# Patient Record
Sex: FEMALE | Race: BLACK OR AFRICAN AMERICAN | NOT HISPANIC OR LATINO | Employment: FULL TIME | URBAN - METROPOLITAN AREA
[De-identification: names, ages, dates, MRNs, and addresses within clinical notes are randomized per-mention and may not be internally consistent; named-entity substitution may affect disease eponyms.]

---

## 2017-01-31 DIAGNOSIS — E03.2 HYPOTHYROIDISM DUE TO MEDICATION: Primary | ICD-10-CM

## 2017-01-31 RX ORDER — LEVOTHYROXINE SODIUM 112 UG/1
112 TABLET ORAL DAILY
Qty: 90 TABLET | Refills: 1 | Status: SHIPPED | OUTPATIENT
Start: 2017-01-31 | End: 2017-03-17

## 2017-01-31 NOTE — TELEPHONE ENCOUNTER
Synthroid      Last Written Prescription Date:  1/28/16  Last Fill Quantity: 90,   # refills: 3  Last Office Visit : 10/25/16  Future Office visit:  no

## 2017-02-08 ENCOUNTER — OFFICE VISIT (OUTPATIENT)
Dept: FAMILY MEDICINE | Facility: CLINIC | Age: 42
End: 2017-02-08
Payer: COMMERCIAL

## 2017-02-08 ENCOUNTER — PRE VISIT (OUTPATIENT)
Dept: UROLOGY | Facility: CLINIC | Age: 42
End: 2017-02-08

## 2017-02-08 VITALS
OXYGEN SATURATION: 100 % | SYSTOLIC BLOOD PRESSURE: 102 MMHG | BODY MASS INDEX: 29.32 KG/M2 | DIASTOLIC BLOOD PRESSURE: 66 MMHG | HEART RATE: 69 BPM | RESPIRATION RATE: 12 BRPM | HEIGHT: 65 IN | WEIGHT: 176 LBS | TEMPERATURE: 97.5 F

## 2017-02-08 DIAGNOSIS — Z11.3 SCREEN FOR STD (SEXUALLY TRANSMITTED DISEASE): ICD-10-CM

## 2017-02-08 DIAGNOSIS — N89.8 VAGINAL ITCHING: ICD-10-CM

## 2017-02-08 DIAGNOSIS — B96.89 BACTERIAL VAGINOSIS: Primary | ICD-10-CM

## 2017-02-08 DIAGNOSIS — N76.0 BACTERIAL VAGINOSIS: Primary | ICD-10-CM

## 2017-02-08 LAB
MICRO REPORT STATUS: ABNORMAL
SPECIMEN SOURCE: ABNORMAL
WET PREP SPEC: ABNORMAL

## 2017-02-08 PROCEDURE — 87491 CHLMYD TRACH DNA AMP PROBE: CPT | Performed by: NURSE PRACTITIONER

## 2017-02-08 PROCEDURE — 87210 SMEAR WET MOUNT SALINE/INK: CPT | Performed by: NURSE PRACTITIONER

## 2017-02-08 PROCEDURE — 87591 N.GONORRHOEAE DNA AMP PROB: CPT | Performed by: NURSE PRACTITIONER

## 2017-02-08 PROCEDURE — 99213 OFFICE O/P EST LOW 20 MIN: CPT | Performed by: NURSE PRACTITIONER

## 2017-02-08 RX ORDER — METRONIDAZOLE 500 MG/1
500 TABLET ORAL 2 TIMES DAILY
Qty: 14 TABLET | Refills: 0 | Status: SHIPPED | OUTPATIENT
Start: 2017-02-08 | End: 2017-03-09

## 2017-02-08 NOTE — PATIENT INSTRUCTIONS
Bacterial Vaginosis    You have a vaginal infection called bacterial vaginosis (BV). Both good and bad bacteria are present in a healthy vagina. BV occurs when these bacteria get out of balance. The number of bad bacteria increase. And the number of good bacteria decrease.  BV may or may not cause symptoms. If symptoms do occur, they can include:    Thin, gray, milky-white, or sometimes green discharge    Unpleasant odor or  fishy  smell    Itching, burning, or pain in or around the vagina  It is not known what causes BV, but certain factors can make the problem more likely. This can include:    Douching    Having sex with a new partner    Having sex with more than one partner  BV will sometimes go away on its own. But treatment is usually recommended. This is because untreated BV can increase the risk of more serious health problems such as:    Pelvic inflammatory disease (PID)     delivery (giving birth to a baby early if you re pregnant)    HIV and certain other sexually transmitted diseases (STDs)    Infection after surgery on the reproductive organs  Home care  General care    BV is most often treated with medicines called antibiotics. These may be given as pills or as a vaginal cream. If antibiotics are prescribed, be sure to use them exactly as directed. Also, be sure to complete all of the medicine, even if your symptoms go away.    Avoid douching or having sex during treatment.    If you have sex with a female partner, ask your healthcare provider if she should also be treated.  Prevention    Limit or avoid douching.    Avoid having sex. If you do have sex, then take steps to lower your risk:    Use condoms when having sex.    Limit the number of partners you have sex with.  Follow-up care  Follow up with your healthcare provider, or as advised.  When to seek medical advice  Call your healthcare provider right away if:    You have a fever of 100.4 F (38 C) or higher, or as directed by your  provider.    Your symptoms worsen, or they don t go away within a few days of starting treatment.    You have new pain in the lower belly or pelvic region.    You have side effects that bother you or a reaction to the pills or cream you re prescribed.    You or any partners you have sex with have new symptoms, such as a rash, joint pain, or sores.    0398-3309 [employer]. [address] [place] [address], [place] [address]. All rights reserved. This information is not intended as a substitute for professional medical care. Always follow your healthcare professional's instructions.

## 2017-02-08 NOTE — TELEPHONE ENCOUNTER
Pt had a pimple removed from her urethra >10yrs ago from what sounded like [facility]. Pt able to sign [facility] consent form on day of appt if necessary. jessa

## 2017-02-08 NOTE — PROGRESS NOTES
SUBJECTIVE:                                                    Mimi Melton is a 41 year old female who presents to clinic today for the following health issues:      Vaginal Symptoms      Duration: 1 week     Description  Itching. She initially had some itching but that has resolved.    Intensity:  moderate    Accompanying signs and symptoms (fever/dysuria/abdominal or back pain): No fever, chills.     History  Sexually active: yes, single partner, contraception - IUD  Possibility of pregnancy: No  Recent antibiotic use: no     Precipitating or alleviating factors: None    Therapies tried and outcome: none            Problem list and histories reviewed & adjusted, as indicated.  Additional history: as documented    Patient Active Problem List   Diagnosis     Surveillance of previously prescribed intrauterine contraceptive device     Vaginitis and vulvovaginitis     Exophthalmos     Abnormal Pap smear of cervix     CARDIOVASCULAR SCREENING; LDL GOAL LESS THAN 160     Essential hypertension with goal blood pressure less than 140/90     Anemia     Hypothyroidism, unspecified hypothyroidism type     Cervical high risk HPV (human papillomavirus) test positive     Thyroid disease     Past Surgical History   Procedure Laterality Date     C  delivery only  91     , Low Cervical     C  delivery only  97     , Low Cervical     C  delivery only  99     , Low Cervical     C induced abortn by d&c       Aspiration & Curettage, TAB x2     Hc repair incisional hernia,reducible       Umbilical hernia Repair     Eye surgery  2008     Orbital Decompression Dr smart       Social History   Substance Use Topics     Smoking status: Never Smoker      Smokeless tobacco: Never Used     Alcohol Use: Yes      Comment: occasional     Family History   Problem Relation Age of Onset     Hypertension Mother      Hypertension Maternal Grandfather      Hypertension  "Maternal Grandmother      Hypertension Father      Hypertension Paternal Grandmother      Hypertension Paternal Grandfather          Current Outpatient Prescriptions   Medication Sig Dispense Refill     levothyroxine (SYNTHROID) 112 MCG tablet Take 1 tablet (112 mcg) by mouth daily 90 tablet 1     lisinopril-hydrochlorothiazide (PRINZIDE,ZESTORETIC) 10-12.5 MG per tablet Take 1 tablet by mouth daily 90 tablet 3     Allergies   Allergen Reactions     No Known Drug Allergies      Recent Labs   Lab Test  10/20/16   1350  08/09/16   0848   07/28/15   0927   12/28/12   1458   LDL   --   99   --   83   --   98   HDL   --   53   --   59   --   57   TRIG   --   90   --   44   --   82   ALT   --   28   --   14   --   20   CR   --   0.93   --   1.00   < >  0.76   GFRESTIMATED   --   67   --   62   < >  86   GFRESTBLACK   --   81   --   75   < >  >90   POTASSIUM   --   3.6   --   4.1   < >  3.7   TSH  0.36*  0.44   < >  3.98   < >  0.88    < > = values in this interval not displayed.      BP Readings from Last 3 Encounters:   02/08/17 102/66   12/08/16 110/79   10/25/16 108/75    Wt Readings from Last 3 Encounters:   02/08/17 176 lb (79.833 kg)   12/08/16 176 lb (79.833 kg)   10/25/16 181 lb (82.101 kg)               Labs reviewed in EPIC  Problem list, Medication list, Allergies, and Medical/Social/Surgical histories reviewed in [facility] and updated as appropriate.    ROS:  Constitutional, HEENT, cardiovascular, pulmonary, gi and gu systems are negative, except as otherwise noted.    OBJECTIVE:                                                    /66 mmHg  Pulse 69  Temp(Src) 97.5  F (36.4  C) (Oral)  Resp 12  Ht 5' 5\" (1.651 m)  Wt 176 lb (79.833 kg)  BMI 29.29 kg/m2  SpO2 100%  Body mass index is 29.29 kg/(m^2).  GENERAL APPEARANCE: healthy, alert and no distress. Smiling.   SKIN: warm and dry  PSYCH: mentation appears normal and affect normal/bright.  Good eye contact.       ASSESSMENT/PLAN:                          "                           (N76.0,  B96.89) Bacterial vaginosis  (primary encounter diagnosis)  Comment:   Plan: Wet prep, metroNIDAZOLE (FLAGYL) 500 MG tablet        Take the flagyl/metronidazole as prescribed and abstain from all alcohol.    Anticipate steady resolution of symptoms.   Return to the clinic for a recheck if the sx do not resolve with the therapy or worsen in any way.      (L29.8) Vaginal itching  Comment:   Plan: Wet prep, Chlamydia trachomatis PCR, Neisseria         gonorrhoeae PCR            (Z11.3) Screen for STD (sexually transmitted disease)  Comment:   Plan: Chlamydia trachomatis PCR, Neisseria         gonorrhoeae PCR                  MARGA Casey [facility]

## 2017-02-08 NOTE — NURSING NOTE
"Chief Complaint   Patient presents with     Vaginal Problem       Initial /66 mmHg  Pulse 69  Temp(Src) 97.5  F (36.4  C) (Oral)  Resp 12  Ht 5' 5\" (1.651 m)  Wt 176 lb (79.833 kg)  BMI 29.29 kg/m2  SpO2 100% Estimated body mass index is 29.29 kg/(m^2) as calculated from the following:    Height as of this encounter: 5' 5\" (1.651 m).    Weight as of this encounter: 176 lb (79.833 kg).  Medication Reconciliation: complete       Kathleen Bucio MA     "

## 2017-02-08 NOTE — MR AVS SNAPSHOT
After Visit Summary   2017    Mimi Melton    MRN: [id number]           Patient Information     Date Of Birth          1975        Visit Information        Provider Department      2017 4:20 PM Kalli Roldan APRN [facility]        Today's Diagnoses     Bacterial vaginosis    -  1     Vaginal itching         Screen for STD (sexually transmitted disease)           Care Instructions      Bacterial Vaginosis    You have a vaginal infection called bacterial vaginosis (BV). Both good and bad bacteria are present in a healthy vagina. BV occurs when these bacteria get out of balance. The number of bad bacteria increase. And the number of good bacteria decrease.  BV may or may not cause symptoms. If symptoms do occur, they can include:    Thin, gray, milky-white, or sometimes green discharge    Unpleasant odor or  fishy  smell    Itching, burning, or pain in or around the vagina  It is not known what causes BV, but certain factors can make the problem more likely. This can include:    Douching    Having sex with a new partner    Having sex with more than one partner  BV will sometimes go away on its own. But treatment is usually recommended. This is because untreated BV can increase the risk of more serious health problems such as:    Pelvic inflammatory disease (PID)     delivery (giving birth to a baby early if you re pregnant)    HIV and certain other sexually transmitted diseases (STDs)    Infection after surgery on the reproductive organs  Home care  General care    BV is most often treated with medicines called antibiotics. These may be given as pills or as a vaginal cream. If antibiotics are prescribed, be sure to use them exactly as directed. Also, be sure to complete all of the medicine, even if your symptoms go away.    Avoid douching or having sex during treatment.    If you have sex with a female partner, ask your healthcare provider if she  should also be treated.  Prevention    Limit or avoid douching.    Avoid having sex. If you do have sex, then take steps to lower your risk:    Use condoms when having sex.    Limit the number of partners you have sex with.  Follow-up care  Follow up with your healthcare provider, or as advised.  When to seek medical advice  Call your healthcare provider right away if:    You have a fever of 100.4 F (38 C) or higher, or as directed by your provider.    Your symptoms worsen, or they don t go away within a few days of starting treatment.    You have new pain in the lower belly or pelvic region.    You have side effects that bother you or a reaction to the pills or cream you re prescribed.    You or any partners you have sex with have new symptoms, such as a rash, joint pain, or sores.    2609-2827 [employer]. [address] [place] [address]. All rights reserved. This information is not intended as a substitute for professional medical care. Always follow your healthcare professional's instructions.              Follow-ups after your visit        Your next 10 appointments already scheduled     Feb 27, 2017  9:30 AM   (Arrive by 9:15 AM)   NEW FEMALE PELVIC with Lenore Alex PA-C   [facility] and [facility] ([facility])    [address] [place] [id number] [phone]            Apr 17, 2017  8:00 AM   (Arrive by 7:45 AM)   RETURN PLASTICS with Niles Donnelly MD   [facility]hthal[facility] ([facility])    [address] [place] [id number] [phone]              Who to contact     If you have questions or need follow up information about today's clinic visit or your schedule please contact [facility] directly at [phone].  Normal or non-critical lab and imaging results will be communicated to you by MyChart, letter or phone  "within 4 business days after the clinic has received the results. If you do not hear from us within 7 days, please contact the clinic through [employer] or phone. If you have a critical or abnormal lab result, we will notify you by phone as soon as possible.  Submit refill requests through [employer] or call your pharmacy and they will forward the refill request to us. Please allow 3 business days for your refill to be completed.          Additional Information About Your Visit        [employer] Information     [employer] gives you secure access to your electronic health record. If you see a primary care provider, you can also send messages to your care team and make appointments. If you have questions, please call your primary care clinic.  If you do not have a primary care provider, please call [phone] and they will assist you.        Care EveryWhere ID     This is your Care EveryWhere ID. This could be used by other organizations to access your [facility] medical records  [id number]        Your Vitals Were     Pulse Temperature Respirations Height BMI (Body Mass Index) Pulse Oximetry    69 97.5  F (36.4  C) (Oral) 12 5' 5\" (1.651 m) 29.29 kg/m2 100%       Blood Pressure from Last 3 Encounters:   02/08/17 102/66   12/08/16 110/79   10/25/16 108/75    Weight from Last 3 Encounters:   02/08/17 176 lb (79.833 kg)   12/08/16 176 lb (79.833 kg)   10/25/16 181 lb (82.101 kg)              We Performed the Following     Chlamydia trachomatis PCR     Neisseria gonorrhoeae PCR     Wet prep          Today's Medication Changes          These changes are accurate as of: 2/8/17  4:59 PM.  If you have any questions, ask your nurse or doctor.               Start taking these medicines.        Dose/Directions    metroNIDAZOLE 500 MG tablet   Commonly known as:  FLAGYL   Used for:  Bacterial vaginosis   Started by:  Kalli Roldan APRN CNP        Dose:  500 mg   Take 1 tablet (500 mg) by mouth 2 times daily   Quantity:  14 " tablet   Refills:  0            Where to get your medicines      These medications were sent to [id number] - [place], [place] - [address]  [address], [facility] [address]     Phone:  [phone]    - metroNIDAZOLE 500 MG tablet             Primary Care Provider Office Phone # Fax #    MARGA Lyon [phone]       [place] [address] [place] [address]        Thank you!     Thank you for choosing [facility]  for your care. Our goal is always to provide you with excellent care. Hearing back from our patients is one way we can continue to improve our services. Please take a few minutes to complete the written survey that you may receive in the mail after your visit with us. Thank you!             Your Updated Medication List - Protect others around you: Learn how to safely use, store and throw away your medicines at www.disposemymeds.org.          This list is accurate as of: 2/8/17  4:59 PM.  Always use your most recent med list.                   Brand Name Dispense Instructions for use    levothyroxine 112 MCG tablet    SYNTHROID    90 tablet    Take 1 tablet (112 mcg) by mouth daily       lisinopril-hydrochlorothiazide 10-12.5 MG per tablet    PRINZIDE/ZESTORETIC    90 tablet    Take 1 tablet by mouth daily       metroNIDAZOLE 500 MG tablet    FLAGYL    14 tablet    Take 1 tablet (500 mg) by mouth 2 times daily

## 2017-02-10 LAB
C TRACH DNA SPEC QL NAA+PROBE: NORMAL
N GONORRHOEA DNA SPEC QL NAA+PROBE: NORMAL
SPECIMEN SOURCE: NORMAL
SPECIMEN SOURCE: NORMAL

## 2017-02-13 NOTE — PROGRESS NOTES
Mi Le,    This note is to let you know that your recent tests for gonorrhea and chlamydia both came back negative. Let me know if you have any questions..    Kalli GRESHAM CNP

## 2017-02-27 ENCOUNTER — OFFICE VISIT (OUTPATIENT)
Dept: UROLOGY | Facility: CLINIC | Age: 42
End: 2017-02-27

## 2017-02-27 VITALS
HEIGHT: 65 IN | SYSTOLIC BLOOD PRESSURE: 98 MMHG | HEART RATE: 83 BPM | BODY MASS INDEX: 28.99 KG/M2 | WEIGHT: 174 LBS | DIASTOLIC BLOOD PRESSURE: 63 MMHG

## 2017-02-27 DIAGNOSIS — N39.3 FEMALE STRESS INCONTINENCE: Primary | ICD-10-CM

## 2017-02-27 ASSESSMENT — PAIN SCALES - GENERAL: PAINLEVEL: NO PAIN (0)

## 2017-02-27 NOTE — LETTER
"2017       RE: Mimi Melton  [address] [facility] [address]     Dear Colleague,    Thank you for referring your patient, Mimi Melton, to the [facility]OLOGY AND INST FOR PROSTATE AND UROLOGIC CANCERS at [facility] Please see a copy of my visit note below.    CC: urinary incontinence.    HPI: It is a pleasure to see Ms. Mimi Melton, a very pleasant 41 year old female who presents via self-referral for evaluation of urinary incontinence.  This problem has been going on for 1 year and has been getting progressively worse.  Incontinence is associated with laughing, sneezing, coughing, and bending at the waist.  She has multiple episodes of incontinence per day and has been using 2 Poise pads per day.  She denies frequency, urgency, or urge incontinence. Voids q3-4 hours during the day, nocturia x 0. States she finds herself unconsciously holding her urine beyond the urge to void at times.    Ms. Melton has tried Kegels in the past for her condition, which have not helped.  She denies any dysuria, nocturia, hematuria, pyuria, hesitancy, intermittency, feeling of incomplete emptying, or any recent history of UTI's or stones. Had what she describes as a \"urethral pimple\" for which she saw an outside urology group in the remote past. States this eventually \"popped\" but then she had a procedure to suture this area closed a few weeks later. She thinks maybe this was a urethral diverticulum but is not sure.    The patient has intermittent constipation, no splinting.  She is sexually active and denies any dyspareunia or pelvic pain.   She denies a vaginal bulge.  She has no neurological or balance problems.    OBSTETRIC HISTORY:    She is .  Babies were delivered  x 3.  The weight of her largest baby was 6 lbs 5 oz.    Periods are regular.    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 or other cause, without mention of thyrotoxic crisis or storm 3/05     Had radioablation, On synthroid, seeing [facility]; takes synthroid     Unspecified essential hypertension 1980     meds since , on atenolol     Past Surgical History   Procedure Laterality Date     C  delivery only  91     , Low Cervical     C  delivery only  97     , Low Cervical     C  delivery only  99     , Low Cervical     C induced abortn by d&c       Aspiration & Curettage, TAB x2     Hc repair incisional hernia,reducible       Umbilical hernia Repair     Eye surgery  2008     Orbital Decompression Dr smart     Current Outpatient Prescriptions   Medication Sig Dispense Refill     metroNIDAZOLE (FLAGYL) 500 MG tablet Take 1 tablet (500 mg) by mouth 2 times daily 14 tablet 0     levothyroxine (SYNTHROID) 112 MCG tablet Take 1 tablet (112 mcg) by mouth daily 90 tablet 1     lisinopril-hydrochlorothiazide (PRINZIDE,ZESTORETIC) 10-12.5 MG per tablet Take 1 tablet by mouth daily 90 tablet 3     Allergies   Allergen Reactions     No Known Drug Allergies        FAMILY HISTORY: The patient denies any history of genitourinary carcinoma and denies history or urolithiasis.    SOCIAL HISTORY: The patient does not smoke cigarettes, minimal EtOH, and no illicit drug use.     ROS: A comprehensive 14 point ROS was obtained and is positive for HTN, hypothyroidism.  The remainder of the ROS is negative except for that outlined above in the HPI.    PHYSICAL EXAM:   Vitals:    17 0942   BP: 98/63   Pulse: 83   Weight: 78.9 kg (174 lb)   Height: 1.651 m (5' 5\")     GENERAL: Well groomed/well developed/well nourished female in NAD.  HEENT: EOMI, AT, NC.  SKIN: Warm to touch, dry.  No visible rashes or " lesions.  RESP: No increased respiratory effort.  LYMPH: No LE edema.  ABD: Soft, NT, ND.  No CVAT.    MS: Full ROM in extremities.  PELVIC:    Examined in the dorsal lithotomy position with and without speculum.                 Normal external genitalia and introitus, no atrophic changes.   Urethra patent. Mild degree of urethral hypermobility with Valsalva.   Stress incontinence was not demonstrated with coughing (although patient had voided just prior to appt and her PVR was 0 cc)   Very mild bulge at vaginal introitus but no obvious cystocele or rectocele.   Pelvic floor muscles of normal tonicity, diffuse mild myofascial tenderness. Kegels 2-3/5.   Perineum wnl, no palpable masses. Rectal exam deferred.  NEURO: Alert and oriented x 3.  PSYCH: Normal mood and affect, pleasant and agreeable during interview and exam.    PVR: Postvoid residual urine volume as measured by ultrasound was 0 mL.    ASSESSMENT and PLAN:    Ms. Mimi Melton is a very pleasant 41 year old female with stress urinary incontinence.  Different management options were discussed today with the patient including observation, Kegel exercises, dedicated pelvic floor physical therapy with biofeedback, and potential surgery. She does not believe she will be able to make PT work with her schedule and would like to discuss surgical options.   - Referral placed for pelvic floor PT through [facility]. I informed her that I do believe she would benefit from PT given her myofascial findings on exam. She is willing to consider this and will call to schedule.  - Per patient request, she would also like to meet with one of our female urologists to discuss possible surgical intervention for KARMA. Scheduled with Dr. Amin.    I have enjoyed participating in the medical care of this very pleasant patient.  Please don't hesitate to contact me with any questions or concerns.     Lenore Alex PA-C  Department of Urology

## 2017-02-27 NOTE — MR AVS SNAPSHOT
After Visit Summary   2/27/2017    Mimi Melton    MRN: [id number]           Patient Information     Date Of Birth          1975        Visit Information        Provider Department      2/27/2017 9:30 AM Lenore Alex PA-C [facility] Urology and [facility] for Prostate and Urologic Cancers        Today's Diagnoses     Female stress incontinence    -  1      Care Instructions    Please see one of the dedicated pelvic floor physical therapists ([facility] [phone])    If no improvement with PT, follow up with Dr. Amin to discuss possible surgery for stress incontinence if that is what you desire.    It was a pleasure meeting with you today.  Thank you for allowing me and my team the privilege of caring for you today.  YOU are the reason we are here, and I truly hope we provided you with the excellent service you deserve.  Please let us know if there is anything else we can do for you so that we can be sure you are leaving completely satisfied with your care experience.            Follow-ups after your visit        Additional Services     MAY Physical Therapy Referral       **This order will print in the [facility] Scheduling Office**    Physical Therapy available through:    *"[employer] for [facility] [employer]    Call one number to schedule at any of the above locations: [phone].    Your provider has referred you to: Physical Therapy at [facility] or [facility]    Indication/Reason for Referral: Women's Health  Onset of Illness: 1 year  Therapy Orders: Evaluate and Treat  Special Programs: None and Women's Health: Pelvic Dysfunction  Pelvic Floor Weakness: Incontinence: stress and  Biofeedback, E-Stim/TENS/TENS Rental if deemed appropriate by therapist, Exercise/HEP and Myofascial Release/Massage (internal)  Special Request: Exercise: Home Exercise Program  Manual Therapy: Myofascial Release/Massage (internal)  Modalities: As Indicated E-Stim/TENS    Additional Comments  for the Therapist : Core strengthening    Please be aware that coverage of these services is subject to the terms and limitations of your health insurance plan.  Call member services at your health plan with any benefit or coverage questions.      Please bring the following to your appointment:    *Your personal calendar for scheduling future appointments  *Comfortable clothing                  Your next 10 appointments already scheduled     Apr 12, 2017  2:30 PM CDT   (Arrive by 2:15 PM)   Return Visit with Karin Amin MD   [facility] Urology and [facility] for Prostate and Urologic Cancers ([facility])    [address] [place] [address] [phone]            Apr 17, 2017  8:00 AM CDT   (Arrive by 7:45 AM)   RETURN PLASTICS with Niles Donnelly MD   [facility] Ophthalmology ([facility])    [address] [place] [address] [phone]              Who to contact     Please call your clinic at [phone] to:    Ask questions about your health    Make or cancel appointments    Discuss your medicines    Learn about your test results    Speak to your doctor   If you have compliments or concerns about an experience at your clinic, or if you wish to file a complaint, please contact [facility] Patient Relations at [phone] or email us at [email or web address]         Additional Information About Your Visit        [employer] Information     "[employer]t gives you secure access to your electronic health record. If you see a primary care provider, you can also send messages to your care team and make appointments. If you have questions, please call your primary care clinic.  If you do not have a primary care provider, please call [phone] and they will assist you.      [employer] is an electronic gateway that provides easy, online access to your medical records. With [employer], you can  "request a clinic appointment, read your test results, renew a prescription or communicate with your care team.     To access your existing account, please contact your [facility] or call [phone] for assistance.        Care EveryWhere ID     This is your Care EveryWhere ID. This could be used by other organizations to access your [facility] medical records  [id number]        Your Vitals Were     Pulse Height BMI (Body Mass Index)             83 1.651 m (5' 5\") 28.96 kg/m2          Blood Pressure from Last 3 Encounters:   02/27/17 98/63   02/08/17 102/66   12/08/16 110/79    Weight from Last 3 Encounters:   02/27/17 78.9 kg (174 lb)   02/08/17 79.8 kg (176 lb)   12/08/16 79.8 kg (176 lb)              We Performed the Following     MAY Physical Therapy Referral     POST-VOID RESIDUAL BLADDER SCAN        Primary Care Provider Office Phone # Fax #    MARGA Lyon [facility] [phone]       [facility] [address] [place] [address]        Thank you!     Thank you for choosing [facility] UROLOGY AND [facility] FOR PROSTATE AND UROLOGIC CANCERS  for your care. Our goal is always to provide you with excellent care. Hearing back from our patients is one way we can continue to improve our services. Please take a few minutes to complete the written survey that you may receive in the mail after your visit with us. Thank you!             Your Updated Medication List - Protect others around you: Learn how to safely use, store and throw away your medicines at www.disposemymeds.org.          This list is accurate as of: 2/27/17 10:13 AM.  Always use your most recent med list.                   Brand Name Dispense Instructions for use    levothyroxine 112 MCG tablet    SYNTHROID    90 tablet    Take 1 tablet (112 mcg) by mouth daily       lisinopril-hydrochlorothiazide 10-12.5 MG per tablet    PRINZIDE/ZESTORETIC    90 tablet    Take 1 tablet by mouth " daily       metroNIDAZOLE 500 MG tablet    FLAGYL    14 tablet    Take 1 tablet (500 mg) by mouth 2 times daily

## 2017-02-27 NOTE — PROGRESS NOTES
"CC: urinary incontinence.    HPI: It is a pleasure to see Ms. Mimi Melton, a very pleasant 41 year old female who presents via self-referral for evaluation of urinary incontinence.  This problem has been going on for 1 year and has been getting progressively worse.  Incontinence is associated with laughing, sneezing, coughing, and bending at the waist.  She has multiple episodes of incontinence per day and has been using 2 Poise pads per day.  She denies frequency, urgency, or urge incontinence. Voids q3-4 hours during the day, nocturia x 0. States she finds herself unconsciously holding her urine beyond the urge to void at times.    Ms. Melton has tried Kegels in the past for her condition, which have not helped.  She denies any dysuria, nocturia, hematuria, pyuria, hesitancy, intermittency, feeling of incomplete emptying, or any recent history of UTI's or stones. Had what she describes as a \"urethral pimple\" for which she saw an outside urology group in the remote past. States this eventually \"popped\" but then she had a procedure to suture this area closed a few weeks later. She thinks maybe this was a urethral diverticulum but is not sure.    The patient has intermittent constipation, no splinting.  She is sexually active and denies any dyspareunia or pelvic pain.   She denies a vaginal bulge.  She has no neurological or balance problems.    OBSTETRIC HISTORY:    She is .  Babies were delivered  x 3.  The weight of her largest baby was 6 lbs 5 oz.    Periods are regular.    Past Medical History   Diagnosis Date     Cervical high risk HPV (human papillomavirus) test positive 2015     NIL pap, + HPV (not 16 or 18)     Esophageal reflux      Exophthalmos, unspecified      Hypothyroidism      Thyroid eye disease      Thyrotoxicosis without mention of goiter or other cause, without mention of thyrotoxic crisis or storm 3/05     Had radioablation, On synthroid, seeing [facility]; takes synthroid " "    Unspecified essential hypertension 1980     meds since , on atenolol     Past Surgical History   Procedure Laterality Date     C  delivery only  91     , Low Cervical     C  delivery only  97     , Low Cervical     C  delivery only  99     , Low Cervical     C induced abortn by d&c       Aspiration & Curettage, TAB x2     Hc repair incisional hernia,reducible       Umbilical hernia Repair     Eye surgery  2008     Orbital Decompression Dr smart     Current Outpatient Prescriptions   Medication Sig Dispense Refill     metroNIDAZOLE (FLAGYL) 500 MG tablet Take 1 tablet (500 mg) by mouth 2 times daily 14 tablet 0     levothyroxine (SYNTHROID) 112 MCG tablet Take 1 tablet (112 mcg) by mouth daily 90 tablet 1     lisinopril-hydrochlorothiazide (PRINZIDE,ZESTORETIC) 10-12.5 MG per tablet Take 1 tablet by mouth daily 90 tablet 3     Allergies   Allergen Reactions     No Known Drug Allergies        FAMILY HISTORY: The patient denies any history of genitourinary carcinoma and denies history or urolithiasis.    SOCIAL HISTORY: The patient does not smoke cigarettes, minimal EtOH, and no illicit drug use.     ROS: A comprehensive 14 point ROS was obtained and is positive for HTN, hypothyroidism.  The remainder of the ROS is negative except for that outlined above in the HPI.    PHYSICAL EXAM:   Vitals:    17 0942   BP: 98/63   Pulse: 83   Weight: 78.9 kg (174 lb)   Height: 1.651 m (5' 5\")     GENERAL: Well groomed/well developed/well nourished female in NAD.  HEENT: EOMI, AT, NC.  SKIN: Warm to touch, dry.  No visible rashes or lesions.  RESP: No increased respiratory effort.  LYMPH: No LE edema.  ABD: Soft, NT, ND.  No CVAT.    MS: Full ROM in extremities.  PELVIC:    Examined in the dorsal lithotomy position with and without speculum.                 Normal external genitalia and introitus, no atrophic changes.   Urethra patent. Mild " degree of urethral hypermobility with Valsalva.   Stress incontinence was not demonstrated with coughing (although patient had voided just prior to appt and her PVR was 0 cc)   Very mild bulge at vaginal introitus but no obvious cystocele or rectocele.   Pelvic floor muscles of normal tonicity, diffuse mild myofascial tenderness. Kegels 2-3/5.   Perineum wnl, no palpable masses. Rectal exam deferred.  NEURO: Alert and oriented x 3.  PSYCH: Normal mood and affect, pleasant and agreeable during interview and exam.    PVR: Postvoid residual urine volume as measured by ultrasound was 0 mL.    ASSESSMENT and PLAN:    Ms. Mimi Melton is a very pleasant 41 year old female with stress urinary incontinence.  Different management options were discussed today with the patient including observation, Kegel exercises, dedicated pelvic floor physical therapy with biofeedback, and potential surgery. She does not believe she will be able to make PT work with her schedule and would like to discuss surgical options.   - Referral placed for pelvic floor PT through [facility]. I informed her that I do believe she would benefit from PT given her myofascial findings on exam. She is willing to consider this and will call to schedule.  - Per patient request, she would also like to meet with one of our female urologists to discuss possible surgical intervention for KARMA. Scheduled with Dr. Amin.    I have enjoyed participating in the medical care of this very pleasant patient.  Please don't hesitate to contact me with any questions or concerns.     Lenore Alex PA-C  Department of Urology

## 2017-02-27 NOTE — PATIENT INSTRUCTIONS
Please see one of the dedicated pelvic floor physical therapists ([facility] [phone])    If no improvement with PT, follow up with Dr. Amin to discuss possible surgery for stress incontinence if that is what you desire.    It was a pleasure meeting with you today.  Thank you for allowing me and my team the privilege of caring for you today.  YOU are the reason we are here, and I truly hope we provided you with the excellent service you deserve.  Please let us know if there is anything else we can do for you so that we can be sure you are leaving completely satisfied with your care experience.

## 2017-03-06 ENCOUNTER — PRE VISIT (OUTPATIENT)
Dept: OTOLARYNGOLOGY | Facility: CLINIC | Age: 42
End: 2017-03-06

## 2017-03-06 NOTE — TELEPHONE ENCOUNTER
1.  Date/reason for appt: 3/9/17 - chornic nasal congestion  2.  Referring provider: Kalli Roldan w/ [facility]  3.  Call to patient (Yes / No - short description): no, recs in epic  4.  Previous care at:   - [facility]   - [facility] (saw Dr. Rubalcava in [phone])

## 2017-03-09 ENCOUNTER — CARE COORDINATION (OUTPATIENT)
Dept: OTOLARYNGOLOGY | Facility: CLINIC | Age: 42
End: 2017-03-09

## 2017-03-09 ENCOUNTER — OFFICE VISIT (OUTPATIENT)
Dept: OTOLARYNGOLOGY | Facility: CLINIC | Age: 42
End: 2017-03-09

## 2017-03-09 VITALS — WEIGHT: 174 LBS | HEIGHT: 65 IN | BODY MASS INDEX: 28.99 KG/M2

## 2017-03-09 DIAGNOSIS — R09.81 NASAL CONGESTION: ICD-10-CM

## 2017-03-09 DIAGNOSIS — J34.89 NASAL VESTIBULITIS: Primary | ICD-10-CM

## 2017-03-09 LAB
BASOPHILS # BLD AUTO: 0 10E9/L (ref 0–0.2)
BASOPHILS NFR BLD AUTO: 0.2 %
CRP SERPL-MCNC: <2.9 MG/L (ref 0–8)
DIFFERENTIAL METHOD BLD: NORMAL
EOSINOPHIL # BLD AUTO: 0.2 10E9/L (ref 0–0.7)
EOSINOPHIL NFR BLD AUTO: 3.4 %
ERYTHROCYTE [DISTWIDTH] IN BLOOD BY AUTOMATED COUNT: 13 % (ref 10–15)
HCT VFR BLD AUTO: 36.8 % (ref 35–47)
HGB BLD-MCNC: 11.7 G/DL (ref 11.7–15.7)
IMM GRANULOCYTES # BLD: 0 10E9/L (ref 0–0.4)
IMM GRANULOCYTES NFR BLD: 0.4 %
LYMPHOCYTES # BLD AUTO: 1.5 10E9/L (ref 0.8–5.3)
LYMPHOCYTES NFR BLD AUTO: 32.2 %
MCH RBC QN AUTO: 28.6 PG (ref 26.5–33)
MCHC RBC AUTO-ENTMCNC: 31.8 G/DL (ref 31.5–36.5)
MCV RBC AUTO: 90 FL (ref 78–100)
MONOCYTES # BLD AUTO: 0.4 10E9/L (ref 0–1.3)
MONOCYTES NFR BLD AUTO: 9.1 %
NEUTROPHILS # BLD AUTO: 2.6 10E9/L (ref 1.6–8.3)
NEUTROPHILS NFR BLD AUTO: 54.7 %
NRBC # BLD AUTO: 0 10*3/UL
NRBC BLD AUTO-RTO: 0 /100
PLATELET # BLD AUTO: 349 10E9/L (ref 150–450)
RBC # BLD AUTO: 4.09 10E12/L (ref 3.8–5.2)
WBC # BLD AUTO: 4.8 10E9/L (ref 4–11)

## 2017-03-09 RX ORDER — MUPIROCIN 20 MG/G
OINTMENT TOPICAL
Qty: 22 G | Refills: 0 | Status: SHIPPED | OUTPATIENT
Start: 2017-03-09 | End: 2017-09-05

## 2017-03-09 ASSESSMENT — PAIN SCALES - GENERAL: PAINLEVEL: NO PAIN (0)

## 2017-03-09 NOTE — PROGRESS NOTES
Janette, Please contact patient by letter/phone with following results:  CRP and WBC normal, would start flonase and recommend allergy evaluation.  IgE still pending, but given other results infection is very unlikely.

## 2017-03-09 NOTE — PROGRESS NOTES
Otolaryngology Adult Consultation    Patient: Mimi Melton   : 1975     Patient presents with:  Consult:   Chief Complaint   Patient presents with     Consult     New Chronic Nasal congestion         Referring Provider: Kalli Roldan   Consulting Physician:  Jasmin Cisneros MD       Assessment/Plan: ASSESSMENT AND PLAN:  Mimi Melton has recurrent acute bouts of nasal congestion and rhinorrhea.  It is unclear to me whether or not this is allergies or infection or a combination of both.  I am recommending that she go to the lab today for a CBC with a differential, IgE, and CRP level.  We will contact her with the results.  If this shows signs of infection, we will consider starting her on antibiotics.  If her laboratory tests are normal or point toward allergy, we will start her on Flonase and have her see the allergist.  I am also going to give her mupirocin ointment for her nasal vestibulitis.  I am happy to see her back to discuss the results or if she sees the allergist and does not have benefit.          HPI: Mimi Melton is a 41 year old female seen today in the Otolaryngology Clinic for nine month history of chronic nasal congestion.  She states that at least monthly, she has had bouts of severe congestion.  It usually starts with a sore throat and then spreads to nasal congestion, coughing, and rhinorrhea.  She takes antibiotics and improves and then seems to have increased symptoms again right away.  She has been taking Zithromax off and on for this.  In October, her primary MD recommended that she see a specialist.  She has also been using a generic nasal spray.  She is not sure what it is.  She has been taking Sudafed over-the-counter for some of these episodes as well.  She has some irritation around the lower aspect of her nose.  She has been using cough syrup for coughing.  She is here because the recurrent nature of these episodes are becoming quite bothersome for her.            Current Outpatient Prescriptions on File Prior to Visit:  levothyroxine (SYNTHROID) 112 MCG tablet Take 1 tablet (112 mcg) by mouth daily   lisinopril-hydrochlorothiazide (PRINZIDE,ZESTORETIC) 10-12.5 MG per tablet Take 1 tablet by mouth daily     No current facility-administered medications on file prior to visit.        Allergies   Allergen Reactions     No Known Drug Allergies        Past Medical History   Diagnosis Date     Cervical high risk HPV (human papillomavirus) test positive 12/2015     NIL pap, + HPV (not 16 or 18)     Chronic sinusitis Summer 2016     I get congested every 3-4 weeks     Esophageal reflux      Exophthalmos, unspecified      Hypothyroidism      Thyroid eye disease      Thyrotoxicosis without mention of goiter or other cause, without mention of thyrotoxic crisis or storm 3/05     Had radioablation, On synthroid, seeing [facility]; takes synthroid     Unspecified essential hypertension 1980     meds since 2001, on atenolol         Review of Systems   ENT ROS 3/9/2017   Ears, Nose, Throat Nasal congestion or drainage, Sore throat        14 point ROS neg other than the symptoms noted above.    Physical Exam:    General Assessment   The patient is alert, oriented and in no acute distress.   Head/Face/Scalp  Grossly normal.   Ears  Normal canals, auricles and tympanic membranes.   Nose  External nose is straight, skin is normal. Intranasal exam (anterior rhinoscopy) reveals normal moist mucosa, turbinate tissue without edema, erythema or crusting.  Septum mainly in the midline.  Mucoid rhinorrhea, moderate congestion of mucosa, mild nasal vestibulitis.  Mouth  Oral cavity shows healthy mucosa with out ulceration, masses or other lesions involving the tongue, palate, buccal mucosa, floor of mouth or gingiva.  Dentition in good repair.  Oropharynx with normal tonsils, posterior wall and base of tongue.  Neck  No significant adenopathy, thyroid or salivary gland abnormality.      Imaging:    No results found for this or any previous visit.

## 2017-03-09 NOTE — MR AVS SNAPSHOT
After Visit Summary   3/9/2017    Mimi Melton    MRN: [id number]           Patient Information     Date Of Birth          1975        Visit Information        Provider Department      3/9/2017 8:30 AM Jasmin Cisneros MD [facility] Ear Nose and Throat        Today's Diagnoses     Nasal vestibulitis    -  1    Nasal congestion          Care Instructions    Plan of care:  Blood work today, we will call you with test result  Clinic contact information:  1. To schedule an appointment call [phone], option 1  2. To talk to the Triage RN call [phone], option 3  3. If you need to speak to Janette or get a message to your doctor on a Friday, call the triage RN  4. JanetteRN: [phone]  5. Surgery scheduling:      Leida Merlos: [phone]      Safia : [phone]  6. Fax: [phone]  7. [phone]          Follow-ups after your visit        Your next 10 appointments already scheduled     2017  2:30 PM CDT   (Arrive by 2:15 PM)   Return Visit with Karin Amin MD   [facility] Urology and Inst for Prostate and Urologic Cancers ([facility] [place])    [address] [place] [address] [phone]            2017  8:00 AM CDT   (Arrive by 7:45 AM)   RETURN PLASTICS with Niles Donnelly MD   [facility] Ophthalmology ([facility])    [address] [place] [address] [phone]              Who to contact     Please call your clinic at [phone] to:    Ask questions about your health    Make or cancel appointments    Discuss your medicines    Learn about your test results    Speak to your doctor   If you have compliments or concerns about an experience at your clinic, or if you wish to file a complaint, please contact [facility] Patient Relations at [phone] or email us at [email or web address]         Additional Information  "About Your Visit        [email or web address] Information     [employer] gives you secure access to your electronic health record. If you see a primary care provider, you can also send messages to your care team and make appointments. If you have questions, please call your primary care clinic.  If you do not have a primary care provider, please call [phone] and they will assist you.      [employer] is an electronic gateway that provides easy, online access to your medical records. With [employer], you can request a clinic appointment, read your test results, renew a prescription or communicate with your care team.     To access your existing account, please contact your [facility] or call [phone] for assistance.        Care EveryWhere ID     This is your Care EveryWhere ID. This could be used by other organizations to access your [facility] medical records  [id number]        Your Vitals Were     Height BMI (Body Mass Index)                1.651 m (5' 5\") 28.96 kg/m2           Blood Pressure from Last 3 Encounters:   02/27/17 98/63   02/08/17 102/66   12/08/16 110/79    Weight from Last 3 Encounters:   03/09/17 78.9 kg (174 lb)   02/27/17 78.9 kg (174 lb)   02/08/17 79.8 kg (176 lb)              We Performed the Following     CBC with platelets differential     CRP inflammation     IgE          Today's Medication Changes          These changes are accurate as of: 3/9/17 11:59 PM.  If you have any questions, ask your nurse or doctor.               Start taking these medicines.        Dose/Directions    fluticasone 50 MCG/ACT spray   Commonly known as:  FLONASE   Used for:  Nasal congestion   Started by:  Jasmin Cisneros MD        Dose:  2 spray   Spray 2 sprays into both nostrils daily   Quantity:  1 Bottle   Refills:  6       mupirocin 2 % ointment   Commonly known as:  BACTROBAN   Used for:  Nasal vestibulitis   Started by:  Jasmin Cisneros MD        Use in nose twice daily for 5 days   Quantity:  " 22 g   Refills:  0            Where to get your medicines      These medications were sent to [facility] [place], [place] - [address]  [address], [facility] [address]     Phone:  [phone]     fluticasone 50 MCG/ACT spray    mupirocin 2 % ointment                Primary Care Provider Office Phone # Fax #    MARGA Lyon JUDIE [phone]       [facility] [address] [place] [address]        Thank you!     Thank you for choosing [facility] EAR NOSE AND THROAT  for your care. Our goal is always to provide you with excellent care. Hearing back from our patients is one way we can continue to improve our services. Please take a few minutes to complete the written survey that you may receive in the mail after your visit with us. Thank you!             Your Updated Medication List - Protect others around you: Learn how to safely use, store and throw away your medicines at www.disposemymeds.org.          This list is accurate as of: 3/9/17 11:59 PM.  Always use your most recent med list.                   Brand Name Dispense Instructions for use    fluticasone 50 MCG/ACT spray    FLONASE    1 Bottle    Spray 2 sprays into both nostrils daily       levothyroxine 112 MCG tablet    SYNTHROID    90 tablet    Take 1 tablet (112 mcg) by mouth daily       lisinopril-hydrochlorothiazide 10-12.5 MG per tablet    PRINZIDE/ZESTORETIC    90 tablet    Take 1 tablet by mouth daily       mupirocin 2 % ointment    BACTROBAN    22 g    Use in nose twice daily for 5 days

## 2017-03-09 NOTE — PATIENT INSTRUCTIONS
Plan of care:  Blood work today, we will call you with test result  Clinic contact information:  1. To schedule an appointment call [phone], option 1  2. To talk to the Triage RN call [phone], option 3  3. If you need to speak to Janette or get a message to your doctor on a Friday, call the triage RN  4. JanetteRN: [phone]  5. Surgery scheduling:      Leida Merlos: [phone]      Safia Richter: [phone]  6. Fax: [phone]  7. [phone]

## 2017-03-09 NOTE — PROGRESS NOTES
Labs reviewed by Dr Cisneros:  CRP and WBC normal, would start flonase and recommend allergy evaluation.  IgE still pending, but given other results infection is very unlikely.  Discussed with patient on MyChart.

## 2017-03-09 NOTE — LETTER
3/9/2017       RE: Mimi Melton  [address] [facility] [address]     Dear Colleague,    Thank you for referring your patient, Mimi Melton, to the [facility] EAR NOSE AND THROAT at [facility]. Please see a copy of my visit note below.    Janette, Please contact patient by letter/phone with following results:  CRP and WBC normal, would start flonase and recommend allergy evaluation.  IgE still pending, but given other results infection is very unlikely.    Otolaryngology Adult Consultation    Patient: Mimi Melton   : 1975     Patient presents with:  Consult:   Chief Complaint   Patient presents with     Consult     New Chronic Nasal congestion         Referring Provider: Kalli Roldan   Consulting Physician:  Jasmin Cisneros MD       Assessment/Plan: ASSESSMENT AND PLAN:  Mimi Melton has recurrent acute bouts of nasal congestion and rhinorrhea.  It is unclear to me whether or not this is allergies or infection or a combination of both.  I am recommending that she go to the lab today for a CBC with a differential, IgE, and CRP level.  We will contact her with the results.  If this shows signs of infection, we will consider starting her on antibiotics.  If her laboratory tests are normal or point toward allergy, we will start her on Flonase and have her see the allergist.  I am also going to give her mupirocin ointment for her nasal vestibulitis.  I am happy to see her back to discuss the results or if she sees the allergist and does not have benefit.          HPI: Mimi Melton is a 41 year old female seen today in the Otolaryngology Clinic for nine month history of chronic nasal congestion.  She states that at least monthly, she has had bouts of severe congestion.  It usually starts with a sore throat and then spreads to nasal congestion, coughing, and rhinorrhea.  She takes antibiotics and improves and then seems to have increased symptoms again right  away.  She has been taking Zithromax off and on for this.  In October, her primary MD recommended that she see a specialist.  She has also been using a generic nasal spray.  She is not sure what it is.  She has been taking Sudafed over-the-counter for some of these episodes as well.  She has some irritation around the lower aspect of her nose.  She has been using cough syrup for coughing.  She is here because the recurrent nature of these episodes are becoming quite bothersome for her.           Current Outpatient Prescriptions on File Prior to Visit:  levothyroxine (SYNTHROID) 112 MCG tablet Take 1 tablet (112 mcg) by mouth daily   lisinopril-hydrochlorothiazide (PRINZIDE,ZESTORETIC) 10-12.5 MG per tablet Take 1 tablet by mouth daily     No current facility-administered medications on file prior to visit.        Allergies   Allergen Reactions     No Known Drug Allergies        Past Medical History   Diagnosis Date     Cervical high risk HPV (human papillomavirus) test positive 12/2015     NIL pap, + HPV (not 16 or 18)     Chronic sinusitis Summer 2016     I get congested every 3-4 weeks     Esophageal reflux      Exophthalmos, unspecified      Hypothyroidism      Thyroid eye disease      Thyrotoxicosis without mention of goiter or other cause, without mention of thyrotoxic crisis or storm 3/05     Had radioablation, On synthroid, seeing [facility]; takes synthroid     Unspecified essential hypertension 1980     meds since 2001, on atenolol         Review of Systems   ENT ROS 3/9/2017   Ears, Nose, Throat Nasal congestion or drainage, Sore throat        14 point ROS neg other than the symptoms noted above.    Physical Exam:    General Assessment   The patient is alert, oriented and in no acute distress.   Head/Face/Scalp  Grossly normal.   Ears  Normal canals, auricles and tympanic membranes.   Nose  External nose is straight, skin is normal. Intranasal exam (anterior rhinoscopy) reveals normal moist mucosa,  turbinate tissue without edema, erythema or crusting.  Septum mainly in the midline.  Mucoid rhinorrhea, moderate congestion of mucosa, mild nasal vestibulitis.  Mouth  Oral cavity shows healthy mucosa with out ulceration, masses or other lesions involving the tongue, palate, buccal mucosa, floor of mouth or gingiva.  Dentition in good repair.  Oropharynx with normal tonsils, posterior wall and base of tongue.  Neck  No significant adenopathy, thyroid or salivary gland abnormality.     Imaging:    No results found for this or any previous visit.       Again, thank you for allowing me to participate in the care of your patient.      Sincerely,    Jasmin Cisneros MD

## 2017-03-10 LAB — IGE SERPL-ACNC: 231 KIU/L (ref 0–114)

## 2017-03-13 DIAGNOSIS — Z53.9 ERRONEOUS ENCOUNTER--DISREGARD: Primary | ICD-10-CM

## 2017-03-14 ENCOUNTER — MYC MEDICAL ADVICE (OUTPATIENT)
Dept: OTOLARYNGOLOGY | Facility: CLINIC | Age: 42
End: 2017-03-14

## 2017-03-15 DIAGNOSIS — I10 ESSENTIAL HYPERTENSION WITH GOAL BLOOD PRESSURE LESS THAN 140/90: Primary | ICD-10-CM

## 2017-03-15 DIAGNOSIS — E03.4 HYPOTHYROIDISM DUE TO ACQUIRED ATROPHY OF THYROID: ICD-10-CM

## 2017-03-15 PROCEDURE — 36415 COLL VENOUS BLD VENIPUNCTURE: CPT | Performed by: INTERNAL MEDICINE

## 2017-03-15 PROCEDURE — 80048 BASIC METABOLIC PNL TOTAL CA: CPT | Performed by: NURSE PRACTITIONER

## 2017-03-15 PROCEDURE — 84439 ASSAY OF FREE THYROXINE: CPT | Performed by: INTERNAL MEDICINE

## 2017-03-15 PROCEDURE — 84443 ASSAY THYROID STIM HORMONE: CPT | Performed by: INTERNAL MEDICINE

## 2017-03-16 LAB
T4 FREE SERPL-MCNC: 1.29 NG/DL (ref 0.76–1.46)
TSH SERPL DL<=0.005 MIU/L-ACNC: 0.22 MU/L (ref 0.4–4)

## 2017-03-17 DIAGNOSIS — E03.2 HYPOTHYROIDISM DUE TO MEDICATION: ICD-10-CM

## 2017-03-17 LAB
ANION GAP SERPL CALCULATED.3IONS-SCNC: 8 MMOL/L (ref 3–14)
BUN SERPL-MCNC: 11 MG/DL (ref 7–30)
CALCIUM SERPL-MCNC: 9.2 MG/DL (ref 8.5–10.1)
CHLORIDE SERPL-SCNC: 104 MMOL/L (ref 94–109)
CO2 SERPL-SCNC: 28 MMOL/L (ref 20–32)
CREAT SERPL-MCNC: 0.8 MG/DL (ref 0.52–1.04)
GFR SERPL CREATININE-BSD FRML MDRD: 79 ML/MIN/1.7M2
GLUCOSE SERPL-MCNC: 97 MG/DL (ref 70–99)
POTASSIUM SERPL-SCNC: 3.5 MMOL/L (ref 3.4–5.3)
SODIUM SERPL-SCNC: 140 MMOL/L (ref 133–144)

## 2017-03-17 RX ORDER — LEVOTHYROXINE SODIUM 100 UG/1
100 TABLET ORAL DAILY
Qty: 90 TABLET | Refills: 3 | Status: SHIPPED | OUTPATIENT
Start: 2017-03-17 | End: 2018-03-08

## 2017-03-17 NOTE — PROGRESS NOTES
Mi Le,    This is to let you know that the results of your recent basic metabolic panel came back negative. Dr. Harmon should be getting in touch with you regarding your thyroid testing.    Let me know if you have any questions.    Kalli GRESHAM CNP

## 2017-03-26 NOTE — PROGRESS NOTES
Janette, Please contact patient by letter/phone with following results:  Send mychart - IgE is high indicating allergy

## 2017-03-29 DIAGNOSIS — E89.0 POSTABLATIVE HYPOTHYROIDISM: Primary | ICD-10-CM

## 2017-04-03 ENCOUNTER — PRE VISIT (OUTPATIENT)
Dept: UROLOGY | Facility: CLINIC | Age: 42
End: 2017-04-03

## 2017-04-03 NOTE — TELEPHONE ENCOUNTER
Patient coming in for stress incontinence. Patient chart reviewed, no need for call, all records available and ready for appointment.

## 2017-04-06 ENCOUNTER — TELEPHONE (OUTPATIENT)
Dept: FAMILY MEDICINE | Facility: CLINIC | Age: 42
End: 2017-04-06

## 2017-04-06 DIAGNOSIS — N76.0 BACTERIAL VAGINOSIS: ICD-10-CM

## 2017-04-06 DIAGNOSIS — B96.89 BACTERIAL VAGINOSIS: ICD-10-CM

## 2017-04-06 NOTE — TELEPHONE ENCOUNTER
Pt still isn't over her BV from 2/17  Could you please send in pills for this  She is on her cycle but she can smell it and knows it is there.     Pharmacy entered.  Thanks!     Linda Hicks RN

## 2017-04-07 RX ORDER — METRONIDAZOLE 500 MG/1
500 TABLET ORAL 2 TIMES DAILY
Qty: 14 TABLET | Refills: 0 | Status: SHIPPED | OUTPATIENT
Start: 2017-04-07 | End: 2017-05-08

## 2017-04-12 ENCOUNTER — OFFICE VISIT (OUTPATIENT)
Dept: UROLOGY | Facility: CLINIC | Age: 42
End: 2017-04-12

## 2017-04-12 VITALS
HEIGHT: 65 IN | BODY MASS INDEX: 28.66 KG/M2 | DIASTOLIC BLOOD PRESSURE: 59 MMHG | HEART RATE: 80 BPM | SYSTOLIC BLOOD PRESSURE: 106 MMHG | WEIGHT: 172 LBS

## 2017-04-12 DIAGNOSIS — N39.3 FEMALE STRESS INCONTINENCE: Primary | ICD-10-CM

## 2017-04-12 ASSESSMENT — PAIN SCALES - GENERAL: PAINLEVEL: NO PAIN (0)

## 2017-04-12 NOTE — NURSING NOTE
"Chief Complaint   Patient presents with     Consult     stress incontinence       Blood pressure 106/59, pulse 80, height 1.651 m (5' 5\"), weight 78 kg (172 lb), not currently breastfeeding. Body mass index is 28.62 kg/(m^2).    Patient Active Problem List   Diagnosis     Surveillance of previously prescribed intrauterine contraceptive device     Vaginitis and vulvovaginitis     Exophthalmos     Abnormal Pap smear of cervix     CARDIOVASCULAR SCREENING; LDL GOAL LESS THAN 160     Essential hypertension with goal blood pressure less than 140/90     Anemia     Hypothyroidism, unspecified hypothyroidism type     Cervical high risk HPV (human papillomavirus) test positive     Thyroid disease       Allergies   Allergen Reactions     No Known Drug Allergies        Current Outpatient Prescriptions   Medication Sig Dispense Refill     metroNIDAZOLE (FLAGYL) 500 MG tablet Take 1 tablet (500 mg) by mouth 2 times daily 14 tablet 0     levothyroxine (SYNTHROID/LEVOTHROID) 100 MCG tablet Take 1 tablet (100 mcg) by mouth daily 90 tablet 3     mupirocin (BACTROBAN) 2 % ointment Use in nose twice daily for 5 days 22 g 0     lisinopril-hydrochlorothiazide (PRINZIDE,ZESTORETIC) 10-12.5 MG per tablet Take 1 tablet by mouth daily 90 tablet 3       Social History   Substance Use Topics     Smoking status: Never Smoker     Smokeless tobacco: Never Used     Alcohol use Yes      Comment: occasional       Letitia Dang LPN  4/12/2017  3:07 PM    "

## 2017-04-12 NOTE — MR AVS SNAPSHOT
After Visit Summary   4/12/2017    Mimi Melton    MRN: [id number]           Patient Information     Date Of Birth          1975        Visit Information        Provider Department      4/12/2017 2:30 PM Karin Amin MD [facility] Urology and [facility] for Prostate and Urologic Cancers        Today's Diagnoses     Female stress incontinence    -  1       Follow-ups after your visit        Follow-up notes from your care team     Return for per pelvital trial.      Your next 10 appointments already scheduled     Apr 17, 2017  8:00 AM CDT   (Arrive by 7:45 AM)   RETURN PLASTICS with Niles Donnelly MD   [facility] Ophthalmology ([facility])    [address] [place] [address]  [address] [facility] [address] [phone]            May 08, 2017  1:55 PM CDT   (Arrive by 1:40 PM)   New Allergy with Raymond Calvillo MD   [facility] for Lung Science and Health ([facility])    [address] [place] [address] [phone]           Do not take anti-histamines or Zantac for seven day prior to your appointment.            Sep 05, 2017  1:00 PM CDT   (Arrive by 12:45 PM)   RETURN ENDOCRINE with Sandhya Harmon MD   [facility] Endocrinology ([facility])    [address] [place] [address] [phone]              Who to contact     Please call your clinic at [phone] to:    Ask questions about your health    Make or cancel appointments    Discuss your medicines    Learn about your test results    Speak to your doctor   If you have compliments or concerns about an experience at your clinic, or if you wish to file a complaint, please contact [facility] Patient Relations at [phone] or email us at [email or web address]         Additional Information About Your Visit        MyChart Information     S[employer] gives you secure  "access to your electronic health record. If you see a primary care provider, you can also send messages to your care team and make appointments. If you have questions, please call your primary care clinic.  If you do not have a primary care provider, please call [phone] and they will assist you.      [employer] is an electronic gateway that provides easy, online access to your medical records. With [employer], you can request a clinic appointment, read your test results, renew a prescription or communicate with your care team.     To access your existing account, please contact your [facility] or call [phone] for assistance.        Care EveryWhere ID     This is your Care EveryWhere ID. This could be used by other organizations to access your [facility] medical records  [id number]        Your Vitals Were     Pulse Height BMI (Body Mass Index)             80 1.651 m (5' 5\") 28.62 kg/m2          Blood Pressure from Last 3 Encounters:   04/12/17 106/59   02/27/17 98/63   02/08/17 102/66    Weight from Last 3 Encounters:   04/12/17 78 kg (172 lb)   03/09/17 78.9 kg (174 lb)   02/27/17 78.9 kg (174 lb)              Today, you had the following     No orders found for display       Primary Care Provider Office Phone # Fax #    MARGA Lyon [facility] [phone]       [facility] [address] [place] [address]        Thank you!     Thank you for choosing [facility] UROLOGY AND [facility] FOR PROSTATE AND UROLOGIC CANCERS  for your care. Our goal is always to provide you with excellent care. Hearing back from our patients is one way we can continue to improve our services. Please take a few minutes to complete the written survey that you may receive in the mail after your visit with us. Thank you!             Your Updated Medication List - Protect others around you: Learn how to safely use, store and throw away your medicines at " www.disposemymeds.org.          This list is accurate as of: 4/12/17  4:12 PM.  Always use your most recent med list.                   Brand Name Dispense Instructions for use    levothyroxine 100 MCG tablet    SYNTHROID    90 tablet    Take 1 tablet (100 mcg) by mouth daily       lisinopril-hydrochlorothiazide 10-12.5 MG per tablet    PRINZIDE/ZESTORETIC    90 tablet    Take 1 tablet by mouth daily       metroNIDAZOLE 500 MG tablet    FLAGYL    14 tablet    Take 1 tablet (500 mg) by mouth 2 times daily       mupirocin 2 % ointment    BACTROBAN    22 g    Use in nose twice daily for 5 days

## 2017-04-12 NOTE — LETTER
4/12/2017       RE: Mimi Melton  [address] [facility] [address]     Dear Colleague,    Thank you for referring your patient, Mimi Melton, to the [facility] UROLOGY AND INST FOR PROSTATE AND UROLOGIC CANCERS at [facility]. Please see a copy of my visit note below.    Reason for Visit:  Discuss options for stress incontinence.    Clinical Data:  42 y/o female with hx of stress urinary incontinence that she deems as mild.  She wanted to discuss further options.  She has tried doing Kegels at home but did not feel like it helped much.    A/P:  42 y/o female with stress urinary incontinence.  We discussed options of observation, PT, enhanced PT with Pelvital trial, and midurethral sling.  We went into full discussion about the use of mesh tape in surgery and the risks of infection, bleeding, exposure of mesh, vaginal pain, injury to the bladder/urethra/rectum, as well as risk of continued incontinence or urinary retention.  We discussed the FDA public health notification at length.  The pt. Verbalized understanding and had a chance to ask her questions which were all answered.  She would like to consider the Pelvital trial for now.  -pt. To visit with out research coordinator.    Thank you for allowing me to participate in the care of  Ms. Mimi Melton and I will keep you updated on her progress.    Karin Amin MD    25 min spent with patient,  >50% in discussion and coordination of care.

## 2017-04-12 NOTE — PROGRESS NOTES
Reason for Visit:  Discuss options for stress incontinence.    Clinical Data:  42 y/o female with hx of stress urinary incontinence that she deems as mild.  She wanted to discuss further options.  She has tried doing Kegels at home but did not feel like it helped much.    A/P:  42 y/o female with stress urinary incontinence.  We discussed options of observation, PT, enhanced PT with Pelvital trial, and midurethral sling.  We went into full discussion about the use of mesh tape in surgery and the risks of infection, bleeding, exposure of mesh, vaginal pain, injury to the bladder/urethra/rectum, as well as risk of continued incontinence or urinary retention.  We discussed the FDA public health notification at length.  The pt. Verbalized understanding and had a chance to ask her questions which were all answered.  She would like to consider the Pelvital trial for now.  -pt. To visit with out research coordinator.    Thank you for allowing me to participate in the care of  Ms. Mimi Melton and I will keep you updated on her progress.    Karin Amin MD    25 min spent with patient,  >50% in discussion and coordination of care.

## 2017-04-17 ENCOUNTER — OFFICE VISIT (OUTPATIENT)
Dept: OPHTHALMOLOGY | Facility: CLINIC | Age: 42
End: 2017-04-17

## 2017-04-17 DIAGNOSIS — H57.89 THYROID EYE DISEASE: Primary | ICD-10-CM

## 2017-04-17 DIAGNOSIS — H02.539 EYELID RETRACTION OR LAG: ICD-10-CM

## 2017-04-17 DIAGNOSIS — E07.9 THYROID EYE DISEASE: Primary | ICD-10-CM

## 2017-04-17 DIAGNOSIS — H05.20 PROPTOSIS: ICD-10-CM

## 2017-04-17 ASSESSMENT — VISUAL ACUITY
OD_CC: 20/20
OS_CC: 20/20
CORRECTION_TYPE: CONTACTS
METHOD: SNELLEN - LINEAR

## 2017-04-17 ASSESSMENT — CONF VISUAL FIELD
METHOD: COUNTING FINGERS
OD_NORMAL: 1
OS_NORMAL: 1

## 2017-04-17 ASSESSMENT — TONOMETRY
OS_IOP_MMHG: 20
OD_IOP_MMHG: 18
IOP_METHOD: ICARE

## 2017-04-17 ASSESSMENT — EXTERNAL EXAM - RIGHT EYE: OD_EXAM: NORMAL

## 2017-04-17 ASSESSMENT — LAGOPHTHALMOS
OD_LAGOPHTHALMOS: 0
OS_LAGOPHTHALMOS: 0

## 2017-04-17 ASSESSMENT — MARGIN REFLEX DISTANCE
OS_MRD1: 3
OD_MRD1: 3

## 2017-04-17 ASSESSMENT — SLIT LAMP EXAM - LIDS
COMMENTS: NORMAL
COMMENTS: NORMAL

## 2017-04-17 ASSESSMENT — EXTERNAL EXAM - LEFT EYE: OS_EXAM: NORMAL

## 2017-04-17 NOTE — MR AVS SNAPSHOT
After Visit Summary   4/17/2017    Mimi Melton    MRN: [id number]           Patient Information     Date Of Birth          1975        Visit Information        Provider Department      4/17/2017 8:00 AM Niles Donnelly MD [facility] Ophthalmology        Today's Diagnoses     Thyroid eye disease - Both Eyes    -  1    Eyelid retraction or lag - Both Eyes        Proptosis - Both Eyes           Follow-ups after your visit        Follow-up notes from your care team     Return in about 1 year (around 4/17/2018) for RETURN OCULOPLASTICS.      Your next 10 appointments already scheduled     May 08, 2017  1:55 PM CDT   (Arrive by 1:40 PM)   New Allergy with Raymond Calvillo MD   [facility] and Health ([facility])    [address] [place] [address] [phone]           Do not take anti-histamines or Zantac for seven day prior to your appointment.            Sep 05, 2017  1:00 PM CDT   (Arrive by 12:45 PM)   RETURN ENDOCRINE with Sandhya Harmon MD   [facility] Endocrinology ([facility])    [address] [place] [address] [phone]              Who to contact     Please call your clinic at [phone] to:    Ask questions about your health    Make or cancel appointments    Discuss your medicines    Learn about your test results    Speak to your doctor   If you have compliments or concerns about an experience at your clinic, or if you wish to file a complaint, please contact [facility] Patient Relations at [phone] or email us at [email or web address]         Additional Information About Your Visit        MyChart Information     [employer] gives you secure access to your electronic health record. If you see a primary care provider, you can also send messages to your care team and make appointments. If you have questions,  please call your primary care clinic.  If you do not have a primary care provider, please call [phone] and they will assist you.      [employer] is an electronic gateway that provides easy, online access to your medical records. With [employer], you can request a clinic appointment, read your test results, renew a prescription or communicate with your care team.     To access your existing account, please contact your [facility] or call [phone] for assistance.        Care EveryWhere ID     This is your Care EveryWhere ID. This could be used by other organizations to access your [place] medical records  [id number]         Blood Pressure from Last 3 Encounters:   04/12/17 106/59   02/27/17 98/63   02/08/17 102/66    Weight from Last 3 Encounters:   04/12/17 78 kg (172 lb)   03/09/17 78.9 kg (174 lb)   02/27/17 78.9 kg (174 lb)              We Performed the Following     External Photos Thyroid Series        Primary Care Provider Office Phone # Fax #    MARGA Lyon [facility] [phone]       [facility] [address] [place] [address]        Thank you!     Thank you for choosing [facility] OPHTHALMOLOGY  for your care. Our goal is always to provide you with excellent care. Hearing back from our patients is one way we can continue to improve our services. Please take a few minutes to complete the written survey that you may receive in the mail after your visit with us. Thank you!             Your Updated Medication List - Protect others around you: Learn how to safely use, store and throw away your medicines at www.disposemymeds.org.          This list is accurate as of: 4/17/17  9:11 AM.  Always use your most recent med list.                   Brand Name Dispense Instructions for use    fluticasone 27.5 MCG/SPRAY spray    VERAMYST     Spray 2 sprays into both nostrils daily       levothyroxine 100 MCG tablet    SYNTHROID    90 tablet     Take 1 tablet (100 mcg) by mouth daily       lisinopril-hydrochlorothiazide 10-12.5 MG per tablet    PRINZIDE/ZESTORETIC    90 tablet    Take 1 tablet by mouth daily       metroNIDAZOLE 500 MG tablet    FLAGYL    14 tablet    Take 1 tablet (500 mg) by mouth 2 times daily       mupirocin 2 % ointment    BACTROBAN    22 g    Use in nose twice daily for 5 days

## 2017-04-17 NOTE — NURSING NOTE
Chief Complaints and History of Present Illnesses   Patient presents with     Follow Up For     yearly f/u Proptosis, VENKATA, s/p Eyelid Retraction     HPI    Affected eye(s):  Both   Symptoms:     No blurred vision   Dryness      Frequency:  Constant       Do you have eye pain now?:  No      Comments:  Pt states vision good, no double vision. Maybe some proptosis in the left eye. No pain. Systane QD OU.    Tigist Beasley COT 8:23 AM April 17, 2017

## 2017-04-17 NOTE — PROGRESS NOTES
Chief Complaints and History of Present Illnesses   Patient presents with     Follow Up For     yearly f/u Proptosis, VENKATA, s/p Eyelid Retraction          She presents for yearly follow up today.  She has a history of thyroid eye disease and was treated with TOMLINSON about 12 years ago.  She is taking replacement and had a recent adjustment in the dose.      She has not noticed any changes to her eyes.  She is using systaine daily for dry eye which helps.      She does not smoke.      Assessment & Plan     Mimi Melton is a 41 year old female with the following diagnoses:   1. Thyroid eye disease - Both Eyes    2. Eyelid retraction or lag - Both Eyes    3. Proptosis - Both Eyes       Stable exam and symptoms, discussed eyelid surgery for lower eyelid retraction.  She would consider this in the future but is not interested right now.      PLAN:  - Continue tears for lubrication  - Follow up 12 months         Attending Physician Attestation:  I have seen and examined this patient.  I have confirmed and edited as necessary the chief complaint(s), history of present illness, review of systems, relevant history, and examination findings as documented by others.  I have personally reviewed the relevant tests, images, and reports as documented above.  I have confirmed and edited as necessary the assessment and plan and agree with this note.    - Niles Donnelly MD 8:36 AM 4/17/2017

## 2017-04-26 ENCOUNTER — PRE VISIT (OUTPATIENT)
Dept: UROLOGY | Facility: CLINIC | Age: 42
End: 2017-04-26

## 2017-04-26 NOTE — TELEPHONE ENCOUNTER
Patient coming in for study discussion. Patient chart reviewed, no need for call, all records available and ready for appointment.

## 2017-04-27 ENCOUNTER — RESULTS ONLY (OUTPATIENT)
Dept: LAB | Age: 42
End: 2017-04-27

## 2017-04-27 DIAGNOSIS — B37.31 YEAST INFECTION OF THE VAGINA: Primary | ICD-10-CM

## 2017-04-27 LAB — HCG UR QL: NEGATIVE

## 2017-04-27 RX ORDER — FLUCONAZOLE 100 MG/1
100 TABLET ORAL DAILY
Qty: 1 TABLET | Refills: 0 | Status: SHIPPED | OUTPATIENT
Start: 2017-04-27 | End: 2017-09-05

## 2017-04-28 ENCOUNTER — MYC MEDICAL ADVICE (OUTPATIENT)
Dept: FAMILY MEDICINE | Facility: CLINIC | Age: 42
End: 2017-04-28

## 2017-04-28 DIAGNOSIS — I10 ESSENTIAL HYPERTENSION WITH GOAL BLOOD PRESSURE LESS THAN 140/90: Primary | ICD-10-CM

## 2017-04-28 NOTE — NURSING NOTE
Patient here in clinic requesting one fluconazole for vaginal yeast     Elizabeth Rondon, RN   Care Coordinator Urology

## 2017-05-01 ENCOUNTER — CARE COORDINATION (OUTPATIENT)
Dept: PULMONOLOGY | Facility: CLINIC | Age: 42
End: 2017-05-01

## 2017-05-01 RX ORDER — LISINOPRIL 10 MG/1
10 TABLET ORAL DAILY
Qty: 90 TABLET | Refills: 1 | Status: SHIPPED | OUTPATIENT
Start: 2017-05-01 | End: 2017-10-06

## 2017-05-01 NOTE — PROGRESS NOTES
Writer called work number and left a message (unable to leave a message on mobile) to remind her to hold any antihistamines or zantac 1 week prior to upcoming appointment with Dr. Calvillo. Triage number left if she has questions.

## 2017-05-02 ENCOUNTER — PRE VISIT (OUTPATIENT)
Dept: PULMONOLOGY | Facility: CLINIC | Age: 42
End: 2017-05-02

## 2017-05-02 NOTE — TELEPHONE ENCOUNTER
1.  Date/reason for appt:5/8/17, Allergy  2.  Referring provider: Self  3.  Call to patient (Yes / No - short description): No, records are in epic.   4.  Previous care at / records requested from:   [facility]- office notes are in epic.

## 2017-05-08 ENCOUNTER — OFFICE VISIT (OUTPATIENT)
Dept: PULMONOLOGY | Facility: CLINIC | Age: 42
End: 2017-05-08
Attending: ALLERGY & IMMUNOLOGY
Payer: COMMERCIAL

## 2017-05-08 VITALS
SYSTOLIC BLOOD PRESSURE: 114 MMHG | HEART RATE: 83 BPM | DIASTOLIC BLOOD PRESSURE: 76 MMHG | OXYGEN SATURATION: 99 % | RESPIRATION RATE: 16 BRPM

## 2017-05-08 DIAGNOSIS — J30.1 SEASONAL ALLERGIC RHINITIS DUE TO POLLEN: Primary | ICD-10-CM

## 2017-05-08 DIAGNOSIS — J30.89 ALLERGIC RHINITIS CAUSED BY MOLD: ICD-10-CM

## 2017-05-08 PROCEDURE — 95004 PERQ TESTS W/ALRGNC XTRCS: CPT | Performed by: ALLERGY & IMMUNOLOGY

## 2017-05-08 PROCEDURE — 99214 OFFICE O/P EST MOD 30 MIN: CPT | Mod: 25,ZF

## 2017-05-08 ASSESSMENT — PAIN SCALES - GENERAL: PAINLEVEL: NO PAIN (0)

## 2017-05-08 NOTE — LETTER
5/8/2017       RE: Mimi Melton  [address] [facility] [address]     Dear Colleague,    Thank you for referring your patient, Mimi Melton, to the [facility] at [facility]. Please see a copy of my visit note below.    Reason for Visit  Mimi Melton is a 41 year old female who is referred by Kalli Roldan for rhinitis    Allergy HPI    Mimi presents today for initial consultation regarding a concern of nasal congestion.  She said it was baseline persistent, but when she would go to [place] it was very bad.  It has been going on for about 2-1/2 years.  No other seasonal symptoms with it.  Not much sneezing, itchy eyes or itchy nose.  She did start Fluticasone nasal spray 2 sprays each nostril.  She had no symptoms with this and she thinks it is really great.  In general her breathing is fine.      SOCIAL HISTORY:  She has no pets.  She does not smoke cigarettes.      FAMILY HISTORY:  Nobody with allergies.       The patient was seen and examined by Raymond Sheriff MD   Current Outpatient Prescriptions   Medication     lisinopril (PRINIVIL/ZESTRIL) 10 MG tablet     fluconazole (DIFLUCAN) 100 MG tablet     fluticasone (VERAMYST) 27.5 MCG/SPRAY spray     levothyroxine (SYNTHROID/LEVOTHROID) 100 MCG tablet     mupirocin (BACTROBAN) 2 % ointment     No current facility-administered medications for this visit.      Allergies   Allergen Reactions     No Known Drug Allergies      Social History     Social History     Marital status: Single     Spouse name: N/A     Number of children: N/A     Years of education: N/A     Occupational History     [employer]     Social History Main Topics     Smoking status: Never Smoker     Smokeless tobacco: Never Used     Alcohol use Yes      Comment: occasional     Drug use: No     Sexual activity: Yes     Partners: Male     Birth control/  protection: IUD      Comment: Paraguard IUD,      Other Topics Concern     Parent/Sibling W/ Cabg, Mi Or Angioplasty Before 65f 55m? No     Social History Narrative    Caffeine intake/servings daily - 0-1, 12 oz of Mountain Dew    Calcium intake/servings daily - eats only cheese    Exercise None    Sunscreen used - Yes    Seatbelts used - Yes    Guns stored in the home - No    Self Breast Exam - No    Pap test up to date -  Yes, as of today    Eye exam up to date -  Yes    Dental exam up to date -  Yes    DEXA scan up to date -  Not Applicable    Flex Sig/Colonoscopy up to date -  Not Applicable    Mammography up to date -  Not Applicable    Immunizations reviewed and up to date - Yes, within the last 10 years    Abuse: Current or Past (Physical, Sexual or Emotional) - No    Do you feel safe in your environment - Yes    Do you cope well with stress - Yes    Do you suffer from insomnia - No    Last updated by: Anu Vinson MA 2011                 Past Medical History:   Diagnosis Date     Cervical high risk HPV (human papillomavirus) test positive 2015    NIL pap, + HPV (not 16 or 18)     Chronic sinusitis Summer 2016    I get congested every 3-4 weeks     Esophageal reflux      Exophthalmos, unspecified      Hypothyroidism      Thyroid eye disease      Thyrotoxicosis without mention of goiter or other cause, without mention of thyrotoxic crisis or storm 3/05    Had radioablation, On synthroid, seeing [facility]; takes synthroid     Unspecified essential hypertension 1980    meds since , on atenolol     Past Surgical History:   Procedure Laterality Date     C  DELIVERY ONLY  91    , Low Cervical     C  DELIVERY ONLY  97    , Low Cervical     C  DELIVERY ONLY  99    , Low Cervical     C INDUCED ABORTN BY D&C      Aspiration & Curettage, TAB x2     EYE SURGERY  2008    [facility]  [facility],REDUCIBLE      Umbilical hernia Repair     Family History   Problem Relation Age of Onset     Hypertension Mother      Hypertension Father      Hypertension Maternal Grandmother      Hypertension Paternal Grandmother      Hypertension Maternal Grandfather      Hypertension Paternal Grandfather          ROS   A complete ROS was otherwise negative except as noted in the HPI and the end of the note.  /76 (BP Location: Right arm, Patient Position: Chair, Cuff Size: Adult Regular)  Pulse 83  Resp 16  SpO2 99%  Exam:   GENERAL APPEARANCE: Well developed, well nourished, alert, and in no apparent distress.  EYES: PERRL, EOMI, conjunctiva clear non-injected  HENT: Nasal mucosa with slightly pale mild edema and no discharge. No nasal polyps.    EARS: Canals clear, TMs normal  MOUTH: Oral mucosa is moist, without any lesions, no tonsillar enlargement, no oropharyngeal exudate.  NECK: Supple, no masses, no thyromegaly.  LYMPHATICS: No significant cervical, or supraclavicular nodes.  RESP: Good air flow throughout.  No crackles. No rhonchi. No wheezes.  CV: Normal S1, S2, regular rhythm, normal rate. No murmur.  No rub. No gallop. No LE edema.   MS: Extremities normal. No clubbing. No cyanosis.  SKIN: No rashes noted  NEURO: Speech normal, normal strength and tone, normal gait and stance  PSYCH: Normal mentation, orientation to person, place, and time.  Results: 38 percutaneous environmental allergen (and 2 controls) extracts placed by MA and read by MD.  Positive to dust mites, mold, willow tree an joss grass.      Assessment and plan: Mimi presents today for initial consultation after seeing ENT and had an elevated IgE.  She is showing positive sensitivity to dust mites, molds, Joss grass and as well as [place] tree pollen.  She had more symptoms in [place] and I am not sure if any of those pollens are any higher content there, but she can look at pollen.com when she is there and see if it corresponds.   She is mostly elevated to the molds and a lot of these are present in the fall.  Aspergillus is often a common indoor mold.  She will work on control measures and continue the fluticasone as she notes a significant benefit with this.  She can use antihistamines as needed if significant problems or when she goes to [place] when her symptoms are very bad and she can take a nasal spray twice a day.  I can see her back as needed.               Answers for HPI/ROS submitted by the patient on 5/8/2017   General Symptoms: No  Skin Symptoms: No  HENT Symptoms: No  EYE SYMPTOMS: No  HEART SYMPTOMS: No  LUNG SYMPTOMS: No  INTESTINAL SYMPTOMS: No  URINARY SYMPTOMS: No  GYNECOLOGIC SYMPTOMS: No  BREAST SYMPTOMS: No  SKELETAL SYMPTOMS: No  BLOOD SYMPTOMS: No  NERVOUS SYSTEM SYMPTOMS: No  MENTAL HEALTH SYMPTOMS: No      Again, thank you for allowing me to participate in the care of your patient.      Sincerely,    Raymond Sheriff MD

## 2017-05-08 NOTE — PATIENT INSTRUCTIONS
"Mold Allergy    Remove live indoor houseplants (molds are in the soil).    Keep windows and doors shut and run the air conditioner at all times; this keeps the molds outside.    Keep windows closed while in the car and air conditioner on with it set to  recirculate  mode.    Stay indoors as much as possible when the mold count is high. Check allergy counts by going to our website: www.pollen.com    If you have a basement, use a dehumidifier.    Use bleach if you see evidence of molds in bathrooms or riana.   MOLD WHERE MOLDS ARE MOST COMMONLY FOUND   Alternaria Outdoors On decaying plants and live plant material, also in soil   Helminthosporium Outdoors On grain plants and grasses, kodak counts in rural areas when threshing grain   Hormodendrum/Cladosporium Indoors & Outdoors On leather, rubber, cloth, foods, and wood products; in soil, living and dead plants; also released after a rain   Fusarium Outdoors On garden plants (peas, beans, tomatoes, corn etc.) and stored fruits and vegetables   Aspergillus niger Indoors & Outdoors On damp hay, cloth, leather, paper, spoiled foods, decaying plant and vegetable material, in bathrooms and in refrigerator drip pans   Epicoccum Indoors & Outdoors In soil and on living or dead plants (such as small black dots), and uncooked fruit     *All information has been reviewed, updated and approved by:  Dr. Raymond Calvillo-[facility] at [facility] updated: 11/2016         Pollen Control Measures      * Keep windows and doors shut and run air conditioner at all times. This keeps outdoor air outside.    * Keep windows closed while in the car and air conditioner on the \"re-circulate\" mode.    * Wash your hair before going to bed at night to reduce exposure during sleep.    * Keep pets outdoors to prevent the pollen from being brought in on their hair.    * Avoid hanging clothes and linens outside as pollens may collect on the items.     * Don't mow the lawn if you " are allergic to grass or weeds. If you must mow the grass, wear a face mask.       Spring: Tree Pollen    Summer: Grass Pollen and Mold    Fall: Weed Pollen and Mold      *All information has been reviewed, updated and approved by:  Dr. Raymond Calvillo-[facility] at [facility] updated: 11/2016

## 2017-05-08 NOTE — PROGRESS NOTES
Reason for Visit  Mimi Melton is a 41 year old female who is referred by Kalli Roldan for rhinitis    Allergy HPI    Mimi presents today for initial consultation regarding a concern of nasal congestion.  She said it was baseline persistent, but when she would go to [place] it was very bad.  It has been going on for about 2-1/2 years.  No other seasonal symptoms with it.  Not much sneezing, itchy eyes or itchy nose.  She did start Fluticasone nasal spray 2 sprays each nostril.  She had no symptoms with this and she thinks it is really great.  In general her breathing is fine.      SOCIAL HISTORY:  She has no pets.  She does not smoke cigarettes.      FAMILY HISTORY:  Nobody with allergies.       The patient was seen and examined by Raymond Sheriff MD   Current Outpatient Prescriptions   Medication     lisinopril (PRINIVIL/ZESTRIL) 10 MG tablet     fluconazole (DIFLUCAN) 100 MG tablet     fluticasone (VERAMYST) 27.5 MCG/SPRAY spray     levothyroxine (SYNTHROID/LEVOTHROID) 100 MCG tablet     mupirocin (BACTROBAN) 2 % ointment     No current facility-administered medications for this visit.      Allergies   Allergen Reactions     No Known Drug Allergies      Social History     Social History     Marital status: Single     Spouse name: N/A     Number of children: N/A     Years of education: N/A     Occupational History     [employer]     Social History Main Topics     Smoking status: Never Smoker     Smokeless tobacco: Never Used     Alcohol use Yes      Comment: occasional     Drug use: No     Sexual activity: Yes     Partners: Male     Birth control/ protection: IUD      Comment: Paraguard IUD,      Other Topics Concern     Parent/Sibling W/ Cabg, Mi Or Angioplasty Before 65f 55m? No     Social History Narrative    Caffeine intake/servings daily - 0-1, 12 oz of Mountain Dew    Calcium intake/servings daily - eats only cheese    Exercise None     Sunscreen used - Yes    Seatbelts used - Yes    Guns stored in the home - No    Self Breast Exam - No    Pap test up to date -  Yes, as of today    Eye exam up to date -  Yes    Dental exam up to date -  Yes    DEXA scan up to date -  Not Applicable    Flex Sig/Colonoscopy up to date -  Not Applicable    Mammography up to date -  Not Applicable    Immunizations reviewed and up to date - Yes, within the last 10 years    Abuse: Current or Past (Physical, Sexual or Emotional) - No    Do you feel safe in your environment - Yes    Do you cope well with stress - Yes    Do you suffer from insomnia - No    Last updated by: Anu Vinson MA 2011                 Past Medical History:   Diagnosis Date     Cervical high risk HPV (human papillomavirus) test positive 2015    NIL pap, + HPV (not 16 or 18)     Chronic sinusitis Summer 2016    I get congested every 3-4 weeks     Esophageal reflux      Exophthalmos, unspecified      Hypothyroidism      Thyroid eye disease      Thyrotoxicosis without mention of goiter or other cause, without mention of thyrotoxic crisis or storm 3/05    Had radioablation, On synthroid, seeing [facility]; takes synthroid     Unspecified essential hypertension 1980    meds since , on atenolol     Past Surgical History:   Procedure Laterality Date     C  DELIVERY ONLY  91    , Low Cervical     C  DELIVERY ONLY  97    , Low Cervical     C  DELIVERY ONLY  99    , Low Cervical     C INDUCED ABORTN BY D&C      Aspiration & Curettage, TAB x2     EYE SURGERY  2008    Orbital Decompression Dr smart      REPAIR INCISIONAL HERNIA,REDUCIBLE      Umbilical hernia Repair     Family History   Problem Relation Age of Onset     Hypertension Mother      Hypertension Father      Hypertension Maternal Grandmother      Hypertension Paternal Grandmother      Hypertension Maternal Grandfather      Hypertension Paternal Grandfather           ROS   A complete ROS was otherwise negative except as noted in the HPI and the end of the note.  /76 (BP Location: Right arm, Patient Position: Chair, Cuff Size: Adult Regular)  Pulse 83  Resp 16  SpO2 99%  Exam:   GENERAL APPEARANCE: Well developed, well nourished, alert, and in no apparent distress.  EYES: PERRL, EOMI, conjunctiva clear non-injected  HENT: Nasal mucosa with slightly pale mild edema and no discharge. No nasal polyps.    EARS: Canals clear, TMs normal  MOUTH: Oral mucosa is moist, without any lesions, no tonsillar enlargement, no oropharyngeal exudate.  NECK: Supple, no masses, no thyromegaly.  LYMPHATICS: No significant cervical, or supraclavicular nodes.  RESP: Good air flow throughout.  No crackles. No rhonchi. No wheezes.  CV: Normal S1, S2, regular rhythm, normal rate. No murmur.  No rub. No gallop. No LE edema.   MS: Extremities normal. No clubbing. No cyanosis.  SKIN: No rashes noted  NEURO: Speech normal, normal strength and tone, normal gait and stance  PSYCH: Normal mentation, orientation to person, place, and time.  Results: 38 percutaneous environmental allergen (and 2 controls) extracts placed by MA and read by MD.  Positive to dust mites, mold, willow tree an joss grass.      Assessment and plan: Mimi presents today for initial consultation after seeing ENT and had an elevated IgE.  She is showing positive sensitivity to dust mites, molds, Joss grass and as well as [place] tree pollen.  She had more symptoms in [place] and I am not sure if any of those pollens are any higher content there, but she can look at pollen.com when she is there and see if it corresponds.  She is mostly elevated to the molds and a lot of these are present in the fall.  Aspergillus is often a common indoor mold.  She will work on control measures and continue the fluticasone as she notes a significant benefit with this.  She can use antihistamines as needed if significant problems or  when she goes to [place] when her symptoms are very bad and she can take a nasal spray twice a day.  I can see her back as needed.               Answers for HPI/ROS submitted by the patient on 5/8/2017   General Symptoms: No  Skin Symptoms: No  HENT Symptoms: No  EYE SYMPTOMS: No  HEART SYMPTOMS: No  LUNG SYMPTOMS: No  INTESTINAL SYMPTOMS: No  URINARY SYMPTOMS: No  GYNECOLOGIC SYMPTOMS: No  BREAST SYMPTOMS: No  SKELETAL SYMPTOMS: No  BLOOD SYMPTOMS: No  NERVOUS SYSTEM SYMPTOMS: No  MENTAL HEALTH SYMPTOMS: No

## 2017-05-08 NOTE — MR AVS SNAPSHOT
After Visit Summary   5/8/2017    Mimi Melton    MRN: [id number]           Patient Information     Date Of Birth          1975        Visit Information        Provider Department      5/8/2017 1:55 PM Raymond Calvillo MD [facility]        Care Instructions    Mold Allergy    Remove live indoor houseplants (molds are in the soil).    Keep windows and doors shut and run the air conditioner at all times; this keeps the molds outside.    Keep windows closed while in the car and air conditioner on with it set to  recirculate  mode.    Stay indoors as much as possible when the mold count is high. Check allergy counts by going to our website: www.pollen.com    If you have a basement, use a dehumidifier.    Use bleach if you see evidence of molds in bathrooms or riana.   MOLD WHERE MOLDS ARE MOST COMMONLY FOUND   Alternaria Outdoors On decaying plants and live plant material, also in soil   Helminthosporium Outdoors On grain plants and grasses, kodak counts in rural areas when threshing grain   Hormodendrum/Cladosporium Indoors & Outdoors On leather, rubber, cloth, foods, and wood products; in soil, living and dead plants; also released after a rain   Fusarium Outdoors On garden plants (peas, beans, tomatoes, corn etc.) and stored fruits and vegetables   Aspergillus niger Indoors & Outdoors On damp hay, cloth, leather, paper, spoiled foods, decaying plant and vegetable material, in bathrooms and in refrigerator drip pans   Epicoccum Indoors & Outdoors In soil and on living or dead plants (such as small black dots), and uncooked fruit     *All information has been reviewed, updated and approved by:  Dr. Raymond Calvillo-[place] [facility] at [facility] updated: 11/2016         Pollen Control Measures      * Keep windows and doors shut and run air conditioner at all times. This keeps outdoor air outside.    * Keep windows closed while in the car and  "air conditioner on the \"re-circulate\" mode.    * Wash your hair before going to bed at night to reduce exposure during sleep.    * Keep pets outdoors to prevent the pollen from being brought in on their hair.    * Avoid hanging clothes and linens outside as pollens may collect on the items.     * Don't mow the lawn if you are allergic to grass or weeds. If you must mow the grass, wear a face mask.       Spring: Tree Pollen    Summer: Grass Pollen and Mold    Fall: Weed Pollen and Mold      *All information has been reviewed, updated and approved by:  Dr. Raymond Calvillo-[facility] at [facility] updated: 11/2016        Follow-ups after your visit        Follow-up notes from your care team     Return if symptoms worsen or fail to improve.      Your next 10 appointments already scheduled     May 22, 2017  1:00 PM CDT   (Arrive by 12:45 PM)   Return Visit with  Prostate Cancer Ctr Nurse   [facility] Urology and Inst for Prostate and Urologic Cancers ([facility])    [address] [place] [id number] [phone]            Sep 05, 2017  1:00 PM CDT   (Arrive by 12:45 PM)   RETURN ENDOCRINE with Sandhya Harmon MD   [facility] Endocrinology ([facility])    [address] [place] [id number] [phone]            Apr 23, 2018  8:00 AM CDT   (Arrive by 7:45 AM)   RETURN PLASTICS with Niles Donnelly MD   [facility] Ophthalmology ([facility])    [address] [place] [id number] [phone]              Who to contact     If you have questions or need follow up information about today's clinic visit or your schedule please contact [facility] directly at [phone].  Normal or non-critical lab and imaging results will be communicated to you by MyChart, letter or phone within 4 business days after the clinic has " received the results. If you do not hear from us within 7 days, please contact the clinic through [employer] or phone. If you have a critical or abnormal lab result, we will notify you by phone as soon as possible.  Submit refill requests through [employer] or call your pharmacy and they will forward the refill request to us. Please allow 3 business days for your refill to be completed.          Additional Information About Your Visit        [employer] Information     [employer] gives you secure access to your electronic health record. If you see a primary care provider, you can also send messages to your care team and make appointments. If you have questions, please call your primary care clinic.  If you do not have a primary care provider, please call [phone] and they will assist you.        Care EveryWhere ID     This is your Care EveryWhere ID. This could be used by other organizations to access your [facility] medical records  [id number]        Your Vitals Were     Pulse Respirations Pulse Oximetry             83 16 99%          Blood Pressure from Last 3 Encounters:   05/08/17 114/76   04/12/17 106/59   02/27/17 98/63    Weight from Last 3 Encounters:   04/12/17 78 kg (172 lb)   03/09/17 78.9 kg (174 lb)   02/27/17 78.9 kg (174 lb)              Today, you had the following     No orders found for display       Primary Care Provider Office Phone # Fax #    MARGA Lyon [phone]       [facility] [address] [place] [address]        Thank you!     Thank you for choosing [facility]  for your care. Our goal is always to provide you with excellent care. Hearing back from our patients is one way we can continue to improve our services. Please take a few minutes to complete the written survey that you may receive in the mail after your visit with us. Thank you!             Your Updated Medication List - Protect others around you:  Learn how to safely use, store and throw away your medicines at www.disposemymeds.org.          This list is accurate as of: 5/8/17  2:42 PM.  Always use your most recent med list.                   Brand Name Dispense Instructions for use    fluconazole 100 MG tablet    DIFLUCAN    1 tablet    Take 1 tablet (100 mg) by mouth daily       fluticasone 27.5 MCG/SPRAY spray    VERAMYST     Spray 2 sprays into both nostrils daily       levothyroxine 100 MCG tablet    SYNTHROID    90 tablet    Take 1 tablet (100 mcg) by mouth daily       lisinopril 10 MG tablet    PRINIVIL/ZESTRIL    90 tablet    Take 1 tablet (10 mg) by mouth daily       mupirocin 2 % ointment    BACTROBAN    22 g    Use in nose twice daily for 5 days

## 2017-05-08 NOTE — NURSING NOTE
Chief Complaint   Patient presents with     Allergy Consult     Patient is being seen for consultation of allergies      Julissa Guzman CMA at 1:47 PM on 5/8/2017

## 2017-05-10 ENCOUNTER — OFFICE VISIT (OUTPATIENT)
Dept: FAMILY MEDICINE | Facility: CLINIC | Age: 42
End: 2017-05-10
Payer: COMMERCIAL

## 2017-05-10 VITALS
OXYGEN SATURATION: 100 % | TEMPERATURE: 98.8 F | HEART RATE: 92 BPM | RESPIRATION RATE: 18 BRPM | SYSTOLIC BLOOD PRESSURE: 112 MMHG | DIASTOLIC BLOOD PRESSURE: 76 MMHG | HEIGHT: 65 IN | BODY MASS INDEX: 27.66 KG/M2 | WEIGHT: 166 LBS

## 2017-05-10 DIAGNOSIS — N89.8 VAGINAL ODOR: Primary | ICD-10-CM

## 2017-05-10 DIAGNOSIS — N76.0 BV (BACTERIAL VAGINOSIS): ICD-10-CM

## 2017-05-10 DIAGNOSIS — B96.89 BV (BACTERIAL VAGINOSIS): ICD-10-CM

## 2017-05-10 LAB
C TRACH DNA SPEC QL NAA+PROBE: ABNORMAL
MICRO REPORT STATUS: ABNORMAL
N GONORRHOEA DNA SPEC QL NAA+PROBE: ABNORMAL
SPECIMEN SOURCE: ABNORMAL
WET PREP SPEC: ABNORMAL

## 2017-05-10 PROCEDURE — 99213 OFFICE O/P EST LOW 20 MIN: CPT | Performed by: NURSE PRACTITIONER

## 2017-05-10 PROCEDURE — 87210 SMEAR WET MOUNT SALINE/INK: CPT | Performed by: NURSE PRACTITIONER

## 2017-05-10 RX ORDER — METRONIDAZOLE 500 MG/1
500 TABLET ORAL 2 TIMES DAILY
Qty: 14 TABLET | Refills: 0 | Status: SHIPPED | OUTPATIENT
Start: 2017-05-10 | End: 2017-09-26

## 2017-05-10 NOTE — MR AVS SNAPSHOT
After Visit Summary   5/10/2017    Mimi Melton    MRN: [id number]           Patient Information     Date Of Birth          1975        Visit Information        Provider Department      5/10/2017 10:40 AM Shanique Pearce APRN CNP [facility]        Today's Diagnoses     Vaginal odor    -  1    BV (bacterial vaginosis)           Follow-ups after your visit        Your next 10 appointments already scheduled     May 22, 2017  1:00 PM CDT   (Arrive by 12:45 PM)   Return Visit with  Prostate Cancer Ctr Nurse   [facility] Urology and Inst for Prostate and Urologic Cancers ([facility])    [address]  [address]th [facility] [address]   [phone]            Sep 05, 2017  1:00 PM CDT   (Arrive by 12:45 PM)   RETURN ENDOCRINE with Sandhya Harmon MD   [facility] Endocrinology ([facility])    [address] [place] [address]  3rd [facility] [address] [phone]            Apr 23, 2018  8:00 AM CDT   (Arrive by 7:45 AM)   RETURN PLASTICS with Niles Donnelly MD   [facility] Ophthalmology ([facility])    [address] [place] [address] [phone]              Future tests that were ordered for you today     Open Future Orders        Priority Expected Expires Ordered    Wet prep Routine  8/10/2017 5/10/2017            Who to contact     If you have questions or need follow up information about today's clinic visit or your schedule please contact [facility] directly at [phone].  Normal or non-critical lab and imaging results will be communicated to you by MyChart, letter or phone within 4 business days after the clinic has received the results. If you do not hear from us within 7 days, please contact the clinic through MyChart or phone. If you have a critical or abnormal lab result, we will notify you by phone as soon as  "possible.  Submit refill requests through [employer] or call your pharmacy and they will forward the refill request to us. Please allow 3 business days for your refill to be completed.          Additional Information About Your Visit        [employer] Information     [employer] gives you secure access to your electronic health record. If you see a primary care provider, you can also send messages to your care team and make appointments. If you have questions, please call your primary care clinic.  If you do not have a primary care provider, please call [phone] and they will assist you.        Care EveryWhere ID     This is your Care EveryWhere ID. This could be used by other organizations to access your [place] medical records  [id number]        Your Vitals Were     Pulse Temperature Respirations Height Pulse Oximetry BMI (Body Mass Index)    92 98.8  F (37.1  C) (Oral) 18 5' 5\" (1.651 m) 100% 27.62 kg/m2       Blood Pressure from Last 3 Encounters:   05/10/17 112/76   05/08/17 114/76   04/12/17 106/59    Weight from Last 3 Encounters:   05/10/17 166 lb (75.3 kg)   04/12/17 172 lb (78 kg)   03/09/17 174 lb (78.9 kg)              We Performed the Following     Chlamydia trachomatis PCR     Neisseria gonorrhoeae PCR     Wet prep          Today's Medication Changes          These changes are accurate as of: 5/10/17  1:44 PM.  If you have any questions, ask your nurse or doctor.               Start taking these medicines.        Dose/Directions    metroNIDAZOLE 500 MG tablet   Commonly known as:  FLAGYL   Used for:  BV (bacterial vaginosis)   Started by:  Shanique Pearce APRN CNP        Dose:  500 mg   Take 1 tablet (500 mg) by mouth 2 times daily   Quantity:  14 tablet   Refills:  0            Where to get your medicines      These medications were sent to [facility] [address] [place] [address] - [address] [place] [place] [address]     Phone:  [phone]     metroNIDAZOLE 500 MG tablet "                Primary Care Provider Office Phone # Fax #    MARGA [facility] [phone]       [facility] [address] [facility] [address]        Thank you!     Thank you for choosing [facility]  for your care. Our goal is always to provide you with excellent care. Hearing back from our patients is one way we can continue to improve our services. Please take a few minutes to complete the written survey that you may receive in the mail after your visit with us. Thank you!             Your Updated Medication List - Protect others around you: Learn how to safely use, store and throw away your medicines at www.disposemymeds.org.          This list is accurate as of: 5/10/17  1:44 PM.  Always use your most recent med list.                   Brand Name Dispense Instructions for use    fluconazole 100 MG tablet    DIFLUCAN    1 tablet    Take 1 tablet (100 mg) by mouth daily       fluticasone 27.5 MCG/SPRAY spray    VERAMYST     Spray 2 sprays into both nostrils daily       levothyroxine 100 MCG tablet    SYNTHROID    90 tablet    Take 1 tablet (100 mcg) by mouth daily       lisinopril 10 MG tablet    PRINIVIL/ZESTRIL    90 tablet    Take 1 tablet (10 mg) by mouth daily       metroNIDAZOLE 500 MG tablet    FLAGYL    14 tablet    Take 1 tablet (500 mg) by mouth 2 times daily       mupirocin 2 % ointment    BACTROBAN    22 g    Use in nose twice daily for 5 days

## 2017-05-10 NOTE — NURSING NOTE
"Chief Complaint   Patient presents with     STD       Initial /76 (BP Location: Left arm, Patient Position: Chair, Cuff Size: Adult Regular)  Pulse 92  Temp (!) 49.6  F (9.8  C) (Oral)  Resp 18  Ht 5' 5\" (1.651 m)  Wt 166 lb (75.3 kg)  SpO2 100%  BMI 27.62 kg/m2 Estimated body mass index is 27.62 kg/(m^2) as calculated from the following:    Height as of this encounter: 5' 5\" (1.651 m).    Weight as of this encounter: 166 lb (75.3 kg).  Medication Reconciliation: complete Jennyfer Lewis/    "

## 2017-05-10 NOTE — PROGRESS NOTES
"  SUBJECTIVE:                                                    Mimi Melton is a 41 year old female who presents to clinic today for the following health issues:        Vaginal Symptoms      Duration: Monday May 8 2017    Description  odor    Intensity:  moderate    Accompanying signs and symptoms (fever/dysuria/abdominal or back pain): None    History  Sexually active: yes, single partner, contraception - abstinence and condoms  Possibility of pregnancy: No  Recent antibiotic use: YES    Precipitating or alleviating factors: None    Therapies tried and outcome: none        Keeps getting BV and yeast infections.      OBJECTIVE:  /76 (BP Location: Left arm, Patient Position: Chair, Cuff Size: Adult Regular)  Pulse 92  Temp 98.8  F (37.1  C) (Oral)  Resp 18  Ht 5' 5\" (1.651 m)  Wt 166 lb (75.3 kg)  SpO2 100%  BMI 27.62 kg/m2  Patient appears well, vital signs normal.   Abdomen normal, soft without tenderness, guarding, mass or organomegaly. No inguinal adenopathy or CVA tenderness.  Pelvic Exam:Deferred  Cultures obtained:   Results for orders placed or performed in visit on 05/10/17   Chlamydia trachomatis PCR   Result Value Ref Range    Specimen Description       Canceled, Test credited   Test canceled by physician  CORRECTED ON 05/10 AT 1145: PREVIOUSLY REPORTED AS Vagina      Chlamydia Trachomatis PCR (A) NEG     Canceled, Test credited   Test canceled by physician     Neisseria gonorrhoeae PCR   Result Value Ref Range    Specimen Descrip       Canceled, Test credited   Test canceled by physician  CORRECTED ON 05/10 AT 1145: PREVIOUSLY REPORTED AS Vagina      N Gonorrhea PCR (A) NEG     Canceled, Test credited   Test canceled by physician     Wet prep   Result Value Ref Range    Specimen Description Vagina     Wet Prep (A)      Clue cells seen  No Trichomonas seen  No yeast seen      Micro Report Status FINAL 05/10/2017      .    ASSESSMENT: bacterial vaginosis.    [unfilled]:Treatment plan per " orders in [facility]. STD prevention discussed. Abstain from intercourse for duration of treatment. Return if symptoms do not resolve as anticipated.    Your wet prep shows that you have bacterial vaginosis (overgrowth of a bacteria called clue cells).  This is not a sexually transmitted disease.  I sent in a prescription for Flagyl which is an antibiotic that you will take twice daily for one week.  You cannot drink alcohol while taking this medication.   Recommend recheck 3 days after medication is completed.        Shanique Pearce RN, CNP

## 2017-05-22 ENCOUNTER — ALLIED HEALTH/NURSE VISIT (OUTPATIENT)
Dept: UROLOGY | Facility: CLINIC | Age: 42
End: 2017-05-22

## 2017-05-22 DIAGNOSIS — N39.3 FEMALE STRESS INCONTINENCE: Primary | ICD-10-CM

## 2017-05-22 NOTE — MR AVS SNAPSHOT
After Visit Summary   5/22/2017    Mimi Melton    MRN: [id number]           Patient Information     Date Of Birth          1975        Visit Information        Provider Department      5/22/2017 1:00 PM Nurse, [facility] Urology and Inst for Prostate and Urologic Cancers        Today's Diagnoses     Female stress incontinence    -  1       Follow-ups after your visit        Follow-up notes from your care team     Return in about 2 weeks (around 6/5/2017) for BP Recheck.      Your next 10 appointments already scheduled     Jun 07, 2017  3:00 PM CDT   RESEARCH with  Urology Research Coordinator   [facility] Urology and Inst for Prostate and Urologic Cancers ([facility])    [address] [address]   [phone]            Jun 26, 2017  3:00 PM CDT   RESEARCH with  Urology Research Coordinator   [facility] Urology and Inst for Prostate and Urologic Cancers ([facility])    [address] [address]   [phone]            Jul 12, 2017  1:30 PM CDT   (Arrive by 1:15 PM)   Return Visit with Karin Amin MD   [facility]rology and Inst for Prostate and Urologic Cancers ([facility])    [address] [address]   [phone]            Jul 24, 2017  3:00 PM CDT   RESEARCH with  Urology Research Coordinator   [facility]rology and Inst for Prostate and Urologic Cancers ([facility])    [address] [address]   [phone]            Aug 14, 2017  3:00 PM CDT   RESEARCH with  Urology Research Coordinator   [facility]rology and Inst for Prostate and Urologic Cancers ([facility])    [address] [address]   [phone]            Aug 30, 2017  1:45 PM CDT   (Arrive by 1:30 PM)    Return Visit with Karin Amin MD   [facility] Urology and Inst for Prostate and Urologic Cancers ([facility])    [address]  4th Floor  [facility] [address] [phone]            Sep 05, 2017  1:00 PM CDT   (Arrive by 12:45 PM)   RETURN ENDOCRINE with Sandhya Harmon MD   [facility] Endocrinology ([facility])    [address]  3rd Floor  [facility] [address] [phone]            Apr 23, 2018  8:00 AM CDT   (Arrive by 7:45 AM)   RETURN PLASTICS with Niles Donnelly MD   [facility] Ophthalmology ([facility])    [address] [facility]  4th Floor  [facility] [address] [phone]              Who to contact     Please call your clinic at [phone] to:    Ask questions about your health    Make or cancel appointments    Discuss your medicines    Learn about your test results    Speak to your doctor   If you have compliments or concerns about an experience at your clinic, or if you wish to file a complaint, please contact [facility] Patient Relations at [phone] or email us at [email or web address]         Additional Information About Your Visit        [employer] Information     [employer] gives you secure access to your electronic health record. If you see a primary care provider, you can also send messages to your care team and make appointments. If you have questions, please call your primary care clinic.  If you do not have a primary care provider, please call [phone] and they will assist you.      [employer] is an electronic gateway that provides easy, online access to your medical records. With [employer], you can request a clinic appointment, read your test results, renew a prescription or communicate with your care team.     To access your existing account, please contact your [facility] or call [phone] for  assistance.        Care EveryWhere ID     This is your Care EveryWhere ID. This could be used by other organizations to access your [place] medical records  [id number]         Blood Pressure from Last 3 Encounters:   05/10/17 112/76   05/08/17 114/76   04/12/17 106/59    Weight from Last 3 Encounters:   05/10/17 75.3 kg (166 lb)   04/12/17 78 kg (172 lb)   03/09/17 78.9 kg (174 lb)              We Performed the Following     C OFFICE VISIT, NEW, LEVEL I        Primary Care Provider Office Phone # Fax #    Kalli FLANAGAN ParishMARGA lambert JUDIE [phone]       [place] [address] [place] [address]        Thank you!     Thank you for choosing [facility] UROLOGY AND [facility] FOR PROSTATE AND UROLOGIC CANCERS  for your care. Our goal is always to provide you with excellent care. Hearing back from our patients is one way we can continue to improve our services. Please take a few minutes to complete the written survey that you may receive in the mail after your visit with us. Thank you!             Your Updated Medication List - Protect others around you: Learn how to safely use, store and throw away your medicines at www.disposemymeds.org.          This list is accurate as of: 5/22/17  3:41 PM.  Always use your most recent med list.                   Brand Name Dispense Instructions for use    fluconazole 100 MG tablet    DIFLUCAN    1 tablet    Take 1 tablet (100 mg) by mouth daily       fluticasone 27.5 MCG/SPRAY spray    VERAMYST     Spray 2 sprays into both nostrils daily       levothyroxine 100 MCG tablet    SYNTHROID    90 tablet    Take 1 tablet (100 mcg) by mouth daily       lisinopril 10 MG tablet    PRINIVIL/ZESTRIL    90 tablet    Take 1 tablet (10 mg) by mouth daily       metroNIDAZOLE 500 MG tablet    FLAGYL    14 tablet    Take 1 tablet (500 mg) by mouth 2 times daily       mupirocin 2 % ointment    BACTROBAN    22 g    Use in nose twice daily for 5 days

## 2017-05-22 NOTE — NURSING NOTE
Patient here to for device teaching.  Reviewed product, booklet, demonstrated cleaning of device and answered all questions.  Patient was able to insert the probe without any hands on assistance. Patient was told to squeeze the probe when the light comes on   And stop when it when off.   Patient will lie in supine position.    Patient to return in 2 weeks     Elizabeth Rondon RN   Care Coordinator Urology

## 2017-06-10 ENCOUNTER — OFFICE VISIT (OUTPATIENT)
Dept: URGENT CARE | Facility: URGENT CARE | Age: 42
End: 2017-06-10
Payer: COMMERCIAL

## 2017-06-10 VITALS
OXYGEN SATURATION: 98 % | HEART RATE: 78 BPM | TEMPERATURE: 98.1 F | SYSTOLIC BLOOD PRESSURE: 137 MMHG | WEIGHT: 173.38 LBS | BODY MASS INDEX: 28.85 KG/M2 | DIASTOLIC BLOOD PRESSURE: 89 MMHG

## 2017-06-10 DIAGNOSIS — B37.31 CANDIDIASIS OF VAGINA: Primary | ICD-10-CM

## 2017-06-10 DIAGNOSIS — N89.8 VAGINAL DISCHARGE: ICD-10-CM

## 2017-06-10 DIAGNOSIS — Z11.3 SCREEN FOR STD (SEXUALLY TRANSMITTED DISEASE): ICD-10-CM

## 2017-06-10 DIAGNOSIS — R30.0 DYSURIA: ICD-10-CM

## 2017-06-10 LAB
ALBUMIN UR-MCNC: NEGATIVE MG/DL
APPEARANCE UR: CLEAR
BILIRUB UR QL STRIP: NEGATIVE
COLOR UR AUTO: YELLOW
GLUCOSE UR STRIP-MCNC: NEGATIVE MG/DL
HGB UR QL STRIP: ABNORMAL
KETONES UR STRIP-MCNC: NEGATIVE MG/DL
LEUKOCYTE ESTERASE UR QL STRIP: ABNORMAL
MICRO REPORT STATUS: ABNORMAL
NITRATE UR QL: NEGATIVE
PH UR STRIP: 7 PH (ref 5–7)
RBC #/AREA URNS AUTO: NORMAL /HPF (ref 0–2)
SP GR UR STRIP: 1.02 (ref 1–1.03)
SPECIMEN SOURCE: ABNORMAL
URN SPEC COLLECT METH UR: ABNORMAL
UROBILINOGEN UR STRIP-ACNC: 0.2 EU/DL (ref 0.2–1)
WBC #/AREA URNS AUTO: NORMAL /HPF (ref 0–2)
WET PREP SPEC: ABNORMAL

## 2017-06-10 PROCEDURE — 87491 CHLMYD TRACH DNA AMP PROBE: CPT | Performed by: PHYSICIAN ASSISTANT

## 2017-06-10 PROCEDURE — 99213 OFFICE O/P EST LOW 20 MIN: CPT | Performed by: PHYSICIAN ASSISTANT

## 2017-06-10 PROCEDURE — 87591 N.GONORRHOEAE DNA AMP PROB: CPT | Performed by: PHYSICIAN ASSISTANT

## 2017-06-10 PROCEDURE — 87210 SMEAR WET MOUNT SALINE/INK: CPT | Performed by: PHYSICIAN ASSISTANT

## 2017-06-10 PROCEDURE — 81001 URINALYSIS AUTO W/SCOPE: CPT | Performed by: PHYSICIAN ASSISTANT

## 2017-06-10 RX ORDER — SULFAMETHOXAZOLE/TRIMETHOPRIM 800-160 MG
1 TABLET ORAL 2 TIMES DAILY
Qty: 14 TABLET | Refills: 0 | Status: SHIPPED | OUTPATIENT
Start: 2017-06-10 | End: 2017-06-17

## 2017-06-10 RX ORDER — FLUCONAZOLE 150 MG/1
150 TABLET ORAL
Qty: 4 TABLET | Refills: 0 | Status: SHIPPED | OUTPATIENT
Start: 2017-06-10 | End: 2017-09-26

## 2017-06-10 RX ORDER — METRONIDAZOLE 500 MG/1
500 TABLET ORAL 2 TIMES DAILY
Qty: 14 TABLET | Refills: 0 | Status: SHIPPED | OUTPATIENT
Start: 2017-06-10 | End: 2017-06-17

## 2017-06-10 NOTE — MR AVS SNAPSHOT
After Visit Summary   6/10/2017    Mimi Melton    MRN: [id number]           Patient Information     Date Of Birth          1975        Visit Information        Provider Department      6/10/2017 9:20 AM Chito Jane PA-C [facility]        Today's Diagnoses     Candidiasis of vagina    -  1    Screen for STD (sexually transmitted disease)        Dysuria        Vaginal discharge           Follow-ups after your visit        Your next 10 appointments already scheduled     Jun 14, 2017  3:00 PM CDT   RESEARCH with  Urology Research Coordinator   [facility] for Prostate and Urologic Cancers ([facility])    [address] [place] [phone]   [phone]            Jun 28, 2017  3:00 PM CDT   (Arrive by 2:45 PM)   Return Visit with Karin Amin MD   [facility] for Prostate and Urologic Cancers ([facility])    [address] [place] [address]   [phone]            Jul 10, 2017  3:00 PM CDT   RESEARCH with  Urology Research Coordinator   [facility] for Prostate and Urologic Cancers ([facility])    [address] [place] [address]   [phone]            Jul 24, 2017  3:00 PM CDT   RESEARCH with  Urology Research Coordinator   [facility] for Prostate and Urologic Cancers ([facility])    [address] [place] [address]   [phone]            Aug 16, 2017  9:15 AM CDT   (Arrive by 9:00 AM)   Return Visit with Karin Amin MD   [facility] for Prostate and Urologic Cancers ([facility])    [address] [place] [address]   [phone]            Sep 05, 2017  1:00 PM CDT   (Arrive by 12:45 PM)   RETURN ENDOCRINE with Sandhya ELAINE  MD Eden   [facility] Endocrinology ([facility])    [address]  3rd Floor  [facility] [id number] [phone]            Apr 23, 2018  8:00 AM CDT   (Arrive by 7:45 AM)   RETURN PLASTICS with Niles Donnelly MD   [facility] Ophthalmology ([facility])    [address]  4th Floor  [facility] [id number] [phone]              Who to contact     If you have questions or need follow up information about today's clinic visit or your schedule please contact [facility] directly at [phone].  Normal or non-critical lab and imaging results will be communicated to you by MyChart, letter or phone within 4 business days after the clinic has received the results. If you do not hear from us within 7 days, please contact the clinic through [employer] or phone. If you have a critical or abnormal lab result, we will notify you by phone as soon as possible.  Submit refill requests through [employer] or call your pharmacy and they will forward the refill request to us. Please allow 3 business days for your refill to be completed.          Additional Information About Your Visit        [employer] Information     [employer] gives you secure access to your electronic health record. If you see a primary care provider, you can also send messages to your care team and make appointments. If you have questions, please call your primary care clinic.  If you do not have a primary care provider, please call [phone] and they will assist you.        Care EveryWhere ID     This is your Care EveryWhere ID. This could be used by other organizations to access your [place] medical records  [id number]        Your Vitals Were     Pulse Temperature Pulse Oximetry Breastfeeding? BMI (Body Mass Index)       78 98.1  F (36.7  C) (Oral) 98% No 28.85 kg/m2        Blood Pressure from Last 3 Encounters:   06/10/17 137/89   05/10/17 112/76   05/08/17 114/76     Weight from Last 3 Encounters:   06/10/17 173 lb 6 oz (78.6 kg)   05/10/17 166 lb (75.3 kg)   04/12/17 172 lb (78 kg)              We Performed the Following     *UA reflex to Microscopic and Culture ([place] [facility] (except [place] and [place])     CHLAMYDIA TRACHOMATIS PCR     NEISSERIA GONORRHOEA PCR     Urine Microscopic     Wet prep          Today's Medication Changes          These changes are accurate as of: 6/10/17 10:43 AM.  If you have any questions, ask your nurse or doctor.               Start taking these medicines.        Dose/Directions    sulfamethoxazole-trimethoprim 800-160 MG per tablet   Commonly known as:  BACTRIM DS/SEPTRA DS   Used for:  Dysuria   Started by:  Chito Jane PA-C        Dose:  1 tablet   Take 1 tablet by mouth 2 times daily for 7 days   Quantity:  14 tablet   Refills:  0         These medicines have changed or have updated prescriptions.        Dose/Directions    * fluconazole 100 MG tablet   Commonly known as:  DIFLUCAN   This may have changed:  Another medication with the same name was added. Make sure you understand how and when to take each.   Used for:  Yeast infection of the vagina   Changed by:  Karin Amin MD        Dose:  100 mg   Take 1 tablet (100 mg) by mouth daily   Quantity:  1 tablet   Refills:  0       * fluconazole 150 MG tablet   Commonly known as:  DIFLUCAN   This may have changed:  You were already taking a medication with the same name, and this prescription was added. Make sure you understand how and when to take each.   Used for:  Candidiasis of vagina   Changed by:  Chito Jane PA-C        Dose:  150 mg   Take 1 tablet (150 mg) by mouth every 3 days   Quantity:  4 tablet   Refills:  0       * metroNIDAZOLE 500 MG tablet   Commonly known as:  FLAGYL   This may have changed:  Another medication with the same name was added. Make sure you understand how and when to take each.   Used for:  BV (bacterial vaginosis)   Changed by:   Shanique Pearce APRN CNP        Dose:  500 mg   Take 1 tablet (500 mg) by mouth 2 times daily   Quantity:  14 tablet   Refills:  0       * metroNIDAZOLE 500 MG tablet   Commonly known as:  FLAGYL   This may have changed:  You were already taking a medication with the same name, and this prescription was added. Make sure you understand how and when to take each.   Used for:  Vaginal discharge   Changed by:  Chito Jane PA-C        Dose:  500 mg   Take 1 tablet (500 mg) by mouth 2 times daily for 7 days   Quantity:  14 tablet   Refills:  0       * Notice:  This list has 4 medication(s) that are the same as other medications prescribed for you. Read the directions carefully, and ask your doctor or other care provider to review them with you.         Where to get your medicines      These medications were sent to [facility] [address] [place] [id number]     Phone:  [phone]     fluconazole 150 MG tablet    metroNIDAZOLE 500 MG tablet    sulfamethoxazole-trimethoprim 800-160 MG per tablet                Primary Care Provider Office Phone # Fax #    MARGA Lyon [phone]       [facility] [address] [place] [address]        Thank you!     Thank you for choosing [facility]  for your care. Our goal is always to provide you with excellent care. Hearing back from our patients is one way we can continue to improve our services. Please take a few minutes to complete the written survey that you may receive in the mail after your visit with us. Thank you!             Your Updated Medication List - Protect others around you: Learn how to safely use, store and throw away your medicines at www.disposemymeds.org.          This list is accurate as of: 6/10/17 10:43 AM.  Always use your most recent med list.                   Brand Name Dispense Instructions for use    *  fluconazole 100 MG tablet    DIFLUCAN    1 tablet    Take 1 tablet (100 mg) by mouth daily       * fluconazole 150 MG tablet    DIFLUCAN    4 tablet    Take 1 tablet (150 mg) by mouth every 3 days       fluticasone 27.5 MCG/SPRAY spray    VERAMYST     Spray 2 sprays into both nostrils daily       levothyroxine 100 MCG tablet    SYNTHROID    90 tablet    Take 1 tablet (100 mcg) by mouth daily       lisinopril 10 MG tablet    PRINIVIL/ZESTRIL    90 tablet    Take 1 tablet (10 mg) by mouth daily       * metroNIDAZOLE 500 MG tablet    FLAGYL    14 tablet    Take 1 tablet (500 mg) by mouth 2 times daily       * metroNIDAZOLE 500 MG tablet    FLAGYL    14 tablet    Take 1 tablet (500 mg) by mouth 2 times daily for 7 days       mupirocin 2 % ointment    BACTROBAN    22 g    Use in nose twice daily for 5 days       sulfamethoxazole-trimethoprim 800-160 MG per tablet    BACTRIM DS/SEPTRA DS    14 tablet    Take 1 tablet by mouth 2 times daily for 7 days       * Notice:  This list has 4 medication(s) that are the same as other medications prescribed for you. Read the directions carefully, and ask your doctor or other care provider to review them with you.

## 2017-06-10 NOTE — NURSING NOTE
"Chief Complaint   Patient presents with     Urgent Care     Vaginal Discharge     c/o vaginal discharge for 2 days       Initial /89  Pulse 78  Temp 98.1  F (36.7  C) (Oral)  Wt 173 lb 6 oz (78.6 kg)  SpO2 98%  Breastfeeding? No  BMI 28.85 kg/m2 Estimated body mass index is 28.85 kg/(m^2) as calculated from the following:    Height as of 5/10/17: 5' 5\" (1.651 m).    Weight as of this encounter: 173 lb 6 oz (78.6 kg).  Medication Reconciliation: complete   Annette Shaikh MA    "

## 2017-06-10 NOTE — PROGRESS NOTES
SUBJECTIVE:    Mimi Melton is a 41 year old female who presents with vaginal discharge. She is concerned for BV since she has an odor and says it is like she had BV before.    Onset of symptoms 2 day(s) ago, stable since.     Pain:none.     Vaginal bleeding: No      Vaginal symptoms: discharge described as scant, white and malodorous  No LMP recorded.    Sexually active: yes, single partner, contraception - condoms.  She denies worsening odor after sex  Predisposing factors: previous history of BV and candidiasis and is currently participating in an experiment at the [facility] for incontinence that requires inserting an instrument daily in the vagina.  She thinks this may be irritating her.  Hx of previous symptom: occasionally in past as mentioned.    No fever chills or night sweats. No symptoms of UTI currently.   and no symptoms currently of STIs but wants screening.   is without symptoms.    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 mention of goiter or other cause, without mention of thyrotoxic crisis or storm 3/05    Had radioablation, On synthroid, seeing [facility]; takes synthroid     Unspecified essential hypertension 1980    meds since 2001, on atenolol     Current Outpatient Prescriptions   Medication Sig Dispense Refill     lisinopril (PRINIVIL/ZESTRIL) 10 MG tablet Take 1 tablet (10 mg) by mouth daily 90 tablet 1     levothyroxine (SYNTHROID/LEVOTHROID) 100 MCG tablet Take 1 tablet (100 mcg) by mouth daily 90 tablet 3     mupirocin (BACTROBAN) 2 % ointment Use in nose twice daily for 5 days 22 g 0     metroNIDAZOLE (FLAGYL) 500 MG tablet Take 1 tablet (500 mg) by mouth 2 times daily (Patient  not taking: Reported on 6/10/2017) 14 tablet 0     fluconazole (DIFLUCAN) 100 MG tablet Take 1 tablet (100 mg) by mouth daily (Patient not taking: Reported on 6/10/2017) 1 tablet 0     fluticasone (VERAMYST) 27.5 MCG/SPRAY spray Spray 2 sprays into both nostrils daily       Social History     Social History     Marital status: Single     Spouse name: N/A     Number of children: N/A     Years of education: N/A     Occupational History     [employer]     Social History Main Topics     Smoking status: Never Smoker     Smokeless tobacco: Never Used     Alcohol use Yes      Comment: occasional     Drug use: No     Sexual activity: Yes     Partners: Male     Birth control/ protection: IUD      Comment: Paraguard IUD,      Other Topics Concern     Parent/Sibling W/ Cabg, Mi Or Angioplasty Before 65f 55m? No     Social History Narrative    Caffeine intake/servings daily - 0-1, 12 oz of Mountain Dew    Calcium intake/servings daily - eats only cheese    Exercise None    Sunscreen used - Yes    Seatbelts used - Yes    Guns stored in the home - No    Self Breast Exam - No    Pap test up to date -  Yes, as of today    Eye exam up to date -  Yes    Dental exam up to date -  Yes    DEXA scan up to date -  Not Applicable    Flex Sig/Colonoscopy up to date -  Not Applicable    Mammography up to date -  Not Applicable    Immunizations reviewed and up to date - Yes, within the last 10 years    Abuse: Current or Past (Physical, Sexual or Emotional) - No    Do you feel safe in your environment - Yes    Do you cope well with stress - Yes    Do you suffer from insomnia - No    Last updated by: Anu Vinson MA 12/21/2011                   ROS:   Review of systems negative except as stated above.    OBJECTIVE:  /89  Pulse 78  Temp 98.1  F (36.7  C) (Oral)  Wt 173 lb 6 oz (78.6 kg)  SpO2 98%  Breastfeeding? No  BMI 28.85 kg/m2  deferred, patient acquired wet prep sample  GENERAL  APPEARANCE: healthy, alert and no distress  CV: regular rates and rhythm, normal S1 S2, no murmur noted  ABDOMEN:  soft, nontender, no HSM or masses and bowel sounds normal  BACK: No CVA tenderness  SKIN: no suspicious lesions or rashes    Lab:  Results for orders placed or performed in visit on 06/10/17   *UA reflex to Microscopic and Culture ([facility] (except [place] and [place])   Result Value Ref Range    Color Urine Yellow     Appearance Urine Clear     Glucose Urine Negative NEG mg/dL    Bilirubin Urine Negative NEG    Ketones Urine Negative NEG mg/dL    Specific Gravity Urine 1.025 1.003 - 1.035    Blood Urine Trace (A) NEG    pH Urine 7.0 5.0 - 7.0 pH    Protein Albumin Urine Negative NEG mg/dL    Urobilinogen Urine 0.2 0.2 - 1.0 EU/dL    Nitrite Urine Negative NEG    Leukocyte Esterase Urine Small (A) NEG    Source Midstream Urine    Urine Microscopic   Result Value Ref Range    WBC Urine O - 2 0 - 2 /HPF    RBC Urine O - 2 0 - 2 /HPF   Wet prep   Result Value Ref Range    Specimen Description Vagina     Wet Prep (A)      Yeast seen  No Trichomonas seen  No clue cells seen      Micro Report Status FINAL 06/10/2017          ASSESSMENT:  Vaginitis:  yeast,   STI screening  Leukoesterase, slight noted on UA    PLAN:    1. Screen for STD (sexually transmitted disease)  Will be contacted with results when available  - NEISSERIA GONORRHOEA PCR  - CHLAMYDIA TRACHOMATIS PCR  - Urine Microscopic    2. Dysuria  Will treat based on leukoesterase in urine and patient symptoms  - *UA reflex to Microscopic and Culture ([facility] (except [facility])  - sulfamethoxazole-trimethoprim (BACTRIM DS/SEPTRA DS) 800-160 MG per tablet; Take 1 tablet by mouth 2 times daily for 7 days  Dispense: 14 tablet; Refill: 0    3. Vaginal discharge  Patient requested treatment for BV because it smells and feels like when she had it last  - Wet prep  - metroNIDAZOLE (FLAGYL) 500 MG tablet;  Take 1 tablet (500 mg) by mouth 2 times daily for 7 days  Dispense: 14 tablet; Refill: 0    4. Candidiasis of vagina  Symptomatic care also gone over in office  - fluconazole (DIFLUCAN) 150 MG tablet; Take 1 tablet (150 mg) by mouth every 3 days  Dispense: 4 tablet; Refill: 0    Follow up with Primary Care Provider if any complications or sequelae present.

## 2017-06-19 ENCOUNTER — PRE VISIT (OUTPATIENT)
Dept: UROLOGY | Facility: CLINIC | Age: 42
End: 2017-06-19

## 2017-06-19 NOTE — TELEPHONE ENCOUNTER
Patient with history of KARMA coming in for study follow up. Patient chart reviewed, no need for call, all records available and ready for appointment.

## 2017-06-28 ENCOUNTER — OFFICE VISIT (OUTPATIENT)
Dept: UROLOGY | Facility: CLINIC | Age: 42
End: 2017-06-28

## 2017-06-28 VITALS — WEIGHT: 173 LBS | BODY MASS INDEX: 28.82 KG/M2 | HEIGHT: 65 IN

## 2017-06-28 DIAGNOSIS — N39.3 STRESS INCONTINENCE: Primary | ICD-10-CM

## 2017-06-28 NOTE — LETTER
6/28/2017       RE: Mimi Melton  [address] [facility] [address]     Dear Colleague,    Thank you for referring your patient, Mimi Melton, to the [facility] UROLOGY AND INST FOR PROSTATE AND UROLOGIC CANCERS at [facility]. Please see a copy of my visit note below.    Reason for Visit:  F/u on pelvital Study    Clinical Data:  Ms. Melton is a 41 y/o female with hx of stress urinary incontinence who decided to participate in the pelvital study.  She reports improvement in her over all incontinence.  She has been using less pads than before and is pleased with the results.  She does report not likely to insert something vaginally every day but I encouraged her to continue given her success thus far and that it was not painful or at all uncomfortable.   She inquired as to what would happen once the study was over.  I informed her that we could address that at that time.    A/P:  41 y/o female in the pelvital study with some success overall and no adverse side effects.    Thank you for allowing me to participate in the care of  Ms. Mimi Melton and I will keep you updated on her progress.    Karin Amin MD

## 2017-06-28 NOTE — MR AVS SNAPSHOT
After Visit Summary   6/28/2017    Mimi Melton    MRN: [id number]           Patient Information     Date Of Birth          1975        Visit Information        Provider Department      6/28/2017 3:00 PM Karin Amin MD [facility] for Prostate and Urologic Cancers        Today's Diagnoses     Stress incontinence    -  1       Follow-ups after your visit        Follow-up notes from your care team     Return for next scheduled visit for pelvital.      Your next 10 appointments already scheduled     Jul 10, 2017  3:00 PM CDT   RESEARCH with  Urology Research Coordinator   [facility] for Prostate and Urologic Cancers ([facility])    [address] [place] [address] [phone]            Jul 24, 2017  3:00 PM CDT   RESEARCH with  Urology Research Coordinator   [facility] for Prostate and Urologic Cancers ([facility])    [address] [place] [id number] [phone]            Aug 16, 2017  9:15 AM CDT   (Arrive by 9:00 AM)   Return Visit with Karin Amin MD   [facility] for Prostate and Urologic Cancers ([facility])    [address] [place] [phone] [phone]            Sep 05, 2017  1:00 PM CDT   (Arrive by 12:45 PM)   RETURN ENDOCRINE with Sandhya Harmon MD   [facility] Endocrinology ([facility])    [address] [place] [id number] [phone]            Apr 23, 2018  8:00 AM CDT   (Arrive by 7:45 AM)   RETURN PLASTICS with iNles Donnelly MD   [facility] Ophthalmology ([facility])    [address] [place] [id number] [phone]              Who to contact     Please call your clinic at [phone] to:    Ask questions about your health    Make or  "cancel appointments    Discuss your medicines    Learn about your test results    Speak to your doctor   If you have compliments or concerns about an experience at your clinic, or if you wish to file a complaint, please contact [facility] Patient Relations at [phone] or email us at [email or web address]         Additional Information About Your Visit        MyChart Information     [employer] gives you secure access to your electronic health record. If you see a primary care provider, you can also send messages to your care team and make appointments. If you have questions, please call your primary care clinic.  If you do not have a primary care provider, please call [phone] and they will assist you.      [employer] is an electronic gateway that provides easy, online access to your medical records. With [employer], you can request a clinic appointment, read your test results, renew a prescription or communicate with your care team.     To access your existing account, please contact your [facility] Clinic or call [phone] for assistance.        Care EveryWhere ID     This is your Care EveryWhere ID. This could be used by other organizations to access your [place] medical records  [id number]        Your Vitals Were     Height BMI (Body Mass Index)                1.651 m (5' 5\") 28.79 kg/m2           Blood Pressure from Last 3 Encounters:   06/10/17 137/89   05/10/17 112/76   05/08/17 114/76    Weight from Last 3 Encounters:   06/28/17 78.5 kg (173 lb)   06/10/17 78.6 kg (173 lb 6 oz)   05/10/17 75.3 kg (166 lb)              Today, you had the following     No orders found for display       Primary Care Provider Office Phone # Fax #    MARGA Lyon [facility] [phone]       [facility] [address] [place] [address]        Equal Access to Services     HUGO RAMOS AH: Nicholas Ag, " wamatteoda mckinleyihsan, qaybta kaluke mortensen, katty boonewinnie ah. So [facility] [phone].    ATENCIÓN: Si stacie bryan, tiene a crowe disposición servicios gratuitos de asistencia lingüística. Uzma al [phone].    We comply with applicable federal civil rights laws and [place] laws. We do not discriminate on the basis of race, color, national origin, age, disability sex, sexual orientation or gender identity.            Thank you!     Thank you for choosing [facility] UROLOGY AND [facility] FOR PROSTATE AND UROLOGIC CANCERS  for your care. Our goal is always to provide you with excellent care. Hearing back from our patients is one way we can continue to improve our services. Please take a few minutes to complete the written survey that you may receive in the mail after your visit with us. Thank you!             Your Updated Medication List - Protect others around you: Learn how to safely use, store and throw away your medicines at www.disposemymeds.org.          This list is accurate as of: 6/28/17 11:59 PM.  Always use your most recent med list.                   Brand Name Dispense Instructions for use Diagnosis    * fluconazole 100 MG tablet    DIFLUCAN    1 tablet    Take 1 tablet (100 mg) by mouth daily    Yeast infection of the vagina       * fluconazole 150 MG tablet    DIFLUCAN    4 tablet    Take 1 tablet (150 mg) by mouth every 3 days    Candidiasis of vagina       fluticasone 27.5 MCG/SPRAY spray    VERAMYST     Spray 2 sprays into both nostrils daily        levothyroxine 100 MCG tablet    SYNTHROID    90 tablet    Take 1 tablet (100 mcg) by mouth daily    Hypothyroidism due to medication       lisinopril 10 MG tablet    PRINIVIL/ZESTRIL    90 tablet    Take 1 tablet (10 mg) by mouth daily    Essential hypertension with goal blood pressure less than 140/90       metroNIDAZOLE 500 MG tablet    FLAGYL    14 tablet    Take 1 tablet (500 mg) by mouth 2 times daily    BV (bacterial vaginosis)        mupirocin 2 % ointment    BACTROBAN    22 g    Use in nose twice daily for 5 days    Nasal vestibulitis       * Notice:  This list has 2 medication(s) that are the same as other medications prescribed for you. Read the directions carefully, and ask your doctor or other care provider to review them with you.

## 2017-06-28 NOTE — NURSING NOTE
Chief Complaint   Patient presents with     RECHECK     6 week study follow up     Letitia Dang LPN

## 2017-06-30 NOTE — PROGRESS NOTES
Reason for Visit:  F/u on pelvital Study    Clinical Data:  Ms. Melton is a 41 y/o female with hx of stress urinary incontinence who decided to participate in the pelvital study.  She reports improvement in her over all incontinence.  She has been using less pads than before and is pleased with the results.  She does report not likely to insert something vaginally every day but I encouraged her to continue given her success thus far and that it was not painful or at all uncomfortable.   She inquired as to what would happen once the study was over.  I informed her that we could address that at that time.    A/P:  41 y/o female in the pelvital study with some success overall and no adverse side effects.    Thank you for allowing me to participate in the care of  Ms. Mimi Melton and I will keep you updated on her progress.    Karin Amin MD

## 2017-08-07 ENCOUNTER — PRE VISIT (OUTPATIENT)
Dept: UROLOGY | Facility: CLINIC | Age: 42
End: 2017-08-07

## 2017-08-07 NOTE — TELEPHONE ENCOUNTER
Patient with history of stress incontinence coming in for study follow up. Patient chart reviewed, no need for call, all records available and ready for appointment.

## 2017-08-16 ENCOUNTER — ALLIED HEALTH/NURSE VISIT (OUTPATIENT)
Dept: UROLOGY | Facility: CLINIC | Age: 42
End: 2017-08-16

## 2017-08-16 ENCOUNTER — OFFICE VISIT (OUTPATIENT)
Dept: UROLOGY | Facility: CLINIC | Age: 42
End: 2017-08-16

## 2017-08-16 VITALS
DIASTOLIC BLOOD PRESSURE: 95 MMHG | HEIGHT: 65 IN | BODY MASS INDEX: 28.82 KG/M2 | WEIGHT: 173 LBS | SYSTOLIC BLOOD PRESSURE: 140 MMHG | HEART RATE: 89 BPM

## 2017-08-16 DIAGNOSIS — N39.3 FEMALE STRESS INCONTINENCE: Primary | ICD-10-CM

## 2017-08-16 RX ORDER — CEFAZOLIN SODIUM 1 G/3ML
1 INJECTION, POWDER, FOR SOLUTION INTRAMUSCULAR; INTRAVENOUS SEE ADMIN INSTRUCTIONS
Status: CANCELLED | OUTPATIENT
Start: 2017-08-16

## 2017-08-16 RX ORDER — FLUTICASONE PROPIONATE 50 MCG
SPRAY, SUSPENSION (ML) NASAL
COMMUNITY
Start: 2017-07-13 | End: 2018-06-18

## 2017-08-16 ASSESSMENT — PAIN SCALES - GENERAL: PAINLEVEL: NO PAIN (0)

## 2017-08-16 NOTE — MR AVS SNAPSHOT
After Visit Summary   8/16/2017    Mimi Melton    MRN: [id number]           Patient Information     Date Of Birth          1975        Visit Information        Provider Department      8/16/2017 10:15 AM Nurse, [facility] Urology and Inst for Prostate and Urologic Cancers        Today's Diagnoses     Female stress incontinence    -  1       Follow-ups after your visit        Your next 10 appointments already scheduled     Sep 05, 2017  1:00 PM CDT   (Arrive by 12:45 PM)   RETURN ENDOCRINE with Sandhya Harmon MD   [facility] Endocrinology ([facility] [place])    [address] [place] [address]  [address] [facility] [address] [phone]            Oct 10, 2017   Procedure with Karin Amin MD   [facility] Surgery and Procedure Center ([facility])    [address] [place] [address]  [address] [facility] [address]   [phone]           Located in the Clinics and Surgery Center at [address] [place] [address].   parking is very convenient and highly recommended.  is a $6 flat rate fee.  Both  and self parkers should enter the main arrival plaza from [facility]; parking attendants will direct you based on your parking preference.            Oct 25, 2017 11:30 AM CDT   (Arrive by 11:15 AM)   Post-Op with Karin Amin MD   [facility] Urology and Inst for Prostate and Urologic Cancers ([facility])    [address] [place] [address]  [address] [facility] [address]   [phone]            Nov 29, 2017 11:30 AM CST   (Arrive by 11:15 AM)   Post-Op with Karin Amin MD   [facility] Urology and Inst for Prostate and Urologic Cancers ([facility])    [address] [place] [address]  [address] [facility] [address]   [phone]            Apr 23, 2018  8:00 AM CDT   (Arrive by 7:45 AM)   RETURN PLASTICS with Niles Donnelly MD   [facility] Ophthalmology ([facility]  [facility] and [facility] Center)    [address] [facility]  4th [facility] [address] [phone]              Who to contact     Please call your clinic at [phone] to:    Ask questions about your health    Make or cancel appointments    Discuss your medicines    Learn about your test results    Speak to your doctor   If you have compliments or concerns about an experience at your clinic, or if you wish to file a complaint, please contact [facility] Patient Relations at [phone] or email us at [email or web address]         Additional Information About Your Visit        Welltheonhart Information     [email or web address] gives you secure access to your electronic health record. If you see a primary care provider, you can also send messages to your care team and make appointments. If you have questions, please call your primary care clinic.  If you do not have a primary care provider, please call [phone] and they will assist you.      Ujogo is an electronic gateway that provides easy, online access to your medical records. With Ujogo, you can request a clinic appointment, read your test results, renew a prescription or communicate with your care team.     To access your existing account, please contact your [facility] or call [phone] for assistance.        Care EveryWhere ID     This is your Care EveryWhere ID. This could be used by other organizations to access your [place] medical records  [id number]         Blood Pressure from Last 3 Encounters:   08/16/17 (!) 140/95   06/10/17 137/89   05/10/17 112/76    Weight from Last 3 Encounters:   08/16/17 78.5 kg (173 lb)   06/28/17 78.5 kg (173 lb)   06/10/17 78.6 kg (173 lb 6 oz)              Today, you had the following     No orders found for display       Primary Care Provider Office Phone # Fax #    MARGA Lyon [facility] [phone] [address] [facility]  [address]  [place] [address]        Equal Access to Services     [facility] Hospital[facility]ENT[facility] : Hadii julián jeffries yoselin Corderoali, wamatteoda luqveronikaha, qajonota kaalmaestefani mortensen, katty reedlauriewatson morris. So [facility] [phone].    ATENCIÓN: Si habla español, tiene a crowe disposición servicios gratuitos de asistencia lingüística. Uzma al [phone].    We comply with applicable federal civil rights laws and [place] laws. We do not discriminate on the basis of race, color, national origin, age, disability sex, sexual orientation or gender identity.            Thank you!     Thank you for choosing [facility] UROLOGY AND [facility] FOR PROSTATE AND UROLOGIC CANCERS  for your care. Our goal is always to provide you with excellent care. Hearing back from our patients is one way we can continue to improve our services. Please take a few minutes to complete the written survey that you may receive in the mail after your visit with us. Thank you!             Your Updated Medication List - Protect others around you: Learn how to safely use, store and throw away your medicines at www.disposemymeds.org.          This list is accurate as of: 8/16/17 10:57 AM.  Always use your most recent med list.                   Brand Name Dispense Instructions for use Diagnosis    * fluconazole 100 MG tablet    DIFLUCAN    1 tablet    Take 1 tablet (100 mg) by mouth daily    Yeast infection of the vagina       * fluconazole 150 MG tablet    DIFLUCAN    4 tablet    Take 1 tablet (150 mg) by mouth every 3 days    Candidiasis of vagina       fluticasone 27.5 MCG/SPRAY spray    VERAMYST     Spray 2 sprays into both nostrils daily        fluticasone 50 MCG/ACT spray    FLONASE          levothyroxine 100 MCG tablet    SYNTHROID    90 tablet    Take 1 tablet (100 mcg) by mouth daily    Hypothyroidism due to medication       lisinopril 10 MG tablet    PRINIVIL/ZESTRIL    90 tablet    Take 1 tablet (10 mg) by mouth daily    Essential hypertension with goal blood  pressure less than 140/90       metroNIDAZOLE 500 MG tablet    FLAGYL    14 tablet    Take 1 tablet (500 mg) by mouth 2 times daily    BV (bacterial vaginosis)       mupirocin 2 % ointment    BACTROBAN    22 g    Use in nose twice daily for 5 days    Nasal vestibulitis       * Notice:  This list has 2 medication(s) that are the same as other medications prescribed for you. Read the directions carefully, and ask your doctor or other care provider to review them with you.

## 2017-08-16 NOTE — MR AVS SNAPSHOT
After Visit Summary   8/16/2017    Mimi Melton    MRN: [id number]           Patient Information     Date Of Birth          1975        Visit Information        Provider Department      8/16/2017 9:15 AM Karin Amin MD [facility] Urology and Inst for Prostate and Urologic Cancers        Today's Diagnoses     Female stress incontinence    -  1       Follow-ups after your visit        Follow-up notes from your care team     Return for schedule surgery.      Your next 10 appointments already scheduled     Sep 05, 2017  1:00 PM CDT   (Arrive by 12:45 PM)   RETURN ENDOCRINE with Sandhya Harmon MD   [facility] Endocrinology ([facility] [place])    [address]  3rd Floor  [facility] [address] [phone]            Apr 23, 2018  8:00 AM CDT   (Arrive by 7:45 AM)   RETURN PLASTICS with Niles Donnelly MD   [facility] Ophthalmology ([facility] [place])    [address] [facility] [facility]  4th Floor  [facility] [address] [phone]              Who to contact     Please call your clinic at [phone] to:    Ask questions about your health    Make or cancel appointments    Discuss your medicines    Learn about your test results    Speak to your doctor   If you have compliments or concerns about an experience at your clinic, or if you wish to file a complaint, please contact [facility] Patient Relations at [phone] or email us at [email or web address]         Additional Information About Your Visit        MyChart Information     Collectrict gives you secure access to your electronic health record. If you see a primary care provider, you can also send messages to your care team and make appointments. If you have questions, please call your primary care clinic.  If you do not have a primary care provider, please call [phone] and they will assist you.      Qteros is an electronic gateway that provides easy,  "online access to your medical records. With Sendbloom, you can request a clinic appointment, read your test results, renew a prescription or communicate with your care team.     To access your existing account, please contact your [facility] or call [phone] for assistance.        Care EveryWhere ID     This is your Care EveryWhere ID. This could be used by other organizations to access your [place] medical records  [id number]        Your Vitals Were     Pulse Height BMI (Body Mass Index)             89 1.651 m (5' 5\") 28.79 kg/m2          Blood Pressure from Last 3 Encounters:   08/16/17 (!) 140/95   06/10/17 137/89   05/10/17 112/76    Weight from Last 3 Encounters:   08/16/17 78.5 kg (173 lb)   06/28/17 78.5 kg (173 lb)   06/10/17 78.6 kg (173 lb 6 oz)              We Performed the Following     Jazz-Operative Worksheet  (Urology General)        Primary Care Provider Office Phone # Fax #    MARGA [facility] [phone] [address] [place] [address]        Equal Access to Services     DENA RAMOS : Hadii aad ku hadasho Sofranceali, waaxda luqadaha, qaybta kaalmada adeegyada, katty flaherty . So [facility] [phone].    ATENCIÓN: Si habla español, tiene a crowe disposición servicios gratuitos de asistencia lingüística. [facility] [phone].    We comply with applicable federal civil rights laws and [place] laws. We do not discriminate on the basis of race, color, national origin, age, disability sex, sexual orientation or gender identity.            Thank you!     Thank you for choosing [facility] UROLOGY AND [facility] FOR PROSTATE AND UROLOGIC CANCERS  for your care. Our goal is always to provide you with excellent care. Hearing back from our patients is one way we can continue to improve our services. Please take a few minutes to complete the written survey that you may receive in the mail after your visit with " us. Thank you!             Your Updated Medication List - Protect others around you: Learn how to safely use, store and throw away your medicines at www.disposemymeds.org.          This list is accurate as of: 8/16/17  9:58 AM.  Always use your most recent med list.                   Brand Name Dispense Instructions for use Diagnosis    * fluconazole 100 MG tablet    DIFLUCAN    1 tablet    Take 1 tablet (100 mg) by mouth daily    Yeast infection of the vagina       * fluconazole 150 MG tablet    DIFLUCAN    4 tablet    Take 1 tablet (150 mg) by mouth every 3 days    Candidiasis of vagina       fluticasone 27.5 MCG/SPRAY spray    VERAMYST     Spray 2 sprays into both nostrils daily        fluticasone 50 MCG/ACT spray    FLONASE          levothyroxine 100 MCG tablet    SYNTHROID    90 tablet    Take 1 tablet (100 mcg) by mouth daily    Hypothyroidism due to medication       lisinopril 10 MG tablet    PRINIVIL/ZESTRIL    90 tablet    Take 1 tablet (10 mg) by mouth daily    Essential hypertension with goal blood pressure less than 140/90       metroNIDAZOLE 500 MG tablet    FLAGYL    14 tablet    Take 1 tablet (500 mg) by mouth 2 times daily    BV (bacterial vaginosis)       mupirocin 2 % ointment    BACTROBAN    22 g    Use in nose twice daily for 5 days    Nasal vestibulitis       * Notice:  This list has 2 medication(s) that are the same as other medications prescribed for you. Read the directions carefully, and ask your doctor or other care provider to review them with you.

## 2017-08-16 NOTE — NURSING NOTE
"Chief Complaint   Patient presents with     RECHECK     12 week study recheck       Blood pressure (!) 140/95, pulse 89, height 1.651 m (5' 5\"), weight 78.5 kg (173 lb), not currently breastfeeding. Body mass index is 28.79 kg/(m^2).    Patient Active Problem List   Diagnosis     Surveillance of previously prescribed intrauterine contraceptive device     Vaginitis and vulvovaginitis     Exophthalmos     Abnormal Pap smear of cervix     CARDIOVASCULAR SCREENING; LDL GOAL LESS THAN 160     Essential hypertension with goal blood pressure less than 140/90     Anemia     Hypothyroidism, unspecified hypothyroidism type     Cervical high risk HPV (human papillomavirus) test positive     Thyroid disease     Female stress incontinence       Allergies   Allergen Reactions     No Known Drug Allergies        Current Outpatient Prescriptions   Medication Sig Dispense Refill     fluticasone (FLONASE) 50 MCG/ACT spray        fluconazole (DIFLUCAN) 150 MG tablet Take 1 tablet (150 mg) by mouth every 3 days 4 tablet 0     metroNIDAZOLE (FLAGYL) 500 MG tablet Take 1 tablet (500 mg) by mouth 2 times daily (Patient not taking: Reported on 6/10/2017) 14 tablet 0     lisinopril (PRINIVIL/ZESTRIL) 10 MG tablet Take 1 tablet (10 mg) by mouth daily 90 tablet 1     fluconazole (DIFLUCAN) 100 MG tablet Take 1 tablet (100 mg) by mouth daily (Patient not taking: Reported on 6/10/2017) 1 tablet 0     fluticasone (VERAMYST) 27.5 MCG/SPRAY spray Spray 2 sprays into both nostrils daily       levothyroxine (SYNTHROID/LEVOTHROID) 100 MCG tablet Take 1 tablet (100 mcg) by mouth daily 90 tablet 3     mupirocin (BACTROBAN) 2 % ointment Use in nose twice daily for 5 days 22 g 0       Social History   Substance Use Topics     Smoking status: Never Smoker     Smokeless tobacco: Never Used     Alcohol use Yes      Comment: occasional       Letitia Dang LPN  8/16/2017  9:12 AM    "

## 2017-08-16 NOTE — PROGRESS NOTES
Reason for Visit:  F/u on pelvital Study    Clinical Data:  Ms. Melton is a 43 y/o female with hx of stress urinary incontinence who decided to participate in the pelvital study.  She reports improvement in her over all incontinence.  She has been using less pads than before and is pleased with the results.  She uses 1 panty liner per day for incontinence.  She did not do it every day.  She was only doing it 2-3 days per week.  However when she did do it consecutively she found that she did not have any incontinence.  She reports not liking to insert something vaginally every day.       A/P:  43 y/o female in the pelvital study with some success overall and no adverse side effects but in discussing further options she would like to proceed with a midurethral sling. We discussed the use of mesh in surgery and the risks which include but are not limited to infection, bleeding, exposure of mesh, vaginal pain, injury to the bladder/urethra or any structures in the abdomen or pelvis, as well as risk of incontinence or urinary retention. There is also risk of need for catheterization and possible further surgery.   We discussed the FDA public health notification at length.  The pt. Verbalized understanding and had a chance to ask her questions which were all answered.  The patient would like to proceed with a midurethral sling.  -schedule midurethral sling.      Thank you for allowing me to participate in the care of  Ms. Mimi Melton and I will keep you updated on her progress.    Karin Amin MD    Over 25 min spent with patient,  >50% in discussion and coordination of care.

## 2017-08-16 NOTE — NURSING NOTE
Pre Op Teaching Flowsheet       Pre and Post op Patient Education  Relevant Diagnosis:  Stress incontinence  Surgical procedure:  midurethal sling and cystoscopy (support the urethra with mesh sling and look in the bladder  Teaching Topic:  Pre and post op teaching  Person Involved in teaching: Mimi Melton    Motivation Level:  Asks Questions: Yes  Eager to Learn:  Yes  Cooperative: Yes  Receptive (willing/able to accept information):  Yes    Patient demonstrates understanding of the following:  Date of surgery:  10/10/17  Location of surgery:  [facility]- 5th Floor  History and Physical and any other testing necessary prior to surgery: Yes  Required time line for completion of History and Physical and any pre-op testing: Yes    Patient demonstrates understanding of the following:  Pre-op bowel prep:  None needed  Pre-op showering/scrub information with PCMX Soap: Yes  Blood thinner medications discussed and when to stop (if applicable):  Yes      Infection Prevention:   Patient demonstrates understanding of the following:  Surgical procedure site care taught: at time of discharge  Signs and symptoms of infection taught:  Yes      Post-op follow-up:  Discussed how to contact the hospital, nurse, and clinic scheduling staff if necessary.    Instructional materials used/given/mailed:  Success Surgery Booklet, post op teaching sheet, Map, Soap, and arrival/location information.    Surgical instructions packet given to patient in office:  Yes.

## 2017-08-21 DIAGNOSIS — N76.0 BV (BACTERIAL VAGINOSIS): ICD-10-CM

## 2017-08-21 DIAGNOSIS — E89.0 POSTABLATIVE HYPOTHYROIDISM: ICD-10-CM

## 2017-08-21 DIAGNOSIS — B96.89 BV (BACTERIAL VAGINOSIS): ICD-10-CM

## 2017-08-21 LAB
SPECIMEN SOURCE: NORMAL
T4 FREE SERPL-MCNC: 0.94 NG/DL (ref 0.76–1.46)
TSH SERPL DL<=0.005 MIU/L-ACNC: 5.13 MU/L (ref 0.4–4)
WET PREP SPEC: NORMAL

## 2017-08-21 PROCEDURE — 87210 SMEAR WET MOUNT SALINE/INK: CPT | Performed by: NURSE PRACTITIONER

## 2017-08-21 PROCEDURE — 84439 ASSAY OF FREE THYROXINE: CPT | Performed by: INTERNAL MEDICINE

## 2017-08-21 PROCEDURE — 84443 ASSAY THYROID STIM HORMONE: CPT | Performed by: INTERNAL MEDICINE

## 2017-08-21 PROCEDURE — 36415 COLL VENOUS BLD VENIPUNCTURE: CPT | Performed by: INTERNAL MEDICINE

## 2017-09-05 ENCOUNTER — OFFICE VISIT (OUTPATIENT)
Dept: ENDOCRINOLOGY | Facility: CLINIC | Age: 42
End: 2017-09-05

## 2017-09-05 VITALS
HEART RATE: 93 BPM | HEIGHT: 65 IN | WEIGHT: 170.3 LBS | SYSTOLIC BLOOD PRESSURE: 109 MMHG | BODY MASS INDEX: 28.37 KG/M2 | DIASTOLIC BLOOD PRESSURE: 73 MMHG

## 2017-09-05 DIAGNOSIS — E03.4 HYPOTHYROIDISM DUE TO ACQUIRED ATROPHY OF THYROID: Primary | ICD-10-CM

## 2017-09-05 ASSESSMENT — PAIN SCALES - GENERAL: PAINLEVEL: NO PAIN (0)

## 2017-09-05 NOTE — MR AVS SNAPSHOT
After Visit Summary   9/5/2017    Mimi Melton    MRN: [id number]           Patient Information     Date Of Birth          1975        Visit Information        Provider Department      9/5/2017 1:00 PM Sandhya Harmon MD [facility]        Today's Diagnoses     Hypothyroidism due to acquired atrophy of thyroid    -  1       Follow-ups after your visit        Follow-up notes from your care team     Return in about 1 year (around 9/5/2018).      Your next 10 appointments already scheduled     Sep 26, 2017  8:00 AM CDT   Pre-Op physical with MARGA [facility] ([facility])    [address] [place] [id number] [phone]            Oct 10, 2017   Procedure with Karin Amin MD   [facility] ([facility])    [address] [place] [id number] [phone]           Located in the Clinics and Surgery Center at [address] [place] [address].   parking is very convenient and highly recommended.  is a $6 flat rate fee.  Both  and self parkers should enter the main arrival plaza from [facility]; parking attendants will direct you based on your parking preference.            Oct 25, 2017 11:30 AM CDT   (Arrive by 11:15 AM)   Post-Op with Karin Amin MD   [facility] ([facility])    [address] [place] [address]  4th [facility] [id number] [phone]            Nov 29, 2017 11:30 AM CST   (Arrive by 11:15 AM)   Post-Op with Karin Amin MD   [facility] ([facility])    [address] [place] [id number] [phone]            Apr 23, 2018  8:00 AM CDT   (Arrive by 7:45 AM)   RETURN PLASTICS with Niles Donnelly MD     [facility] ([facility])    [address] [facility]  4th [facility] [address] [phone]              Who to contact     Please call your clinic at [phone] to:    Ask questions about your health    Make or cancel appointments    Discuss your medicines    Learn about your test results    Speak to your doctor   If you have compliments or concerns about an experience at your clinic, or if you wish to file a complaint, please contact [facility] Patient Relations at [phone] or email us at [email or web address]         Additional Information About Your Visit        Osmopure Information     Osmopure gives you secure access to your electronic health record. If you see a primary care provider, you can also send messages to your care team and make appointments. If you have questions, please call your primary care clinic.  If you do not have a primary care provider, please call [phone] and they will assist you.      Osmopure is an electronic gateway that provides easy, online access to your medical records. With Osmopure, you can request a clinic appointment, read your test results, renew a prescription or communicate with your care team.     To access your existing account, please contact your [facility] or call [phone] for assistance.        Care EveryWhere ID     This is your Care EveryWhere ID. This could be used by other organizations to access your [place] medical records  [id number]        Your Vitals Were     Pulse Height BMI (Body Mass Index)             93 1.651 m (5' 5\") 28.34 kg/m2          Blood Pressure from Last 3 Encounters:   09/05/17 109/73   08/16/17 (!) 140/95   06/10/17 137/89    Weight from Last 3 Encounters:   09/05/17 77.2 kg (170 lb 4.8 oz)   08/16/17 78.5 kg (173 lb)   06/28/17 78.5 kg (173 lb)              Today, you had the following     No orders found for display    "      Today's Medication Changes          These changes are accurate as of: 9/5/17  1:37 PM.  If you have any questions, ask your nurse or doctor.               These medicines have changed or have updated prescriptions.        Dose/Directions    fluconazole 150 MG tablet   Commonly known as:  DIFLUCAN   This may have changed:  Another medication with the same name was removed. Continue taking this medication, and follow the directions you see here.   Used for:  Candidiasis of vagina        Dose:  150 mg   Take 1 tablet (150 mg) by mouth every 3 days   Quantity:  4 tablet   Refills:  0         Stop taking these medicines if you haven't already. Please contact your care team if you have questions.     mupirocin 2 % ointment   Commonly known as:  BACTROBAN                    Primary Care Provider Office Phone # Fax #    Kalli MARGA Interiano [phone] [address] [place] [address]        Equal Access to Services     [facility] : Hadii julián siddiquio Soshea, waaxda luqadaha, qaybta kaalmada adebrendayada, katty flaherty . So [facility] [phone].    ATENCIÓN: Si habla español, tiene a crowe disposición servicios gratuitos de asistencia lingüística. Uzma al [phone].    We comply with applicable federal civil rights laws and [place] laws. We do not discriminate on the basis of race, color, national origin, age, disability sex, sexual orientation or gender identity.            Thank you!     Thank you for choosing [facility] ENDOCRINOLOGY  for your care. Our goal is always to provide you with excellent care. Hearing back from our patients is one way we can continue to improve our services. Please take a few minutes to complete the written survey that you may receive in the mail after your visit with us. Thank you!             Your Updated Medication List - Protect others around you: Learn how to safely use, store and throw away your medicines at  www.disposemymeds.org.          This list is accurate as of: 9/5/17  1:37 PM.  Always use your most recent med list.                   Brand Name Dispense Instructions for use Diagnosis    fluconazole 150 MG tablet    DIFLUCAN    4 tablet    Take 1 tablet (150 mg) by mouth every 3 days    Candidiasis of vagina       fluticasone 27.5 MCG/SPRAY spray    VERAMYST     Spray 2 sprays into both nostrils daily        fluticasone 50 MCG/ACT spray    FLONASE          levothyroxine 100 MCG tablet    SYNTHROID    90 tablet    Take 1 tablet (100 mcg) by mouth daily    Hypothyroidism due to medication       lisinopril 10 MG tablet    PRINIVIL/ZESTRIL    90 tablet    Take 1 tablet (10 mg) by mouth daily    Essential hypertension with goal blood pressure less than 140/90       metroNIDAZOLE 500 MG tablet    FLAGYL    14 tablet    Take 1 tablet (500 mg) by mouth 2 times daily    BV (bacterial vaginosis)

## 2017-09-05 NOTE — PROGRESS NOTES
"This 42 year old woman returns for f/u of her hypothyroidism.. She has Graves disease and underwent 131-I ablation in 2005 and has been on replacement since then.  She had ophthalmopathy that required surgical rx and hypertension. She was found to be over replaced about a year ago and the dose has been reduced.  She is now on 100 mcg each day.  However, she admits that she has missed one or two pills each week since her pharmacy stopped giving her individually packaged pills.  Overall she feels well.  She doesn't feel any different than she felt on the higher dose of T4.  She denies palpitations, temperature tolerance.  She continues to have dry brittle hair, but this has been true for more than 10 years.  She is due to see her eye doctor soon.  She doesn't have any eye sx.      She has lost weight intentionally.        Current Outpatient Prescriptions on File Prior to Visit:  fluticasone (FLONASE) 50 MCG/ACT spray    fluconazole (DIFLUCAN) 150 MG tablet Take 1 tablet (150 mg) by mouth every 3 days   metroNIDAZOLE (FLAGYL) 500 MG tablet Take 1 tablet (500 mg) by mouth 2 times daily   lisinopril (PRINIVIL/ZESTRIL) 10 MG tablet Take 1 tablet (10 mg) by mouth daily   fluticasone (VERAMYST) 27.5 MCG/SPRAY spray Spray 2 sprays into both nostrils daily   levothyroxine (SYNTHROID/LEVOTHROID) 100 MCG tablet Take 1 tablet (100 mcg) by mouth daily     No current facility-administered medications on file prior to visit.     ROS: 10 point ROS neg other than the symptoms noted above in the HPI.  Vital signs:   /73  Pulse 93  Ht 1.651 m (5' 5\")  Wt 77.2 kg (170 lb 4.8 oz)  BMI 28.34 kg/m2  Estimated body mass index is 28.34 kg/(m^2) as calculated from the following:    Height as of this encounter: 1.651 m (5' 5\").    Weight as of this encounter: 77.2 kg (170 lb 4.8 oz).    VSS  NAD  Eyes - no periorbital edema, conjunctival injection, scleral icterus  CV -   No edema  Skin - normal texture       Orders Only on " 08/21/2017   Component Date Value Ref Range Status     TSH 08/21/2017 5.13* 0.40 - 4.00 mU/L Final     T4 Free 08/21/2017 0.94  0.76 - 1.46 ng/dL Final     Specimen Description 08/21/2017 Vagina   Final     Wet Prep 08/21/2017 No yeast seen   Final     Wet Prep 08/21/2017 No clue cells seen   Final     Wet Prep 08/21/2017 No Trichomonas seen   Final   Office Visit on 06/10/2017   Component Date Value Ref Range Status     Color Urine 06/10/2017 Yellow   Final     Appearance Urine 06/10/2017 Clear   Final     Glucose Urine 06/10/2017 Negative  NEG mg/dL Final     Bilirubin Urine 06/10/2017 Negative  NEG Final     Ketones Urine 06/10/2017 Negative  NEG mg/dL Final     Specific Gravity Urine 06/10/2017 1.025  1.003 - 1.035 Final     Blood Urine 06/10/2017 Trace* NEG Final     pH Urine 06/10/2017 7.0  5.0 - 7.0 pH Final     Protein Albumin Urine 06/10/2017 Negative  NEG mg/dL Final     Urobilinogen Urine 06/10/2017 0.2  0.2 - 1.0 EU/dL Final     Nitrite Urine 06/10/2017 Negative  NEG Final     Leukocyte Esterase Urine 06/10/2017 Small* NEG Final     Source 06/10/2017 Midstream Urine   Final     Specimen Description 06/10/2017 Vagina   Final     Wet Prep 06/10/2017 *  Final                    Value:Yeast seen  No Trichomonas seen  No clue cells seen       Micro Report Status 06/10/2017 FINAL 06/10/2017   Final     Specimen Descrip 06/10/2017 Vagina   Final     N Gonorrhea PCR 06/10/2017   NEG Final                    Value:Negative   Negative for N. gonorrhoeae rRNA by transcription mediated amplification.   A negative result by transcription mediated amplification does not preclude the   presence of N. gonorrhoeae infection because results are dependent on proper   and adequate collection, absence of inhibitors, and sufficient rRNA to be   detected.       Specimen Description 06/10/2017 Vagina   Final     Chlamydia Trachomatis PCR 06/10/2017   NEG Final                    Value:Negative   Negative for C. trachomatis rRNA  by transcription mediated amplification.   A negative result by transcription mediated amplification does not preclude the   presence of C. trachomatis infection because results are dependent on proper   and adequate collection, absence of inhibitors, and sufficient rRNA to be   detected.       WBC Urine 06/10/2017 O - 2  0 - 2 /HPF Final     RBC Urine 06/10/2017 O - 2  0 - 2 /HPF Final   Office Visit on 05/10/2017   Component Date Value Ref Range Status     Specimen Description 05/10/2017    Corrected                    Value:Canceled, Test credited   Test canceled by physician  CORRECTED ON 05/10 AT 1145: PREVIOUSLY REPORTED AS Vagina       Chlamydia Trachomatis PCR 05/10/2017 * NEG Final                    Value:Canceled, Test credited   Test canceled by physician       Specimen Descrip 05/10/2017    Corrected                    Value:Canceled, Test credited   Test canceled by physician  CORRECTED ON 05/10 AT 1145: PREVIOUSLY REPORTED AS Vagina       N Gonorrhea PCR 05/10/2017 * NEG Final                    Value:Canceled, Test credited   Test canceled by physician       Specimen Description 05/10/2017 Vagina   Final     Wet Prep 05/10/2017 *  Final                    Value:Clue cells seen  No Trichomonas seen  No yeast seen       Micro Report Status 05/10/2017 FINAL 05/10/2017   Final   Results Only on 04/27/2017   Component Date Value Ref Range Status     HCG Qual Urine 04/27/2017 Negative  NEG Final   Orders Only on 03/15/2017   Component Date Value Ref Range Status     TSH 03/15/2017 0.22* 0.40 - 4.00 mU/L Final     Sodium 03/15/2017 140  133 - 144 mmol/L Final     Potassium 03/15/2017 3.5  3.4 - 5.3 mmol/L Final     Chloride 03/15/2017 104  94 - 109 mmol/L Final     Carbon Dioxide 03/15/2017 28  20 - 32 mmol/L Final     Anion Gap 03/15/2017 8  3 - 14 mmol/L Final     Glucose 03/15/2017 97  70 - 99 mg/dL Final     Urea Nitrogen 03/15/2017 11  7 - 30 mg/dL Final     Creatinine 03/15/2017 0.80  0.52 - 1.04  mg/dL Final     GFR Estimate 03/15/2017 79  >60 mL/min/1.7m2 Final    Non  GFR Calc     GFR Estimate If Black 03/15/2017   >60 mL/min/1.7m2 Final                    Value:>90   GFR Calc       Calcium 03/15/2017 9.2  8.5 - 10.1 mg/dL Final     T4 Free 03/15/2017 1.29  0.76 - 1.46 ng/dL Final   Office Visit on 03/09/2017   Component Date Value Ref Range Status     IGE 03/09/2017 231* 0 - 114 KIU/L Final     WBC 03/09/2017 4.8  4.0 - 11.0 10e9/L Final     RBC Count 03/09/2017 4.09  3.8 - 5.2 10e12/L Final     Hemoglobin 03/09/2017 11.7  11.7 - 15.7 g/dL Final     Hematocrit 03/09/2017 36.8  35.0 - 47.0 % Final     MCV 03/09/2017 90  78 - 100 fl Final     MCH 03/09/2017 28.6  26.5 - 33.0 pg Final     MCHC 03/09/2017 31.8  31.5 - 36.5 g/dL Final     RDW 03/09/2017 13.0  10.0 - 15.0 % Final     Platelet Count 03/09/2017 349  150 - 450 10e9/L Final     Diff Method 03/09/2017 Automated Method   Final     % Neutrophils 03/09/2017 54.7  % Final     % Lymphocytes 03/09/2017 32.2  % Final     % Monocytes 03/09/2017 9.1  % Final     % Eosinophils 03/09/2017 3.4  % Final     % Basophils 03/09/2017 0.2  % Final     % Immature Granulocytes 03/09/2017 0.4  % Final     Nucleated RBCs 03/09/2017 0  0 /100 Final     Absolute Neutrophil 03/09/2017 2.6  1.6 - 8.3 10e9/L Final     Absolute Lymphocytes 03/09/2017 1.5  0.8 - 5.3 10e9/L Final     Absolute Monocytes 03/09/2017 0.4  0.0 - 1.3 10e9/L Final     Absolute Eosinophils 03/09/2017 0.2  0.0 - 0.7 10e9/L Final     Absolute Basophils 03/09/2017 0.0  0.0 - 0.2 10e9/L Final     Abs Immature Granulocytes 03/09/2017 0.0  0 - 0.4 10e9/L Final     Absolute Nucleated RBC 03/09/2017 0.0   Final     CRP Inflammation 03/09/2017 <2.9  0.0 - 8.0 mg/L Final     Assessment/Plan:    Clinically she is euthryoid.  The TSH is a bit higher than target, but she has been missing pills.  Encouraged her to get a pill box.  Will repeat TFTs in 6 mo.  Plan for f/u in 12  john Harmon MD            Answers for HPI/ROS submitted by the patient on 8/22/2017   General Symptoms: No  Skin Symptoms: No  HENT Symptoms: No  EYE SYMPTOMS: No  HEART SYMPTOMS: No  LUNG SYMPTOMS: No  INTESTINAL SYMPTOMS: No  URINARY SYMPTOMS: No  GYNECOLOGIC SYMPTOMS: No  BREAST SYMPTOMS: No  SKELETAL SYMPTOMS: No  BLOOD SYMPTOMS: No  NERVOUS SYSTEM SYMPTOMS: No  MENTAL HEALTH SYMPTOMS: No

## 2017-09-05 NOTE — LETTER
"9/5/2017       RE: Mimi Melton  [address] [facility] [facility] [address]     Dear Colleague,    Thank you for referring your patient, Mimi Melton, to the [facility] ENDOCRINOLOGY at [facility]. Please see a copy of my visit note below.    This 42 year old woman returns for f/u of her hypothyroidism.. She has Graves disease and underwent 131-I ablation in 2005 and has been on replacement since then.  She had ophthalmopathy that required surgical rx and hypertension. She was found to be over replaced about a year ago and the dose has been reduced.  She is now on 100 mcg each day.  However, she admits that she has missed one or two pills each week since her pharmacy stopped giving her individually packaged pills.  Overall she feels well.  She doesn't feel any different than she felt on the higher dose of T4.  She denies palpitations, temperature tolerance.  She continues to have dry brittle hair, but this has been true for more than 10 years.  She is due to see her eye doctor soon.  She doesn't have any eye sx.      She has lost weight intentionally.        Current Outpatient Prescriptions on File Prior to Visit:  fluticasone (FLONASE) 50 MCG/ACT spray    fluconazole (DIFLUCAN) 150 MG tablet Take 1 tablet (150 mg) by mouth every 3 days   metroNIDAZOLE (FLAGYL) 500 MG tablet Take 1 tablet (500 mg) by mouth 2 times daily   lisinopril (PRINIVIL/ZESTRIL) 10 MG tablet Take 1 tablet (10 mg) by mouth daily   fluticasone (VERAMYST) 27.5 MCG/SPRAY spray Spray 2 sprays into both nostrils daily   levothyroxine (SYNTHROID/LEVOTHROID) 100 MCG tablet Take 1 tablet (100 mcg) by mouth daily     No current facility-administered medications on file prior to visit.     ROS: 10 point ROS neg other than the symptoms noted above in the HPI.  Vital signs:   /73  Pulse 93  Ht 1.651 m (5' 5\")  Wt 77.2 kg (170 lb 4.8 oz)  BMI 28.34 kg/m2  Estimated body mass index is 28.34 kg/(m^2) as calculated " "from the following:    Height as of this encounter: 1.651 m (5' 5\").    Weight as of this encounter: 77.2 kg (170 lb 4.8 oz).    VSS  NAD  Eyes - no periorbital edema, conjunctival injection, scleral icterus  CV -   No edema  Skin - normal texture       Orders Only on 08/21/2017   Component Date Value Ref Range Status     TSH 08/21/2017 5.13* 0.40 - 4.00 mU/L Final     T4 Free 08/21/2017 0.94  0.76 - 1.46 ng/dL Final     Specimen Description 08/21/2017 Vagina   Final     Wet Prep 08/21/2017 No yeast seen   Final     Wet Prep 08/21/2017 No clue cells seen   Final     Wet Prep 08/21/2017 No Trichomonas seen   Final   Office Visit on 06/10/2017   Component Date Value Ref Range Status     Color Urine 06/10/2017 Yellow   Final     Appearance Urine 06/10/2017 Clear   Final     Glucose Urine 06/10/2017 Negative  NEG mg/dL Final     Bilirubin Urine 06/10/2017 Negative  NEG Final     Ketones Urine 06/10/2017 Negative  NEG mg/dL Final     Specific Gravity Urine 06/10/2017 1.025  1.003 - 1.035 Final     Blood Urine 06/10/2017 Trace* NEG Final     pH Urine 06/10/2017 7.0  5.0 - 7.0 pH Final     Protein Albumin Urine 06/10/2017 Negative  NEG mg/dL Final     Urobilinogen Urine 06/10/2017 0.2  0.2 - 1.0 EU/dL Final     Nitrite Urine 06/10/2017 Negative  NEG Final     Leukocyte Esterase Urine 06/10/2017 Small* NEG Final     Source 06/10/2017 Midstream Urine   Final     Specimen Description 06/10/2017 Vagina   Final     Wet Prep 06/10/2017 *  Final                    Value:Yeast seen  No Trichomonas seen  No clue cells seen       Micro Report Status 06/10/2017 FINAL 06/10/2017   Final     Specimen Descrip 06/10/2017 Vagina   Final     N Gonorrhea PCR 06/10/2017   NEG Final                    Value:Negative   Negative for N. gonorrhoeae rRNA by transcription mediated amplification.   A negative result by transcription mediated amplification does not preclude the   presence of N. gonorrhoeae infection because results are dependent on " proper   and adequate collection, absence of inhibitors, and sufficient rRNA to be   detected.       Specimen Description 06/10/2017 Vagina   Final     Chlamydia Trachomatis PCR 06/10/2017   NEG Final                    Value:Negative   Negative for C. trachomatis rRNA by transcription mediated amplification.   A negative result by transcription mediated amplification does not preclude the   presence of C. trachomatis infection because results are dependent on proper   and adequate collection, absence of inhibitors, and sufficient rRNA to be   detected.       WBC Urine 06/10/2017 O - 2  0 - 2 /HPF Final     RBC Urine 06/10/2017 O - 2  0 - 2 /HPF Final   Office Visit on 05/10/2017   Component Date Value Ref Range Status     Specimen Description 05/10/2017    Corrected                    Value:Canceled, Test credited   Test canceled by physician  CORRECTED ON 05/10 AT 1145: PREVIOUSLY REPORTED AS Vagina       Chlamydia Trachomatis PCR 05/10/2017 * NEG Final                    Value:Canceled, Test credited   Test canceled by physician       Specimen Descrip 05/10/2017    Corrected                    Value:Canceled, Test credited   Test canceled by physician  CORRECTED ON 05/10 AT 1145: PREVIOUSLY REPORTED AS Vagina       N Gonorrhea PCR 05/10/2017 * NEG Final                    Value:Canceled, Test credited   Test canceled by physician       Specimen Description 05/10/2017 Vagina   Final     Wet Prep 05/10/2017 *  Final                    Value:Clue cells seen  No Trichomonas seen  No yeast seen       Micro Report Status 05/10/2017 FINAL 05/10/2017   Final   Results Only on 04/27/2017   Component Date Value Ref Range Status     HCG Qual Urine 04/27/2017 Negative  NEG Final   Orders Only on 03/15/2017   Component Date Value Ref Range Status     TSH 03/15/2017 0.22* 0.40 - 4.00 mU/L Final     Sodium 03/15/2017 140  133 - 144 mmol/L Final     Potassium 03/15/2017 3.5  3.4 - 5.3 mmol/L Final     Chloride 03/15/2017 104  94  - 109 mmol/L Final     Carbon Dioxide 03/15/2017 28  20 - 32 mmol/L Final     Anion Gap 03/15/2017 8  3 - 14 mmol/L Final     Glucose 03/15/2017 97  70 - 99 mg/dL Final     Urea Nitrogen 03/15/2017 11  7 - 30 mg/dL Final     Creatinine 03/15/2017 0.80  0.52 - 1.04 mg/dL Final     GFR Estimate 03/15/2017 79  >60 mL/min/1.7m2 Final    Non  GFR Calc     GFR Estimate If Black 03/15/2017   >60 mL/min/1.7m2 Final                    Value:>90   GFR Calc       Calcium 03/15/2017 9.2  8.5 - 10.1 mg/dL Final     T4 Free 03/15/2017 1.29  0.76 - 1.46 ng/dL Final   Office Visit on 03/09/2017   Component Date Value Ref Range Status     IGE 03/09/2017 231* 0 - 114 KIU/L Final     WBC 03/09/2017 4.8  4.0 - 11.0 10e9/L Final     RBC Count 03/09/2017 4.09  3.8 - 5.2 10e12/L Final     Hemoglobin 03/09/2017 11.7  11.7 - 15.7 g/dL Final     Hematocrit 03/09/2017 36.8  35.0 - 47.0 % Final     MCV 03/09/2017 90  78 - 100 fl Final     MCH 03/09/2017 28.6  26.5 - 33.0 pg Final     MCHC 03/09/2017 31.8  31.5 - 36.5 g/dL Final     RDW 03/09/2017 13.0  10.0 - 15.0 % Final     Platelet Count 03/09/2017 349  150 - 450 10e9/L Final     Diff Method 03/09/2017 Automated Method   Final     % Neutrophils 03/09/2017 54.7  % Final     % Lymphocytes 03/09/2017 32.2  % Final     % Monocytes 03/09/2017 9.1  % Final     % Eosinophils 03/09/2017 3.4  % Final     % Basophils 03/09/2017 0.2  % Final     % Immature Granulocytes 03/09/2017 0.4  % Final     Nucleated RBCs 03/09/2017 0  0 /100 Final     Absolute Neutrophil 03/09/2017 2.6  1.6 - 8.3 10e9/L Final     Absolute Lymphocytes 03/09/2017 1.5  0.8 - 5.3 10e9/L Final     Absolute Monocytes 03/09/2017 0.4  0.0 - 1.3 10e9/L Final     Absolute Eosinophils 03/09/2017 0.2  0.0 - 0.7 10e9/L Final     Absolute Basophils 03/09/2017 0.0  0.0 - 0.2 10e9/L Final     Abs Immature Granulocytes 03/09/2017 0.0  0 - 0.4 10e9/L Final     Absolute Nucleated RBC 03/09/2017 0.0   Final     CRP  Inflammation 03/09/2017 <2.9  0.0 - 8.0 mg/L Final     Assessment/Plan:    Clinically she is euthryoid.  The TSH is a bit higher than target, but she has been missing pills.  Encouraged her to get a pill box.  Will repeat TFTs in 6 mo.  Plan for f/u in 12 mo    Sandhya Harmon MD

## 2017-09-21 ENCOUNTER — COMMUNICATION - HEALTHEAST (OUTPATIENT)
Dept: PALLIATIVE MEDICINE | Facility: OTHER | Age: 42
End: 2017-09-21

## 2017-09-26 ENCOUNTER — OFFICE VISIT (OUTPATIENT)
Dept: FAMILY MEDICINE | Facility: CLINIC | Age: 42
End: 2017-09-26
Payer: COMMERCIAL

## 2017-09-26 VITALS
WEIGHT: 172 LBS | RESPIRATION RATE: 20 BRPM | HEIGHT: 65 IN | TEMPERATURE: 98.2 F | BODY MASS INDEX: 28.66 KG/M2 | OXYGEN SATURATION: 98 % | DIASTOLIC BLOOD PRESSURE: 68 MMHG | SYSTOLIC BLOOD PRESSURE: 118 MMHG | HEART RATE: 92 BPM

## 2017-09-26 DIAGNOSIS — Z01.818 PREOP GENERAL PHYSICAL EXAM: Primary | ICD-10-CM

## 2017-09-26 DIAGNOSIS — I10 ESSENTIAL HYPERTENSION WITH GOAL BLOOD PRESSURE LESS THAN 140/90: ICD-10-CM

## 2017-09-26 DIAGNOSIS — N39.3 FEMALE STRESS INCONTINENCE: ICD-10-CM

## 2017-09-26 DIAGNOSIS — E07.9 THYROID DISEASE: ICD-10-CM

## 2017-09-26 LAB
ALBUMIN UR-MCNC: NEGATIVE MG/DL
APPEARANCE UR: CLEAR
BACTERIA #/AREA URNS HPF: ABNORMAL /HPF
BILIRUB UR QL STRIP: NEGATIVE
COLOR UR AUTO: YELLOW
GLUCOSE UR STRIP-MCNC: NEGATIVE MG/DL
HGB BLD-MCNC: 11.8 G/DL (ref 11.7–15.7)
HGB UR QL STRIP: ABNORMAL
KETONES UR STRIP-MCNC: NEGATIVE MG/DL
LEUKOCYTE ESTERASE UR QL STRIP: NEGATIVE
NITRATE UR QL: NEGATIVE
PH UR STRIP: 6 PH (ref 5–7)
RBC #/AREA URNS AUTO: ABNORMAL /HPF
SOURCE: ABNORMAL
SP GR UR STRIP: 1.02 (ref 1–1.03)
UROBILINOGEN UR STRIP-ACNC: 0.2 EU/DL (ref 0.2–1)
WBC #/AREA URNS AUTO: ABNORMAL /HPF

## 2017-09-26 PROCEDURE — 87086 URINE CULTURE/COLONY COUNT: CPT | Performed by: NURSE PRACTITIONER

## 2017-09-26 PROCEDURE — 85018 HEMOGLOBIN: CPT | Performed by: NURSE PRACTITIONER

## 2017-09-26 PROCEDURE — 36415 COLL VENOUS BLD VENIPUNCTURE: CPT | Performed by: NURSE PRACTITIONER

## 2017-09-26 PROCEDURE — 99214 OFFICE O/P EST MOD 30 MIN: CPT | Performed by: NURSE PRACTITIONER

## 2017-09-26 PROCEDURE — 80048 BASIC METABOLIC PNL TOTAL CA: CPT | Performed by: NURSE PRACTITIONER

## 2017-09-26 PROCEDURE — 81001 URINALYSIS AUTO W/SCOPE: CPT | Performed by: NURSE PRACTITIONER

## 2017-09-26 RX ORDER — LACTOBACILLUS RHAMNOSUS GG 10B CELL
1 CAPSULE ORAL
Qty: 100 CAPSULE | Refills: 11 | Status: SHIPPED | OUTPATIENT
Start: 2017-09-26 | End: 2023-05-11

## 2017-09-26 NOTE — NURSING NOTE
"Chief Complaint   Patient presents with     Pre-Op Exam       Initial /68  Pulse 92  Temp 98.2  F (36.8  C) (Oral)  Resp 20  Ht 5' 5\" (1.651 m)  Wt 172 lb (78 kg)  LMP 09/23/2017  SpO2 98%  Breastfeeding? No  BMI 28.62 kg/m2 Estimated body mass index is 28.62 kg/(m^2) as calculated from the following:    Height as of this encounter: 5' 5\" (1.651 m).    Weight as of this encounter: 172 lb (78 kg).  Medication Reconciliation: complete     Blair Victoria MA       "

## 2017-09-26 NOTE — MR AVS SNAPSHOT
After Visit Summary   9/26/2017    Mimi Melton    MRN: [id number]           Patient Information     Date Of Birth          1975        Visit Information        Provider Department      9/26/2017 8:00 AM Kalli Roldan APRN CNP [facility]        Today's Diagnoses     Preop general physical exam    -  1    Female stress incontinence        Thyroid disease          Care Instructions      Before Your Surgery      Call your surgeon if there is any change in your health. This includes signs of a cold or flu (such as a sore throat, runny nose, cough, rash or fever).    Do not smoke, drink alcohol or take over the counter medicine (unless your surgeon or primary care doctor tells you to) for the 24 hours before and after surgery.    If you take prescribed drugs: Follow your doctor s orders about which medicines to take and which to stop until after surgery.    Eating and drinking prior to surgery: follow the instructions from your surgeon    Take a shower or bath the night before surgery. Use the soap your surgeon gave you to gently clean your skin. If you do not have soap from your surgeon, use your regular soap. Do not shave or scrub the surgery site.  Wear clean pajamas and have clean sheets on your bed.           Follow-ups after your visit        Your next 10 appointments already scheduled     Oct 10, 2017   Procedure with Karin Amin MD   [facility] ([facility])    [address] [place] [address]   [phone]           Located in the Clinics and Surgery Center at [address] [place] [address].   parking is very convenient and highly recommended.  is a $6 flat rate fee.  Both  and self parkers should enter the main arrival plaza from [facility]; parking attendants will direct you based on your parking preference.            Oct 25, 2017 11:30 AM CDT    (Arrive by 11:15 AM)   Post-Op with Karin Amin MD   [facility] ([facility])    [address] [place] [id number] [phone]            Nov 29, 2017 11:30 AM CST   (Arrive by 11:15 AM)   Post-Op with Karin Amin MD   [facility] ([facility])    [address] [place] [id number] [phone]            Apr 23, 2018  8:00 AM CDT   (Arrive by 7:45 AM)   RETURN PLASTICS with Niles Donnelly MD   [facility] ([facility])    [address] [place] [id number] [phone]              Who to contact     If you have questions or need follow up information about today's clinic visit or your schedule please contact [facility] directly at [phone].  Normal or non-critical lab and imaging results will be communicated to you by [employer], letter or phone within 4 business days after the clinic has received the results. If you do not hear from us within 7 days, please contact the clinic through [employer] or phone. If you have a critical or abnormal lab result, we will notify you by phone as soon as possible.  Submit refill requests through [employer] or call your pharmacy and they will forward the refill request to us. Please allow 3 business days for your refill to be completed.          Additional Information About Your Visit        [employer] Information     [employer] gives you secure access to your electronic health record. If you see a primary care provider, you can also send messages to your care team and make appointments. If you have questions, please call your primary care clinic.  If you do not have a primary care provider, please call [phone] and they will assist you.        Care EveryWhere ID     This is your Care EveryWhere  "ID. This could be used by other organizations to access your [facility] medical records  [id number]        Your Vitals Were     Pulse Temperature Respirations Height Last Period Pulse Oximetry    92 98.2  F (36.8  C) (Oral) 20 5' 5\" (1.651 m) 09/23/2017 98%    Breastfeeding? BMI (Body Mass Index)                No 28.62 kg/m2           Blood Pressure from Last 3 Encounters:   09/26/17 118/68   09/05/17 109/73   08/16/17 (!) 140/95    Weight from Last 3 Encounters:   09/26/17 172 lb (78 kg)   09/05/17 170 lb 4.8 oz (77.2 kg)   08/16/17 173 lb (78.5 kg)              We Performed the Following     Basic metabolic panel  (Ca, Cl, CO2, Creat, Gluc, K, Na, BUN)     Hemoglobin     UA with Microscopic     Urine Culture Aerobic Bacterial          Today's Medication Changes          These changes are accurate as of: 9/26/17  8:56 AM.  If you have any questions, ask your nurse or doctor.               Start taking these medicines.        Dose/Directions    [facility]   Used for:  Preop general physical exam   Started by:  Kalli Roldan APRN CNP        Dose:  1 capsule   Take 1 capsule by mouth 2 times daily   Quantity:  100 capsule   Refills:  11            Where to get your medicines      These medications were sent to [facility] [address] [place] [address]     Phone:  [phone]     [facility] DIG[facility]IVE [facility]                Primary Care Provider Office Phone # Fax #    MARGA Lyon [phone] [address] [place] [address]        Equal Access to Services     DENA RAMOS AH: Hadii julián wyatt Soshea, waaxda luqadaha, qaybta kaalmada adeegyada, katty morris. So [facility] [phone].    ATENCIÓN: Si habla español, tiene a crowe disposición servicios gratuitos de asistencia lingüística. Llame al [phone].    We comply with applicable " federal civil rights laws and [place] laws. We do not discriminate on the basis of race, color, national origin, age, disability sex, sexual orientation or gender identity.            Thank you!     Thank you for choosing [facility]  for your care. Our goal is always to provide you with excellent care. Hearing back from our patients is one way we can continue to improve our services. Please take a few minutes to complete the written survey that you may receive in the mail after your visit with us. Thank you!             Your Updated Medication List - Protect others around you: Learn how to safely use, store and throw away your medicines at www.disposemymeds.org.          This list is accurate as of: 9/26/17  8:56 AM.  Always use your most recent med list.                   Brand Name Dispense Instructions for use Diagnosis    [facility]     100 capsule    Take 1 capsule by mouth 2 times daily    Preop general physical exam       fluticasone 27.5 MCG/SPRAY spray    VERAMYST     Spray 2 sprays into both nostrils daily        fluticasone 50 MCG/ACT spray    FLONASE          levothyroxine 100 MCG tablet    SYNTHROID    90 tablet    Take 1 tablet (100 mcg) by mouth daily    Hypothyroidism due to medication       lisinopril 10 MG tablet    PRINIVIL/ZESTRIL    90 tablet    Take 1 tablet (10 mg) by mouth daily    Essential hypertension with goal blood pressure less than 140/90

## 2017-09-26 NOTE — PROGRESS NOTES
[address] [address]  [phone]  Dept: [phone]    PRE-OP EVALUATION:  Today's date: 2017    Mimi Melton (: 1975) presents for pre-operative evaluation assessment as requested by Dr. tabor.  She requires evaluation and anesthesia risk assessment prior to undergoing surgery/procedure for treatment of stress urinary incontinence .  Proposed procedure: CYSTOSCOPY, SLING TRANSVAGINAL    Date of Surgery/ Procedure: 10/10/2017  Time of Surgery/ Procedure: 8 am   Hospital/Surgical Facility: [facility]   Primary Physician: Kalli Roldan  Type of Anesthesia Anticipated: monitor anesthesia care     Patient has a Health Care Directive or Living Will:  NO    Preop Questions 2017   1.  Do you have a history of heart attack, stroke, stent, bypass or surgery on an artery in the head, neck, heart or legs? No   2.  Do you ever have any pain or discomfort in your chest? No   3.  Do you have a history of  Heart Failure? No   4.   Are you troubled by shortness of breath when:  walking on a level surface, or up a slight hill, or at night? No   5.  Do you currently have a cold, bronchitis or other respiratory infection? No   6.  Do you have a cough, shortness of breath, or wheezing? No   7.  Do you sometimes get pains in the calves of your legs when you walk? No   8. Do you or anyone in your family have previous history of blood clots? No   9.  Do you or does anyone in your family have a serious bleeding problem such as prolonged bleeding following surgeries or cuts? No   10. Have you ever had problems with anemia or been told to take iron pills? No   11. Have you had any abnormal blood loss such as black, tarry or bloody stools, or abnormal vaginal bleeding? No   12. Have you ever had a blood transfusion? No   13. Have you or any of your relatives ever had problems with anesthesia? No   14. Do you have sleep apnea, excessive snoring or daytime  drowsiness? No   15. Do you have any prosthetic heart valves? No   16. Do you have prosthetic joints? No   17. Is there any chance that you may be pregnant? No           HPI:                                                      Brief HPI related to upcoming procedure: Mimi presents to the clinic for pre-op evaluation for a bladder sling placement.  Overall she reports she is doing well and she denies any recent illness.  Her hypothyroidism is followed by endocrinology and she reports it is stable. She reports taking her lisinopril daily and an improvement in her BP.  She has a history of stress urinary incontinence and will be undergoing a bladder sling placement.    See problem list for active medical problems.  Problems all longstanding and stable, except as noted/documented.  See ROS for pertinent symptoms related to these conditions.                                                                                                  .    MEDICAL HISTORY:                                                    Patient Active Problem List    Diagnosis Date Noted     Female stress incontinence 04/12/2017     Priority: Medium     Thyroid disease 08/09/2016     Priority: Medium     Cervical high risk HPV (human papillomavirus) test positive 12/01/2015     Priority: Medium     ECC, repeat pap and hpv all negative 9/09.   NIL paps: 2/10, 9/10, 12/11, 12/12. Plan pap in 3 yrs.   7/14: NIL pap. Plan cotest pap & HPV in 3 years.   12/2015: NIL pap, + HPV (not 16 or 18). Plan cotest pap & HPV in 1 year  12/08/16: NIL pap, Neg HR HPV result. Plan cotest in 3 years.       Hypothyroidism due to acquired atrophy of thyroid 10/13/2015     Priority: Medium     Anemia 07/22/2014     Priority: Medium     Essential hypertension with goal blood pressure less than 140/90 12/28/2012     Priority: Medium     CARDIOVASCULAR SCREENING; LDL GOAL LESS THAN 160 10/31/2010     Priority: Medium     Abnormal Pap smear of cervix 09/09/2009      Priority: Medium     ECC, repeat pap and hpv all negative .   NIL paps: 2/10, 9/10, , . Plan pap in 3 yrs.  : NIL pap. Plan cotest pap & HPV in 3 years.  2015: NIL pap, + HPV (not 16 or 18). Plan cotest pap & HPV in 1 year  Tracking started.           Exophthalmos 2007     Priority: Medium     Problem list name updated by automated process. Provider to review       Vaginitis and vulvovaginitis 2007     Priority: Medium     Problem list name updated by automated process. Provider to review       Surveillance of previously prescribed intrauterine contraceptive device 2004     Priority: Medium     Paragard IUD placed in May 19, 2011.        Past Medical History:   Diagnosis Date     Cervical high risk HPV (human papillomavirus) test positive 2015    NIL pap, + HPV (not 16 or 18)     Chronic sinusitis Summer 2016    I get congested every 3-4 weeks     Esophageal reflux      Exophthalmos, unspecified      Hypothyroidism      Thyroid eye disease      Thyrotoxicosis without mention of goiter or other cause, without mention of thyrotoxic crisis or storm 3/05    Had radioablation, On synthroid, seeing [facility]; takes synthroid     Unspecified essential hypertension 1980    meds since , on atenolol     Past Surgical History:   Procedure Laterality Date     C  DELIVERY ONLY  91    , Low Cervical     C  DELIVERY ONLY  97    , Low Cervical     C  DELIVERY ONLY  99    , Low Cervical     C INDUCED ABORTN BY D&C      Aspiration & Curettage, TAB x2     EYE SURGERY  2008    Orbital Decompression Dr smart      REPAIR INCISIONAL HERNIA,REDUCIBLE      Umbilical hernia Repair     Current Outpatient Prescriptions   Medication Sig Dispense Refill     Lactobacillus-Inulin ([facility] DIGESTIVE HEALTH) CAPS Take 1 capsule by mouth 2 times daily 100 capsule 11     fluticasone (FLONASE) 50 MCG/ACT spray         "lisinopril (PRINIVIL/ZESTRIL) 10 MG tablet Take 1 tablet (10 mg) by mouth daily 90 tablet 1     levothyroxine (SYNTHROID/LEVOTHROID) 100 MCG tablet Take 1 tablet (100 mcg) by mouth daily 90 tablet 3     fluticasone (VERAMYST) 27.5 MCG/SPRAY spray Spray 2 sprays into both nostrils daily       OTC products: Probiotics    Allergies   Allergen Reactions     Mold      No Known Drug Allergies       Latex Allergy: NO    Social History   Substance Use Topics     Smoking status: Never Smoker     Smokeless tobacco: Never Used     Alcohol use Yes      Comment: occasional     History   Drug Use No       REVIEW OF SYSTEMS:                                                    Constitutional, neuro, ENT, endocrine, pulmonary, cardiac, gastrointestinal, genitourinary, musculoskeletal, integument and psychiatric systems are negative, except as otherwise noted.      EXAM:                                                    /68  Pulse 92  Temp 98.2  F (36.8  C) (Oral)  Resp 20  Ht 5' 5\" (1.651 m)  Wt 172 lb (78 kg)  LMP 09/23/2017  SpO2 98%  Breastfeeding? No  BMI 28.62 kg/m2    GENERAL APPEARANCE: healthy, alert and no distress     EYES: EOMI, PERRL     HENT: ear canals and TM's normal and nose and mouth without ulcers or lesions     NECK: no adenopathy, no asymmetry, masses, or scars and thyroid normal to palpation     RESP: lungs clear to auscultation - no rales, rhonchi or wheezes     CV: regular rates and rhythm, normal S1 S2, no S3 or S4 and no murmur, click or rub     ABDOMEN:  soft, nontender, no HSM or masses and bowel sounds normal     MS: extremities normal- no gross deformities noted, no evidence of inflammation in joints, FROM in all extremities.     SKIN: no suspicious lesions or rashes     NEURO: Normal strength and tone, sensory exam grossly normal, mentation intact and speech normal     PSYCH: mentation appears normal. and affect normal/bright     LYMPHATICS: No axillary, cervical, or supraclavicular " nodes    DIAGNOSTICS:                                                    Hemoglobin, BMP, UA/UC per surgeon requirements  Results for orders placed or performed in visit on 09/26/17   Hemoglobin   Result Value Ref Range    Hemoglobin 11.8 11.7 - 15.7 g/dL   UA with Microscopic   Result Value Ref Range    Color Urine Yellow     Appearance Urine Clear     Glucose Urine Negative NEG^Negative mg/dL    Bilirubin Urine Negative NEG^Negative    Ketones Urine Negative NEG^Negative mg/dL    Specific Gravity Urine 1.020 1.003 - 1.035    pH Urine 6.0 5.0 - 7.0 pH    Protein Albumin Urine Negative NEG^Negative mg/dL    Urobilinogen Urine 0.2 0.2 - 1.0 EU/dL    Nitrite Urine Negative NEG^Negative    Blood Urine Large (A) NEG^Negative    Leukocyte Esterase Urine Negative NEG^Negative    Source Midstream Urine     WBC Urine O - 2 OTO2^O - 2 /HPF    RBC Urine 25-50 (A) OTO2^O - 2 /HPF    Bacteria Urine Few (A) NEG^Negative /HPF     IMPRESSION:                                                    Reason for surgery/procedure: Stress urinary incontinence  Diagnosis/reason for consult: pre-op visit    (Z01.818) Preop general physical exam  (primary encounter diagnosis)  Comment: routine  Plan: Lactobacillus-Inulin ([facility]) CAPS, Hemoglobin, Basic metabolic panel        (Ca, Cl, CO2, Creat, Gluc, K, Na, BUN), UA with        Microscopic, Urine Culture Aerobic Bacterial        Cleared for surgery    (N39.3) Female stress incontinence  Comment: uncontrolled  Plan: UA with Microscopic, Urine Culture Aerobic         Bacterial        Cleared for surgery    (E07.9) Thyroid disease  Comment: stable  Plan: continue care with endocrine    (I10) Essential hypertension with goal blood pressure less than 140/90  Comment: controlled  Plan: BP controlled today.         The proposed surgical procedure is considered LOW risk.    REVISED CARDIAC RISK INDEX  The patient has the following serious cardiovascular risks for  perioperative complications such as (MI, PE, VFib and 3  AV Block):  No serious cardiac risks  INTERPRETATION: 0 risks: Class I (very low risk - 0.4% complication rate)    The patient has the following additional risks for perioperative complications:  No identified additional risks      ICD-10-CM    1. Preop general physical exam Z01.818 Lactobacillus-Inulin ([facility] DIGESTIVE [facility]) CAPS     Hemoglobin     Basic metabolic panel  (Ca, Cl, CO2, Creat, Gluc, K, Na, BUN)     UA with Microscopic     Urine Culture Aerobic Bacterial   2. Female stress incontinence N39.3 UA with Microscopic     Urine Culture Aerobic Bacterial   3. Thyroid disease E07.9        RECOMMENDATIONS:                                                      --Consult hospital rounder / IM to assist post-op medical management    --Patient is to take all scheduled medications on the day of surgery EXCEPT for modifications listed below.    APPROVAL GIVEN to proceed with proposed procedure, without further diagnostic evaluation     This note was scribed by Vesna Rodríguez RN, Student NP    Signed Electronically by: MARGA Casey CNP    Copy of this evaluation report is provided to requesting physician.    [facility] Preop Guidelines

## 2017-09-27 LAB
ANION GAP SERPL CALCULATED.3IONS-SCNC: 8 MMOL/L (ref 3–14)
BACTERIA SPEC CULT: NORMAL
BUN SERPL-MCNC: 12 MG/DL (ref 7–30)
CALCIUM SERPL-MCNC: 9.1 MG/DL (ref 8.5–10.1)
CHLORIDE SERPL-SCNC: 105 MMOL/L (ref 94–109)
CO2 SERPL-SCNC: 26 MMOL/L (ref 20–32)
CREAT SERPL-MCNC: 0.96 MG/DL (ref 0.52–1.04)
GFR SERPL CREATININE-BSD FRML MDRD: 64 ML/MIN/1.7M2
GLUCOSE SERPL-MCNC: 74 MG/DL (ref 70–99)
POTASSIUM SERPL-SCNC: 4 MMOL/L (ref 3.4–5.3)
SODIUM SERPL-SCNC: 139 MMOL/L (ref 133–144)
SPECIMEN SOURCE: NORMAL

## 2017-09-28 NOTE — PROGRESS NOTES
Mi Le,    This is to let you know that the results of your recent blood tests are all good. Let me know if you have any questions.    Good luck with your surgery!  Kalli

## 2017-10-06 RX ORDER — CETIRIZINE HYDROCHLORIDE 10 MG/1
10 TABLET ORAL EVERY MORNING
COMMUNITY
End: 2020-10-07

## 2017-10-09 ENCOUNTER — ANESTHESIA EVENT (OUTPATIENT)
Dept: SURGERY | Facility: AMBULATORY SURGERY CENTER | Age: 42
End: 2017-10-09

## 2017-10-10 ENCOUNTER — HOSPITAL ENCOUNTER (OUTPATIENT)
Facility: AMBULATORY SURGERY CENTER | Age: 42
End: 2017-10-10
Attending: UROLOGY

## 2017-10-10 ENCOUNTER — ANESTHESIA (OUTPATIENT)
Dept: SURGERY | Facility: AMBULATORY SURGERY CENTER | Age: 42
End: 2017-10-10

## 2017-10-10 ENCOUNTER — SURGERY (OUTPATIENT)
Age: 42
End: 2017-10-10

## 2017-10-10 VITALS
TEMPERATURE: 97 F | RESPIRATION RATE: 20 BRPM | OXYGEN SATURATION: 98 % | SYSTOLIC BLOOD PRESSURE: 126 MMHG | DIASTOLIC BLOOD PRESSURE: 88 MMHG

## 2017-10-10 DIAGNOSIS — N39.3 SUI (STRESS URINARY INCONTINENCE, FEMALE): Primary | ICD-10-CM

## 2017-10-10 LAB
HCG UR QL: NEGATIVE
INTERNAL QC OK POCT: YES

## 2017-10-10 DEVICE — MESH SLING SINGLE INCISION ALTIS 519650: Type: IMPLANTABLE DEVICE | Site: BLADDER | Status: FUNCTIONAL

## 2017-10-10 RX ORDER — PROPOFOL 10 MG/ML
INJECTION, EMULSION INTRAVENOUS PRN
Status: DISCONTINUED | OUTPATIENT
Start: 2017-10-10 | End: 2017-10-10

## 2017-10-10 RX ORDER — GABAPENTIN 300 MG/1
300 CAPSULE ORAL ONCE
Status: COMPLETED | OUTPATIENT
Start: 2017-10-10 | End: 2017-10-10

## 2017-10-10 RX ORDER — PROPOFOL 10 MG/ML
INJECTION, EMULSION INTRAVENOUS CONTINUOUS PRN
Status: DISCONTINUED | OUTPATIENT
Start: 2017-10-10 | End: 2017-10-10

## 2017-10-10 RX ORDER — ONDANSETRON 2 MG/ML
4 INJECTION INTRAMUSCULAR; INTRAVENOUS EVERY 30 MIN PRN
Status: DISCONTINUED | OUTPATIENT
Start: 2017-10-10 | End: 2017-10-11 | Stop reason: HOSPADM

## 2017-10-10 RX ORDER — CEFAZOLIN SODIUM 1 G/3ML
1 INJECTION, POWDER, FOR SOLUTION INTRAMUSCULAR; INTRAVENOUS SEE ADMIN INSTRUCTIONS
Status: DISCONTINUED | OUTPATIENT
Start: 2017-10-10 | End: 2017-10-10 | Stop reason: HOSPADM

## 2017-10-10 RX ORDER — LISINOPRIL/HYDROCHLOROTHIAZIDE 10-12.5 MG
1 TABLET ORAL DAILY
COMMUNITY
End: 2017-11-01

## 2017-10-10 RX ORDER — SODIUM CHLORIDE, SODIUM LACTATE, POTASSIUM CHLORIDE, CALCIUM CHLORIDE 600; 310; 30; 20 MG/100ML; MG/100ML; MG/100ML; MG/100ML
INJECTION, SOLUTION INTRAVENOUS CONTINUOUS
Status: DISCONTINUED | OUTPATIENT
Start: 2017-10-10 | End: 2017-10-10 | Stop reason: HOSPADM

## 2017-10-10 RX ORDER — LABETALOL HYDROCHLORIDE 5 MG/ML
10 INJECTION, SOLUTION INTRAVENOUS
Status: DISCONTINUED | OUTPATIENT
Start: 2017-10-10 | End: 2017-10-10 | Stop reason: HOSPADM

## 2017-10-10 RX ORDER — HYDROCODONE BITARTRATE AND ACETAMINOPHEN 5; 325 MG/1; MG/1
1-2 TABLET ORAL EVERY 4 HOURS PRN
Qty: 18 TABLET | Refills: 0 | Status: SHIPPED | OUTPATIENT
Start: 2017-10-10 | End: 2017-10-25

## 2017-10-10 RX ORDER — FENTANYL CITRATE 50 UG/ML
25-50 INJECTION, SOLUTION INTRAMUSCULAR; INTRAVENOUS
Status: DISCONTINUED | OUTPATIENT
Start: 2017-10-10 | End: 2017-10-10 | Stop reason: HOSPADM

## 2017-10-10 RX ORDER — ONDANSETRON 4 MG/1
4 TABLET, ORALLY DISINTEGRATING ORAL EVERY 30 MIN PRN
Status: DISCONTINUED | OUTPATIENT
Start: 2017-10-10 | End: 2017-10-11 | Stop reason: HOSPADM

## 2017-10-10 RX ORDER — OXYCODONE HYDROCHLORIDE 5 MG/1
5 TABLET ORAL ONCE
Status: COMPLETED | OUTPATIENT
Start: 2017-10-10 | End: 2017-10-10

## 2017-10-10 RX ORDER — ACETAMINOPHEN 325 MG/1
975 TABLET ORAL ONCE
Status: COMPLETED | OUTPATIENT
Start: 2017-10-10 | End: 2017-10-10

## 2017-10-10 RX ORDER — MEPERIDINE HYDROCHLORIDE 25 MG/ML
12.5 INJECTION INTRAMUSCULAR; INTRAVENOUS; SUBCUTANEOUS
Status: DISCONTINUED | OUTPATIENT
Start: 2017-10-10 | End: 2017-10-11 | Stop reason: HOSPADM

## 2017-10-10 RX ORDER — ONDANSETRON 2 MG/ML
INJECTION INTRAMUSCULAR; INTRAVENOUS PRN
Status: DISCONTINUED | OUTPATIENT
Start: 2017-10-10 | End: 2017-10-10

## 2017-10-10 RX ORDER — NALOXONE HYDROCHLORIDE 0.4 MG/ML
.1-.4 INJECTION, SOLUTION INTRAMUSCULAR; INTRAVENOUS; SUBCUTANEOUS
Status: DISCONTINUED | OUTPATIENT
Start: 2017-10-10 | End: 2017-10-11 | Stop reason: HOSPADM

## 2017-10-10 RX ORDER — SODIUM CHLORIDE, SODIUM LACTATE, POTASSIUM CHLORIDE, CALCIUM CHLORIDE 600; 310; 30; 20 MG/100ML; MG/100ML; MG/100ML; MG/100ML
INJECTION, SOLUTION INTRAVENOUS CONTINUOUS
Status: DISCONTINUED | OUTPATIENT
Start: 2017-10-10 | End: 2017-10-11 | Stop reason: HOSPADM

## 2017-10-10 RX ORDER — KETAMINE HYDROCHLORIDE 10 MG/ML
INJECTION, SOLUTION INTRAMUSCULAR; INTRAVENOUS PRN
Status: DISCONTINUED | OUTPATIENT
Start: 2017-10-10 | End: 2017-10-10

## 2017-10-10 RX ORDER — FENTANYL CITRATE 50 UG/ML
25-50 INJECTION, SOLUTION INTRAMUSCULAR; INTRAVENOUS
Status: DISCONTINUED | OUTPATIENT
Start: 2017-10-10 | End: 2017-10-11 | Stop reason: HOSPADM

## 2017-10-10 RX ORDER — DEXAMETHASONE SODIUM PHOSPHATE 4 MG/ML
INJECTION, SOLUTION INTRA-ARTICULAR; INTRALESIONAL; INTRAMUSCULAR; INTRAVENOUS; SOFT TISSUE PRN
Status: DISCONTINUED | OUTPATIENT
Start: 2017-10-10 | End: 2017-10-10

## 2017-10-10 RX ORDER — LIDOCAINE HYDROCHLORIDE 20 MG/ML
INJECTION, SOLUTION INFILTRATION; PERINEURAL PRN
Status: DISCONTINUED | OUTPATIENT
Start: 2017-10-10 | End: 2017-10-10

## 2017-10-10 RX ORDER — BUPIVACAINE HYDROCHLORIDE AND EPINEPHRINE 2.5; 5 MG/ML; UG/ML
INJECTION, SOLUTION INFILTRATION; PERINEURAL PRN
Status: DISCONTINUED | OUTPATIENT
Start: 2017-10-10 | End: 2017-10-10 | Stop reason: HOSPADM

## 2017-10-10 RX ORDER — FENTANYL CITRATE 50 UG/ML
25-50 INJECTION, SOLUTION INTRAMUSCULAR; INTRAVENOUS EVERY 5 MIN PRN
Status: DISCONTINUED | OUTPATIENT
Start: 2017-10-10 | End: 2017-10-10 | Stop reason: HOSPADM

## 2017-10-10 RX ORDER — AMOXICILLIN 250 MG
1-2 CAPSULE ORAL 2 TIMES DAILY
Qty: 30 TABLET | Refills: 0 | Status: SHIPPED | OUTPATIENT
Start: 2017-10-10 | End: 2018-10-18

## 2017-10-10 RX ADMIN — PROPOFOL 125 MCG/KG/MIN: 10 INJECTION, EMULSION INTRAVENOUS at 08:10

## 2017-10-10 RX ADMIN — PROPOFOL 50 MG: 10 INJECTION, EMULSION INTRAVENOUS at 08:15

## 2017-10-10 RX ADMIN — ACETAMINOPHEN 975 MG: 325 TABLET ORAL at 07:12

## 2017-10-10 RX ADMIN — PROPOFOL 200 MG: 10 INJECTION, EMULSION INTRAVENOUS at 08:23

## 2017-10-10 RX ADMIN — PROPOFOL 60 MG: 10 INJECTION, EMULSION INTRAVENOUS at 08:10

## 2017-10-10 RX ADMIN — DEXAMETHASONE SODIUM PHOSPHATE 4 MG: 4 INJECTION, SOLUTION INTRA-ARTICULAR; INTRALESIONAL; INTRAMUSCULAR; INTRAVENOUS; SOFT TISSUE at 08:12

## 2017-10-10 RX ADMIN — PROPOFOL: 10 INJECTION, EMULSION INTRAVENOUS at 08:43

## 2017-10-10 RX ADMIN — BUPIVACAINE HYDROCHLORIDE AND EPINEPHRINE 10 ML: 2.5; 5 INJECTION, SOLUTION INFILTRATION; PERINEURAL at 08:47

## 2017-10-10 RX ADMIN — ONDANSETRON 4 MG: 2 INJECTION INTRAMUSCULAR; INTRAVENOUS at 08:12

## 2017-10-10 RX ADMIN — GABAPENTIN 300 MG: 300 CAPSULE ORAL at 07:12

## 2017-10-10 RX ADMIN — KETAMINE HYDROCHLORIDE 20 MG: 10 INJECTION, SOLUTION INTRAMUSCULAR; INTRAVENOUS at 08:22

## 2017-10-10 RX ADMIN — SODIUM CHLORIDE, SODIUM LACTATE, POTASSIUM CHLORIDE, CALCIUM CHLORIDE: 600; 310; 30; 20 INJECTION, SOLUTION INTRAVENOUS at 08:06

## 2017-10-10 RX ADMIN — OXYCODONE HYDROCHLORIDE 5 MG: 5 TABLET ORAL at 09:12

## 2017-10-10 RX ADMIN — LIDOCAINE HYDROCHLORIDE 80 MG: 20 INJECTION, SOLUTION INFILTRATION; PERINEURAL at 08:10

## 2017-10-10 NOTE — IP AVS SNAPSHOT
[facility]    [address] [place] [id number]    Phone:  [phone]    Fax:  [phone]                                       After Visit Summary   10/10/2017    iMmi Melton    MRN: [id number]           After Visit Summary Signature Page     I have received my discharge instructions, and my questions have been answered. I have discussed any challenges I see with this plan with the nurse or doctor.    ..........................................................................................................................................  Patient/Patient Representative Signature      ..........................................................................................................................................  Patient Representative Print Name and Relationship to Patient    ..................................................               ................................................  Date                                            Time    ..........................................................................................................................................  Reviewed by Signature/Title    ...................................................              ..............................................  Date                                                            Time

## 2017-10-10 NOTE — DISCHARGE INSTRUCTIONS
[facility]  Home Care Following Anesthesia  For 24 hours after surgery:  1. Get plenty of rest.  A responsible adult must stay with you for at least 24 hours after you leave the surgery center.  2. Do not drive or use heavy equipment.  If you have weakness or tingling, don't drive or use heavy equipment until this feeling goes away.   3. Do not drink alcohol.   4. Avoid strenuous or risky activities.  Ask for help when climbing stairs.  5. You may feel lightheaded.  IF so, sit for a few minutes before standing.  Have someone help you get up.   6. If you have nausea (feel sick to your stomach): Drink only clear liquids such as apple juice, ginger ale, broth or 7-Up.  Rest may also help.  Be sure to drink enough fluids.  Move to a regular diet as you feel able.   7. You may have a slight fever.  Call the doctor if your fever is over 100 F (37.7 C) (taken under the tongue) or lasts longer than 24 hours.  8. You may have a dry mouth, a sore throat, muscle aches or trouble sleeping. These should go away after 24 hours.  9. Do not make important or legal decisions.   If you use hormonal birth control (such as the pill, patch, ring or implants):  You will need a second form of birth control for 7 days (condoms, a diaphragm or contraceptive foam).  While in the surgery center, you received a medicine called Sugammadex.  Hormonal birth control (such as the pill, patch, ring or implants) will not work as well for a week after taking this medicine.         Tips for taking pain medications  To get the best pain relief possible, remember these points:    Take pain medications as directed, before pain becomes severe.    Pain medication can upset your stomach: taking it with food may help.    Constipation is a common side effect of pain medication. Drink plenty of  fluids.    Eat foods high in fiber. Take a stool softener if recommended by your doctor or pharmacist.    Do not drink alcohol,  drive or operate machinery while taking pain medications.    Ask about other ways to control pain, such as with heat, ice or relaxation.    Tylenol/Acetaminophen Consumption  To help encourage the safe use of acetaminophen, the makers of TYLENOL  have lowered the maximum daily dose for single-ingredient Extra Strength TYLENOL  (acetaminophen) products sold in the [place] from 8 pills per day (4,000 mg) to 6 pills per day (3,000 mg). The dosing interval has also changed from 2 pills every 4-6 hours to 2 pills every 6 hours.    If you feel your pain relief is insufficient, you may take Tylenol/Acetaminophen in addition to your narcotic pain medication.     Be careful not to exceed 3,000 mg of Tylenol/Acetaminophen in a 24 hour period from all sources.    If you are taking extra strength Tylenol/acetaminophen (500 mg), the maximum dose is 6 tablets in 24 hours.    If you are taking regular strength acetaminophen (325 mg), the maximum dose is 9 tablets in 24 hours.    Call a doctor for any of the followin. Signs of infection (fever, growing tenderness at the surgery site, a large amount of drainage or bleeding, severe pain, foul-smelling drainage, redness, swelling).  2. It has been over 8 to 10 hours since surgery and you are still not able to urinate (pass water).  3. Headache for over 24 hours.  Your doctor is:       Dr. Karin Amin, Prostate and Urology: [phone]               Or dial [phone] and ask for the resident on call for:  Prostate Urology  For emergency care, call the:  [facility]:  [phone] (TTY for hearing impaired: [phone])  Discharge Instructions Following a Cystoscopy, Stent and/or Ureteroscopy    Drainage/Urination:    Urine may be slightly bloody but should taper off in a few days.    You may have some frequency and urgency for a few days.    Comfort:    A burning sensation when urinating is common. Drinking extra fluids helps decrease this burning feeling  and also helps to clear the bloody urine.    Mild abdominal cramps may occur for a few days.    Baths:    Daily tub baths aide in passing urine.    Frequent use of bubble bath is discouraged since it encourages urinary tract infections.    Report to your doctor at once if you experience:    Inability to pass your urine    Continuous or heavy bleeding    Severe Pain    Elevated temperature > than 101.5 for 24 hours    Home Activity:    On the day of surgery spend a quiet evening at home.    Increase activity as tolerated.

## 2017-10-10 NOTE — OP NOTE
OPERATIVE REPORT    PREOPERATIVE DIAGNOSIS:  Female stress urinary incontinence    POSTOPERATIVE DIAGNOSIS: Same    PROCEDURES PERFORMED:   1. Cystoscopy  2. Placement of midurethral sling (Coloplast Altis)    STAFF SURGEON: Dr. Karin Amin MD, present for entire case.     RESIDENT(S):  William King MD    ANESTHESIA: Monitor Anesthesia Care    ESTIMATED BLOOD LOSS: 10 mL.     DRAINS: None    SIGNIFICANT FINDINGS:  1. Uncomplicated sling placement.  No leakage at conclusion of case. No bladder injury    OPERATIVE INDICATIONS:   Mimi Melton is a(n) 42 year old female with a history of stress urinary incontinence.. The patient was counseled on the alternatives, risks, and benefits and elected to proceed with the above stated procedure.    DESCRIPTION OF PROCEDURE:   After verification of informed consent was obtained, the patient was brought to the operating room, and moved to the operating table. After adequate anesthesia was induced, the patient was repositioned in the lithotomy position and prepped and draped in the usual sterile fashion. A formal timeout was performed to confirm the correct patient, procedure and operative site.     We began the procedure by placing a 16Fr pretty catheter. An Allis clamp was used to grasp the vaginal mucosa approximately 1 cm from the urethral meatus at the mid urethra. A 25-gauge needle, we injected 10 mL of 1% lidocaine with epinephrine to hydrodissect the tissues. We then made a 1-cm incision through the vaginal mucosa with a Metzenbaum scissors. We then dissected this laterally out passed the fornices of the vagina in the pubocervical fascia, out laterally to the pelvic side wall, first on the right-hand side and then on the left hand side. We then placed the Altis sling on the trocar and placed this first through the obturator membrane on the left side and then deployed it and then placed it on the right hand side and deployed it. The sling lay flat.  The string was  used to place it under appropriate tension.  We then placed a 19-Cambodian cystoscope through the urethra and into the bladder, noting bilateral ureteral orifices. The floor of the bladder was intact as well as the bladder neck and the lateral edges, no mesh was seen. The urethra was without tape. We then withdrew the cystoscope with a full bladder and pressed on the suprapubic region and did not note any incontinence from the urethral meatus. At this time the string was cut and the wound was irrigated.  A running 2-0 Vicryl was then used to reapproximate the mucosa of the vagina.     The procedure was then concluded. The patient was transferred to the postanesthesia care unit in stable condition and tolerated the procedure well.    POSTOP PLAN:  1. RTC in 2 weeks for wound check, PVR check    Patient was seen, evaluated and plan was formulated in conjunction with me and I agree with the above.  I was present for the entire procedure.  Karin Amin MD

## 2017-10-10 NOTE — ANESTHESIA POSTPROCEDURE EVALUATION
Patient: Mimi Melton    Procedure(s):  Midurethral Sling and Cystoscopy (Support the Urethra with Mesh Sling and Look in the Bladder) - Wound Class: II-Clean Contaminated    Diagnosis:Stress Incontinence  Diagnosis Additional Information: No value filed.    Anesthesia Type:  No value filed.    Note:  Anesthesia Post Evaluation    Patient location during evaluation: PACU  Patient participation: Able to fully participate in evaluation  Level of consciousness: awake  Pain management: adequate  Airway patency: patent  Cardiovascular status: acceptable  Respiratory status: acceptable  Hydration status: acceptable  PONV: none             Last vitals:  Vitals:    10/10/17 0915 10/10/17 0918 10/10/17 0945   BP: 110/84 115/84 126/88   Resp: 20 21 20   Temp:  36.1  C (96.9  F) 36.1  C (97  F)   SpO2: 97% 96% 98%         Electronically Signed By: Rishi Harvey MD  October 10, 2017  11:55 AM

## 2017-10-10 NOTE — ANESTHESIA PREPROCEDURE EVALUATION
Anesthesia Evaluation     . Pt has had prior anesthetic. Type: General    No history of anesthetic complications          ROS/MED HX    ENT/Pulmonary:  - neg pulmonary ROS     Neurologic:  - neg neurologic ROS     Cardiovascular:     (+) hypertension----. : . . . :. .       METS/Exercise Tolerance:  >4 METS   Hematologic:         Musculoskeletal:  - neg musculoskeletal ROS       GI/Hepatic:     (+) GERD Other,       Renal/Genitourinary:  - ROS Renal section negative       Endo:     (+) thyroid problem hypothyroidism, .      Psychiatric:  - neg psychiatric ROS       Infectious Disease:         Malignancy:      - no malignancy   Other:    (+) No chance of pregnancy C-spine cleared: N/A, no H/O Chronic Pain,no other significant disability                    Physical Exam  Normal systems: pulmonary and dental    Airway   Mallampati: I    Dental     Cardiovascular   Rhythm and rate: regular and normal      Pulmonary                     Anesthesia Plan      History & Physical Review  History and physical reviewed and following examination; no interval change.    ASA Status:  2 .    NPO Status:  > 8 hours    Plan for General and LMA with Intravenous and Propofol induction. Maintenance will be TIVA.    PONV prophylaxis:  Ondansetron (or other 5HT-3)       Postoperative Care  Postoperative pain management:  IV analgesics.      Consents  Anesthetic plan, risks, benefits and alternatives discussed with:  Patient..                          .

## 2017-10-10 NOTE — IP AVS SNAPSHOT
"                  MRN:[id number]                      After Visit Summary   10/10/2017    Mimi Melton    MRN: [id number]           Thank you!     Thank you for choosing [facility] for your care. Our goal is always to provide you with excellent care. Hearing back from our patients is one way we can continue to improve our services. Please take a few minutes to complete the written survey that you may receive in the mail after you visit with us. Thank you!        Patient Information     Date Of Birth          1975        About your hospital stay     You were admitted on:  October 10, 2017 [facility]    You were discharged on:  October 10, 2017       Who to Call     For medical emergencies, please call [phone].  For non-urgent questions about your medical care, please call your primary care provider or clinic, [phone]  For questions related to your surgery, please call your surgery clinic        Attending Provider     Provider Specialty    Karin Amin MD Urology       Primary Care Provider Office Phone # Fax #    MARGA [facility] [phone]      After Care Instructions     Activity       - No lifting over 15 pounds and no strenuous physical activity for 4 weeks  - Do not strain with bowel movements.  - Do not drive until you can press the brake pedal quickly and fully without pain.   - Do not operate a motor vehicle while taking narcotic pain medications            Contact Information       Phone numbers:   - Monday through Friday 8am to 4:30pm: Call [phone] with questions or to schedule or confirm appointment.    - Nights or weekends: call the after hours emergency pager - [phone] and tell the  \"I would like to page the Urology Resident on call.\"  - For emergencies, call [phone]            Diet Instructions       Resume pre procedure diet            Encourage fluids       Encourage fluids at home to " keep urine clear to light pink            Follow-Up       Follow-Up:  - Follow up with Dr. Amin as previously scheduled  - Call or return sooner than your regularly scheduled visit if you develop any of the following: fever (greater than 101.5), uncontrolled pain, uncontrolled nausea or vomiting, as well as increased redness, swelling, or drainage from your wound.            Medication Instructions       - Transition from narcotic pain medications to tylenol (acetaminophen) as you are able.  Wean yourself off all pain medications as you are able.  - Some pain medications contain both tylenol (acetaminophen) and a narcotic (Norco, vicodin, percocet), do not take more than 4,000mg of Tylenol (acetaminophen) from all sources in any 24 hour period.  - Narcotics can make you constipated.  Take over the counter fiber (metamucil or benefiber) and stool softeners (miralax, docusate or senna) while taking narcotic pain medications, but stop if you develop diarrhea.  - No driving or operating machinery while taking narcotic pain medications            No Alcohol       for 24 hours post procedure            No driving or operating machinery        until the day after procedure                  Your next 10 appointments already scheduled     Oct 25, 2017 11:30 AM CDT   (Arrive by 11:15 AM)   Post-Op with Karin Amin MD   [facility] ([facility])    [address] [place] [id number]   [phone]            Nov 29, 2017 11:30 AM CST   (Arrive by 11:15 AM)   Post-Op with Karin Amin MD   [facility] and [facility] ([facility])    [address] [place] [id number]   [phone]            Apr 23, 2018  8:00 AM CDT   (Arrive by 7:45 AM)   RETURN PLASTICS with Niles Donnelly MD   [facility] Ophthalmology ([facility]  [place])    [address]  [address] [facility] [address] [phone]              Further instructions from your care team       [facility]  Home Care Following Anesthesia  For 24 hours after surgery:  1. Get plenty of rest.  A responsible adult must stay with you for at least 24 hours after you leave the surgery center.  2. Do not drive or use heavy equipment.  If you have weakness or tingling, don't drive or use heavy equipment until this feeling goes away.   3. Do not drink alcohol.   4. Avoid strenuous or risky activities.  Ask for help when climbing stairs.  5. You may feel lightheaded.  IF so, sit for a few minutes before standing.  Have someone help you get up.   6. If you have nausea (feel sick to your stomach): Drink only clear liquids such as apple juice, ginger ale, broth or 7-Up.  Rest may also help.  Be sure to drink enough fluids.  Move to a regular diet as you feel able.   7. You may have a slight fever.  Call the doctor if your fever is over 100 F (37.7 C) (taken under the tongue) or lasts longer than 24 hours.  8. You may have a dry mouth, a sore throat, muscle aches or trouble sleeping. These should go away after 24 hours.  9. Do not make important or legal decisions.   If you use hormonal birth control (such as the pill, patch, ring or implants):  You will need a second form of birth control for 7 days (condoms, a diaphragm or contraceptive foam).  While in the surgery center, you received a medicine called Sugammadex.  Hormonal birth control (such as the pill, patch, ring or implants) will not work as well for a week after taking this medicine.         Tips for taking pain medications  To get the best pain relief possible, remember these points:    Take pain medications as directed, before pain becomes severe.    Pain medication can upset your stomach: taking it with food may help.    Constipation is a common side effect of pain medication.  Drink plenty of  fluids.    Eat foods high in fiber. Take a stool softener if recommended by your doctor or pharmacist.    Do not drink alcohol, drive or operate machinery while taking pain medications.    Ask about other ways to control pain, such as with heat, ice or relaxation.    Tylenol/Acetaminophen Consumption  To help encourage the safe use of acetaminophen, the makers of TYLENOL  have lowered the maximum daily dose for single-ingredient Extra Strength TYLENOL  (acetaminophen) products sold in the [place] from 8 pills per day (4,000 mg) to 6 pills per day (3,000 mg). The dosing interval has also changed from 2 pills every 4-6 hours to 2 pills every 6 hours.    If you feel your pain relief is insufficient, you may take Tylenol/Acetaminophen in addition to your narcotic pain medication.     Be careful not to exceed 3,000 mg of Tylenol/Acetaminophen in a 24 hour period from all sources.    If you are taking extra strength Tylenol/acetaminophen (500 mg), the maximum dose is 6 tablets in 24 hours.    If you are taking regular strength acetaminophen (325 mg), the maximum dose is 9 tablets in 24 hours.    Call a doctor for any of the followin. Signs of infection (fever, growing tenderness at the surgery site, a large amount of drainage or bleeding, severe pain, foul-smelling drainage, redness, swelling).  2. It has been over 8 to 10 hours since surgery and you are still not able to urinate (pass water).  3. Headache for over 24 hours.  Your doctor is:       Dr. Karin Amin, Prostate and Urology: [phone]               Or dial [phone] and ask for the resident on call for:  Prostate Urology  For emergency care, call the:  [facility]:  [phone] (TTY for hearing impaired: [phone])  Discharge Instructions Following a Cystoscopy, Stent and/or Ureteroscopy    Drainage/Urination:    Urine may be slightly bloody but should taper off in a few days.    You may have some frequency and  urgency for a few days.    Comfort:    A burning sensation when urinating is common. Drinking extra fluids helps decrease this burning feeling and also helps to clear the bloody urine.    Mild abdominal cramps may occur for a few days.    Baths:    Daily tub baths aide in passing urine.    Frequent use of bubble bath is discouraged since it encourages urinary tract infections.    Report to your doctor at once if you experience:    Inability to pass your urine    Continuous or heavy bleeding    Severe Pain    Elevated temperature > than 101.5 for 24 hours    Home Activity:    On the day of surgery spend a quiet evening at home.    Increase activity as tolerated.                    Pending Results     No orders found from 10/8/2017 to 10/11/2017.            Admission Information     Date & Time Provider Department Dept. Phone    10/10/2017 Karin Amin MD [facility] [phone]      Your Vitals Were     Blood Pressure Temperature Respirations Last Period Pulse Oximetry       123/82 98.2  F (36.8  C) (Oral) 16 09/23/2017 98%       [email or web address] Information     [employer] gives you secure access to your electronic health record. If you see a primary care provider, you can also send messages to your care team and make appointments. If you have questions, please call your primary care clinic.  If you do not have a primary care provider, please call [phone] and they will assist you.      [employer] is an electronic gateway that provides easy, online access to your medical records. With [employer], you can request a clinic appointment, read your test results, renew a prescription or communicate with your care team.     To access your existing account, please contact your [facility] or call [phone] for assistance.        Care EveryWhere ID     This is your Care EveryWhere ID. This could be used by other organizations to access your [facility]  records  [id number]        Equal Access to Services     [facility] : Hadii aad ku hadtashmegan Ag, wamatteoda lumaryellenadaha, qaybta martinmaestefani mortensen, katty morris. So [facility] [phone].    ATENCIÓN: Si habla español, tiene a crowe disposición servicios gratuitos de asistencia lingüística. Llame al [phone].    We comply with applicable federal civil rights laws and [place] laws. We do not discriminate on the basis of race, color, national origin, age, disability, sex, sexual orientation, or gender identity.               Review of your medicines      START taking        Dose / Directions    HYDROcodone-acetaminophen 5-325 MG per tablet   Commonly known as:  NORCO   Used for:  KARMA (stress urinary incontinence, female)        Dose:  1-2 tablet   Take 1-2 tablets by mouth every 4 hours as needed for moderate to severe pain maximum 8 tablet(s) per day   Quantity:  18 tablet   Refills:  0       senna-docusate 8.6-50 MG per tablet   Commonly known as:  SENOKOT-S;PERICOLACE   Used for:  KARMA (stress urinary incontinence, female)        Dose:  1-2 tablet   Take 1-2 tablets by mouth 2 times daily To prevent constipation while taking narcotic pain medication.   Quantity:  30 tablet   Refills:  0         CONTINUE these medicines which have NOT CHANGED        Dose / Directions    ACETAMINOPHEN PO        Dose:  1000 mg   Take 1,000 mg by mouth 2 times daily as needed for pain (menstrual cramps)   Refills:  0       cetirizine 10 MG tablet   Commonly known as:  zyrTEC        Dose:  10 mg   Take 10 mg by mouth every morning   Refills:  0       CULTURELLE DIGESTIVE HEALTH Caps   Used for:  Preop general physical exam        Dose:  1 capsule   Take 1 capsule by mouth 2 times daily   Quantity:  100 capsule   Refills:  11       fluticasone 50 MCG/ACT spray   Commonly known as:  FLONASE        Refills:  0       IBUPROFEN PO        Dose:  800 mg   Take 800 mg by mouth every 4 hours as needed (takes for  menstrual cramps.)   Refills:  0       levothyroxine 100 MCG tablet   Commonly known as:  SYNTHROID   Used for:  Hypothyroidism due to medication        Dose:  100 mcg   Take 1 tablet (100 mcg) by mouth daily   Quantity:  90 tablet   Refills:  3       lisinopril-hydrochlorothiazide 10-12.5 MG per tablet   Commonly known as:  PRINZIDE/ZESTORETIC        Dose:  1 tablet   Take 1 tablet by mouth daily   Refills:  0            Where to get your medicines      These medications were sent to [facility] [address]  [address] [address]    Hours:  TRANSPLANT PHONE NUMBER [phone] Phone:  [phone]     senna-docusate 8.6-50 MG per tablet         Some of these will need a paper prescription and others can be bought over the counter. Ask your nurse if you have questions.     Bring a paper prescription for each of these medications     HYDROcodone-acetaminophen 5-325 MG per tablet                Protect others around you: Learn how to safely use, store and throw away your medicines at www.disposemymeds.org.             Medication List: This is a list of all your medications and when to take them. Check marks below indicate your daily home schedule. Keep this list as a reference.      Medications           Morning Afternoon Evening Bedtime As Needed    ACETAMINOPHEN PO   Take 1,000 mg by mouth 2 times daily as needed for pain (menstrual cramps)   Last time this was given:  975 mg on 10/10/2017  7:12 AM                                cetirizine 10 MG tablet   Commonly known as:  zyrTEC   Take 10 mg by mouth every morning                                [facility]   Take 1 capsule by mouth 2 times daily                                fluticasone 50 MCG/ACT spray   Commonly known as:  FLONASE                                HYDROcodone-acetaminophen 5-325 MG per tablet   Commonly known as:  NORCO   Take 1-2 tablets by  mouth every 4 hours as needed for moderate to severe pain maximum 8 tablet(s) per day                                IBUPROFEN PO   Take 800 mg by mouth every 4 hours as needed (takes for menstrual cramps.)                                levothyroxine 100 MCG tablet   Commonly known as:  SYNTHROID   Take 1 tablet (100 mcg) by mouth daily                                lisinopril-hydrochlorothiazide 10-12.5 MG per tablet   Commonly known as:  PRINZIDE/ZESTORETIC   Take 1 tablet by mouth daily                                senna-docusate 8.6-50 MG per tablet   Commonly known as:  SENOKOT-S;PERICOLACE   Take 1-2 tablets by mouth 2 times daily To prevent constipation while taking narcotic pain medication.

## 2017-10-10 NOTE — ANESTHESIA CARE TRANSFER NOTE
Mother states that she brought patient in to ED on Friday night because increased respiratory rate and heart rate. Mother states that she is still doing the nebulizer every four hours so that patient does not ave a hard time breathing. Mother states that the nebulizer seems to help a little bit.   Patient still seems to breathing heavy.  Mother denies an wheezing, bluish in color, fever or respiratory distress.     Mother also states that the left ear is still draining green mucus in discharge. She does not think that the ear drops are working.   Patient shows facial signs when left ear is touched that is is painful or in pain.     Mother was advised to continue to use nebulizer and the ear drops as advised. Mother is concerned if she should be seen by ENT sooner then March? Or if she should follow up with primary.   Please advise. Thank you   Patient: Mimi Geronimo    Procedure(s):  Midurethral Sling and Cystoscopy (Support the Urethra with Mesh Sling and Look in the Bladder) - Wound Class: II-Clean Contaminated    Diagnosis: Stress Incontinence  Diagnosis Additional Information: No value filed.    Anesthesia Type:   No value filed.     Note:  Airway :Nasal Cannula  Patient transferred to:PACU  Comments: Patient to PACU on RA/native airway and is awake. Report to RN and transfer of care. BP: 112/52 HR: 90 Temp: 97.3 SpO2: 97% on 2l O2 via NC RR: 18      Vitals: (Last set prior to Anesthesia Care Transfer)    CRNA VITALS  10/10/2017 0823 - 10/10/2017 0901      10/10/2017             Pulse: 96    SpO2: 100 %    Resp Rate (observed): (!)  1                Electronically Signed By: MARGA Gardner CRNA  October 10, 2017  9:01 AM

## 2017-10-12 ENCOUNTER — CARE COORDINATION (OUTPATIENT)
Dept: UROLOGY | Facility: CLINIC | Age: 42
End: 2017-10-12

## 2017-10-17 ENCOUNTER — PRE VISIT (OUTPATIENT)
Dept: UROLOGY | Facility: CLINIC | Age: 42
End: 2017-10-17

## 2017-10-17 NOTE — TELEPHONE ENCOUNTER
Patient with history of KARMA coming in for post operative check up S/P cystoscopy and placement of midurethral sling ([employer] Altis). Patient chart reviewed, no need for call, all records available and ready for appointment.

## 2017-10-25 ENCOUNTER — OFFICE VISIT (OUTPATIENT)
Dept: UROLOGY | Facility: CLINIC | Age: 42
End: 2017-10-25

## 2017-10-25 VITALS
SYSTOLIC BLOOD PRESSURE: 128 MMHG | HEART RATE: 89 BPM | WEIGHT: 179.2 LBS | DIASTOLIC BLOOD PRESSURE: 74 MMHG | BODY MASS INDEX: 29.82 KG/M2

## 2017-10-25 DIAGNOSIS — N39.3 FEMALE STRESS INCONTINENCE: ICD-10-CM

## 2017-10-25 DIAGNOSIS — B37.31 CANDIDIASIS OF VAGINA: Primary | ICD-10-CM

## 2017-10-25 RX ORDER — MUPIROCIN 20 MG/G
OINTMENT TOPICAL PRN
COMMUNITY
Start: 2017-03-09 | End: 2023-05-30

## 2017-10-25 RX ORDER — FLUCONAZOLE 150 MG/1
TABLET ORAL
COMMUNITY
Start: 2017-06-10 | End: 2018-04-07

## 2017-10-25 RX ORDER — FLUCONAZOLE 150 MG/1
150 TABLET ORAL ONCE
Qty: 1 TABLET | Refills: 0 | Status: SHIPPED | OUTPATIENT
Start: 2017-10-25 | End: 2017-10-25

## 2017-10-25 RX ORDER — FLUCONAZOLE 100 MG/1
TABLET ORAL
COMMUNITY
Start: 2017-04-27 | End: 2017-11-29

## 2017-10-25 RX ORDER — LISINOPRIL 10 MG/1
TABLET ORAL
COMMUNITY
Start: 2017-05-01 | End: 2017-11-01

## 2017-10-25 ASSESSMENT — PAIN SCALES - GENERAL: PAINLEVEL: NO PAIN (0)

## 2017-10-25 NOTE — PROGRESS NOTES
Reason for Visit: 2 week post op for A midurethral sling on 10/10/17.    Clinical Data: Ms. Mimi Melton  is a 42 year old year old   female with a history of the above.   She returns today for her post operative visit.  She states that she is doing well.  No stress incontinence, minimal urinary urgency no urge incontinence.    On exam:  Residual mild urethral mobility  Vaginal incision with suture  No evidence of mesh extrusion or erosion    RU: 0 cc on bladder scan by nursing.    A/P s/p midurethral sling doing well except for some vaginal itching which may be related to a yeast infection.  -f/u in 4 weeks.  -continue restrictions of lifting and sexual activity      Thank you for allowing me to participate in the care of  Ms.Deidre Melton and I will keep you updated on her progress.    Karin Amin MD

## 2017-10-25 NOTE — LETTER
10/25/2017       RE: Mimi Melton  [address] [facility] [address]     Dear Colleague,    Thank you for referring your patient, Mimi Melton, to the [facility] UROLOGY AND INST FOR PROSTATE AND UROLOGIC CANCERS at [facility]. Please see a copy of my visit note below.    Reason for Visit: 2 week post op for A midurethral sling on 10/10/17.    Clinical Data: Ms. Mimi Melton  is a 42 year old year old   female with a history of the above.   She returns today for her post operative visit.  She states that she is doing well.  No stress incontinence, minimal urinary urgency no urge incontinence.    On exam:  Residual mild urethral mobility  Vaginal incision with suture  No evidence of mesh extrusion or erosion    RU: 0 cc on bladder scan by nursing.    A/P s/p midurethral sling doing well except for some vaginal itching which may be related to a yeast infection.  -f/u in 4 weeks.  -continue restrictions of lifting and sexual activity      Thank you for allowing me to participate in the care of  Ms.Deidre Melton and I will keep you updated on her progress.    Karin Amin MD

## 2017-10-25 NOTE — NURSING NOTE
Chief Complaint   Patient presents with     Surgical Followup     Patient with history of KARMA coming in for post operative check up S/P cystoscopy and placement of midurethral sling (Coloplast Altis).      Dalila Iglesias

## 2017-10-25 NOTE — MR AVS SNAPSHOT
After Visit Summary   10/25/2017    Mimi Melton    MRN: [id number]           Patient Information     Date Of Birth          1975        Visit Information        Provider Department      10/25/2017 11:30 AM Karin Amin MD [facility]        Today's Diagnoses     Candidiasis of vagina    -  1    Female stress incontinence           Follow-ups after your visit        Follow-up notes from your care team     Return in about 4 weeks (around 11/22/2017).      Your next 10 appointments already scheduled     Nov 29, 2017 11:30 AM CST   (Arrive by 11:15 AM)   Post-Op with Karin Amin MD   [facility] ([facility])    [address] [place] [address] [phone]            Apr 23, 2018  8:00 AM CDT   (Arrive by 7:45 AM)   RETURN PLASTICS with Niles Donnelly MD   [facility] Ophthalmology ([facility])    [address] [place] [address] [phone]              Who to contact     Please call your clinic at [phone] to:    Ask questions about your health    Make or cancel appointments    Discuss your medicines    Learn about your test results    Speak to your doctor   If you have compliments or concerns about an experience at your clinic, or if you wish to file a complaint, please contact [facility] Patient Relations at [phone] or email us at [email or web address]         Additional Information About Your Visit        MyChart Information     [employer] gives you secure access to your electronic health record. If you see a primary care provider, you can also send messages to your care team and make appointments. If you have questions, please call your primary care clinic.  If you do not have a primary care provider, please call [phone] and they will  assist you.      [employer] is an electronic gateway that provides easy, online access to your medical records. With [employer], you can request a clinic appointment, read your test results, renew a prescription or communicate with your care team.     To access your existing account, please contact your [facility] or call [phone] for assistance.        Care EveryWhere ID     This is your Care EveryWhere ID. This could be used by other organizations to access your [place] medical records  [id number]        Your Vitals Were     Pulse Last Period BMI (Body Mass Index)             89 09/23/2017 29.82 kg/m2          Blood Pressure from Last 3 Encounters:   10/25/17 128/74   10/10/17 126/88   09/26/17 118/68    Weight from Last 3 Encounters:   10/25/17 81.3 kg (179 lb 3.2 oz)   09/26/17 78 kg (172 lb)   09/05/17 77.2 kg (170 lb 4.8 oz)              Today, you had the following     No orders found for display         Today's Medication Changes          These changes are accurate as of: 10/25/17 11:55 AM.  If you have any questions, ask your nurse or doctor.               These medicines have changed or have updated prescriptions.        Dose/Directions    * fluconazole 100 MG tablet   Commonly known as:  DIFLUCAN   This may have changed:  Another medication with the same name was added. Make sure you understand how and when to take each.   Changed by:  Karin Amin MD        Refills:  0       * fluconazole 150 MG tablet   Commonly known as:  DIFLUCAN   This may have changed:  Another medication with the same name was added. Make sure you understand how and when to take each.   Changed by:  Karin Amin MD        Refills:  0       * fluconazole 150 MG tablet   Commonly known as:  DIFLUCAN   This may have changed:  You were already taking a medication with the same name, and this prescription was added. Make sure you understand how and when to take each.   Used for:  Candidiasis  of vagina   Changed by:  Karin Amin MD        Dose:  150 mg   Take 1 tablet (150 mg) by mouth once for 1 dose   Quantity:  1 tablet   Refills:  0       * Notice:  This list has 3 medication(s) that are the same as other medications prescribed for you. Read the directions carefully, and ask your doctor or other care provider to review them with you.      Stop taking these medicines if you haven't already. Please contact your care team if you have questions.     HYDROcodone-acetaminophen 5-325 MG per tablet   Commonly known as:  NORCO   Stopped by:  Karin Amin MD                Where to get your medicines      These medications were sent to [facility] [address] [place] [address] [address] [place] [address] [place] [address]     Phone:  [phone]     fluconazole 150 MG tablet                Primary Care Provider Office Phone # Fax #    Kalli MARGA [facility] [phone] [address] [place] [address]        Equal Access to Services     [facility] AH: Hadii aad ku hadasho Soomaali, waaxda luqadaha, qaybta kaalmada adeegyada, waxay idiin hayfrankn sonny flaherty . So [facility] [phone].    ATENCIÓN: Si habla español, tiene a crowe disposición servicios gratuitos de asistencia lingüística. [facility] [phone].    We comply with applicable federal civil rights laws and [place] laws. We do not discriminate on the basis of race, color, national origin, age, disability, sex, sexual orientation, or gender identity.            Thank you!     Thank you for choosing [facility] UROLOGY AND [facility] FOR PROSTATE AND UROLOGIC CANCERS  for your care. Our goal is always to provide you with excellent care. Hearing back from our patients is one way we can continue to improve our services. Please take a few minutes to complete the written survey that you may receive in the mail after your visit with us. Thank you!             Your Updated Medication List -  Protect others around you: Learn how to safely use, store and throw away your medicines at www.disposemymeds.org.          This list is accurate as of: 10/25/17 11:55 AM.  Always use your most recent med list.                   Brand Name Dispense Instructions for use Diagnosis    ACETAMINOPHEN PO      Take 1,000 mg by mouth 2 times daily as needed for pain (menstrual cramps)        cetirizine 10 MG tablet    zyrTEC     Take 10 mg by mouth every morning        [facility] DIGESTIVE HEALTH Caps     100 capsule    Take 1 capsule by mouth 2 times daily    Preop general physical exam       * fluconazole 100 MG tablet    DIFLUCAN          * fluconazole 150 MG tablet    DIFLUCAN          * fluconazole 150 MG tablet    DIFLUCAN    1 tablet    Take 1 tablet (150 mg) by mouth once for 1 dose    Candidiasis of vagina       fluticasone 50 MCG/ACT spray    FLONASE          IBUPROFEN PO      Take 800 mg by mouth every 4 hours as needed (takes for menstrual cramps.)        levothyroxine 100 MCG tablet    SYNTHROID    90 tablet    Take 1 tablet (100 mcg) by mouth daily    Hypothyroidism due to medication       lisinopril 10 MG tablet    PRINIVIL/ZESTRIL          lisinopril-hydrochlorothiazide 10-12.5 MG per tablet    PRINZIDE/ZESTORETIC     Take 1 tablet by mouth daily        mupirocin 2 % ointment    BACTROBAN          senna-docusate 8.6-50 MG per tablet    SENOKOT-S;PERICOLACE    30 tablet    Take 1-2 tablets by mouth 2 times daily To prevent constipation while taking narcotic pain medication.    KARMA (stress urinary incontinence, female)       * Notice:  This list has 3 medication(s) that are the same as other medications prescribed for you. Read the directions carefully, and ask your doctor or other care provider to review them with you.

## 2017-10-31 DIAGNOSIS — I10 ESSENTIAL HYPERTENSION WITH GOAL BLOOD PRESSURE LESS THAN 140/90: ICD-10-CM

## 2017-11-01 RX ORDER — LISINOPRIL/HYDROCHLOROTHIAZIDE 10-12.5 MG
TABLET ORAL
Qty: 90 TABLET | Refills: 3 | Status: SHIPPED | OUTPATIENT
Start: 2017-11-01 | End: 2018-05-04

## 2017-11-01 RX ORDER — LISINOPRIL/HYDROCHLOROTHIAZIDE 10-12.5 MG
1 TABLET ORAL DAILY
Qty: 90 TABLET | Refills: 3 | Status: SHIPPED | OUTPATIENT
Start: 2017-11-01 | End: 2017-11-01

## 2017-11-02 NOTE — TELEPHONE ENCOUNTER
Appreciate Endocrine consult and they do not feel that any adjustments needed in Synthroid but will give trial of IV Synthroid as per Endocrine to see if helps patient. Endocrine notes no signs of myxedema so doubt lethargy related to thyroid disease.   · Will add liothyronine 5 mcg daily as a trial to see if it help with symptoms  · Reverse T3 pending     Telephone call to the patient for clarification.  She is taking lisinopril/hctz 10/12.5 every day.  She has been on this for years.  Refills were sent in.  She is due her next lab panel, pap smear fall 2018.  She will return to the clinic at that time to see me.  She will return sooner with problems.  She will continue care with endocrinology.

## 2017-11-14 ENCOUNTER — COMMUNICATION - HEALTHEAST (OUTPATIENT)
Dept: PALLIATIVE MEDICINE | Facility: CLINIC | Age: 42
End: 2017-11-14

## 2017-11-15 ENCOUNTER — PRE VISIT (OUTPATIENT)
Dept: UROLOGY | Facility: CLINIC | Age: 42
End: 2017-11-15

## 2017-11-29 ENCOUNTER — OFFICE VISIT (OUTPATIENT)
Dept: UROLOGY | Facility: CLINIC | Age: 42
End: 2017-11-29

## 2017-11-29 VITALS
HEART RATE: 106 BPM | DIASTOLIC BLOOD PRESSURE: 73 MMHG | SYSTOLIC BLOOD PRESSURE: 116 MMHG | HEIGHT: 65 IN | BODY MASS INDEX: 29.82 KG/M2 | WEIGHT: 179 LBS

## 2017-11-29 DIAGNOSIS — N39.3 STRESS INCONTINENCE: Primary | ICD-10-CM

## 2017-11-29 DIAGNOSIS — B37.31 CANDIDIASIS OF VAGINA: ICD-10-CM

## 2017-11-29 LAB
ALBUMIN UR-MCNC: NEGATIVE MG/DL
APPEARANCE UR: ABNORMAL
BILIRUB UR QL STRIP: NEGATIVE
COLOR UR AUTO: YELLOW
GLUCOSE UR STRIP-MCNC: NEGATIVE MG/DL
HGB UR QL STRIP: NEGATIVE
KETONES UR STRIP-MCNC: NEGATIVE MG/DL
LEUKOCYTE ESTERASE UR QL STRIP: NEGATIVE
MUCOUS THREADS #/AREA URNS LPF: PRESENT /LPF
NITRATE UR QL: NEGATIVE
PH UR STRIP: 6 PH (ref 5–7)
RBC #/AREA URNS AUTO: 1 /HPF (ref 0–2)
SOURCE: ABNORMAL
SP GR UR STRIP: 1.02 (ref 1–1.03)
SQUAMOUS #/AREA URNS AUTO: 3 /HPF (ref 0–1)
UROBILINOGEN UR STRIP-MCNC: 0 MG/DL (ref 0–2)
WBC #/AREA URNS AUTO: 1 /HPF (ref 0–2)

## 2017-11-29 RX ORDER — FLUCONAZOLE 150 MG/1
150 TABLET ORAL EVERY OTHER DAY
Qty: 3 TABLET | Refills: 0 | Status: SHIPPED | OUTPATIENT
Start: 2017-11-29 | End: 2018-04-07

## 2017-11-29 ASSESSMENT — PAIN SCALES - GENERAL: PAINLEVEL: NO PAIN (0)

## 2017-11-29 NOTE — PROGRESS NOTES
Reason for Visit: 6 week post op for A midurethral sling on 10/10/17.    Clinical Data: Ms. Mimi Melton  is a 42 year old year old   female with a history of the above.   She returns today for her post operative visit.  She states that she is doing well.  No stress incontinence, no urinary urgency no urge incontinence.    On exam:  Residual mild urethral mobility  Vaginal incision well healed  No evidence of mesh extrusion or erosion    A/P s/p midurethral sling doing well except for some vaginal itching which may be related to a yeast infection.  -f/u prn  -discontinue restrictions of lifting and sexual activity  -will give rx for diflucan.      Thank you for allowing me to participate in the care of  Ms.Deidre Melton and I will keep you updated on her progress.    Karin Amin MD

## 2017-11-29 NOTE — MR AVS SNAPSHOT
After Visit Summary   11/29/2017    Mimi Melton    MRN: [id number]           Patient Information     Date Of Birth          1975        Visit Information        Provider Department      11/29/2017 11:30 AM Karin Amin MD [facility]rology and Inst for Prostate and Urologic Cancers        Today's Diagnoses     Stress incontinence    -  1    Candidiasis of vagina           Follow-ups after your visit        Follow-up notes from your care team     Return if symptoms worsen or fail to improve.      Your next 10 appointments already scheduled     Apr 23, 2018  8:00 AM CDT   (Arrive by 7:45 AM)   RETURN PLASTICS with Niles Donnelly MD   [facility] [place]    [address] [facility] [address] [facility]  [address] [facility] [address] [phone]              Who to contact     Please call your clinic at [phone] to:    Ask questions about your health    Make or cancel appointments    Discuss your medicines    Learn about your test results    Speak to your doctor   If you have compliments or concerns about an experience at your clinic, or if you wish to file a complaint, please contact [facility] Patient Relations at [phone] or email us at [email or web address]         Additional Information About Your Visit        MyChart Information     "[employer] gives you secure access to your electronic health record. If you see a primary care provider, you can also send messages to your care team and make appointments. If you have questions, please call your primary care clinic.  If you do not have a primary care provider, please call [phone] and they will assist you.      "[employer] is an electronic gateway that provides easy, online access to your medical records. With "[employer] you can request a clinic appointment, read your test results, renew a prescription or communicate with your care team.     To access your existing account,  "please contact your [facility] or call [phone] for assistance.        Care EveryWhere ID     This is your Care EveryWhere ID. This could be used by other organizations to access your [place] medical records  [id number]        Your Vitals Were     Pulse Height BMI (Body Mass Index)             106 1.651 m (5' 5\") 29.79 kg/m2          Blood Pressure from Last 3 Encounters:   11/29/17 116/73   10/25/17 128/74   10/10/17 126/88    Weight from Last 3 Encounters:   11/29/17 81.2 kg (179 lb)   10/25/17 81.3 kg (179 lb 3.2 oz)   09/26/17 78 kg (172 lb)              Today, you had the following     No orders found for display         Today's Medication Changes          These changes are accurate as of: 11/29/17 11:59 AM.  If you have any questions, ask your nurse or doctor.               These medicines have changed or have updated prescriptions.        Dose/Directions    * fluconazole 150 MG tablet   Commonly known as:  DIFLUCAN   This may have changed:  Another medication with the same name was added. Make sure you understand how and when to take each.   Changed by:  Karin Amin MD        Refills:  0       * fluconazole 150 MG tablet   Commonly known as:  DIFLUCAN   This may have changed:  You were already taking a medication with the same name, and this prescription was added. Make sure you understand how and when to take each.   Used for:  Candidiasis of vagina   Changed by:  Karin Amin MD        Dose:  150 mg   Take 1 tablet (150 mg) by mouth every other day   Quantity:  3 tablet   Refills:  0       * Notice:  This list has 2 medication(s) that are the same as other medications prescribed for you. Read the directions carefully, and ask your doctor or other care provider to review them with you.         Where to get your medicines      These medications were sent to [facility] [address] [place] [address] [address]  [address] [facility] [address] "     Phone:  [phone]     fluconazole 150 MG tablet                Primary Care Provider Office Phone # Fax #    MARGA [facility] [phone] [address] [place] [address]        Equal Access to Services     HUGO RAMOS : Nicholas jeffries hadasho Soomaali, waaxda luqadaha, qaybta kaalmada adeegyada, waxtrevin millan hayga reedlauriewatson flaherty . So [facility] [phone].    ATENCIÓN: Si habla español, tiene a crowe disposición servicios gratuitos de asistencia lingüística. Uzma al [phone].    We comply with applicable federal civil rights laws and [place] laws. We do not discriminate on the basis of race, color, national origin, age, disability, sex, sexual orientation, or gender identity.            Thank you!     Thank you for choosing [facility] UROLOGY AND [facility] FOR PROSTATE AND UROLOGIC CANCERS  for your care. Our goal is always to provide you with excellent care. Hearing back from our patients is one way we can continue to improve our services. Please take a few minutes to complete the written survey that you may receive in the mail after your visit with us. Thank you!             Your Updated Medication List - Protect others around you: Learn how to safely use, store and throw away your medicines at www.disposemymeds.org.          This list is accurate as of: 11/29/17 11:59 AM.  Always use your most recent med list.                   Brand Name Dispense Instructions for use Diagnosis    ACETAMINOPHEN PO      Take 1,000 mg by mouth 2 times daily as needed for pain (menstrual cramps)        cetirizine 10 MG tablet    zyrTEC     Take 10 mg by mouth every morning        [facility] DIGESTIVE HEALTH Caps     100 capsule    Take 1 capsule by mouth 2 times daily    Preop general physical exam       * fluconazole 150 MG tablet    DIFLUCAN          * fluconazole 150 MG tablet    DIFLUCAN    3 tablet    Take 1 tablet (150 mg) by mouth every other day    Candidiasis of vagina        fluticasone 50 MCG/ACT spray    FLONASE          IBUPROFEN PO      Take 800 mg by mouth every 4 hours as needed (takes for menstrual cramps.)        levothyroxine 100 MCG tablet    SYNTHROID    90 tablet    Take 1 tablet (100 mcg) by mouth daily    Hypothyroidism due to medication       lisinopril-hydrochlorothiazide 10-12.5 MG per tablet    PRINZIDE/ZESTORETIC    90 tablet    TAKE ONE TABLET BY MOUTH ONCE DAILY    Essential hypertension with goal blood pressure less than 140/90       mupirocin 2 % ointment    BACTROBAN          senna-docusate 8.6-50 MG per tablet    SENOKOT-S;PERICOLACE    30 tablet    Take 1-2 tablets by mouth 2 times daily To prevent constipation while taking narcotic pain medication.    KARMA (stress urinary incontinence, female)       * Notice:  This list has 2 medication(s) that are the same as other medications prescribed for you. Read the directions carefully, and ask your doctor or other care provider to review them with you.

## 2017-11-29 NOTE — LETTER
11/29/2017       RE: Mimi Melton  [address] [address]     Dear Colleague,    Thank you for referring your patient, Mimi Melton, to the [facility] UROLOGY AND INST FOR PROSTATE AND UROLOGIC CANCERS at [facility]. Please see a copy of my visit note below.    Reason for Visit: 6 week post op for A midurethral sling on 10/10/17.    Clinical Data: Ms. Mimi Melton  is a 42 year old year old   female with a history of the above.   She returns today for her post operative visit.  She states that she is doing well.  No stress incontinence, no urinary urgency no urge incontinence.    On exam:  Residual mild urethral mobility  Vaginal incision well healed  No evidence of mesh extrusion or erosion    A/P s/p midurethral sling doing well except for some vaginal itching which may be related to a yeast infection.  -f/u prn  -discontinue restrictions of lifting and sexual activity  -will give rx for diflucan.      Thank you for allowing me to participate in the care of  Ms.Deidre Melton and I will keep you updated on her progress.    Karin Amin MD

## 2017-11-29 NOTE — NURSING NOTE
"Chief Complaint   Patient presents with     Surgical Followup     post operative check up S/P cystoscopy and placement of midurethral sling ([facility])       Blood pressure 116/73, pulse 106, height 1.651 m (5' 5\"), weight 81.2 kg (179 lb), not currently breastfeeding. Body mass index is 29.79 kg/(m^2).    Patient Active Problem List   Diagnosis     Surveillance of previously prescribed intrauterine contraceptive device     Vaginitis and vulvovaginitis     Exophthalmos     Abnormal Pap smear of cervix     CARDIOVASCULAR SCREENING; LDL GOAL LESS THAN 160     Essential hypertension with goal blood pressure less than 140/90     Anemia     Hypothyroidism due to acquired atrophy of thyroid     Cervical high risk HPV (human papillomavirus) test positive     Thyroid disease     Female stress incontinence       Allergies   Allergen Reactions     Mold      Cannot have any meds that contain mold.     No Known Drug Allergies        Current Outpatient Prescriptions   Medication Sig Dispense Refill     lisinopril-hydrochlorothiazide (PRINZIDE/ZESTORETIC) 10-12.5 MG per tablet TAKE ONE TABLET BY MOUTH ONCE DAILY 90 tablet 3     fluconazole (DIFLUCAN) 150 MG tablet        mupirocin (BACTROBAN) 2 % ointment        senna-docusate (SENOKOT-S;PERICOLACE) 8.6-50 MG per tablet Take 1-2 tablets by mouth 2 times daily To prevent constipation while taking narcotic pain medication. 30 tablet 0     cetirizine (ZYRTEC) 10 MG tablet Take 10 mg by mouth every morning       IBUPROFEN PO Take 800 mg by mouth every 4 hours as needed (takes for menstrual cramps.)        ACETAMINOPHEN PO Take 1,000 mg by mouth 2 times daily as needed for pain (menstrual cramps)       Lactobacillus-Inulin ([facility] DIGESTIVE HEALTH) CAPS Take 1 capsule by mouth 2 times daily 100 capsule 11     fluticasone (FLONASE) 50 MCG/ACT spray        levothyroxine (SYNTHROID/LEVOTHROID) 100 MCG tablet Take 1 tablet (100 mcg) by mouth daily 90 tablet 3       Social " History   Substance Use Topics     Smoking status: Never Smoker     Smokeless tobacco: Never Used     Alcohol use Yes      Comment: 1-2x's/month if that.       Letitia Dang LPN  11/29/2017  11:36 AM

## 2017-11-30 LAB
BACTERIA SPEC CULT: NORMAL
Lab: NORMAL
SPECIMEN SOURCE: NORMAL

## 2017-12-04 LAB
SPECIMEN SOURCE: NORMAL
YEAST SPEC QL CULT: NORMAL

## 2018-01-02 ENCOUNTER — MYC MEDICAL ADVICE (OUTPATIENT)
Dept: FAMILY MEDICINE | Facility: CLINIC | Age: 43
End: 2018-01-02

## 2018-01-03 ENCOUNTER — TELEPHONE (OUTPATIENT)
Dept: FAMILY MEDICINE | Facility: CLINIC | Age: 43
End: 2018-01-03

## 2018-01-03 ENCOUNTER — OFFICE VISIT (OUTPATIENT)
Dept: PEDIATRICS | Facility: CLINIC | Age: 43
End: 2018-01-03
Payer: COMMERCIAL

## 2018-01-03 ENCOUNTER — NURSE TRIAGE (OUTPATIENT)
Dept: NURSING | Facility: CLINIC | Age: 43
End: 2018-01-03

## 2018-01-03 VITALS
DIASTOLIC BLOOD PRESSURE: 70 MMHG | HEIGHT: 65 IN | BODY MASS INDEX: 28.52 KG/M2 | HEART RATE: 111 BPM | OXYGEN SATURATION: 99 % | WEIGHT: 171.2 LBS | TEMPERATURE: 99.7 F | SYSTOLIC BLOOD PRESSURE: 110 MMHG | RESPIRATION RATE: 20 BRPM

## 2018-01-03 DIAGNOSIS — J20.9 ACUTE BRONCHITIS, UNSPECIFIED ORGANISM: Primary | ICD-10-CM

## 2018-01-03 PROCEDURE — 99214 OFFICE O/P EST MOD 30 MIN: CPT | Performed by: PHYSICIAN ASSISTANT

## 2018-01-03 RX ORDER — AZITHROMYCIN 250 MG/1
TABLET, FILM COATED ORAL
Qty: 6 TABLET | Refills: 0 | Status: SHIPPED | OUTPATIENT
Start: 2018-01-03 | End: 2018-03-13

## 2018-01-03 RX ORDER — CODEINE PHOSPHATE AND GUAIFENESIN 10; 100 MG/5ML; MG/5ML
1 SOLUTION ORAL EVERY 4 HOURS PRN
Qty: 60 ML | Refills: 0 | Status: SHIPPED | OUTPATIENT
Start: 2018-01-03 | End: 2018-03-13

## 2018-01-03 NOTE — MR AVS SNAPSHOT
After Visit Summary   1/3/2018    Mimi Melton    MRN: [id number]           Patient Information     Date Of Birth          1975        Visit Information        Provider Department      1/3/2018 8:10 AM Janet Sahu PA-C [facility]        Today's Diagnoses     Acute bronchitis, unspecified organism    -  1      Care Instructions                       Acute Bronchitis                  What is acute bronchitis?   Bronchitis is an infection of the air passages--that is, the tubes that connect the windpipe to the lungs. It causes swelling and irritation of the airways. With acute bronchitis you usually have a cough that produces phlegm and pain behind the breastbone when you breathe deeply or cough.   How does it occur?   Bronchitis often occurs with viral infections of the respiratory tract, such as colds and flu. Bronchitis may also be caused by bacterial infections. It may occur with childhood illnesses such as measles and whooping cough.   Attacks are most frequent during the winter or when the level of air pollution is high.   Infants, young children, older adults, smokers, and people with heart disease or lung disease (including asthma and allergies) are most likely to get acute bronchitis.   What are the symptoms?   Symptoms may include:   a deep cough that produces yellowish or greenish phlegm   pain behind the breastbone when you breathe deeply or cough   wheezing   feeling short of breath   fever   chills   headache   sore muscles.   How is it diagnosed?   Your healthcare provider will ask about your symptoms and examine you. You may have tests, such as:   a test of phlegm to look for bacteria   chest X-ray   blood tests.   How is it treated?   Acute bronchitis often does not require medical treatment. Resting at home and drinking plenty of fluids to keep the mucus loose may be all you need to do to get better in a few days. If your symptoms are severe or you have  other health problems (such as heart or lung disease or diabetes), you may need to take antibiotics.   How long will the effects last?   Most of the time acute bronchitis clears up in a few days. Your cough may slowly get better in 1 to 2 weeks.   It may take you longer to recover if:   You are a smoker.   You live in an area where air pollution is a problem.   You have a heart or lung disease.   You have any other continuing health problems.   How can I take care of myself?   You can help yourself by:   following the full treatment your healthcare provider recommends   using a vaporizer, humidifier, or steam from hot water to add moisture to the air   drinking plenty of liquids   taking cough medicine if recommended by your healthcare provider   resting in bed   taking aspirin or acetaminophen to reduce fever and relieve headache and muscle pain (Check with your healthcare provider before you give any medicine that contains aspirin or salicylates to a child or teen. This includes medicines like baby aspirin, some cold medicines, and Pepto Bismol. Children and teens who take aspirin are at risk for a serious illness called Reye's syndrome.)   eating healthy meals.   Call your healthcare provider if:   You have trouble breathing.   You have a fever of 101.5?F (38.6?C) or higher.   You cough up blood.   Your symptoms are getting worse instead of better.   You don't start to feel better after 3 days of treatment.   You have any symptoms that concern you.   How can I help prevent acute bronchitis?   To reduce your risk of getting a respiratory infection:   Do not smoke.   Wash your hands often, especially when you are around people with colds (upper respiratory infections).   If you have asthma or allergies, keep your symptoms under good control.   Get regular exercise.   Eat healthy foods.       Published by [employer].  This content is reviewed periodically and is subject to change as new health information becomes  available. The information is intended to inform and educate and is not a replacement for medical evaluation, advice, diagnosis or treatment by a healthcare professional.   Developed by [employer].   ? 2010 [employer] and/or its affiliates. All Rights Reserved.   Copyright   Clinical Reference Systems 2011                  Follow-ups after your visit        Your next 10 appointments already scheduled     Apr 23, 2018  8:00 AM CDT   (Arrive by 7:45 AM)   RETURN PLASTICS with Niles Donnelly MD   [facility] ([facility])    [address] [place] [address]  [address] [facility] [address] [phone]              Who to contact     If you have questions or need follow up information about today's clinic visit or your schedule please contact [facility] directly at [phone].  Normal or non-critical lab and imaging results will be communicated to you by MyChart, letter or phone within 4 business days after the clinic has received the results. If you do not hear from us within 7 days, please contact the clinic through MyChart or phone. If you have a critical or abnormal lab result, we will notify you by phone as soon as possible.  Submit refill requests through [employer] (formerly [employer]) or call your pharmacy and they will forward the refill request to us. Please allow 3 business days for your refill to be completed.          Additional Information About Your Visit        [employer] Information     [employer] (formerly [employer]) gives you secure access to your electronic health record. If you see a primary care provider, you can also send messages to your care team and make appointments. If you have questions, please call your primary care clinic.  If you do not have a primary care provider, please call [phone] and they will assist you.        Care EveryWhere ID     This is your Care EveryWhere ID. This could be used by other organizations to access your [facility] medical records  [id number]        Your Vitals  "Were     Pulse Temperature Height Pulse Oximetry BMI (Body Mass Index)       111 99.7  F (37.6  C) (Oral) 5' 5\" (1.651 m) 99% 28.49 kg/m2        Blood Pressure from Last 3 Encounters:   01/03/18 110/70   11/29/17 116/73   10/25/17 128/74    Weight from Last 3 Encounters:   01/03/18 171 lb 3.2 oz (77.7 kg)   11/29/17 179 lb (81.2 kg)   10/25/17 179 lb 3.2 oz (81.3 kg)              Today, you had the following     No orders found for display         Today's Medication Changes          These changes are accurate as of: 1/3/18  8:31 AM.  If you have any questions, ask your nurse or doctor.               Start taking these medicines.        Dose/Directions    azithromycin 250 MG tablet   Commonly known as:  ZITHROMAX   Used for:  Acute bronchitis, unspecified organism   Started by:  Janet Sahu PA-C        Two tablets first day, then one tablet daily for four days.   Quantity:  6 tablet   Refills:  0       guaiFENesin-codeine 100-10 MG/5ML Soln solution   Commonly known as:  ROBITUSSIN AC   Used for:  Acute bronchitis, unspecified organism   Started by:  Janet Sahu PA-C        Dose:  1 tsp.   Take 5 mLs by mouth every 4 hours as needed for cough   Quantity:  60 mL   Refills:  0            Where to get your medicines      These medications were sent to [place] [id number] [place] [id number] [place] [address]     Phone:  [phone]     azithromycin 250 MG tablet         Some of these will need a paper prescription and others can be bought over the counter.  Ask your nurse if you have questions.     Bring a paper prescription for each of these medications     guaiFENesin-codeine 100-10 MG/5ML Soln solution                Primary Care Provider Office Phone # Fax #    MARGA Lyon [phone]       [facility] [place] [address]        Equal Access to Services     HUGO RAMOS AH: Nicholas Ag, " waaxda luqadaha, qaybta kaalmada balbina, katty boonewinnie ah. So [facility] [phone].    ATENCIÓN: Si stacie bryan, tiene a crowe disposición servicios gratuitos de asistencia lingüística. Uzma al [phone].    We comply with applicable federal civil rights laws and [place] laws. We do not discriminate on the basis of race, color, national origin, age, disability, sex, sexual orientation, or gender identity.            Thank you!     Thank you for choosing [facility]  for your care. Our goal is always to provide you with excellent care. Hearing back from our patients is one way we can continue to improve our services. Please take a few minutes to complete the written survey that you may receive in the mail after your visit with us. Thank you!             Your Updated Medication List - Protect others around you: Learn how to safely use, store and throw away your medicines at www.disposemymeds.org.          This list is accurate as of: 1/3/18  8:31 AM.  Always use your most recent med list.                   Brand Name Dispense Instructions for use Diagnosis    ACETAMINOPHEN PO      Take 1,000 mg by mouth 2 times daily as needed for pain (menstrual cramps)        azithromycin 250 MG tablet    ZITHROMAX    6 tablet    Two tablets first day, then one tablet daily for four days.    Acute bronchitis, unspecified organism       cetirizine 10 MG tablet    zyrTEC     Take 10 mg by mouth every morning        CULTUREE DIGESTIVE HEALTH Caps     100 capsule    Take 1 capsule by mouth 2 times daily    Preop general physical exam       * fluconazole 150 MG tablet    DIFLUCAN          * fluconazole 150 MG tablet    DIFLUCAN    3 tablet    Take 1 tablet (150 mg) by mouth every other day    Candidiasis of vagina       fluticasone 50 MCG/ACT spray    FLONASE          guaiFENesin-codeine 100-10 MG/5ML Soln solution    ROBITUSSIN AC    60 mL    Take 5 mLs by mouth every 4 hours as needed for  cough    Acute bronchitis, unspecified organism       IBUPROFEN PO      Take 800 mg by mouth every 4 hours as needed (takes for menstrual cramps.)        levothyroxine 100 MCG tablet    SYNTHROID    90 tablet    Take 1 tablet (100 mcg) by mouth daily    Hypothyroidism due to medication       lisinopril-hydrochlorothiazide 10-12.5 MG per tablet    PRINZIDE/ZESTORETIC    90 tablet    TAKE ONE TABLET BY MOUTH ONCE DAILY    Essential hypertension with goal blood pressure less than 140/90       mupirocin 2 % ointment    BACTROBAN          senna-docusate 8.6-50 MG per tablet    SENOKOT-S;PERICOLACE    30 tablet    Take 1-2 tablets by mouth 2 times daily To prevent constipation while taking narcotic pain medication.    KARMA (stress urinary incontinence, female)       * Notice:  This list has 2 medication(s) that are the same as other medications prescribed for you. Read the directions carefully, and ask your doctor or other care provider to review them with you.

## 2018-01-03 NOTE — PROGRESS NOTES
"  SUBJECTIVE:   Mimi Melton is a 42 year old female who presents to clinic today for the following health issues:    Acute Illness   Acute illness concerns: cough  Onset: 5 days ago    Fever: yes today    Chills/Sweats: YES- chills    Headache (location?): YES- frontal and occipital    Sinus Pressure:no    Conjunctivitis:  no    Ear Pain: no    Rhinorrhea: YES    Congestion: YES- nasal and chest    Sore Throat: no     Cough: YES-barking, worsening over time    Wheeze: YES    sob    Decreased Appetite: YES    Nausea: YES    Vomiting: no    Diarrhea:  no    Dysuria/Freq.: no    Fatigue/Achiness: YES    Sick/Strep Exposure: YES- daughter     Therapies Tried and outcome: acetaminophen   History of bronchitis yearly. No history of pneumonia, asthma.   History of seasonal allergies.     ROS:  ROS otherwise negative    OBJECTIVE:                                                    /70 (BP Location: Right arm, Cuff Size: Adult Regular)  Pulse 111  Temp 99.7  F (37.6  C) (Oral)  Ht 5' 5\" (1.651 m)  Wt 171 lb 3.2 oz (77.7 kg)  SpO2 99%  BMI 28.49 kg/m2 RR 20  Body mass index is 28.49 kg/(m^2).   GENERAL: alert, no distress  HENT: ear canals- normal; TMs- normal; Nose- normal; Mouth- no ulcers, no lesions  NECK: no tenderness, no adenopathy  RESP: mod diminished breath sounds; rhonchi present--able to clear with cough; no rales or wheezing  CV: regular rates and rhythm, normal S1 S2, no S3 or S4 and no murmur, no click or rub    Diagnostic test results:  No results found for this or any previous visit (from the past 24 hour(s)).       ASSESSMENT/PLAN:                                                    (J20.9) Acute bronchitis, unspecified organism  (primary encounter diagnosis)  Comment: begin antibiotics as directed. Limit exposures. Signs for emergent evaluation discussed with patient.  Plan: azithromycin (ZITHROMAX) 250 MG tablet,         guaiFENesin-codeine (ROBITUSSIN AC) 100-10         MG/5ML SOLN " solution          See Patient Instructions    Janet Sahu PA-C  [facility]

## 2018-01-03 NOTE — TELEPHONE ENCOUNTER
I spoke with patient and she has a appt this AM at the [facility].  Dorothy Magallanes RN

## 2018-01-03 NOTE — PATIENT INSTRUCTIONS
Acute Bronchitis                  What is acute bronchitis?   Bronchitis is an infection of the air passages--that is, the tubes that connect the windpipe to the lungs. It causes swelling and irritation of the airways. With acute bronchitis you usually have a cough that produces phlegm and pain behind the breastbone when you breathe deeply or cough.   How does it occur?   Bronchitis often occurs with viral infections of the respiratory tract, such as colds and flu. Bronchitis may also be caused by bacterial infections. It may occur with childhood illnesses such as measles and whooping cough.   Attacks are most frequent during the winter or when the level of air pollution is high.   Infants, young children, older adults, smokers, and people with heart disease or lung disease (including asthma and allergies) are most likely to get acute bronchitis.   What are the symptoms?   Symptoms may include:   a deep cough that produces yellowish or greenish phlegm   pain behind the breastbone when you breathe deeply or cough   wheezing   feeling short of breath   fever   chills   headache   sore muscles.   How is it diagnosed?   Your healthcare provider will ask about your symptoms and examine you. You may have tests, such as:   a test of phlegm to look for bacteria   chest X-ray   blood tests.   How is it treated?   Acute bronchitis often does not require medical treatment. Resting at home and drinking plenty of fluids to keep the mucus loose may be all you need to do to get better in a few days. If your symptoms are severe or you have other health problems (such as heart or lung disease or diabetes), you may need to take antibiotics.   How long will the effects last?   Most of the time acute bronchitis clears up in a few days. Your cough may slowly get better in 1 to 2 weeks.   It may take you longer to recover if:   You are a smoker.   You live in an area where air pollution is a problem.   You have a heart  or lung disease.   You have any other continuing health problems.   How can I take care of myself?   You can help yourself by:   following the full treatment your healthcare provider recommends   using a vaporizer, humidifier, or steam from hot water to add moisture to the air   drinking plenty of liquids   taking cough medicine if recommended by your healthcare provider   resting in bed   taking aspirin or acetaminophen to reduce fever and relieve headache and muscle pain (Check with your healthcare provider before you give any medicine that contains aspirin or salicylates to a child or teen. This includes medicines like baby aspirin, some cold medicines, and Pepto Bismol. Children and teens who take aspirin are at risk for a serious illness called Reye's syndrome.)   eating healthy meals.   Call your healthcare provider if:   You have trouble breathing.   You have a fever of 101.5?F (38.6?C) or higher.   You cough up blood.   Your symptoms are getting worse instead of better.   You don't start to feel better after 3 days of treatment.   You have any symptoms that concern you.   How can I help prevent acute bronchitis?   To reduce your risk of getting a respiratory infection:   Do not smoke.   Wash your hands often, especially when you are around people with colds (upper respiratory infections).   If you have asthma or allergies, keep your symptoms under good control.   Get regular exercise.   Eat healthy foods.       Published by [employer].  This content is reviewed periodically and is subject to change as new health information becomes available. The information is intended to inform and educate and is not a replacement for medical evaluation, advice, diagnosis or treatment by a healthcare professional.   Developed by [employer].   ? 2010 [employer] and/or its affiliates. All Rights Reserved.   Copyright   [employer] Reference Systems 2011

## 2018-01-03 NOTE — TELEPHONE ENCOUNTER
Clinic Action Needed: please call her/:Declines triage/ seeking an appointment for her cough/ wants to talk to the nurse at the  clinic to see how soon she can be seen/ her daughter is in the er so has urgent concerns.   Wicho Arellano RN [phone]  Reason for Call:see above  Patient Recommendations/Teaching:  Routed to:

## 2018-01-03 NOTE — NURSING NOTE
"Chief Complaint   Patient presents with     Cough       Initial /70 (BP Location: Right arm, Cuff Size: Adult Regular)  Pulse 111  Temp 99.7  F (37.6  C) (Oral)  Ht 5' 5\" (1.651 m)  Wt 171 lb 3.2 oz (77.7 kg)  SpO2 99%  BMI 28.49 kg/m2 Estimated body mass index is 28.49 kg/(m^2) as calculated from the following:    Height as of this encounter: 5' 5\" (1.651 m).    Weight as of this encounter: 171 lb 3.2 oz (77.7 kg).  Medication Reconciliation: complete   Jatin Marshall CMA    "

## 2018-01-03 NOTE — TELEPHONE ENCOUNTER
Declines triage/ seeking an appointment for her cough/ wants to talk to the nurse at the  clinic to see how soon she can be seen/ her daughter is in the er so has urgent concerns.   Wicho Arellano RN [phone]

## 2018-01-07 ENCOUNTER — NURSE TRIAGE (OUTPATIENT)
Dept: NURSING | Facility: CLINIC | Age: 43
End: 2018-01-07

## 2018-01-07 NOTE — TELEPHONE ENCOUNTER
"Caller states she has had influenza for 5 days and  although better and she has loss of appetite and feels she  just can't eat; feels she needs to be hospitalized; Has not been vomiting; did have an apple and a yogurt today no vomiting; taking pedialyte , voiding; no fever. Mild cough; fatigued an sleeping alot  Triage protocol reviewed  Advised in hydration and  Food suggestions  But becomes adamant that  \" I just can't eat anything\"   Advised to be seen in clinic in a.m. and transferred to  .  Additional Information    [1] Influenza (diagnosed by HCP) AND [2] no complications (all triage questions negative)    Protocols used: INFLUENZA FOLLOW-UP CALL-ADULT-  Dasia Benton RN  [facility]    "

## 2018-01-08 ENCOUNTER — OFFICE VISIT (OUTPATIENT)
Dept: FAMILY MEDICINE | Facility: CLINIC | Age: 43
End: 2018-01-08
Payer: COMMERCIAL

## 2018-01-08 VITALS
BODY MASS INDEX: 26.92 KG/M2 | TEMPERATURE: 98.1 F | SYSTOLIC BLOOD PRESSURE: 113 MMHG | WEIGHT: 161.8 LBS | HEART RATE: 120 BPM | DIASTOLIC BLOOD PRESSURE: 78 MMHG | RESPIRATION RATE: 16 BRPM

## 2018-01-08 DIAGNOSIS — R53.83 FATIGUE, UNSPECIFIED TYPE: Primary | ICD-10-CM

## 2018-01-08 DIAGNOSIS — R50.9 FEVER, UNSPECIFIED FEVER CAUSE: ICD-10-CM

## 2018-01-08 DIAGNOSIS — R11.0 NAUSEA: ICD-10-CM

## 2018-01-08 LAB
BASOPHILS # BLD AUTO: 0 10E9/L (ref 0–0.2)
BASOPHILS NFR BLD AUTO: 0.3 %
DIFFERENTIAL METHOD BLD: ABNORMAL
EOSINOPHIL # BLD AUTO: 0 10E9/L (ref 0–0.7)
EOSINOPHIL NFR BLD AUTO: 0.3 %
ERYTHROCYTE [DISTWIDTH] IN BLOOD BY AUTOMATED COUNT: 12.4 % (ref 10–15)
HCT VFR BLD AUTO: 42.8 % (ref 35–47)
HETEROPH AB SER QL: NEGATIVE
HGB BLD-MCNC: 14.1 G/DL (ref 11.7–15.7)
LYMPHOCYTES # BLD AUTO: 2 10E9/L (ref 0.8–5.3)
LYMPHOCYTES NFR BLD AUTO: 63.2 %
MCH RBC QN AUTO: 29.2 PG (ref 26.5–33)
MCHC RBC AUTO-ENTMCNC: 32.9 G/DL (ref 31.5–36.5)
MCV RBC AUTO: 89 FL (ref 78–100)
MONOCYTES # BLD AUTO: 0.4 10E9/L (ref 0–1.3)
MONOCYTES NFR BLD AUTO: 13.2 %
NEUTROPHILS # BLD AUTO: 0.7 10E9/L (ref 1.6–8.3)
NEUTROPHILS NFR BLD AUTO: 23 %
PLATELET # BLD AUTO: 311 10E9/L (ref 150–450)
RBC # BLD AUTO: 4.83 10E12/L (ref 3.8–5.2)
WBC # BLD AUTO: 3.2 10E9/L (ref 4–11)

## 2018-01-08 PROCEDURE — 85025 COMPLETE CBC W/AUTO DIFF WBC: CPT | Performed by: FAMILY MEDICINE

## 2018-01-08 PROCEDURE — 99213 OFFICE O/P EST LOW 20 MIN: CPT | Performed by: FAMILY MEDICINE

## 2018-01-08 PROCEDURE — 80053 COMPREHEN METABOLIC PANEL: CPT | Performed by: FAMILY MEDICINE

## 2018-01-08 PROCEDURE — 36415 COLL VENOUS BLD VENIPUNCTURE: CPT | Performed by: FAMILY MEDICINE

## 2018-01-08 PROCEDURE — 86308 HETEROPHILE ANTIBODY SCREEN: CPT | Performed by: FAMILY MEDICINE

## 2018-01-08 NOTE — PATIENT INSTRUCTIONS
When you feel better, I can write return to work note.  Fluids.  Rest.  We are checking labs today. Symptoms are suggestive of mono or CMV. May consider CMV testing.  Typically people get better in 3-4 weeks from those viruses.  May consider retesting thyroid if not better by next Monday.  Anticipate feeling well enough to return to work by Wednesday.

## 2018-01-08 NOTE — PROGRESS NOTES
SUBJECTIVE:   Mimi Melton is a 42 year old female who presents to clinic today for the following health issues:      Pt is here for a follow up and also a return to work letter.    Completed antibiotics, it did seem to help.    She still feels very fatigued.    She is still nauseated. Had a sore throat.    She missed work since last Wednesday.    EXAM:  /78  Pulse 120  Temp 98.1  F (36.7  C) (Oral)  Resp 16  Wt 161 lb 12.8 oz (73.4 kg)  BMI 26.92 kg/m2  Constitutional: Healthy, alert, no distress   EENT: PERRL, TM's clear bilaterally, oropharynx with minimal erythema, no anterior cervical lymphadenopathy   Cardiovascular: RRR. No murmurs   Respiratory: Clear to auscultation          ASSESSMENT    ICD-10-CM    1. Fatigue, unspecified type R53.83    2. Nausea R11.0 Comprehensive metabolic panel (BMP + Alb, Alk Phos, ALT, AST, Total. Bili, TP)   3. Fever, unspecified fever cause R50.9 Mononucleosis screen     CBC with platelets and differential     Comprehensive metabolic panel (BMP + Alb, Alk Phos, ALT, AST, Total. Bili, TP)      Plan:  Patient Instructions   When you feel better, I can write return to work note.  Fluids.  Rest.  We are checking labs today. Symptoms are suggestive of mono or CMV. May consider CMV testing.  Typically people get better in 3-4 weeks from those viruses.  May consider retesting thyroid if not better by next Monday.  Anticipate feeling well enough to return to work by Wednesday.       Kody Irwin MD  Family Medicine Physician

## 2018-01-08 NOTE — MR AVS SNAPSHOT
After Visit Summary   1/8/2018    Mimi Melton    MRN: [id number]           Patient Information     Date Of Birth          1975        Visit Information        Provider Department      1/8/2018 2:45 PM Kody Lin MD [facility]        Today's Diagnoses     Fatigue, unspecified type    -  1    Nausea        Fever, unspecified fever cause          Care Instructions    When you feel better, I can write return to work note.  Fluids.  Rest.  We are checking labs today. Symptoms are suggestive of mono or CMV. May consider CMV testing.  Typically people get better in 3-4 weeks from those viruses.  May consider retesting thyroid if not better by next Monday.  Anticipate feeling well enough to return to work by Wednesday.          Follow-ups after your visit        Your next 10 appointments already scheduled     Apr 23, 2018  8:00 AM CDT   (Arrive by 7:45 AM)   RETURN PLASTICS with Niles Donnelly MD   [facility] ([facility] [place])    [address] [place] [address] [phone]              Who to contact     If you have questions or need follow up information about today's clinic visit or your schedule please contact [facility] directly at [phone].  Normal or non-critical lab and imaging results will be communicated to you by MyChart, letter or phone within 4 business days after the clinic has received the results. If you do not hear from us within 7 days, please contact the clinic through GraffitiGeohart or phone. If you have a critical or abnormal lab result, we will notify you by phone as soon as possible.  Submit refill requests through TelemetryWeb or call your pharmacy and they will forward the refill request to us. Please allow 3 business days for your refill to be completed.          Additional Information About Your Visit        TelemetryWeb Information     TelemetryWeb gives you secure access  to your electronic health record. If you see a primary care provider, you can also send messages to your care team and make appointments. If you have questions, please call your primary care clinic.  If you do not have a primary care provider, please call [phone] and they will assist you.        Care EveryWhere ID     This is your Care EveryWhere ID. This could be used by other organizations to access your [facility] medical records  [id number]        Your Vitals Were     Pulse Temperature Respirations BMI (Body Mass Index)          120 98.1  F (36.7  C) (Oral) 16 26.92 kg/m2         Blood Pressure from Last 3 Encounters:   01/08/18 113/78   01/03/18 110/70   11/29/17 116/73    Weight from Last 3 Encounters:   01/08/18 161 lb 12.8 oz (73.4 kg)   01/03/18 171 lb 3.2 oz (77.7 kg)   11/29/17 179 lb (81.2 kg)              We Performed the Following     CBC with platelets and differential     Comprehensive metabolic panel (BMP + Alb, Alk Phos, ALT, AST, Total. Bili, TP)     Mononucleosis screen        Primary Care Provider Office Phone # Fax #    Kalli Roldan, APRN [facility] [phone] [address] [place] [address]        Equal Access to Services     HUGO RAMOS : Hadii aad ku hadasho Soomaali, waaxda luqadaha, qaybta kaalmada adeegyada, waxay idiin hayaan sonny flaherty . So [facility] [phone].    ATENCIÓN: Si habla español, tiene a crowe disposición servicios gratuitos de asistencia lingüística. Llame al [phone].    We comply with applicable federal civil rights laws and [place] laws. We do not discriminate on the basis of race, color, national origin, age, disability, sex, sexual orientation, or gender identity.            Thank you!     Thank you for choosing [facility]  for your care. Our goal is always to provide you with excellent care. Hearing back from our patients is one way we can continue to improve our services. Please take a few  minutes to complete the written survey that you may receive in the mail after your visit with us. Thank you!             Your Updated Medication List - Protect others around you: Learn how to safely use, store and throw away your medicines at www.disposemymeds.org.          This list is accurate as of: 1/8/18  4:06 PM.  Always use your most recent med list.                   Brand Name Dispense Instructions for use Diagnosis    ACETAMINOPHEN PO      Take 1,000 mg by mouth 2 times daily as needed for pain (menstrual cramps)        azithromycin 250 MG tablet    ZITHROMAX    6 tablet    Two tablets first day, then one tablet daily for four days.    Acute bronchitis, unspecified organism       cetirizine 10 MG tablet    zyrTEC     Take 10 mg by mouth every morning        [facility] DIGESTIVE HEALTH Caps     100 capsule    Take 1 capsule by mouth 2 times daily    Preop general physical exam       * fluconazole 150 MG tablet    DIFLUCAN          * fluconazole 150 MG tablet    DIFLUCAN    3 tablet    Take 1 tablet (150 mg) by mouth every other day    Candidiasis of vagina       fluticasone 50 MCG/ACT spray    FLONASE          guaiFENesin-codeine 100-10 MG/5ML Soln solution    ROBITUSSIN AC    60 mL    Take 5 mLs by mouth every 4 hours as needed for cough    Acute bronchitis, unspecified organism       IBUPROFEN PO      Take 800 mg by mouth every 4 hours as needed (takes for menstrual cramps.)        levothyroxine 100 MCG tablet    SYNTHROID    90 tablet    Take 1 tablet (100 mcg) by mouth daily    Hypothyroidism due to medication       lisinopril-hydrochlorothiazide 10-12.5 MG per tablet    PRINZIDE/ZESTORETIC    90 tablet    TAKE ONE TABLET BY MOUTH ONCE DAILY    Essential hypertension with goal blood pressure less than 140/90       mupirocin 2 % ointment    BACTROBAN          senna-docusate 8.6-50 MG per tablet    SENOKOT-S;PERICOLACE    30 tablet    Take 1-2 tablets by mouth 2 times daily To prevent constipation while  taking narcotic pain medication.    KARMA (stress urinary incontinence, female)       * Notice:  This list has 2 medication(s) that are the same as other medications prescribed for you. Read the directions carefully, and ask your doctor or other care provider to review them with you.

## 2018-01-09 LAB
ALBUMIN SERPL-MCNC: 4.2 G/DL (ref 3.4–5)
ALP SERPL-CCNC: 35 U/L (ref 40–150)
ALT SERPL W P-5'-P-CCNC: 14 U/L (ref 0–50)
ANION GAP SERPL CALCULATED.3IONS-SCNC: 11 MMOL/L (ref 3–14)
AST SERPL W P-5'-P-CCNC: 22 U/L (ref 0–45)
BILIRUB SERPL-MCNC: 0.2 MG/DL (ref 0.2–1.3)
BUN SERPL-MCNC: 8 MG/DL (ref 7–30)
CALCIUM SERPL-MCNC: 9.2 MG/DL (ref 8.5–10.1)
CHLORIDE SERPL-SCNC: 102 MMOL/L (ref 94–109)
CO2 SERPL-SCNC: 24 MMOL/L (ref 20–32)
CREAT SERPL-MCNC: 0.86 MG/DL (ref 0.52–1.04)
GFR SERPL CREATININE-BSD FRML MDRD: 73 ML/MIN/1.7M2
GLUCOSE SERPL-MCNC: 109 MG/DL (ref 70–99)
POTASSIUM SERPL-SCNC: 3.5 MMOL/L (ref 3.4–5.3)
PROT SERPL-MCNC: 8.6 G/DL (ref 6.8–8.8)
SODIUM SERPL-SCNC: 137 MMOL/L (ref 133–144)

## 2018-01-10 ENCOUNTER — TELEPHONE (OUTPATIENT)
Dept: FAMILY MEDICINE | Facility: CLINIC | Age: 43
End: 2018-01-10

## 2018-01-10 NOTE — TELEPHONE ENCOUNTER
Please call pt and ask her what dates she needs on the letter? And where to fax this.    Thanks!     Linda Hicks RN

## 2018-01-10 NOTE — TELEPHONE ENCOUNTER
Reason for Call:  Other-Work Letter    Detailed comments: Pt called to see if she could get a back to work letter faxed to her work place. She was seen this past Monday by [facility]. Unfortunately, she does not have the fax # on hand. Please contact the pt and she will provide this. Thanks!    Phone Number Patient can be reached at: Cell number on file:    Telephone Information:   Mobile [phone]       Best Time: Anytime    Can we leave a detailed message on this number? YES    Call taken on 1/10/2018 at 7:03 AM by Renetta Lara

## 2018-01-10 NOTE — LETTER
[facility]   [address], [place]  [address]  [phone]        January 10, 2018      RE: Mimi Melton  [address] [place] [facility] [address]        To whom it may concern:    Mimi Melton was seen in the clinic on Monday, 1/8/2018. Please excuse her from work 1/8/2018-1/10/2018. She  may return to work on Thursday, 1/11/2018.   If you have any questions please feel free to call me at [phone]    Sincerely,    Kody Irwin MD/ashley

## 2018-01-10 NOTE — TELEPHONE ENCOUNTER
Yes, please pend letter so I can sign and it can be faxed or she can pick it up.    Kody Irwin MD  Family Medicine Physician

## 2018-01-10 NOTE — TELEPHONE ENCOUNTER
Dr Gonzalez   Pt saw you Monday   Would like work note from 1/8-1/10 returning tomorrow  Ok for letter?    Thanks!     Linda Hicks RN

## 2018-03-08 DIAGNOSIS — E03.2 HYPOTHYROIDISM DUE TO MEDICATION: ICD-10-CM

## 2018-03-08 DIAGNOSIS — E03.9 HYPOTHYROIDISM: Primary | ICD-10-CM

## 2018-03-08 RX ORDER — LEVOTHYROXINE SODIUM 100 UG/1
100 TABLET ORAL DAILY
Qty: 90 TABLET | Refills: 3 | Status: SHIPPED | OUTPATIENT
Start: 2018-03-08 | End: 2019-03-01

## 2018-03-08 NOTE — TELEPHONE ENCOUNTER
Levothyroxine  100 MCG     Last Written Prescription Date:  3/17/17  Last Fill Quantity: 90,   # refills: 3  Last Office Visit : 9/5/17  Future Office visit:  NONE    Routing refill request to provider for review/approval because:  REVIEW TSH

## 2018-03-09 ENCOUNTER — RADIANT APPOINTMENT (OUTPATIENT)
Dept: MAMMOGRAPHY | Facility: CLINIC | Age: 43
End: 2018-03-09
Attending: NURSE PRACTITIONER
Payer: COMMERCIAL

## 2018-03-09 DIAGNOSIS — Z12.31 VISIT FOR SCREENING MAMMOGRAM: ICD-10-CM

## 2018-03-09 DIAGNOSIS — E03.4 HYPOTHYROIDISM DUE TO ACQUIRED ATROPHY OF THYROID: ICD-10-CM

## 2018-03-09 LAB
T4 FREE SERPL-MCNC: 1.12 NG/DL (ref 0.76–1.46)
TSH SERPL DL<=0.005 MIU/L-ACNC: 0.3 MU/L (ref 0.4–4)

## 2018-03-09 RX ORDER — LEVOTHYROXINE SODIUM 112 MCG
TABLET ORAL
Qty: 90 TABLET | Refills: 3 | OUTPATIENT
Start: 2018-03-09

## 2018-03-13 ENCOUNTER — OFFICE VISIT (OUTPATIENT)
Dept: FAMILY MEDICINE | Facility: CLINIC | Age: 43
End: 2018-03-13
Payer: COMMERCIAL

## 2018-03-13 VITALS
TEMPERATURE: 98.3 F | HEART RATE: 72 BPM | BODY MASS INDEX: 28.29 KG/M2 | RESPIRATION RATE: 16 BRPM | WEIGHT: 170 LBS | DIASTOLIC BLOOD PRESSURE: 71 MMHG | OXYGEN SATURATION: 100 % | SYSTOLIC BLOOD PRESSURE: 110 MMHG

## 2018-03-13 DIAGNOSIS — N76.0 BACTERIAL VAGINITIS: ICD-10-CM

## 2018-03-13 DIAGNOSIS — B96.89 BACTERIAL VAGINITIS: ICD-10-CM

## 2018-03-13 DIAGNOSIS — R30.0 DYSURIA: Primary | ICD-10-CM

## 2018-03-13 LAB
SPECIMEN SOURCE: ABNORMAL
WET PREP SPEC: ABNORMAL

## 2018-03-13 PROCEDURE — 87210 SMEAR WET MOUNT SALINE/INK: CPT | Performed by: FAMILY MEDICINE

## 2018-03-13 PROCEDURE — 99213 OFFICE O/P EST LOW 20 MIN: CPT | Performed by: FAMILY MEDICINE

## 2018-03-13 RX ORDER — METRONIDAZOLE 500 MG/1
500 TABLET ORAL 2 TIMES DAILY
Qty: 14 TABLET | Refills: 0 | Status: SHIPPED | OUTPATIENT
Start: 2018-03-13 | End: 2018-04-07

## 2018-03-13 NOTE — PROGRESS NOTES
SUBJECTIVE:   Mimi Melton is a 42 year old female who presents to clinic today for the following health issues:    Vaginal Symptoms      Duration: 5 days    Description  vaginal discharge - creamy and odor    Intensity:  mild    Accompanying signs and symptoms (fever/dysuria/abdominal or back pain): None    History  Sexually active: yes, single partner, contraception - not needed  Possibility of pregnancy: No  Recent antibiotic use: no     Precipitating or alleviating factors: None    Therapies tried and outcome: none   Outcome:           Problem list and histories reviewed & adjusted, as indicated.  Additional history: as documented    Patient Active Problem List   Diagnosis     Surveillance of previously prescribed intrauterine contraceptive device     Vaginitis and vulvovaginitis     Exophthalmos     Abnormal Pap smear of cervix     CARDIOVASCULAR SCREENING; LDL GOAL LESS THAN 160     Essential hypertension with goal blood pressure less than 140/90     Anemia     Hypothyroidism due to acquired atrophy of thyroid     Cervical high risk HPV (human papillomavirus) test positive     Thyroid disease     Female stress incontinence     Past Surgical History:   Procedure Laterality Date     C  DELIVERY ONLY  91    , Low Cervical     C  DELIVERY ONLY  97    , Low Cervical     C  DELIVERY ONLY  99    , Low Cervical     C INDUCED ABORTN BY D&C      Aspiration & Curettage, TAB x2     CYSTOSCOPY, SLING TRANSVAGINAL N/A 10/10/2017    Procedure: CYSTOSCOPY, SLING TRANSVAGINAL;  Midurethral Sling and Cystoscopy (Support the Urethra with Mesh Sling and Look in the Bladder);  Surgeon: Karin Amin MD;  Location: [facility] OR     EYE SURGERY  2008    Orbital Decompression Dr smart      REPAIR INCISIONAL HERNIA,REDUCIBLE      Umbilical hernia Repair       Social History   Substance Use Topics     Smoking status: Never Smoker     Smokeless tobacco: Never  Used     Alcohol use Yes      Comment: 1-2x's/month if that.     Family History   Problem Relation Age of Onset     Hypertension Mother      Hypertension Father      Hypertension Maternal Grandmother      Hypertension Paternal Grandmother      Hypertension Maternal Grandfather      Hypertension Paternal Grandfather          Current Outpatient Prescriptions   Medication Sig Dispense Refill     metroNIDAZOLE (FLAGYL) 500 MG tablet Take 1 tablet (500 mg) by mouth 2 times daily 14 tablet 0     levothyroxine (SYNTHROID/LEVOTHROID) 100 MCG tablet Take 1 tablet (100 mcg) by mouth daily 90 tablet 3     fluconazole (DIFLUCAN) 150 MG tablet Take 1 tablet (150 mg) by mouth every other day (Patient not taking: Reported on 3/13/2018) 3 tablet 0     lisinopril-hydrochlorothiazide (PRINZIDE/ZESTORETIC) 10-12.5 MG per tablet TAKE ONE TABLET BY MOUTH ONCE DAILY 90 tablet 3     fluconazole (DIFLUCAN) 150 MG tablet        mupirocin (BACTROBAN) 2 % ointment        senna-docusate (SENOKOT-S;PERICOLACE) 8.6-50 MG per tablet Take 1-2 tablets by mouth 2 times daily To prevent constipation while taking narcotic pain medication. (Patient not taking: Reported on 3/13/2018) 30 tablet 0     cetirizine (ZYRTEC) 10 MG tablet Take 10 mg by mouth every morning       IBUPROFEN PO Take 800 mg by mouth every 4 hours as needed (takes for menstrual cramps.)        ACETAMINOPHEN PO Take 1,000 mg by mouth 2 times daily as needed for pain (menstrual cramps)       Lactobacillus-Inulin ([facility] DIGESTIVE HEALTH) CAPS Take 1 capsule by mouth 2 times daily 100 capsule 11     fluticasone (FLONASE) 50 MCG/ACT spray        Allergies   Allergen Reactions     Mold      Cannot have any meds that contain mold.     No Known Drug Allergies      BP Readings from Last 3 Encounters:   03/13/18 110/71   01/08/18 113/78   01/03/18 110/70    Wt Readings from Last 3 Encounters:   03/13/18 170 lb (77.1 kg)   01/08/18 161 lb 12.8 oz (73.4 kg)   01/03/18 171 lb 3.2 oz (77.7  kg)                    Reviewed and updated as needed this visit by clinical staff       Reviewed and updated as needed this visit by Provider         ROS:  Constitutional, HEENT, cardiovascular, pulmonary, gi and gu systems are negative, except as otherwise noted.    OBJECTIVE:     /71  Pulse 72  Temp 98.3  F (36.8  C) (Oral)  Resp 16  Wt 170 lb (77.1 kg)  SpO2 100%  BMI 28.29 kg/m2  Body mass index is 28.29 kg/(m^2).  GENERAL: healthy, alert and no distress  EYES: Eyes grossly normal to inspection, PERRL and conjunctivae and sclerae normal  NECK: no adenopathy, no asymmetry, masses, or scars and thyroid normal to palpation  MS: no gross musculoskeletal defects noted, no edema  SKIN: no suspicious lesions or rashes  PSYCH: mentation appears normal, affect normal/bright    Diagnostic Test Results:  Results for orders placed or performed in visit on 03/13/18 (from the past 24 hour(s))   Wet prep   Result Value Ref Range    Specimen Description Vagina     Wet Prep Clue cells seen (A)     Wet Prep No Trichomonas seen     Wet Prep No yeast seen        ASSESSMENT/PLAN:       1. Bacterial vaginitis    - metroNIDAZOLE (FLAGYL) 500 MG tablet; Take 1 tablet (500 mg) by mouth 2 times daily  Dispense: 14 tablet; Refill: 0    Prescribed Flagyl 500mg po bid x 7 days.  FU if no change or worsening symptoms prn.    Madison Ivan MD  [facility]

## 2018-03-13 NOTE — MR AVS SNAPSHOT
After Visit Summary   3/13/2018    Mimi Melton    MRN: [id number]           Patient Information     Date Of Birth          1975        Visit Information        Provider Department      3/13/2018 8:00 AM Madison Ivan MD [facility]        Today's Diagnoses     Dysuria    -  1    Bacterial vaginitis           Follow-ups after your visit        Follow-up notes from your care team     Return if symptoms worsen or fail to improve.      Your next 10 appointments already scheduled     Apr 23, 2018  9:15 AM CDT   (Arrive by 9:00 AM)   RETURN PLASTICS with Niles Donnelly MD   [facility] ([facility])    [address]  [address] [facility] [address] [phone]              Who to contact     If you have questions or need follow up information about today's clinic visit or your schedule please contact [facility] directly at [phone].  Normal or non-critical lab and imaging results will be communicated to you by MyChart, letter or phone within 4 business days after the clinic has received the results. If you do not hear from us within 7 days, please contact the clinic through EyeJothart or phone. If you have a critical or abnormal lab result, we will notify you by phone as soon as possible.  Submit refill requests through Pressglue or call your pharmacy and they will forward the refill request to us. Please allow 3 business days for your refill to be completed.          Additional Information About Your Visit        MyChart Information     Pressglue gives you secure access to your electronic health record. If you see a primary care provider, you can also send messages to your care team and make appointments. If you have questions, please call your primary care clinic.  If you do not have a primary care provider, please call [phone] and they will assist you.        Care EveryWhere ID      This is your Care EveryWhere ID. This could be used by other organizations to access your [facility] medical records  [id number]        Your Vitals Were     Pulse Temperature Respirations Pulse Oximetry BMI (Body Mass Index)       72 98.3  F (36.8  C) (Oral) 16 100% 28.29 kg/m2        Blood Pressure from Last 3 Encounters:   03/13/18 110/71   01/08/18 113/78   01/03/18 110/70    Weight from Last 3 Encounters:   03/13/18 170 lb (77.1 kg)   01/08/18 161 lb 12.8 oz (73.4 kg)   01/03/18 171 lb 3.2 oz (77.7 kg)              We Performed the Following     Wet prep          Today's Medication Changes          These changes are accurate as of 3/13/18  8:54 AM.  If you have any questions, ask your nurse or doctor.               Start taking these medicines.        Dose/Directions    metroNIDAZOLE 500 MG tablet   Commonly known as:  FLAGYL   Used for:  Bacterial vaginitis        Dose:  500 mg   Take 1 tablet (500 mg) by mouth 2 times daily   Quantity:  14 tablet   Refills:  0            Where to get your medicines      These medications were sent to [place] [address] [place] [address] [address]   [address] [facility] [address]     Phone:  [phone]     metroNIDAZOLE 500 MG tablet                Primary Care Provider Office Phone # Fax #    Kalli MASHA Roldan, APRN -[phone]       [address] [place] [address]        Equal Access to Services     HUGO RAMOS AH: Hadelvis Ag, waaxda lumaryellenadaha, qaybta kaalmada adeegyada, wax monty leblanc [facility] Hospitalbrenda morris. So [facility] [phone].    ATENCIÓN: Si habla español, tiene a crowe disposición servicios gratuitos de asistencia lingüística. Llame al [phone].    We comply with applicable federal civil rights laws and [place] laws. We do not discriminate on the basis of race, color, national origin, age, disability, sex, sexual orientation, or gender identity.            Thank you!     Thank you for choosing  [facility]  for your care. Our goal is always to provide you with excellent care. Hearing back from our patients is one way we can continue to improve our services. Please take a few minutes to complete the written survey that you may receive in the mail after your visit with us. Thank you!             Your Updated Medication List - Protect others around you: Learn how to safely use, store and throw away your medicines at www.disposemymeds.org.          This list is accurate as of 3/13/18  8:54 AM.  Always use your most recent med list.                   Brand Name Dispense Instructions for use Diagnosis    ACETAMINOPHEN PO      Take 1,000 mg by mouth 2 times daily as needed for pain (menstrual cramps)        cetirizine 10 MG tablet    zyrTEC     Take 10 mg by mouth every morning        [facility] DIGESTIVE HEALTH Caps     100 capsule    Take 1 capsule by mouth 2 times daily    Preop general physical exam       * fluconazole 150 MG tablet    DIFLUCAN          * fluconazole 150 MG tablet    DIFLUCAN    3 tablet    Take 1 tablet (150 mg) by mouth every other day    Candidiasis of vagina       fluticasone 50 MCG/ACT spray    FLONASE          IBUPROFEN PO      Take 800 mg by mouth every 4 hours as needed (takes for menstrual cramps.)        levothyroxine 100 MCG tablet    SYNTHROID/LEVOTHROID    90 tablet    Take 1 tablet (100 mcg) by mouth daily    Hypothyroidism due to medication       lisinopril-hydrochlorothiazide 10-12.5 MG per tablet    PRINZIDE/ZESTORETIC    90 tablet    TAKE ONE TABLET BY MOUTH ONCE DAILY    Essential hypertension with goal blood pressure less than 140/90       metroNIDAZOLE 500 MG tablet    FLAGYL    14 tablet    Take 1 tablet (500 mg) by mouth 2 times daily    Bacterial vaginitis       mupirocin 2 % ointment    BACTROBAN          senna-docusate 8.6-50 MG per tablet    SENOKOT-S;PERICOLACE    30 tablet    Take 1-2 tablets by mouth 2 times daily To prevent constipation while  taking narcotic pain medication.    KARMA (stress urinary incontinence, female)       * Notice:  This list has 2 medication(s) that are the same as other medications prescribed for you. Read the directions carefully, and ask your doctor or other care provider to review them with you.

## 2018-03-21 ENCOUNTER — TRANSFERRED RECORDS (OUTPATIENT)
Dept: HEALTH INFORMATION MANAGEMENT | Facility: CLINIC | Age: 43
End: 2018-03-21

## 2018-04-07 ENCOUNTER — OFFICE VISIT (OUTPATIENT)
Dept: URGENT CARE | Facility: URGENT CARE | Age: 43
End: 2018-04-07
Payer: COMMERCIAL

## 2018-04-07 VITALS
OXYGEN SATURATION: 97 % | HEART RATE: 96 BPM | HEIGHT: 65 IN | DIASTOLIC BLOOD PRESSURE: 64 MMHG | TEMPERATURE: 98.8 F | BODY MASS INDEX: 28.32 KG/M2 | SYSTOLIC BLOOD PRESSURE: 96 MMHG | WEIGHT: 170 LBS

## 2018-04-07 DIAGNOSIS — B37.31 CANDIDIASIS OF VAGINA: ICD-10-CM

## 2018-04-07 DIAGNOSIS — R03.1 NONSPECIFIC LOW BLOOD PRESSURE READING: ICD-10-CM

## 2018-04-07 DIAGNOSIS — B96.89 BV (BACTERIAL VAGINOSIS): ICD-10-CM

## 2018-04-07 DIAGNOSIS — N89.8 VAGINAL DISCHARGE: Primary | ICD-10-CM

## 2018-04-07 DIAGNOSIS — N76.0 BV (BACTERIAL VAGINOSIS): ICD-10-CM

## 2018-04-07 LAB
SPECIMEN SOURCE: ABNORMAL
WET PREP SPEC: ABNORMAL

## 2018-04-07 PROCEDURE — 87210 SMEAR WET MOUNT SALINE/INK: CPT | Performed by: FAMILY MEDICINE

## 2018-04-07 PROCEDURE — 87491 CHLMYD TRACH DNA AMP PROBE: CPT | Performed by: FAMILY MEDICINE

## 2018-04-07 PROCEDURE — 99214 OFFICE O/P EST MOD 30 MIN: CPT | Performed by: PHYSICIAN ASSISTANT

## 2018-04-07 PROCEDURE — 87591 N.GONORRHOEAE DNA AMP PROB: CPT | Performed by: FAMILY MEDICINE

## 2018-04-07 RX ORDER — CLINDAMYCIN HCL 150 MG
150 CAPSULE ORAL 3 TIMES DAILY
Qty: 21 CAPSULE | Refills: 0 | Status: SHIPPED | OUTPATIENT
Start: 2018-04-07 | End: 2019-09-13

## 2018-04-07 RX ORDER — FLUCONAZOLE 150 MG/1
150 TABLET ORAL ONCE
Qty: 1 TABLET | Refills: 0 | Status: SHIPPED | OUTPATIENT
Start: 2018-04-07 | End: 2018-04-07

## 2018-04-07 RX ORDER — LACTOBACILLUS COMBINATION NO.9 4B CELL
1 CAPSULE ORAL DAILY
Qty: 30 CAPSULE | Refills: 0 | Status: SHIPPED | OUTPATIENT
Start: 2018-04-07 | End: 2018-06-08

## 2018-04-07 NOTE — MR AVS SNAPSHOT
After Visit Summary   4/7/2018    Mimi Melton    MRN: [id number]           Patient Information     Date Of Birth          1975        Visit Information        Provider Department      4/7/2018 4:25 PM Provider, Luanne Bolden [facility]        Today's Diagnoses     Vaginal discharge    -  1    BV (bacterial vaginosis)        Candidiasis of vagina          Care Instructions    Your Gonorrhea and Chlamydia tests are pending- we will call you if any positive results. Negative results will be sent over [employer].    Your wet prep showed evidence for BV.    We will trial treatment with a different medication that can be used in recurrent BV.   Take clindamycin three times daily with food x 7 days. It is VERY important to take a probiotic while taking this medication. You may wish to continue to take a probiotic daily even after the medication.    If you have yeast infection symptoms after completing the antibiotic, take the Diflucan tab x 1.    Follow up with your primary care provider if no improvement or worsening.      Regarding your blood pressure, it was slightly low. Measure your blood pressure at home regularly. If below 90/60, do not take your BP med that day.  Push fluid intake. Follow up with your primary care provider for ongoing blood pressure management.          Follow-ups after your visit        Follow-up notes from your care team     Return if symptoms worsen or fail to improve.      Your next 10 appointments already scheduled     Apr 30, 2018  9:00 AM CDT   (Arrive by 8:45 AM)   RETURN PLASTICS with Niles Donnelly MD   [facility] ([facility])    [address] [place] [address]  4th [facility] [address]   [phone]            Jun 22, 2018  9:15 AM CDT   (Arrive by 9:00 AM)   NEW HAIRLOSS with Betty Weaver MD   [facility] D[facility]at[facility] ([facility])    [address] [place] [address]  "[facility] [address] [phone]              Who to contact     If you have questions or need follow up information about today's clinic visit or your schedule please contact [facility] directly at [phone].  Normal or non-critical lab and imaging results will be communicated to you by MyChart, letter or phone within 4 business days after the clinic has received the results. If you do not hear from us within 7 days, please contact the clinic through [employer] or phone. If you have a critical or abnormal lab result, we will notify you by phone as soon as possible.  Submit refill requests through [employer] or call your pharmacy and they will forward the refill request to us. Please allow 3 business days for your refill to be completed.          Additional Information About Your Visit        Let[employer] Information     [employer] gives you secure access to your electronic health record. If you see a primary care provider, you can also send messages to your care team and make appointments. If you have questions, please call your primary care clinic.  If you do not have a primary care provider, please call [phone] and they will assist you.        Care EveryWhere ID     This is your Care EveryWhere ID. This could be used by other organizations to access your [place] medical records  [id number]        Your Vitals Were     Pulse Temperature Height Last Period Pulse Oximetry BMI (Body Mass Index)    96 98.8  F (37.1  C) (Oral) 5' 5\" (1.651 m) 03/16/2018 (Exact Date) 97% 28.29 kg/m2       Blood Pressure from Last 3 Encounters:   04/07/18 96/64   03/13/18 110/71   01/08/18 113/78    Weight from Last 3 Encounters:   04/07/18 170 lb (77.1 kg)   03/13/18 170 lb (77.1 kg)   01/08/18 161 lb 12.8 oz (73.4 kg)              We Performed the Following     Chlamydia trachomatis PCR     Neisseria gonorrhoeae PCR     Wet prep          Today's Medication Changes          These changes are accurate " as of 4/7/18  4:59 PM.  If you have any questions, ask your nurse or doctor.               Start taking these medicines.        Dose/Directions    clindamycin 150 MG capsule   Commonly known as:  CLEOCIN   Used for:  BV (bacterial vaginosis)   Started by:  Luanne Espinoza        Dose:  150 mg   Take 1 capsule (150 mg) by mouth 3 times daily for 7 days   Quantity:  21 capsule   Refills:  0       fluconazole 150 MG tablet   Commonly known as:  DIFLUCAN   Used for:  Candidiasis of vagina   Started by:  ProviderRiccardoLovelock         Dose:  150 mg   Take 1 tablet (150 mg) by mouth once for 1 dose Take 1 tablet x 1.   Quantity:  1 tablet   Refills:  0            Where to get your medicines      These medications were sent to [place] [address] [place] [facility] [address]  [address] [facility] [address]     Phone:  [phone]     clindamycin 150 MG capsule    fluconazole 150 MG tablet                Primary Care Provider Office Phone # Fax #    Kalli Rodlan, APRN [facility] [phone]       [facility] [place] [address]        Equal Access to Services     HUGO RAMOS AH: Hadii julián ku hadasho Soomaali, waaxda luqadaha, qaybta kaalmada adeegyada, katty flaherty . So [facility] [phone].    ATENCIÓN: Si habla español, tiene a crowe disposición servicios gratuitos de asistencia lingüística. [facility] [phone].    We comply with applicable federal civil rights laws and [place] laws. We do not discriminate on the basis of race, color, national origin, age, disability, sex, sexual orientation, or gender identity.            Thank you!     Thank you for choosing [facility]  for your care. Our goal is always to provide you with excellent care. Hearing back from our patients is one way we can continue to improve our services. Please take a few minutes to complete the written survey that you may receive in the mail after  your visit with us. Thank you!             Your Updated Medication List - Protect others around you: Learn how to safely use, store and throw away your medicines at www.disposemymeds.org.          This list is accurate as of 4/7/18  4:59 PM.  Always use your most recent med list.                   Brand Name Dispense Instructions for use Diagnosis    ACETAMINOPHEN PO      Take 1,000 mg by mouth 2 times daily as needed for pain (menstrual cramps)        cetirizine 10 MG tablet    zyrTEC     Take 10 mg by mouth every morning        clindamycin 150 MG capsule    CLEOCIN    21 capsule    Take 1 capsule (150 mg) by mouth 3 times daily for 7 days    BV (bacterial vaginosis)       CULTURELLE DIGESTIVE HEALTH Caps     100 capsule    Take 1 capsule by mouth 2 times daily    Preop general physical exam       fluconazole 150 MG tablet    DIFLUCAN    1 tablet    Take 1 tablet (150 mg) by mouth once for 1 dose Take 1 tablet x 1.    Candidiasis of vagina       fluticasone 50 MCG/ACT spray    FLONASE          IBUPROFEN PO      Take 800 mg by mouth every 4 hours as needed (takes for menstrual cramps.)        levothyroxine 100 MCG tablet    SYNTHROID/LEVOTHROID    90 tablet    Take 1 tablet (100 mcg) by mouth daily    Hypothyroidism due to medication       lisinopril-hydrochlorothiazide 10-12.5 MG per tablet    PRINZIDE/ZESTORETIC    90 tablet    TAKE ONE TABLET BY MOUTH ONCE DAILY    Essential hypertension with goal blood pressure less than 140/90       mupirocin 2 % ointment    BACTROBAN          senna-docusate 8.6-50 MG per tablet    SENOKOT-S;PERICOLACE    30 tablet    Take 1-2 tablets by mouth 2 times daily To prevent constipation while taking narcotic pain medication.    KARMA (stress urinary incontinence, female)

## 2018-04-07 NOTE — PATIENT INSTRUCTIONS
Your Gonorrhea and Chlamydia tests are pending- we will call you if any positive results. Negative results will be sent over [employer].    Your wet prep showed evidence for BV.    We will trial treatment with a different medication that can be used in recurrent BV.   Take clindamycin three times daily with food x 7 days. It is VERY important to take a probiotic while taking this medication. You may wish to continue to take a probiotic daily even after the medication.    If you have yeast infection symptoms after completing the antibiotic, take the Diflucan tab x 1.    Follow up with your primary care provider if no improvement or worsening.      Regarding your blood pressure, it was slightly low. Measure your blood pressure at home regularly. If below 90/60, do not take your BP med that day.  Push fluid intake. Follow up with your primary care provider for ongoing blood pressure management.

## 2018-04-07 NOTE — NURSING NOTE
"Chief Complaint   Patient presents with     Urgent Care     Vaginal Problem     Was treated for BV a week ago, thought symptoms resolved but yesterday started to notice vaginal odor, more intense today.  Also about to start menses.     Initial BP 96/64  Pulse 96  Temp 98.8  F (37.1  C) (Oral)  Ht 5' 5\" (1.651 m)  Wt 170 lb (77.1 kg)  LMP 03/16/2018 (Exact Date)  SpO2 97%  BMI 28.29 kg/m2 Estimated body mass index is 28.29 kg/(m^2) as calculated from the following:    Height as of this encounter: 5' 5\" (1.651 m).    Weight as of this encounter: 170 lb (77.1 kg)..  BP completed using cuff size: benita Campbell RN  "

## 2018-04-07 NOTE — PROGRESS NOTES
SUBJECTIVE:   Mimi Melton is a 42 year old female presenting with a chief complaint of   Chief Complaint   Patient presents with     Urgent Care     Vaginal Problem     Was treated for BV a week ago, thought symptoms resolved but yesterday started to notice vaginal odor, more intense today.  Also about to start menses.       She was treated on 3/10 for BV. She did have resolution of symptoms, but then again developed discharge. She describes discharge as yellow-brown, not thick, and is malodorous. No vaginal itching. She expects her menses soon. No vaginal bleeding.  Her BP is lower than her usual, and says in the past they have had to adjust her BP medication when this happens. She says she has been eating more sodium and has not been drinking as much water.  No fevers, chills, lightheadedness, shortness of breath, chest pain, syncope, nausea, vomiting, abdominal pain. No hematuria or flank pain. No dysuria.    ROS  See HPI    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 mention of goiter or other cause, without mention of thyrotoxic crisis or storm 3/05    Had radioablation, On synthroid, seeing [facility]; takes synthroid     Unspecified essential hypertension 1980    meds since 2001, on atenolol     Current Outpatient Prescriptions   Medication Sig Dispense Refill     clindamycin (CLEOCIN) 150 MG capsule Take 1 capsule (150 mg) by mouth 3 times daily for 7 days 21 capsule 0     fluconazole (DIFLUCAN) 150 MG tablet Take 1 tablet (150 mg) by mouth once for 1 dose Take 1 tablet x 1. 1 tablet 0     Probiotic Product ([employer] ADULT PROBIOTIC) CAPS Take 1 tablet by mouth daily 30 capsule 0     levothyroxine  "(SYNTHROID/LEVOTHROID) 100 MCG tablet Take 1 tablet (100 mcg) by mouth daily 90 tablet 3     lisinopril-hydrochlorothiazide (PRINZIDE/ZESTORETIC) 10-12.5 MG per tablet TAKE ONE TABLET BY MOUTH ONCE DAILY 90 tablet 3     cetirizine (ZYRTEC) 10 MG tablet Take 10 mg by mouth every morning       mupirocin (BACTROBAN) 2 % ointment        senna-docusate (SENOKOT-S;PERICOLACE) 8.6-50 MG per tablet Take 1-2 tablets by mouth 2 times daily To prevent constipation while taking narcotic pain medication. (Patient not taking: Reported on 3/13/2018) 30 tablet 0     IBUPROFEN PO Take 800 mg by mouth every 4 hours as needed (takes for menstrual cramps.)        ACETAMINOPHEN PO Take 1,000 mg by mouth 2 times daily as needed for pain (menstrual cramps)       Lactobacillus-Inulin ([facility] DIGESTIVE HEALTH) CAPS Take 1 capsule by mouth 2 times daily (Patient not taking: Reported on 4/7/2018) 100 capsule 11     fluticasone (FLONASE) 50 MCG/ACT spray          Family History   Problem Relation Age of Onset     Hypertension Mother      Hypertension Father      Hypertension Maternal Grandmother      Hypertension Paternal Grandmother      Hypertension Maternal Grandfather      Hypertension Paternal Grandfather        Social History   Substance Use Topics     Smoking status: Never Smoker     Smokeless tobacco: Never Used     Alcohol use Yes      Comment: 1-2x's/month if that.       OBJECTIVE  BP 96/64  Pulse 96  Temp 98.8  F (37.1  C) (Oral)  Ht 5' 5\" (1.651 m)  Wt 170 lb (77.1 kg)  LMP 03/16/2018 (Exact Date)  SpO2 97%  BMI 28.29 kg/m2    General: alert, appears comfortable, NAD. Afebrile.  EENT: Oropharynx: MMM.   Respiratory: No distress. Lungs CTAB.  Cardiovascular:RRR. No murmurs, clicks gallops or rub. No peripheral edema. DP pulses 2+ bilaterally.   Gastrointestinal: Abdomen soft, nontender. BS normal. No masses, organomegaly.   : no CVA tenderness.  Neurologic: Follows commands. Gait normal.   Psychiatric: mentation appears " normal and affect normal/bright         Labs:  Results for orders placed or performed in visit on 04/07/18 (from the past 24 hour(s))   Wet prep   Result Value Ref Range    Specimen Description Vagina     Wet Prep Clue cells seen (A)     Wet Prep No yeast seen     Wet Prep No Trichomonas seen                ASSESSMENT/PLAN:    ICD-10-CM    1. Vaginal discharge N89.8 Wet prep     Neisseria gonorrhoeae PCR     Chlamydia trachomatis PCR   2. BV (bacterial vaginosis) N76.0 clindamycin (CLEOCIN) 150 MG capsule    B96.89 Probiotic Product ([facility] ADULT PROBIOTIC) CAPS   3. Candidiasis of vagina B37.3 fluconazole (DIFLUCAN) 150 MG tablet     Probiotic Product ([facility] ADULT PROBIOTIC) CAPS   4. Nonspecific low blood pressure reading R03.1            Medical Decision Making:    Differential Diagnosis: BV, vaginal candidiasis, STI    Serious Comorbid Conditions: HTN- slightly hypotensive but asymptomatic today, see below for discussion    Wet prep showed evidence for BV. This is recurrent. We will retreat with clindamycin today.  Also prescribed Diflucan for yeast infection if developing after the BV treatment.    Blood pressure slightly lower for her today. She denies any symptoms of hypotension today. Suspect she has not has had enough fluids today. Recommend pushing fluids and monitoring BP regularly at home. Discussed signs of hypotension, for which she needs to go to the ER immediately.    At the end of the encounter, I discussed all available results, as well as the diagnosis and any associated medications. I discussed red flags for immediate return to clinic/ER, as well as indications for follow up. Refer to patient instructions below, which were all addressed with patient. Patient understood and agreed to plan. Patient was appropriate for discharge.      Patient Instructions   Your Gonorrhea and Chlamydia tests are pending- we will call you if any positive results. Negative results will be sent over [employer].    Your wet  prep showed evidence for BV.    We will trial treatment with a different medication that can be used in recurrent BV.   Take clindamycin three times daily with food x 7 days. It is VERY important to take a probiotic while taking this medication. You may wish to continue to take a probiotic daily even after the medication.    If you have yeast infection symptoms after completing the antibiotic, take the Diflucan tab x 1.    Follow up with your primary care provider if no improvement or worsening.      Regarding your blood pressure, it was slightly low. Measure your blood pressure at home regularly. If below 90/60, do not take your BP med that day.  Push fluid intake. Follow up with your primary care provider for ongoing blood pressure management.            Maricel Apodaca PA-C

## 2018-04-09 DIAGNOSIS — E03.4 HYPOTHYROIDISM DUE TO ACQUIRED ATROPHY OF THYROID: Primary | ICD-10-CM

## 2018-04-09 LAB
C TRACH DNA SPEC QL NAA+PROBE: NEGATIVE
N GONORRHOEA DNA SPEC QL NAA+PROBE: NEGATIVE
SPECIMEN SOURCE: NORMAL
SPECIMEN SOURCE: NORMAL

## 2018-04-30 ENCOUNTER — OFFICE VISIT (OUTPATIENT)
Dept: OPHTHALMOLOGY | Facility: CLINIC | Age: 43
End: 2018-04-30
Payer: COMMERCIAL

## 2018-04-30 DIAGNOSIS — H02.539 EYELID RETRACTION OR LAG: ICD-10-CM

## 2018-04-30 DIAGNOSIS — E07.9 THYROID EYE DISEASE: Primary | ICD-10-CM

## 2018-04-30 DIAGNOSIS — H57.89 THYROID EYE DISEASE: Primary | ICD-10-CM

## 2018-04-30 ASSESSMENT — TONOMETRY
IOP_METHOD: ICARE
OS_IOP_MMHG: 21
OD_IOP_MMHG: 19

## 2018-04-30 ASSESSMENT — MARGIN REFLEX DISTANCE
OD_MRD1: 4
OS_MRD1: 4

## 2018-04-30 ASSESSMENT — VISUAL ACUITY
OS_CC: 20/20
OD_CC: 20/20
METHOD: SNELLEN - LINEAR
CORRECTION_TYPE: CONTACTS

## 2018-04-30 ASSESSMENT — LEVATOR FUNCTION
OS_LEVATOR: 15
OD_LEVATOR: 15

## 2018-04-30 ASSESSMENT — SLIT LAMP EXAM - LIDS
COMMENTS: NORMAL
COMMENTS: NORMAL

## 2018-04-30 ASSESSMENT — EXTERNAL EXAM - LEFT EYE: OS_EXAM: NORMAL

## 2018-04-30 ASSESSMENT — EXTERNAL EXAM - RIGHT EYE: OD_EXAM: NORMAL

## 2018-04-30 NOTE — NURSING NOTE
Chief Complaints and History of Present Illnesses   Patient presents with     Follow Up For     VENKATA     HPI    Affected eye(s):  Both   Symptoms:     No blurred vision      Frequency:  Constant       Do you have eye pain now?:  No      Comments:  Patient states vision has been stable since last eye exam, both eyes. Denies eye pain or irritation. Using ATs PRN OU    Ana Perez COT 9:11 AM April 30, 2018

## 2018-04-30 NOTE — MR AVS SNAPSHOT
After Visit Summary   4/30/2018    Mimi Melton    MRN: [id number]           Patient Information     Date Of Birth          1975        Visit Information        Provider Department      4/30/2018 9:00 AM Niles Donnelly MD [facility]        Today's Diagnoses     Thyroid eye disease - Both Eyes    -  1    Eyelid retraction or lag - Both Eyes           Follow-ups after your visit        Follow-up notes from your care team     Return if symptoms worsen or fail to improve.      Your next 10 appointments already scheduled     May 04, 2018  8:00 AM CDT   PHYSICAL with MARGA Lyon [facility] ([facility])    [address] [place] [address]   [phone]            Jun 22, 2018  9:15 AM CDT   (Arrive by 9:00 AM)   NEW HAIRLOSS with Betty Weaver MD   [facility] Dermatology ([facility])    [address] [place] [address] [phone]            Oct 23, 2018 10:00 AM CDT   (Arrive by 9:45 AM)   RETURN ENDOCRINE with Sandhya Harmon MD   [facility] Endocrinology ([facility])    [address] [place] [address] [phone]              Who to contact     Please call your clinic at [phone] to:    Ask questions about your health    Make or cancel appointments    Discuss your medicines    Learn about your test results    Speak to your doctor            Additional Information About Your Visit        [employer] Information     [employer] gives you secure access to your electronic health record. If you see a primary care provider, you can also send messages to your care team and make appointments. If you have questions, please call your primary care clinic.  If you do not have a primary care provider, please call [phone] and they will assist you.      [employer] is an electronic gateway that provides easy,  online access to your medical records. With [employer], you can request a clinic appointment, read your test results, renew a prescription or communicate with your care team.     To access your existing account, please contact your [facility] or call [phone] for assistance.        Care EveryWhere ID     This is your Care EveryWhere ID. This could be used by other organizations to access your [place] medical records  [id number]         Blood Pressure from Last 3 Encounters:   04/07/18 96/64   03/13/18 110/71   01/08/18 113/78    Weight from Last 3 Encounters:   04/07/18 77.1 kg (170 lb)   03/13/18 77.1 kg (170 lb)   01/08/18 73.4 kg (161 lb 12.8 oz)              We Performed the Following     External Photos Thyroid Series        Primary Care Provider Office Phone # Fax #    MARGA Lyon [facility] [phone] [address] [place] [address]        Equal Access to Services     HUGO RAMOS : Hadii aad ku hadasho Soomaali, waaxda luqadaha, qaybta kaalmada adeegyada, waxay idiin hayaan sonny kharawatson flaherty . So [facility] [phone].    ATENCIÓN: Si habla español, tiene a crowe disposición servicios gratuitos de asistencia lingüística. [facility] [phone].    We comply with applicable federal civil rights laws and [place] laws. We do not discriminate on the basis of race, color, national origin, age, disability, sex, sexual orientation, or gender identity.            Thank you!     Thank you for choosing [facility] OPHTHALMOLOGY  for your care. Our goal is always to provide you with excellent care. Hearing back from our patients is one way we can continue to improve our services. Please take a few minutes to complete the written survey that you may receive in the mail after your visit with us. Thank you!             Your Updated Medication List - Protect others around you: Learn how to safely use, store and throw away your medicines at  www.disposemymeds.org.          This list is accurate as of 4/30/18  9:56 AM.  Always use your most recent med list.                   Brand Name Dispense Instructions for use Diagnosis    ACETAMINOPHEN PO      Take 1,000 mg by mouth 2 times daily as needed for pain (menstrual cramps)        cetirizine 10 MG tablet    zyrTEC     Take 10 mg by mouth every morning        CULTUREE DIGESTIVE HEALTH Caps     100 capsule    Take 1 capsule by mouth 2 times daily    Preop general physical exam       CVS ADULT PROBIOTIC Caps     30 capsule    Take 1 tablet by mouth daily    Candidiasis of vagina, BV (bacterial vaginosis)       fluticasone 50 MCG/ACT spray    FLONASE          IBUPROFEN PO      Take 800 mg by mouth every 4 hours as needed (takes for menstrual cramps.)        levothyroxine 100 MCG tablet    SYNTHROID/LEVOTHROID    90 tablet    Take 1 tablet (100 mcg) by mouth daily    Hypothyroidism due to medication       lisinopril-hydrochlorothiazide 10-12.5 MG per tablet    PRINZIDE/ZESTORETIC    90 tablet    TAKE ONE TABLET BY MOUTH ONCE DAILY    Essential hypertension with goal blood pressure less than 140/90       mupirocin 2 % ointment    BACTROBAN          senna-docusate 8.6-50 MG per tablet    SENOKOT-S;PERICOLACE    30 tablet    Take 1-2 tablets by mouth 2 times daily To prevent constipation while taking narcotic pain medication.    KARMA (stress urinary incontinence, female)

## 2018-04-30 NOTE — PROGRESS NOTES
Chief Complaints and History of Present Illnesses   Patient presents with     Follow Up For     VENKATA     Here for follow up thyroid eye disease.  Doing well overall. She states that her thyroid medication is stable and everything is going well.  No eye concerns.       Mimi Melton is a 42 year old female with the following diagnoses:   1. Thyroid eye disease - Both Eyes    2. Eyelid retraction or lag - Both Eyes         Stable inactive thyroid eye disease.  Tears as needed for comfort. At this time she would not like any lid surgery, discussed possibility of lower lid retraction repair.  Follow here as needed.       Paris Garcia MD  Ophthalmic Plastic and Reconstructive Surgery Fellow    Attending Physician Attestation:  Complete documentation of historical and exam elements from today's encounter can be found in the full encounter summary report (not reduplicated in this progress note).  I personally obtained the chief complaint(s) and history of present illness.  I confirmed and edited as necessary the review of systems, past medical/surgical history, family history, social history, and examination findings as documented by others; and I examined the patient myself.  I personally reviewed the relevant tests, images, and reports as documented above.  I formulated and edited as necessary the assessment and plan and discussed the findings and management plan with the patient and family. I personally reviewed the ophthalmic test(s) associated with this encounter, agree with the interpretation(s) as documented by the resident/fellow, and have edited the corresponding report(s) as necessary.   -Niles Donnelly MD

## 2018-05-04 ENCOUNTER — OFFICE VISIT (OUTPATIENT)
Dept: FAMILY MEDICINE | Facility: CLINIC | Age: 43
End: 2018-05-04
Payer: COMMERCIAL

## 2018-05-04 VITALS
HEART RATE: 90 BPM | TEMPERATURE: 100.1 F | WEIGHT: 169.5 LBS | SYSTOLIC BLOOD PRESSURE: 108 MMHG | BODY MASS INDEX: 28.21 KG/M2 | DIASTOLIC BLOOD PRESSURE: 76 MMHG | RESPIRATION RATE: 16 BRPM | OXYGEN SATURATION: 98 %

## 2018-05-04 DIAGNOSIS — Z30.431 SURVEILLANCE OF PREVIOUSLY PRESCRIBED INTRAUTERINE CONTRACEPTIVE DEVICE: ICD-10-CM

## 2018-05-04 DIAGNOSIS — I10 ESSENTIAL HYPERTENSION WITH GOAL BLOOD PRESSURE LESS THAN 140/90: ICD-10-CM

## 2018-05-04 DIAGNOSIS — E03.4 HYPOTHYROIDISM DUE TO ACQUIRED ATROPHY OF THYROID: ICD-10-CM

## 2018-05-04 DIAGNOSIS — Z01.419 ENCOUNTER FOR GYNECOLOGICAL EXAMINATION WITHOUT ABNORMAL FINDING: Primary | ICD-10-CM

## 2018-05-04 LAB
CHOLEST SERPL-MCNC: 191 MG/DL
CREAT UR-MCNC: 218 MG/DL
GLUCOSE SERPL-MCNC: 97 MG/DL (ref 70–99)
HDLC SERPL-MCNC: 56 MG/DL
LDLC SERPL CALC-MCNC: 124 MG/DL
MICROALBUMIN UR-MCNC: 1220 MG/L
MICROALBUMIN/CREAT UR: 559.63 MG/G CR (ref 0–25)
NONHDLC SERPL-MCNC: 135 MG/DL
TRIGL SERPL-MCNC: 55 MG/DL

## 2018-05-04 PROCEDURE — 99214 OFFICE O/P EST MOD 30 MIN: CPT | Performed by: NURSE PRACTITIONER

## 2018-05-04 PROCEDURE — 36415 COLL VENOUS BLD VENIPUNCTURE: CPT | Performed by: NURSE PRACTITIONER

## 2018-05-04 PROCEDURE — 82043 UR ALBUMIN QUANTITATIVE: CPT | Performed by: NURSE PRACTITIONER

## 2018-05-04 PROCEDURE — 80061 LIPID PANEL: CPT | Performed by: NURSE PRACTITIONER

## 2018-05-04 PROCEDURE — 82947 ASSAY GLUCOSE BLOOD QUANT: CPT | Performed by: NURSE PRACTITIONER

## 2018-05-04 RX ORDER — LISINOPRIL/HYDROCHLOROTHIAZIDE 10-12.5 MG
1 TABLET ORAL DAILY
Qty: 90 TABLET | Refills: 3 | Status: SHIPPED | OUTPATIENT
Start: 2018-05-04 | End: 2019-05-28

## 2018-05-04 NOTE — PROGRESS NOTES
SUBJECTIVE:   CC: Mimi Melton is an 42 year old woman who presents for preventive health visit.     Physical   Annual:     Getting at least 3 servings of Calcium per day::  Yes    Bi-annual eye exam::  Yes    Dental care twice a year::  Yes    Sleep apnea or symptoms of sleep apnea::  None    Diet::  Other    Frequency of exercise::  1 day/week    Duration of exercise::  Less than 15 minutes    Taking medications regularly::  Yes    Medication side effects::  None    Additional concerns today::  No              Today's PHQ-2 Score:   PHQ-2 ( 1999 [employer]) 5/4/2018   Q1: Little interest or pleasure in doing things 0   Q2: Feeling down, depressed or hopeless 0   PHQ-2 Score 0   Q1: Little interest or pleasure in doing things Not at all   Q2: Feeling down, depressed or hopeless Not at all   PHQ-2 Score 0       Abuse: Current or Past(Physical, Sexual or Emotional)- No  Do you feel safe in your environment - Yes    Social History   Substance Use Topics     Smoking status: Never Smoker     Smokeless tobacco: Never Used     Alcohol use Yes      Comment: 1-2x's/month if that.     Alcohol Use 5/4/2018   If you drink alcohol do you typically have greater than 3 drinks per day OR greater than 7 drinks per week? No   No flowsheet data found.    Reviewed orders with patient.  Reviewed health maintenance and updated orders accordingly - Yes  Labs reviewed in EPIC  BP Readings from Last 3 Encounters:   05/04/18 108/76   04/07/18 96/64   03/13/18 110/71    Wt Readings from Last 3 Encounters:   05/04/18 169 lb 8 oz (76.9 kg)   04/07/18 170 lb (77.1 kg)   03/13/18 170 lb (77.1 kg)                  Patient Active Problem List   Diagnosis     Surveillance of previously prescribed intrauterine contraceptive device     Vaginitis and vulvovaginitis     Exophthalmos     Abnormal Pap smear of cervix     CARDIOVASCULAR SCREENING; LDL GOAL LESS THAN 160     Essential hypertension with goal blood pressure less than 140/90     Anemia      Hypothyroidism due to acquired atrophy of thyroid     Cervical high risk HPV (human papillomavirus) test positive     Thyroid disease     Female stress incontinence     Past Surgical History:   Procedure Laterality Date     C  DELIVERY ONLY  91    , Low Cervical     C  DELIVERY ONLY  97    , Low Cervical     C  DELIVERY ONLY  99    , Low Cervical     C INDUCED ABORTN BY D&C      Aspiration & Curettage, TAB x2     CYSTOSCOPY, SLING TRANSVAGINAL N/A 10/10/2017    Procedure: CYSTOSCOPY, SLING TRANSVAGINAL;  Midurethral Sling and Cystoscopy (Support the Urethra with Mesh Sling and Look in the Bladder);  Surgeon: Karin Amin MD;  Location: [facility] OR     EYE SURGERY  2008    Orbital Decompression Dr smart      REPAIR INCISIONAL HERNIA,REDUCIBLE      Umbilical hernia Repair       Social History   Substance Use Topics     Smoking status: Never Smoker     Smokeless tobacco: Never Used     Alcohol use Yes      Comment: 1-2x's/month if that.     Family History   Problem Relation Age of Onset     Hypertension Mother      Hypertension Father      Hypertension Maternal Grandmother      Hypertension Paternal Grandmother      Hypertension Maternal Grandfather      Hypertension Paternal Grandfather          Current Outpatient Prescriptions   Medication Sig Dispense Refill     ACETAMINOPHEN PO Take 1,000 mg by mouth 2 times daily as needed for pain (menstrual cramps)       cetirizine (ZYRTEC) 10 MG tablet Take 10 mg by mouth every morning       fluticasone (FLONASE) 50 MCG/ACT spray        IBUPROFEN PO Take 800 mg by mouth every 4 hours as needed (takes for menstrual cramps.)        Lactobacillus-Inulin ([facility] DIGESTIVE [facility]) CAPS Take 1 capsule by mouth 2 times daily 100 capsule 11     levothyroxine (SYNTHROID/LEVOTHROID) 100 MCG tablet Take 1 tablet (100 mcg) by mouth daily 90 tablet 3     lisinopril-hydrochlorothiazide (PRINZIDE/ZESTORETIC)  "10-12.5 MG per tablet TAKE ONE TABLET BY MOUTH ONCE DAILY 90 tablet 3     mupirocin (BACTROBAN) 2 % ointment        Probiotic Product ([employer] ADULT PROBIOTIC) CAPS Take 1 tablet by mouth daily 30 capsule 0     senna-docusate (SENOKOT-S;PERICOLACE) 8.6-50 MG per tablet Take 1-2 tablets by mouth 2 times daily To prevent constipation while taking narcotic pain medication. 30 tablet 0     Allergies   Allergen Reactions     Mold      Cannot have any meds that contain mold.     No Known Drug Allergies      Recent Labs   Lab Test  03/09/18   0919  01/08/18   1548  09/26/17   0913  08/21/17   1223   08/09/16   0848   07/28/15   0927   12/28/12   1458   LDL   --    --    --    --    --   99   --   83   --   98   HDL   --    --    --    --    --   53   --   59   --   57   TRIG   --    --    --    --    --   90   --   44   --   82   ALT   --   14   --    --    --   28   --   14   --   20   CR   --   0.86  0.96   --    < >  0.93   --   1.00   < >  0.76   GFRESTIMATED   --   73  64   --    < >  67   --   62   < >  86   GFRESTBLACK   --   88  78   --    < >  81   --   75   < >  >90   POTASSIUM   --   3.5  4.0   --    < >  3.6   --   4.1   < >  3.7   TSH  0.30*   --    --   5.13*   < >  0.44   < >  3.98   < >  0.88    < > = values in this interval not displayed.        Patient under age 50, mutual decision reflected in health maintenance.      Pertinent mammograms are reviewed under the imaging tab.  History of abnormal Pap smear:   Last 3 Pap and HPV Results:   PAP / HPV Latest Ref Rng & Units 12/8/2016 12/2/2015 7/14/2014   PAP - NIL NIL NIL   HPV 16 DNA NEG Negative Negative -   HPV 18 DNA NEG Negative Negative -   OTHER HR HPV NEG Negative Positive(A) -       Reviewed and updated as needed this visit by clinical staff  Tobacco  Allergies  Meds  Med Hx  Surg Hx  Fam Hx  Soc Hx        Reviewed and updated as needed this visit by Provider          Family history of Type 2 DM.  She does like candy, sugar.  \"I don't want to be a " "diabetic.\"      Review of Systems  CONSTITUTIONAL: NEGATIVE for fever, chills, change in weight  INTEGUMENTARU/SKIN: NEGATIVE for worrisome rashes, moles or lesions  EYES: NEGATIVE for vision changes or irritation  ENT: NEGATIVE for ear, mouth and throat problems  RESP: NEGATIVE for significant cough or SOB  BREAST: NEGATIVE for masses, tenderness or discharge  CV: NEGATIVE for chest pain, palpitations or peripheral edema  GI: NEGATIVE for nausea, abdominal pain, heartburn, or change in bowel habits  : NEGATIVE for unusual urinary or vaginal symptoms. Periods are regular. IUD in place.  MUSCULOSKELETAL: NEGATIVE for significant arthralgias or myalgia  NEURO: NEGATIVE for weakness, dizziness or paresthesias  ENDOCRINE: NEGATIVE for temperature intolerance, skin/hair changes  PSYCHIATRIC: NEGATIVE for changes in mood or affect     OBJECTIVE:   /76  Pulse 90  Temp 100.1  F (37.8  C) (Oral)  Resp 16  Wt 169 lb 8 oz (76.9 kg)  LMP 05/03/2018 (Exact Date)  SpO2 98%  Breastfeeding? No  BMI 28.21 kg/m2  Physical Exam  GENERAL: healthy, alert and no distress  EYES: Eyes grossly normal to inspection, PERRL and conjunctivae and sclerae normal  HENT: ear canals and TM's normal, nose and mouth without ulcers or lesions  NECK: no adenopathy, no asymmetry, masses, or scars and thyroid normal to palpation  RESP: lungs clear to auscultation - no rales, rhonchi or wheezes  BREAST: declined by patient  CV: regular rate and rhythm, normal S1 S2, no S3 or S4, no murmur, click or rub, no peripheral edema and peripheral pulses strong  ABDOMEN: soft, nontender, no hepatosplenomegaly, no masses and bowel sounds normal   (female): declined by patient   MS: no gross musculoskeletal defects noted, no edema  SKIN: no suspicious lesions or rashes  NEURO: Normal strength and tone, mentation intact and speech normal  PSYCH: mentation appears normal, affect normal/bright    ASSESSMENT/PLAN:   (Z01.419) Encounter for gynecological " "examination without abnormal finding  (primary encounter diagnosis)  Comment:   Plan: Glucose, Lipid panel reflex to direct LDL         Fasting        I did talk with the patient today about cutting down her sugar intake, increasing aerobic activity and maintaining a healthy weight in hopes of preventing type 2 diabetes.  I did discuss type 2 diabetes signs and symptoms, development, monitoring blood sugars etc.  Her blood sugar today is pending.  She has had blood sugars above 100 in the past.  She states that if her blood sugar is above the normal limit today, at that it would be a motivator for her to reduce her sugar and move more.  She is appreciative of the information    (I10) Essential hypertension with goal blood pressure less than 140/90  Comment: Controlled  Plan: Albumin Random Urine Quantitative with Creat         Ratio, lisinopril-hydrochlorothiazide         (PRINZIDE/ZESTORETIC) 10-12.5 MG per tablet        Continue meds as prescribed.  Labs pending.  Last basic metabolic panel January 2 was normal except of glucose which is noted above.    (Z30.431) Surveillance of previously prescribed intrauterine contraceptive device  Comment: Routine  Plan: IUD in place     (E03.4) Hypothyroidism due to acquired atrophy of thyroid  Comment:   Plan:continue care with endocrinology        COUNSELING:  Reviewed preventive health counseling, as reflected in patient instructions         reports that she has never smoked. She has never used smokeless tobacco.    Estimated body mass index is 28.21 kg/(m^2) as calculated from the following:    Height as of 4/7/18: 5' 5\" (1.651 m).    Weight as of this encounter: 169 lb 8 oz (76.9 kg).   Weight management plan: Discussed healthy diet and exercise guidelines and patient will follow up in 12 months in clinic to re-evaluate.    Counseling Resources:  ATP IV Guidelines  Pooled Cohorts Equation Calculator  Breast Cancer Risk Calculator  FRAX Risk Assessment  ICSI Preventive " Guidelines  Dietary Guidelines for Americans, 2010  USDA's MyPlate  ASA Prophylaxis  Lung CA Screening    MARGA Casey CNP  [facility]

## 2018-05-04 NOTE — MR AVS SNAPSHOT
After Visit Summary   5/4/2018    Mimi Melton    MRN: [id number]           Patient Information     Date Of Birth          1975        Visit Information        Provider Department      5/4/2018 8:00 AM Kalli Roldan APRN [facility]        Today's Diagnoses     Encounter for gynecological examination without abnormal finding    -  1    Essential hypertension with goal blood pressure less than 140/90        Surveillance of previously prescribed intrauterine contraceptive device          Care Instructions      Preventive Health Recommendations  Female Ages 40 to 49    Yearly exam:     See your health care provider every year in order to  1. Review health changes.   2. Discuss preventive care.    3. Review your medicines if your doctor prescribed any.      Get a Pap test every three years (unless you have an abnormal result and your provider advises testing more often).      If you get Pap tests with HPV test, you only need to test every 5 years, unless you have an abnormal result. You do not need a Pap test if your uterus was removed (hysterectomy) and you have not had cancer.      You should be tested each year for STDs (sexually transmitted diseases), if you're at risk.       Ask your doctor if you should have a mammogram.      Have a colonoscopy (test for colon cancer) if someone in your family has had colon cancer or polyps before age 50.       Have a cholesterol test every 5 years.       Have a diabetes test (fasting glucose) after age 45. If you are at risk for diabetes, you should have this test every 3 years.    Shots: Get a flu shot each year. Get a tetanus shot every 10 years.     Nutrition:     Eat at least 5 servings of fruits and vegetables each day.    Eat whole-grain bread, whole-wheat pasta and brown rice instead of white grains and rice.    Talk to your provider about Calcium and Vitamin D.     Lifestyle    Exercise at least 150 minutes a week (an  average of 30 minutes a day, 5 days a week). This will help you control your weight and prevent disease.    Limit alcohol to one drink per day.    No smoking.     Wear sunscreen to prevent skin cancer.    See your dentist every six months for an exam and cleaning.          Follow-ups after your visit        Your next 10 appointments already scheduled     Jun 22, 2018  9:15 AM CDT   (Arrive by 9:00 AM)   [place] with Betty Weaver MD   [facility] ([facility])    [address] [place] [address] [id number] [phone]            Oct 23, 2018 10:00 AM CDT   (Arrive by 9:45 AM)   RETURN ENDOCRINE with Sandhya Harmon MD   [facility] ([facility])    [address] [place] [address] [id number] [phone]              Who to contact     If you have questions or need follow up information about today's clinic visit or your schedule please contact [facility] directly at [phone].  Normal or non-critical lab and imaging results will be communicated to you by My eShoehart, letter or phone within 4 business days after the clinic has received the results. If you do not hear from us within 7 days, please contact the clinic through [facility] or phone. If you have a critical or abnormal lab result, we will notify you by phone as soon as possible.  Submit refill requests through Un[employer] or call your pharmacy and they will forward the refill request to us. Please allow 3 business days for your refill to be completed.          Additional Information About Your Visit        My eShoeharThumb Friendly Information     UnboundID gives you secure access to your electronic health record. If you see a primary care provider, you can also send messages to your care team and make appointments. If you have questions, please call your primary care clinic.  If you do not have a primary care provider, please call [phone]  and they will assist you.        Care EveryWhere ID     This is your Care EveryWhere ID. This could be used by other organizations to access your [facility] medical records  [id number]        Your Vitals Were     Pulse Temperature Respirations Last Period Pulse Oximetry Breastfeeding?    90 100.1  F (37.8  C) (Oral) 16 05/03/2018 (Exact Date) 98% No    BMI (Body Mass Index)                   28.21 kg/m2            Blood Pressure from Last 3 Encounters:   05/04/18 108/76   04/07/18 96/64   03/13/18 110/71    Weight from Last 3 Encounters:   05/04/18 169 lb 8 oz (76.9 kg)   04/07/18 170 lb (77.1 kg)   03/13/18 170 lb (77.1 kg)              We Performed the Following     Albumin Random Urine Quantitative with Creat Ratio     Glucose     Lipid panel reflex to direct LDL Fasting          Today's Medication Changes          These changes are accurate as of 5/4/18  8:37 AM.  If you have any questions, ask your nurse or doctor.               These medicines have changed or have updated prescriptions.        Dose/Directions    lisinopril-hydrochlorothiazide 10-12.5 MG per tablet   Commonly known as:  PRINZIDE/ZESTORETIC   This may have changed:  See the new instructions.   Used for:  Essential hypertension with goal blood pressure less than 140/90   Changed by:  Kalli Roldan APRN CNP        Dose:  1 tablet   Take 1 tablet by mouth daily   Quantity:  90 tablet   Refills:  3            Where to get your medicines      These medications were sent to [place] [address] [place] [address]  [address] [facility] [address]     Phone:  [phone]     lisinopril-hydrochlorothiazide 10-12.5 MG per tablet                Primary Care Provider Office Phone # Fax #    MARGA Lyon [phone]       [facility] [place] [address]        Equal Access to Services     HUGO RAMOS AH: Nicholas Ag, angelesda luqihsan, qaybta ariel mortensen,  katty rosariobrenda fields'aan ah. So [facility] [phone].    ATENCIÓN: Si adamla randi, tiene a crowe disposición servicios gratuitos de asistencia lingüística. Uzma alvarado [phone].    We comply with applicable federal civil rights laws and [place] laws. We do not discriminate on the basis of race, color, national origin, age, disability, sex, sexual orientation, or gender identity.            Thank you!     Thank you for choosing [facility]  for your care. Our goal is always to provide you with excellent care. Hearing back from our patients is one way we can continue to improve our services. Please take a few minutes to complete the written survey that you may receive in the mail after your visit with us. Thank you!             Your Updated Medication List - Protect others around you: Learn how to safely use, store and throw away your medicines at www.disposemymeds.org.          This list is accurate as of 5/4/18  8:37 AM.  Always use your most recent med list.                   Brand Name Dispense Instructions for use Diagnosis    ACETAMINOPHEN PO      Take 1,000 mg by mouth 2 times daily as needed for pain (menstrual cramps)        cetirizine 10 MG tablet    zyrTEC     Take 10 mg by mouth every morning        CULTURELLE DIGESTIVE HEALTH Caps     100 capsule    Take 1 capsule by mouth 2 times daily    Preop general physical exam       CVS ADULT PROBIOTIC Caps     30 capsule    Take 1 tablet by mouth daily    Candidiasis of vagina, BV (bacterial vaginosis)       fluticasone 50 MCG/ACT spray    FLONASE          IBUPROFEN PO      Take 800 mg by mouth every 4 hours as needed (takes for menstrual cramps.)        levothyroxine 100 MCG tablet    SYNTHROID/LEVOTHROID    90 tablet    Take 1 tablet (100 mcg) by mouth daily    Hypothyroidism due to medication       lisinopril-hydrochlorothiazide 10-12.5 MG per tablet    PRINZIDE/ZESTORETIC    90 tablet    Take 1 tablet by mouth daily    Essential  hypertension with goal blood pressure less than 140/90       mupirocin 2 % ointment    BACTROBAN          senna-docusate 8.6-50 MG per tablet    SENOKOT-S;PERICOLACE    30 tablet    Take 1-2 tablets by mouth 2 times daily To prevent constipation while taking narcotic pain medication.    KARMA (stress urinary incontinence, female)

## 2018-05-09 NOTE — PROGRESS NOTES
Mi Le,    Here are the results of your recent lab studies.    Your blood sugar and cholesterol levels are good.    Back in January, your comprehensive metabolic panel including your kidney function studies came back normal.  With your recent appointment, I did a urine test for kidney function.  That test result did come back abnormal which was surprising to me.  I discussed this result with Dr. Teddy Francois.  Since this was a random high report, we recommend that you eat a normal diet, regular amounts of protein in your diet, and return to the clinic  you to repeat this urine microalbumin report as well as to do a routine UA.  We will then see how you are kidney function studies look.  I would like to do this within the next 1-3 months.  In the meantime, I do recommend that you limit your use of NSAIDs such as ibuprofen, Aleve, aspirin, etc as these are metabolized or excreted by your kidneys.    Let me know if you have any questions.  Otherwise do not worry about this too much and we will do a follow-up.    Kalli

## 2018-06-01 ASSESSMENT — ENCOUNTER SYMPTOMS
DIFFICULTY URINATING: 1
HEMATURIA: 0
SKIN CHANGES: 0
POOR WOUND HEALING: 0
NAIL CHANGES: 0
FLANK PAIN: 0
DYSURIA: 0

## 2018-06-04 ENCOUNTER — TELEPHONE (OUTPATIENT)
Dept: NEPHROLOGY | Facility: CLINIC | Age: 43
End: 2018-06-04

## 2018-06-04 DIAGNOSIS — R80.9 MICROALBUMINURIA: ICD-10-CM

## 2018-06-04 DIAGNOSIS — I10 ESSENTIAL HYPERTENSION WITH GOAL BLOOD PRESSURE LESS THAN 140/90: Primary | ICD-10-CM

## 2018-06-08 ENCOUNTER — OFFICE VISIT (OUTPATIENT)
Dept: NEPHROLOGY | Facility: CLINIC | Age: 43
End: 2018-06-08
Payer: COMMERCIAL

## 2018-06-08 VITALS
BODY MASS INDEX: 29.42 KG/M2 | DIASTOLIC BLOOD PRESSURE: 78 MMHG | HEART RATE: 80 BPM | HEIGHT: 65 IN | RESPIRATION RATE: 16 BRPM | WEIGHT: 176.6 LBS | SYSTOLIC BLOOD PRESSURE: 119 MMHG

## 2018-06-08 DIAGNOSIS — R80.9 MICROALBUMINURIA: ICD-10-CM

## 2018-06-08 DIAGNOSIS — I10 ESSENTIAL HYPERTENSION WITH GOAL BLOOD PRESSURE LESS THAN 140/90: Primary | ICD-10-CM

## 2018-06-08 DIAGNOSIS — I10 ESSENTIAL HYPERTENSION WITH GOAL BLOOD PRESSURE LESS THAN 140/90: ICD-10-CM

## 2018-06-08 LAB
ALBUMIN SERPL-MCNC: 3.8 G/DL (ref 3.4–5)
ALBUMIN UR-MCNC: NEGATIVE MG/DL
ANION GAP SERPL CALCULATED.3IONS-SCNC: 6 MMOL/L (ref 3–14)
APPEARANCE UR: CLEAR
BACTERIA #/AREA URNS HPF: ABNORMAL /HPF
BILIRUB UR QL STRIP: NEGATIVE
BUN SERPL-MCNC: 8 MG/DL (ref 7–30)
CALCIUM SERPL-MCNC: 8.8 MG/DL (ref 8.5–10.1)
CHLORIDE SERPL-SCNC: 104 MMOL/L (ref 94–109)
CO2 SERPL-SCNC: 30 MMOL/L (ref 20–32)
COLOR UR AUTO: YELLOW
CREAT SERPL-MCNC: 1 MG/DL (ref 0.52–1.04)
CREAT UR-MCNC: 171 MG/DL
ERYTHROCYTE [DISTWIDTH] IN BLOOD BY AUTOMATED COUNT: 13.1 % (ref 10–15)
FERRITIN SERPL-MCNC: 8 NG/ML (ref 12–150)
GFR SERPL CREATININE-BSD FRML MDRD: 61 ML/MIN/1.7M2
GLUCOSE SERPL-MCNC: 83 MG/DL (ref 70–99)
GLUCOSE UR STRIP-MCNC: NEGATIVE MG/DL
HCT VFR BLD AUTO: 37.2 % (ref 35–47)
HGB BLD-MCNC: 12.1 G/DL (ref 11.7–15.7)
HGB UR QL STRIP: NEGATIVE
IRON SATN MFR SERPL: 16 % (ref 15–46)
IRON SERPL-MCNC: 62 UG/DL (ref 35–180)
KETONES UR STRIP-MCNC: NEGATIVE MG/DL
LEUKOCYTE ESTERASE UR QL STRIP: NEGATIVE
MCH RBC QN AUTO: 29.3 PG (ref 26.5–33)
MCHC RBC AUTO-ENTMCNC: 32.5 G/DL (ref 31.5–36.5)
MCV RBC AUTO: 90 FL (ref 78–100)
MICROALBUMIN UR-MCNC: 12 MG/L
MICROALBUMIN/CREAT UR: 7.08 MG/G CR (ref 0–25)
MUCOUS THREADS #/AREA URNS LPF: PRESENT /LPF
NITRATE UR QL: NEGATIVE
PH UR STRIP: 6 PH (ref 5–7)
PHOSPHATE SERPL-MCNC: 2.5 MG/DL (ref 2.5–4.5)
PLATELET # BLD AUTO: 354 10E9/L (ref 150–450)
POTASSIUM SERPL-SCNC: 3.4 MMOL/L (ref 3.4–5.3)
PROT UR-MCNC: 0.09 G/L
PROT/CREAT 24H UR: 0.05 G/G CR (ref 0–0.2)
PTH-INTACT SERPL-MCNC: 41 PG/ML (ref 18–80)
RBC # BLD AUTO: 4.13 10E12/L (ref 3.8–5.2)
RBC #/AREA URNS AUTO: 2 /HPF (ref 0–2)
SODIUM SERPL-SCNC: 140 MMOL/L (ref 133–144)
SOURCE: ABNORMAL
SP GR UR STRIP: 1.01 (ref 1–1.03)
SQUAMOUS #/AREA URNS AUTO: 2 /HPF (ref 0–1)
TIBC SERPL-MCNC: 401 UG/DL (ref 240–430)
UROBILINOGEN UR STRIP-MCNC: 0 MG/DL (ref 0–2)
WBC # BLD AUTO: 4 10E9/L (ref 4–11)
WBC #/AREA URNS AUTO: 2 /HPF (ref 0–5)

## 2018-06-08 PROCEDURE — 81001 URINALYSIS AUTO W/SCOPE: CPT | Performed by: INTERNAL MEDICINE

## 2018-06-08 PROCEDURE — 80069 RENAL FUNCTION PANEL: CPT | Performed by: INTERNAL MEDICINE

## 2018-06-08 PROCEDURE — 85027 COMPLETE CBC AUTOMATED: CPT | Performed by: INTERNAL MEDICINE

## 2018-06-08 PROCEDURE — 36415 COLL VENOUS BLD VENIPUNCTURE: CPT | Performed by: INTERNAL MEDICINE

## 2018-06-08 PROCEDURE — 83540 ASSAY OF IRON: CPT | Performed by: INTERNAL MEDICINE

## 2018-06-08 PROCEDURE — 83550 IRON BINDING TEST: CPT | Performed by: INTERNAL MEDICINE

## 2018-06-08 PROCEDURE — 84156 ASSAY OF PROTEIN URINE: CPT | Performed by: INTERNAL MEDICINE

## 2018-06-08 PROCEDURE — 82306 VITAMIN D 25 HYDROXY: CPT | Performed by: INTERNAL MEDICINE

## 2018-06-08 PROCEDURE — 83970 ASSAY OF PARATHORMONE: CPT | Performed by: INTERNAL MEDICINE

## 2018-06-08 PROCEDURE — 82043 UR ALBUMIN QUANTITATIVE: CPT | Performed by: INTERNAL MEDICINE

## 2018-06-08 PROCEDURE — 82728 ASSAY OF FERRITIN: CPT | Performed by: INTERNAL MEDICINE

## 2018-06-08 PROCEDURE — G0463 HOSPITAL OUTPT CLINIC VISIT: HCPCS | Mod: ZF

## 2018-06-08 ASSESSMENT — PAIN SCALES - GENERAL: PAINLEVEL: NO PAIN (0)

## 2018-06-08 NOTE — MR AVS SNAPSHOT
After Visit Summary   6/8/2018    Mimi Melton    MRN: [id number]           Patient Information     Date Of Birth          1975        Visit Information        Provider Department      6/8/2018 8:30 AM Raffi Meyer MD [facility] Nephrology        Today's Diagnoses     Essential hypertension with goal blood pressure less than 140/90    -  1       Follow-ups after your visit        Your next 10 appointments already scheduled     Sep 07, 2018  9:45 AM CDT   (Arrive by 9:30 AM)   [place] with Peña Sheets MD   [facility] D[facility]at[facility] ([facility])    [address] [place] [id number] [phone]            Oct 23, 2018 10:00 AM CDT   (Arrive by 9:45 AM)   RETURN ENDOCRINE with Sandhya Harmon MD   [facility] Endocrin[facility] ([facility])    [address] [place] [id number] [phone]              Who to contact     If you have questions or need follow up information about today's clinic visit or your schedule please contact [facility] NEPHROLOGY directly at [phone].  Normal or non-critical lab and imaging results will be communicated to you by Huaathart, letter or phone within 4 business days after the clinic has received the results. If you do not hear from us within 7 days, please contact the clinic through [email or web address] or phone. If you have a critical or abnormal lab result, we will notify you by phone as soon as possible.  Submit refill requests through Ruzuku or call your pharmacy and they will forward the refill request to us. Please allow 3 business days for your refill to be completed.          Additional Information About Your Visit        Huaathart Information     Ruzuku gives you secure access to your electronic health record. If you see a primary care provider, you can also send messages to your care team and make appointments. If you have questions, please call your  "primary care clinic.  If you do not have a primary care provider, please call [phone] and they will assist you.        Care EveryWhere ID     This is your Care EveryWhere ID. This could be used by other organizations to access your [facility] medical records  [id number]        Your Vitals Were     Pulse Respirations Height BMI (Body Mass Index)          80 16 1.651 m (5' 5\") 29.39 kg/m2         Blood Pressure from Last 3 Encounters:   06/08/18 119/78   05/04/18 108/76   04/07/18 96/64    Weight from Last 3 Encounters:   06/08/18 80.1 kg (176 lb 9.6 oz)   05/04/18 76.9 kg (169 lb 8 oz)   04/07/18 77.1 kg (170 lb)              Today, you had the following     No orders found for display         Today's Medication Changes          These changes are accurate as of 6/8/18 11:59 PM.  If you have any questions, ask your nurse or doctor.               Stop taking these medicines if you haven't already. Please contact your care team if you have questions.     [employer] [facility] [employer] C[facility]   Stopped by:  Raffi Meyer MD                    Primary Care Provider Office Phone # Fax #    MARGA Lyon [facility] [phone] [address] [place] [address]        Equal Access to Services     HUGO RAMOS : Hadii julián ku hadasho Soomaali, waaxda luqadaha, qaybta kaalmada adeleora, katty morris. So [facility] [phone].    ATENCIÓN: Si habla español, tiene a crowe disposición servicios gratuitos de asistencia lingüística. Lleunice al [phone].    We comply with applicable federal civil rights laws and [place] laws. We do not discriminate on the basis of race, color, national origin, age, disability, sex, sexual orientation, or gender identity.            Thank you!     Thank you for choosing [facility] NEPHROLOGY  for your care. Our goal is always to provide you with excellent care. Hearing back from our patients is one way we can continue to improve our " services. Please take a few minutes to complete the written survey that you may receive in the mail after your visit with us. Thank you!             Your Updated Medication List - Protect others around you: Learn how to safely use, store and throw away your medicines at www.disposemymeds.org.          This list is accurate as of 6/8/18 11:59 PM.  Always use your most recent med list.                   Brand Name Dispense Instructions for use Diagnosis    ACETAMINOPHEN PO      Take 1,000 mg by mouth 2 times daily as needed for pain (menstrual cramps)        cetirizine 10 MG tablet    zyrTEC     Take 10 mg by mouth every morning        [facility] DIGESTIVE HEALTH Caps     100 capsule    Take 1 capsule by mouth 2 times daily    Preop general physical exam       fluticasone 50 MCG/ACT spray    FLONASE          IBUPROFEN PO      Take 800 mg by mouth every 4 hours as needed (takes for menstrual cramps.)        levothyroxine 100 MCG tablet    SYNTHROID/LEVOTHROID    90 tablet    Take 1 tablet (100 mcg) by mouth daily    Hypothyroidism due to medication       lisinopril-hydrochlorothiazide 10-12.5 MG per tablet    PRINZIDE/ZESTORETIC    90 tablet    Take 1 tablet by mouth daily    Essential hypertension with goal blood pressure less than 140/90       mupirocin 2 % ointment    BACTROBAN          senna-docusate 8.6-50 MG per tablet    SENOKOT-S;PERICOLACE    30 tablet    Take 1-2 tablets by mouth 2 times daily To prevent constipation while taking narcotic pain medication.    KARMA (stress urinary incontinence, female)

## 2018-06-08 NOTE — PROGRESS NOTES
CC: HTN, albuminuria     HPI:  Mimi Melton is a 42 year old female with pmh of HTN, pre-eclampsia, and Grave's disease who presents due to HTN and albuminuria, referred by Kalli Roldan.     The patient reports longstanding HTN, since childhood, starting on atenolol at age 26 and then lisinopril and HCTZ. She had alubminuria checked by primary care in May and had 500mg/g albuminuria. The patient has had UA positive for proteinuria at times in the past in the setting of hematuria and bacteria but the majority of UAs are without either, including most recently. Creatinine has been in the 0.8-0.9 range. The patient has renal US in  without arterial pathology found. She reports that her mother had HTN but onset only at age 50. No known kidney disease family hx. The patient has no hx of hypokalemia dating back to . Son had HTN starting at age 17 with an unknown cardiomyopathy. The patient reports that she had pre-eclampsia with all three of her children.     The patient notes that she was having her period on the day of her albuminuria measurement and did see blood in the urine that was tested.     No HAs or palpitations. No sweating. No SOB or CP or LE swelling. No fevers, chills, night sweats, weight loss, abd pain, N/V/D/C, or rashes.     Past Medical History:   Diagnosis Date     Cervical high risk HPV (human papillomavirus) test positive 2015    NIL pap, + HPV (not 16 or 18)     Chronic sinusitis Summer 2016    I get congested every 3-4 weeks     Esophageal reflux      Exophthalmos, unspecified      Hypothyroidism      Thyroid eye disease      Thyrotoxicosis without mention of goiter or other cause, without mention of thyrotoxic crisis or storm 3/05    Had radioablation, On synthroid, seeing [facility]; takes synthroid     Unspecified essential hypertension 1980    meds since , on atenolol           Past Surgical History:   Procedure Laterality Date     C  DELIVERY ONLY  91     , Low Cervical     C  DELIVERY ONLY  97    , Low Cervical     C  DELIVERY ONLY  99    , Low Cervical     C INDUCED ABORTN BY D&C      Aspiration & Curettage, TAB x2     CYSTOSCOPY, SLING TRANSVAGINAL N/A 10/10/2017    Procedure: CYSTOSCOPY, SLING TRANSVAGINAL;  Midurethral Sling and Cystoscopy (Support the Urethra with Mesh Sling and Look in the Bladder);  Surgeon: Karin Amin MD;  Location: [facility] OR     EYE SURGERY  2008    Orbital Decompression Dr smart      REPAIR INCISIONAL HERNIA,REDUCIBLE      Umbilical hernia Repair        Family History   Problem Relation Age of Onset     Hypertension Mother      Hypertension Father      Hypertension Maternal Grandmother      Hypertension Paternal Grandmother      Hypertension Maternal Grandfather      Hypertension Paternal Grandfather        Social History   Substance Use Topics     Smoking status: Never Smoker     Smokeless tobacco: Never Used     Alcohol use Yes      Comment: 1-2x's/month if that.       Social History     Social History Narrative    Caffeine intake/servings daily - 0-1, 12 oz of Mountain Dew    Calcium intake/servings daily - eats only cheese    Exercise None    Sunscreen used - Yes    Seatbelts used - Yes    Guns stored in the home - No    Self Breast Exam - No    Pap test up to date -  Yes, as of today    Eye exam up to date -  Yes    Dental exam up to date -  Yes    DEXA scan up to date -  Not Applicable    Flex Sig/Colonoscopy up to date -  Not Applicable    Mammography up to date -  Not Applicable    Immunizations reviewed and up to date - Yes, within the last 10 years    Abuse: Current or Past (Physical, Sexual or Emotional) - No    Do you feel safe in your environment - Yes    Do you cope well with stress - Yes    Do you suffer from insomnia - No    Last updated by: Anu Vinson MA 2011                    Medications:  Current Outpatient Prescriptions   Medication Sig Dispense  "Refill     ACETAMINOPHEN PO Take 1,000 mg by mouth 2 times daily as needed for pain (menstrual cramps)       cetirizine (ZYRTEC) 10 MG tablet Take 10 mg by mouth every morning       fluticasone (FLONASE) 50 MCG/ACT spray        IBUPROFEN PO Take 800 mg by mouth every 4 hours as needed (takes for menstrual cramps.)        Lactobacillus-Inulin ([employer]) CAPS Take 1 capsule by mouth 2 times daily 100 capsule 11     levothyroxine (SYNTHROID/LEVOTHROID) 100 MCG tablet Take 1 tablet (100 mcg) by mouth daily 90 tablet 3     lisinopril-hydrochlorothiazide (PRINZIDE/ZESTORETIC) 10-12.5 MG per tablet Take 1 tablet by mouth daily 90 tablet 3     mupirocin (BACTROBAN) 2 % ointment        senna-docusate (SENOKOT-S;PERICOLACE) 8.6-50 MG per tablet Take 1-2 tablets by mouth 2 times daily To prevent constipation while taking narcotic pain medication. (Patient not taking: Reported on 6/8/2018) 30 tablet 0       Review Of Systems:  Constitutional: Negative  Eyes: negative  Ears/Nose/Throat: negative  Cardiovascular: negative  Respiratory: No shortness of breath, dyspnea on exertion, cough, or hemoptysis  Gastrointestinal: negative  Genitourinary: negative  Musculoskeletal: negative  Skin: negative  Neurologic: negative  Endocrine: negative  Hematologic/Lymphatic/Immunologic: negative  Psychiatric: negative    OBJECTIVE:  Physical exam:  /78  Pulse 80  Resp 16  Ht 1.651 m (5' 5\")  Wt 80.1 kg (176 lb 9.6 oz)  BMI 29.39 kg/m2  Body mass index is 29.39 kg/(m^2).   Gen: Well-developed, well-nourished and in no apparent distress  HEENT: anicteric, MMM  CV: regular rate and rhythm, normal S1 S2, no S3 or S4 and no murmur, click, or rub  Resp: clear to ausculation bilaterally, normal respiratory effort  Abd: bowel sounds presents, soft, non-tender, non-distended, no masses or hepatosplenomegaly  Ext: WWP, no LE edema   Skin: warm and dry, no rashes or ecchymoses appreciated   Psych: normal mood, normal " affect  Neuro: alert and oriented x3, CN2-12 grossly intact    Recent Labs   Lab Test  06/08/18   0804  05/04/18   0842  01/08/18   1548   NA  140   --   137   POTASSIUM  3.4   --   3.5   CHLORIDE  104   --   102   CO2  30   --   24   ANIONGAP  6   --   11   GLC  83  97  109*   BUN  8   --   8   CR  1.00   --   0.86   DAXA  8.8   --   9.2     Lab Results   Component Value Date    WBC 4.0 06/08/2018     Lab Results   Component Value Date    RBC 4.13 06/08/2018     Lab Results   Component Value Date    HGB 12.1 06/08/2018     Lab Results   Component Value Date    HCT 37.2 06/08/2018     No components found for: MCT  Lab Results   Component Value Date    MCV 90 06/08/2018     Lab Results   Component Value Date    MCH 29.3 06/08/2018     Lab Results   Component Value Date    MCHC 32.5 06/08/2018     Lab Results   Component Value Date    RDW 13.1 06/08/2018     Lab Results   Component Value Date     06/08/2018     Color Urine (no units)   Date Value   06/08/2018 Yellow     Appearance Urine (no units)   Date Value   06/08/2018 Clear     Glucose Urine (mg/dL)   Date Value   06/08/2018 Negative     Bilirubin Urine (no units)   Date Value   06/08/2018 Negative     Ketones Urine (mg/dL)   Date Value   06/08/2018 Negative     Specific Gravity Urine (no units)   Date Value   06/08/2018 1.015     pH Urine (pH)   Date Value   06/08/2018 6.0     Protein Albumin Urine (mg/dL)   Date Value   06/08/2018 Negative     Urobilinogen Urine (EU/dL)   Date Value   09/26/2017 0.2     Nitrite Urine (no units)   Date Value   06/08/2018 Negative     Leukocyte Esterase Urine (no units)   Date Value   06/08/2018 Negative     Urine protein: 0.09g/g  Urine albumin: 7.08mg/g    PTH 41  Vit D pending     Ferritin 8  Iron sat 16%      ASSESSMENT/PLAN:  Pt is a 42 year old female here to establish care.    Mimi was seen today for consult.    Diagnoses and all orders for this visit:    Essential hypertension with goal blood pressure less than  140/90  Transient albuminuria / transient proteinuria  The patient has longstanding HTN. Prior renal US shows no renal artery stenosis. No hx of hypokalemia. Likely familial cause of HTN resulting in essential HTN, no further workup needed at this time with BP well controlled on current medications (no change). Pt without protineuria at this time and on UAs that don't have findings c/w UTI vs bacteruria. No hematuria today and eGFR WNL. Reassured patient about these findings.     Iron deficiency  Messaged patient's primary care provider about iron deficiency on labs for further workup and management, likely starting oral iron.       Return to clinic in as needed. No scheduled f/u at this time.     Patient seen and discussed with Dr. Matthews who agrees with this plan.    Raffi Meyer MD  Renal Fellow  Pager [phone]    I have seen and reviewed the patient with the fellow. The fellow's note reflects our joint assessment and plan. -- Luis Matthews M.D.

## 2018-06-08 NOTE — NURSING NOTE
"Chief Complaint   Patient presents with     Consult     ABNORMAL KIDNEY LAB TEST     /78  Pulse 80  Resp 16  Ht 1.651 m (5' 5\")  Wt 80.1 kg (176 lb 9.6 oz)  BMI 29.39 kg/m2  LAURA SMITH CMA    "

## 2018-06-08 NOTE — LETTER
6/8/2018      RE: Mimi Melton  [address] [place] [address]       CC: HTN, albuminuria     HPI:  Mimi Melton is a 42 year old female with pmh of HTN, pre-eclampsia, and Grave's disease who presents due to HTN and albuminuria, referred by Kalli Roldan.     The patient reports longstanding HTN, since childhood, starting on atenolol at age 26 and then lisinopril and HCTZ. She had alubminuria checked by primary care in May and had 500mg/g albuminuria. The patient has had UA positive for proteinuria at times in the past in the setting of hematuria and bacteria but the majority of UAs are without either, including most recently. Creatinine has been in the 0.8-0.9 range. The patient has renal US in 2011 without arterial pathology found. She reports that her mother had HTN but onset only at age 50. No known kidney disease family hx. The patient has no hx of hypokalemia dating back to 2005. Son had HTN starting at age 17 with an unknown cardiomyopathy. The patient reports that she had pre-eclampsia with all three of her children.     The patient notes that she was having her period on the day of her albuminuria measurement and did see blood in the urine that was tested.     No HAs or palpitations. No sweating. No SOB or CP or LE swelling. No fevers, chills, night sweats, weight loss, abd pain, N/V/D/C, or rashes.     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 mention of goiter or other cause, without mention of thyrotoxic crisis or storm 3/05    Had radioablation, On synthroid, seeing [facility]; takes synthroid     Unspecified essential hypertension 1980    meds since 2001, on atenolol           Past  Surgical History:   Procedure Laterality Date     C  DELIVERY ONLY  91    , Low Cervical     C  DELIVERY ONLY  97    , Low Cervical     C  DELIVERY ONLY  99    , Low Cervical     C INDUCED ABORTN BY D&C      Aspiration & Curettage, TAB x2     CYSTOSCOPY, SLING TRANSVAGINAL N/A 10/10/2017    Procedure: CYSTOSCOPY, SLING TRANSVAGINAL;  Midurethral Sling and Cystoscopy (Support the Urethra with Mesh Sling and Look in the Bladder);  Surgeon: Karin Amin MD;  [facility] OR     EYE SURGERY  2008    Orbital Decompression Dr smart      REPAIR INCISIONAL HERNIA,REDUCIBLE      Umbilical hernia Repair        Family History   Problem Relation Age of Onset     Hypertension Mother      Hypertension Father      Hypertension Maternal Grandmother      Hypertension Paternal Grandmother      Hypertension Maternal Grandfather      Hypertension Paternal Grandfather        Social History   Substance Use Topics     Smoking status: Never Smoker     Smokeless tobacco: Never Used     Alcohol use Yes      Comment: 1-2x's/month if that.       Social History     Social History Narrative    Caffeine intake/servings daily - 0-1, 12 oz of Mountain Dew    Calcium intake/servings daily - eats only cheese    Exercise None    Sunscreen used - Yes    Seatbelts used - Yes    Guns stored in the home - No    Self Breast Exam - No    Pap test up to date -  Yes, as of today    Eye exam up to date -  Yes    Dental exam up to date -  Yes    DEXA scan up to date -  Not Applicable    Flex Sig/Colonoscopy up to date -  Not Applicable    Mammography up to date -  Not Applicable    Immunizations reviewed and up to date - Yes, within the last 10 years    Abuse: Current or Past (Physical, Sexual or Emotional) - No    Do you feel safe in your environment - Yes    Do you cope well with stress - Yes    Do you suffer from insomnia - No    Last updated by: Aun Vinson MA 2011         "            Medications:  Current Outpatient Prescriptions   Medication Sig Dispense Refill     ACETAMINOPHEN PO Take 1,000 mg by mouth 2 times daily as needed for pain (menstrual cramps)       cetirizine (ZYRTEC) 10 MG tablet Take 10 mg by mouth every morning       fluticasone (FLONASE) 50 MCG/ACT spray        IBUPROFEN PO Take 800 mg by mouth every 4 hours as needed (takes for menstrual cramps.)        Lactobacillus-Inulin ([facility] DIGESTIVE HEALTH) CAPS Take 1 capsule by mouth 2 times daily 100 capsule 11     levothyroxine (SYNTHROID/LEVOTHROID) 100 MCG tablet Take 1 tablet (100 mcg) by mouth daily 90 tablet 3     lisinopril-hydrochlorothiazide (PRINZIDE/ZESTORETIC) 10-12.5 MG per tablet Take 1 tablet by mouth daily 90 tablet 3     mupirocin (BACTROBAN) 2 % ointment        senna-docusate (SENOKOT-S;PERICOLACE) 8.6-50 MG per tablet Take 1-2 tablets by mouth 2 times daily To prevent constipation while taking narcotic pain medication. (Patient not taking: Reported on 6/8/2018) 30 tablet 0       Review Of Systems:  Constitutional: Negative  Eyes: negative  Ears/Nose/Throat: negative  Cardiovascular: negative  Respiratory: No shortness of breath, dyspnea on exertion, cough, or hemoptysis  Gastrointestinal: negative  Genitourinary: negative  Musculoskeletal: negative  Skin: negative  Neurologic: negative  Endocrine: negative  Hematologic/Lymphatic/Immunologic: negative  Psychiatric: negative    OBJECTIVE:  Physical exam:  /78  Pulse 80  Resp 16  Ht 1.651 m (5' 5\")  Wt 80.1 kg (176 lb 9.6 oz)  BMI 29.39 kg/m2  Body mass index is 29.39 kg/(m^2).   Gen: Well-developed, well-nourished and in no apparent distress  HEENT: anicteric, MMM  CV: regular rate and rhythm, normal S1 S2, no S3 or S4 and no murmur, click, or rub  Resp: clear to ausculation bilaterally, normal respiratory effort  Abd: bowel sounds presents, soft, non-tender, non-distended, no masses or hepatosplenomegaly  Ext: WWP, no LE edema   Skin: " warm and dry, no rashes or ecchymoses appreciated   Psych: normal mood, normal affect  Neuro: alert and oriented x3, CN2-12 grossly intact    Recent Labs   Lab Test  06/08/18   0804  05/04/18   0842  01/08/18   1548   NA  140   --   137   POTASSIUM  3.4   --   3.5   CHLORIDE  104   --   102   CO2  30   --   24   ANIONGAP  6   --   11   GLC  83  97  109*   BUN  8   --   8   CR  1.00   --   0.86   DAXA  8.8   --   9.2     Lab Results   Component Value Date    WBC 4.0 06/08/2018     Lab Results   Component Value Date    RBC 4.13 06/08/2018     Lab Results   Component Value Date    HGB 12.1 06/08/2018     Lab Results   Component Value Date    HCT 37.2 06/08/2018     No components found for: MCT  Lab Results   Component Value Date    MCV 90 06/08/2018     Lab Results   Component Value Date    MCH 29.3 06/08/2018     Lab Results   Component Value Date    MCHC 32.5 06/08/2018     Lab Results   Component Value Date    RDW 13.1 06/08/2018     Lab Results   Component Value Date     06/08/2018     Color Urine (no units)   Date Value   06/08/2018 Yellow     Appearance Urine (no units)   Date Value   06/08/2018 Clear     Glucose Urine (mg/dL)   Date Value   06/08/2018 Negative     Bilirubin Urine (no units)   Date Value   06/08/2018 Negative     Ketones Urine (mg/dL)   Date Value   06/08/2018 Negative     Specific Gravity Urine (no units)   Date Value   06/08/2018 1.015     pH Urine (pH)   Date Value   06/08/2018 6.0     Protein Albumin Urine (mg/dL)   Date Value   06/08/2018 Negative     Urobilinogen Urine (EU/dL)   Date Value   09/26/2017 0.2     Nitrite Urine (no units)   Date Value   06/08/2018 Negative     Leukocyte Esterase Urine (no units)   Date Value   06/08/2018 Negative     Urine protein: 0.09g/g  Urine albumin: 7.08mg/g    PTH 41  Vit D pending     Ferritin 8  Iron sat 16%      ASSESSMENT/PLAN:  Pt is a 42 year old female here to establish care.    Mimi was seen today for consult.    Diagnoses and all orders  for this visit:    Essential hypertension with goal blood pressure less than 140/90  Transient albuminuria / transient proteinuria  The patient has longstanding HTN. Prior renal US shows no renal artery stenosis. No hx of hypokalemia. Likely familial cause of HTN resulting in essential HTN, no further workup needed at this time with BP well controlled on current medications (no change). Pt without protineuria at this time and on UAs that don't have findings c/w UTI vs bacteruria. No hematuria today and eGFR WNL. Reassured patient about these findings.     Iron deficiency  Messaged patient's primary care provider about iron deficiency on labs for further workup and management, likely starting oral iron.       Return to clinic in as needed. No scheduled f/u at this time.     Patient seen and discussed with Dr. Matthews who agrees with this plan.    Raffi Meyer MD  Renal Fellow  Pager [phone]    I have seen and reviewed the patient with the fellow. The fellow's note reflects our joint assessment and plan. -- Luis Matthews M.D.      Raffi Meyer MD

## 2018-06-11 LAB — DEPRECATED CALCIDIOL+CALCIFEROL SERPL-MC: 21 UG/L (ref 20–75)

## 2018-07-15 ENCOUNTER — OFFICE VISIT (OUTPATIENT)
Dept: URGENT CARE | Facility: URGENT CARE | Age: 43
End: 2018-07-15
Payer: COMMERCIAL

## 2018-07-15 VITALS
SYSTOLIC BLOOD PRESSURE: 92 MMHG | HEIGHT: 65 IN | BODY MASS INDEX: 29.16 KG/M2 | TEMPERATURE: 97.9 F | WEIGHT: 175 LBS | HEART RATE: 76 BPM | DIASTOLIC BLOOD PRESSURE: 58 MMHG | OXYGEN SATURATION: 98 % | RESPIRATION RATE: 16 BRPM

## 2018-07-15 DIAGNOSIS — N76.0 BV (BACTERIAL VAGINOSIS): Primary | ICD-10-CM

## 2018-07-15 DIAGNOSIS — N89.8 VAGINAL DISCHARGE: ICD-10-CM

## 2018-07-15 DIAGNOSIS — B96.89 BV (BACTERIAL VAGINOSIS): Primary | ICD-10-CM

## 2018-07-15 LAB
ALBUMIN UR-MCNC: NEGATIVE MG/DL
APPEARANCE UR: CLEAR
BILIRUB UR QL STRIP: NEGATIVE
COLOR UR AUTO: YELLOW
GLUCOSE UR STRIP-MCNC: NEGATIVE MG/DL
HGB UR QL STRIP: NEGATIVE
KETONES UR STRIP-MCNC: NEGATIVE MG/DL
LEUKOCYTE ESTERASE UR QL STRIP: NEGATIVE
NITRATE UR QL: NEGATIVE
PH UR STRIP: 7.5 PH (ref 5–7)
SOURCE: ABNORMAL
SP GR UR STRIP: 1.02 (ref 1–1.03)
SPECIMEN SOURCE: ABNORMAL
UROBILINOGEN UR STRIP-ACNC: 1 EU/DL (ref 0.2–1)
WET PREP SPEC: ABNORMAL

## 2018-07-15 PROCEDURE — 81003 URINALYSIS AUTO W/O SCOPE: CPT | Performed by: FAMILY MEDICINE

## 2018-07-15 PROCEDURE — 99213 OFFICE O/P EST LOW 20 MIN: CPT | Performed by: FAMILY MEDICINE

## 2018-07-15 PROCEDURE — 87210 SMEAR WET MOUNT SALINE/INK: CPT | Performed by: FAMILY MEDICINE

## 2018-07-15 RX ORDER — CLINDAMYCIN HCL 150 MG
150 CAPSULE ORAL 3 TIMES DAILY
Qty: 21 CAPSULE | Refills: 0 | Status: SHIPPED | OUTPATIENT
Start: 2018-07-15 | End: 2018-07-22

## 2018-07-15 NOTE — MR AVS SNAPSHOT
After Visit Summary   7/15/2018    Mimi Melton    MRN: [id number]           Patient Information     Date Of Birth          1975        Visit Information        Provider Department      7/15/2018 7:50 PM Sanjay Hinson MD [facility]        Today's Diagnoses     BV (bacterial vaginosis)    -  1    Vaginal discharge          Care Instructions    Take full course of antibiotic for BV treatment.  Eat yogurt 1-2 times daily.      Bacterial Vaginosis    You have a vaginal infection called bacterial vaginosis (BV). Both good and bad bacteria are present in a healthy vagina. BV occurs when these bacteria get out of balance. The number of bad bacteria increase. And the number of good bacteria decrease. Although BV is associated with sexual activity, it is not a sexually transmitted disease.  BV may or may not cause symptoms. If symptoms do occur, they can include:    Thin, gray, milky-white, or sometimes green discharge    Unpleasant odor or  fishy  smell    Itching, burning, or pain in or around the vagina  It is not known what causes BV, but certain factors can make the problem more likely. This can include:    Douching    Having sex with a new partner    Having sex with more than one partner  BV will sometimes go away on its own. But treatment is usually recommended. This is because untreated BV can increase the risk of more serious health problems such as:    Pelvic inflammatory disease (PID)     delivery (giving birth to a baby early if you re pregnant)    HIV and certain other sexually transmitted diseases (STDs)    Infection after surgery on the reproductive organs  Home care  General care    BV is most often treated with medicines called antibiotics. These may be given as pills or as a vaginal cream. If antibiotics are prescribed, be sure to use them exactly as directed. Also, be sure to complete all of the medicine, even if your symptoms go away.    Don't douche or  having sex during treatment.    If you have sex with a female partner, ask your healthcare provider if she should also be treated.  Prevention    Don't douche.    Don't have sex. If you do have sex, then take steps to lower your risk:  ? Use condoms when having sex.  ? Limit the number of sexual partners you have.  Follow-up care  Follow up with your healthcare provider, or as advised.  When to seek medical advice  Call your healthcare provider right away if:    You have a fever of 100.4 F (38 C) or higher, or as directed by your provider.    Your symptoms worsen, or they don t go away within a few days of starting treatment.    You have new pain in the lower belly or pelvic region.    You have side effects that bother you or a reaction to the pills or cream you re prescribed.    You or any partners you have sex with have new symptoms, such as a rash, joint pain, or sores.  Date Last Reviewed: 10/1/2017    0626-3971 [employer]. [address] [place] [address]. All rights reserved. This information is not intended as a substitute for professional medical care. Always follow your healthcare professional's instructions.                Follow-ups after your visit        Your next 10 appointments already scheduled     Sep 07, 2018  9:45 AM CDT   (Arrive by 9:30 AM)   [facility]   [facility] ([facility] [place])    [address] [place] [id number] [phone]            Oct 23, 2018 10:00 AM CDT   (Arrive by 9:45 AM)   RETURN ENDOCRINE with Sandhya Harmon MD   [facility] ([place] [facility])    [address] [place] [id number] [phone]              Who to contact     If you have questions or need follow up information about today's clinic visit or your schedule please contact [facility] directly at [phone].  Normal or  "non-critical lab and imaging results will be communicated to you by MyChart, letter or phone within 4 business days after the clinic has received the results. If you do not hear from us within 7 days, please contact the clinic through [employer] or phone. If you have a critical or abnormal lab result, we will notify you by phone as soon as possible.  Submit refill requests through [employer] or call your pharmacy and they will forward the refill request to us. Please allow 3 business days for your refill to be completed.          Additional Information About Your Visit        [employer] Information     [employer] gives you secure access to your electronic health record. If you see a primary care provider, you can also send messages to your care team and make appointments. If you have questions, please call your primary care clinic.  If you do not have a primary care provider, please call [phone] and they will assist you.        Care EveryWhere ID     This is your Care EveryWhere ID. This could be used by other organizations to access your [facility] medical records  [id number]        Your Vitals Were     Pulse Temperature Respirations Height Last Period Pulse Oximetry    76 97.9  F (36.6  C) (Oral) 16 5' 5\" (1.651 m) 06/24/2018 98%    Breastfeeding? BMI (Body Mass Index)                No 29.12 kg/m2           Blood Pressure from Last 3 Encounters:   07/15/18 92/58   06/08/18 119/78   05/04/18 108/76    Weight from Last 3 Encounters:   07/15/18 175 lb (79.4 kg)   06/08/18 176 lb 9.6 oz (80.1 kg)   05/04/18 169 lb 8 oz (76.9 kg)              We Performed the Following     *UA reflex to Microscopic and Culture ([facility] (except [place] and [place])     Wet prep          Today's Medication Changes          These changes are accurate as of 7/15/18  8:21 PM.  If you have any questions, ask your nurse or doctor.               Start taking these medicines.        Dose/Directions    clindamycin 150 MG capsule "   Commonly known as:  CLEOCIN   Used for:  BV (bacterial vaginosis)   Started by:  Sanjay Hinson MD        Dose:  150 mg   Take 1 capsule (150 mg) by mouth 3 times daily for 7 days   Quantity:  21 capsule   Refills:  0            Where to get your medicines      These medications were sent to [facility] - [place], [place] - [address] AT [facility]  [address], [place] [address]    Hours:  24-hours Phone:  [phone]     clindamycin 150 MG capsule                Primary Care Provider Office Phone # Fax #    Kalli Ulloarositanbpetra, APRN [facility] [phone] [address] [place] [address]        Equal Access to Services     [facility] [facility]: Hadii julián jeffries hadasho Soshea, waaxda luqadaha, qaybta kaalmada adeegyada, katty flaherty . So [facility] [phone].    ATENCIÓN: Si habla español, tiene a crowe disposición servicios gratuitos de asistencia lingüística. [facility] [phone].    We comply with applicable federal civil rights laws and [place] laws. We do not discriminate on the basis of race, color, national origin, age, disability, sex, sexual orientation, or gender identity.            Thank you!     Thank you for choosing [facility]  for your care. Our goal is always to provide you with excellent care. Hearing back from our patients is one way we can continue to improve our services. Please take a few minutes to complete the written survey that you may receive in the mail after your visit with us. Thank you!             Your Updated Medication List - Protect others around you: Learn how to safely use, store and throw away your medicines at www.disposemymeds.org.          This list is accurate as of 7/15/18  8:21 PM.  Always use your most recent med list.                   Brand Name Dispense Instructions for use Diagnosis    ACETAMINOPHEN PO      Take 1,000 mg by mouth 2 times daily as  needed for pain (menstrual cramps)        cetirizine 10 MG tablet    zyrTEC     Take 10 mg by mouth every morning        clindamycin 150 MG capsule    CLEOCIN    21 capsule    Take 1 capsule (150 mg) by mouth 3 times daily for 7 days    BV (bacterial vaginosis)       CULTURELLE DIGESTIVE HEALTH Caps     100 capsule    Take 1 capsule by mouth 2 times daily    Preop general physical exam       fluticasone 50 MCG/ACT spray    FLONASE    1 Bottle    Spray 1 spray into both nostrils 2 times daily    Chronic seasonal allergic rhinitis, unspecified trigger       IBUPROFEN PO      Take 800 mg by mouth every 4 hours as needed (takes for menstrual cramps.)        levothyroxine 100 MCG tablet    SYNTHROID/LEVOTHROID    90 tablet    Take 1 tablet (100 mcg) by mouth daily    Hypothyroidism due to medication       lisinopril-hydrochlorothiazide 10-12.5 MG per tablet    PRINZIDE/ZESTORETIC    90 tablet    Take 1 tablet by mouth daily    Essential hypertension with goal blood pressure less than 140/90       mupirocin 2 % ointment    BACTROBAN          senna-docusate 8.6-50 MG per tablet    SENOKOT-S;PERICOLACE    30 tablet    Take 1-2 tablets by mouth 2 times daily To prevent constipation while taking narcotic pain medication.    KARMA (stress urinary incontinence, female)

## 2018-07-16 NOTE — PATIENT INSTRUCTIONS
Take full course of antibiotic for BV treatment.  Eat yogurt 1-2 times daily.      Bacterial Vaginosis    You have a vaginal infection called bacterial vaginosis (BV). Both good and bad bacteria are present in a healthy vagina. BV occurs when these bacteria get out of balance. The number of bad bacteria increase. And the number of good bacteria decrease. Although BV is associated with sexual activity, it is not a sexually transmitted disease.  BV may or may not cause symptoms. If symptoms do occur, they can include:    Thin, gray, milky-white, or sometimes green discharge    Unpleasant odor or  fishy  smell    Itching, burning, or pain in or around the vagina  It is not known what causes BV, but certain factors can make the problem more likely. This can include:    Douching    Having sex with a new partner    Having sex with more than one partner  BV will sometimes go away on its own. But treatment is usually recommended. This is because untreated BV can increase the risk of more serious health problems such as:    Pelvic inflammatory disease (PID)     delivery (giving birth to a baby early if you re pregnant)    HIV and certain other sexually transmitted diseases (STDs)    Infection after surgery on the reproductive organs  Home care  General care    BV is most often treated with medicines called antibiotics. These may be given as pills or as a vaginal cream. If antibiotics are prescribed, be sure to use them exactly as directed. Also, be sure to complete all of the medicine, even if your symptoms go away.    Don't douche or having sex during treatment.    If you have sex with a female partner, ask your healthcare provider if she should also be treated.  Prevention    Don't douche.    Don't have sex. If you do have sex, then take steps to lower your risk:  ? Use condoms when having sex.  ? Limit the number of sexual partners you have.  Follow-up care  Follow up with your healthcare provider, or as  advised.  When to seek medical advice  Call your healthcare provider right away if:    You have a fever of 100.4 F (38 C) or higher, or as directed by your provider.    Your symptoms worsen, or they don t go away within a few days of starting treatment.    You have new pain in the lower belly or pelvic region.    You have side effects that bother you or a reaction to the pills or cream you re prescribed.    You or any partners you have sex with have new symptoms, such as a rash, joint pain, or sores.  Date Last Reviewed: 10/1/2017    2637-8125 [employer]. [address] [place], [place] [address]. All rights reserved. This information is not intended as a substitute for professional medical care. Always follow your healthcare professional's instructions.

## 2018-07-16 NOTE — PROGRESS NOTES
SUBJECTIVE:   Mimi Melton is a 43 year old female who  presents today for vaginal discharge.    Patient here today due to concerns of recurrent BV.  Patient endorsed vaginal odor.  No concerns for STD, denies any dysuria symptoms.  Patient just back in town from vacation at [place] and wondered if being in wet/damp swim suit caused this.    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 mention of goiter or other cause, without mention of thyrotoxic crisis or storm 3/05    Had radioablation, On synthroid, seeing [facility]; takes synthroid     Unspecified essential hypertension 1980    meds since 2001, on atenolol     Current Outpatient Prescriptions   Medication Sig Dispense Refill     cetirizine (ZYRTEC) 10 MG tablet Take 10 mg by mouth every morning       fluticasone (FLONASE) 50 MCG/ACT spray Spray 1 spray into both nostrils 2 times daily 1 Bottle 11     levothyroxine (SYNTHROID/LEVOTHROID) 100 MCG tablet Take 1 tablet (100 mcg) by mouth daily 90 tablet 3     lisinopril-hydrochlorothiazide (PRINZIDE/ZESTORETIC) 10-12.5 MG per tablet Take 1 tablet by mouth daily 90 tablet 3     ACETAMINOPHEN PO Take 1,000 mg by mouth 2 times daily as needed for pain (menstrual cramps)       IBUPROFEN PO Take 800 mg by mouth every 4 hours as needed (takes for menstrual cramps.)        Lactobacillus-Inulin ([facility] DIGESTIVE [facility]) CAPS Take 1 capsule by mouth 2 times daily 100 capsule 11     mupirocin (BACTROBAN) 2 % ointment        senna-docusate (SENOKOT-S;PERICOLACE) 8.6-50 MG per tablet Take 1-2 tablets by mouth 2 times daily To prevent constipation while taking narcotic pain medication. (Patient not taking: Reported on  "6/8/2018) 30 tablet 0     Social History   Substance Use Topics     Smoking status: Never Smoker     Smokeless tobacco: Never Used     Alcohol use Yes      Comment: 1-2x's/month if that.       ROS:   Review of systems negative except as stated above.    OBJECTIVE:  BP 92/58  Pulse 76  Temp 97.9  F (36.6  C) (Oral)  Resp 16  Ht 5' 5\" (1.651 m)  Wt 175 lb (79.4 kg)  LMP 06/24/2018  SpO2 98%  Breastfeeding? No  BMI 29.12 kg/m2  GENERAL APPEARANCE: healthy, alert and no distress  PSYCH: mentation appears normal and affect normal/bright    Results for orders placed or performed in visit on 07/15/18   *UA reflex to Microscopic and Culture ([place] (except [place] and [place])   Result Value Ref [facility]    Color Urine Yellow     Appearance Urine Clear     Glucose Urine Negative NEG^Negative mg/dL    Bilirubin Urine Negative NEG^Negative    Ketones Urine Negative NEG^Negative mg/dL    Specific Gravity Urine 1.020 1.003 - 1.035    Blood Urine Negative NEG^Negative    pH Urine 7.5 (H) 5.0 - 7.0 pH    Protein Albumin Urine Negative NEG^Negative mg/dL    Urobilinogen Urine 1.0 0.2 - 1.0 EU/dL    Nitrite Urine Negative NEG^Negative    Leukocyte Esterase Urine Negative NEG^Negative    Source Midstream Urine    Wet prep   Result Value Ref [facility]    Specimen Description Vagina     Wet Prep Clue cells seen (A)     Wet Prep No yeast seen     Wet Prep No Trichomonas seen        ASSESSMENT/PLAN:   (N76.0,  B96.89) BV (bacterial vaginosis)  (primary encounter diagnosis)  Plan: clindamycin (CLEOCIN) 150 MG capsule            (N89.8) Vaginal discharge  Plan: *UA reflex to Microscopic and Culture ([facility] (except [place] and         [place]), Wet prep              Reviewed labs with patient, discussed frequent recurrence of BV and encourage to eat yogurt regularly to help balance vaginal oscar.  RX Clindamycin given as patient reports that Flagyl did not work as well.    Follow up " with primary provider if no resolution of symptoms.    Sanjay Hinson MD

## 2018-10-18 ENCOUNTER — OFFICE VISIT (OUTPATIENT)
Dept: FAMILY MEDICINE | Facility: CLINIC | Age: 43
End: 2018-10-18
Payer: COMMERCIAL

## 2018-10-18 VITALS
HEART RATE: 60 BPM | OXYGEN SATURATION: 99 % | RESPIRATION RATE: 12 BRPM | WEIGHT: 176 LBS | TEMPERATURE: 98.4 F | SYSTOLIC BLOOD PRESSURE: 98 MMHG | DIASTOLIC BLOOD PRESSURE: 64 MMHG | BODY MASS INDEX: 29.29 KG/M2

## 2018-10-18 DIAGNOSIS — N89.8 VAGINAL DISCHARGE: Primary | ICD-10-CM

## 2018-10-18 DIAGNOSIS — E03.4 HYPOTHYROIDISM DUE TO ACQUIRED ATROPHY OF THYROID: ICD-10-CM

## 2018-10-18 LAB
T4 FREE SERPL-MCNC: 1.19 NG/DL (ref 0.76–1.46)
TSH SERPL DL<=0.005 MIU/L-ACNC: 1.9 MU/L (ref 0.4–4)

## 2018-10-18 PROCEDURE — 99213 OFFICE O/P EST LOW 20 MIN: CPT | Performed by: NURSE PRACTITIONER

## 2018-10-18 PROCEDURE — 36415 COLL VENOUS BLD VENIPUNCTURE: CPT | Performed by: INTERNAL MEDICINE

## 2018-10-18 PROCEDURE — 84439 ASSAY OF FREE THYROXINE: CPT | Performed by: INTERNAL MEDICINE

## 2018-10-18 PROCEDURE — 84443 ASSAY THYROID STIM HORMONE: CPT | Performed by: INTERNAL MEDICINE

## 2018-10-18 NOTE — MR AVS SNAPSHOT
After Visit Summary   10/18/2018    Mimi Melton    MRN: [id number]           Patient Information     Date Of Birth          1975        Visit Information        Provider Department      10/18/2018 8:20 AM Kalli Roldan APRN CNP [facility]        Today's Diagnoses     Vaginal discharge    -  1    Hypothyroidism due to acquired atrophy of thyroid           Follow-ups after your visit        Your next 10 appointments already scheduled     Oct 23, 2018 10:00 AM CDT   (Arrive by 9:45 AM)   RETURN ENDOCRINE with Sandhya Harmon MD   [facility] ([facility] [place])    [address] [place] [id number] [phone]              Future tests that were ordered for you today     Open Future Orders        Priority Expected Expires Ordered    Wet prep Routine 11/1/2018 10/18/2019 10/18/2018            Who to contact     If you have questions or need follow up information about today's clinic visit or your schedule please contact [facility] directly at [phone].  Normal or non-critical lab and imaging results will be communicated to you by MyChart, letter or phone within 4 business days after the clinic has received the results. If you do not hear from us within 7 days, please contact the clinic through [employer] or phone. If you have a critical or abnormal lab result, we will notify you by phone as soon as possible.  Submit refill requests through [employer] or call your pharmacy and they will forward the refill request to us. Please allow 3 business days for your refill to be completed.          Additional Information About Your Visit        MyChart Information     [employer] gives you secure access to your electronic health record. If you see a primary care provider, you can also send messages to your care team and make appointments. If you have questions, please call your primary care clinic.  If  you do not have a primary care provider, please call [phone] and they will assist you.        Care EveryWhere ID     This is your Care EveryWhere ID. This could be used by other organizations to access your [facility] medical records  [id number]        Your Vitals Were     Pulse Temperature Respirations Last Period Pulse Oximetry Breastfeeding?    60 98.4  F (36.9  C) (Oral) 12 10/17/2018 99% No    BMI (Body Mass Index)                   29.29 kg/m2            Blood Pressure from Last 3 Encounters:   10/18/18 98/64   07/15/18 92/58   06/08/18 119/78    Weight from Last 3 Encounters:   10/18/18 176 lb (79.8 kg)   07/15/18 175 lb (79.4 kg)   06/08/18 176 lb 9.6 oz (80.1 kg)              We Performed the Following     T4 free     TSH        Primary Care Provider Office Phone # Fax #    Kalli FLANAGAN Jamar, APRN [phone] [address] [place] [address]        Equal Access to Services     [facility]: Hadii aad ku hadasho Soomaali, waaxda luqadaha, qaybta kaalmada adeegyada, waxtrevin flaherty . So [facility] [phone].    ATENCIÓN: Si habla español, tiene a crowe disposición servicios gratuitos de asistencia lingüística. Llame al [phone].    We comply with applicable federal civil rights laws and [place] laws. We do not discriminate on the basis of race, color, national origin, age, disability, sex, sexual orientation, or gender identity.            Thank you!     Thank you for choosing [facility]  for your care. Our goal is always to provide you with excellent care. Hearing back from our patients is one way we can continue to improve our services. Please take a few minutes to complete the written survey that you may receive in the mail after your visit with us. Thank you!             Your Updated Medication List - Protect others around you: Learn how to safely use, store and throw away your medicines at www.disposemymeds.org.           This list is accurate as of 10/18/18  4:43 PM.  Always use your most recent med list.                   Brand Name Dispense Instructions for use Diagnosis    ACETAMINOPHEN PO      Take 1,000 mg by mouth 2 times daily as needed for pain (menstrual cramps)        cetirizine 10 MG tablet    zyrTEC     Take 10 mg by mouth every morning        [facility] DIGESTIVE HEALTH Caps     100 capsule    Take 1 capsule by mouth 2 times daily    Preop general physical exam       fluticasone 50 MCG/ACT spray    FLONASE    1 Bottle    Spray 1 spray into both nostrils 2 times daily    Chronic seasonal allergic rhinitis, unspecified trigger       IBUPROFEN PO      Take 800 mg by mouth every 4 hours as needed (takes for menstrual cramps.)        levothyroxine 100 MCG tablet    SYNTHROID/LEVOTHROID    90 tablet    Take 1 tablet (100 mcg) by mouth daily    Hypothyroidism due to medication       lisinopril-hydrochlorothiazide 10-12.5 MG per tablet    PRINZIDE/ZESTORETIC    90 tablet    Take 1 tablet by mouth daily    Essential hypertension with goal blood pressure less than 140/90       mupirocin 2 % ointment    BACTROBAN

## 2018-10-18 NOTE — PROGRESS NOTES
SUBJECTIVE:   Mimi Melton is a 43 year old female who presents to clinic today for the following health issues:      Vaginal Symptoms    Duration: 4-5 days     Description  vaginal discharge - white/yeast like, but now menstruating.  Menstruating heavy today.   History of frequent yeast and BV. She is uncertain which it is today.     Intensity:  moderate    Accompanying signs and symptoms (fever/dysuria/abdominal or back pain): None    History  Sexually active: yes, single partner, contraception - IUD  Possibility of pregnancy: No  Recent antibiotic use: no     Precipitating or alleviating factors: second day of menstrual cycle     Therapies tried and outcome: none      Thyroid:  She goes to endocrinology.  Today she is due her lab studies (orders are in place by endocrine).  She has no concerns about her thryoid today.       Problem list and histories reviewed & adjusted, as indicated.  Additional history: as documented    Patient Active Problem List   Diagnosis     Surveillance of previously prescribed intrauterine contraceptive device     Vaginitis and vulvovaginitis     Exophthalmos     Abnormal Pap smear of cervix     CARDIOVASCULAR SCREENING; LDL GOAL LESS THAN 160     Essential hypertension with goal blood pressure less than 140/90     Anemia     Hypothyroidism due to acquired atrophy of thyroid     Cervical high risk HPV (human papillomavirus) test positive     Thyroid disease     Female stress incontinence     Chronic seasonal allergic rhinitis, unspecified trigger     Past Surgical History:   Procedure Laterality Date     C  DELIVERY ONLY  91    , Low Cervical     C  DELIVERY ONLY  97    , Low Cervical     C  DELIVERY ONLY  99    , Low Cervical     C INDUCED ABORTN BY D&C      Aspiration & Curettage, TAB x2     CYSTOSCOPY, SLING TRANSVAGINAL N/A 10/10/2017    Procedure: CYSTOSCOPY, SLING TRANSVAGINAL;  Midurethral Sling and Cystoscopy  (Support the Urethra with Mesh Sling and Look in the Bladder);  Surgeon: Karin Amin MD;  Location: [facility]     EYE SURGERY  6/6/2008    Orbital Decompression Dr smart      REPAIR INCISIONAL HERNIA,REDUCIBLE      Umbilical hernia Repair       Social History   Substance Use Topics     Smoking status: Never Smoker     Smokeless tobacco: Never Used     Alcohol use Yes      Comment: 1-2x's/month if that.     Family History   Problem Relation Age of Onset     Hypertension Mother      Hypertension Father      Hypertension Maternal Grandmother      Hypertension Paternal Grandmother      Hypertension Maternal Grandfather      Hypertension Paternal Grandfather          Current Outpatient Prescriptions   Medication Sig Dispense Refill     ACETAMINOPHEN PO Take 1,000 mg by mouth 2 times daily as needed for pain (menstrual cramps)       cetirizine (ZYRTEC) 10 MG tablet Take 10 mg by mouth every morning       fluticasone (FLONASE) 50 MCG/ACT spray Spray 1 spray into both nostrils 2 times daily 1 Bottle 11     IBUPROFEN PO Take 800 mg by mouth every 4 hours as needed (takes for menstrual cramps.)        Lactobacillus-Inulin ([facility] DIGESTIVE [facility]) CAPS Take 1 capsule by mouth 2 times daily 100 capsule 11     levothyroxine (SYNTHROID/LEVOTHROID) 100 MCG tablet Take 1 tablet (100 mcg) by mouth daily 90 tablet 3     lisinopril-hydrochlorothiazide (PRINZIDE/ZESTORETIC) 10-12.5 MG per tablet Take 1 tablet by mouth daily 90 tablet 3     mupirocin (BACTROBAN) 2 % ointment        Allergies   Allergen Reactions     Mold      Cannot have any meds that contain mold.     No Known Drug Allergies      Recent Labs   Lab Test  10/18/18   0850  06/08/18   0804  05/04/18   0842  03/09/18   0919  01/08/18   1548   08/09/16   0848   07/28/15   0927   LDL   --    --   124*   --    --    --   99   --   83   HDL   --    --   56   --    --    --   53   --   59   TRIG   --    --   55   --    --    --   90   --   44   ALT   --    --    --     --   14   --   28   --   14   CR   --   1.00   --    --   0.86   < >  0.93   --   1.00   GFRESTIMATED   --   61   --    --   73   < >  67   --   62   GFRESTBLACK   --   74   --    --   88   < >  81   --   75   POTASSIUM   --   3.4   --    --   3.5   < >  3.6   --   4.1   TSH  1.90   --    --   0.30*   --    < >  0.44   < >  3.98    < > = values in this interval not displayed.      BP Readings from Last 3 Encounters:   10/18/18 98/64   07/15/18 92/58   06/08/18 119/78    Wt Readings from Last 3 Encounters:   10/18/18 176 lb (79.8 kg)   07/15/18 175 lb (79.4 kg)   06/08/18 176 lb 9.6 oz (80.1 kg)                  Labs reviewed in EPIC    Reviewed and updated as needed this visit by clinical staff       Reviewed and updated as needed this visit by Provider         ROS:  Constitutional, HEENT, cardiovascular, pulmonary, gi and gu systems are negative, except as otherwise noted.    OBJECTIVE:     BP 98/64  Pulse 60  Temp 98.4  F (36.9  C) (Oral)  Resp 12  Wt 176 lb (79.8 kg)  LMP 10/17/2018  SpO2 99%  Breastfeeding? No  BMI 29.29 kg/m2  Body mass index is 29.29 kg/(m^2).  GENERAL APPEARANCE: healthy, alert and no distress. Smiling.   SKIN: warm and dry  PSYCH: mentation appears normal and affect normal/bright.  Good eye contact.      ASSESSMENT/PLAN:     (N89.8) Vaginal discharge  (primary encounter diagnosis)  Comment: uncertain  Plan: Wet prep        For today, since she is menstruating she opts to not have a wet prep done.  She will wait until after her period.  If she still havs vaginal symptoms, she will return for a lab only wet prep and I will treat if positive.     (E03.4) Hypothyroidism due to acquired atrophy of thyroid  Comment:   Plan: continue care with endocrinology.  Labs drawn today.         MARGA Casey [facility]

## 2018-10-23 ENCOUNTER — OFFICE VISIT (OUTPATIENT)
Dept: ENDOCRINOLOGY | Facility: CLINIC | Age: 43
End: 2018-10-23
Payer: COMMERCIAL

## 2018-10-23 VITALS
BODY MASS INDEX: 29.49 KG/M2 | SYSTOLIC BLOOD PRESSURE: 119 MMHG | DIASTOLIC BLOOD PRESSURE: 80 MMHG | WEIGHT: 177 LBS | HEART RATE: 98 BPM | HEIGHT: 65 IN

## 2018-10-23 DIAGNOSIS — E03.4 HYPOTHYROIDISM DUE TO ACQUIRED ATROPHY OF THYROID: Primary | ICD-10-CM

## 2018-10-23 ASSESSMENT — PAIN SCALES - GENERAL: PAINLEVEL: NO PAIN (0)

## 2018-10-23 NOTE — PROGRESS NOTES
"This 43 year old woman returns for f/u of her hypothyroidism.. She has Graves disease and underwent 131-I ablation in 2005 and has been on replacement since then.  She had ophthalmopathy that required surgical rx and hypertension. She was found to be over replaced about a year ago and the dose has been reduced.  She is now on 100 mcg each day and takes it every day without fail.  She denies palpitations, temperature tolerance.   She doesn't have any eye sx.  She saw the eye MD recently and was told things are stable.  Checks her BP at home and it is always in target.  Has been trying to eat smaller portions and exercise more to control her weight.          Current Outpatient Prescriptions on File Prior to Visit:  ACETAMINOPHEN PO Take 1,000 mg by mouth 2 times daily as needed for pain (menstrual cramps)   cetirizine (ZYRTEC) 10 MG tablet Take 10 mg by mouth every morning   fluticasone (FLONASE) 50 MCG/ACT spray Spray 1 spray into both nostrils 2 times daily   IBUPROFEN PO Take 800 mg by mouth every 4 hours as needed (takes for menstrual cramps.)    Lactobacillus-Inulin ([facility] DIGESTIVE [facility]) CAPS Take 1 capsule by mouth 2 times daily   levothyroxine (SYNTHROID/LEVOTHROID) 100 MCG tablet Take 1 tablet (100 mcg) by mouth daily   lisinopril-hydrochlorothiazide (PRINZIDE/ZESTORETIC) 10-12.5 MG per tablet Take 1 tablet by mouth daily   mupirocin (BACTROBAN) 2 % ointment      No current facility-administered medications on file prior to visit.     ROS: 10 point ROS neg other than the symptoms noted above in the HPI.    So Hx - no cigs    Fm Hx - older relative have type 2 diabetes    Vital signs:   /80  Pulse 98  Ht 1.651 m (5' 5\")  Wt 80.3 kg (177 lb)  LMP 10/17/2018  BMI 29.45 kg/m2  Estimated body mass index is 29.45 kg/(m^2) as calculated from the following:    Height as of this encounter: 1.651 m (5' 5\").    Weight as of this encounter: 80.3 kg (177 lb).    VSS  NAD  Eyes - no periorbital edema, " conjunctival injection, scleral icterus  Neck - no thyromegaly  Neuro -  DTR 2/4 biceps  Skin - normal texture   Mood - cheerful    ENDO THYROID LABS-[facility] Latest Ref Rng & Units 10/23/2018 10/18/2018   TSH 0.40 - 4.00 mU/L  1.90   T4 FREE 0.76 - 1.46 ng/dL  1.19   TRIIODOTHYRONINE(T3) 60 - 181 ng/dL     THYROID STIM IMMUNOG      ENDO VITALS-[facility]  10/23/2018 10/18/2018   Weight  80.287 kg 79.833 kg   Weight  177 lb 176 lb   Height  65 in    BP  119/80 98/64   Pulse  98 60   Resp   12   BSA (Calculated - sq m)  1.92    BMI (Calculated)  29.52    Temp   98.4   Temp src   Oral   SpO2   99   ENDO DIABETES Latest Ref Rng & Units     HGB 11.7 - 15.7 g/dL     GLUCOSE 70 - 99 mg/dL       ENDO THYROID LABS-[facility] Latest Ref Rng & Units 7/15/2018 6/8/2018   TSH 0.40 - 4.00 mU/L     T4 FREE 0.76 - 1.46 ng/dL     TRIIODOTHYRONINE(T3) 60 - 181 ng/dL     THYROID STIM IMMUNOG      ENDO VITALS-[facility]  7/15/2018 6/8/2018   Weight  79.379 kg    Weight  175 lb    Height  65 in    BP  92/58 119/78   Pulse  76    Resp  16    BSA (Calculated - sq m)  1.91    BMI (Calculated)  29.18    Temp  97.9    Temp src  Oral    SpO2  98    ENDO DIABETES Latest Ref Rng & Units  6/8/2018   HGB 11.7 - 15.7 g/dL  12.1   GLUCOSE 70 - 99 mg/dL  83     Assessment/Plan:    1.  Graves' disease.  She is now more than 15 years status post radioiodine treatment of her Graves' disease.  She is on a stable dose of thyroid hormone and her thyroid function tests are in target.  We will continue this current dose.  I will return her to the care of her primary care doctor.  I recommended she have annual measurement of her thyroid test to determine if the dose is adequate.  I also recommended she continue to have follow-up with ophthalmologist.    2.  Family history of type 2 diabetes.  Her blood glucose was normal earlier this year.  I reviewed the evidence that losing weight, keeping it off, and exercising regularly will delay the onset of diabetes.    Follow-up  SPENSER.    Sandhya Harmon MD

## 2018-10-23 NOTE — MR AVS SNAPSHOT
"              After Visit Summary   10/23/2018    Mimi Melton    MRN: [id number]           Patient Information     Date Of Birth          1975        Visit Information        Provider Department      10/23/2018 10:00 AM Sandhya Harmon MD  [facility]        Today's Diagnoses     Hypothyroidism due to acquired atrophy of thyroid    -  1       Follow-ups after your visit        Follow-up notes from your care team     Return if symptoms worsen or fail to improve.      Who to contact     Please call your clinic at [phone] to:    Ask questions about your health    Make or cancel appointments    Discuss your medicines    Learn about your test results    Speak to your doctor            Additional Information About Your Visit        TiempyharclickTRUE Information     Mabaya gives you secure access to your electronic health record. If you see a primary care provider, you can also send messages to your care team and make appointments. If you have questions, please call your primary care clinic.  If you do not have a primary care provider, please call [phone] and they will assist you.      Mabaya is an electronic gateway that provides easy, online access to your medical records. With Mabaya, you can request a clinic appointment, read your test results, renew a prescription or communicate with your care team.     To access your existing account, please contact your [facility] or call [phone] for assistance.        Care EveryWhere ID     This is your Care EveryWhere ID. This could be used by other organizations to access your [place] medical records  [id number]        Your Vitals Were     Pulse Height Last Period BMI (Body Mass Index)          98 1.651 m (5' 5\") 10/17/2018 29.45 kg/m2         Blood Pressure from Last 3 Encounters:   10/23/18 119/80   10/18/18 98/64   07/15/18 92/58    Weight from Last 3 Encounters:   10/23/18 80.3 kg (177 lb)   10/18/18 79.8 kg " (176 lb)   07/15/18 79.4 kg (175 lb)              Today, you had the following     No orders found for display       Primary Care Provider Office Phone # Fax #    MARGA Lyon JUDIE [phone] [address] [place] [address]        Equal Access to Services     DENA RAMOS : Hadii aad ku hadasho Soomaali, waaxda luqadaha, qaybta kaalmada adeegyada, waxay idiin hayaan adeeg kharash lapatty . So [facility] [phone].    ATENCIÓN: Si habla español, tiene a crowe disposición servicios gratuitos de asistencia lingüística. Llame al [phone].    We comply with applicable federal civil rights laws and [place] laws. We do not discriminate on the basis of race, color, national origin, age, disability, sex, sexual orientation, or gender identity.            Thank you!     Thank you for choosing [facility]  for your care. Our goal is always to provide you with excellent care. Hearing back from our patients is one way we can continue to improve our services. Please take a few minutes to complete the written survey that you may receive in the mail after your visit with us. Thank you!             Your Updated Medication List - Protect others around you: Learn how to safely use, store and throw away your medicines at www.disposemymeds.org.          This list is accurate as of 10/23/18 10:20 AM.  Always use your most recent med list.                   Brand Name Dispense Instructions for use Diagnosis    ACETAMINOPHEN PO      Take 1,000 mg by mouth 2 times daily as needed for pain (menstrual cramps)        cetirizine 10 MG tablet    zyrTEC     Take 10 mg by mouth every morning        [facility] DIGESTIVE HEALTH Caps     100 capsule    Take 1 capsule by mouth 2 times daily    Preop general physical exam       fluticasone 50 MCG/ACT spray    FLONASE    1 Bottle    Spray 1 spray into both nostrils 2 times daily    Chronic seasonal allergic rhinitis, unspecified trigger        IBUPROFEN PO      Take 800 mg by mouth every 4 hours as needed (takes for menstrual cramps.)        levothyroxine 100 MCG tablet    SYNTHROID/LEVOTHROID    90 tablet    Take 1 tablet (100 mcg) by mouth daily    Hypothyroidism due to medication       lisinopril-hydrochlorothiazide 10-12.5 MG per tablet    PRINZIDE/ZESTORETIC    90 tablet    Take 1 tablet by mouth daily    Essential hypertension with goal blood pressure less than 140/90       mupirocin 2 % ointment    BACTROBAN

## 2018-10-23 NOTE — LETTER
"10/23/2018     RE: Mimi Melton  [address] [address]     Dear Colleague,    Thank you for referring your patient, Mimi Melton, to the [facility] ENDOCRINOLOGY at [facility]. Please see a copy of my visit note below.    This 43 year old woman returns for f/u of her hypothyroidism.. She has Graves disease and underwent 131-I ablation in 2005 and has been on replacement since then.  She had ophthalmopathy that required surgical rx and hypertension. She was found to be over replaced about a year ago and the dose has been reduced.  She is now on 100 mcg each day and takes it every day without fail.  She denies palpitations, temperature tolerance.   She doesn't have any eye sx.  She saw the eye MD recently and was told things are stable.  Checks her BP at home and it is always in target.  Has been trying to eat smaller portions and exercise more to control her weight.          Current Outpatient Prescriptions on File Prior to Visit:  ACETAMINOPHEN PO Take 1,000 mg by mouth 2 times daily as needed for pain (menstrual cramps)   cetirizine (ZYRTEC) 10 MG tablet Take 10 mg by mouth every morning   fluticasone (FLONASE) 50 MCG/ACT spray Spray 1 spray into both nostrils 2 times daily   IBUPROFEN PO Take 800 mg by mouth every 4 hours as needed (takes for menstrual cramps.)    Lactobacillus-Inulin ([facility] DIGESTIVE HEALTH) CAPS Take 1 capsule by mouth 2 times daily   levothyroxine (SYNTHROID/LEVOTHROID) 100 MCG tablet Take 1 tablet (100 mcg) by mouth daily   lisinopril-hydrochlorothiazide (PRINZIDE/ZESTORETIC) 10-12.5 MG per tablet Take 1 tablet by mouth daily   mupirocin (BACTROBAN) 2 % ointment      No current facility-administered medications on file prior to visit.     ROS: 10 point ROS neg other than the symptoms noted above in the HPI.    So Hx - no cigs    Fm Hx - older relative have type 2 diabetes    Vital signs:   /80  Pulse 98  Ht 1.651 m (5' 5\")  " "Wt 80.3 kg (177 lb)  LMP 10/17/2018  BMI 29.45 kg/m2  Estimated body mass index is 29.45 kg/(m^2) as calculated from the following:    Height as of this encounter: 1.651 m (5' 5\").    Weight as of this encounter: 80.3 kg (177 lb).    VSS  NAD  Eyes - no periorbital edema, conjunctival injection, scleral icterus  Neck - no thyromegaly  Neuro -  DTR 2/4 biceps  Skin - normal texture   Mood - cheerful    ENDO THYROID LABS-[facility] Latest Ref Rng & Units 10/23/2018 10/18/2018   TSH 0.40 - 4.00 mU/L  1.90   T4 FREE 0.76 - 1.46 ng/dL  1.19   TRIIODOTHYRONINE(T3) 60 - 181 ng/dL     THYROID STIM IMMUNOG      ENDO VITALS-[facility]  10/23/2018 10/18/2018   Weight  80.287 kg 79.833 kg   Weight  177 lb 176 lb   Height  65 in    BP  119/80 98/64   Pulse  98 60   Resp   12   BSA (Calculated - sq m)  1.92    BMI (Calculated)  29.52    Temp   98.4   Temp src   Oral   SpO2   99   ENDO DIABETES Latest Ref Rng & Units     HGB 11.7 - 15.7 g/dL     GLUCOSE 70 - 99 mg/dL       ENDO THYROID LABS-[facility] Latest Ref Rng & Units 7/15/2018 6/8/2018   TSH 0.40 - 4.00 mU/L     T4 FREE 0.76 - 1.46 ng/dL     TRIIODOTHYRONINE(T3) 60 - 181 ng/dL     THYROID STIM IMMUNOG      ENDO VITALS-[facility]  7/15/2018 6/8/2018   Weight  79.379 kg    Weight  175 lb    Height  65 in    BP  92/58 119/78   Pulse  76    Resp  16    BSA (Calculated - sq m)  1.91    BMI (Calculated)  29.18    Temp  97.9    Temp src  Oral    SpO2  98    ENDO DIABETES Latest Ref Rng & Units  6/8/2018   HGB 11.7 - 15.7 g/dL  12.1   GLUCOSE 70 - 99 mg/dL  83     Assessment/Plan:    1.  Graves' disease.  She is now more than 15 years status post radioiodine treatment of her Graves' disease.  She is on a stable dose of thyroid hormone and her thyroid function tests are in target.  We will continue this current dose.  I will return her to the care of her primary care doctor.  I recommended she have annual measurement of her thyroid test to determine if the dose is adequate.  I also recommended she continue " to have follow-up with ophthalmologist.    2.  Family history of type 2 diabetes.  Her blood glucose was normal earlier this year.  I reviewed the evidence that losing weight, keeping it off, and exercising regularly will delay the onset of diabetes.    Follow-up PRN.    Sandhya Harmon MD

## 2018-12-03 DIAGNOSIS — N89.8 VAGINAL DISCHARGE: ICD-10-CM

## 2018-12-03 LAB
SPECIMEN SOURCE: ABNORMAL
WET PREP SPEC: ABNORMAL

## 2018-12-03 PROCEDURE — 87210 SMEAR WET MOUNT SALINE/INK: CPT | Performed by: NURSE PRACTITIONER

## 2018-12-04 ENCOUNTER — TELEPHONE (OUTPATIENT)
Dept: FAMILY MEDICINE | Facility: CLINIC | Age: 43
End: 2018-12-04

## 2018-12-04 DIAGNOSIS — B96.89 BACTERIAL VAGINOSIS: Primary | ICD-10-CM

## 2018-12-04 DIAGNOSIS — N76.0 BACTERIAL VAGINOSIS: Primary | ICD-10-CM

## 2018-12-04 RX ORDER — METRONIDAZOLE 500 MG/1
500 TABLET ORAL 2 TIMES DAILY
Qty: 14 TABLET | Refills: 0 | Status: SHIPPED | OUTPATIENT
Start: 2018-12-04 | End: 2019-01-11

## 2018-12-04 NOTE — TELEPHONE ENCOUNTER
Telephone call to the patient.  I informed her of the positive wet prep and bacterial vaginosis.  She is requesting a prescription for metronidazole be sent into her [facility] pharmacy.  This was done.

## 2018-12-21 DIAGNOSIS — N76.0 VAGINITIS AND VULVOVAGINITIS: Primary | ICD-10-CM

## 2018-12-21 RX ORDER — FLUCONAZOLE 150 MG/1
150 TABLET ORAL ONCE
Qty: 1 TABLET | Refills: 0 | Status: SHIPPED | OUTPATIENT
Start: 2018-12-21 | End: 2019-04-15

## 2018-12-21 NOTE — TELEPHONE ENCOUNTER
Reason for Call:  Medication or medication refill:    Do you use a [facility]?  Name of the pharmacy and phone number for the current request:  [id number] [place] [address]    Name of the medication requested: Medication for yeast infection    Other request: Patient states she has a yeast infection - most likely from the antibiotic she recently was on. Please assist. Thanks!     Can we leave a detailed message on this number? YES    Phone number patient can be reached at: Home number on file [phone] (home)    Best Time: Any    Call taken on 12/21/2018 at 11:04 AM by Reny Molina

## 2019-01-07 ENCOUNTER — OFFICE VISIT (OUTPATIENT)
Dept: OBGYN | Facility: CLINIC | Age: 44
End: 2019-01-07
Payer: COMMERCIAL

## 2019-01-07 VITALS
DIASTOLIC BLOOD PRESSURE: 87 MMHG | SYSTOLIC BLOOD PRESSURE: 126 MMHG | OXYGEN SATURATION: 97 % | BODY MASS INDEX: 29.12 KG/M2 | HEART RATE: 125 BPM | WEIGHT: 175 LBS

## 2019-01-07 DIAGNOSIS — N92.6 IRREGULAR MENSES: Primary | ICD-10-CM

## 2019-01-07 DIAGNOSIS — N89.8 VAGINAL DISCHARGE: ICD-10-CM

## 2019-01-07 DIAGNOSIS — R10.2 PELVIC PAIN IN FEMALE: ICD-10-CM

## 2019-01-07 LAB
BETA HCG QUAL IFA URINE: NEGATIVE
FSH SERPL-ACNC: 3.9 IU/L
SPECIMEN SOURCE: NORMAL
WET PREP SPEC: NORMAL

## 2019-01-07 PROCEDURE — 99214 OFFICE O/P EST MOD 30 MIN: CPT | Performed by: OBSTETRICS & GYNECOLOGY

## 2019-01-07 PROCEDURE — 84703 CHORIONIC GONADOTROPIN ASSAY: CPT | Performed by: OBSTETRICS & GYNECOLOGY

## 2019-01-07 PROCEDURE — 36415 COLL VENOUS BLD VENIPUNCTURE: CPT | Performed by: OBSTETRICS & GYNECOLOGY

## 2019-01-07 PROCEDURE — 83001 ASSAY OF GONADOTROPIN (FSH): CPT | Performed by: OBSTETRICS & GYNECOLOGY

## 2019-01-07 PROCEDURE — 87210 SMEAR WET MOUNT SALINE/INK: CPT | Performed by: OBSTETRICS & GYNECOLOGY

## 2019-01-07 NOTE — PROGRESS NOTES
SUBJECTIVE:  Mimi Melton is a 43 year old  who presents to evaluate abnormal bleeding.  She describes very regular menstrual periods in the past, but recently has been having irregular bleeding and spotting.  She had no menstrual period in July.  She then had regular periods until December, when she began to spot all through the month.  She has also had pelvic pain and cramping.  She has a ParaGard IUD, do to be changed when she is 46.  Pap in  was negative, other HPV positive.  Pap 2016 was negative, HPV negative.  She wonders about transition to menopause.    OBJECTIVE: /87   Pulse 125   Wt 79.4 kg (175 lb)   LMP 2018   SpO2 97%   BMI 29.12 kg/m       Pelvic exam:  External genitalia appeared normal without lesion.  Urethral meatus, urethra, bladder, perineum, and anus appeared normal. Cervix and vagina appeared normal, without lesion.  Bimanual exam revealed a normal sized non-tender uterus, with no adnexal masses.  Rectovaginal deferred.      ASSESSMENT: Irregular bleeding.    PLAN: We discussed irregular bleeding, transition to menopause.  She will do a wet prep.  UPT and FSH will be sent.  She will plan to schedule ultrasound, I will see her back after the ultrasound, and we will reassess.    Please note greater than 50% of this 25 minute appointment were spent in counseling with the patient of the issues described above in the history of present illness and in the plan, including evaluation and managment of irregular bleeding, menopause..

## 2019-01-10 ENCOUNTER — ANCILLARY PROCEDURE (OUTPATIENT)
Dept: ULTRASOUND IMAGING | Facility: CLINIC | Age: 44
End: 2019-01-10
Payer: COMMERCIAL

## 2019-01-10 DIAGNOSIS — R10.2 PELVIC PAIN IN FEMALE: ICD-10-CM

## 2019-01-10 PROCEDURE — 76830 TRANSVAGINAL US NON-OB: CPT | Performed by: OBSTETRICS & GYNECOLOGY

## 2019-01-11 ENCOUNTER — OFFICE VISIT (OUTPATIENT)
Dept: FAMILY MEDICINE | Facility: CLINIC | Age: 44
End: 2019-01-11
Payer: COMMERCIAL

## 2019-01-11 VITALS
OXYGEN SATURATION: 98 % | WEIGHT: 171 LBS | SYSTOLIC BLOOD PRESSURE: 115 MMHG | HEART RATE: 123 BPM | TEMPERATURE: 97.9 F | DIASTOLIC BLOOD PRESSURE: 80 MMHG | BODY MASS INDEX: 28.46 KG/M2 | RESPIRATION RATE: 18 BRPM

## 2019-01-11 DIAGNOSIS — J20.9 ACUTE BRONCHITIS, UNSPECIFIED ORGANISM: Primary | ICD-10-CM

## 2019-01-11 PROCEDURE — 99213 OFFICE O/P EST LOW 20 MIN: CPT | Performed by: NURSE PRACTITIONER

## 2019-01-11 RX ORDER — CODEINE PHOSPHATE AND GUAIFENESIN 10; 100 MG/5ML; MG/5ML
1-2 SOLUTION ORAL EVERY 4 HOURS PRN
Qty: 240 ML | Refills: 0 | Status: SHIPPED | OUTPATIENT
Start: 2019-01-11 | End: 2020-02-10

## 2019-01-11 RX ORDER — AZITHROMYCIN 250 MG/1
TABLET, FILM COATED ORAL
Qty: 6 TABLET | Refills: 0 | Status: SHIPPED | OUTPATIENT
Start: 2019-01-11 | End: 2019-01-11

## 2019-01-11 RX ORDER — AZITHROMYCIN 250 MG/1
TABLET, FILM COATED ORAL
Qty: 6 TABLET | Refills: 0 | Status: SHIPPED | OUTPATIENT
Start: 2019-01-11 | End: 2020-02-10

## 2019-01-11 NOTE — PROGRESS NOTES
SUBJECTIVE:   Mimi Melton is a 43 year old female who presents to clinic today for the following health issues:      RESPIRATORY SYMPTOMS      Duration: 4 days     Description  sore throat, cough, wheezing and chills, cramping.  Symptoms are getting worse.      Severity: moderate    Accompanying signs and symptoms: see above     History (predisposing factors):  none    Precipitating or alleviating factors: None    Therapies tried and outcome: none          Problem list and histories reviewed & adjusted, as indicated.  Additional history: as documented    Patient Active Problem List   Diagnosis     Surveillance of previously prescribed intrauterine contraceptive device     Vaginitis and vulvovaginitis     Exophthalmos     Abnormal Pap smear of cervix     CARDIOVASCULAR SCREENING; LDL GOAL LESS THAN 160     Essential hypertension with goal blood pressure less than 140/90     Anemia     Hypothyroidism due to acquired atrophy of thyroid     Cervical high risk HPV (human papillomavirus) test positive     Thyroid disease     Female stress incontinence     Chronic seasonal allergic rhinitis, unspecified trigger     Past Surgical History:   Procedure Laterality Date     C  DELIVERY ONLY  91    , Low Cervical     C  DELIVERY ONLY  97    , Low Cervical     C  DELIVERY ONLY  99    , Low Cervical     C INDUCED ABORTN BY D&C      Aspiration & Curettage, TAB x2     CYSTOSCOPY, SLING TRANSVAGINAL N/A 10/10/2017    Procedure: CYSTOSCOPY, SLING TRANSVAGINAL;  Midurethral Sling and Cystoscopy (Support the Urethra with Mesh Sling and Look in the Bladder);  Surgeon: Karin Amin MD;  Location: [facility] OR     EYE SURGERY  2008    Orbital Decompression Dr smart      REPAIR INCISIONAL HERNIA,REDUCIBLE      Umbilical hernia Repair       Social History     Tobacco Use     Smoking status: Never Smoker     Smokeless tobacco: Never Used   Substance Use Topics      Alcohol use: Yes     Comment: 1-2x's/month if that.     Family History   Problem Relation Age of Onset     Hypertension Mother      Hypertension Father      Hypertension Maternal Grandmother      Hypertension Paternal Grandmother      Hypertension Maternal Grandfather      Hypertension Paternal Grandfather          Current Outpatient Medications   Medication Sig Dispense Refill     ACETAMINOPHEN PO Take 1,000 mg by mouth 2 times daily as needed for pain (menstrual cramps)       cetirizine (ZYRTEC) 10 MG tablet Take 10 mg by mouth every morning       fluticasone (FLONASE) 50 MCG/ACT spray Spray 1 spray into both nostrils 2 times daily 1 Bottle 11     IBUPROFEN PO Take 800 mg by mouth every 4 hours as needed (takes for menstrual cramps.)        Lactobacillus-Inulin ([employer] DIGESTIVE HEALTH) CAPS Take 1 capsule by mouth 2 times daily 100 capsule 11     levothyroxine (SYNTHROID/LEVOTHROID) 100 MCG tablet Take 1 tablet (100 mcg) by mouth daily 90 tablet 3     lisinopril-hydrochlorothiazide (PRINZIDE/ZESTORETIC) 10-12.5 MG per tablet Take 1 tablet by mouth daily 90 tablet 3     mupirocin (BACTROBAN) 2 % ointment        Allergies   Allergen Reactions     Mold      Cannot have any meds that contain mold.     No Known Drug Allergies      Recent Labs   Lab Test 10/18/18  0850 06/08/18  0804 05/04/18  0842 03/09/18  0919 01/08/18  1548  08/09/16  0848  07/28/15  0927   LDL  --   --  124*  --   --   --  99  --  83   HDL  --   --  56  --   --   --  53  --  59   TRIG  --   --  55  --   --   --  90  --  44   ALT  --   --   --   --  14  --  28  --  14   CR  --  1.00  --   --  0.86   < > 0.93  --  1.00   GFRESTIMATED  --  61  --   --  73   < > 67  --  62   GFRESTBLACK  --  74  --   --  88   < > 81  --  75   POTASSIUM  --  3.4  --   --  3.5   < > 3.6  --  4.1   TSH 1.90  --   --  0.30*  --    < > 0.44   < > 3.98    < > = values in this interval not displayed.      BP Readings from Last 3 Encounters:   01/11/19 115/80   01/07/19  126/87   10/23/18 119/80    Wt Readings from Last 3 Encounters:   01/11/19 77.6 kg (171 lb)   01/07/19 79.4 kg (175 lb)   10/23/18 80.3 kg (177 lb)            Labs reviewed in EPIC    Reviewed and updated as needed this visit by clinical staff  Tobacco  Allergies  Med Hx  Surg Hx  Fam Hx  Soc Hx      Reviewed and updated as needed this visit by Provider         ROS:  Constitutional, HEENT, cardiovascular, pulmonary, gi and gu systems are negative, except as otherwise noted.    OBJECTIVE:     /80 (BP Location: Right arm, Patient Position: Sitting, Cuff Size: Adult Regular)   Pulse 123   Temp 97.9  F (36.6  C) (Oral)   Resp 18   Wt 77.6 kg (171 lb)   LMP 12/07/2018 (Exact Date)   SpO2 98%   Breastfeeding? No   BMI 28.46 kg/m    Body mass index is 28.46 kg/m .  EXAM:  Constitutional: healthy, alert, active and mild distress  Neck: Neck supple. No adenopathy.  ENT: Bilateral TM's are normal.  Posterior oropharynx is clear.  Nares clear without congestion.  Cardiovascular: S1, S2  Respiratory:frequent hacky cough.  Respirations easy and regular. No respiratory distress. Lungs sounds CTA.  Skin: warm and dry  Psychiatric: mentation appears normal. and affect normal/bright      ASSESSMENT/PLAN:     (J20.9) Acute bronchitis, unspecified organism  (primary encounter diagnosis)  Comment: acute  Plan: guaiFENesin-codeine (ROBITUSSIN AC) 100-10         MG/5ML solution, azithromycin (ZITHROMAX) 250         MG tablet, DISCONTINUED: azithromycin         (ZITHROMAX) 250 MG tablet        Supportive management. Push fluids, rest. Tylenol or advil prn for the next 1-2 days for pain and or fever.   Meds as prescribed.  Call or return with new or worsening sx.          MARGA Casey [facility]

## 2019-01-12 ENCOUNTER — NURSE TRIAGE (OUTPATIENT)
Dept: NURSING | Facility: CLINIC | Age: 44
End: 2019-01-12

## 2019-01-12 DIAGNOSIS — R05.9 COUGH: Primary | ICD-10-CM

## 2019-01-12 RX ORDER — CODEINE PHOSPHATE AND GUAIFENESIN 10; 100 MG/5ML; MG/5ML
1-2 SOLUTION ORAL AT BEDTIME
Qty: 40 ML | Refills: 1 | Status: SHIPPED | OUTPATIENT
Start: 2019-01-12 | End: 2019-07-11

## 2019-01-12 NOTE — TELEPHONE ENCOUNTER
I told her if it contains a narcotic she has to be seen in urgent care for that hand carried document. She is going to bring her left over medication, that doesn't make her vomit, to the urgent care for them to write for that type of cough medication and will probably get it filled a [facility] where she previously got their generic product that works, doesn't cause her to vomit.  Britni Gomez RN-[facility]

## 2019-01-15 ENCOUNTER — OFFICE VISIT (OUTPATIENT)
Dept: OBGYN | Facility: CLINIC | Age: 44
End: 2019-01-15
Payer: COMMERCIAL

## 2019-01-15 VITALS — HEART RATE: 129 BPM | OXYGEN SATURATION: 100 % | DIASTOLIC BLOOD PRESSURE: 99 MMHG | SYSTOLIC BLOOD PRESSURE: 141 MMHG

## 2019-01-15 DIAGNOSIS — N92.6 IRREGULAR MENSES: Primary | ICD-10-CM

## 2019-01-15 DIAGNOSIS — R10.2 PELVIC PAIN IN FEMALE: ICD-10-CM

## 2019-01-15 PROCEDURE — 99214 OFFICE O/P EST MOD 30 MIN: CPT | Performed by: OBSTETRICS & GYNECOLOGY

## 2019-01-15 NOTE — PROGRESS NOTES
SUBJECTIVE:  Mimi Melton is a 43 year old  who presents to evaluate pelvic pain, irregular menses.  See prior GYN note 2019.  Wet prep was negative.  Her FSH was 3.9.  She had a period over the weekend of -.  Her pain has decreased.    We reviewed the ultrasound done 1/10/2019.  4 cm posterior left fibroid was seen.  The IUD is properly placed in the endometrial cavity.  The endometrium measures 5 mm.  Right ovary contained a small complex cyst, likely physiologic.  Left ovary contains a small simple cyst.    OBJECTIVE: BP (!) 141/99   Pulse 129   LMP 2019   SpO2 100%  Patient was not otherwise examined.      ASSESSMENT: Irregular menses, still premenopausal but transition to menopause with irregular ovulation.  Uterine fibroid.  Physiologic ovarian cysts.    PLAN: We discussed the above findings, and she is reassured by the imaging and data.  Would advise ultrasound in one in 1 year to reassess the fibroid.  Call as needed prolonged bleeding or other abnormal bleeding.  Call as needed increasing pelvic pain.    Please note greater than 50% of this 25 minute appointment were spent in counseling with the patient of the issues described above in the history of present illness and in the plan, including evaluation and managment of uterine fibroid, irregular menses, pelvic pain.

## 2019-03-01 DIAGNOSIS — E03.2 HYPOTHYROIDISM DUE TO MEDICATION: ICD-10-CM

## 2019-03-04 RX ORDER — LEVOTHYROXINE SODIUM 100 UG/1
100 TABLET ORAL DAILY
Qty: 90 TABLET | Refills: 2 | Status: SHIPPED | OUTPATIENT
Start: 2019-03-04 | End: 2019-09-13

## 2019-03-11 ENCOUNTER — ANCILLARY PROCEDURE (OUTPATIENT)
Dept: MAMMOGRAPHY | Facility: CLINIC | Age: 44
End: 2019-03-11
Attending: NURSE PRACTITIONER
Payer: COMMERCIAL

## 2019-03-11 DIAGNOSIS — Z12.31 VISIT FOR SCREENING MAMMOGRAM: ICD-10-CM

## 2019-04-15 ENCOUNTER — OFFICE VISIT (OUTPATIENT)
Dept: FAMILY MEDICINE | Facility: CLINIC | Age: 44
End: 2019-04-15
Payer: COMMERCIAL

## 2019-04-15 ENCOUNTER — MYC MEDICAL ADVICE (OUTPATIENT)
Dept: FAMILY MEDICINE | Facility: CLINIC | Age: 44
End: 2019-04-15

## 2019-04-15 VITALS
WEIGHT: 170 LBS | RESPIRATION RATE: 16 BRPM | TEMPERATURE: 98.9 F | DIASTOLIC BLOOD PRESSURE: 81 MMHG | HEART RATE: 70 BPM | SYSTOLIC BLOOD PRESSURE: 121 MMHG | BODY MASS INDEX: 28.29 KG/M2 | OXYGEN SATURATION: 98 %

## 2019-04-15 DIAGNOSIS — N94.9 VAGINAL SYMPTOM: Primary | ICD-10-CM

## 2019-04-15 LAB
SPECIMEN SOURCE: NORMAL
WET PREP SPEC: NORMAL

## 2019-04-15 PROCEDURE — 99213 OFFICE O/P EST LOW 20 MIN: CPT | Performed by: NURSE PRACTITIONER

## 2019-04-15 PROCEDURE — 87210 SMEAR WET MOUNT SALINE/INK: CPT | Performed by: NURSE PRACTITIONER

## 2019-04-15 NOTE — PROGRESS NOTES
SUBJECTIVE:   Mimi Melton is a 43 year old female who presents to clinic today for the following   health issues:        Vaginal Symptoms      Duration: 1 week     Description  vaginal discharge - white creamy and itching    Intensity:  mild    Accompanying signs and symptoms (fever/dysuria/abdominal or back pain): None    History  Sexually active: yes, single partner, contraception - not needed  Possibility of pregnancy: No  Recent antibiotic use: no   Additional history:     Reviewed  and updated as needed this visit by clinical staff         Reviewed and updated as needed this visit by Provider        ROS: 10 point ROS neg other than the symptoms noted above in the HPI.      OBJECTIVE:  /81   Pulse 70   Temp 98.9  F (37.2  C) (Oral)   Resp 16   Wt 77.1 kg (170 lb)   SpO2 98%   BMI 28.29 kg/m    Patient appears well, vital signs normal. Abdomen normal, soft without tenderness, guarding, mass or organomegaly. No inguinal adenopathy or CVA tenderness.  Pelvic Exam:Deferred  Cultures obtained:   Results for orders placed or performed in visit on 04/15/19   Wet prep   Result Value Ref Range    Specimen Description Vagina     Wet Prep No Trichomonas seen     Wet Prep No clue cells seen     Wet Prep No yeast seen     Wet Prep No WBC's seen      .    ASSESSMENT: vaginitis.    [unfilled]:  May use OTC hydrocortisone for itching.    Treatment plan per orders in [facility].   STD prevention discussed.    Return if symptoms do not resolve as anticipated.    Shanique Pearce RN, CNP

## 2019-05-28 DIAGNOSIS — I10 ESSENTIAL HYPERTENSION WITH GOAL BLOOD PRESSURE LESS THAN 140/90: ICD-10-CM

## 2019-05-28 NOTE — TELEPHONE ENCOUNTER
"Requested Prescriptions   Pending Prescriptions Disp Refills     lisinopril-hydrochlorothiazide (PRINZIDE/ZESTORETIC) 10-12.5 MG tablet [Pharmacy Med Name: LISINOPRIL-HCTZ 10-12.5 MG TAB]  Last Written Prescription Date:  05/04/2018  Last Fill Quantity: 90 tablet,  # refills: 3   Last Office Visit: 4/15/2019   Future Office Visit:      90 tablet 1     Sig: TAKE 1 TABLET BY MOUTH ONCE DAILY       Diuretics (Including Combos) Protocol Passed - 5/28/2019  1:13 AM        Passed - Blood pressure under 140/90 in past 12 months     BP Readings from Last 3 Encounters:   04/15/19 121/81   01/15/19 (!) 141/99   01/11/19 115/80           Passed - Recent (12 mo) or future (30 days) visit within the authorizing provider's specialty     Patient had office visit in the last 12 months or has a visit in the next 30 days with authorizing provider or within the authorizing provider's specialty.  See \"Patient Info\" tab in inbasket, or \"Choose Columns\" in Meds & Orders section of the refill encounter.          Passed - Medication is active on med list        Passed - Patient is age 18 or older        Passed - No active pregancy on record        Passed - Normal serum creatinine on file in past 12 months     Recent Labs   Lab Test 06/08/18  0804   CR 1.00              Passed - Normal serum potassium on file in past 12 months     Recent Labs   Lab Test 06/08/18  0804   POTASSIUM 3.4            Passed - Normal serum sodium on file in past 12 months     Recent Labs   Lab Test 06/08/18  0804                 Passed - No positive pregnancy test in past 12 months          "

## 2019-05-30 RX ORDER — LISINOPRIL/HYDROCHLOROTHIAZIDE 10-12.5 MG
TABLET ORAL
Qty: 90 TABLET | Refills: 0 | Status: SHIPPED | OUTPATIENT
Start: 2019-05-30 | End: 2019-08-27

## 2019-05-30 NOTE — TELEPHONE ENCOUNTER
Medication is being filled for 1 time refill only due to:  Patient needs to be seen because it has been more than one year since last visit.   Sent my chart message to schedule annual exam.  Char Kumar RN

## 2019-06-17 PROBLEM — J30.2 CHRONIC SEASONAL ALLERGIC RHINITIS: Status: ACTIVE | Noted: 2018-06-19

## 2019-08-25 ENCOUNTER — NURSE TRIAGE (OUTPATIENT)
Dept: NURSING | Facility: CLINIC | Age: 44
End: 2019-08-25

## 2019-08-26 NOTE — TELEPHONE ENCOUNTER
Pt in [place] and was stung on hand by insect earlier today.  Thinks it was a bee of some type. Area red, swollen, warm and itchy. No breathing difficulty. No swelling of face/lips/mouth. [place] but gave general nursing advice for her c/o itching - HC 1% cream, cold compress, Benadryl for severe itching - use caution due to drowsy side effects.

## 2019-08-27 DIAGNOSIS — I10 ESSENTIAL HYPERTENSION WITH GOAL BLOOD PRESSURE LESS THAN 140/90: ICD-10-CM

## 2019-08-27 NOTE — LETTER
[facility]  [address] [address]  Phone: [phone]    08/28/19    Mimiturner Melton  [address] [address]      To whom it may concern:     In order to ensure we are providing the best quality care, we have reviewed your chart and see that you are due for a Med follow-up appointment with labs for further refills.    We have also sent your lisinopril-hydrochlorothiazide (PRINZIDE/ZESTORETIC) 10-12.5 MG tablet medication to your pharmacy. For future medication refills, please contact your primary care clinic to schedule the above appointment. This can be requested via Sendio or by calling the clinic at [phone].    We greatly appreciate the opportunity to serve you. Thank you for trusting us with your health care.      Sincerely,    Your care team at [facility]

## 2019-08-28 RX ORDER — LISINOPRIL/HYDROCHLOROTHIAZIDE 10-12.5 MG
TABLET ORAL
Qty: 30 TABLET | Refills: 0 | Status: SHIPPED | OUTPATIENT
Start: 2019-08-28 | End: 2019-09-13

## 2019-08-28 NOTE — TELEPHONE ENCOUNTER
"1 refill approved please call and schedule med check with labs.    thanks      Requested Prescriptions   Pending Prescriptions Disp Refills     lisinopril-hydrochlorothiazide (PRINZIDE/ZESTORETIC) 10-12.5 MG tablet [Pharmacy Med Name: LISINOPRIL-HCTZ 10-12.5 MG TAB] 90 tablet 0     Sig: TAKE 1 TABLET BY MOUTH EVERY DAY       Diuretics (Including Combos) Protocol Failed - 8/27/2019  1:19 AM        Failed - Normal serum creatinine on file in past 12 months     Recent Labs   Lab Test 06/08/18  0804   CR 1.00              Failed - Normal serum potassium on file in past 12 months     Recent Labs   Lab Test 06/08/18  0804   POTASSIUM 3.4                    Failed - Normal serum sodium on file in past 12 months     Recent Labs   Lab Test 06/08/18  0804                 Passed - Blood pressure under 140/90 in past 12 months     BP Readings from Last 3 Encounters:   04/15/19 121/81   01/15/19 (!) 141/99   01/11/19 115/80                 Passed - Recent (12 mo) or future (30 days) visit within the authorizing provider's specialty     Patient had office visit in the last 12 months or has a visit in the next 30 days with authorizing provider or within the authorizing provider's specialty.  See \"Patient Info\" tab in inbasket, or \"Choose Columns\" in Meds & Orders section of the refill encounter.              Passed - Medication is active on med list        Passed - Patient is age 18 or older        Passed - No active pregancy on record        Passed - No positive pregnancy test in past 12 months          "

## 2019-09-13 ENCOUNTER — MYC MEDICAL ADVICE (OUTPATIENT)
Dept: FAMILY MEDICINE | Facility: CLINIC | Age: 44
End: 2019-09-13

## 2019-09-13 ENCOUNTER — OFFICE VISIT (OUTPATIENT)
Dept: FAMILY MEDICINE | Facility: CLINIC | Age: 44
End: 2019-09-13
Payer: COMMERCIAL

## 2019-09-13 VITALS
DIASTOLIC BLOOD PRESSURE: 64 MMHG | RESPIRATION RATE: 16 BRPM | BODY MASS INDEX: 27.32 KG/M2 | WEIGHT: 170 LBS | OXYGEN SATURATION: 99 % | HEART RATE: 73 BPM | SYSTOLIC BLOOD PRESSURE: 106 MMHG | TEMPERATURE: 98.1 F | HEIGHT: 66 IN

## 2019-09-13 DIAGNOSIS — Z00.00 ROUTINE GENERAL MEDICAL EXAMINATION AT A HEALTH CARE FACILITY: Primary | ICD-10-CM

## 2019-09-13 DIAGNOSIS — E03.2 HYPOTHYROIDISM DUE TO MEDICATION: ICD-10-CM

## 2019-09-13 DIAGNOSIS — I10 ESSENTIAL HYPERTENSION WITH GOAL BLOOD PRESSURE LESS THAN 140/90: ICD-10-CM

## 2019-09-13 DIAGNOSIS — J30.2 CHRONIC SEASONAL ALLERGIC RHINITIS: ICD-10-CM

## 2019-09-13 DIAGNOSIS — N76.0 BV (BACTERIAL VAGINOSIS): ICD-10-CM

## 2019-09-13 DIAGNOSIS — N76.0 BACTERIAL VAGINOSIS: ICD-10-CM

## 2019-09-13 DIAGNOSIS — B96.89 BACTERIAL VAGINOSIS: ICD-10-CM

## 2019-09-13 DIAGNOSIS — Z13.220 SCREENING FOR HYPERLIPIDEMIA: ICD-10-CM

## 2019-09-13 DIAGNOSIS — B96.89 BV (BACTERIAL VAGINOSIS): ICD-10-CM

## 2019-09-13 LAB
ANION GAP SERPL CALCULATED.3IONS-SCNC: 6 MMOL/L (ref 3–14)
BUN SERPL-MCNC: 15 MG/DL (ref 7–30)
CALCIUM SERPL-MCNC: 9.2 MG/DL (ref 8.5–10.1)
CHLORIDE SERPL-SCNC: 106 MMOL/L (ref 94–109)
CHOLEST SERPL-MCNC: 175 MG/DL
CO2 SERPL-SCNC: 27 MMOL/L (ref 20–32)
CREAT SERPL-MCNC: 0.92 MG/DL (ref 0.52–1.04)
CREAT UR-MCNC: 111 MG/DL
GFR SERPL CREATININE-BSD FRML MDRD: 76 ML/MIN/{1.73_M2}
GLUCOSE SERPL-MCNC: 83 MG/DL (ref 70–99)
HDLC SERPL-MCNC: 61 MG/DL
LDLC SERPL CALC-MCNC: 101 MG/DL
MICROALBUMIN UR-MCNC: 6 MG/L
MICROALBUMIN/CREAT UR: 5.21 MG/G CR (ref 0–25)
NONHDLC SERPL-MCNC: 114 MG/DL
POTASSIUM SERPL-SCNC: 3.6 MMOL/L (ref 3.4–5.3)
SODIUM SERPL-SCNC: 139 MMOL/L (ref 133–144)
SPECIMEN SOURCE: ABNORMAL
TRIGL SERPL-MCNC: 64 MG/DL
TSH SERPL DL<=0.005 MIU/L-ACNC: 0.55 MU/L (ref 0.4–4)
WET PREP SPEC: ABNORMAL

## 2019-09-13 PROCEDURE — 80048 BASIC METABOLIC PNL TOTAL CA: CPT | Performed by: NURSE PRACTITIONER

## 2019-09-13 PROCEDURE — 84443 ASSAY THYROID STIM HORMONE: CPT | Performed by: NURSE PRACTITIONER

## 2019-09-13 PROCEDURE — 87210 SMEAR WET MOUNT SALINE/INK: CPT | Performed by: NURSE PRACTITIONER

## 2019-09-13 PROCEDURE — 80061 LIPID PANEL: CPT | Performed by: NURSE PRACTITIONER

## 2019-09-13 PROCEDURE — 99396 PREV VISIT EST AGE 40-64: CPT | Performed by: NURSE PRACTITIONER

## 2019-09-13 PROCEDURE — 82043 UR ALBUMIN QUANTITATIVE: CPT | Performed by: NURSE PRACTITIONER

## 2019-09-13 PROCEDURE — 99214 OFFICE O/P EST MOD 30 MIN: CPT | Mod: 25 | Performed by: NURSE PRACTITIONER

## 2019-09-13 PROCEDURE — 36415 COLL VENOUS BLD VENIPUNCTURE: CPT | Performed by: NURSE PRACTITIONER

## 2019-09-13 RX ORDER — LEVOTHYROXINE SODIUM 100 UG/1
100 TABLET ORAL DAILY
Qty: 90 TABLET | Refills: 3 | Status: SHIPPED | OUTPATIENT
Start: 2019-09-13 | End: 2020-10-06

## 2019-09-13 RX ORDER — CLINDAMYCIN HCL 150 MG
150 CAPSULE ORAL 3 TIMES DAILY
Qty: 21 CAPSULE | Refills: 0 | Status: CANCELLED | OUTPATIENT
Start: 2019-09-13

## 2019-09-13 RX ORDER — LISINOPRIL/HYDROCHLOROTHIAZIDE 10-12.5 MG
1 TABLET ORAL DAILY
Qty: 90 TABLET | Refills: 3 | Status: SHIPPED | OUTPATIENT
Start: 2019-09-13 | End: 2020-10-07 | Stop reason: ALTCHOICE

## 2019-09-13 RX ORDER — METRONIDAZOLE 500 MG/1
500 TABLET ORAL 2 TIMES DAILY
Qty: 14 TABLET | Refills: 0 | Status: SHIPPED | OUTPATIENT
Start: 2019-09-13 | End: 2019-09-13 | Stop reason: ALTCHOICE

## 2019-09-13 RX ORDER — CLINDAMYCIN HCL 150 MG
150 CAPSULE ORAL 3 TIMES DAILY
Qty: 21 CAPSULE | Refills: 0 | Status: SHIPPED | OUTPATIENT
Start: 2019-09-13 | End: 2020-02-10

## 2019-09-13 RX ORDER — FLUTICASONE PROPIONATE 50 MCG
1 SPRAY, SUSPENSION (ML) NASAL 2 TIMES DAILY
Qty: 48 G | Refills: 3 | Status: SHIPPED | OUTPATIENT
Start: 2019-09-13 | End: 2020-10-29

## 2019-09-13 ASSESSMENT — MIFFLIN-ST. JEOR: SCORE: 1434.92

## 2019-09-13 NOTE — TELEPHONE ENCOUNTER
Kalli Roldan CNP  Pt prefers cleocin for the BV as it is easier on her stomach  I will franc up an old rx of it and pharmacy  Thanks!     Linda Hicks RN

## 2019-09-13 NOTE — RESULT ENCOUNTER NOTE
Mi Le,    Your wet prep did show clue cells/bacterial vaginosis today. I sent in a prescription for metronidazole twice a day for 7 days for you. You can pick it up right away. You cannot drink alcohol with this medication (as I am sure you know).     Let me know if you have any questions.    Kalli

## 2019-09-13 NOTE — PROGRESS NOTES
SUBJECTIVE:   CC: Mimi Melton is an 44 year old woman who presents for preventive health visit.     Healthy Habits:    Do you get at least three servings of calcium containing foods daily (dairy, green leafy vegetables, etc.)? yes    Amount of exercise or daily activities, outside of work: 3 day(s) per week    Problems taking medications regularly No    Medication side effects: No    Have you had an eye exam in the past two years? yes    Do you see a dentist twice per year? yes    Do you have sleep apnea, excessive snoring or daytime drowsiness?no      Today's PHQ-2 Score:   PHQ-2 ( 1999 [employer]) 9/13/2019 6/8/2018   Q1: Little interest or pleasure in doing things 0 0   Q2: Feeling down, depressed or hopeless 0 0   PHQ-2 Score 0 0   Q1: Little interest or pleasure in doing things - -   Q2: Feeling down, depressed or hopeless - -   PHQ-2 Score - -       Abuse: Current or Past(Physical, Sexual or Emotional)- No  Do you feel safe in your environment? Yes    Social History     Tobacco Use     Smoking status: Never Smoker     Smokeless tobacco: Never Used   Substance Use Topics     Alcohol use: Yes     Comment: 1-2x's/month if that.     If you drink alcohol do you typically have >3 drinks per day or >7 drinks per week? No                     Reviewed orders with patient.  Reviewed health maintenance and updated orders accordingly - Yes  Lab work is in process  Labs reviewed in EPIC  BP Readings from Last 3 Encounters:   09/13/19 106/64   04/15/19 121/81   01/15/19 (!) 141/99    Wt Readings from Last 3 Encounters:   09/13/19 77.1 kg (170 lb)   04/15/19 77.1 kg (170 lb)   01/11/19 77.6 kg (171 lb)                  Patient Active Problem List   Diagnosis     Surveillance of previously prescribed intrauterine contraceptive device     Vaginitis and vulvovaginitis     Exophthalmos     Abnormal Pap smear of cervix     CARDIOVASCULAR SCREENING; LDL GOAL LESS THAN 160     Essential hypertension with goal blood pressure less  than 140/90     Anemia     Hypothyroidism due to acquired atrophy of thyroid     Cervical high risk HPV (human papillomavirus) test positive     Thyroid disease     Female stress incontinence     Chronic seasonal allergic rhinitis, unspecified trigger     Past Surgical History:   Procedure Laterality Date     C  DELIVERY ONLY  91    , Low Cervical     C  DELIVERY ONLY  97    , Low Cervical     C  DELIVERY ONLY  99    , Low Cervical     C INDUCED ABORTN BY D&C      Aspiration & Curettage, TAB x2     CYSTOSCOPY, SLING TRANSVAGINAL N/A 10/10/2017    Procedure: CYSTOSCOPY, SLING TRANSVAGINAL;  Midurethral Sling and Cystoscopy (Support the Urethra with Mesh Sling and Look in the Bladder);  Surgeon: Karin Amin MD;  Location: [facility] OR     EYE SURGERY  2008    Orbital Decompression Dr smart      REPAIR INCISIONAL HERNIA,REDUCIBLE      Umbilical hernia Repair       Social History     Tobacco Use     Smoking status: Never Smoker     Smokeless tobacco: Never Used   Substance Use Topics     Alcohol use: Yes     Comment: 1-2x's/month if that.     Family History   Problem Relation Age of Onset     Hypertension Mother      Hypertension Father      Hypertension Maternal Grandmother      Hypertension Paternal Grandmother      Hypertension Maternal Grandfather      Hypertension Paternal Grandfather          Current Outpatient Medications   Medication Sig Dispense Refill     ACETAMINOPHEN PO Take 1,000 mg by mouth 2 times daily as needed for pain (menstrual cramps)       azithromycin (ZITHROMAX) 250 MG tablet Two tablets first day, then one tablet daily for four days. 6 tablet 0     cetirizine (ZYRTEC) 10 MG tablet Take 10 mg by mouth every morning       fluticasone (FLONASE) 50 MCG/ACT nasal spray Spray 1 spray into both nostrils 2 times daily 48 g 3     guaiFENesin-codeine (ROBITUSSIN AC) 100-10 MG/5ML solution Take 5-10 mLs by mouth every 4 hours as  needed for cough 240 mL 0     IBUPROFEN PO Take 800 mg by mouth every 4 hours as needed (takes for menstrual cramps.)        Lactobacillus-Inulin (Immunovative Therapies) CAPS Take 1 capsule by mouth 2 times daily 100 capsule 11     levothyroxine (SYNTHROID/LEVOTHROID) 100 MCG tablet Take 1 tablet (100 mcg) by mouth daily 90 tablet 3     lisinopril-hydrochlorothiazide (PRINZIDE/ZESTORETIC) 10-12.5 MG tablet Take 1 tablet by mouth daily 90 tablet 3     metroNIDAZOLE (FLAGYL) 500 MG tablet Take 1 tablet (500 mg) by mouth 2 times daily for 7 days 14 tablet 0     mupirocin (BACTROBAN) 2 % ointment        Allergies   Allergen Reactions     Mold      Cannot have any meds that contain mold.     No Known Drug Allergies      Recent Labs   Lab Test 10/18/18  0850 06/08/18  0804 05/04/18  0842 03/09/18  0919 01/08/18  1548  08/09/16  0848  07/28/15  0927   LDL  --   --  124*  --   --   --  99  --  83   HDL  --   --  56  --   --   --  53  --  59   TRIG  --   --  55  --   --   --  90  --  44   ALT  --   --   --   --  14  --  28  --  14   CR  --  1.00  --   --  0.86   < > 0.93  --  1.00   GFRESTIMATED  --  61  --   --  73   < > 67  --  62   GFRESTBLACK  --  74  --   --  88   < > 81  --  75   POTASSIUM  --  3.4  --   --  3.5   < > 3.6  --  4.1   TSH 1.90  --   --  0.30*  --    < > 0.44   < > 3.98    < > = values in this interval not displayed.        Mammogram Screening: Patient under age 50, mutual decision reflected in health maintenance.      Pertinent mammograms are reviewed under the imaging tab.  History of abnormal Pap smear:   Last 3 Pap and HPV Results:   PAP / HPV Latest Ref Rng & Units 12/8/2016 12/2/2015 7/14/2014   PAP - NIL NIL NIL   HPV 16 DNA NEG Negative Negative -   HPV 18 DNA NEG Negative Negative -   OTHER HR HPV NEG Negative Positive(A) -     PAP / HPV Latest Ref Rng & Units 12/8/2016 12/2/2015 7/14/2014   PAP - NIL NIL NIL   HPV 16 DNA NEG Negative Negative -   HPV 18 DNA NEG Negative Negative -   OTHER  HR HPV NEG Negative Positive(A) -   IUD in place.  She gets her paps/women's health care managed by OB/GYN.  Vaginal discharge: clear, sometime itchy.  History of frequent BV.  She is requesting a wet prep today/treatment if positive.     Reviewed and updated as needed this visit by clinical staff  Tobacco  Allergies  Meds  Problems  Med Hx  Surg Hx  Fam Hx  Soc Hx          Reviewed and updated as needed this visit by Provider  Tobacco  Allergies  Meds  Problems  Med Hx  Surg Hx  Fam Hx  Soc Hx           Hypothyroid:  Previously managed by endocrinology.  She was rturned back to primary care for thyroid management/refills.  She is due her thyroid labs and refills today.    Blood pressure:  Meds are going well.  No s/e or problems.  She is requesting refills of her medication today.     Allergic rhinitis:  History of mold allergies.  When she uses her flonase, her symptoms are improved and she feels better.  Sometimes she forgets her flonase and her symptoms flare.  She is requesting refills of her fluticasone today.     ROS:  CONSTITUTIONAL: NEGATIVE for fever, chills, change in weight  INTEGUMENTARU/SKIN: NEGATIVE for worrisome rashes, moles or lesions  EYES: NEGATIVE for vision changes or irritation  ENT: NEGATIVE for ear, mouth and throat problems  RESP: NEGATIVE for significant cough or SOB  BREAST: NEGATIVE for masses, tenderness or discharge  CV: NEGATIVE for chest pain, palpitations or peripheral edema  GI: NEGATIVE for nausea, abdominal pain, heartburn, or change in bowel habits  : NEGATIVE for unusual urinary or vaginal symptoms. Periods are regular.  MUSCULOSKELETAL: NEGATIVE for significant arthralgias or myalgia  NEURO: NEGATIVE for weakness, dizziness or paresthesias  ENDOCRINE: NEGATIVE for temperature intolerance, skin/hair changes  PSYCHIATRIC: NEGATIVE for changes in mood or affect    OBJECTIVE:   /64 (BP Location: Right arm, Patient Position: Sitting, Cuff Size: Adult Regular)  "  Pulse 73   Temp 98.1  F (36.7  C) (Oral)   Resp 16   Ht 1.664 m (5' 5.5\")   Wt 77.1 kg (170 lb)   LMP 08/24/2019 (Exact Date)   SpO2 99%   BMI 27.86 kg/m    EXAM:  GENERAL: healthy, alert and no distress  EYES: Eyes grossly normal to inspection, PERRL and conjunctivae and sclerae normal  HENT: ear canals and TM's normal, nose and mouth without ulcers or lesions  NECK: no adenopathy, no asymmetry, masses, or scars and thyroid normal to palpation  RESP: lungs clear to auscultation - no rales, rhonchi or wheezes  CV: regular rate and rhythm, normal S1 S2, no S3 or S4, no murmur, click or rub, no peripheral edema and peripheral pulses strong  ABDOMEN: soft, nontender, no hepatosplenomegaly, no masses and bowel sounds normal  MS: no gross musculoskeletal defects noted, no edema  SKIN: no suspicious lesions or rashes  NEURO: Normal strength and tone, mentation intact and speech normal  PSYCH: mentation appears normal, affect normal/bright    Diagnostic Test Results:  Labs reviewed in Epic  Results for orders placed or performed in visit on 09/13/19   Wet prep   Result Value Ref Range    Specimen Description Vagina     Wet Prep Moderate  Clue cells seen   (A)     Wet Prep Moderate  WBC'S seen       Wet Prep No Trichomonas seen     Wet Prep No yeast seen      Other lab results pending    ASSESSMENT/PLAN:   (Z00.00) Routine general medical examination at a health care facility  (primary encounter diagnosis)  Comment:   Plan: BASIC METABOLIC PANEL, Albumin Random Urine         Quantitative with Creat Ratio, Lipid panel         reflex to direct LDL Fasting            (I10) Essential hypertension with goal blood pressure less than 140/90  Comment:   Plan: BASIC METABOLIC PANEL, Albumin Random Urine         Quantitative with Creat Ratio,         lisinopril-hydrochlorothiazide         (PRINZIDE/ZESTORETIC) 10-12.5 MG tablet,         OFFICE/OUTPT VISIT,EST,LEVL IV        She will continue her medication, blood pressure " "management, exercise etc.  Yearly clinic appts with me for follow ups/refills, etc, sooner prn.     (E03.2) Hypothyroidism due to medication  Comment: chronic  Plan: levothyroxine (SYNTHROID/LEVOTHROID) 100 MCG         tablet, TSH with free T4 reflex, OFFICE/OUTPT         VISIT,EST,LEVL IV        For today, her TSH is pending.  I did refill her levothyroxine and told Safia that if in the event her thyroid levels become difficult to manage, she will be referred back to endocrinology.  Safia verbalizes understanding.     (J30.2) Chronic seasonal allergic rhinitis  Comment: chronic  Plan: fluticasone (FLONASE) 50 MCG/ACT nasal spray        She will continue her flonase.      (N76.0,  B96.89) Bacterial vaginosis  Comment: acute  Plan: Wet prep, metroNIDAZOLE (FLAGYL) 500 MG tablet,        OFFICE/OUTPT VISIT,EST,LEVL IV        Take the flagyl/metronidazole as prescribed and abstain from all alcohol.    Anticipate steady resolution of symptoms.   Return to the clinic for a recheck if the sx do not resolve with the therapy or worsen in any way.      (Z13.220) Screening for hyperlipidemia  Comment:   Plan: done today    COUNSELING:   Reviewed preventive health counseling, as reflected in patient instructions    Estimated body mass index is 27.86 kg/m  as calculated from the following:    Height as of this encounter: 1.664 m (5' 5.5\").    Weight as of this encounter: 77.1 kg (170 lb).    Weight management plan: Discussed healthy diet and exercise guidelines     reports that she has never smoked. She has never used smokeless tobacco.      Counseling Resources:  ATP IV Guidelines  Pooled Cohorts Equation Calculator  Breast Cancer Risk Calculator  FRAX Risk Assessment  ICSI Preventive Guidelines  Dietary Guidelines for Americans, 2010  USDA's MyPlate  ASA Prophylaxis  Lung CA Screening    Kalli Roldan, [facility]  "

## 2019-09-17 NOTE — RESULT ENCOUNTER NOTE
Mi Le,    This note is to let you know that your lab test rests look good/stable.    I hope your vaginal symptoms are clearing by now (with the treatment).    Let me know if you have any questions.    Kalli GRESHAM CNP

## 2019-09-29 ENCOUNTER — HEALTH MAINTENANCE LETTER (OUTPATIENT)
Age: 44
End: 2019-09-29

## 2019-10-18 ENCOUNTER — OFFICE VISIT (OUTPATIENT)
Dept: OBGYN | Facility: CLINIC | Age: 44
End: 2019-10-18
Payer: COMMERCIAL

## 2019-10-18 VITALS
DIASTOLIC BLOOD PRESSURE: 78 MMHG | WEIGHT: 169 LBS | SYSTOLIC BLOOD PRESSURE: 111 MMHG | BODY MASS INDEX: 27.7 KG/M2 | OXYGEN SATURATION: 95 % | HEART RATE: 91 BPM

## 2019-10-18 DIAGNOSIS — N89.8 VAGINAL DISCHARGE: ICD-10-CM

## 2019-10-18 DIAGNOSIS — D25.9 UTERINE LEIOMYOMA, UNSPECIFIED LOCATION: ICD-10-CM

## 2019-10-18 DIAGNOSIS — Z12.4 SCREENING FOR MALIGNANT NEOPLASM OF CERVIX: Primary | ICD-10-CM

## 2019-10-18 LAB
SPECIMEN SOURCE: NORMAL
WET PREP SPEC: NORMAL

## 2019-10-18 PROCEDURE — G0145 SCR C/V CYTO,THINLAYER,RESCR: HCPCS | Performed by: OBSTETRICS & GYNECOLOGY

## 2019-10-18 PROCEDURE — 99213 OFFICE O/P EST LOW 20 MIN: CPT | Performed by: OBSTETRICS & GYNECOLOGY

## 2019-10-18 PROCEDURE — 87624 HPV HI-RISK TYP POOLED RSLT: CPT | Performed by: OBSTETRICS & GYNECOLOGY

## 2019-10-18 PROCEDURE — 87210 SMEAR WET MOUNT SALINE/INK: CPT | Performed by: OBSTETRICS & GYNECOLOGY

## 2019-10-18 NOTE — PROGRESS NOTES
Mimi Melton is a 44 year old year old woman who presents requesting pap smear.  Had one in 2016, doesn't want to repeat at 5 years, concerned because has history of abnormal pap, HPV.  Happy with IUD.  She denies abnormal bleeding, GI symptoms.  Has recurrent BV, has had minimal discharge, requests wet prep.  Had ultrasound January 2019, 4 cm fibroid diagnosed.  Has intermittent midcycle pain correlating with ovulation.    Past medical, surgical, family, and social history have been noted.      ROS:  See above re /GI .  Denies fevers, skin lesions.     OBJECTIVE: Healthy female in NAD.  /78   Pulse 91   Wt 76.7 kg (169 lb)   LMP 10/12/2019   SpO2 95%   Breastfeeding? No   BMI 27.70 kg/m       Abdomen soft, non-tender, without mass.      Pelvic exam:  External genitalia appeared normal without lesion.  Urethral meatus, perineum, and anus appeared normal. Cervix and vagina appeared normal, without lesion.  Pap was sent.  IUD string present.     Results for orders placed or performed in visit on 10/18/19   Wet prep   Result Value Ref Range    Specimen Description Vagina     Wet Prep No Trichomonas seen     Wet Prep No clue cells seen     Wet Prep No yeast seen     Wet Prep Few  WBC'S seen           ASSESSMENT:   Vaginal discharge.  Screening for cervical cancer.     PLAN: if pap is normal, repeat in 3 years.  Discussed uterine fibroids, will plan repeat Pap January 2020.

## 2019-10-22 LAB
COPATH REPORT: NORMAL
PAP: NORMAL

## 2019-10-24 LAB
FINAL DIAGNOSIS: ABNORMAL
HPV HR 12 DNA CVX QL NAA+PROBE: POSITIVE
HPV16 DNA SPEC QL NAA+PROBE: NEGATIVE
HPV18 DNA SPEC QL NAA+PROBE: NEGATIVE
SPECIMEN DESCRIPTION: ABNORMAL
SPECIMEN SOURCE CVX/VAG CYTO: ABNORMAL

## 2019-10-25 ENCOUNTER — RESULT FOLLOW UP (OUTPATIENT)
Dept: OBGYN | Facility: CLINIC | Age: 44
End: 2019-10-25

## 2019-10-25 NOTE — PROGRESS NOTES
ECC, repeat pap and hpv all negative 9/09.   NIL paps: 2/10, 9/10, 12/11, 12/12. Plan pap in 3 yrs.  7/14: NIL pap. Plan cotest pap & HPV in 3 years.  12/2015: NIL pap, + HPV (not 16 or 18). Plan cotest pap & HPV in 1 year  Tracking started.  12/8/2016 Pap: NIL/neg HR HPV. Plan cotest in 3 years.  10/18/19 Pap: NIL, +HR HPV (not 16/18). Plan cotest in 1 year/ck

## 2020-01-10 ENCOUNTER — ANCILLARY PROCEDURE (OUTPATIENT)
Dept: ULTRASOUND IMAGING | Facility: CLINIC | Age: 45
End: 2020-01-10
Attending: OBSTETRICS & GYNECOLOGY
Payer: COMMERCIAL

## 2020-01-10 DIAGNOSIS — D25.9 UTERINE LEIOMYOMA, UNSPECIFIED LOCATION: ICD-10-CM

## 2020-01-10 PROCEDURE — 76830 TRANSVAGINAL US NON-OB: CPT | Performed by: OBSTETRICS & GYNECOLOGY

## 2020-01-27 ENCOUNTER — OFFICE VISIT (OUTPATIENT)
Dept: FAMILY MEDICINE | Facility: CLINIC | Age: 45
End: 2020-01-27
Payer: COMMERCIAL

## 2020-01-27 VITALS
BODY MASS INDEX: 29.09 KG/M2 | TEMPERATURE: 98.3 F | RESPIRATION RATE: 14 BRPM | HEART RATE: 84 BPM | WEIGHT: 181 LBS | OXYGEN SATURATION: 99 % | DIASTOLIC BLOOD PRESSURE: 80 MMHG | SYSTOLIC BLOOD PRESSURE: 114 MMHG | HEIGHT: 66 IN

## 2020-01-27 DIAGNOSIS — N89.8 VAGINAL ITCHING: Primary | ICD-10-CM

## 2020-01-27 DIAGNOSIS — M25.512 ACUTE PAIN OF LEFT SHOULDER: ICD-10-CM

## 2020-01-27 LAB
Lab: NORMAL
SPECIMEN SOURCE: NORMAL
WET PREP SPEC: NORMAL

## 2020-01-27 PROCEDURE — 87591 N.GONORRHOEAE DNA AMP PROB: CPT | Performed by: FAMILY MEDICINE

## 2020-01-27 PROCEDURE — 87210 SMEAR WET MOUNT SALINE/INK: CPT | Performed by: FAMILY MEDICINE

## 2020-01-27 PROCEDURE — 99213 OFFICE O/P EST LOW 20 MIN: CPT | Performed by: FAMILY MEDICINE

## 2020-01-27 PROCEDURE — 87491 CHLMYD TRACH DNA AMP PROBE: CPT | Performed by: FAMILY MEDICINE

## 2020-01-27 ASSESSMENT — MIFFLIN-ST. JEOR: SCORE: 1484.82

## 2020-01-27 NOTE — PROGRESS NOTES
"Dennis Melton is a 44 year old female who presents to clinic today for the following health issues:    HPI   Vaginal Symptoms      Duration: x 1 day    Description  itching and odor    Accompanying signs and symptoms (fever/dysuria/abdominal or back pain): None    History  Sexually active: yes, multiple partners, contraception - IUD  Possibility of pregnancy: No  Recent antibiotic use: no     Precipitating or alleviating factors: None    Therapies tried and outcome: tylenol   Outcome: temporary relief      Soreness left upper arm   x 2 days,   Onset after she was exercising on a treadmill in a hotel but no specific injury noted  no trauma or overuse  No neck pain  Worse with movement especially overhead  No numbness/tingling of upper extremities  therapies: tylenol and ice both gave temporary relief         BP Readings from Last 3 Encounters:   01/27/20 114/80   10/18/19 111/78   09/13/19 106/64    Wt Readings from Last 3 Encounters:   01/27/20 82.1 kg (181 lb)   10/18/19 76.7 kg (169 lb)   09/13/19 77.1 kg (170 lb)           Reviewed and updated as needed this visit by Provider         Review of Systems         Objective    /80 (BP Location: Right arm, Patient Position: Sitting, Cuff Size: Adult Regular)   Pulse 84   Temp 98.3  F (36.8  C) (Oral)   Resp 14   Ht 1.664 m (5' 5.5\")   Wt 82.1 kg (181 lb)   LMP 01/24/2020   SpO2 99%   Breastfeeding No   BMI 29.66 kg/m    Body mass index is 29.66 kg/m .  Physical Exam   GEN:  no apparent distress   SHOULDER:  Inspection of left shoulder reveals no deformity/asymmetry.  ROM is limited by pain and she is very resistant to me moving her her arm/shoulder in any way.  She is able to abduct the arm to about 90 degrees.  No tenderness to palpation over the bony landmarks.  She did not tolerate any impingement sign testing.     Diagnostic Test Results:  Labs reviewed in Epic  Results for orders placed or performed in visit on 01/27/20   Wet prep    "  Status: None   Result Value Ref Range    Specimen Description Vagina     Special Requests Vagina     Wet Prep No Trichomonas seen     Wet Prep No clue cells seen     Wet Prep No yeast seen     Wet Prep WBC'S seen  Few              Assessment & Plan     1. Vaginal itching  Reassured her that wet prep is negative.  I did recommend additional testing for gonorrhea/chlamydia as she is sexually active and has not had that done in the past year.   - Wet prep  - NEISSERIA GONORRHOEA PCR  - CHLAMYDIA TRACHOMATIS PCR    2. Acute pain of left shoulder  Very suspicious for rotator cuff injury but she is not tolerating much of a shoulder exam today.  She'll continue to use over-the-counter analgesics and ice.  I gave her instructions on alternating acetaminophen with NSAID for the next 24 hours. If pain persists I have given her a referral to see Sports Med.  - SPORTS MEDICINE REFERRAL        Patient Instructions   Alternate ibuprofen 400-600 mg and acetaminophen 1000 mg.  Take something every 3 hours, so, for example:    At 6 pm take ibuprofen   At 9 pm take acetaminophen  At 12 midnight take ibuprofen   etc for 24 hours      No follow-ups on file.    Fany Araujo MD  [facility]

## 2020-01-27 NOTE — PATIENT INSTRUCTIONS
Alternate ibuprofen 400-600 mg and acetaminophen 1000 mg.  Take something every 3 hours, so, for example:    At 6 pm take ibuprofen   At 9 pm take acetaminophen  At 12 midnight take ibuprofen   etc for 24 hours

## 2020-01-29 NOTE — RESULT ENCOUNTER NOTE
Hi Mimi,  Your gonorrhea/chlamydia tests are negative.  I hope your arm is starting to feel better.    Fany Araujo MD

## 2020-02-10 ENCOUNTER — OFFICE VISIT (OUTPATIENT)
Dept: FAMILY MEDICINE | Facility: CLINIC | Age: 45
End: 2020-02-10
Payer: COMMERCIAL

## 2020-02-10 VITALS
WEIGHT: 180 LBS | SYSTOLIC BLOOD PRESSURE: 116 MMHG | HEART RATE: 108 BPM | OXYGEN SATURATION: 99 % | BODY MASS INDEX: 29.5 KG/M2 | TEMPERATURE: 99 F | RESPIRATION RATE: 18 BRPM | DIASTOLIC BLOOD PRESSURE: 80 MMHG

## 2020-02-10 DIAGNOSIS — J20.9 ACUTE BRONCHITIS, UNSPECIFIED ORGANISM: ICD-10-CM

## 2020-02-10 PROCEDURE — 99213 OFFICE O/P EST LOW 20 MIN: CPT | Performed by: NURSE PRACTITIONER

## 2020-02-10 RX ORDER — CODEINE PHOSPHATE AND GUAIFENESIN 10; 100 MG/5ML; MG/5ML
1-2 SOLUTION ORAL EVERY 4 HOURS PRN
Qty: 240 ML | Refills: 0 | Status: SHIPPED | OUTPATIENT
Start: 2020-02-10 | End: 2020-10-07

## 2020-02-10 NOTE — PROGRESS NOTES
Subjective     Mimi Melton is a 44 year old female who presents to clinic today for the following health issues:    HPI     RESPIRATORY SYMPTOMS      Duration: Since Saturday (2 days)    Description   cough, sore throat, chest tightness; minimal nasal congestion  No fevers or myalgias    Therapies tried and outcome:  Tylenol PRN     Cough is non-productive.  No wheezing or shortness of breath.               Reviewed and updated as needed this visit by Provider         Review of Systems   ROS COMP: CONSTITUTIONAL: NEGATIVE for fever, chills, change in weight  INTEGUMENTARY/SKIN: NEGATIVE for worrisome rashes, moles or lesions  EYES: NEGATIVE for vision changes or irritation  ENT/MOUTH: see HPI  RESP:see HPI  CV: NEGATIVE for chest pain, palpitations or peripheral edema  GI: NEGATIVE for nausea, abdominal pain, heartburn, or change in bowel habits      Objective    /80   Pulse 108   Temp 99  F (37.2  C) (Oral)   Resp 18   Wt 81.6 kg (180 lb)   LMP 01/24/2020   SpO2 99%   BMI 29.50 kg/m    Body mass index is 29.5 kg/m .  Physical Exam   GENERAL: healthy, alert and no distress  HENT: ear canals and TM's normal, nose and mouth without ulcers or lesions  NECK: no adenopathy, no asymmetry, masses, or scars and thyroid normal to palpation  RESP: lungs clear to auscultation - no rales, rhonchi or wheezes  CV: regular rate and rhythm, normal S1 S2, no S3 or S4, no murmur, click or rub, no peripheral edema and peripheral pulses strong  PSYCH: mentation appears normal, affect normal/bright            Assessment & Plan     (J20.9) Acute bronchitis, unspecified organism  Comment:   Plan: guaiFENesin-codeine (ROBITUSSIN AC) 100-10         MG/5ML solution        Discussed symptomatic treatment measures.  Return to clinic if symptoms persist or worsen.              No follow-ups on file.    Marlyn Weber NP  [facility]

## 2020-03-12 ENCOUNTER — ANCILLARY PROCEDURE (OUTPATIENT)
Dept: MAMMOGRAPHY | Facility: CLINIC | Age: 45
End: 2020-03-12
Attending: NURSE PRACTITIONER
Payer: COMMERCIAL

## 2020-03-12 DIAGNOSIS — Z12.31 VISIT FOR SCREENING MAMMOGRAM: ICD-10-CM

## 2020-07-03 ENCOUNTER — OFFICE VISIT (OUTPATIENT)
Dept: FAMILY MEDICINE | Facility: CLINIC | Age: 45
End: 2020-07-03
Payer: COMMERCIAL

## 2020-07-03 VITALS
OXYGEN SATURATION: 97 % | BODY MASS INDEX: 28.87 KG/M2 | HEIGHT: 66 IN | HEART RATE: 93 BPM | DIASTOLIC BLOOD PRESSURE: 68 MMHG | RESPIRATION RATE: 18 BRPM | WEIGHT: 179.6 LBS | SYSTOLIC BLOOD PRESSURE: 118 MMHG | TEMPERATURE: 97.4 F

## 2020-07-03 DIAGNOSIS — N89.8 VAGINAL DISCHARGE: ICD-10-CM

## 2020-07-03 DIAGNOSIS — Z23 NEED FOR HPV VACCINE: ICD-10-CM

## 2020-07-03 DIAGNOSIS — B37.31 YEAST VAGINITIS: Primary | ICD-10-CM

## 2020-07-03 LAB
SPECIMEN SOURCE: NORMAL
WET PREP SPEC: NORMAL

## 2020-07-03 PROCEDURE — 99213 OFFICE O/P EST LOW 20 MIN: CPT | Performed by: FAMILY MEDICINE

## 2020-07-03 PROCEDURE — 87210 SMEAR WET MOUNT SALINE/INK: CPT | Performed by: FAMILY MEDICINE

## 2020-07-03 RX ORDER — FLUCONAZOLE 150 MG/1
TABLET ORAL
Qty: 3 TABLET | Refills: 3 | Status: SHIPPED | OUTPATIENT
Start: 2020-07-03 | End: 2021-10-28

## 2020-07-03 ASSESSMENT — MIFFLIN-ST. JEOR: SCORE: 1473.47

## 2020-07-03 NOTE — PROGRESS NOTES
Dennis Melton is a 45 year old female who presents to clinic today for the following health issues:    HPI   Vaginal Symptoms      Duration: 1 week    Description  itching at night    Intensity:  mild    Accompanying signs and symptoms (fever/dysuria/abdominal or back pain): None    History  Sexually active: yes, single partner, contraception - IUD  Possibility of pregnancy: No  Recent antibiotic use: no     Precipitating or alleviating factors: None    Therapies tried and outcome: none       Lab Results   Component Value Date    PAP NIL 10/18/2019    PAP NIL 2016    PAP NIL 2015         Patient Active Problem List   Diagnosis     Surveillance of previously prescribed intrauterine contraceptive device     Vaginitis and vulvovaginitis     Exophthalmos     Abnormal Pap smear of cervix     CARDIOVASCULAR SCREENING; LDL GOAL LESS THAN 160     Essential hypertension with goal blood pressure less than 140/90     Anemia     Hypothyroidism due to acquired atrophy of thyroid     Cervical high risk HPV (human papillomavirus) test positive     Thyroid disease     Female stress incontinence     Chronic seasonal allergic rhinitis, unspecified trigger     Past Surgical History:   Procedure Laterality Date     C  DELIVERY ONLY  91    , Low Cervical     C  DELIVERY ONLY  97    , Low Cervical     C  DELIVERY ONLY  99    , Low Cervical     C INDUCED ABORTN BY D&C      Aspiration & Curettage, TAB x2     CYSTOSCOPY, SLING TRANSVAGINAL N/A 10/10/2017    Procedure: CYSTOSCOPY, SLING TRANSVAGINAL;  Midurethral Sling and Cystoscopy (Support the Urethra with Mesh Sling and Look in the Bladder);  Surgeon: Karin Amin MD;  Location: [facility]     EYE SURGERY  2008    Orbital Decompression Dr smart      REPAIR INCISIONAL HERNIA,REDUCIBLE      Umbilical hernia Repair       Social History     Tobacco Use     Smoking status: Never Smoker      Smokeless tobacco: Never Used   Substance Use Topics     Alcohol use: Yes     Comment: 1-2x's/month if that.     Family History   Problem Relation Age of Onset     Hypertension Mother      Hypertension Father      Hypertension Maternal Grandmother      Hypertension Paternal Grandmother      Hypertension Maternal Grandfather      Hypertension Paternal Grandfather          Current Outpatient Medications   Medication Sig Dispense Refill     ACETAMINOPHEN PO Take 1,000 mg by mouth 2 times daily as needed for pain (menstrual cramps)       cetirizine (ZYRTEC) 10 MG tablet Take 10 mg by mouth every morning       fluticasone (FLONASE) 50 MCG/ACT nasal spray Spray 1 spray into both nostrils 2 times daily 48 g 3     guaiFENesin-codeine (ROBITUSSIN AC) 100-10 MG/5ML solution Take 5-10 mLs by mouth every 4 hours as needed for cough 240 mL 0     IBUPROFEN PO Take 800 mg by mouth every 4 hours as needed (takes for menstrual cramps.)        Lactobacillus-Inulin ([facility] DIGESTIVE [facility]) CAPS Take 1 capsule by mouth 2 times daily 100 capsule 11     levothyroxine (SYNTHROID/LEVOTHROID) 100 MCG tablet Take 1 tablet (100 mcg) by mouth daily 90 tablet 3     lisinopril-hydrochlorothiazide (PRINZIDE/ZESTORETIC) 10-12.5 MG tablet Take 1 tablet by mouth daily 90 tablet 3     mupirocin (BACTROBAN) 2 % ointment as needed        Allergies   Allergen Reactions     Mold      Cannot have any meds that contain mold.     No Known Drug Allergies      Recent Labs   Lab Test 09/13/19  0934 10/18/18  0850 06/08/18  0804 05/04/18  0842  01/08/18  1548  08/09/16  0848  07/28/15  0927   *  --   --  124*  --   --   --  99  --  83   HDL 61  --   --  56  --   --   --  53  --  59   TRIG 64  --   --  55  --   --   --  90  --  44   ALT  --   --   --   --   --  14  --  28  --  14   CR 0.92  --  1.00  --   --  0.86   < > 0.93  --  1.00   GFRESTIMATED 76  --  61  --   --  73   < > 67  --  62   GFRESTBLACK 88  --  74  --   --  88   < > 81  --  75  "  POTASSIUM 3.6  --  3.4  --   --  3.5   < > 3.6  --  4.1   TSH 0.55 1.90  --   --    < >  --    < > 0.44   < > 3.98    < > = values in this interval not displayed.      BP Readings from Last 3 Encounters:   07/03/20 118/68   02/10/20 116/80   01/27/20 114/80    Wt Readings from Last 3 Encounters:   07/03/20 81.5 kg (179 lb 9.6 oz)   02/10/20 81.6 kg (180 lb)   01/27/20 82.1 kg (181 lb)            Reviewed and updated as needed this visit by Provider         Review of Systems   Constitutional, HEENT, cardiovascular, pulmonary, GI, , musculoskeletal, neuro, skin, endocrine and psych systems are negative, except as otherwise noted.      Objective    /68 (BP Location: Left arm, Patient Position: Sitting, Cuff Size: Adult Regular)   Pulse 93   Temp 97.4  F (36.3  C)   Resp 18   Ht 1.664 m (5' 5.5\")   Wt 81.5 kg (179 lb 9.6 oz)   LMP 06/19/2020   SpO2 97%   BMI 29.43 kg/m    Body mass index is 29.43 kg/m .  Physical Exam   GENERAL: healthy, alert and no distress  MS: no gross musculoskeletal defects noted, no edema  PSYCH: mentation appears normal, affect normal/bright    Diagnostic Test Results:  Labs reviewed in Epic  Results for orders placed or performed in visit on 07/03/20 (from the past 24 hour(s))   Wet prep    Specimen: Vagina   Result Value Ref Range    Specimen Description Vagina     Wet Prep No yeast seen     Wet Prep No WBC's seen     Wet Prep No Trichomonas seen     Wet Prep No clue cells seen            Assessment & Plan     1. Yeast vaginitis with  2. Vaginal discharge  Will treat based on symptoms, and provided medication for recurrent infections  - Wet prep  - fluconazole (DIFLUCAN) 150 MG tablet; Take one tablet every 3 days until symptoms resolve.  Dispense: 3 tablet; Refill: 3    I reviewed chart, she's had NIL with +HPV, recommend gardisil vaccine    Return in about 4 months (around 10/19/2020) for preventive visit (wellness/annual physical exam).    Kalli Pelletier, " MD  [facility]

## 2020-07-03 NOTE — RESULT ENCOUNTER NOTE
Patient was seen today in clinic.  I discussed results in clinic, please see clinic progress note.    Kalli Pelletier MD 7/3/2020

## 2020-08-20 ENCOUNTER — ALLIED HEALTH/NURSE VISIT (OUTPATIENT)
Dept: NURSING | Facility: CLINIC | Age: 45
End: 2020-08-20
Payer: COMMERCIAL

## 2020-08-20 ENCOUNTER — TELEPHONE (OUTPATIENT)
Dept: ALLERGY | Facility: CLINIC | Age: 45
End: 2020-08-20

## 2020-08-20 ENCOUNTER — APPOINTMENT (OUTPATIENT)
Dept: LAB | Facility: CLINIC | Age: 45
End: 2020-08-20
Payer: COMMERCIAL

## 2020-08-20 DIAGNOSIS — E07.9 THYROID DISEASE: Primary | ICD-10-CM

## 2020-08-20 LAB
ANION GAP SERPL CALCULATED.3IONS-SCNC: 7 MMOL/L (ref 3–14)
BUN SERPL-MCNC: 13 MG/DL (ref 7–30)
CALCIUM SERPL-MCNC: 9.1 MG/DL (ref 8.5–10.1)
CHLORIDE SERPL-SCNC: 107 MMOL/L (ref 94–109)
CO2 SERPL-SCNC: 27 MMOL/L (ref 20–32)
CREAT SERPL-MCNC: 0.94 MG/DL (ref 0.52–1.04)
GFR SERPL CREATININE-BSD FRML MDRD: 74 ML/MIN/{1.73_M2}
GLUCOSE SERPL-MCNC: 78 MG/DL (ref 70–99)
POTASSIUM SERPL-SCNC: 3.6 MMOL/L (ref 3.4–5.3)
SODIUM SERPL-SCNC: 141 MMOL/L (ref 133–144)
TSH SERPL DL<=0.005 MIU/L-ACNC: 0.49 MU/L (ref 0.4–4)

## 2020-08-20 PROCEDURE — 84443 ASSAY THYROID STIM HORMONE: CPT | Performed by: PHYSICIAN ASSISTANT

## 2020-08-20 PROCEDURE — 80048 BASIC METABOLIC PNL TOTAL CA: CPT | Performed by: PHYSICIAN ASSISTANT

## 2020-08-20 PROCEDURE — 90651 9VHPV VACCINE 2/3 DOSE IM: CPT

## 2020-08-20 PROCEDURE — 90471 IMMUNIZATION ADMIN: CPT

## 2020-08-20 PROCEDURE — 82043 UR ALBUMIN QUANTITATIVE: CPT | Performed by: PHYSICIAN ASSISTANT

## 2020-08-20 PROCEDURE — 99207 ZZC NO CHARGE NURSE ONLY: CPT

## 2020-08-20 PROCEDURE — 36415 COLL VENOUS BLD VENIPUNCTURE: CPT | Performed by: PHYSICIAN ASSISTANT

## 2020-08-20 NOTE — NURSING NOTE
Prior to immunization administration, verified patients identity using patient s name and date of birth. Please see Immunization Activity for additional information.     Screening Questionnaire for Adult Immunization    Are you sick today?   No   Do you have allergies to medications, food, a vaccine component or latex?   No   Have you ever had a serious reaction after receiving a vaccination?   No   Do you have a long-term health problem with heart, lung, kidney, or metabolic disease (e.g., diabetes), asthma, a blood disorder, no spleen, complement component deficiency, a cochlear implant, or a spinal fluid leak?  Are you on long-term aspirin therapy?   No   Do you have cancer, leukemia, HIV/AIDS, or any other immune system problem?   No   Do you have a parent, brother, or sister with an immune system problem?   No   In the past 3 months, have you taken medications that affect  your immune system, such as prednisone, other steroids, or anticancer drugs; drugs for the treatment of rheumatoid arthritis, Crohn s disease, or psoriasis; or have you had radiation treatments?   No   Have you had a seizure, or a brain or other nervous system problem?   No   During the past year, have you received a transfusion of blood or blood    products, or been given immune (gamma) globulin or antiviral drug?   No   For women: Are you pregnant or is there a chance you could become       pregnant during the next month?   No   Have you received any vaccinations in the past 4 weeks?   No     Immunization questionnaire answers were all negative.        Per orders of Dr. eastman, injection of hpv given by Debra Wise CMA. Patient instructed to remain in clinic for 15 minutes afterwards, and to report any adverse reaction to me immediately.       Screening performed by Debra Wise CMA on 8/20/2020 at 8:20 AM.   (2) assistive person

## 2020-08-20 NOTE — PROGRESS NOTES
Juan Manuel Carter also put in the order for TSH, Albumine urine and BMP which are being done today.    Debra Wise CMA on 8/20/2020 at 8:21 AM

## 2020-08-20 NOTE — TELEPHONE ENCOUNTER
[facility] Call [facility]    Phone Message    May a detailed message be left on voicemail: yes     Reason for Call: Other: Patient is calling in she stated that her she keeps having an allergic reaction to something evrery once in a while her lips swell up. I was not sure if this would fall under defer out 2 months or clinic review. Please call the patient to schedule. Thank you.     Action Taken: Message routed to:  [facility] ([facility]): allergy    Travel Screening: Not Applicable

## 2020-08-20 NOTE — TELEPHONE ENCOUNTER
Scheduled patient for an allergy appointment with Dr. Paz on 9/8/20. Instructed patient to please send in photos within 3 days of her appointment.

## 2020-08-21 LAB
CREAT UR-MCNC: 389 MG/DL
MICROALBUMIN UR-MCNC: 23 MG/L
MICROALBUMIN/CREAT UR: 5.91 MG/G CR (ref 0–25)

## 2020-08-21 NOTE — RESULT ENCOUNTER NOTE
Thank you for getting your urine tested.    Your microalbumen is normal, which is great news. This means you do not have protein in the urine, and that your kidneys are currently functioning well. Keeping blood pressure and blood fats low is the best way to protect your kidneys from damage.    Keep up the good work!    Sincerely,    PRATIMA CARRASCO MD   8/21/2020

## 2020-09-08 ENCOUNTER — VIRTUAL VISIT (OUTPATIENT)
Dept: ALLERGY | Facility: CLINIC | Age: 45
End: 2020-09-08
Payer: COMMERCIAL

## 2020-09-08 DIAGNOSIS — T78.3XXA ANGIOEDEMA, INITIAL ENCOUNTER: Primary | ICD-10-CM

## 2020-09-08 RX ORDER — FEXOFENADINE HCL 180 MG/1
TABLET ORAL
Qty: 12 TABLET | Refills: 0 | Status: SHIPPED | OUTPATIENT
Start: 2020-09-08 | End: 2020-11-18

## 2020-09-08 ASSESSMENT — PAIN SCALES - GENERAL: PAINLEVEL: NO PAIN (0)

## 2020-09-08 NOTE — PROGRESS NOTES
[facility] Dermato-Allergy Record     Allergy Problem List:    Specialty Problems        Allergy Diagnoses    Chronic seasonal allergic rhinitis, unspecified trigger              CC:   Allergies (Mimi is here fro a possible food allergy with oral swelling for 1 to 5 hours. )        Encounter Date: Sep 8, 2020    History of Present Illness:  I have reviewed the teledermatology  information and the nursing intake corresponding to this issue. Mimi Melton is a 45 year old female who presents as a teledermatology consult for the following information take directly from the nursing note (kept in this note for patient safety) and phone/video call performed by myself:     Last several years (7-8 years). Lips will start swelling up; only top or bottom lip will swell. Cannot determine if eyes were swollen because of her thyroid disease. Eyes seem to be OK currently. Less frequently during the winter. Was happening frequently between March and June. Could only maybe associate it with fish. Tuna fish from the can or the bag with a new diet during that time. Lips will tingle. Had this occur without the fish. No airway involvement or difficulty breathing. No rash elsewhere. Uncle on mom's side would get similar reaction to seafood. Hasn't happened for the last 1-2 months. Would take more of her generic allergy pill (ceterizine 10 mg). Didn't last as long while on this; diphenhydramine also helps. Notably with a history seasonal allergies to dust mites, molds, [place] grass and as well as [place] tree pollen. Was also travelling to [place] during this period and notes she thinks there was more mold during this time.    Past Medical History:   Patient Active Problem List   Diagnosis     Surveillance of previously prescribed intrauterine contraceptive device     Vaginitis and vulvovaginitis     Exophthalmos     Abnormal Pap smear of cervix     CARDIOVASCULAR SCREENING; LDL GOAL LESS THAN 160     Essential hypertension with  goal blood pressure less than 140/90     Anemia     Hypothyroidism due to acquired atrophy of thyroid     Cervical high risk HPV (human papillomavirus) test positive     Thyroid disease     Female stress incontinence     Chronic seasonal allergic rhinitis, unspecified trigger     Need for HPV vaccine     Past Medical History:   Diagnosis Date     Cervical high risk HPV (human papillomavirus) test positive 12/2015 & 10/2019    NIL pap, + HPV (not 16 or 18)     Chronic sinusitis Summer 2016    I get congested every 3-4 weeks     Esophageal reflux      Exophthalmos, unspecified      Hypothyroidism      Thyroid eye disease      Thyrotoxicosis without mention of goiter or other cause, without mention of thyrotoxic crisis or storm 3/05    Had radioablation, On synthroid, seeing [facility]; takes synthroid     Unspecified essential hypertension 1980    meds since 2001, on atenolol       Allergy History:     Allergies   Allergen Reactions     Mold      Cannot have any meds that contain mold.     No Known Drug Allergies        Social History:   reports that she has never smoked. She has never used smokeless tobacco. She reports current alcohol use. She reports current drug use. Drug: Marijuana.      Family History:  Family History   Problem Relation Age of Onset     Hypertension Mother      Hypertension Father      Hypertension Maternal Grandmother      Hypertension Paternal Grandmother      Hypertension Maternal Grandfather      Hypertension Paternal Grandfather        Medications:  Current Outpatient Medications   Medication Sig Dispense Refill     fexofenadine (ALLEGRA ALLERGY) 180 MG tablet Emergency medication for lip swelling - take 1-2 tablets up to twice daily with episodes of lip swelling. Record exposures including medications, environmental and food trigger. 12 tablet 0     ACETAMINOPHEN PO Take 1,000 mg by mouth 2 times daily as needed for pain (menstrual cramps)       cetirizine (ZYRTEC) 10 MG tablet Take 10  mg by mouth every morning       fluconazole (DIFLUCAN) 150 MG tablet Take one tablet every 3 days until symptoms resolve. 3 tablet 3     fluticasone (FLONASE) 50 MCG/ACT nasal spray Spray 1 spray into both nostrils 2 times daily 48 g 3     guaiFENesin-codeine (ROBITUSSIN AC) 100-10 MG/5ML solution Take 5-10 mLs by mouth every 4 hours as needed for cough 240 mL 0     IBUPROFEN PO Take 800 mg by mouth every 4 hours as needed (takes for menstrual cramps.)        Lactobacillus-Inulin ([facility] DIGESTIVE [facility]) CAPS Take 1 capsule by mouth 2 times daily 100 capsule 11     levothyroxine (SYNTHROID/LEVOTHROID) 100 MCG tablet Take 1 tablet (100 mcg) by mouth daily 90 tablet 3     lisinopril-hydrochlorothiazide (PRINZIDE/ZESTORETIC) 10-12.5 MG tablet Take 1 tablet by mouth daily 90 tablet 3     mupirocin (BACTROBAN) 2 % ointment as needed          ROS: Patient generally feeling well today  Physical Examination:  General: Well-appearing, appropriately-developed individual.  Skin: Focused examination of the head and neck within the teledermatology photograph(s)* was performed.   -Without labial or periorbital swelling    Allergy Tests:    Past Allergy Test    Future Allergy Test: 9/8/2020     Order for PRICK TESTS    [x] Outpatient  [] Inpatient: [facility]..../ Bed ....      Skin Atopy (atopic dermatitis) [x] Yes   [] No  Contact allergies:   [] Yes   [] No  Urticaria/Angioedema  [x] Yes   [] No  Rhinitis/Sinusitis:   [x] Yes   [] No   [x] seasonal [] perennial            Allergic Asthma:   [] Yes   [] No  Pets :  [] Yes   [] No  which?......  Food Allergy:   [] Yes   [] No  Drug allergies:   [] Yes   [] No       Reason for tests (suspected allergy): food vs inhaled allergen  Known previous allergies: dust mites, molds, [place] grass and as well as [place] tree pollen    Standardized prick panels  [x] Atopic panel (20 tests)  [] Pediatric Panel (12 tests)  [] Milk, Meat, Eggs, Grains (20 tests)   [x] Dust, Epithelia, Feathers (10  tests)  [x] Fish, Seafood, Shellfish (17 tests)  [] Nuts, Beans (14 tests)  [] Spice, Vegetable, Fruit (17 tests)  [x] Others:  Tuna fish      [] Patient's own products: ...    DO NOT test if chemical or biological identity is unknown!     always ask from patient the product information and safety sheets (MSDS)     Standardized intradermal tests  [] Penicillium notatum [] Aspergillus fumigatus [] House dust mites  [] Bee venom   [] Wasp venom !!Specific protocol with dilutions!!  [] Others: ...    [] Patient needs consultation with Allergy team (changes of tests may apply)  [] Tests discussed with Allergy team (can have direct appointment for allergy tests)    Impression/Plan:    1. Lip swelling   Suspicion is for angioedema of the lips in the setting of atopic predisposition with seasonal rhinoconjunctivitis. Without e/o laryngeal involvement. DDX food vs inhalant allergens (mold, dust mite, etc). Potentially favor the latter.  - Prick testing as above  - Fexofenadine 180 mg tablets - Emergency medication for lip swelling: take 1-2 tablets up to twice daily with lip swelling  - For any episode, detailed allergy diary recording exposures including medications, environmental and food trigger.  - Try to hold cetirizine 10 mg in the 4-5 days before our allergy testing     Patient counseling:  Counseled on the above and as per    Follow-up: to schedule prick testing with clinic staff    Thank you for the opportunity be involved in the care of this patient.     This patient was staffed with Dr. Paz.    Staff:  Resident(Bucky)/Staff(Chris) / Elena Moss LPN    Staff Physician Comments:  I saw and evaluated the patient with the resident and I agree with the assessment and plan as documented in the resident's note.    Niall Paz MD  Professor  Head of Dermato-Allergy Division  Department of Dermatology  [facility] [place],  [place]      _____________________________________________________________________________    Teledermatology information:  - Location of patient: Home  - Patient presented in referral from: self other  - Location of teledermatologist[facility]  ( [facility] (, [facility], [place])  - Reason teledermatology is appropriate:  of National Emergency Regarding Coronavirus disease (COVID 19) Outbreak  - Method of transmission:  Video: ( Invitation sent by:  Amjc and text to cell phone: preferred number )  - Date of images: 9/8/20  - Service start time:850AM  - Service end time:909AM  - Date of report: 09/08/20      I spent a total of 19 minutes for telemedicine consult with Mimi Melton during today s video meeting. Over 50% of this time was spent counseling the patient and/or coordinating care. Please see Assessment and Plan for details.

## 2020-09-08 NOTE — NURSING NOTE
"Mimi Melton is a 45 year old female who is being evaluated via a billable video visit.      The patient has been notified of following:     \"This video visit will be conducted via a call between you and your physician/provider. We have found that certain health care needs can be provided without the need for an in-person physical exam.  This service lets us provide the care you need with a video conversation.  If a prescription is necessary we can send it directly to your pharmacy.  If lab work is needed we can place an order for that and you can then stop by our lab to have the test done at a later time.    Video visits are billed at different rates depending on your insurance coverage.  Please reach out to your insurance provider with any questions.    If during the course of the call the physician/provider feels a video visit is not appropriate, you will not be charged for this service.\"    Patient has given verbal consent for Video visit? Yes  How would you like to obtain your AVS? MyChart  If you are dropped from the video visit, the video invite should be resent to: Send to e-mail at: [email or web address]  Will anyone else be joining your video visit? No        Video-Visit Details    Type of service:  Video Visit    Originating Location (pt. Location): Home    Distant Location (provider location):  [facility]     Platform used for Video Visit: Salma Moss LPN        "

## 2020-09-08 NOTE — LETTER
9/8/2020         RE: Mimi Melton  [address] [facility] [address]        Dear Colleague,    Thank you for referring your patient, Mimi Melton, to the [facility]. Please see a copy of my visit note below.    [facility] Dermato-Allergy Record     Allergy Problem List:    Specialty Problems        Allergy Diagnoses    Chronic seasonal allergic rhinitis, unspecified trigger              CC:   Allergies (Mimi is here fro a possible food allergy with oral swelling for 1 to 5 hours. )        Encounter Date: Sep 8, 2020    History of Present Illness:  I have reviewed the teledermatology  information and the nursing intake corresponding to this issue. Mimi Melton is a 45 year old female who presents as a teledermatology consult for the following information take directly from the nursing note (kept in this note for patient safety) and phone/video call performed by myself:     Last several years (7-8 years). Lips will start swelling up; only top or bottom lip will swell. Cannot determine if eyes were swollen because of her thyroid disease. Eyes seem to be OK currently. Less frequently during the winter. Was happening frequently between March and June. Could only maybe associate it with fish. Tuna fish from the can or the bag with a new diet during that time. Lips will tingle. Had this occur without the fish. No airway involvement or difficulty breathing. No rash elsewhere. Uncle on mom's side would get similar reaction to seafood. Hasn't happened for the last 1-2 months. Would take more of her generic allergy pill (ceterizine 10 mg). Didn't last as long while on this; diphenhydramine also helps. Notably with a history seasonal allergies to dust mites, molds, [place] grass and as well as [place] tree pollen. Was also travelling to [place] during this period and notes she thinks there was more mold during this time.    Past Medical History:   Patient Active Problem List   Diagnosis     Surveillance  of previously prescribed intrauterine contraceptive device     Vaginitis and vulvovaginitis     Exophthalmos     Abnormal Pap smear of cervix     CARDIOVASCULAR SCREENING; LDL GOAL LESS THAN 160     Essential hypertension with goal blood pressure less than 140/90     Anemia     Hypothyroidism due to acquired atrophy of thyroid     Cervical high risk HPV (human papillomavirus) test positive     Thyroid disease     Female stress incontinence     Chronic seasonal allergic rhinitis, unspecified trigger     Need for HPV vaccine     Past Medical History:   Diagnosis Date     Cervical high risk HPV (human papillomavirus) test positive 12/2015 & 10/2019    NIL pap, + HPV (not 16 or 18)     Chronic sinusitis Summer 2016    I get congested every 3-4 weeks     Esophageal reflux      Exophthalmos, unspecified      Hypothyroidism      Thyroid eye disease      Thyrotoxicosis without mention of goiter or other cause, without mention of thyrotoxic crisis or storm 3/05    Had radioablation, On synthroid, seeing [facility]; takes synthroid     Unspecified essential hypertension 1980    meds since 2001, on atenolol       Allergy History:     Allergies   Allergen Reactions     Mold      Cannot have any meds that contain mold.     No Known Drug Allergies        Social History:   reports that she has never smoked. She has never used smokeless tobacco. She reports current alcohol use. She reports current drug use. Drug: Marijuana.      Family History:  Family History   Problem Relation Age of Onset     Hypertension Mother      Hypertension Father      Hypertension Maternal Grandmother      Hypertension Paternal Grandmother      Hypertension Maternal Grandfather      Hypertension Paternal Grandfather        Medications:  Current Outpatient Medications   Medication Sig Dispense Refill     fexofenadine (ALLEGRA ALLERGY) 180 MG tablet Emergency medication for lip swelling - take 1-2 tablets up to twice daily with episodes of lip swelling.  Record exposures including medications, environmental and food trigger. 12 tablet 0     ACETAMINOPHEN PO Take 1,000 mg by mouth 2 times daily as needed for pain (menstrual cramps)       cetirizine (ZYRTEC) 10 MG tablet Take 10 mg by mouth every morning       fluconazole (DIFLUCAN) 150 MG tablet Take one tablet every 3 days until symptoms resolve. 3 tablet 3     fluticasone (FLONASE) 50 MCG/ACT nasal spray Spray 1 spray into both nostrils 2 times daily 48 g 3     guaiFENesin-codeine (ROBITUSSIN AC) 100-10 MG/5ML solution Take 5-10 mLs by mouth every 4 hours as needed for cough 240 mL 0     IBUPROFEN PO Take 800 mg by mouth every 4 hours as needed (takes for menstrual cramps.)        Lactobacillus-Inulin ([facility] DIGESTIVE [facility]) CAPS Take 1 capsule by mouth 2 times daily 100 capsule 11     levothyroxine (SYNTHROID/LEVOTHROID) 100 MCG tablet Take 1 tablet (100 mcg) by mouth daily 90 tablet 3     lisinopril-hydrochlorothiazide (PRINZIDE/ZESTORETIC) 10-12.5 MG tablet Take 1 tablet by mouth daily 90 tablet 3     mupirocin (BACTROBAN) 2 % ointment as needed          ROS: Patient generally feeling well today  Physical Examination:  General: Well-appearing, appropriately-developed individual.  Skin: Focused examination of the head and neck within the teledermatology photograph(s)* was performed.   -Without labial or periorbital swelling    Allergy Tests:    Past Allergy Test    Future Allergy Test: 9/8/2020     Order for PRICK TESTS    [x] Outpatient  [] Inpatient: Perla..../ Bed ....      Skin Atopy (atopic dermatitis) [x] Yes   [] No  Contact allergies:   [] Yes   [] No  Urticaria/Angioedema  [x] Yes   [] No  Rhinitis/Sinusitis:   [x] Yes   [] No   [x] seasonal [] perennial            Allergic Asthma:   [] Yes   [] No  Pets :  [] Yes   [] No  which?......  Food Allergy:   [] Yes   [] No  Drug allergies:   [] Yes   [] No       Reason for tests (suspected allergy): food vs inhaled allergen  Known previous allergies: dust  mites, molds, [place] grass and as well as [place] tree pollen    Standardized prick panels  [x] Atopic panel (20 tests)  [] Pediatric Panel (12 tests)  [] Milk, Meat, Eggs, Grains (20 tests)   [x] Dust, Epithelia, Feathers (10 tests)  [x] Fish, Seafood, Shellfish (17 tests)  [] Nuts, Beans (14 tests)  [] Spice, Vegetable, Fruit (17 tests)  [x] Others:  Tuna fish      [] Patient's own products: ...    DO NOT test if chemical or biological identity is unknown!     always ask from patient the product information and safety sheets (MSDS)     Standardized intradermal tests  [] Penicillium notatum [] Aspergillus fumigatus [] House dust mites  [] Bee venom   [] Wasp venom !!Specific protocol with dilutions!!  [] Others: ...    [] Patient needs consultation with Allergy team (changes of tests may apply)  [] Tests discussed with Allergy team (can have direct appointment for allergy tests)    Impression/Plan:    1. Lip swelling   Suspicion is for angioedema of the lips in the setting of atopic predisposition with seasonal rhinoconjunctivitis. Without e/o laryngeal involvement. DDX food vs inhalant allergens (mold, dust mite, etc). Potentially favor the latter.  - Prick testing as above  - Fexofenadine 180 mg tablets - Emergency medication for lip swelling: take 1-2 tablets up to twice daily with lip swelling  - For any episode, detailed allergy diary recording exposures including medications, environmental and food trigger.  - Try to hold cetirizine 10 mg in the 4-5 days before our allergy testing     Patient counseling:  Counseled on the above and as per    Follow-up: to schedule prick testing with clinic staff    Thank you for the opportunity be involved in the care of this patient.     This patient was staffed with Dr. Paz.    Staff:  Resident(Bucky)/Staff(Chris) / Elena Moss LPN    Staff Physician Comments:  I saw and evaluated the patient with the resident and I agree with the assessment and plan as  documented in the resident's note.    Niall Paz MD  Professor  Head of Dermato-Allergy Division  Department of Dermatology  [facility], [place], [place]      _____________________________________________________________________________    Teledermatology information:  - Location of patient: Home  - Patient presented in referral from: self other  - Location of teledermatologist:  ( [facility] (, [facility], [place])  - Reason teledermatology is appropriate:  of National Emergency Regarding Coronavirus disease (COVID 19) Outbreak  - Method of transmission:  Video: ( Invitation sent by:  Amwell and text to cell phone: preferred number )  - Date of images: 9/8/20  - Service start time:850AM  - Service end time:909AM  - Date of report: 09/08/20      I spent a total of 19 minutes for telemedicine consult with Mimi Melton during today s video meeting. Over 50% of this time was spent counseling the patient and/or coordinating care. Please see Assessment and Plan for details.     Again, thank you for allowing me to participate in the care of your patient.        Sincerely,        Niall Paz MD

## 2020-09-08 NOTE — PROGRESS NOTES
[facility] Dermato-Allergy Record     Allergy Problem List:    Specialty Problems        Allergy Diagnoses    Chronic seasonal allergic rhinitis, unspecified trigger              CC:   No chief complaint on file.        Encounter Date: Sep 8, 2020    History of Present Illness:  I have reviewed the teledermatology  information and the nursing intake corresponding to this issue. Mimi Melton is a 45 year old female who presents as a teledermatology consult for the following information take directly from the ***nursing note (kept in this note for patient safety) and ***phone/video call performed by myself:     Last several years (7-8 years). Lips will start swelling up; only top or bottom will swell. Cannot determine if eyes were swollen because of her thyroid disease. Eyes seem to be OK currently. Less frequently during the winter. Was happening frequently between March and June. Could only maybe associate it with fish. Tuna fish from the can or the bag. Lips will tingle. Had occurr without the fish. No airway involvement or difficulty breathing. No rash elsewhere. Uncle on mom's side would get similar reaction to seafood. Hasn't happened for the last 1-2 months. Would take more of her generic allergy pill (ceterizine 10 mg). Didn't last as long while on this. Notably with a history seasonal allergies to dust mites, molds, [place] grass and as well as [place] tree pollen. Was also travelling to [place] during this period and notes she thinks there was more mold during this time.    Past Medical History:   Patient Active Problem List   Diagnosis     Surveillance of previously prescribed intrauterine contraceptive device     Vaginitis and vulvovaginitis     Exophthalmos     Abnormal Pap smear of cervix     CARDIOVASCULAR SCREENING; LDL GOAL LESS THAN 160     Essential hypertension with goal blood pressure less than 140/90     Anemia     Hypothyroidism due to acquired atrophy of thyroid     Cervical high risk HPV  (human papillomavirus) test positive     Thyroid disease     Female stress incontinence     Chronic seasonal allergic rhinitis, unspecified trigger     Need for HPV vaccine     Past Medical History:   Diagnosis Date     Cervical high risk HPV (human papillomavirus) test positive 12/2015 & 10/2019    NIL pap, + HPV (not 16 or 18)     Chronic sinusitis Summer 2016    I get congested every 3-4 weeks     Esophageal reflux      Exophthalmos, unspecified      Hypothyroidism      Thyroid eye disease      Thyrotoxicosis without mention of goiter or other cause, without mention of thyrotoxic crisis or storm 3/05    Had radioablation, On synthroid, seeing [facility]; takes synthroid     Unspecified essential hypertension 1980    meds since 2001, on atenolol       Allergy History:     Allergies   Allergen Reactions     Mold      Cannot have any meds that contain mold.     No Known Drug Allergies        Social History:  The patient {works:604313}. Patient has the following hobbies or non-occupational exposure ***     reports that she has never smoked. She has never used smokeless tobacco. She reports current alcohol use. She reports current drug use. Drug: Marijuana.      Family History:  Family History   Problem Relation Age of Onset     Hypertension Mother      Hypertension Father      Hypertension Maternal Grandmother      Hypertension Paternal Grandmother      Hypertension Maternal Grandfather      Hypertension Paternal Grandfather        Medications:  Current Outpatient Medications   Medication Sig Dispense Refill     ACETAMINOPHEN PO Take 1,000 mg by mouth 2 times daily as needed for pain (menstrual cramps)       cetirizine (ZYRTEC) 10 MG tablet Take 10 mg by mouth every morning       fluconazole (DIFLUCAN) 150 MG tablet Take one tablet every 3 days until symptoms resolve. 3 tablet 3     fluticasone (FLONASE) 50 MCG/ACT nasal spray Spray 1 spray into both nostrils 2 times daily 48 g 3     guaiFENesin-codeine  (ROBITUSSIN AC) 100-10 MG/5ML solution Take 5-10 mLs by mouth every 4 hours as needed for cough 240 mL 0     IBUPROFEN PO Take 800 mg by mouth every 4 hours as needed (takes for menstrual cramps.)        Lactobacillus-Inulin ([facility] DIGESTIVE [facility]) CAPS Take 1 capsule by mouth 2 times daily 100 capsule 11     levothyroxine (SYNTHROID/LEVOTHROID) 100 MCG tablet Take 1 tablet (100 mcg) by mouth daily 90 tablet 3     lisinopril-hydrochlorothiazide (PRINZIDE/ZESTORETIC) 10-12.5 MG tablet Take 1 tablet by mouth daily 90 tablet 3     mupirocin (BACTROBAN) 2 % ointment as needed              Additional comments and observations from review of the patient s chart including the following:    ***      ROS: Patient generally feeling well today ***  Physical Examination:  General: Well-appearing, appropriately-developed individual.  Skin: Focused examination of the *** within the teledermatology photograph(s)* was performed.   -***    Allergy Tests:    Past Allergy Test    Future Allergy Test: 9/8/2020     Order for PRICK TESTS    [x] Outpatient  [] Inpatient: [facility]..../ Bed ....      Skin Atopy (atopic dermatitis) [x] Yes   [] No  Contact allergies:   [] Yes   [] No  Urticaria/Angioedema  [x] Yes   [] No  Rhinitis/Sinusitis:   [x] Yes   [] No   [x] seasonal [] perennial            Allergic Asthma:   [] Yes   [] No  Pets :  [] Yes   [] No  which?......  Food Allergy:   [] Yes   [] No  Drug allergies:   [] Yes   [] No       Reason for tests (suspected allergy): food vs inhaled allergen  Known previous allergies: dust mites, molds, [place] grass and as well as [place] tree pollen    Standardized prick panels  [x] Atopic panel (20 tests)  [] Pediatric Panel (12 tests)  [] Milk, Meat, Eggs, Grains (20 tests)   [] Dust, Epithelia, Feathers (10 tests)  [x] Fish, Seafood, Shellfish (17 tests)  [] Nuts, Beans (14 tests)  [] Spice, Vegetable, Fruit (17 tests)  [x] Others:  Tuna fish      [] Patient's own products: ...    DO NOT test  "if chemical or biological identity is unknown!     always ask from patient the product information and safety sheets (MSDS)     Standardized intradermal tests  [] Penicillium notatum [] Aspergillus fumigatus [] House dust mites  [] Bee venom   [] Wasp venom !!Specific protocol with dilutions!!  [] Others: ...    [] Patient needs consultation with Allergy team (changes of tests may apply)  [] Tests discussed with Allergy team (can have direct appointment for allergy tests)    Impression/Plan:    1. Lip swelling   Without e/o pharyngeal / laryngeal involvement. Suspicion is for angioedema of the lips in the setting of atopic predisposition with seasonal rhinoconjunctivitis. DDX food vs inhalant allergens (mold, dust mite, etc). Potentially favor the latter.  - Prick testing as above  - Fexofenadine 180 mg tablets - Emergency medication for lip swelling: take 1-2 tablets up to twice daily with lip swelling  - For any episode, detailed allergy diary recording exposures including medications, environmental and food trigger.  - Try to hold cetirizine 10 mg in the 4-5 days before our allergy testing     Patient counseling:  Counseled on the above and as per    Follow-up: to schedule prick testing with clinic staff  {follow-up:916143}    Thank you for the opportunity be involved in the care of this patient.     Staff:  Resident(Bucky)/Staff(Chris) / Elena Moss LPN    _____________________________________________________________________________    Teledermatology information:  - Location of patient: {video visit patient location:[address]1401::\"Home\"}  - Patient presented in referral from: ***self other  - Location of teledermatologist:  ([facility] (, [place], [place])  - Reason teledermatology is appropriate:  {umdermtelereason:156116}  - Method of transmission:  {umdermteletype:591403}  - Date of images: ***  - Service start time:857AM  - Service end time:909AM  - Date of report: 09/08/20      I spent a total " of *** minutes for telemedicine consult with Mimi Melton during today s video meeting. Over 50% of this time was spent counseling the patient and/or coordinating care. Please see Assessment and Plan for details.

## 2020-09-08 NOTE — PATIENT INSTRUCTIONS
[facility] Dermatology Visit    Thank you for allowing us to participate in your care. Your findings, instructions and follow-up plan are as follows:    1. Lip swelling  - We will plan allergy testing  - Please bring the tuna fish you were eating this spring/summer  - Fexofenadine 180 mg tablets - Emergency medication for lip swelling: take 1-2 tablets up to twice daily with lip swelling  - For any episode, detailed allergy diary recording exposures including medications, environmental and food trigger.  - Try to hold cetirizine 10 mg in the 4-5 days before our allergy testing       When should I call my doctor?    If you are worsening or not improving, please, contact us or seek urgent care as noted below.     Who should I call with questions (adults)?    [facility] (adult and pediatric): [phone]     [facility] (adult): [phone]    For urgent needs outside of business hours call the [facility] at [phone] and ask for the dermatology resident on call    If this is a medical emergency and you are unable to reach an ER, Call [phone]      Who should I call with questions (pediatric)?  [facility]- Pediatric Dermatology  Dr. Ursula Ojeda, Dr. Wendy Monterroso, Dr. Betty Weaver, Jaylin Fletcher, PA  Dr. Gudelia Newman, Dr. Prema Muñoz & Dr. Mihir Cruz  Non Urgent  Nurse Triage Line; [phone]- Smiley and Layla MULLEN Care Coordinators   [place] (/Complex ) [phone]    If you need a prescription refill, please contact your pharmacy. Refills are approved or denied by our Physicians during normal business hours, Monday through Fridays  Per office policy, refills will not be granted if you have not been seen within the past year (or sooner depending on your child's condition)    Scheduling Information:  Pediatric Appointment Scheduling and Call Center [phone]  [phone]  Radiology Scheduling- [phone]  Sedation Unit Scheduling- [phone]  [place] Scheduling- General [phone]; Pediatric Dermatology [phone]  Main  Services: [phone]  Slovenian: [phone]  Guinean: [phone]  Hmong/Bora/Kevin: [phone]  Preadmission Nursing Department Fax Number: [phone] (Fax all pre-operative paperwork to this number)    For urgent matters arising during evenings, weekends, or holidays that cannot wait for normal business hours please call [phone] and ask for the Dermatology Resident On-Call to be paged.

## 2020-09-14 ENCOUNTER — OFFICE VISIT (OUTPATIENT)
Dept: FAMILY MEDICINE | Facility: CLINIC | Age: 45
End: 2020-09-14
Payer: COMMERCIAL

## 2020-09-14 VITALS
OXYGEN SATURATION: 98 % | TEMPERATURE: 97.9 F | SYSTOLIC BLOOD PRESSURE: 118 MMHG | HEART RATE: 94 BPM | DIASTOLIC BLOOD PRESSURE: 79 MMHG

## 2020-09-14 DIAGNOSIS — Z11.3 SCREENING EXAMINATION FOR SEXUALLY TRANSMITTED DISEASE: Primary | ICD-10-CM

## 2020-09-14 LAB
SPECIMEN SOURCE: NORMAL
WET PREP SPEC: NORMAL

## 2020-09-14 PROCEDURE — 87491 CHLMYD TRACH DNA AMP PROBE: CPT | Performed by: FAMILY MEDICINE

## 2020-09-14 PROCEDURE — 87591 N.GONORRHOEAE DNA AMP PROB: CPT | Performed by: FAMILY MEDICINE

## 2020-09-14 PROCEDURE — 87210 SMEAR WET MOUNT SALINE/INK: CPT | Performed by: FAMILY MEDICINE

## 2020-09-14 PROCEDURE — 99213 OFFICE O/P EST LOW 20 MIN: CPT | Performed by: FAMILY MEDICINE

## 2020-09-14 NOTE — PROGRESS NOTES
SUBJECTIVE:   Mimi Melton is a 45 year old female who presents to clinic today for the following health issues:  Vaginal Symptoms -- no, just wants screening  Onset: x 3 days    Description:  Vaginal Discharge: none   Itching (Pruritis): no   Burning sensation:  no   Odor: no     Accompanying Signs & Symptoms:  Pain with Urination: no   Abdominal Pain: no   Fever: no     History:   Sexually active: YES- pt had unprotected intercourse on 2020  Possibility of Pregnancy:  No    Precipitating factors:   Recent Antibiotic Use: no      She was seen in clinic in 2020 for vaginal symptoms with negative wet prep. She was treated for yeast infection based on her symptoms.        Has steady partner and had a new partner recently and has had unprotected sex a couple of times. Has paragard IUD. Wants just wet prep and gc/chlamydia testing today. Denies vaginal symptoms. Denies known contact with STI. Otherwise feeling well. Has preventive visit scheduled and plans on doing blood tests for STI screening at that time.     Problem list and histories reviewed & adjusted, as indicated.  Additional history: as documented    Patient Active Problem List   Diagnosis     Surveillance of previously prescribed intrauterine contraceptive device     Vaginitis and vulvovaginitis     Exophthalmos     Abnormal Pap smear of cervix     CARDIOVASCULAR SCREENING; LDL GOAL LESS THAN 160     Essential hypertension with goal blood pressure less than 140/90     Anemia     Hypothyroidism due to acquired atrophy of thyroid     Cervical high risk HPV (human papillomavirus) test positive     Thyroid disease     Female stress incontinence     Chronic seasonal allergic rhinitis, unspecified trigger     Need for HPV vaccine     Past Surgical History:   Procedure Laterality Date     C  DELIVERY ONLY  91    , Low Cervical     C  DELIVERY ONLY  97    , Low Cervical     C  DELIVERY ONLY  99     , Low Cervical     C INDUCED ABORTN BY D&C      Aspiration & Curettage, TAB x2     CYSTOSCOPY, SLING TRANSVAGINAL N/A 10/10/2017    Procedure: CYSTOSCOPY, SLING TRANSVAGINAL;  Midurethral Sling and Cystoscopy (Support the Urethra with Mesh Sling and Look in the Bladder);  Surgeon: Karin Amin MD;  Location: [facility] OR     EYE SURGERY  2008    Orbital Decompression Dr smart      REPAIR INCISIONAL HERNIA,REDUCIBLE      Umbilical hernia Repair       Social History     Tobacco Use     Smoking status: Never Smoker     Smokeless tobacco: Never Used   Substance Use Topics     Alcohol use: Yes     Comment: 1-2x's/month if that.     Family History   Problem Relation Age of Onset     Hypertension Mother      Hypertension Father      Hypertension Maternal Grandmother      Hypertension Paternal Grandmother      Hypertension Maternal Grandfather      Hypertension Paternal Grandfather          Current Outpatient Medications   Medication Sig Dispense Refill     ACETAMINOPHEN PO Take 1,000 mg by mouth 2 times daily as needed for pain (menstrual cramps)       fexofenadine (ALLEGRA ALLERGY) 180 MG tablet Emergency medication for lip swelling - take 1-2 tablets up to twice daily with episodes of lip swelling. Record exposures including medications, environmental and food trigger. 12 tablet 0     fluconazole (DIFLUCAN) 150 MG tablet Take one tablet every 3 days until symptoms resolve. 3 tablet 3     fluticasone (FLONASE) 50 MCG/ACT nasal spray Spray 1 spray into both nostrils 2 times daily 48 g 3     IBUPROFEN PO Take 800 mg by mouth every 4 hours as needed (takes for menstrual cramps.)        Lactobacillus-Inulin ([facility] DIGESTIVE [facility]) CAPS Take 1 capsule by mouth 2 times daily 100 capsule 11     levothyroxine (SYNTHROID/LEVOTHROID) 100 MCG tablet Take 1 tablet (100 mcg) by mouth daily 90 tablet 3     lisinopril-hydrochlorothiazide (PRINZIDE/ZESTORETIC) 10-12.5 MG tablet Take 1 tablet by mouth daily 90  tablet 3     mupirocin (BACTROBAN) 2 % ointment as needed        cetirizine (ZYRTEC) 10 MG tablet Take 10 mg by mouth every morning       guaiFENesin-codeine (ROBITUSSIN AC) 100-10 MG/5ML solution Take 5-10 mLs by mouth every 4 hours as needed for cough 240 mL 0     Allergies   Allergen Reactions     Mold      Cannot have any meds that contain mold.     No Known Drug Allergies      Recent Labs   Lab Test 08/20/20  0819 09/13/19  0934  05/04/18  0842  01/08/18  1548  08/09/16  0848  07/28/15  0927   LDL  --  101*  --  124*  --   --   --  99  --  83   HDL  --  61  --  56  --   --   --  53  --  59   TRIG  --  64  --  55  --   --   --  90  --  44   ALT  --   --   --   --   --  14  --  28  --  14   CR 0.94 0.92   < >  --   --  0.86   < > 0.93  --  1.00   GFRESTIMATED 74 76   < >  --   --  73   < > 67  --  62   GFRESTBLACK 86 88   < >  --   --  88   < > 81  --  75   POTASSIUM 3.6 3.6   < >  --   --  3.5   < > 3.6  --  4.1   TSH 0.49 0.55   < >  --    < >  --    < > 0.44   < > 3.98    < > = values in this interval not displayed.      BP Readings from Last 3 Encounters:   09/14/20 118/79   07/03/20 118/68   02/10/20 116/80    Wt Readings from Last 3 Encounters:   07/03/20 81.5 kg (179 lb 9.6 oz)   02/10/20 81.6 kg (180 lb)   01/27/20 82.1 kg (181 lb)              Reviewed and updated as needed this visit by clinical staff  Tobacco  Allergies  Meds  Med Hx  Surg Hx  Fam Hx  Soc Hx      Reviewed and updated as needed this visit by Provider         ROS:  As above     OBJECTIVE:     /79   Pulse 94   Temp 97.9  F (36.6  C) (Tympanic)   SpO2 98%   There is no height or weight on file to calculate BMI.  GENERAL: healthy, alert and no distress    Diagnostic Test Results:  Labs reviewed in Epic  Results for orders placed or performed in visit on 09/14/20 (from the past 24 hour(s))   Wet prep    Specimen: Vagina   Result Value Ref Range    Specimen Description Vagina     Wet Prep Few  WBC'S seen       Wet Prep No  Trichomonas seen     Wet Prep No clue cells seen     Wet Prep No yeast seen        ASSESSMENT/PLAN:         ICD-10-CM    1. Screening examination for sexually transmitted disease  Z11.3 Wet prep     Chlamydia trachomatis PCR     Neisseria gonorrhoeae PCR       Reviewed negative wet prep results with pt today. Recommended 100% condom use for STI prevention. She will have additional STI screening at the time of her upcoming preventive visit, but declines for now.     Fany Diop M.D.        [facility]

## 2020-09-14 NOTE — RESULT ENCOUNTER NOTE
Excellent! Please call or sent a [employer] message if you have any questions. Fany Diop M.D.

## 2020-09-16 NOTE — RESULT ENCOUNTER NOTE
Excellent! Please call or sent a [employer] message if you have any questions. Fany Diop M.D.

## 2020-09-23 ENCOUNTER — MYC MEDICAL ADVICE (OUTPATIENT)
Dept: PULMONOLOGY | Facility: CLINIC | Age: 45
End: 2020-09-23

## 2020-10-02 ENCOUNTER — OFFICE VISIT (OUTPATIENT)
Dept: ALLERGY | Facility: CLINIC | Age: 45
End: 2020-10-02
Payer: COMMERCIAL

## 2020-10-02 DIAGNOSIS — J30.9 ALLERGIC RHINOCONJUNCTIVITIS: ICD-10-CM

## 2020-10-02 DIAGNOSIS — Z91.018 FOOD ALLERGY: ICD-10-CM

## 2020-10-02 DIAGNOSIS — L73.9 FOLLICULITIS: Primary | ICD-10-CM

## 2020-10-02 DIAGNOSIS — T78.3XXD ANGIOEDEMA, SUBSEQUENT ENCOUNTER: ICD-10-CM

## 2020-10-02 DIAGNOSIS — H10.10 ALLERGIC RHINOCONJUNCTIVITIS: ICD-10-CM

## 2020-10-02 PROCEDURE — 95024 IQ TESTS W/ALLERGENIC XTRCS: CPT | Performed by: DERMATOLOGY

## 2020-10-02 PROCEDURE — 99203 OFFICE O/P NEW LOW 30 MIN: CPT | Mod: 25 | Performed by: DERMATOLOGY

## 2020-10-02 PROCEDURE — 95004 PERQ TESTS W/ALRGNC XTRCS: CPT | Performed by: DERMATOLOGY

## 2020-10-02 RX ORDER — TRETINOIN 0.5 MG/G
CREAM TOPICAL AT BEDTIME
Qty: 20 G | Refills: 3 | Status: SHIPPED | OUTPATIENT
Start: 2020-10-02 | End: 2024-03-04

## 2020-10-02 NOTE — PROGRESS NOTES
Note for return visit for Dermato-Allergy       Encounter Date: Oct 2, 2020    History of Present Illness:  Mimi Melton is a 45 year old female who presents in person to the consult following information has been take directly from the prior notes, patients informations, Epic and/or other sources and exam/history performed by myself:     Elo 2017 with Dr. Bynum patient positive to house dust mites, molds and willow tree and joss grass    Between December and March increase of lip swelling, can happen anytime.       Additional comments and observations from review of the patient s chart including the following:    See notes for more details    ROS: Patient generally feeling well today   Physical Examination:  General: Well-appearing, appropriately-developed individual.  - Skin: Focused examination of the skin on test sites on arms within the consultation was performed.   As above in HPI. No additional skin concerns.  See results below  - upper respiratory tract: currently no obvious Rhinitis  - lungs: no signs for active and present Asthma/Wheezing or coughing  - eyes: currently no active conjunctivits  - GI system/digestion: currently no problems, no bloating or Diarrhoea        Earlier History and Allergy exams:    Last several years (7-8 years). Lips will start swelling up; only top or bottom lip will swell. Cannot determine if eyes were swollen because of her thyroid disease. Eyes seem to be OK currently. Less frequently during the winter. Was happening frequently between March and June. Could only maybe associate it with fish. Tuna fish from the can or the bag with a new diet during that time. Lips will tingle. Had this occur without the fish. No airway involvement or difficulty breathing. No rash elsewhere. Uncle on mom's side would get similar reaction to seafood. Hasn't happened for the last 1-2 months. Would take more of her generic allergy pill (ceterizine 10 mg). Didn't last as long while on this;  diphenhydramine also helps. Notably with a history seasonal allergies to dust mites, molds, [place] grass and as well as [place] tree pollen. Was also travelling to [place] during this period and notes she thinks there was more mold during this time.    Past Medical History:   Patient Active Problem List   Diagnosis     Surveillance of previously prescribed intrauterine contraceptive device     Vaginitis and vulvovaginitis     Exophthalmos     Abnormal Pap smear of cervix     CARDIOVASCULAR SCREENING; LDL GOAL LESS THAN 160     Essential hypertension with goal blood pressure less than 140/90     Anemia     Hypothyroidism due to acquired atrophy of thyroid     Cervical high risk HPV (human papillomavirus) test positive     Thyroid disease     Female stress incontinence     Chronic seasonal allergic rhinitis, unspecified trigger     Need for HPV vaccine     Past Medical History:   Diagnosis Date     Cervical high risk HPV (human papillomavirus) test positive 12/2015 & 10/2019    NIL pap, + HPV (not 16 or 18)     Chronic sinusitis Summer 2016    I get congested every 3-4 weeks     Esophageal reflux      Exophthalmos, unspecified      Hypothyroidism      Thyroid eye disease      Thyrotoxicosis without mention of goiter or other cause, without mention of thyrotoxic crisis or storm 3/05    Had radioablation, On synthroid, seeing [facility]; takes synthroid     Unspecified essential hypertension 1980    meds since 2001, on atenolol       Allergy History:     Allergies   Allergen Reactions     Mold      Cannot have any meds that contain mold.     No Known Drug Allergies        Social History:   reports that she has never smoked. She has never used smokeless tobacco. She reports current alcohol use. She reports current drug use. Drug: Marijuana.      Family History:  Family History   Problem Relation Age of Onset     Hypertension Mother      Hypertension Father      Hypertension Maternal Grandmother      Hypertension  Paternal Grandmother      Hypertension Maternal Grandfather      Hypertension Paternal Grandfather        Medications:  Current Outpatient Medications   Medication Sig Dispense Refill     ACETAMINOPHEN PO Take 1,000 mg by mouth 2 times daily as needed for pain (menstrual cramps)       cetirizine (ZYRTEC) 10 MG tablet Take 10 mg by mouth every morning       fexofenadine (ALLEGRA ALLERGY) 180 MG tablet Emergency medication for lip swelling - take 1-2 tablets up to twice daily with episodes of lip swelling. Record exposures including medications, environmental and food trigger. 12 tablet 0     fluconazole (DIFLUCAN) 150 MG tablet Take one tablet every 3 days until symptoms resolve. 3 tablet 3     fluticasone (FLONASE) 50 MCG/ACT nasal spray Spray 1 spray into both nostrils 2 times daily 48 g 3     guaiFENesin-codeine (ROBITUSSIN AC) 100-10 MG/5ML solution Take 5-10 mLs by mouth every 4 hours as needed for cough 240 mL 0     IBUPROFEN PO Take 800 mg by mouth every 4 hours as needed (takes for menstrual cramps.)        Lactobacillus-Inulin ([facility] DIGESTIVE [facility]) CAPS Take 1 capsule by mouth 2 times daily 100 capsule 11     levothyroxine (SYNTHROID/LEVOTHROID) 100 MCG tablet Take 1 tablet (100 mcg) by mouth daily 90 tablet 3     lisinopril-hydrochlorothiazide (PRINZIDE/ZESTORETIC) 10-12.5 MG tablet Take 1 tablet by mouth daily 90 tablet 3     mupirocin (BACTROBAN) 2 % ointment as needed          ROS: Patient generally feeling well today  Physical Examination:  General: Well-appearing, appropriately-developed individual.  Skin: Focused examination of the head and neck within the teledermatology photograph(s)* was performed.   -Without labial or periorbital swelling    Allergy Tests:    Past Allergy Test    Future Allergy Test: 9/8/2020     Order for PRICK TESTS    [x] Outpatient  [] Inpatient: [facility]..../ Bed ....      Skin Atopy (atopic dermatitis) [x] Yes   [] No  Contact allergies:   [] Yes   []  No  Urticaria/Angioedema  [x] Yes   [] No  Rhinitis/Sinusitis:   [x] Yes   [] No   [x] seasonal [] perennial            Allergic Asthma:   [] Yes   [] No  Pets :  [] Yes   [] No  which?......  Food Allergy:   [] Yes   [] No  Drug allergies:   [] Yes   [] No       Reason for tests (suspected allergy): food vs inhaled allergen  Known previous allergies: dust mites, molds, [place] grass and as well as [place] tree pollen    Standardized prick panels  [x] Atopic panel (20 tests)  [] Pediatric Panel (12 tests)  [] Milk, Meat, Eggs, Grains (20 tests)   [] Dust, Epithelia, Feathers (10 tests)  [x] Fish, Seafood, Shellfish (17 tests)  [] Nuts, Beans (14 tests)  [] Spice, Vegetable, Fruit (17 tests)  [x] Others:  Tuna fish      [] Patient's own products: ...    DO NOT test if chemical or biological identity is unknown!     always ask from patient the product information and safety sheets (MSDS)     Standardized intradermal tests  [] Penicillium notatum [] Aspergillus fumigatus [] House dust mites  [] Bee venom   [] Wasp venom !!Specific protocol with dilutions!!  [] Others: ...    Atopy Screen (Placed 10/02/20 )    No Substance Readings (15 min) Evaluation   POS Histamine 1mg/ml +/++    NEG NaCl 0.9% -      No Substance Readings (15 min) Evaluation   1 Alternaria alternata (tenuis)  ++    2 Cladosporium herbarum -    3 Aspergillus fumigatus +    4 Penicillium notatum -    5 Dermatophagoides pteronyssinus -    6 Dermatophagoides farinae -    7 Dog epithelium (canis spp) -    8 Cat hair (viktoriya catus) -    9 Cockroach   (Blatella americana & germanica) -    10 Grass mix midwest   ([place], Orchard, [place], [place]) -    11 Apolinar grass (sorghum halepense) +    12 Weed mix   (common Cocklebur, Lamb s quarters, rough redroot Pigweed, Dock/Sorrel) -    13 Mug wort (artemisia vulgare) +    14 Ragweed giant/short (ambrosia spp) -    15 English Plantain (plantago lanceolata) -    16 Tree mix 1 (Pecan, Maple BHR, Oak RVW, american  [place], black [place]) -    17 Red cedar (juniperus virginia) -    18 Tree mix 2   (white [place], river/red Birch, black [place], common [place], american Elm) -    19 Box elder/Maple mix (acer spp) -    20 [place] shagbark (carya ovata) -       -      Conclusion: patient has in prick tests clearly positive reaction to molds (Alternaria >> Aspergillus)    Fish and Seafood (Placed 10/02/20 )    No Substance Readings (15min) Evaluation   1 Codfish (gadus morhua) -    2 Flounder (platichthys spp) -    3 Tuna (thunnus albecarus) -    4 Bass (centropristis striata) -    5 Mackerel (scomberomorus cavalla) -    6 Halibut (hipoglossus hipoglossus) -    7 [place] (salmo perri) -    8 Catfish (ictalurus spp) -    9 Perch (serranus scriba) -    10 [place], [place] (oncorhynchus mykiss) -    11 Crab (callinectes spp) -    12 Lobster (homarus americanus) -    13 Shrimp white/brown/pink (farfantepenaeus&litopenaeus) -    14 Kingcrab (paralithodes camtschatica) -    15 Scallop (placopecten magellanicus) -    16 Clam (mercenaria mercenaria) -    17 Oyster (cstrea/crassostrea) -      Conclusion: no clear signs for allergy to seafood/fish      Intradermal Testing (Placed 10/02/20 )    No Substance Conc.  Readings (15min) Evaluation   - NaCl  0.9% -    + Histamine (prick) 0.1mg / ml ++    DF Standard Dust Mite - D. Farinae 1:10 ++ 9mmP/15mmE   DP Standard Dust Mite - D. 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    # perennial in the morning soar throat, congested nose and mucus    proven dust mite allergy     Additionally sensitization to molds    --> maybe later support skin test results with specific IgE to mugwort, Alternaria, Aspergillus, Penicillium, D. Farinae and D. Pteronyssinus.    # folliculitis on chin area    Tretinoin cream 1-2 times daily    BP 5% wash    Patient counseling/plan:  >> follow recommendations and reduce HDM and molds if possible at home  >> go with Flonase nasal spray every evening  >> take daily an antihistamine like Cetirizine, Claritine or Fexofenadine   >> most importantly talk to PCP to stop Lisinopril and replace maybe with ARBs (e.g. Losartan --> has much less risk of lip swelling). ACE induced angioedemas can last up to 6 months after stopping drug.      Follow-up: virtual visit in about 1 month    Thank you for the opportunity be involved in the care of this patient.       Staff: Nissa Elias LPN      I spent a total of 40 minutes face to face with Mimi Melton during today s office visit. Over 50% of this time was spent discussing all the individual test results, correlating them to the clinical relevance, counseling the patient and/or coordinating care. Please see Assessment and Plan for details. This excludes any time spent performing prick and intradermal tests

## 2020-10-02 NOTE — PATIENT INSTRUCTIONS
Cross Reactions    This cross-reaction (or pollen-associated food allergy) can be explained by the fact that pollen and food allergens are similar in structure and so are mistaken for each other by the immune system. Sufferers generally have mild symptoms such as itching, redness and mild swelling in the mouth and throat.    What is a cross-reaction?   In this form of food allergy, the respiratory tract (nose, lungs) first becomes sensitised to birch pollen, for example. Because the protein structures of inhalation and food allergens are similar, this results in a cross-reaction, or pollen-associated food allergy. Birch pollen-nut-pip fruit or mugwort-celery spice syndrome are common examples of a cross-reaction. Therefore someone who has become sensitised to birch pollen may also have an allergic reaction to apples and nuts and vice versa. There are other respiratory tract allergens apart from pollen that may cause a cross-reaction to foods, e.g. latex (natural rubber), animal allergens (e.g. cats, birds) and house dust mites.     How can a cross-reaction be identified?   Sufferers from pollen allergy who experience a tingling in the palate, burning and itching in the mouth area and lips or even develop swelling affecting the face when eating certain foods should consider that they may have a cross-reaction.    How can they be prevented?   Anyone who notices tingling/burning in the mouth or throat should no longer consume the food concerned. Admittedly, a lot of proteins are destroyed by cooking and heating and the food can be eaten again. A frequent recommendation is to eat only small amounts of the food in question. Occasionally pollen therapy by means of desensitisation will also relieve the food allergy.    What are the most common cross-reactions?   Around 70 per cent of people who suffer from tree pollen allergies are found to have cross-reactions with foods. Cross-reactions are less common with other types  "of pollen and respiratory tract allergens. The following are typical:    Birch, shashank, moon pollen (January-April) Stone and pip fruit (apples, pears, plums, apricots, cherries, etc.), hazelnut, walnut, almonds, tomatoes, carrots, celery, solitario, avocado, fennel, kiwi fruit, lychee   Mugwort pollen (Artemisia vulgaris) (July-August) Celery, carrots, fennel, artichokes, camomile, pepper, mustard, dill, parsley, coriander, isak, aniseed, sunflower seeds.   House dust mites Prawns, lobster, langoustine, crab, snails   Latex Avocado, bananas, sweet chestnut (vermicelli, roasted), kiwi fruit, papaya, figs, paprika   Bird feathers Chicken eggs (yolk)         Modified from \"Cross-Reactions\" by [employer] [facility] [place].    Mold Allergy    Molds are thread-like micro-fungi - they occur all over the world and grow particularly in places where there is moisture. If living spaces are infested with mold, it must be removed quickly. It may pose a health risk.    Triggers of mold allergy   The most important known allergens among fungi are Aspergillus species, Alternaria alternata, Cladosporium species and Penicillium species, which all belong to the category of molds. Molds are found all over the world, but predominantly in nature, mainly in the soil, in dead organic matter. Indoors molds develop in places where it is damp, such as areas affected by leaks, in angel cracks or when the relative air humidity is high. Poorly maintained ventilation systems, air humidifiers, aquariums, or decorative fountains and a lot of plants in a room can also lead to a mold infestation.    Symptoms   Mold allergy, like other respiratory allergies, is manifest as allergic runny nose, streaming eyes, cough and shortness of breath and is frequently accompanied by asthma. As well as allergic reactions, molds also cause irritation of the airways, the mucous membranes of the eyes and the skin. The growth of mold is often associated with a " "distinctively musty smell of earth, damp and mushrooms, which additionally impairs well-being.    Therapy and treatment   Any fungal infestation in living areas must be removed rapidly and properly. It is always important to get rid of the cause, hence eradicate the damp problem. Otherwise the mold will quickly reappear. People with health problems should not remove even small patches of mold growth. Before the mold is cleared and in the weeks following its clearance, rooms should also be aired frequently and dust should be removed.    Tips and tricks:     Keep relative air humidity to a maximum of 45 per cent in winter    Keep temperature between 66 and 73 C in winter    Air rooms thoroughly for five to ten minutes twice to three times a day    Position furniture at least ten centimetres away from walls    No plants in the bedroom area    Dispose of compost or organic waste on a daily basis or store temporarily outside the home    Do not use air humidifiers or only use them if air humidity is continuously monitored        Modified from \"Mould Allergy\" by [employer] [facility] [place].    House Dust Mite Allergy        The house dust mite is an arachnid about 0.3 mm in size and not visible to the naked eye. There are around 150 species of house dust mites in the world. One mite produces up to 40 fecal droppings a day. One teaspoonful of bedroom dust contains an average of nearly 1000 mites and 250,000 minute droppings.    Causes and triggers of house dust mite allergy  The house dust mite requires a warm, moist environment without light in order to live and reproduce. Our beds are ideal. The mite feeds on human and animal skin scales. The allergen is mainly contained in the mite's feces. The feces contain allergy-triggering constituents which are spread in fine dust, are breathed in and can cause an allergic reaction.    Symptoms  When the allergens come into contact with the mucous membranes in the eyes, nose, mouth " and throat, sufferers develop symptoms typical of an allergic cold (allergic rhinitis) or an allergic inflammation of the conjunctiva (allergic conjunctivitis): blocked or runny nose, sneezing, red, itchy eyes. If all of these symptoms are present, then the condition is also known as rhinoconjunctivitis. Often, the upper respiratory tract becomes chronically inflamed, primarily because house dust mites are present all year round.  The symptoms of house dust mite allergy typically occur in the morning and are more frequent in the cold months of the year.    Therapy and treatment  As a first step, mattress, pillows and duvet/comforter should be placed in mite-proof or anti-mite covers, sometimes known as encasings. Alternatively you can use pillows or comforter that can be washed at over 130 F monthly. At the same time, house dust should be minimized. If necessary, the symptoms can be treated with medication, for example antihistamines in the form of nasal sprays, eye drops and tablets. Desensitization/specific immunotherapy (SIT) is recommended for house dust allergy if all the measures above are not sufficient.    Tips and tricks:    Keep room temperature at 66-70 F and relative air humidity at a maximum of 50%.    Ideally, thoroughly air your home two to three times a day for 5 to 10 minutes each time.    Wash bed linens in at least 130 F every week.    Remove stuffed animals or freeze them every other week.    Keep ceiling fans off in the bedroom as they can stir up dust mite allergens.    Remove dust from furniture with a damp cloth and regularly wet mop floors.    Do not put pot and hydroponic plants in the bedroom and also avoid putting too many in living areas, as they increase room humidity.    When staying overnight in other accommodations, we recommend taking your own bed linen and the above anti-mite mattress covers with you.    Remove upholstered furniture from the bedroom and consider removing the  "carpets. Ideally, use sealed parquet or laminate mohini, cork tiles or mohini made of wood, novilon or PVC.    Maybe additionally reduce dust mites in mattress, upholstery, or mohini using hot steam .      Modified from \"House Dust Mite Allergy\" by [employer] [facility] [place].    Atopy Screen (Placed 10/02/20 )    No Substance Readings (15 min) Evaluation   POS Histamine 1mg/ml +/++    NEG NaCl 0.9% -      No Substance Readings (15 min) Evaluation   1 Alternaria alternata (tenuis)  ++    2 Cladosporium herbarum -    3 Aspergillus fumigatus +    4 Penicillium notatum -    5 Dermatophagoides pteronyssinus -    6 Dermatophagoides farinae -    7 Dog epithelium (canis spp) -    8 Cat hair (viktoriya catus) -    9 Cockroach   (Blatella americana & germanica) -    10 Grass mix midwest   (Heena, Orchard, [place]top, [place]) -    11 Apolinar grass (sorghum halepense) +    12 Weed mix   (common Cocklebur, Lamb s quarters, rough redroot Pigweed, Dock/Sorrel) -    13 Mug wort (artemisia vulgare) +    14 Ragweed giant/short (ambrosia spp) -    15 English Plantain (plantago lanceolata) -    16 Tree mix 1 (Pecan, Maple BHR, Oak RVW, american [place], black [place]) -    17 Red cedar (juniperus virginia) -    18 Tree mix 2   (white Joseph, river/red Birch, black [place], common [place], american Elm) -    19 Box elder/Maple mix (acer spp) -    20 [place] shagbark (carya ovata) -       -      Conclusion: patient has in prick tests clearly positive reaction to molds (Alternaria >> Aspergillus)    Fish and Seafood (Placed 10/02/20 )    No Substance Readings (15min) Evaluation   1 Codfish (gadus morhua) -    2 Flounder (platichthys spp) -    3 Tuna (thunnus albecarus) -    4 Bass (centropristis striata) -    5 Mackerel (scomberomorus cavalla) -    6 Halibut (hipoglossus hipoglossus) -    7 [place] (salmo perri) -    8 Catfish (ictalurus spp) -    9 Perch (serranus scriba) -    10 [place], [place] (oncorhynchus mykiss) -    11 Crab " (callinectes spp) -    12 Lobster (homarus americanus) -    13 Shrimp white/brown/pink (farfantepenaeus&litopenaeus) -    14 Kingcrab (paralithodes camtschatica) -    15 Scallop (placopecten magellanicus) -    16 Clam (mercenaria mercenaria) -    17 Oyster (cstrea/crassostrea) -      Conclusion: no clear signs for allergy to seafood/fish      Intradermal Testing (Placed 10/02/20 )    No Substance Conc.  Readings (15min) Evaluation   - NaCl  0.9% -    + Histamine (prick) 0.1mg / ml ++    DF Standard Dust Mite - D. Farinae 1:10 ++ 9mmP/15mmE   DP Standard Dust Mite - D. 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    # perennial in the morning soar throat, congested nose and mucus    proven dust mite allergy     Additionally sensitization to molds    --> maybe later support skin test results with specific IgE to mugwort, Alternaria, Aspergillus, Penicillium, D. Farinae and D. Pteronyssinus.    Patient counseling/plan:  >> follow recommendations and reduce HDM and molds if possible at home  >> go with Flonase nasal spray every evening  >> take daily an antihistamine like Cetirizine, Claritine or Fexofenadine   >> most importantly talk to PCP to stop Lisinopril and replace maybe with ARBs (e.g. Losartan --> has much less risk of lip swelling). ACE induced angioedemas can last up to 6 months after stopping drug.

## 2020-10-02 NOTE — LETTER
10/2/2020         RE: Mimi Melton  [address] [address]        Dear Colleague,    Thank you for referring your patient, Mimi Melton, to the [facility] [place]. Please see a copy of my visit note below.    Note for return visit for Dermato-Allergy       Encounter Date: Oct 2, 2020    History of Present Illness:  Mimi Melton is a 45 year old female who presents in person to the consult following information has been take directly from the prior notes, patients informations, Epic and/or other sources and exam/history performed by myself:     Elo 2017 with Dr. Bynum patient positive to house dust mites, molds and willow tree and joss grass    Between December and March increase of lip swelling, can happen anytime.       Additional comments and observations from review of the patient s chart including the following:    See notes for more details    ROS: Patient generally feeling well today   Physical Examination:  General: Well-appearing, appropriately-developed individual.  - Skin: Focused examination of the skin on test sites on arms within the consultation was performed.   As above in HPI. No additional skin concerns.  See results below  - upper respiratory tract: currently no obvious Rhinitis  - lungs: no signs for active and present Asthma/Wheezing or coughing  - eyes: currently no active conjunctivits  - GI system/digestion: currently no problems, no bloating or Diarrhoea        Earlier History and Allergy exams:    Last several years (7-8 years). Lips will start swelling up; only top or bottom lip will swell. Cannot determine if eyes were swollen because of her thyroid disease. Eyes seem to be OK currently. Less frequently during the winter. Was happening frequently between March and June. Could only maybe associate it with fish. Tuna fish from the can or the bag with a new diet during that time. Lips will tingle. Had this occur without the fish. No  airway involvement or difficulty breathing. No rash elsewhere. Uncle on mom's side would get similar reaction to seafood. Hasn't happened for the last 1-2 months. Would take more of her generic allergy pill (ceterizine 10 mg). Didn't last as long while on this; diphenhydramine also helps. Notably with a history seasonal allergies to dust mites, molds, [place] grass and as well as [place] tree pollen. Was also travelling to [place] during this period and notes she thinks there was more mold during this time.    Past Medical History:   Patient Active Problem List   Diagnosis     Surveillance of previously prescribed intrauterine contraceptive device     Vaginitis and vulvovaginitis     Exophthalmos     Abnormal Pap smear of cervix     CARDIOVASCULAR SCREENING; LDL GOAL LESS THAN 160     Essential hypertension with goal blood pressure less than 140/90     Anemia     Hypothyroidism due to acquired atrophy of thyroid     Cervical high risk HPV (human papillomavirus) test positive     Thyroid disease     Female stress incontinence     Chronic seasonal allergic rhinitis, unspecified trigger     Need for HPV vaccine     Past Medical History:   Diagnosis Date     Cervical high risk HPV (human papillomavirus) test positive 12/2015 & 10/2019    NIL pap, + HPV (not 16 or 18)     Chronic sinusitis Summer 2016    I get congested every 3-4 weeks     Esophageal reflux      Exophthalmos, unspecified      Hypothyroidism      Thyroid eye disease      Thyrotoxicosis without mention of goiter or other cause, without mention of thyrotoxic crisis or storm 3/05    Had radioablation, On synthroid, seeing [facility]; takes synthroid     Unspecified essential hypertension 1980    meds since 2001, on atenolol       Allergy History:     Allergies   Allergen Reactions     Mold      Cannot have any meds that contain mold.     No Known Drug Allergies        Social History:   reports that she has never smoked. She has never used smokeless  tobacco. She reports current alcohol use. She reports current drug use. Drug: Marijuana.      Family History:  Family History   Problem Relation Age of Onset     Hypertension Mother      Hypertension Father      Hypertension Maternal Grandmother      Hypertension Paternal Grandmother      Hypertension Maternal Grandfather      Hypertension Paternal Grandfather        Medications:  Current Outpatient Medications   Medication Sig Dispense Refill     ACETAMINOPHEN PO Take 1,000 mg by mouth 2 times daily as needed for pain (menstrual cramps)       cetirizine (ZYRTEC) 10 MG tablet Take 10 mg by mouth every morning       fexofenadine (ALLEGRA ALLERGY) 180 MG tablet Emergency medication for lip swelling - take 1-2 tablets up to twice daily with episodes of lip swelling. Record exposures including medications, environmental and food trigger. 12 tablet 0     fluconazole (DIFLUCAN) 150 MG tablet Take one tablet every 3 days until symptoms resolve. 3 tablet 3     fluticasone (FLONASE) 50 MCG/ACT nasal spray Spray 1 spray into both nostrils 2 times daily 48 g 3     guaiFENesin-codeine (ROBITUSSIN AC) 100-10 MG/5ML solution Take 5-10 mLs by mouth every 4 hours as needed for cough 240 mL 0     IBUPROFEN PO Take 800 mg by mouth every 4 hours as needed (takes for menstrual cramps.)        Lactobacillus-Inulin ([facility] DIGESTIVE [facility]) CAPS Take 1 capsule by mouth 2 times daily 100 capsule 11     levothyroxine (SYNTHROID/LEVOTHROID) 100 MCG tablet Take 1 tablet (100 mcg) by mouth daily 90 tablet 3     lisinopril-hydrochlorothiazide (PRINZIDE/ZESTORETIC) 10-12.5 MG tablet Take 1 tablet by mouth daily 90 tablet 3     mupirocin (BACTROBAN) 2 % ointment as needed          ROS: Patient generally feeling well today  Physical Examination:  General: Well-appearing, appropriately-developed individual.  Skin: Focused examination of the head and neck within the teledermatology photograph(s)* was performed.   -Without labial or periorbital  swelling    Allergy Tests:    Past Allergy Test    Future Allergy Test: 9/8/2020     Order for PRICK TESTS    [x] Outpatient  [] Inpatient: [facility]..../ Bed ....      Skin Atopy (atopic dermatitis) [x] Yes   [] No  Contact allergies:   [] Yes   [] No  Urticaria/Angioedema  [x] Yes   [] No  Rhinitis/Sinusitis:   [x] Yes   [] No   [x] seasonal [] perennial            Allergic Asthma:   [] Yes   [] No  Pets :  [] Yes   [] No  which?......  Food Allergy:   [] Yes   [] No  Drug allergies:   [] Yes   [] No       Reason for tests (suspected allergy): food vs inhaled allergen  Known previous allergies: dust mites, molds, [place] grass and as well as [place] tree pollen    Standardized prick panels  [x] Atopic panel (20 tests)  [] Pediatric Panel (12 tests)  [] Milk, Meat, Eggs, Grains (20 tests)   [] Dust, Epithelia, Feathers (10 tests)  [x] Fish, Seafood, Shellfish (17 tests)  [] Nuts, Beans (14 tests)  [] Spice, Vegetable, Fruit (17 tests)  [x] Others:  Tuna fish      [] Patient's own products: ...    DO NOT test if chemical or biological identity is unknown!     always ask from patient the product information and safety sheets (MSDS)     Standardized intradermal tests  [] Penicillium notatum [] Aspergillus fumigatus [] House dust mites  [] Bee venom   [] Wasp venom !!Specific protocol with dilutions!!  [] Others: ...    Atopy Screen (Placed 10/02/20 )    No Substance Readings (15 min) Evaluation   POS Histamine 1mg/ml +/++    NEG NaCl 0.9% -      No Substance Readings (15 min) Evaluation   1 Alternaria alternata (tenuis)  ++    2 Cladosporium herbarum -    3 Aspergillus fumigatus +    4 Penicillium notatum -    5 Dermatophagoides pteronyssinus -    6 Dermatophagoides farinae -    7 Dog epithelium (canis spp) -    8 Cat hair (viktoriya catus) -    9 Cockroach   (Blatella americana & germanica) -    10 Grass mix midwest   (Heena, Orchard, Redtop, [place]) -    11 Apolinar grass (sorghum halepense) +    12 Weed mix   (common  Cocklebur, Larios s quarters, rough redroot Pigweed, Dock/Sorrel) -    13 Mug wort (artemisia vulgare) +    14 Ragweed giant/short (ambrosia spp) -    15 English Plantain (plantago lanceolata) -    16 Tree mix 1 (Pecan, Maple BHR, Oak RVW, american [place], black [place]) -    17 Red cedar (juniperus virginia) -    18 Tree mix 2   (white [place], river/red Birch, black [place], common [place], american Elm) -    19 Box elder/Maple mix (acer spp) -    20 [place] shagbark (carya ovata) -       -      Conclusion: patient has in prick tests clearly positive reaction to molds (Alternaria >> Aspergillus)    Fish and Seafood (Placed 10/02/20 )    No Substance Readings (15min) Evaluation   1 Codfish (gadus morhua) -    2 Flounder (platichthys spp) -    3 Tuna (thunnus albecarus) -    4 Bass (centropristis striata) -    5 Mackerel (scomberomorus cavalla) -    6 Halibut (hipoglossus hipoglossus) -    7 [place] (salmo perri) -    8 Catfish (ictalurus spp) -    9 Perch (serranus scriba) -    10 [place], [place] (oncorhynchus mykiss) -    11 Crab (callinectes spp) -    12 Lobster (homarus americanus) -    13 Shrimp white/brown/pink (farfantepenaeus&litopenaeus) -    14 Kingcrab (paralithodes camtschatica) -    15 Scallop (placopecten magellanicus) -    16 Clam (mercenaria mercenaria) -    17 Oyster (cstrea/crassostrea) -      Conclusion: no clear signs for allergy to seafood/fish      Intradermal Testing (Placed 10/02/20 )    No Substance Conc.  Readings (15min) Evaluation   - NaCl  0.9% -    + Histamine (prick) 0.1mg / ml ++    DF Standard Dust Mite - D. Farinae 1:10 ++ 9mmP/15mmE   DP Standard Dust Mite - D. Pteronyssinus 1:10 ++ 10mmP/15mmE     Conclusion: clear positive reaction to house dust mites      Impression/Plan:    # 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) most probably linked to ACE inhibitor Lisinopril (since 10 years on Lisinopril)    DDx allergic (HDM)    - Fexofenadine 180 mg tablets - Emergency medication for lip  swelling: take 1-2 tablets up to twice daily with lip swelling  -     # seasonal allergic rhinoconjunctivitis spring and fall without Asthma    In fall probably mugwort and Alternaria mold    No clear explanation for problems in spring    Maybe crossreacting food allergy to mugwort??    # perennial in the morning soar throat, congested nose and mucus    proven dust mite allergy     Additionally sensitization to molds    --> maybe later support skin test results with specific IgE to mugwort, Alternaria, Aspergillus, Penicillium, D. Farinae and D. Pteronyssinus.    # folliculitis on chin area    Tretinoin cream 1-2 times daily    BP 5% wash    Patient counseling/plan:  >> follow recommendations and reduce HDM and molds if possible at home  >> go with Flonase nasal spray every evening  >> take daily an antihistamine like Cetirizine, Claritine or Fexofenadine   >> most importantly talk to PCP to stop Lisinopril and replace maybe with ARBs (e.g. Losartan --> has much less risk of lip swelling). ACE induced angioedemas can last up to 6 months after stopping drug.      Follow-up: virtual visit in about 1 month    Thank you for the opportunity be involved in the care of this patient.       Staff: Nissa Elias LPN      I spent a total of 40 minutes face to face with Mimi Melton during today s office visit. Over 50% of this time was spent discussing all the individual test results, correlating them to the clinical relevance, counseling the patient and/or coordinating care. Please see Assessment and Plan for details. This excludes any time spent performing prick and intradermal tests                         Again, thank you for allowing me to participate in the care of your patient.        Sincerely,        Niall Paz MD

## 2020-10-04 ASSESSMENT — ENCOUNTER SYMPTOMS
HEMATOCHEZIA: 0
DIZZINESS: 0
JOINT SWELLING: 0
EYE PAIN: 0
ARTHRALGIAS: 0
DIARRHEA: 0
HEARTBURN: 0
ABDOMINAL PAIN: 0
NERVOUS/ANXIOUS: 0
MYALGIAS: 0
SHORTNESS OF BREATH: 0
FREQUENCY: 0
WEAKNESS: 0
BREAST MASS: 0
PALPITATIONS: 0
HEADACHES: 0
NAUSEA: 0
SORE THROAT: 0
PARESTHESIAS: 0
DYSURIA: 0
HEMATURIA: 0
CHILLS: 0
FEVER: 0
COUGH: 0
CONSTIPATION: 0

## 2020-10-05 DIAGNOSIS — E03.2 HYPOTHYROIDISM DUE TO MEDICATION: ICD-10-CM

## 2020-10-06 ENCOUNTER — TELEPHONE (OUTPATIENT)
Dept: ALLERGY | Facility: CLINIC | Age: 45
End: 2020-10-06

## 2020-10-06 RX ORDER — LEVOTHYROXINE SODIUM 100 UG/1
100 TABLET ORAL DAILY
Qty: 90 TABLET | Refills: 0 | Status: SHIPPED | OUTPATIENT
Start: 2020-10-06 | End: 2020-12-24

## 2020-10-06 NOTE — TELEPHONE ENCOUNTER
PA Initiation    Medication: tretinoin (RETIN-A) 0.05 % external cream   Insurance Company: [facility] - Phone [phone] Fax [phone]  Pharmacy Filling [facility] Rx[facility] [id number] [place] [address]  Filling Pharmacy Phone: [phone]  Filling Pharmacy Fax: [phone]  Start Date: 10/6/2020

## 2020-10-06 NOTE — TELEPHONE ENCOUNTER
Prior Authorization Retail Medication Request    Medication/Dose: Tretinoin 0.05%  ICD code (if different than what is on RX):    Previously Tried and Failed:    Rationale:      Insurance Name:    Insurance ID:        Pharmacy Information (if different than what is on RX)  [facility]   Phone:  [phone] [id number]

## 2020-10-06 NOTE — TELEPHONE ENCOUNTER
PRIOR AUTHORIZATION DENIED    Medication: tretinoin (RETIN-A) 0.05 % external cream--DENIED    Denial Date: 10/6/2020    Denial Rational: Patient needs to have diagnosis of acne vulgaris or keratosis follicularis (Darier's disease, Darier-White disease)    Appeal Information:

## 2020-10-07 ENCOUNTER — OFFICE VISIT (OUTPATIENT)
Dept: FAMILY MEDICINE | Facility: CLINIC | Age: 45
End: 2020-10-07
Payer: COMMERCIAL

## 2020-10-07 VITALS
RESPIRATION RATE: 16 BRPM | HEART RATE: 72 BPM | OXYGEN SATURATION: 97 % | BODY MASS INDEX: 27.97 KG/M2 | SYSTOLIC BLOOD PRESSURE: 120 MMHG | WEIGHT: 174 LBS | HEIGHT: 66 IN | TEMPERATURE: 97.8 F | DIASTOLIC BLOOD PRESSURE: 80 MMHG

## 2020-10-07 DIAGNOSIS — Z11.3 SCREENING EXAMINATION FOR SEXUALLY TRANSMITTED DISEASE: ICD-10-CM

## 2020-10-07 DIAGNOSIS — Z00.00 ROUTINE GENERAL MEDICAL EXAMINATION AT A HEALTH CARE FACILITY: Primary | ICD-10-CM

## 2020-10-07 DIAGNOSIS — I10 ESSENTIAL HYPERTENSION WITH GOAL BLOOD PRESSURE LESS THAN 140/90: ICD-10-CM

## 2020-10-07 DIAGNOSIS — E03.2 HYPOTHYROIDISM DUE TO MEDICATION: ICD-10-CM

## 2020-10-07 DIAGNOSIS — Z13.220 SCREENING FOR HYPERLIPIDEMIA: ICD-10-CM

## 2020-10-07 LAB
CHOLEST SERPL-MCNC: 180 MG/DL
HCV AB SERPL QL IA: NONREACTIVE
HDLC SERPL-MCNC: 62 MG/DL
LDLC SERPL CALC-MCNC: 104 MG/DL
NONHDLC SERPL-MCNC: 118 MG/DL
SPECIMEN SOURCE: NORMAL
T PALLIDUM AB SER QL: NONREACTIVE
TRIGL SERPL-MCNC: 71 MG/DL
WET PREP SPEC: NORMAL

## 2020-10-07 PROCEDURE — 99214 OFFICE O/P EST MOD 30 MIN: CPT | Mod: 25 | Performed by: NURSE PRACTITIONER

## 2020-10-07 PROCEDURE — 86803 HEPATITIS C AB TEST: CPT | Performed by: NURSE PRACTITIONER

## 2020-10-07 PROCEDURE — 36415 COLL VENOUS BLD VENIPUNCTURE: CPT | Performed by: NURSE PRACTITIONER

## 2020-10-07 PROCEDURE — 87591 N.GONORRHOEAE DNA AMP PROB: CPT | Performed by: NURSE PRACTITIONER

## 2020-10-07 PROCEDURE — 99000 SPECIMEN HANDLING OFFICE-LAB: CPT | Performed by: NURSE PRACTITIONER

## 2020-10-07 PROCEDURE — 87491 CHLMYD TRACH DNA AMP PROBE: CPT | Performed by: NURSE PRACTITIONER

## 2020-10-07 PROCEDURE — 80061 LIPID PANEL: CPT | Performed by: NURSE PRACTITIONER

## 2020-10-07 PROCEDURE — 99396 PREV VISIT EST AGE 40-64: CPT | Performed by: NURSE PRACTITIONER

## 2020-10-07 PROCEDURE — 86780 TREPONEMA PALLIDUM: CPT | Mod: 90 | Performed by: NURSE PRACTITIONER

## 2020-10-07 PROCEDURE — 87210 SMEAR WET MOUNT SALINE/INK: CPT | Performed by: NURSE PRACTITIONER

## 2020-10-07 PROCEDURE — 87389 HIV-1 AG W/HIV-1&-2 AB AG IA: CPT | Performed by: NURSE PRACTITIONER

## 2020-10-07 RX ORDER — LOSARTAN POTASSIUM 25 MG/1
25 TABLET ORAL DAILY
Qty: 90 TABLET | Refills: 0 | Status: SHIPPED | OUTPATIENT
Start: 2020-10-07 | End: 2020-12-24

## 2020-10-07 RX ORDER — HYDROCHLOROTHIAZIDE 12.5 MG/1
12.5 TABLET ORAL DAILY
Qty: 90 TABLET | Refills: 0 | Status: SHIPPED | OUTPATIENT
Start: 2020-10-07 | End: 2020-12-24

## 2020-10-07 ASSESSMENT — ENCOUNTER SYMPTOMS
HEARTBURN: 0
ARTHRALGIAS: 0
PALPITATIONS: 0
ABDOMINAL PAIN: 0
CHILLS: 0
NERVOUS/ANXIOUS: 0
DIARRHEA: 0
COUGH: 0
PARESTHESIAS: 0
FEVER: 0
DYSURIA: 0
HEADACHES: 0
MYALGIAS: 0
SORE THROAT: 0
WEAKNESS: 0
HEMATURIA: 0
FREQUENCY: 0
JOINT SWELLING: 0
DIZZINESS: 0
EYE PAIN: 0
NAUSEA: 0
BREAST MASS: 0
CONSTIPATION: 0
HEMATOCHEZIA: 0
SHORTNESS OF BREATH: 0

## 2020-10-07 ASSESSMENT — MIFFLIN-ST. JEOR: SCORE: 1448.07

## 2020-10-07 NOTE — PATIENT INSTRUCTIONS
Preventive Health Recommendations  Female Ages 40 to 49    Yearly exam:     See your health care provider every year in order to  1. Review health changes.   2. Discuss preventive care.    3. Review your medicines if your doctor prescribed any.      Get a Pap test every three years (unless you have an abnormal result and your provider advises testing more often).      If you get Pap tests with HPV test, you only need to test every 5 years, unless you have an abnormal result. You do not need a Pap test if your uterus was removed (hysterectomy) and you have not had cancer.      You should be tested each year for STDs (sexually transmitted diseases), if you're at risk.     Ask your doctor if you should have a mammogram.      Have a colonoscopy (test for colon cancer) if someone in your family has had colon cancer or polyps before age 50.       Have a cholesterol test every 5 years.       Have a diabetes test (fasting glucose) after age 45. If you are at risk for diabetes, you should have this test every 3 years.    Shots: Get a flu shot each year. Get a tetanus shot every 10 years.     Nutrition:     Eat at least 5 servings of fruits and vegetables each day.    Eat whole-grain bread, whole-wheat pasta and brown rice instead of white grains and rice.    Get adequate Calcium and Vitamin D.      Lifestyle    Exercise at least 150 minutes a week (an average of 30 minutes a day, 5 days a week). This will help you control your weight and prevent disease.    Limit alcohol to one drink per day.    No smoking.     Wear sunscreen to prevent skin cancer.    See your dentist every six months for an exam and cleaning.

## 2020-10-07 NOTE — PROGRESS NOTES
SUBJECTIVE:   CC: Mimi Melton is an 45 year old woman who presents for preventive health visit.     Healthy Habits:     Getting at least 3 servings of Calcium per day:  NO    Bi-annual eye exam:  Yes    Dental care twice a year:  Yes    Sleep apnea or symptoms of sleep apnea:  None    Diet:  Regular (no restrictions)    Frequency of exercise:  2-3 days/week    Duration of exercise:  45-60 minutes    Taking medications regularly:  Yes    Medication side effects:  Other    PHQ-2 Total Score: 0    Additional concerns today:  Yes      Today's PHQ-2 Score:   PHQ-2 ( 1999 Pf[employer]) 10/4/2020   Q1: Little interest or pleasure in doing things 0   Q2: Feeling down, depressed or hopeless 0   PHQ-2 Score 0   Q1: Little interest or pleasure in doing things Not at all   Q2: Feeling down, depressed or hopeless Not at all   PHQ-2 Score 0       Abuse: Current or Past (Physical, Sexual or Emotional) - No  Do you feel safe in your environment? Yes    Social History     Tobacco Use     Smoking status: Never Smoker     Smokeless tobacco: Never Used   Substance Use Topics     Alcohol use: Yes     Comment: 1-2x's/month if that.     If you drink alcohol do you typically have >3 drinks per day or >7 drinks per week? No    Alcohol Use 10/7/2020   Prescreen: >3 drinks/day or >7 drinks/week? -   Prescreen: >3 drinks/day or >7 drinks/week? No   No flowsheet data found.    Reviewed orders with patient.  Reviewed health maintenance and updated orders accordingly - Yes  Lab work is in process  Labs reviewed in EPIC  BP Readings from Last 3 Encounters:   10/07/20 120/80   09/14/20 118/79   07/03/20 118/68    Wt Readings from Last 3 Encounters:   10/07/20 78.9 kg (174 lb)   07/03/20 81.5 kg (179 lb 9.6 oz)   02/10/20 81.6 kg (180 lb)                  Patient Active Problem List   Diagnosis     Surveillance of previously prescribed intrauterine contraceptive device     Vaginitis and vulvovaginitis     Exophthalmos     Abnormal Pap smear of  cervix     CARDIOVASCULAR SCREENING; LDL GOAL LESS THAN 160     Essential hypertension with goal blood pressure less than 140/90     Anemia     Hypothyroidism due to acquired atrophy of thyroid     Cervical high risk HPV (human papillomavirus) test positive     Thyroid disease     Female stress incontinence     Chronic seasonal allergic rhinitis, unspecified trigger     Need for HPV vaccine     Past Surgical History:   Procedure Laterality Date     C  DELIVERY ONLY  91    , Low Cervical     C  DELIVERY ONLY  97    , Low Cervical     C  DELIVERY ONLY  99    , Low Cervical     C INDUCED ABORTN BY D&C      Aspiration & Curettage, TAB x2     CYSTOSCOPY, SLING TRANSVAGINAL N/A 10/10/2017    Procedure: CYSTOSCOPY, SLING TRANSVAGINAL;  Midurethral Sling and Cystoscopy (Support the Urethra with Mesh Sling and Look in the Bladder);  Surgeon: Karin Amin MD;  Location: [facility]     EYE SURGERY  2008    Orbital Decompression Dr smart     HC REPAIR INCISIONAL HERNIA,REDUCIBLE      Umbilical hernia Repair       Social History     Tobacco Use     Smoking status: Never Smoker     Smokeless tobacco: Never Used   Substance Use Topics     Alcohol use: Yes     Comment: 1-2x's/month if that.     Family History   Problem Relation Age of Onset     Hypertension Mother      Hypertension Father      Hypertension Maternal Grandmother      Hypertension Paternal Grandmother      Hypertension Maternal Grandfather      Hypertension Paternal Grandfather          Current Outpatient Medications   Medication Sig Dispense Refill     ACETAMINOPHEN PO Take 1,000 mg by mouth 2 times daily as needed for pain (menstrual cramps)       benzoyl peroxide 5 % external liquid Wash face skin 1-2 times daily 148 g 3     fexofenadine (ALLEGRA ALLERGY) 180 MG tablet Emergency medication for lip swelling - take 1-2 tablets up to twice daily with episodes of lip swelling. Record exposures  including medications, environmental and food trigger. 12 tablet 0     fluconazole (DIFLUCAN) 150 MG tablet Take one tablet every 3 days until symptoms resolve. 3 tablet 3     fluticasone (FLONASE) 50 MCG/ACT nasal spray Spray 1 spray into both nostrils 2 times daily 48 g 3     hydrochlorothiazide (HYDRODIURIL) 12.5 MG tablet Take 1 tablet (12.5 mg) by mouth daily 90 tablet 0     IBUPROFEN PO Take 800 mg by mouth every 4 hours as needed (takes for menstrual cramps.)        Lactobacillus-Inulin ([facility] DIGESTIVE [facility]) CAPS Take 1 capsule by mouth 2 times daily 100 capsule 11     levothyroxine (SYNTHROID/LEVOTHROID) 100 MCG tablet Take 1 tablet (100 mcg) by mouth daily 90 tablet 0     losartan (COZAAR) 25 MG tablet Take 1 tablet (25 mg) by mouth daily 90 tablet 0     mupirocin (BACTROBAN) 2 % ointment as needed        tretinoin (RETIN-A) 0.05 % external cream Apply topically At Bedtime Put on face 1-2 times daily (if irritated reduce to every other day) 20 g 3     Allergies   Allergen Reactions     Dust Mites      Lisinopril Swelling     Swelling in lip     Mold      Cannot have any meds that contain mold.     No Known Drug Allergies      Recent Labs   Lab Test 08/20/20  0819 09/13/19  0934 05/04/18  0842 05/04/18  0842 01/08/18  1548 01/08/18  1548 08/09/16  0848 08/09/16  0848 07/28/15  0927 07/28/15  0927   LDL  --  101*  --  124*  --   --   --  99  --  83   HDL  --  61  --  56  --   --   --  53  --  59   TRIG  --  64  --  55  --   --   --  90  --  44   ALT  --   --   --   --   --  14  --  28  --  14   CR 0.94 0.92   < >  --   --  0.86   < > 0.93  --  1.00   GFRESTIMATED 74 76   < >  --   --  73   < > 67  --  62   GFRESTBLACK 86 88   < >  --   --  88   < > 81  --  75   POTASSIUM 3.6 3.6   < >  --   --  3.5   < > 3.6  --  4.1   TSH 0.49 0.55   < >  --    < >  --    < > 0.44   < > 3.98    < > = values in this interval not displayed.        Mammogram Screening: Patient under age 50, mutual decision reflected in  health maintenance.      Pertinent mammograms are reviewed under the imaging tab.  History of abnormal Pap smear:   Last 3 Pap and HPV Results:   PAP / HPV Latest Ref Rng & Units 10/18/2019 12/8/2016 12/2/2015   PAP - NIL NIL NIL   HPV 16 DNA NEG:Negative Negative Negative Negative   HPV 18 DNA NEG:Negative Negative Negative Negative   OTHER HR HPV NEG:Negative Positive(A) Negative Positive(A)     PAP / HPV Latest Ref Rng & Units 10/18/2019 12/8/2016 12/2/2015   PAP - NIL NIL NIL   HPV 16 DNA NEG:Negative Negative Negative Negative   HPV 18 DNA NEG:Negative Negative Negative Negative   OTHER HR HPV NEG:Negative Positive(A) Negative Positive(A)     Reviewed and updated as needed this visit by clinical staff  Tobacco  Allergies  Meds   Med Hx  Surg Hx  Fam Hx  Soc Hx        Reviewed and updated as needed this visit by Provider                    Thyroid:  Managed by Dr. Harmon.  She does not need refills of her medications today.    BP:  Mimi has been on blood pressure medications for several years.  Mimi developed a swollen lip on her hydrochlorothiazide/lisinopril.  She was recently seen by the dermatology group and she was told by Dr. Paz that the reason she developed swelling on her lip is from the lisinopril.  She is to stop the lisinopril now.  She is requesting a new agent for blood pressure.  She reports today she is feeling healthy.  She denies chest pain or shortness of breath.  She does check her blood pressure at home and intermittently and her blood pressure is always good.      Feeling great.    PAP smears/IUD:  Managed by OB/GYN  She is due an appointment OB/GYN now.    STD panel requested.  Cholesterol panel requested today.      Review of Systems   Constitutional: Negative for chills and fever.   HENT: Negative for congestion, ear pain, hearing loss and sore throat.    Eyes: Negative for pain and visual disturbance.   Respiratory: Negative for cough and shortness of breath.   "  Cardiovascular: Negative for chest pain, palpitations and peripheral edema.   Gastrointestinal: Negative for abdominal pain, constipation, diarrhea, heartburn, hematochezia and nausea.   Breasts:  Negative for tenderness, breast mass and discharge.   Genitourinary: Negative for dysuria, frequency, genital sores, hematuria, pelvic pain, urgency, vaginal bleeding and vaginal discharge.   Musculoskeletal: Negative for arthralgias, joint swelling and myalgias.   Skin: Negative for rash.   Neurological: Negative for dizziness, weakness, headaches and paresthesias.   Psychiatric/Behavioral: Negative for mood changes. The patient is not nervous/anxious.       OBJECTIVE:   /80 (BP Location: Right arm, Patient Position: Sitting, Cuff Size: Adult Regular)   Pulse 72   Temp 97.8  F (36.6  C) (Temporal)   Resp 16   Ht 1.664 m (5' 5.5\")   Wt 78.9 kg (174 lb)   LMP 09/25/2020 (Exact Date)   SpO2 97%   BMI 28.51 kg/m    Physical Exam  GENERAL: healthy, alert and no distress  EYES: Eyes grossly normal to inspection, PERRL and conjunctivae and sclerae normal  HENT: ear canals and TM's normal, nose and mouth without ulcers or lesions  NECK: no adenopathy, no asymmetry, masses, or scars and thyroid normal to palpation  RESP: lungs clear to auscultation - no rales, rhonchi or wheezes  CV: regular rate and rhythm, normal S1 S2, no S3 or S4, no murmur, click or rub, no peripheral edema and peripheral pulses strong  ABDOMEN: soft, nontender, no hepatosplenomegaly, no masses and bowel sounds normal  MS: no gross musculoskeletal defects noted, no edema  SKIN: no suspicious lesions or rashes  NEURO: Normal strength and tone, mentation intact and speech normal  PSYCH: mentation appears normal, affect normal/bright    Diagnostic Test Results:  Labs reviewed in Epic  Labs done today, results pending    ASSESSMENT/PLAN:   (Z00.00) Routine general medical examination at a health care facility  (primary encounter " diagnosis)  Comment:   Plan: Wet prep, Chlamydia trachomatis PCR, Neisseria         gonorrhoeae PCR, Hepatitis C antibody, HIV         Antigen Antibody Combo, Treponema Abs w Reflex         to RPR and Titer, CANCELED: INFLUENZA VACCINE         IM > 6 MONTHS VALENT IIV4 [31121]            (E03.2) Hypothyroidism due to medication  Comment: Chronic  Plan: Continue thyroid medication and continue care with Dr. Harmon.    (I10) Essential hypertension with goal blood pressure less than 140/90  Comment: Controlled  Plan: losartan (COZAAR) 25 MG tablet,         hydrochlorothiazide (HYDRODIURIL) 12.5 MG         tablet          For today, I agree with Dr. Paz that she needs to stop her lisinopril.  I did switch her blood pressure agent today.   She is to continue hydrochlorothiazide 12.5 mg a day.  I switched her from lisinopril to losartan 25 mg once a day.  I did tell her I am starting her on a low-dose of losartan and I want her to check her blood pressure every other day and record.  In the event that her blood pressure goes up I will want to increase losartan to 50 mg once a day.  I encouraged her to continue a healthy diet, low-salt etc.  She is to follow-up with me within 3 months for refills, sooner if her blood pressure goes up or any side effects of the medication.    She was instructed to read the side effect profile that comes with the medication.    (Z13.220) Screening for hyperlipidemia  Comment: Routine  Plan: Lipid panel reflex to direct LDL Fasting        Done today    (Z11.3) Screening examination for sexually transmitted disease  Comment: Routine  Plan: Wet prep, Chlamydia trachomatis PCR, Neisseria         gonorrhoeae PCR, Hepatitis C antibody, HIV         Antigen Antibody Combo, Treponema Abs w Reflex         to RPR and Titer        Done today.  Safe sex practices encouraged.      Patient has been advised of split billing requirements and indicates understanding: Yes  COUNSELING:  Reviewed  "preventive health counseling, as reflected in patient instructions    Estimated body mass index is 28.51 kg/m  as calculated from the following:    Height as of this encounter: 1.664 m (5' 5.5\").    Weight as of this encounter: 78.9 kg (174 lb).    Weight management plan: Discussed healthy diet and exercise guidelines    She reports that she has never smoked. She has never used smokeless tobacco.      Counseling Resources:  ATP IV Guidelines  Pooled Cohorts Equation Calculator  Breast Cancer Risk Calculator  BRCA-Related Cancer Risk Assessment: FHS-7 Tool  FRAX Risk Assessment  ICSI Preventive Guidelines  Dietary Guidelines for Americans, 2010  USDA's MyPlate  ASA Prophylaxis  Lung CA Screening    MARGA Casey CNP  [facility]  "

## 2020-10-08 LAB
C TRACH DNA SPEC QL NAA+PROBE: NEGATIVE
HIV 1+2 AB+HIV1 P24 AG SERPL QL IA: NONREACTIVE
N GONORRHOEA DNA SPEC QL NAA+PROBE: NEGATIVE
SPECIMEN SOURCE: NORMAL
SPECIMEN SOURCE: NORMAL

## 2020-10-08 NOTE — RESULT ENCOUNTER NOTE
Mi Le,    This note is to let you know that your labs look great. Your comprehensive STD (sexually transmitted diseases) panel is negative.   There is no sign of HIV, hepatitis, syphilis, gonorrhea, or chlamydia. Your wet prep was negative/normal.    Kalli GRESHAM CNP

## 2020-10-13 ENCOUNTER — TELEPHONE (OUTPATIENT)
Dept: FAMILY MEDICINE | Facility: CLINIC | Age: 45
End: 2020-10-13

## 2020-10-13 NOTE — TELEPHONE ENCOUNTER
Please call her pharmacy.  The lisinopril was DISCONTINUED BECAUSE OF AN ALLERGY.  She was switched to losartan and hydrochlorothiazide and they don't make a losartan/hct strength in what she needs.

## 2020-10-13 NOTE — TELEPHONE ENCOUNTER
lisinopril-hydrochlorothiazide (PRINZIDE/ZESTORETIC) 10-12.5 MG tablet (Discontinued)      Last Written Prescription Date:  9-13-19  Last Fill Quantity: 90 tab,   # refills: 3  Last Office Visit: 10-7-20  Future Office visit:    Next 5 appointments (look out 90 days)    Oct 19, 2020  9:00 AM  Nurse Only with [facility]IST[facility]  [facility] ([facility]) [address] [place] [id number] [phone]   Oct 30, 2020  1:00 PM  Office Visit with Marisa Whitney MD  [facility] [place] [facility] [address] [place] [id number] [phone]           Routing refill request to provider for review/approval because:  Drug not active on patient's medication list  Medication is reported/historical

## 2020-10-15 ENCOUNTER — MYC MEDICAL ADVICE (OUTPATIENT)
Dept: FAMILY MEDICINE | Facility: CLINIC | Age: 45
End: 2020-10-15

## 2020-10-15 ENCOUNTER — OFFICE VISIT (OUTPATIENT)
Dept: FAMILY MEDICINE | Facility: CLINIC | Age: 45
End: 2020-10-15
Payer: COMMERCIAL

## 2020-10-15 VITALS
SYSTOLIC BLOOD PRESSURE: 118 MMHG | BODY MASS INDEX: 27.97 KG/M2 | TEMPERATURE: 98.2 F | HEIGHT: 66 IN | DIASTOLIC BLOOD PRESSURE: 80 MMHG | HEART RATE: 95 BPM | RESPIRATION RATE: 12 BRPM | WEIGHT: 174 LBS

## 2020-10-15 DIAGNOSIS — N76.0 BACTERIAL VAGINOSIS: Primary | ICD-10-CM

## 2020-10-15 DIAGNOSIS — B96.89 BACTERIAL VAGINOSIS: Primary | ICD-10-CM

## 2020-10-15 DIAGNOSIS — N89.8 VAGINAL DISCHARGE: ICD-10-CM

## 2020-10-15 LAB
Lab: ABNORMAL
SPECIMEN SOURCE: ABNORMAL
WET PREP SPEC: ABNORMAL

## 2020-10-15 PROCEDURE — 87491 CHLMYD TRACH DNA AMP PROBE: CPT | Performed by: FAMILY MEDICINE

## 2020-10-15 PROCEDURE — 87591 N.GONORRHOEAE DNA AMP PROB: CPT | Performed by: FAMILY MEDICINE

## 2020-10-15 PROCEDURE — 87210 SMEAR WET MOUNT SALINE/INK: CPT | Performed by: FAMILY MEDICINE

## 2020-10-15 PROCEDURE — 99214 OFFICE O/P EST MOD 30 MIN: CPT | Performed by: FAMILY MEDICINE

## 2020-10-15 RX ORDER — CLINDAMYCIN HCL 300 MG
300 CAPSULE ORAL 2 TIMES DAILY
Qty: 14 CAPSULE | Refills: 0 | Status: SHIPPED | OUTPATIENT
Start: 2020-10-15 | End: 2020-10-22

## 2020-10-15 ASSESSMENT — MIFFLIN-ST. JEOR: SCORE: 1448.07

## 2020-10-15 NOTE — PROGRESS NOTES
Subjective:   Mimi Melton is a 45 year old female who presents for   Chief Complaint   Patient presents with     Vaginal Problem     vaginal irritation and odor.      Patient initially treated for yeast with fluconazole - this didn't resolve symptoms. There was some foul odor she so suspected BV -- she took some old meds of clindamycin 150 (took twice now) from 2019. She reports that vaginal inserts are too messy and that oral flagyl causes upset stomach.     She has had 3 sex partners over the last few months and sees all of them still. Unprotected intercourse.     HIV /Hep C/ syphilis she reports being done about 1-2 weeks ago and is not interested in having this done at this time    Patient has an IUD.     Patient Active Problem List    Diagnosis Date Noted     Need for HPV vaccine 07/03/2020     Priority: Medium     Chronic seasonal allergic rhinitis, unspecified trigger 06/19/2018     Priority: Medium     Female stress incontinence 04/12/2017     Priority: Medium     Thyroid disease 08/09/2016     Priority: Medium     Cervical high risk HPV (human papillomavirus) test positive 12/01/2015     Priority: Medium     ECC, repeat pap and hpv all negative 9/09.   NIL paps: 2/10, 9/10, 12/11, 12/12. Plan pap in 3 yrs.   7/14: NIL pap. Plan cotest pap & HPV in 3 years.   12/2015: NIL pap, + HPV (not 16 or 18). Plan cotest pap & HPV in 1 year  12/08/16: NIL pap, Neg HR HPV result. Plan cotest in 3 years.  10/18/19 Pap: NIL, +HR HPV (not 16/18). Plan cotest in 1 year.       Hypothyroidism due to acquired atrophy of thyroid 10/13/2015     Priority: Medium     Anemia 07/22/2014     Priority: Medium     Essential hypertension with goal blood pressure less than 140/90 12/28/2012     Priority: Medium     CARDIOVASCULAR SCREENING; LDL GOAL LESS THAN 160 10/31/2010     Priority: Medium     Abnormal Pap smear of cervix 09/09/2009     Priority: Medium     ECC, repeat pap and hpv all negative 9/09.   NIL paps: 2/10, 9/10, 12/11,  "12/12. Plan pap in 3 yrs.  7/14: NIL pap. Plan cotest pap & HPV in 3 years.  12/2015: NIL pap, + HPV (not 16 or 18). Plan cotest pap & HPV in 1 year  Tracking started.  12/8/2016 Pap: NIL/neg HR HPV. Plan cotest in 3 years.  10/18/19 Pap: NIL, +HR HPV (not 16/18). Plan cotest in 1 year.           Exophthalmos 12/04/2007     Priority: Medium     Problem list name updated by automated process. Provider to review       Vaginitis and vulvovaginitis 06/19/2007     Priority: Medium     Problem list name updated by automated process. Provider to review       Surveillance of previously prescribed intrauterine contraceptive device 12/20/2004     Priority: Medium     Paragard IUD placed in May 19, 2011.         Current Outpatient Medications   Medication     clindamycin (CLEOCIN) 300 MG capsule     hydrochlorothiazide (HYDRODIURIL) 12.5 MG tablet     levothyroxine (SYNTHROID/LEVOTHROID) 100 MCG tablet     losartan (COZAAR) 25 MG tablet     tretinoin (RETIN-A) 0.05 % external cream     ACETAMINOPHEN PO     benzoyl peroxide 5 % external liquid     fexofenadine (ALLEGRA ALLERGY) 180 MG tablet     fluconazole (DIFLUCAN) 150 MG tablet     fluticasone (FLONASE) 50 MCG/ACT nasal spray     IBUPROFEN PO     Lactobacillus-Inulin ([facility] DIGESTIVE HEALTH) CAPS     mupirocin (BACTROBAN) 2 % ointment     No current facility-administered medications for this visit.        ROS:  As above per HPI      Objective:   /80   Pulse 95   Temp 98.2  F (36.8  C) (Oral)   Resp 12   Ht 1.664 m (5' 5.5\")   Wt 78.9 kg (174 lb)   LMP 09/25/2020 (Exact Date)   Breastfeeding No   BMI 28.51 kg/m  , Body mass index is 28.51 kg/m .  Gen:  NAD, well-nourished, sitting in chair comfortably  HEENT: EOMI, sclera anicteric, Head normocephalic, ; nares patent; moist mucous membranes  Neck: trachea midline, no thyromegaly  CV:  Hemodynamically stable  Pulm:  no increased work of breathing   Extrem: no cyanosis, edema or clubbing  Skin: no obvious " rashes or abnormalities  Psych: Euthymic, linear thoughts, normal rate of speech    Results for orders placed or performed in visit on 10/15/20   Wet prep     Status: Abnormal    Specimen: Vagina   Result Value Ref Range    Specimen Description Vagina     Special Requests Vagina     Wet Prep Clue cells seen  Moderate   (A)     Wet Prep No Trichomonas seen     Wet Prep No yeast seen     Wet Prep WBC'S seen  Few        Assessment & Plan:   Mimi Melton, 45 year old female who presents with:    Bacterial vaginosis  Oral treatment BID 300mg x 7 days provided. Patient had concern about this not resolving I did do the courtesy of allowing her to do a 'lab only 'visit in the next week if symptoms persist discussed that this is a additional non-visit order thus she may not get a call from one of the on-site clinicians to act on a new result, she will need to wait for me to come in to the office the next business day to address the result. The other option is to be seen in person for a new encounter.   - clindamycin (CLEOCIN) 300 MG capsule  Dispense: 14 capsule; Refill: 0  - Wet prep    Vaginal discharge  No reported fever or abdominal pain. Patient has 3 current partners thus GC/CT testing will be conducted today, this was done with consideration of [place] testing restrictions which patient fits criteria to be tested [facility] (due to the current pandemic).   - Wet prep  - Chlamydia trachomatis PCR  - Neisseria gonorrhoeae PCR      Alex Al MD   [facility]    The use of Dragon/Secure64 dictation services may have been used to construct the content in this note; any grammatical or spelling errors are non-intentional. Please contact the author of this note directly if you are in need of any clarification.

## 2020-10-15 NOTE — PATIENT INSTRUCTIONS
300mg clindamycin twice a day for a 1 week to treat bacterial vaginosis      The other testing done today will come back in the next two days, if negative results you should be able to see in MyChart      If your symptoms are still persistent after next week you can go to the lab to repeat your wet prep test  -- ( I will have to call you with the results as there is no formal visit with any of the other clinicians in the clinic)

## 2020-10-19 ENCOUNTER — TELEPHONE (OUTPATIENT)
Dept: FAMILY MEDICINE | Facility: CLINIC | Age: 45
End: 2020-10-19

## 2020-10-19 NOTE — TELEPHONE ENCOUNTER
Reason for Call:  Other call back    Detailed comments: pt states need her hpv today in the afternoon or tomorrow. There are no openings for quite some time, wondering if she can be squeezed in .     Phone Number Patient can be reached at: Home number on file [phone] (home)    Best Time: asap    Can we leave a detailed message on this number? YES    Call taken on 10/19/2020 at 7:34 AM by Sumi Mesa

## 2020-10-20 NOTE — TELEPHONE ENCOUNTER
Patient called back checking the status on this and there was an opening today at 4 pt scheduled    No further action needed

## 2020-10-21 ENCOUNTER — ALLIED HEALTH/NURSE VISIT (OUTPATIENT)
Dept: NURSING | Facility: CLINIC | Age: 45
End: 2020-10-21
Payer: COMMERCIAL

## 2020-10-21 DIAGNOSIS — Z23 ENCOUNTER FOR IMMUNIZATION: Primary | ICD-10-CM

## 2020-10-21 PROCEDURE — 90651 9VHPV VACCINE 2/3 DOSE IM: CPT

## 2020-10-21 PROCEDURE — 90471 IMMUNIZATION ADMIN: CPT

## 2020-10-21 NOTE — NURSING NOTE
Prior to immunization administration, verified patients identity using patient s name and date of birth. Please see Immunization Activity for additional information.     Screening Questionnaire for Adult Immunization    Are you sick today?   No   Do you have allergies to medications, food, a vaccine component or latex?   No   Have you ever had a serious reaction after receiving a vaccination?   No   Do you have a long-term health problem with heart, lung, kidney, or metabolic disease (e.g., diabetes), asthma, a blood disorder, no spleen, complement component deficiency, a cochlear implant, or a spinal fluid leak?  Are you on long-term aspirin therapy?   No   Do you have cancer, leukemia, HIV/AIDS, or any other immune system problem?   No   Do you have a parent, brother, or sister with an immune system problem?   No   In the past 3 months, have you taken medications that affect  your immune system, such as prednisone, other steroids, or anticancer drugs; drugs for the treatment of rheumatoid arthritis, Crohn s disease, or psoriasis; or have you had radiation treatments?   No   Have you had a seizure, or a brain or other nervous system problem?   No   During the past year, have you received a transfusion of blood or blood    products, or been given immune (gamma) globulin or antiviral drug?   No   For women: Are you pregnant or is there a chance you could become       pregnant during the next month?   No   Have you received any vaccinations in the past 4 weeks?   No     Immunization questionnaire answers were all negative.        Per orders of Dr. Weber , injection of HPV given by Cuco Vaughan. Patient instructed to remain in clinic for 15 minutes afterwards, and to report any adverse reaction to me immediately.       Screening performed by Cuco Vaughan on 10/21/2020 at 4:38 PM.

## 2020-10-29 DIAGNOSIS — J30.2 CHRONIC SEASONAL ALLERGIC RHINITIS: ICD-10-CM

## 2020-10-29 RX ORDER — FLUTICASONE PROPIONATE 50 MCG
1 SPRAY, SUSPENSION (ML) NASAL 2 TIMES DAILY
Qty: 48 ML | Refills: 3 | Status: SHIPPED | OUTPATIENT
Start: 2020-10-29 | End: 2021-11-04

## 2020-10-29 NOTE — TELEPHONE ENCOUNTER
Kalli Roldan CNP   Please review sig for bid dosing on flonase.  And sign prn  Thanks!     Linda Hicks RN

## 2020-10-30 ENCOUNTER — OFFICE VISIT (OUTPATIENT)
Dept: OBGYN | Facility: CLINIC | Age: 45
End: 2020-10-30
Payer: COMMERCIAL

## 2020-10-30 VITALS
HEART RATE: 93 BPM | WEIGHT: 172 LBS | DIASTOLIC BLOOD PRESSURE: 88 MMHG | BODY MASS INDEX: 28.19 KG/M2 | SYSTOLIC BLOOD PRESSURE: 134 MMHG | TEMPERATURE: 98.6 F

## 2020-10-30 DIAGNOSIS — R87.610 PAPANICOLAOU SMEAR OF CERVIX WITH ATYPICAL SQUAMOUS CELLS OF UNDETERMINED SIGNIFICANCE (ASC-US): ICD-10-CM

## 2020-10-30 DIAGNOSIS — N89.8 VAGINAL DISCHARGE: ICD-10-CM

## 2020-10-30 DIAGNOSIS — B97.7 HPV IN FEMALE: Primary | ICD-10-CM

## 2020-10-30 LAB
SPECIMEN SOURCE: NORMAL
WET PREP SPEC: NORMAL

## 2020-10-30 PROCEDURE — 99213 OFFICE O/P EST LOW 20 MIN: CPT | Performed by: OBSTETRICS & GYNECOLOGY

## 2020-10-30 PROCEDURE — 87210 SMEAR WET MOUNT SALINE/INK: CPT | Performed by: OBSTETRICS & GYNECOLOGY

## 2020-10-30 PROCEDURE — 87624 HPV HI-RISK TYP POOLED RSLT: CPT | Performed by: OBSTETRICS & GYNECOLOGY

## 2020-10-30 NOTE — PROGRESS NOTES
Mimi Melton is a 45 year old year old woman who presents for repeat pap smear.  Pap last year was NIL, other HPV positive.  She has received 2 of the 3 HPV shots.  She has a ParaGard IUD, placed just over 9 years ago.  Menses have been lasting longer, with spotting followed by several days of more typical bleeding.  She has just been treated with oral clindamycin for BV, wishes repeat wet prep today, would want oral clindamycin if BV found, oral Diflucan if yeast found.  Is treated for hypertension.  Her regular partner is age 72 and unable to achieve erections for intercourse, she has another partner with whom she is sexually active who probably has other partners as well.     She is considering permanent sterilization, we discussed laparoscopic salpingectomy/tubal ligation.    Past medical, surgical, family, and social history have been noted.      ROS:  See above re /GI .       OBJECTIVE: Healthy female in NAD.  /88   Pulse 93   Temp 98.6  F (37  C) (Oral)   Wt 78 kg (172 lb)   BMI 28.19 kg/m       Abdomen soft, non-tender, without mass.      Pelvic exam:  External genitalia appeared normal without lesion.  IUD string is present.  Urethral meatus, perineum, and anus appeared normal. Cervix and vagina appeared normal, without lesion.  Pap was sent.  Bimanual examination revealed a slightly enlarged uterus (consistent with the known 4 cm fibroid), no adnexal masses.    ASSESSMENT:  NIL other HPV + pap smear.     PLAN:  When Pap results are known will reassess per guidelines.  If colposcopy is indicated we will plan this prior to further management of the IUD (she is interested in removal and replacement if she does not decide for tubal sterilization).  Wet prep is pending.

## 2020-10-30 NOTE — NURSING NOTE
"Chief Complaint   Patient presents with     Prenatal Care       Initial /88   Pulse 93   Temp 98.6  F (37  C) (Oral)   Wt 78 kg (172 lb)   BMI 28.19 kg/m   Estimated body mass index is 28.19 kg/m  as calculated from the following:    Height as of 10/15/20: 1.664 m (5' 5.5\").    Weight as of this encounter: 78 kg (172 lb).  BP completed using cuff size: regular    Questioned patient about current smoking habits.  Pt. has never smoked.          The following HM Due: NONE      The following patient reported/Care Every where data was sent to:  P ABSTRACT QUALITY INITIATIVES [26891]        N/a      Natasha Cisneros MA                "

## 2020-11-03 LAB
COPATH REPORT: NORMAL
PAP: NORMAL

## 2020-11-09 ENCOUNTER — PATIENT OUTREACH (OUTPATIENT)
Dept: OBGYN | Facility: CLINIC | Age: 45
End: 2020-11-09
Payer: COMMERCIAL

## 2020-11-09 NOTE — TELEPHONE ENCOUNTER
E[facility], repeat pap and hpv all negative 9/09.   NIL paps: 2/10, 9/10, 12/11, 12/12. Plan pap in 3 yrs.  7/14: NIL pap. Plan cotest pap & HPV in 3 years.  12/2015: NIL pap, + HPV (not 16 or 18). Plan cotest pap & HPV in 1 year  Tracking started.  12/8/2016 Pap: NIL/neg HR HPV. Plan cotest in 3 years.  10/18/19 Pap: NIL, +HR HPV (not 16/18). Plan cotest in 1 year.  10/30/20: NIL Pap, + HR HPV (not 16 or 18). Plan [place] due bef 01/30/21.

## 2020-11-18 ENCOUNTER — VIRTUAL VISIT (OUTPATIENT)
Dept: ALLERGY | Facility: CLINIC | Age: 45
End: 2020-11-18
Payer: COMMERCIAL

## 2020-11-18 DIAGNOSIS — T78.3XXD ANGIOEDEMA, SUBSEQUENT ENCOUNTER: ICD-10-CM

## 2020-11-18 DIAGNOSIS — L73.9 FOLLICULITIS: ICD-10-CM

## 2020-11-18 DIAGNOSIS — J30.9 ALLERGIC RHINOCONJUNCTIVITIS: Primary | ICD-10-CM

## 2020-11-18 DIAGNOSIS — H10.10 ALLERGIC RHINOCONJUNCTIVITIS: Primary | ICD-10-CM

## 2020-11-18 PROCEDURE — 99213 OFFICE O/P EST LOW 20 MIN: CPT | Mod: 95 | Performed by: DERMATOLOGY

## 2020-11-18 RX ORDER — FEXOFENADINE HCL 180 MG/1
TABLET ORAL
Qty: 30 TABLET | Refills: 3 | Status: SHIPPED | OUTPATIENT
Start: 2020-11-18 | End: 2021-11-04

## 2020-11-18 ASSESSMENT — PAIN SCALES - GENERAL: PAINLEVEL: NO PAIN (0)

## 2020-11-18 NOTE — NURSING NOTE
Allergy Rooming Note    Mimi Melton's goals for this visit include:   Chief Complaint   Patient presents with     ANNY Le is here for a follow up visit she states that things have been improving.      Elena Moss LPN

## 2020-11-18 NOTE — PROGRESS NOTES
"Mimi Melton is a 45 year old female who is being evaluated via a billable video visit.      The patient has been notified of following:     \"This video visit will be conducted via a call between you and your physician/provider. We have found that certain health care needs can be provided without the need for an in-person physical exam.  This service lets us provide the care you need with a video conversation.  If a prescription is necessary we can send it directly to your pharmacy.  If lab work is needed we can place an order for that and you can then stop by our lab to have the test done at a later time.    Video visits are billed at different rates depending on your insurance coverage.  Please reach out to your insurance provider with any questions.    If during the course of the call the physician/provider feels a video visit is not appropriate, you will not be charged for this service.\"    Patient has given verbal consent for Video visit? Yes  How would you like to obtain your AVS? MyChart  If you are dropped from the video visit, the video invite should be resent to: Text to cell phone: [phone]  Will anyone else be joining your video visit? No        Video-Visit Details    Type of service:  Video Visit    Originating Location (pt. Location): Home    Distant Location (provider location):  [facility] [place]     Platform used for Video Visit: Salma Moss LPN        "

## 2020-11-18 NOTE — LETTER
"    11/18/2020         RE: Mimi Melton  [address] [address]        Dear Colleague,    Thank you for referring your patient, Mimi Melton, to the [facility]. Please see a copy of my visit note below.    Mimi Melton is a 45 year old female who is being evaluated via a billable video visit.      The patient has been notified of following:     \"This video visit will be conducted via a call between you and your physician/provider. We have found that certain health care needs can be provided without the need for an in-person physical exam.  This service lets us provide the care you need with a video conversation.  If a prescription is necessary we can send it directly to your pharmacy.  If lab work is needed we can place an order for that and you can then stop by our lab to have the test done at a later time.    Video visits are billed at different rates depending on your insurance coverage.  Please reach out to your insurance provider with any questions.    If during the course of the call the physician/provider feels a video visit is not appropriate, you will not be charged for this service.\"    Patient has given verbal consent for Video visit? Yes  How would you like to obtain your AVS? MyChart  If you are dropped from the video visit, the video invite should be resent to: Text to cell phone: [phone]  Will anyone else be joining your video visit? No        Video-Visit Details    Type of service:  Video Visit    Originating Location (pt. Location): Home    Distant Location (provider location):  [facility]     Platform used for Video Visit: Salma Moss LPN          Note for return visit for Dermato-Allergy       Encounter Date: Nov 18, 2020    History of Present Illness:  I have reviewed the teledermatology  information and the nursing intake corresponding to this issue. Mimi Melton is a 45 year old " female who presents as a teledermatology consult for the following information take directly from the prior notes and video call performed by myself:     Patient is doing great (changed blood pressure medication) = losartan potassium ==> no angioedemas anymore    Patient has still the allergies and is taking every morning a Claritine and this usually works. Sometimes need in addition a Fexofenadine      Additional comments and observations from review of the patient s chart including the following:    See notes    ROS: Patient generally feeling well today   Physical Examination:  General: Well-appearing, appropriately-developed individual.  - Skin: Examination of the visible skin within the teledermatology was performed.   No signs for folliculitis on chin anymore   No Angioedemas anymore and also RC and asthmoid problems with Claritine/Fexofenadine under control  As above in HPI. No additional skin concerns.  - upper respiratory tract: currently no obvious Rhinitis  - lungs: no signs for active and present Asthma/Wheezing or coughing  - eyes: currently no active conjunctivits  - GI system/digestion: currently no problems, no bloating or Diarrhoea    Earlier History and Allergy exams:    Elo 2017 with Dr. Bynum patient positive to house dust mites, molds and willow tree and joss grass    Between December and March increase of lip swelling, can happen anytime.     Last several years (7-8 years). Lips will start swelling up; only top or bottom lip will swell. Cannot determine if eyes were swollen because of her thyroid disease. Eyes seem to be OK currently. Less frequently during the winter. Was happening frequently between March and June. Could only maybe associate it with fish. Tuna fish from the can or the bag with a new diet during that time. Lips will tingle. Had this occur without the fish. No airway involvement or difficulty breathing. No rash elsewhere. Uncle on mom's side would get similar reaction to  seafood. Hasn't happened for the last 1-2 months. Would take more of her generic allergy pill (ceterizine 10 mg). Didn't last as long while on this; diphenhydramine also helps. Notably with a history seasonal allergies to dust mites, molds, [place] grass and as well as [place] tree pollen. Was also travelling to [place] during this period and notes she thinks there was more mold during this time.    Past Medical History:   Patient Active Problem List   Diagnosis     Surveillance of previously prescribed intrauterine contraceptive device     Vaginitis and vulvovaginitis     Exophthalmos     Abnormal Pap smear of cervix     CARDIOVASCULAR SCREENING; LDL GOAL LESS THAN 160     Essential hypertension with goal blood pressure less than 140/90     Anemia     Hypothyroidism due to acquired atrophy of thyroid     Cervical high risk HPV (human papillomavirus) test positive     Thyroid disease     Female stress incontinence     Chronic seasonal allergic rhinitis, unspecified trigger     Need for HPV vaccine     Past Medical History:   Diagnosis Date     Cervical high risk HPV (human papillomavirus) test positive 12/2015 12/2015, 10/2019, 10/30/20     Chronic sinusitis Summer 2016    I get congested every 3-4 weeks     Esophageal reflux      Exophthalmos, unspecified      Hypothyroidism      Thyroid eye disease      Thyrotoxicosis without mention of goiter or other cause, without mention of thyrotoxic crisis or storm 03/2005    Had radioablation, On synthroid, seeing [facility]; takes synthroid     Unspecified essential hypertension 1980    meds since 2001, on atenolol       Allergy History:     Allergies   Allergen Reactions     Dust Mites      Lisinopril Swelling     Swelling in lip     Mold      Cannot have any meds that contain mold.     No Known Drug Allergies        Social History:   reports that she has never smoked. She has never used smokeless tobacco. She reports current alcohol use. She reports current drug  use. Drug: Marijuana.      Family History:  Family History   Problem Relation Age of Onset     Hypertension Mother      Hypertension Father      Hypertension Maternal Grandmother      Hypertension Paternal Grandmother      Hypertension Maternal Grandfather      Hypertension Paternal Grandfather        Medications:  Current Outpatient Medications   Medication Sig Dispense Refill     ACETAMINOPHEN PO Take 1,000 mg by mouth 2 times daily as needed for pain (menstrual cramps)       benzoyl peroxide 5 % external liquid Wash face skin 1-2 times daily 148 g 3     fexofenadine (ALLEGRA ALLERGY) 180 MG tablet Emergency medication for lip swelling - take 1-2 tablets up to twice daily with episodes of lip swelling. Record exposures including medications, environmental and food trigger. 12 tablet 0     fluconazole (DIFLUCAN) 150 MG tablet Take one tablet every 3 days until symptoms resolve. 3 tablet 3     fluticasone (FLONASE) 50 MCG/ACT nasal spray SPRAY 1 SPRAY INTO BOTH NOSTRILS 2 TIMES DAILY 48 mL 3     hydrochlorothiazide (HYDRODIURIL) 12.5 MG tablet Take 1 tablet (12.5 mg) by mouth daily 90 tablet 0     IBUPROFEN PO Take 800 mg by mouth every 4 hours as needed (takes for menstrual cramps.)        Lactobacillus-Inulin ([facility] DIGESTIVE [facility]) CAPS Take 1 capsule by mouth 2 times daily 100 capsule 11     levothyroxine (SYNTHROID/LEVOTHROID) 100 MCG tablet Take 1 tablet (100 mcg) by mouth daily 90 tablet 0     losartan (COZAAR) 25 MG tablet Take 1 tablet (25 mg) by mouth daily 90 tablet 0     mupirocin (BACTROBAN) 2 % ointment as needed        tretinoin (RETIN-A) 0.05 % external cream Apply topically At Bedtime Put on face 1-2 times daily (if irritated reduce to every other day) 20 g 3       ROS: Patient generally feeling well today  Physical Examination:  General: Well-appearing, appropriately-developed individual.  Skin: Focused examination of the head and neck within the teledermatology photograph(s)* was performed.    -Without labial or periorbital swelling    Allergy Tests:    Past Allergy Test    Future Allergy Test: 9/8/2020     Order for PRICK TESTS    [x] Outpatient  [] Inpatient: [facility]..../ Bed ....      Skin Atopy (atopic dermatitis) [x] Yes   [] No  Contact allergies:   [] Yes   [] No  Urticaria/Angioedema  [x] Yes   [] No  Rhinitis/Sinusitis:   [x] Yes   [] No   [x] seasonal [] perennial            Allergic Asthma:   [] Yes   [] No  Pets :  [] Yes   [] No  which?......  Food Allergy:   [] Yes   [] No  Drug allergies:   [] Yes   [] No       Reason for tests (suspected allergy): food vs inhaled allergen  Known previous allergies: dust mites, molds, [place] grass and as well as [place] tree pollen    Standardized prick panels  [x] Atopic panel (20 tests)  [] Pediatric Panel (12 tests)  [] Milk, Meat, Eggs, Grains (20 tests)   [] Dust, Epithelia, Feathers (10 tests)  [x] Fish, Seafood, Shellfish (17 tests)  [] Nuts, Beans (14 tests)  [] Spice, Vegetable, Fruit (17 tests)  [x] Others:  Tuna fish      [] Patient's own products: ...    DO NOT test if chemical or biological identity is unknown!     always ask from patient the product information and safety sheets (MSDS)     Standardized intradermal tests  [] Penicillium notatum [] Aspergillus fumigatus [] House dust mites  [] Bee venom   [] Wasp venom !!Specific protocol with dilutions!!  [] Others: ...    Atopy Screen (Placed 10/02/20 )    No Substance Readings (15 min) Evaluation   POS Histamine 1mg/ml +/++    NEG NaCl 0.9% -      No Substance Readings (15 min) Evaluation   1 Alternaria alternata (tenuis)  ++    2 Cladosporium herbarum -    3 Aspergillus fumigatus +    4 Penicillium notatum -    5 Dermatophagoides pteronyssinus -    6 Dermatophagoides farinae -    7 Dog epithelium (canis spp) -    8 Cat hair (viktoriya catus) -    9 Cockroach   (Blatella americana & germanica) -    10 Grass mix midwest   (Heena, Orchard, [place], [place]) -    11 Apolinar grass (sorghum halepense) +     12 Weed mix   (common Cocklebur, Lamb s quarters, rough redroot Pigweed, Dock/Sorrel) -    13 Mug wort (artemisia vulgare) +    14 Ragweed giant/short (ambrosia spp) -    15 English Plantain (plantago lanceolata) -    16 Tree mix 1 (Pecan, Maple BHR, Oak RVW, american [place], black [place]) -    17 Red cedar (juniperus virginia) -    18 Tree mix 2   (white Joseph, river/red Birch, black [place], common [place], american Elm) -    19 Box elder/Maple mix (acer spp) -    20 [place] shagbark (carya ovata) -       -      Conclusion: patient has in prick tests clearly positive reaction to molds (Alternaria >> Aspergillus)    Fish and Seafood (Placed 10/02/20 )    No Substance Readings (15min) Evaluation   1 Codfish (gadus morhua) -    2 Flounder (platichthys spp) -    3 Tuna (thunnus albecarus) -    4 Bass (centropristis striata) -    5 Mackerel (scomberomorus cavalla) -    6 Halibut (hipoglossus hipoglossus) -    7 Sharon (salmo perri) -    8 Catfish (ictalurus spp) -    9 Perch (serranus scriba) -    10 [place], [place] (oncorhynchus mykiss) -    11 Crab (callinectes spp) -    12 Lobster (homarus americanus) -    13 Shrimp white/brown/pink (farfantepenaeus&litopenaeus) -    14 Kingcrab (paralithodes camtschatica) -    15 Scallop (placopecten magellanicus) -    16 Clam (mercenaria mercenaria) -    17 Oyster (cstrea/crassostrea) -      Conclusion: no clear signs for allergy to seafood/fish      Intradermal Testing (Placed 10/02/20 )    No Substance Conc.  Readings (15min) Evaluation   - NaCl  0.9% -    + Histamine (prick) 0.1mg / ml ++    DF Standard Dust Mite - D. Farinae 1:10 ++ 9mmP/15mmE   DP Standard Dust Mite - D. Pteronyssinus 1:10 ++ 10mmP/15mmE     Conclusion: clear positive reaction to house dust mites      Impression/Plan:    # 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) most probably linked to ACE inhibitor Lisinopril (since 10 years on Lisinopril)     Now on Losartan (about 6 weeks ago)    Fexofenadine 180 mg  tablets - Emergency medication for lip swelling: take 1-2 tablets up to twice daily with lip swelling    # seasonal allergic rhinoconjunctivitis spring and fall without Asthma    In fall probably mugwort and Alternaria mold    No clear explanation for problems in spring    Maybe crossreacting food allergy to mugwort??    # perennial in the morning soar throat, congested nose and mucus    proven dust mite allergy     Info HDM reduction given    Additionally sensitization to molds    Maybe try Claritine or Fexofenadine in the evening    --> maybe later support skin test results with specific IgE to mugwort, Alternaria, Aspergillus, Penicillium, D. Farinae and D. Pteronyssinus.  --> discussed the options with IT    # folliculitis on chin area    Tretinoin cream 2x weekly (dry out)    BP 5% wash     Patient counseling/plan:  >> follow recommendations and reduce HDM and molds if possible at home  >> go with Flonase nasal spray every evening  >> take daily an antihistamine like Cetirizine, Claritine or Fexofenadine      Follow-up: in Derm-Allergy clinic if necessary      Thank you for the opportunity be involved in the care of this patient.     Staff: Elena Moss LPN    _____________________________________________________________________________    Teledermatology information:  - Location of patient: Home  - Patient presented in referral from: other  - Location of teledermatologist:  [facility] [place] (, [place], [place])  - Reason teledermatology is appropriate:  of National Emergency Regarding Coronavirus disease (COVID 19) Outbreak  - Method of transmission:  Store and Forward and Video ( Invitation sent by:  Mariana and send to e-mail at: [email or web address] )  - Date of images: per video  - Service start time: 9:11am  - Service end time:9:31am  - Date of report: 11/18/20      I spent a total of 20 minutes for telemedicine consult with Mimi Melton during today s video  meeting. Over 50% of this time was spent counseling the patient and/or coordinating care. Please see Assessment and Plan for details.       Again, thank you for allowing me to participate in the care of your patient.        Sincerely,        Niall Paz MD

## 2020-11-18 NOTE — PROGRESS NOTES
Note for return visit for Dermato-Allergy       Encounter Date: Nov 18, 2020    History of Present Illness:  I have reviewed the teledermatology  information and the nursing intake corresponding to this issue. Mimi Melton is a 45 year old female who presents as a teledermatology consult for the following information take directly from the prior notes and video call performed by myself:     Patient is doing great (changed blood pressure medication) = losartan potassium ==> no angioedemas anymore    Patient has still the allergies and is taking every morning a Claritine and this usually works. Sometimes need in addition a Fexofenadine      Additional comments and observations from review of the patient s chart including the following:    See notes    ROS: Patient generally feeling well today   Physical Examination:  General: Well-appearing, appropriately-developed individual.  - Skin: Examination of the visible skin within the teledermatology was performed.   No signs for folliculitis on chin anymore   No Angioedemas anymore and also RC and asthmoid problems with Claritine/Fexofenadine under control  As above in HPI. No additional skin concerns.  - upper respiratory tract: currently no obvious Rhinitis  - lungs: no signs for active and present Asthma/Wheezing or coughing  - eyes: currently no active conjunctivits  - GI system/digestion: currently no problems, no bloating or Diarrhoea    Earlier History and Allergy exams:    Elo 2017 with Dr. Bynum patient positive to house dust mites, molds and willow tree and joss grass    Between December and March increase of lip swelling, can happen anytime.     Last several years (7-8 years). Lips will start swelling up; only top or bottom lip will swell. Cannot determine if eyes were swollen because of her thyroid disease. Eyes seem to be OK currently. Less frequently during the winter. Was happening frequently between March and June. Could only maybe associate it with  fish. Tuna fish from the can or the bag with a new diet during that time. Lips will tingle. Had this occur without the fish. No airway involvement or difficulty breathing. No rash elsewhere. Uncle on mom's side would get similar reaction to seafood. Hasn't happened for the last 1-2 months. Would take more of her generic allergy pill (ceterizine 10 mg). Didn't last as long while on this; diphenhydramine also helps. Notably with a history seasonal allergies to dust mites, molds, [place] grass and as well as [place] tree pollen. Was also travelling to [place] during this period and notes she thinks there was more mold during this time.    Past Medical History:   Patient Active Problem List   Diagnosis     Surveillance of previously prescribed intrauterine contraceptive device     Vaginitis and vulvovaginitis     Exophthalmos     Abnormal Pap smear of cervix     CARDIOVASCULAR SCREENING; LDL GOAL LESS THAN 160     Essential hypertension with goal blood pressure less than 140/90     Anemia     Hypothyroidism due to acquired atrophy of thyroid     Cervical high risk HPV (human papillomavirus) test positive     Thyroid disease     Female stress incontinence     Chronic seasonal allergic rhinitis, unspecified trigger     Need for HPV vaccine     Past Medical History:   Diagnosis Date     Cervical high risk HPV (human papillomavirus) test positive 12/2015 12/2015, 10/2019, 10/30/20     Chronic sinusitis Summer 2016    I get congested every 3-4 weeks     Esophageal reflux      Exophthalmos, unspecified      Hypothyroidism      Thyroid eye disease      Thyrotoxicosis without mention of goiter or other cause, without mention of thyrotoxic crisis or storm 03/2005    Had radioablation, On synthroid, seeing [facility]; takes synthroid     Unspecified essential hypertension 1980    meds since 2001, on atenolol       Allergy History:     Allergies   Allergen Reactions     Dust Mites      Lisinopril Swelling     Swelling in lip      Mold      Cannot have any meds that contain mold.     No Known Drug Allergies        Social History:   reports that she has never smoked. She has never used smokeless tobacco. She reports current alcohol use. She reports current drug use. Drug: Marijuana.      Family History:  Family History   Problem Relation Age of Onset     Hypertension Mother      Hypertension Father      Hypertension Maternal Grandmother      Hypertension Paternal Grandmother      Hypertension Maternal Grandfather      Hypertension Paternal Grandfather        Medications:  Current Outpatient Medications   Medication Sig Dispense Refill     ACETAMINOPHEN PO Take 1,000 mg by mouth 2 times daily as needed for pain (menstrual cramps)       benzoyl peroxide 5 % external liquid Wash face skin 1-2 times daily 148 g 3     fexofenadine (ALLEGRA ALLERGY) 180 MG tablet Emergency medication for lip swelling - take 1-2 tablets up to twice daily with episodes of lip swelling. Record exposures including medications, environmental and food trigger. 12 tablet 0     fluconazole (DIFLUCAN) 150 MG tablet Take one tablet every 3 days until symptoms resolve. 3 tablet 3     fluticasone (FLONASE) 50 MCG/ACT nasal spray SPRAY 1 SPRAY INTO BOTH NOSTRILS 2 TIMES DAILY 48 mL 3     hydrochlorothiazide (HYDRODIURIL) 12.5 MG tablet Take 1 tablet (12.5 mg) by mouth daily 90 tablet 0     IBUPROFEN PO Take 800 mg by mouth every 4 hours as needed (takes for menstrual cramps.)        Lactobacillus-Inulin ([facility] DIGESTIVE [facility]) CAPS Take 1 capsule by mouth 2 times daily 100 capsule 11     levothyroxine (SYNTHROID/LEVOTHROID) 100 MCG tablet Take 1 tablet (100 mcg) by mouth daily 90 tablet 0     losartan (COZAAR) 25 MG tablet Take 1 tablet (25 mg) by mouth daily 90 tablet 0     mupirocin (BACTROBAN) 2 % ointment as needed        tretinoin (RETIN-A) 0.05 % external cream Apply topically At Bedtime Put on face 1-2 times daily (if irritated reduce to every other day) 20 g 3        ROS: Patient generally feeling well today  Physical Examination:  General: Well-appearing, appropriately-developed individual.  Skin: Focused examination of the head and neck within the teledermatology photograph(s)* was performed.   -Without labial or periorbital swelling    Allergy Tests:    Past Allergy Test    Future Allergy Test: 9/8/2020     Order for PRICK TESTS    [x] Outpatient  [] Inpatient: [facility]..../ Bed ....      Skin Atopy (atopic dermatitis) [x] Yes   [] No  Contact allergies:   [] Yes   [] No  Urticaria/Angioedema  [x] Yes   [] No  Rhinitis/Sinusitis:   [x] Yes   [] No   [x] seasonal [] perennial            Allergic Asthma:   [] Yes   [] No  Pets :  [] Yes   [] No  which?......  Food Allergy:   [] Yes   [] No  Drug allergies:   [] Yes   [] No       Reason for tests (suspected allergy): food vs inhaled allergen  Known previous allergies: dust mites, molds, [place] grass and as well as [place] tree pollen    Standardized prick panels  [x] Atopic panel (20 tests)  [] Pediatric Panel (12 tests)  [] Milk, Meat, Eggs, Grains (20 tests)   [] Dust, Epithelia, Feathers (10 tests)  [x] Fish, Seafood, Shellfish (17 tests)  [] Nuts, Beans (14 tests)  [] Spice, Vegetable, Fruit (17 tests)  [x] Others:  Tuna fish      [] Patient's own products: ...    DO NOT test if chemical or biological identity is unknown!     always ask from patient the product information and safety sheets (MSDS)     Standardized intradermal tests  [] Penicillium notatum [] Aspergillus fumigatus [] House dust mites  [] Bee venom   [] Wasp venom !!Specific protocol with dilutions!!  [] Others: ...    Atopy Screen (Placed 10/02/20 )    No Substance Readings (15 min) Evaluation   POS Histamine 1mg/ml +/++    NEG NaCl 0.9% -      No Substance Readings (15 min) Evaluation   1 Alternaria alternata (tenuis)  ++    2 Cladosporium herbarum -    3 Aspergillus fumigatus +    4 Penicillium notatum -    5 Dermatophagoides pteronyssinus -    6  Dermatophagoides farinae -    7 Dog epithelium (canis spp) -    8 Cat hair (viktoriya catus) -    9 Cockroach   (Blatella americana & germanica) -    10 Grass mix midwest   (Heena, Orchard, [place], [place]) -    11 Apolinar grass (sorghum halepense) +    12 Weed mix   (common Cocklebur, Lamb s quarters, rough redroot Pigweed, Dock/Sorrel) -    13 Mug wort (artemisia vulgare) +    14 Ragweed giant/short (ambrosia spp) -    15 English Plantain (plantago lanceolata) -    16 Tree mix 1 (Pecan, Maple BHR, Oak RVW, american [place], black [place]) -    17 Red cedar (juniperus virginia) -    18 Tree mix 2   (white [place], river/red Birch, black [place], common [place], american Elm) -    19 Box elder/Maple mix (acer spp) -    20 Jackson shagbark (carya ovata) -       -      Conclusion: patient has in prick tests clearly positive reaction to molds (Alternaria >> Aspergillus)    Fish and Seafood (Placed 10/02/20 )    No Substance Readings (15min) Evaluation   1 Codfish (gadus morhua) -    2 Flounder (platichthys spp) -    3 Tuna (thunnus albecarus) -    4 Bass (centropristis striata) -    5 Mackerel (scomberomorus cavalla) -    6 Halibut (hipoglossus hipoglossus) -    7 [place] (salmo perri) -    8 Catfish (ictalurus spp) -    9 Perch (serranus scriba) -    10 [place]ley, [place] (oncorhynchus mykiss) -    11 Crab (callinectes spp) -    12 Lobster (homarus americanus) -    13 Shrimp white/brown/pink (farfantepenaeus&litopenaeus) -    14 Kingcrab (paralithodes camtschatica) -    15 Scallop (placopecten magellanicus) -    16 Clam (mercenaria mercenaria) -    17 Oyster (cstrea/crassostrea) -      Conclusion: no clear signs for allergy to seafood/fish      Intradermal Testing (Placed 10/02/20 )    No Substance Conc.  Readings (15min) Evaluation   - NaCl  0.9% -    + Histamine (prick) 0.1mg / ml ++    DF Standard Dust Mite - D. Farinae 1:10 ++ 9mmP/15mmE   DP Standard Dust Mite - D. Pteronyssinus 1:10 ++ 10mmP/15mmE     Conclusion: clear  positive reaction to house dust mites      Impression/Plan:    # 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) most probably linked to ACE inhibitor Lisinopril (since 10 years on Lisinopril)     Now on Losartan (about 6 weeks ago)    Fexofenadine 180 mg tablets - Emergency medication for lip swelling: take 1-2 tablets up to twice daily with lip swelling    # seasonal allergic rhinoconjunctivitis spring and fall without Asthma    In fall probably mugwort and Alternaria mold    No clear explanation for problems in spring    Maybe crossreacting food allergy to mugwort??    # perennial in the morning soar throat, congested nose and mucus    proven dust mite allergy     Info HDM reduction given    Additionally sensitization to molds    Maybe try Claritine or Fexofenadine in the evening    --> maybe later support skin test results with specific IgE to mugwort, Alternaria, Aspergillus, Penicillium, D. Farinae and D. Pteronyssinus.  --> discussed the options with IT    # folliculitis on chin area    Tretinoin cream 2x weekly (dry out)    BP 5% wash     Patient counseling/plan:  >> follow recommendations and reduce HDM and molds if possible at home  >> go with Flonase nasal spray every evening  >> take daily an antihistamine like Cetirizine, Claritine or Fexofenadine      Follow-up: in Derm-Allergy clinic if necessary      Thank you for the opportunity be involved in the care of this patient.     Staff: Elena Moss LPN    _____________________________________________________________________________    Teledermatology information:  - Location of patient: Home  - Patient presented in referral from: other  - Location of teledermatologist:  [facility] [place] (, [facility], [place])  - Reason teledermatology is appropriate:  of National Emergency Regarding Coronavirus disease (COVID 19) Outbreak  - Method of transmission:  Store and Forward and Video ( Invitation sent by:  Amjc and send to  e-mail at: [email or web address] )  - Date of images: per video  - Service start time: 9:11am  - Service end time:9:31am  - Date of report: 11/18/20      I spent a total of 20 minutes for telemedicine consult with Mimi Melton during today s video meeting. Over 50% of this time was spent counseling the patient and/or coordinating care. Please see Assessment and Plan for details.

## 2020-12-02 ENCOUNTER — OFFICE VISIT (OUTPATIENT)
Dept: OBGYN | Facility: CLINIC | Age: 45
End: 2020-12-02
Payer: COMMERCIAL

## 2020-12-02 VITALS
BODY MASS INDEX: 29.01 KG/M2 | SYSTOLIC BLOOD PRESSURE: 124 MMHG | HEART RATE: 90 BPM | OXYGEN SATURATION: 99 % | DIASTOLIC BLOOD PRESSURE: 73 MMHG | WEIGHT: 177 LBS

## 2020-12-02 DIAGNOSIS — B97.7 HPV IN FEMALE: ICD-10-CM

## 2020-12-02 DIAGNOSIS — N89.8 VAGINAL DISCHARGE: ICD-10-CM

## 2020-12-02 DIAGNOSIS — B96.89 BV (BACTERIAL VAGINOSIS): ICD-10-CM

## 2020-12-02 DIAGNOSIS — R87.610 PAPANICOLAOU SMEAR OF CERVIX WITH ATYPICAL SQUAMOUS CELLS OF UNDETERMINED SIGNIFICANCE (ASC-US): Primary | ICD-10-CM

## 2020-12-02 DIAGNOSIS — N76.0 BV (BACTERIAL VAGINOSIS): ICD-10-CM

## 2020-12-02 DIAGNOSIS — D25.9 UTERINE LEIOMYOMA, UNSPECIFIED LOCATION: ICD-10-CM

## 2020-12-02 LAB
HCG UR QL: NEGATIVE
SPECIMEN SOURCE: ABNORMAL
WET PREP SPEC: ABNORMAL

## 2020-12-02 PROCEDURE — 87210 SMEAR WET MOUNT SALINE/INK: CPT | Performed by: OBSTETRICS & GYNECOLOGY

## 2020-12-02 PROCEDURE — 88305 TISSUE EXAM BY PATHOLOGIST: CPT | Performed by: PATHOLOGY

## 2020-12-02 PROCEDURE — 99212 OFFICE O/P EST SF 10 MIN: CPT | Mod: 25 | Performed by: OBSTETRICS & GYNECOLOGY

## 2020-12-02 PROCEDURE — 81025 URINE PREGNANCY TEST: CPT | Performed by: OBSTETRICS & GYNECOLOGY

## 2020-12-02 PROCEDURE — 57456 ENDOCERV CURETTAGE W/SCOPE: CPT | Performed by: OBSTETRICS & GYNECOLOGY

## 2020-12-02 RX ORDER — CLINDAMYCIN HYDROCHLORIDE 75 MG/1
150 CAPSULE ORAL 2 TIMES DAILY
Qty: 28 CAPSULE | Refills: 0 | Status: SHIPPED | OUTPATIENT
Start: 2020-12-02 | End: 2020-12-16

## 2020-12-02 NOTE — PROGRESS NOTES
INDICATION: Mimi Melton is a 45 year old female who presents for colposcopy.  Pap smear was reported as NIL, other HPV + (same as prior).  She has had recurrent vaginitis, requests wet prep today.  We discussed cervical dysplasia, degrees of dysplasia, options of management.  We discussed high-grade ZACHARY, cervical cancer, possible progression of disease into invasive cervical cancer.  We discussed HPV.  She has known fibroids, we have done ultrasound surveillance, will plan another scan in January 2021.  She is considering TL, will want to discuss at a later date pending today's colp.           Informed consent obtained for procedure.               COLPOSCOPIC EXAM:  Cervix is fully visualized.  Squamocolumnar junction is not fully visualized. IUD string present.       Using standard technique and acetic acid application, the cervix and vagina were examined using the colposcope.  The transformation zone appeared invisible.  No epithelial abnormalities were seen.       ECC was performed.       Colposcopic Impression:   Normal cervix.  Invisible transformation zone.  Recurrent vaginitis.    Wet prep shows clue cells.        PLAN: Post Colposcopy Instructions were given.  Call in 1 week for biopsy results.  Will advise followup depending on results.  Will rx BV with oral clindamycin (she has preferred this in the past).     In addition to the procedure, I spent 10 minutes with the patient, with more than 50% spent in counseling/discussing the diagnosis and treatment options, recurrent BV, fibroids.

## 2020-12-02 NOTE — PATIENT INSTRUCTIONS

## 2020-12-04 LAB — COPATH REPORT: NORMAL

## 2020-12-04 NOTE — PROGRESS NOTES
"Outcome for 12/04/20 11:07 AM : patient was busy would like a call after 1pm-sumeet Melton is a 45 year old female who is being evaluated via a billable video visit.      The patient has been notified of following:   [phone]  \"This video visit will be conducted via a call between you and your physician/provider. We have found that certain health care needs can be provided without the need for an in-person physical exam.  This service lets us provide the care you need with a video conversation.  If a prescription is necessary we can send it directly to your pharmacy.  If lab work is needed we can place an order for that and you can then stop by our lab to have the test done at a later time.    Video visits are billed at different rates depending on your insurance coverage.  Please reach out to your insurance provider with any questions.    If during the course of the call the physician/provider feels a video visit is not appropriate, you will not be charged for this service.\"    Patient has given verbal consent for Video visit? Yes  How would you like to obtain your AVS? MyChart  If you are dropped from the video visit, the video invite should be resent to: Text to cell phone: [phone]  Will anyone else be joining your video visit? No        Video-Visit Details    Type of service:  Video Visit    Video Start Time: 4:35  Video End Time: 4:51 PM    Originating Location (pt. Location): Home       Distant Location (provider location):  [facility] [place]     Platform used for Video Visit: Valeria Harmon MD    This 43 year old woman returns for f/u of her hypothyroidism.. She has Graves disease and underwent 131-I ablation in 2005 and has been on replacement since then.  She had ophthalmopathy that required surgical rx and hypertension She takes 100 mcg of T4 each day and takes it every day without fail.  She denies palpitations, temperature " tolerance.   She doesn't have any eye sx.   She is trying to [place].  Goal is 165 lbs and she is now at 173.  Her only concern today is that her sex drive seems to be more pronounced than in the past.  Her PCP says it is because she is less stressed, but Safia wanted to be sure it wasn't ; related to her thyroid.    Checks her BP at home and it is always in target.      Current Outpatient Medications   Medication     ACETAMINOPHEN PO     clindamycin (CLEOCIN) 75 MG capsule     fexofenadine (ALLEGRA ALLERGY) 180 MG tablet     hydrochlorothiazide (HYDRODIURIL) 12.5 MG tablet     levothyroxine (SYNTHROID/LEVOTHROID) 100 MCG tablet     losartan (COZAAR) 25 MG tablet     mupirocin (BACTROBAN) 2 % ointment     tretinoin (RETIN-A) 0.05 % external cream     benzoyl peroxide 5 % external liquid     fluconazole (DIFLUCAN) 150 MG tablet     fluticasone (FLONASE) 50 MCG/ACT nasal spray     IBUPROFEN PO     Lactobacillus-Inulin (CULTURELLE DIGESTIVE HEALTH) CAPS     No current facility-administered medications for this visit.      Social history-she is now working from home exclusively.    On exam she is in no acute distress.  She is without periorbital edema or conjunctival injection.  Cranial nerves are grossly intact.  Her mood is upbeat.      ENDO THYROID LABS-[facility] Latest Ref Rng & Units 8/20/2020 9/13/2019   TSH 0.40 - 4.00 mU/L 0.49 0.55   T4 FREE 0.76 - 1.46 ng/dL     TRIIODOTHYRONINE(T3) 60 - 181 ng/dL     THYROID STIM IMMUNOG          Assessment and plan:    This 45-year-old woman is now several years after undergoing radioiodine ablation of her thyroid as treatment for Graves' disease.  She is on a stable dose of T4 and has had a TSH at target over the last 2 years.  I recommended she stay on the same dose and have her primary care doctor check it again next year.  She did have significant problems with Graves' ophthalmopathy several years ago but this has  resolved.  I find no evidence of this disorder on exam today.    Follow-up as needed.    Sandhya Harmon MD

## 2020-12-07 ENCOUNTER — PATIENT OUTREACH (OUTPATIENT)
Dept: OBGYN | Facility: CLINIC | Age: 45
End: 2020-12-07

## 2020-12-07 ENCOUNTER — VIRTUAL VISIT (OUTPATIENT)
Dept: ENDOCRINOLOGY | Facility: CLINIC | Age: 45
End: 2020-12-07
Payer: COMMERCIAL

## 2020-12-07 DIAGNOSIS — E03.4 HYPOTHYROIDISM DUE TO ACQUIRED ATROPHY OF THYROID: Primary | ICD-10-CM

## 2020-12-07 PROCEDURE — 99214 OFFICE O/P EST MOD 30 MIN: CPT | Mod: 95 | Performed by: INTERNAL MEDICINE

## 2020-12-07 NOTE — LETTER
"12/7/2020       RE: Mimi Melton  [address] [address]     Dear Colleague,    Thank you for referring your patient, Mimi Melton, to the [facility] [place] at [facility]. Please see a copy of my visit note below.    Outcome for 12/04/20 11:07 AM : patient was busy would like a call after 1pm-soj    Mimi Melton is a 45 year old female who is being evaluated via a billable video visit.      The patient has been notified of following:   [phone]  \"This video visit will be conducted via a call between you and your physician/provider. We have found that certain health care needs can be provided without the need for an in-person physical exam.  This service lets us provide the care you need with a video conversation.  If a prescription is necessary we can send it directly to your pharmacy.  If lab work is needed we can place an order for that and you can then stop by our lab to have the test done at a later time.    Video visits are billed at different rates depending on your insurance coverage.  Please reach out to your insurance provider with any questions.    If during the course of the call the physician/provider feels a video visit is not appropriate, you will not be charged for this service.\"    Patient has given verbal consent for Video visit? Yes  How would you like to obtain your AVS? MyChart  If you are dropped from the video visit, the video invite should be resent to: Text to cell phone: [phone]  Will anyone else be joining your video visit? No        Video-Visit Details    Type of service:  Video Visit    Video Start Time: 4:35  Video End Time: 4:51 PM    Originating Location (pt. Location): Home       Distant Location (provider location):  [facility]     Platform used for Video Visit: Valeria Harmon MD    This 43 year old woman returns for " f/u of her hypothyroidism.. She has Graves disease and underwent 131-I ablation in 2005 and has been on replacement since then.  She had ophthalmopathy that required surgical rx and hypertension She takes 100 mcg of T4 each day and takes it every day without fail.  She denies palpitations, temperature tolerance.   She doesn't have any eye sx.   She is trying to lose weight and [place].  Goal is 165 lbs and she is now at 173.  Her only concern today is that her sex drive seems to be more pronounced than in the past.  Her PCP says it is because she is less stressed, but Safia wanted to be sure it wasn't ; related to her thyroid.    Checks her BP at home and it is always in target.      Current Outpatient Medications   Medication     ACETAMINOPHEN PO     clindamycin (CLEOCIN) 75 MG capsule     fexofenadine (ALLEGRA ALLERGY) 180 MG tablet     hydrochlorothiazide (HYDRODIURIL) 12.5 MG tablet     levothyroxine (SYNTHROID/LEVOTHROID) 100 MCG tablet     losartan (COZAAR) 25 MG tablet     mupirocin (BACTROBAN) 2 % ointment     tretinoin (RETIN-A) 0.05 % external cream     benzoyl peroxide 5 % external liquid     fluconazole (DIFLUCAN) 150 MG tablet     fluticasone (FLONASE) 50 MCG/ACT nasal spray     IBUPROFEN PO     Lactobacillus-Inulin (CULTURELLE DIGESTIVE HEALTH) CAPS     No current facility-administered medications for this visit.      Social history-she is now working from home exclusively.    On exam she is in no acute distress.  She is without periorbital edema or conjunctival injection.  Cranial nerves are grossly intact.  Her mood is upbeat.      ENDO THYROID LABS-[facility] Latest Ref Rng & Units 8/20/2020 9/13/2019   TSH 0.40 - 4.00 mU/L 0.49 0.55   T4 FREE 0.76 - 1.46 ng/dL     TRIIODOTHYRONINE(T3) 60 - 181 ng/dL     THYROID STIM IMMUNOG          Assessment and plan:    This 45-year-old woman is now several years after undergoing radioiodine ablation of her thyroid as treatment  for Graves' disease.  She is on a stable dose of T4 and has had a TSH at target over the last 2 years.  I recommended she stay on the same dose and have her primary care doctor check it again next year.  She did have significant problems with Graves' ophthalmopathy several years ago but this has resolved.  I find no evidence of this disorder on exam today.    Follow-up as needed.    Sandhya Harmon MD

## 2020-12-07 NOTE — TELEPHONE ENCOUNTER
ECC, repeat pap and hpv all negative 9/09.   NIL paps: 2/10, 9/10, 12/11, 12/12. Plan pap in 3 yrs.  7/14: NIL pap. Plan cotest pap & HPV in 3 years.  12/2015: NIL pap, + HPV (not 16 or 18). Plan cotest pap & HPV in 1 year  Tracking started.  12/8/2016 Pap: NIL/neg HR HPV. Plan cotest in 3 years.  10/18/19 Pap: NIL, +HR HPV (not 16/18). Plan cotest in 1 year.  10/30/20: NIL Pap, + HR HPV (not 16 or 18). Plan [place] due bef 01/30/21.  11/9/20 Pt notified by phone.  12/02/20: [place] ECC Neg for dysplasia. Plan cotest in 1 year due 12/02/21.

## 2020-12-16 ENCOUNTER — MYC MEDICAL ADVICE (OUTPATIENT)
Dept: OBGYN | Facility: CLINIC | Age: 45
End: 2020-12-16

## 2020-12-16 DIAGNOSIS — B96.89 BV (BACTERIAL VAGINOSIS): ICD-10-CM

## 2020-12-16 DIAGNOSIS — N76.0 BV (BACTERIAL VAGINOSIS): ICD-10-CM

## 2020-12-16 DIAGNOSIS — N89.8 VAGINAL DISCHARGE: ICD-10-CM

## 2020-12-16 DIAGNOSIS — N89.8 VAGINAL DISCHARGE: Primary | ICD-10-CM

## 2020-12-16 LAB
SPECIMEN SOURCE: ABNORMAL
WET PREP SPEC: ABNORMAL

## 2020-12-16 PROCEDURE — 87210 SMEAR WET MOUNT SALINE/INK: CPT | Performed by: OBSTETRICS & GYNECOLOGY

## 2020-12-16 RX ORDER — CLINDAMYCIN HYDROCHLORIDE 75 MG/1
150 CAPSULE ORAL 2 TIMES DAILY
Qty: 28 CAPSULE | Refills: 0 | Status: SHIPPED | OUTPATIENT
Start: 2020-12-16 | End: 2022-01-10

## 2020-12-16 NOTE — TELEPHONE ENCOUNTER
Pt would like prescription for BV sent to her pharmacy.  Unsure whether you wanted to do oral or vaginal.  Please send desired prescription.  Imani Cooney RN

## 2020-12-24 ENCOUNTER — VIRTUAL VISIT (OUTPATIENT)
Dept: FAMILY MEDICINE | Facility: CLINIC | Age: 45
End: 2020-12-24
Payer: COMMERCIAL

## 2020-12-24 DIAGNOSIS — I10 ESSENTIAL HYPERTENSION WITH GOAL BLOOD PRESSURE LESS THAN 140/90: ICD-10-CM

## 2020-12-24 DIAGNOSIS — E03.2 HYPOTHYROIDISM DUE TO MEDICATION: ICD-10-CM

## 2020-12-24 PROCEDURE — 99214 OFFICE O/P EST MOD 30 MIN: CPT | Mod: 95 | Performed by: NURSE PRACTITIONER

## 2020-12-24 RX ORDER — HYDROCHLOROTHIAZIDE 12.5 MG/1
12.5 TABLET ORAL DAILY
Qty: 90 TABLET | Refills: 3 | Status: SHIPPED | OUTPATIENT
Start: 2020-12-24 | End: 2021-05-25

## 2020-12-24 RX ORDER — LEVOTHYROXINE SODIUM 100 UG/1
100 TABLET ORAL DAILY
Qty: 90 TABLET | Refills: 3 | Status: SHIPPED | OUTPATIENT
Start: 2020-12-24 | End: 2021-05-25

## 2020-12-24 RX ORDER — LOSARTAN POTASSIUM 25 MG/1
25 TABLET ORAL DAILY
Qty: 90 TABLET | Refills: 3 | Status: SHIPPED | OUTPATIENT
Start: 2020-12-24 | End: 2021-05-25

## 2020-12-24 NOTE — PROGRESS NOTES
"Mimi Melton is a 45 year old female who is being evaluated via a billable video visit.      The patient has been notified of following:     \"This video visit will be conducted via a call between you and your physician/provider. We have found that certain health care needs can be provided without the need for an in-person physical exam.  This service lets us provide the care you need with a video conversation.  If a prescription is necessary we can send it directly to your pharmacy.  If lab work is needed we can place an order for that and you can then stop by our lab to have the test done at a later time.    Video visits are billed at different rates depending on your insurance coverage.  Please reach out to your insurance provider with any questions.    If during the course of the call the physician/provider feels a video visit is not appropriate, you will not be charged for this service.\"    Patient has given verbal consent for Video visit? Yes  How would you like to obtain your AVS? MyChart  If you are dropped from the video visit, the video invite should be resent to: Send to e-mail at: [email or web address]  Will anyone else be joining your video visit? No    Subjective     Mimi Melton is a 45 year old female who presents today via video visit for the following health issues:    HPI       Chief Complaint   Patient presents with     Hypertension     Thyroid Disease     Blood pressure:  Mimi has been on blood pressure medication for years.  She was switched off of lisinopril (because of side effects)  She was switched to losartan.  Since being on the losartan, her blood pressure has been excellent and she reports no side effects.  She states that she feels much better on the losartan than she did on the lisinopril.  She is eating a healthy diet.  Her weight is stable.  She checks her blood pressure intermittently at home and it is consistently below 135/85.  She has had several OB/GYN appointments " recently and all of her blood pressure checks at those appointments have also been good.  Today, she is requesting refills on her losartan/hydrochlorothiazide.    Thyroid:   Mimi has been on thyroid medication since she is in her 20s.  She recently had an endocrinology appointment with Dr. Harmon.  At that appointment, Dr. Harmon reported her thyroid levels are stable and asked that Mimi have her thyroid managed by primary care.  Mimi is taking her medication as prescribed.  Today she is asking for refills on her levothyroxine.      Video Start Time: 8:21 AM      Review of Systems   Constitutional, HEENT, cardiovascular, pulmonary, GI, , musculoskeletal, neuro, skin, endocrine and psych systems are negative, except as otherwise noted.      Objective           Vitals:  No vitals were obtained today due to virtual visit.    Physical Exam     GENERAL: Healthy, alert and no distress  EYES: Eyes grossly normal to inspection.  No discharge or erythema, or obvious scleral/conjunctival abnormalities.  RESP: No audible wheeze, cough, or visible cyanosis.  No visible retractions or increased work of breathing.    SKIN: Visible skin clear. No significant rash, abnormal pigmentation or lesions.  NEURO: Cranial nerves grossly intact.  Mentation and speech appropriate for age.  PSYCH: Mentation appears normal, affect normal/bright, judgement and insight intact, normal speech and appearance well-groomed.      Diagnostics:  Reviewed in Epic        Assessment & Plan     (I10) Essential hypertension with goal blood pressure less than 140/90  Comment: Controlled  Plan: losartan (COZAAR) 25 MG tablet,         hydrochlorothiazide (HYDRODIURIL) 12.5 MG         tablet        I did go ahead and refill Mimi's blood pressure medications for the next year.  I encouraged her to take her blood pressure medication daily as prescribed as well as to continue to check her blood pressures intermittently.  In the event that her blood  pressures go up at home, she is to come into the clinic anytime for recheck.  The patient states because of her new medication/losartan, she would prefer to come to the clinic in 6 months for recheck.  I agree.  In 6 months I will also plan to do her comprehensive lab panel including but not limited to her thyroid, metabolic panel, cholesterol panel and an STD panel if needed.  She will plan to do her labs 1 week before her follow-up appoint with me.  She is appreciative.    (E03.2) Hypothyroidism due to medication  Comment: Chronic/stable  Plan: levothyroxine (SYNTHROID/LEVOTHROID) 100 MCG         tablet        I did go ahead and refill her levothyroxine today.  She is to continue to take her medication daily as prescribed.  Next recheck with me in 6 months as above, sooner if needed.          See Patient Instructions    Return in about 6 months (around 6/24/2021) for Physical Exam, Medication follow up.    MARGA Casey CNP  [facility]      Video-Visit Details    Type of service:  Video Visit    Video End Time:8:37 AM    Originating Location (pt. Location): Home    Distant Location (provider location):  [facility]     Platform used for Video Visit: Salma

## 2021-01-07 NOTE — TELEPHONE ENCOUNTER
Ok to have pt do lab only self collect wet prep prior to her US on 1/11 to ensure BV is gone? Order pending.   Kasey Harding RN-BSN

## 2021-01-11 ENCOUNTER — ANCILLARY PROCEDURE (OUTPATIENT)
Dept: ULTRASOUND IMAGING | Facility: CLINIC | Age: 46
End: 2021-01-11
Attending: OBSTETRICS & GYNECOLOGY
Payer: COMMERCIAL

## 2021-01-11 DIAGNOSIS — D25.9 UTERINE LEIOMYOMA, UNSPECIFIED LOCATION: ICD-10-CM

## 2021-01-11 DIAGNOSIS — N89.8 VAGINAL DISCHARGE: ICD-10-CM

## 2021-01-11 LAB
SPECIMEN SOURCE: ABNORMAL
WET PREP SPEC: ABNORMAL

## 2021-01-11 PROCEDURE — 76830 TRANSVAGINAL US NON-OB: CPT | Performed by: OBSTETRICS & GYNECOLOGY

## 2021-01-11 PROCEDURE — 87210 SMEAR WET MOUNT SALINE/INK: CPT | Performed by: OBSTETRICS & GYNECOLOGY

## 2021-01-14 ENCOUNTER — HEALTH MAINTENANCE LETTER (OUTPATIENT)
Age: 46
End: 2021-01-14

## 2021-01-29 ENCOUNTER — MYC MEDICAL ADVICE (OUTPATIENT)
Dept: OBGYN | Facility: CLINIC | Age: 46
End: 2021-01-29

## 2021-01-29 ENCOUNTER — OFFICE VISIT (OUTPATIENT)
Dept: URGENT CARE | Facility: URGENT CARE | Age: 46
End: 2021-01-29
Payer: COMMERCIAL

## 2021-01-29 VITALS
TEMPERATURE: 98.7 F | SYSTOLIC BLOOD PRESSURE: 122 MMHG | HEART RATE: 74 BPM | BODY MASS INDEX: 27.32 KG/M2 | DIASTOLIC BLOOD PRESSURE: 84 MMHG | RESPIRATION RATE: 13 BRPM | WEIGHT: 170 LBS | HEIGHT: 66 IN

## 2021-01-29 DIAGNOSIS — B96.89 BACTERIAL VAGINOSIS: Primary | ICD-10-CM

## 2021-01-29 DIAGNOSIS — N76.0 BACTERIAL VAGINOSIS: Primary | ICD-10-CM

## 2021-01-29 LAB
SPECIMEN SOURCE: ABNORMAL
WET PREP SPEC: ABNORMAL

## 2021-01-29 PROCEDURE — 87210 SMEAR WET MOUNT SALINE/INK: CPT | Performed by: FAMILY MEDICINE

## 2021-01-29 PROCEDURE — 99213 OFFICE O/P EST LOW 20 MIN: CPT | Performed by: FAMILY MEDICINE

## 2021-01-29 RX ORDER — CLINDAMYCIN HCL 300 MG
300 CAPSULE ORAL 2 TIMES DAILY
Qty: 20 CAPSULE | Refills: 1 | Status: SHIPPED | OUTPATIENT
Start: 2021-01-29 | End: 2021-02-08

## 2021-01-29 ASSESSMENT — MIFFLIN-ST. JEOR: SCORE: 1429.92

## 2021-01-29 NOTE — TELEPHONE ENCOUNTER
Patient states she has started to have vaginal odor. Please see message. Patient had a wet prep done on 1/11/2021. Treatment was held off at that point. Patient would like a prescription, but does not want a cream. Okay for a script? Kasey Hagen RN

## 2021-01-29 NOTE — PROGRESS NOTES
Subjective: Patient has had about 3 episodes of bacterial vaginosis recently, was in at her gynecologist recently and they found a small amount of clue cells but she was not particularly symptomatic at that time but now she has been for the last week or so with mostly an odor and a little discharge and irritation.  She is sexually active and has several regular partners, does not want to be tested for STDs as she feels her risk is extremely low.  She had a gastrointestinal upset from Flagyl in the past, does not like the vaginal prep has gotten best results from oral clindamycin.    Objective: Vitals are stable, NAD.  Wet prep is positive for many clue cells.    Assessment and plan: Recurrent bacterial vaginosis.  Prescription for clindamycin given after discussing options with a couple of refills.

## 2021-02-01 NOTE — TELEPHONE ENCOUNTER
It looks like Dr. Morrow prescribed clindamycin.  Please let me know if there is something more I should do.

## 2021-02-15 ENCOUNTER — MYC MEDICAL ADVICE (OUTPATIENT)
Dept: FAMILY MEDICINE | Facility: CLINIC | Age: 46
End: 2021-02-15

## 2021-02-15 DIAGNOSIS — B96.89 BV (BACTERIAL VAGINOSIS): Primary | ICD-10-CM

## 2021-02-15 DIAGNOSIS — N76.0 BV (BACTERIAL VAGINOSIS): Primary | ICD-10-CM

## 2021-02-15 NOTE — TELEPHONE ENCOUNTER
Seen 1/19/2021 in [facility] for BV, is coming in 2/19/2021 for a nurse visit and would like to recheck the wet prep. Please sign off on order if correct.    Thank you

## 2021-02-22 ENCOUNTER — ALLIED HEALTH/NURSE VISIT (OUTPATIENT)
Dept: NURSING | Facility: CLINIC | Age: 46
End: 2021-02-22
Payer: COMMERCIAL

## 2021-02-22 ENCOUNTER — MYC MEDICAL ADVICE (OUTPATIENT)
Dept: FAMILY MEDICINE | Facility: CLINIC | Age: 46
End: 2021-02-22

## 2021-02-22 DIAGNOSIS — N76.0 BV (BACTERIAL VAGINOSIS): ICD-10-CM

## 2021-02-22 DIAGNOSIS — B96.89 BV (BACTERIAL VAGINOSIS): ICD-10-CM

## 2021-02-22 DIAGNOSIS — Z23 ENCOUNTER FOR IMMUNIZATION: Primary | ICD-10-CM

## 2021-02-22 LAB
SPECIMEN SOURCE: ABNORMAL
WET PREP SPEC: ABNORMAL

## 2021-02-22 PROCEDURE — 90651 9VHPV VACCINE 2/3 DOSE IM: CPT

## 2021-02-22 PROCEDURE — 99207 PR NO CHARGE NURSE ONLY: CPT

## 2021-02-22 PROCEDURE — 87210 SMEAR WET MOUNT SALINE/INK: CPT | Performed by: STUDENT IN AN ORGANIZED HEALTH CARE EDUCATION/TRAINING PROGRAM

## 2021-02-22 PROCEDURE — 90471 IMMUNIZATION ADMIN: CPT

## 2021-02-22 NOTE — TELEPHONE ENCOUNTER
Spoke to patient and was able to add to MA schedule this afternoon.  Ok Per Jackie.  Lisa Santos RN  [facility]

## 2021-02-22 NOTE — NURSING NOTE
Prior to immunization administration, verified patients identity using patient s name and date of birth. Please see Immunization Activity for additional information.     Screening Questionnaire for Adult Immunization    Are you sick today?   No   Do you have allergies to medications, food, a vaccine component or latex?   No   Have you ever had a serious reaction after receiving a vaccination?   No   Do you have a long-term health problem with heart, lung, kidney, or metabolic disease (e.g., diabetes), asthma, a blood disorder, no spleen, complement component deficiency, a cochlear implant, or a spinal fluid leak?  Are you on long-term aspirin therapy?   No   Do you have cancer, leukemia, HIV/AIDS, or any other immune system problem?   No   Do you have a parent, brother, or sister with an immune system problem?   No   In the past 3 months, have you taken medications that affect  your immune system, such as prednisone, other steroids, or anticancer drugs; drugs for the treatment of rheumatoid arthritis, Crohn s disease, or psoriasis; or have you had radiation treatments?   No   Have you had a seizure, or a brain or other nervous system problem?   No   During the past year, have you received a transfusion of blood or blood    products, or been given immune (gamma) globulin or antiviral drug?   No   For women: Are you pregnant or is there a chance you could become       pregnant during the next month?   No   Have you received any vaccinations in the past 4 weeks?   No     Immunization questionnaire answers were all negative.        Per orders of Dr. Pearce, injection of hpv given by Jackie Lopez. Patient instructed to remain in clinic for 15 minutes afterwards, and to report any adverse reaction to me immediately.       Screening performed by Jackie Lopez on 2/22/2021 at 2:50 PM.

## 2021-02-22 NOTE — RESULT ENCOUNTER NOTE
Hi Mimi,    Wet prep shows you may still have BV. Are you having symptoms of discharge, irritation or odor? If so, we can go ahead and treat. The options are oral Flagyl (do not drink alcohol on this medicine) or vaginal metronidazole. Please let us know which you prefer if you are symptomatic. If you do not have symptoms, then I would not treat at this time.    Maria M Groves MD

## 2021-02-23 ENCOUNTER — MYC MEDICAL ADVICE (OUTPATIENT)
Dept: FAMILY MEDICINE | Facility: CLINIC | Age: 46
End: 2021-02-23

## 2021-02-23 DIAGNOSIS — N76.0 BACTERIAL VAGINOSIS: ICD-10-CM

## 2021-02-23 DIAGNOSIS — B96.89 BACTERIAL VAGINOSIS: ICD-10-CM

## 2021-02-23 RX ORDER — METRONIDAZOLE 7.5 MG/G
1 GEL VAGINAL DAILY
Qty: 35 G | Refills: 0 | Status: SHIPPED | OUTPATIENT
Start: 2021-02-23 | End: 2021-03-02

## 2021-02-23 RX ORDER — CLINDAMYCIN HCL 300 MG
300 CAPSULE ORAL 2 TIMES DAILY
Qty: 20 CAPSULE | Refills: 1 | Status: CANCELLED | OUTPATIENT
Start: 2021-02-23

## 2021-03-03 ENCOUNTER — TELEPHONE (OUTPATIENT)
Dept: FAMILY MEDICINE | Facility: CLINIC | Age: 46
End: 2021-03-03

## 2021-03-03 NOTE — TELEPHONE ENCOUNTER
Reason for Call: Request for an order or referral:    Order or referral being requested: colonoscpy    Date needed: as soon as possible    Has the patient been seen by the PCP for this problem? NO    Additional comments: patient declined scheduling for order. Please call her to discuss.    Phone number Patient can be reached at:  Home number on file [phone] (home)    Best Time:  any    Can we leave a detailed message on this number?  YES    Call taken on 3/3/2021 at 2:56 PM by Nannette Davis

## 2021-03-03 NOTE — TELEPHONE ENCOUNTER
Call attempted-Pt did not answer and has no voicemail. Has she had a colonoscopy in the past? Symptoms? Family history? Also sent mychart with questions. Debra Lozano RN

## 2021-03-22 DIAGNOSIS — Z12.31 VISIT FOR SCREENING MAMMOGRAM: ICD-10-CM

## 2021-03-22 PROCEDURE — 77067 SCR MAMMO BI INCL CAD: CPT | Mod: GC

## 2021-04-12 ENCOUNTER — OFFICE VISIT (OUTPATIENT)
Dept: OPHTHALMOLOGY | Facility: CLINIC | Age: 46
End: 2021-04-12
Payer: COMMERCIAL

## 2021-04-12 DIAGNOSIS — H05.249 CONSTANT EXOPHTHALMOS, UNSPECIFIED LATERALITY: ICD-10-CM

## 2021-04-12 DIAGNOSIS — E07.9 THYROID EYE DISEASE: Primary | ICD-10-CM

## 2021-04-12 DIAGNOSIS — H02.539 EYELID RETRACTION, UNSPECIFIED LATERALITY: ICD-10-CM

## 2021-04-12 DIAGNOSIS — H57.89 THYROID EYE DISEASE: Primary | ICD-10-CM

## 2021-04-12 PROCEDURE — 99214 OFFICE O/P EST MOD 30 MIN: CPT | Performed by: OPHTHALMOLOGY

## 2021-04-12 PROCEDURE — 92285 EXTERNAL OCULAR PHOTOGRAPHY: CPT | Performed by: OPHTHALMOLOGY

## 2021-04-12 ASSESSMENT — LEVATOR FUNCTION
OD_LEVATOR: 15
OS_LEVATOR: 15

## 2021-04-12 ASSESSMENT — EXTERNAL EXAM - RIGHT EYE: OD_EXAM: EXOPHTHALMOS

## 2021-04-12 ASSESSMENT — VISUAL ACUITY
OD_CC: 20/20
METHOD: SNELLEN - LINEAR
CORRECTION_TYPE: CONTACTS
OD_CC+: -1
OS_CC: 20/20

## 2021-04-12 ASSESSMENT — TONOMETRY
IOP_METHOD: ICARE
OD_IOP_MMHG: 20
OS_IOP_MMHG: 21

## 2021-04-12 ASSESSMENT — CONF VISUAL FIELD
OD_NORMAL: 1
METHOD: COUNTING FINGERS
OS_NORMAL: 1

## 2021-04-12 ASSESSMENT — SLIT LAMP EXAM - LIDS
COMMENTS: LID RETRACTION: LOWER LID
COMMENTS: LID RETRACTION: LOWER LID

## 2021-04-12 ASSESSMENT — EXTERNAL EXAM - LEFT EYE: OS_EXAM: EXOPHTHALMOS

## 2021-04-12 ASSESSMENT — MARGIN REFLEX DISTANCE
OD_MRD1: 4
OS_MRD1: 4

## 2021-04-12 NOTE — NURSING NOTE
Chief Complaints and History of Present Illnesses   Patient presents with     Follow Up     Thyroid Eye Disease,  4/30/2018.       Chief Complaint(s) and History of Present Illness(es)     Follow Up     Laterality: both eyes    Onset: years ago    Frequency: constantly    Course: stable    Associated symptoms: dryness.  Negative for eye pain and double vision    Treatments tried: no treatments    Pain scale: 0/10    Comments: Thyroid Eye Disease,  4/30/2018.                Comments     Pt states eyes feel dry. No double vision. No eye pain. Last Thyroid labs with in December with Dr. Dutta ?- possible normal    TRAE Glasgow COT 9:05 AM April 12, 2021

## 2021-04-12 NOTE — PROGRESS NOTES
Chief Complaints and History of Present Illnesses   Patient presents with     Follow Up     Thyroid Eye Disease,  4/30/2018.       Chief Complaint(s) and History of Present Illness(es)     Follow Up     In both eyes.  This started years ago.  Occurring constantly.  Since   onset it is stable.  Associated symptoms include dryness.  Negative for   eye pain and double vision.  Treatments tried include no treatments.  Pain   was noted as 0/10. Additional comments: Thyroid Eye Disease,  4/30/2018.      Comments     Pt states eyes feel dry. No double vision. No eye pain. Last Thyroid labs   with in December with Dr. Dutta ?- possible normal    Kimberlyao Beasley, COT COT 9:05 AM April 12, 2021          Assessment & Plan     HPI: No new changes. Some eye dryness. No pain. Last Thyroid labs stable.     Thyroid:  On levothyrxine. Stable dose.  TSH 0.49 on 8/20/20.      Eye symptoms (since when):   Proptosis (better/worse/same since last visit): same   Diplopia(better/worse/same since last visit): same  Eyelid retraction(better/worse/same since last visit): same  Tearing(better/worse/same since last visit): same  Redness (better/worse/same since last visit): same  Pain ((better/worse/same since last visit): same  Pain to move the eyes (better/worse/same since last visit): same  Blurred vision: none    Ocular history:   Orbital decompression (date, details): 2008 bilat medial and lateral      Exam:   Pa (base):97  24/25   Better/worse same: same  Strabismus (better/worse/same): same  Eyelid retraction (better/worse/same): same      Mimi Geronimo is a 45 year old female with the following diagnoses:   1. Thyroid eye disease    2. Eyelid retraction, unspecified laterality    3. Constant exophthalmos, unspecified laterality       -stable  -Return to clinic 6 months as needed  -discussed [place]             Attending Physician Attestation:  Complete documentation of historical and exam elements from today's encounter can be  found in the full encounter summary report (not reduplicated in this progress note).  I personally obtained the chief complaint(s) and history of present illness.  I confirmed and edited as necessary the review of systems, past medical/surgical history, family history, social history, and examination findings as documented by others; and I examined the patient myself.  I personally reviewed the relevant tests, images, and reports as documented above.  I formulated and edited as necessary the assessment and plan and discussed the findings and management plan with the patient and family. I personally reviewed the ophthalmic test(s) associated with this encounter, and have completed the corresponding report(s) as necessary.   -Niles Donnelly MD

## 2021-05-19 ENCOUNTER — IMMUNIZATION (OUTPATIENT)
Dept: NURSING | Facility: CLINIC | Age: 46
End: 2021-05-19
Payer: COMMERCIAL

## 2021-05-19 PROCEDURE — 0001A PR COVID VAC PFIZER DIL RECON 30 MCG/0.3 ML IM: CPT

## 2021-05-19 PROCEDURE — 91300 PR COVID VAC PFIZER DIL RECON 30 MCG/0.3 ML IM: CPT

## 2021-05-25 DIAGNOSIS — E03.2 HYPOTHYROIDISM DUE TO MEDICATION: ICD-10-CM

## 2021-05-25 DIAGNOSIS — I10 ESSENTIAL HYPERTENSION WITH GOAL BLOOD PRESSURE LESS THAN 140/90: ICD-10-CM

## 2021-05-26 RX ORDER — HYDROCHLOROTHIAZIDE 12.5 MG/1
12.5 TABLET ORAL DAILY
Qty: 90 TABLET | Refills: 3 | OUTPATIENT
Start: 2021-05-26

## 2021-05-26 RX ORDER — LOSARTAN POTASSIUM 25 MG/1
25 TABLET ORAL DAILY
Qty: 90 TABLET | Refills: 3 | OUTPATIENT
Start: 2021-05-26

## 2021-05-26 RX ORDER — LEVOTHYROXINE SODIUM 100 UG/1
100 TABLET ORAL DAILY
Qty: 90 TABLET | Refills: 3 | OUTPATIENT
Start: 2021-05-26

## 2021-05-27 ENCOUNTER — OFFICE VISIT (OUTPATIENT)
Dept: URGENT CARE | Facility: URGENT CARE | Age: 46
End: 2021-05-27
Payer: COMMERCIAL

## 2021-05-27 VITALS
WEIGHT: 170 LBS | TEMPERATURE: 98.7 F | DIASTOLIC BLOOD PRESSURE: 80 MMHG | SYSTOLIC BLOOD PRESSURE: 112 MMHG | RESPIRATION RATE: 14 BRPM | OXYGEN SATURATION: 100 % | HEART RATE: 80 BPM | BODY MASS INDEX: 27.32 KG/M2 | HEIGHT: 66 IN

## 2021-05-27 DIAGNOSIS — B96.89 BACTERIAL VAGINOSIS: Primary | ICD-10-CM

## 2021-05-27 DIAGNOSIS — N76.0 BACTERIAL VAGINOSIS: Primary | ICD-10-CM

## 2021-05-27 LAB
ALBUMIN UR-MCNC: NEGATIVE MG/DL
APPEARANCE UR: CLEAR
BILIRUB UR QL STRIP: NEGATIVE
COLOR UR AUTO: YELLOW
GLUCOSE UR STRIP-MCNC: NEGATIVE MG/DL
HGB UR QL STRIP: ABNORMAL
KETONES UR STRIP-MCNC: NEGATIVE MG/DL
LEUKOCYTE ESTERASE UR QL STRIP: NEGATIVE
NITRATE UR QL: NEGATIVE
PH UR STRIP: 6.5 PH (ref 5–7)
RBC #/AREA URNS AUTO: NORMAL /HPF
SOURCE: ABNORMAL
SP GR UR STRIP: 1.02 (ref 1–1.03)
SPECIMEN SOURCE: ABNORMAL
UROBILINOGEN UR STRIP-ACNC: 0.2 EU/DL (ref 0.2–1)
WBC #/AREA URNS AUTO: NORMAL /HPF
WET PREP SPEC: ABNORMAL

## 2021-05-27 PROCEDURE — 87591 N.GONORRHOEAE DNA AMP PROB: CPT | Performed by: FAMILY MEDICINE

## 2021-05-27 PROCEDURE — 87210 SMEAR WET MOUNT SALINE/INK: CPT | Performed by: FAMILY MEDICINE

## 2021-05-27 PROCEDURE — 87491 CHLMYD TRACH DNA AMP PROBE: CPT | Performed by: FAMILY MEDICINE

## 2021-05-27 PROCEDURE — 81001 URINALYSIS AUTO W/SCOPE: CPT | Performed by: FAMILY MEDICINE

## 2021-05-27 PROCEDURE — 99213 OFFICE O/P EST LOW 20 MIN: CPT | Performed by: FAMILY MEDICINE

## 2021-05-27 RX ORDER — CLINDAMYCIN HCL 300 MG
300 CAPSULE ORAL 2 TIMES DAILY
Qty: 14 CAPSULE | Refills: 0 | Status: SHIPPED | OUTPATIENT
Start: 2021-05-27 | End: 2021-06-03

## 2021-05-27 RX ORDER — METRONIDAZOLE 7.5 MG/G
1 GEL VAGINAL DAILY
Qty: 25 G | Refills: 0 | Status: SHIPPED | OUTPATIENT
Start: 2021-05-27 | End: 2021-06-01

## 2021-05-27 ASSESSMENT — MIFFLIN-ST. JEOR: SCORE: 1429.92

## 2021-05-27 NOTE — PROGRESS NOTES
Assessment & Plan     Bacterial vaginosis    - metroNIDAZOLE (METROGEL) 0.75 % vaginal gel  Dispense: 25 g; Refill: 0  - clindamycin (CLEOCIN) 300 MG capsule  Dispense: 14 capsule; Refill: 0  - *UA reflex to Microscopic and Culture ([facility] (except [place] and [place])  - Wet prep  - Neisseria gonorrhoeae PCR  - Chlamydia trachomatis PCR  - Urine Microscopic    Normal UA. Recurrence of BV. Patient requests both oral and topical treatment given her hx. Opts for clinda rather than flagyl oral therapy.     GC/CT testing is pending ( has two active partners none of whom are reported to have positive test or present symptoms)     Alex Al MD   [facility]    Dennis Le is a 45 year old female who presents to clinic today for the following health issues:  No chief complaint on file.    HPI  Smell she is experiencing is similar to her previous episodes. Has had BV many times in the past. Does not like oral flagyl as she likes to drink alcohol.     Has used some vinegar/water remedies to help with the area.     Patient Active Problem List    Diagnosis Date Noted     Need for HPV vaccine 07/03/2020     Priority: Medium     Chronic seasonal allergic rhinitis, unspecified trigger 06/19/2018     Priority: Medium     Female stress incontinence 04/12/2017     Priority: Medium     Thyroid disease 08/09/2016     Priority: Medium     Cervical high risk HPV (human papillomavirus) test positive 12/01/2015     Priority: Medium     ECC, repeat pap and hpv all negative 9/09.   NIL paps: 2/10, 9/10, 12/11, 12/12. Plan pap in 3 yrs.  7/14: NIL pap. Plan cotest pap & HPV in 3 years.  12/2015: NIL pap, + HPV (not 16 or 18). Plan cotest pap & HPV in 1 year  Tracking started.  12/8/2016 Pap: NIL/neg HR HPV. Plan cotest in 3 years.  10/18/19 Pap: NIL, +HR HPV (not 16/18). Plan cotest in 1 year.  10/30/20: NIL Pap, + HR HPV (not 16 or 18). Plan [place] due bef 01/30/21.  11/9/20 Pt notified by  "phone.  12/02/20: [place] [facility] Neg for dysplasia. Plan cotest in 1 year due 12/02/21. Provider advised the pt of the results and recommendations thru nestor. Pt viewed.          Hypothyroidism due to acquired atrophy of thyroid 10/13/2015     Priority: Medium     Anemia 07/22/2014     Priority: Medium     Essential hypertension with goal blood pressure less than 140/90 12/28/2012     Priority: Medium     CARDIOVASCULAR SCREENING; LDL GOAL LESS THAN 160 10/31/2010     Priority: Medium     Abnormal Pap smear of cervix 09/09/2009     Priority: Medium     See dx for Cervical High Risk HPV.        Exophthalmos 12/04/2007     Priority: Medium     Problem list name updated by automated process. Provider to review       Vaginitis and vulvovaginitis 06/19/2007     Priority: Medium     Problem list name updated by automated process. Provider to review       Surveillance of previously prescribed intrauterine contraceptive device 12/20/2004     Priority: Medium     Paragard IUD placed in May 19, 2011.         Current Outpatient Medications   Medication     clindamycin (CLEOCIN) 300 MG capsule     fexofenadine (ALLEGRA ALLERGY) 180 MG tablet     fluticasone (FLONASE) 50 MCG/ACT nasal spray     hydrochlorothiazide (HYDRODIURIL) 12.5 MG tablet     levothyroxine (SYNTHROID/LEVOTHROID) 100 MCG tablet     losartan (COZAAR) 25 MG tablet     metroNIDAZOLE (METROGEL) 0.75 % vaginal gel     ACETAMINOPHEN PO     benzoyl peroxide 5 % external liquid     clindamycin (CLEOCIN) 75 MG capsule     fluconazole (DIFLUCAN) 150 MG tablet     IBUPROFEN PO     Lactobacillus-Inulin ([facility] DIGESTIVE [facility]) CAPS     mupirocin (BACTROBAN) 2 % ointment     tretinoin (RETIN-A) 0.05 % external cream     No current facility-administered medications for this visit.           Objective    /80   Pulse 80   Temp 98.7  F (37.1  C) (Oral)   Resp 14   Ht 1.664 m (5' 5.5\")   Wt 77.1 kg (170 lb)   SpO2 100%   BMI 27.86 kg/m    Physical Exam     GEN: " NAD, pleasant      Results for orders placed or performed in visit on 05/27/21   *UA reflex to Microscopic and Culture ([place] [facility] (except [place] and [place])     Status: Abnormal    Specimen: Midstream Urine   Result Value Ref Range    Color Urine Yellow     Appearance Urine Clear     Glucose Urine Negative NEG^Negative mg/dL    Bilirubin Urine Negative NEG^Negative    Ketones Urine Negative NEG^Negative mg/dL    Specific Gravity Urine 1.020 1.003 - 1.035    Blood Urine Trace (A) NEG^Negative    pH Urine 6.5 5.0 - 7.0 pH    Protein Albumin Urine Negative NEG^Negative mg/dL    Urobilinogen Urine 0.2 0.2 - 1.0 EU/dL    Nitrite Urine Negative NEG^Negative    Leukocyte Esterase Urine Negative NEG^Negative    Source Midstream Urine    Urine Microscopic     Status: None   Result Value Ref Range    WBC Urine 0 - 5 OTO5^0 - 5 /HPF    RBC Urine O - 2 OTO2^O - 2 /HPF   Wet prep     Status: Abnormal    Specimen: Vagina   Result Value Ref Range    Specimen Description Vagina     Wet Prep No Trichomonas seen     Wet Prep Moderate  Clue cells seen   (A)     Wet Prep No yeast seen                      The use of Dragon/Ultora dictation services may have been used to construct the content in this note; any grammatical or spelling errors are non-intentional. Please contact the author of this note directly if you are in need of any clarification.

## 2021-06-02 ENCOUNTER — OFFICE VISIT (OUTPATIENT)
Dept: OBGYN | Facility: CLINIC | Age: 46
End: 2021-06-02
Payer: COMMERCIAL

## 2021-06-02 VITALS
SYSTOLIC BLOOD PRESSURE: 114 MMHG | HEART RATE: 88 BPM | DIASTOLIC BLOOD PRESSURE: 72 MMHG | WEIGHT: 179 LBS | BODY MASS INDEX: 29.33 KG/M2 | TEMPERATURE: 98.4 F

## 2021-06-02 DIAGNOSIS — D25.9 UTERINE LEIOMYOMA, UNSPECIFIED LOCATION: ICD-10-CM

## 2021-06-02 DIAGNOSIS — Z30.432 ENCOUNTER FOR IUD REMOVAL: Primary | ICD-10-CM

## 2021-06-02 DIAGNOSIS — Z30.430 ENCOUNTER FOR IUD INSERTION: ICD-10-CM

## 2021-06-02 LAB — HCG UR QL: NEGATIVE

## 2021-06-02 PROCEDURE — 81025 URINE PREGNANCY TEST: CPT | Performed by: OBSTETRICS & GYNECOLOGY

## 2021-06-02 PROCEDURE — 58300 INSERT INTRAUTERINE DEVICE: CPT | Performed by: OBSTETRICS & GYNECOLOGY

## 2021-06-02 PROCEDURE — 58301 REMOVE INTRAUTERINE DEVICE: CPT | Performed by: OBSTETRICS & GYNECOLOGY

## 2021-06-02 RX ORDER — COPPER 313.4 MG/1
1 INTRAUTERINE DEVICE INTRAUTERINE ONCE
Status: COMPLETED
Start: 2021-06-02 | End: 2021-06-02

## 2021-06-02 RX ADMIN — COPPER 1 EACH: 313.4 INTRAUTERINE DEVICE INTRAUTERINE at 15:24

## 2021-06-02 NOTE — PROGRESS NOTES
GYN CLINIC VISIT  2021   CC: IUD replacement (removal and insertion)    HPI:   Mimi Melton is a 45 year old  who presents to clinic today for an IUD removal and replacement.  She had a Paragard T-380 inserted on in 2011 for contraception.   She desires removal of IUD because it is due for removal.   Her bleeding pattern with IUD is regular, monthly.  H/o STIs: No. Current STI symptoms: No. Last pap: 10/30/2020 NIL +other HPV    Reviewed GYN hx, OB hx, past medical hx, surgical hx and family hx.   Reviewed medications and allergies. Changes made in EPIC.     OBJECTIVE:   Vitals:    21 1411   BP: 114/72   Pulse: 88   Temp: 98.4  F (36.9  C)   TempSrc: Oral   Weight: 81.2 kg (179 lb)     Body mass index is 29.33 kg/m .     Gen: alert, oriented, no distress, cooperative and pleasant  Abd: soft, nontender, nondistended  : normal external genitalia without lesions or abnormalities. Normal [place]th[place]'s, normal [place]'s, normal urethra. Normal vaginal mucosa, no abnormal discharge or lesions. Cervix appears nulliparous, IUD strings not visible, no lesions or erythema. Bimanual exam reveals 12 week sized retroverted mobile uterus, no cervical motion tenderness, no uterine tenderness, no adnexal masses.    PROCEDURE: IUD REMOVAL  Patient has verbalized understanding of risks and benefits. All questions answered. She has signed the consent form.      A medium saima speculum was placed in the vagina with good visualization of the cervix.  The IUD strings not immediately visualized at cervical os. Endocervical brush used to bring IUD strings into view.  IUD strings grasped with sterile ring forceps. Gentle traction applied and IUD removed intact without difficulty. There were no complications. The patient had a lot of cramping tolerated the procedure well.      PROCEDURE: IUD insertion  Patient has verbalized understanding of risks and benefits. She was counseled on risks of infection, bleeding,  uterine perforation, cervical laceration, expulsion, overall risk of pregnancy 2 in 1000, if she does get pregnant that there is an increased risk of ectopic pregnancy. All questions answered. She has signed the consent form.    Urine pregnancy test was negative.      After IUD removed, the cervix was then swabbed with a betadine x3.  Tenaculum was placed at the 12 o'clock position on the cervix and the uterus sounded to 6.5cm.  The Paragard T-380  IUD was then placed in the usual fashion under sterile technique without difficulty.  Strings were clipped about 2-3 cm from the cervical os.  Tenaculum was removed and cervix was hemostatic. There were no complications. The patient experienced a lot of cramping but overall tolerated the procedure well.  NDC:[address]-0 LOT#:[id number] EXP:2027    ASSESSMENT:   Mimi Melton is a 45 year old  who had a Paragard IUD removed and a Paragard IUD replaced today without complication.    PLAN:  1. Encounter for IUD removal    - REMOVE INTRAUTERINE DEVICE    2. Encounter for IUD insertion    - HCG Qual, Urine ([id number])  - INSERTION INTRAUTERINE DEVICE  - paragard intrauterine copper IUD device 1 each  - PARAGARD IUD    3. Uterine leiomyoma, unspecified location    - US OB Transvaginal Only; Future      The patient should feel for the IUD strings in 2 weeks.  If unable to locate them, she should return to clinic for a speculum examination for confirmation that the IUD is in place. Bleeding pattern of this particular IUD was discussed with the patient. She is aware that the IUD will need to be removed in 10 years or PRN.  She is to return to clinic for her next annual or PRN.    Return to clinic in Dec 2021 for repeat pap, 2022 for fibroid ultrasound, sooner PRN.    Shanique Fish MD

## 2021-06-09 ENCOUNTER — IMMUNIZATION (OUTPATIENT)
Dept: NURSING | Facility: CLINIC | Age: 46
End: 2021-06-09
Attending: INTERNAL MEDICINE
Payer: COMMERCIAL

## 2021-06-09 PROCEDURE — 0002A PR COVID VAC PFIZER DIL RECON 30 MCG/0.3 ML IM: CPT

## 2021-06-09 PROCEDURE — 91300 PR COVID VAC PFIZER DIL RECON 30 MCG/0.3 ML IM: CPT

## 2021-07-06 ENCOUNTER — OFFICE VISIT (OUTPATIENT)
Dept: URGENT CARE | Facility: URGENT CARE | Age: 46
End: 2021-07-06
Payer: COMMERCIAL

## 2021-07-06 VITALS
BODY MASS INDEX: 29.33 KG/M2 | WEIGHT: 179 LBS | DIASTOLIC BLOOD PRESSURE: 87 MMHG | HEART RATE: 68 BPM | OXYGEN SATURATION: 98 % | TEMPERATURE: 98.3 F | SYSTOLIC BLOOD PRESSURE: 115 MMHG

## 2021-07-06 DIAGNOSIS — N89.8 VAGINAL DISCHARGE: ICD-10-CM

## 2021-07-06 DIAGNOSIS — N76.0 BV (BACTERIAL VAGINOSIS): Primary | ICD-10-CM

## 2021-07-06 DIAGNOSIS — B96.89 BV (BACTERIAL VAGINOSIS): Primary | ICD-10-CM

## 2021-07-06 LAB
ALBUMIN UR-MCNC: NEGATIVE MG/DL
APPEARANCE UR: CLEAR
BACTERIA #/AREA URNS HPF: ABNORMAL /HPF
BILIRUB UR QL STRIP: NEGATIVE
COLOR UR AUTO: YELLOW
GLUCOSE UR STRIP-MCNC: NEGATIVE MG/DL
HGB UR QL STRIP: ABNORMAL
KETONES UR STRIP-MCNC: NEGATIVE MG/DL
LEUKOCYTE ESTERASE UR QL STRIP: NEGATIVE
NITRATE UR QL: NEGATIVE
NON-SQ EPI CELLS #/AREA URNS LPF: ABNORMAL /LPF
PH UR STRIP: 7.5 PH (ref 5–7)
RBC #/AREA URNS AUTO: ABNORMAL /HPF
SOURCE: ABNORMAL
SP GR UR STRIP: 1.02 (ref 1–1.03)
SPECIMEN SOURCE: ABNORMAL
UROBILINOGEN UR STRIP-ACNC: 0.2 EU/DL (ref 0.2–1)
WBC #/AREA URNS AUTO: ABNORMAL /HPF
WET PREP SPEC: ABNORMAL

## 2021-07-06 PROCEDURE — 81001 URINALYSIS AUTO W/SCOPE: CPT | Performed by: PHYSICIAN ASSISTANT

## 2021-07-06 PROCEDURE — 87591 N.GONORRHOEAE DNA AMP PROB: CPT | Performed by: PHYSICIAN ASSISTANT

## 2021-07-06 PROCEDURE — 87491 CHLMYD TRACH DNA AMP PROBE: CPT | Performed by: PHYSICIAN ASSISTANT

## 2021-07-06 PROCEDURE — 99213 OFFICE O/P EST LOW 20 MIN: CPT | Performed by: NURSE PRACTITIONER

## 2021-07-06 PROCEDURE — 87210 SMEAR WET MOUNT SALINE/INK: CPT | Performed by: PHYSICIAN ASSISTANT

## 2021-07-06 RX ORDER — CLINDAMYCIN HCL 300 MG
300 CAPSULE ORAL 2 TIMES DAILY
Qty: 14 CAPSULE | Refills: 0 | Status: SHIPPED | OUTPATIENT
Start: 2021-07-06 | End: 2021-07-13

## 2021-07-06 RX ORDER — METRONIDAZOLE 7.5 MG/G
1 GEL VAGINAL DAILY
Qty: 25 G | Refills: 0 | Status: SHIPPED | OUTPATIENT
Start: 2021-07-06 | End: 2021-07-11

## 2021-07-06 NOTE — PROGRESS NOTES
Chief Complaint   Patient presents with     Urgent Care     Vaginal Problem     c/o vaginal odor     SUBJECTIVE:  Mimi Melton is a 46 year old female who presents today for possible vaginitis. She has had vaginal odor for a couple days. She just had BV 05/27 and this feels the same. She prefers oral and topical medication if warranted.    Past Medical History:   Diagnosis Date     Cervical high risk HPV (human papillomavirus) test positive 12/2015 12/2015, 10/2019, 10/30/20     Chronic sinusitis Summer 2016    I get congested every 3-4 weeks     Esophageal reflux      Exophthalmos, unspecified      Hypothyroidism      Thyroid eye disease      Thyrotoxicosis without mention of goiter or other cause, without mention of thyrotoxic crisis or storm 03/2005    Had radioablation, On synthroid, seeing [facility]; takes synthroid     Unspecified essential hypertension 1980    meds since 2001, on atenolol     Current Outpatient Medications   Medication Sig Dispense Refill     ACETAMINOPHEN PO Take 1,000 mg by mouth 2 times daily as needed for pain (menstrual cramps)       benzoyl peroxide 5 % external liquid Wash face skin 1-2 times daily 148 g 3     clindamycin (CLEOCIN) 300 MG capsule Take 1 capsule (300 mg) by mouth 2 times daily for 7 days 14 capsule 0     fexofenadine (ALLEGRA ALLERGY) 180 MG tablet Take 1-2 Tabl Fexofenadine against allergic reactions 30 tablet 3     fluconazole (DIFLUCAN) 150 MG tablet Take one tablet every 3 days until symptoms resolve. 3 tablet 3     fluticasone (FLONASE) 50 MCG/ACT nasal spray SPRAY 1 SPRAY INTO BOTH NOSTRILS 2 TIMES DAILY 48 mL 3     hydrochlorothiazide (HYDRODIURIL) 12.5 MG tablet Take 1 tablet (12.5 mg) by mouth daily 90 tablet 1     IBUPROFEN PO Take 800 mg by mouth every 4 hours as needed (takes for menstrual cramps.)        levothyroxine (SYNTHROID/LEVOTHROID) 100 MCG tablet Take 1 tablet (100 mcg) by mouth daily 90 tablet 1     losartan (COZAAR) 25 MG tablet Take 1  tablet (25 mg) by mouth daily 90 tablet 1     metroNIDAZOLE (METROGEL) 0.75 % vaginal gel Place 1 applicator (5 g) vaginally daily for 5 days 25 g 0     mupirocin (BACTROBAN) 2 % ointment as needed        tretinoin (RETIN-A) 0.05 % external cream Apply topically At Bedtime Put on face 1-2 times daily (if irritated reduce to every other day) 20 g 3     clindamycin (CLEOCIN) 75 MG capsule Take 2 capsules (150 mg) by mouth 2 times daily (Patient not taking: Reported on 5/27/2021) 28 capsule 0     Lactobacillus-Inulin ([employer]) CAPS Take 1 capsule by mouth 2 times daily (Patient not taking: Reported on 6/2/2021) 100 capsule 11     Social History     Tobacco Use     Smoking status: Never Smoker     Smokeless tobacco: Never Used   Substance Use Topics     Alcohol use: Yes     Comment: 1-2x's/month if that.     Allergies   Allergen Reactions     Dust Mites      Lisinopril Swelling     Swelling in lip     Mold      Cannot have any meds that contain mold.     No Known Drug Allergies      Review of Systems   Constitutional: Negative for activity change, appetite change, chills, diaphoresis, fatigue and fever.   Gastrointestinal: Negative for abdominal pain, nausea and vomiting.   Genitourinary: Positive for vaginal discharge. Negative for difficulty urinating, dysuria, flank pain, frequency, hematuria, menstrual problem, urgency and vaginal pain.        + malodorous discharge   Musculoskeletal: Negative for myalgias.   Skin: Negative for color change and rash.   Neurological: Negative for syncope, weakness and light-headedness.     OBJECTIVE:  /87   Pulse 68   Temp 98.3  F (36.8  C) (Tympanic)   Wt 81.2 kg (179 lb)   LMP 06/28/2021   SpO2 98%   BMI 29.33 kg/m       Physical Exam  Constitutional:       General: She is not in acute distress.     Appearance: She is well-developed. She is not diaphoretic.   Abdominal:      General: Bowel sounds are normal.      Palpations: Abdomen is soft.       Tenderness: There is no abdominal tenderness.   Musculoskeletal: Normal range of motion.   Skin:     General: Skin is warm and dry.      Capillary Refill: Capillary refill takes less than 2 seconds.      Coloration: Skin is not pale.   Neurological:      Mental Status: She is alert and oriented to person, place, and time.   Psychiatric:         Mood and Affect: Mood normal.         Behavior: Behavior normal.       Results for orders placed or performed in visit on 07/06/21   *UA reflex to Microscopic and Culture ([facility] (except [place] and [place])     Status: Abnormal    Specimen: Midstream Urine   Result Value Ref Range    Color Urine Yellow     Appearance Urine Clear     Glucose Urine Negative NEG^Negative mg/dL    Bilirubin Urine Negative NEG^Negative    Ketones Urine Negative NEG^Negative mg/dL    Specific Gravity Urine 1.025 1.003 - 1.035    Blood Urine Trace (A) NEG^Negative    pH Urine 7.5 (H) 5.0 - 7.0 pH    Protein Albumin Urine Negative NEG^Negative mg/dL    Urobilinogen Urine 0.2 0.2 - 1.0 EU/dL    Nitrite Urine Negative NEG^Negative    Leukocyte Esterase Urine Negative NEG^Negative    Source Midstream Urine    Urine Microscopic     Status: Abnormal   Result Value Ref Range    WBC Urine 0 - 5 OTO5^0 - 5 /HPF    RBC Urine O - 2 OTO2^O - 2 /HPF    Squamous Epithelial /LPF Urine Few FEW^Few /LPF    Bacteria Urine Few (A) NEG^Negative /HPF   Neisseria gonorrhoeae PCR     Status: None    Specimen: Vagina   Result Value Ref Range    Specimen Descrip Vagina     N Gonorrhea PCR Negative NEG^Negative   Chlamydia trachomatis PCR     Status: None    Specimen: Vagina   Result Value Ref Range    Specimen Description Vagina     Chlamydia Trachomatis PCR Negative NEG^Negative   Wet prep     Status: Abnormal    Specimen: Vagina   Result Value Ref Range    Specimen Description Vagina     Wet Prep Moderate  Clue cells seen   (A)     Wet Prep Rare  WBC'S seen       Wet Prep No Trichomonas seen      Wet Prep No yeast seen      ASSESSMENT:    ICD-10-CM    1. BV (bacterial vaginosis)  N76.0 metroNIDAZOLE (METROGEL) 0.75 % vaginal gel    B96.89 clindamycin (CLEOCIN) 300 MG capsule   2. Vaginal discharge  N89.8 Neisseria gonorrhoeae PCR     Chlamydia trachomatis PCR     Wet prep     *UA reflex to Microscopic and Culture ([facility] [place] [facility] (except [place] and [place])     Urine Microscopic     PLAN:   Patient Instructions   Flagyl and clinda for BV per request  Open to air at bed, cotton underwear, hypoallergenic body wash, probiotics  Will call if chlamydia or gonorrhea are positive    Follow up with primary care provider with any problems, questions or concerns or if symptoms worsen or fail to improve. Patient agreed to plan and verbalized understanding.    Nuvia Brandon, [facility]

## 2021-07-08 ASSESSMENT — ENCOUNTER SYMPTOMS
DIFFICULTY URINATING: 0
FLANK PAIN: 0
FEVER: 0
DIAPHORESIS: 0
LIGHT-HEADEDNESS: 0
HEMATURIA: 0
ACTIVITY CHANGE: 0
MYALGIAS: 0
APPETITE CHANGE: 0
ABDOMINAL PAIN: 0
NAUSEA: 0
FREQUENCY: 0
FATIGUE: 0
CHILLS: 0
VOMITING: 0
COLOR CHANGE: 0
DYSURIA: 0
WEAKNESS: 0

## 2021-07-08 NOTE — PATIENT INSTRUCTIONS
Flagyl and clinda for BV per request  Open to air at bed, cotton underwear, hypoallergenic body wash, probiotics  Will call if chlamydia or gonorrhea are positive

## 2021-07-13 ENCOUNTER — E-VISIT (OUTPATIENT)
Dept: OBGYN | Facility: CLINIC | Age: 46
End: 2021-07-13
Payer: COMMERCIAL

## 2021-07-13 DIAGNOSIS — N76.0 BACTERIAL VAGINITIS: ICD-10-CM

## 2021-07-13 DIAGNOSIS — B96.89 BACTERIAL VAGINITIS: ICD-10-CM

## 2021-07-13 DIAGNOSIS — N89.8 VAGINAL DISCHARGE: Primary | ICD-10-CM

## 2021-07-13 PROCEDURE — 99422 OL DIG E/M SVC 11-20 MIN: CPT | Performed by: OBSTETRICS & GYNECOLOGY

## 2021-07-20 RX ORDER — METRONIDAZOLE 7.5 MG/G
1 GEL VAGINAL
Qty: 70 G | Refills: 3 | Status: SHIPPED | OUTPATIENT
Start: 2021-07-22 | End: 2021-10-28

## 2021-07-26 NOTE — PATIENT INSTRUCTIONS
Thank you for choosing us for your care. I have placed an order for a prescription so that you can start treatment. View your full visit summary for details by clicking on the link below. Your pharmacist will able to address any questions you may have about the medication.     If you're not feeling better within 5-7 days, please schedule an appointment.  You can schedule an appointment right here in [facility], or call [phone]  If the visit is for the same symptoms as your eVisit, we'll refund the cost of your eVisit if seen within seven days.    Thank you for choosing us for your care. Given your symptoms, I would like you to do a lab-only visit to determine what is causing them.  I have placed the orders.  Please schedule an appointment with the lab right here in [facility], or call [phone].  I will let you know when the results are back and next steps to take.

## 2021-09-18 ENCOUNTER — OFFICE VISIT (OUTPATIENT)
Dept: URGENT CARE | Facility: URGENT CARE | Age: 46
End: 2021-09-18
Payer: COMMERCIAL

## 2021-09-18 VITALS
SYSTOLIC BLOOD PRESSURE: 118 MMHG | OXYGEN SATURATION: 98 % | HEART RATE: 79 BPM | BODY MASS INDEX: 29.33 KG/M2 | WEIGHT: 179 LBS | DIASTOLIC BLOOD PRESSURE: 80 MMHG

## 2021-09-18 DIAGNOSIS — B96.89 BV (BACTERIAL VAGINOSIS): ICD-10-CM

## 2021-09-18 DIAGNOSIS — Z11.3 SCREEN FOR STD (SEXUALLY TRANSMITTED DISEASE): ICD-10-CM

## 2021-09-18 DIAGNOSIS — R30.0 DYSURIA: Primary | ICD-10-CM

## 2021-09-18 DIAGNOSIS — N76.0 BV (BACTERIAL VAGINOSIS): ICD-10-CM

## 2021-09-18 LAB

## 2021-09-18 PROCEDURE — 99213 OFFICE O/P EST LOW 20 MIN: CPT | Performed by: PHYSICIAN ASSISTANT

## 2021-09-18 PROCEDURE — 87491 CHLMYD TRACH DNA AMP PROBE: CPT | Performed by: PHYSICIAN ASSISTANT

## 2021-09-18 PROCEDURE — 87591 N.GONORRHOEAE DNA AMP PROB: CPT | Performed by: PHYSICIAN ASSISTANT

## 2021-09-18 PROCEDURE — 87210 SMEAR WET MOUNT SALINE/INK: CPT | Performed by: PHYSICIAN ASSISTANT

## 2021-09-18 PROCEDURE — 81003 URINALYSIS AUTO W/O SCOPE: CPT | Performed by: PHYSICIAN ASSISTANT

## 2021-09-18 RX ORDER — CLINDAMYCIN HCL 300 MG
300 CAPSULE ORAL 2 TIMES DAILY
Qty: 20 CAPSULE | Refills: 0 | Status: SHIPPED | OUTPATIENT
Start: 2021-09-18 | End: 2021-09-28

## 2021-09-18 NOTE — PROGRESS NOTES
SUBJECTIVE:   Mimi Melton is a 46 year old female who  presents today for a possible BV. Vaginal odor change.  Feels like previous BV. Requests no gel, no flagyl pills.  Wants previous effective treatment.    Past Medical History:   Diagnosis Date     Cervical high risk HPV (human papillomavirus) test positive 12/2015 12/2015, 10/2019, 10/30/20     Chronic sinusitis Summer 2016    I get congested every 3-4 weeks     Esophageal reflux      Exophthalmos, unspecified      Hypothyroidism      Thyroid eye disease      Thyrotoxicosis without mention of goiter or other cause, without mention of thyrotoxic crisis or storm 03/2005    Had radioablation, On synthroid, seeing [facility]; takes synthroid     Unspecified essential hypertension 1980    meds since 2001, on atenolol     Current Outpatient Medications   Medication Sig Dispense Refill     ACETAMINOPHEN PO Take 1,000 mg by mouth 2 times daily as needed for pain (menstrual cramps)       benzoyl peroxide 5 % external liquid Wash face skin 1-2 times daily 148 g 3     fexofenadine (ALLEGRA ALLERGY) 180 MG tablet Take 1-2 Tabl Fexofenadine against allergic reactions 30 tablet 3     fluconazole (DIFLUCAN) 150 MG tablet Take one tablet every 3 days until symptoms resolve. 3 tablet 3     fluticasone (FLONASE) 50 MCG/ACT nasal spray SPRAY 1 SPRAY INTO BOTH NOSTRILS 2 TIMES DAILY 48 mL 3     hydrochlorothiazide (HYDRODIURIL) 12.5 MG tablet Take 1 tablet (12.5 mg) by mouth daily 90 tablet 1     IBUPROFEN PO Take 800 mg by mouth every 4 hours as needed (takes for menstrual cramps.)        levothyroxine (SYNTHROID/LEVOTHROID) 100 MCG tablet Take 1 tablet (100 mcg) by mouth daily 90 tablet 1     losartan (COZAAR) 25 MG tablet Take 1 tablet (25 mg) by mouth daily 90 tablet 1     metroNIDAZOLE (METROGEL) 0.75 % vaginal gel Place 1 applicator (5 g) vaginally twice a week 70 g 3     mupirocin (BACTROBAN) 2 % ointment as needed        tretinoin (RETIN-A) 0.05 % external cream  Apply topically At Bedtime Put on face 1-2 times daily (if irritated reduce to every other day) 20 g 3     clindamycin (CLEOCIN) 75 MG capsule Take 2 capsules (150 mg) by mouth 2 times daily (Patient not taking: Reported on 5/27/2021) 28 capsule 0     Lactobacillus-Inulin (End[employer]) CAPS Take 1 capsule by mouth 2 times daily (Patient not taking: Reported on 6/2/2021) 100 capsule 11     Social History     Tobacco Use     Smoking status: Never Smoker     Smokeless tobacco: Never Used   Substance Use Topics     Alcohol use: Yes     Comment: 1-2x's/month if that.       ROS:   10 point ROS negative except as listed above      OBJECTIVE:  /80   Pulse 79   Wt 81.2 kg (179 lb)   LMP 09/15/2021   SpO2 98%   BMI 29.33 kg/m    GENERAL APPEARANCE: healthy, alert and no distress  RESP: no labored breathing  BACK: No CVA tenderness  SKIN: no suspicious lesions or rashes      Results for orders placed or performed in visit on 09/18/21   UA Macro with Reflex to Micro and Culture - lab collect     Status: Normal    Specimen: Urine, Midstream   Result Value Ref Range    Color Urine Yellow Colorless, Straw, Light Yellow, Yellow    Appearance Urine Clear Clear    Glucose Urine Negative Negative mg/dL    Bilirubin Urine Negative Negative    Ketones Urine Negative Negative mg/dL    Specific Gravity Urine 1.020 1.003 - 1.035    Blood Urine Negative Negative    pH Urine 6.5 5.0 - 7.0    Protein Albumin Urine Negative Negative mg/dL    Urobilinogen Urine 0.2 0.2, 1.0 E.U./dL    Nitrite Urine Negative Negative    Leukocyte Esterase Urine Negative Negative    Narrative    Microscopic not indicated   NEISSERIA GONORRHOEA PCR     Status: Normal    Specimen: Vagina; Swab   Result Value Ref Range    Neisseria gonorrhoeae Negative Negative   CHLAMYDIA TRACHOMATIS PCR     Status: Normal    Specimen: Vagina; Swab   Result Value Ref Range    Chlamydia trachomatis Negative Negative   Wet prep - lab collect     Status:  Abnormal    Specimen: Vagina; Swab   Result Value Ref Range    Trichomonas Absent Absent    Yeast Absent Absent    Clue Cells Present (A) Absent    WBCs/high power field 1+ (A) None   '  ASSESSMENT:   (R30.0) Dysuria  (primary encounter diagnosis)  Plan: UA Macro with Reflex to Micro and Culture - lab        collect      (Z11.3) Screen for STD (sexually transmitted disease)  Plan: NEISSERIA GONORRHOEA PCR, CHLAMYDIA TRACHOMATIS        PCR      (N76.0,  B96.89) BV (bacterial vaginosis)  Comment: clinda per patient request  Plan: Wet prep - lab collect, clindamycin (CLEOCIN)         300 MG capsule      Red flags and emergent follow up discussed, and understood by patient  Follow up with PCP if symptoms worsen or fail to improve      There are no Patient Instructions on file for this visit.

## 2021-09-19 LAB
C TRACH DNA SPEC QL NAA+PROBE: NEGATIVE
N GONORRHOEA DNA SPEC QL NAA+PROBE: NEGATIVE

## 2021-10-07 NOTE — TELEPHONE ENCOUNTER
DIAGNOSIS: (R) Knee pain (cracking) / self ref / P1 / no images / no surgery   APPOINTMENT DATE: 10.13.21   NOTES STATUS DETAILS   OFFICE NOTE from referring provider N/A    OFFICE NOTE from other specialist N/A    DISCHARGE SUMMARY from hospital N/A    DISCHARGE REPORT from the ER N/A    OPERATIVE REPORT N/A    EMG report N/A    MEDICATION LIST Internal    MRI N/A    DEXA (osteoporosis/bone health) N/A    CT SCAN N/A    XRAYS (IMAGES & REPORTS) N/A

## 2021-10-12 DIAGNOSIS — M25.561 RIGHT KNEE PAIN, UNSPECIFIED CHRONICITY: Primary | ICD-10-CM

## 2021-10-12 ASSESSMENT — ENCOUNTER SYMPTOMS
DIARRHEA: 0
WEAKNESS: 0
DIZZINESS: 0
COUGH: 0
SORE THROAT: 0
BREAST MASS: 0
NAUSEA: 0
NERVOUS/ANXIOUS: 0
CHILLS: 0
HEMATURIA: 0
ARTHRALGIAS: 0
EYE PAIN: 0
ABDOMINAL PAIN: 0
HEADACHES: 0
PARESTHESIAS: 0
MYALGIAS: 0
HEMATOCHEZIA: 0
JOINT SWELLING: 0
FEVER: 0
PALPITATIONS: 0
CONSTIPATION: 0
SHORTNESS OF BREATH: 0
FREQUENCY: 0
DYSURIA: 0
HEARTBURN: 0

## 2021-10-13 ENCOUNTER — PRE VISIT (OUTPATIENT)
Dept: ORTHOPEDICS | Facility: CLINIC | Age: 46
End: 2021-10-13

## 2021-10-14 ENCOUNTER — MYC MEDICAL ADVICE (OUTPATIENT)
Dept: FAMILY MEDICINE | Facility: CLINIC | Age: 46
End: 2021-10-14

## 2021-10-14 ENCOUNTER — OFFICE VISIT (OUTPATIENT)
Dept: FAMILY MEDICINE | Facility: CLINIC | Age: 46
End: 2021-10-14
Payer: COMMERCIAL

## 2021-10-14 VITALS
DIASTOLIC BLOOD PRESSURE: 80 MMHG | WEIGHT: 181 LBS | HEART RATE: 87 BPM | SYSTOLIC BLOOD PRESSURE: 115 MMHG | BODY MASS INDEX: 29.66 KG/M2 | OXYGEN SATURATION: 99 % | TEMPERATURE: 98 F

## 2021-10-14 DIAGNOSIS — Z11.3 SCREEN FOR STD (SEXUALLY TRANSMITTED DISEASE): ICD-10-CM

## 2021-10-14 DIAGNOSIS — E03.2 HYPOTHYROIDISM DUE TO MEDICATION: ICD-10-CM

## 2021-10-14 DIAGNOSIS — Z00.00 ROUTINE GENERAL MEDICAL EXAMINATION AT A HEALTH CARE FACILITY: Primary | ICD-10-CM

## 2021-10-14 DIAGNOSIS — I10 ESSENTIAL HYPERTENSION WITH GOAL BLOOD PRESSURE LESS THAN 140/90: ICD-10-CM

## 2021-10-14 DIAGNOSIS — Z13.1 SCREENING FOR DIABETES MELLITUS: ICD-10-CM

## 2021-10-14 DIAGNOSIS — Z13.220 LIPID SCREENING: ICD-10-CM

## 2021-10-14 LAB
ANION GAP SERPL CALCULATED.3IONS-SCNC: 3 MMOL/L (ref 3–14)
BUN SERPL-MCNC: 10 MG/DL (ref 7–30)
CALCIUM SERPL-MCNC: 9.3 MG/DL (ref 8.5–10.1)
CHLORIDE BLD-SCNC: 103 MMOL/L (ref 94–109)
CHOLEST SERPL-MCNC: 175 MG/DL
CLUE CELLS: PRESENT
CO2 SERPL-SCNC: 29 MMOL/L (ref 20–32)
CREAT SERPL-MCNC: 1 MG/DL (ref 0.52–1.04)
CREAT UR-MCNC: 82 MG/DL
ERYTHROCYTE [DISTWIDTH] IN BLOOD BY AUTOMATED COUNT: 13.6 % (ref 10–15)
FASTING STATUS PATIENT QL REPORTED: YES
GFR SERPL CREATININE-BSD FRML MDRD: 68 ML/MIN/1.73M2
GLUCOSE BLD-MCNC: 87 MG/DL (ref 70–99)
HBA1C MFR BLD: 5.4 % (ref 0–5.6)
HBV CORE AB SERPL QL IA: NONREACTIVE
HBV SURFACE AB SERPL IA-ACNC: 339.16 M[IU]/ML
HBV SURFACE AG SERPL QL IA: NONREACTIVE
HCT VFR BLD AUTO: 40.2 % (ref 35–47)
HCV AB SERPL QL IA: NONREACTIVE
HDLC SERPL-MCNC: 58 MG/DL
HGB BLD-MCNC: 13.1 G/DL (ref 11.7–15.7)
HIV 1+2 AB+HIV1 P24 AG SERPL QL IA: NONREACTIVE
HSV1 IGG SERPL QL IA: 0.46 INDEX
HSV1 IGG SERPL QL IA: ABNORMAL
HSV2 IGG SERPL QL IA: >23.6 INDEX
HSV2 IGG SERPL QL IA: ABNORMAL
LDLC SERPL CALC-MCNC: 103 MG/DL
MCH RBC QN AUTO: 29.9 PG (ref 26.5–33)
MCHC RBC AUTO-ENTMCNC: 32.6 G/DL (ref 31.5–36.5)
MCV RBC AUTO: 92 FL (ref 78–100)
MICROALBUMIN UR-MCNC: <5 MG/L
MICROALBUMIN/CREAT UR: NORMAL MG/G{CREAT}
NONHDLC SERPL-MCNC: 117 MG/DL
PLATELET # BLD AUTO: 421 10E3/UL (ref 150–450)
POTASSIUM BLD-SCNC: 3.4 MMOL/L (ref 3.4–5.3)
RBC # BLD AUTO: 4.38 10E6/UL (ref 3.8–5.2)
SODIUM SERPL-SCNC: 135 MMOL/L (ref 133–144)
T PALLIDUM AB SER QL: NONREACTIVE
TRICHOMONAS, WET PREP: ABNORMAL
TRIGL SERPL-MCNC: 68 MG/DL
TSH SERPL DL<=0.005 MIU/L-ACNC: 1.54 MU/L (ref 0.4–4)
WBC # BLD AUTO: 4 10E3/UL (ref 4–11)
WBC'S/HIGH POWER FIELD, WET PREP: ABNORMAL
YEAST, WET PREP: ABNORMAL

## 2021-10-14 PROCEDURE — 86704 HEP B CORE ANTIBODY TOTAL: CPT | Performed by: FAMILY MEDICINE

## 2021-10-14 PROCEDURE — 83036 HEMOGLOBIN GLYCOSYLATED A1C: CPT | Performed by: FAMILY MEDICINE

## 2021-10-14 PROCEDURE — 87591 N.GONORRHOEAE DNA AMP PROB: CPT | Performed by: FAMILY MEDICINE

## 2021-10-14 PROCEDURE — 82043 UR ALBUMIN QUANTITATIVE: CPT | Performed by: FAMILY MEDICINE

## 2021-10-14 PROCEDURE — 86695 HERPES SIMPLEX TYPE 1 TEST: CPT | Performed by: FAMILY MEDICINE

## 2021-10-14 PROCEDURE — 84443 ASSAY THYROID STIM HORMONE: CPT | Performed by: FAMILY MEDICINE

## 2021-10-14 PROCEDURE — 87210 SMEAR WET MOUNT SALINE/INK: CPT | Performed by: FAMILY MEDICINE

## 2021-10-14 PROCEDURE — 86706 HEP B SURFACE ANTIBODY: CPT | Performed by: FAMILY MEDICINE

## 2021-10-14 PROCEDURE — 90686 IIV4 VACC NO PRSV 0.5 ML IM: CPT | Performed by: FAMILY MEDICINE

## 2021-10-14 PROCEDURE — 36415 COLL VENOUS BLD VENIPUNCTURE: CPT | Performed by: FAMILY MEDICINE

## 2021-10-14 PROCEDURE — 87389 HIV-1 AG W/HIV-1&-2 AB AG IA: CPT | Performed by: FAMILY MEDICINE

## 2021-10-14 PROCEDURE — 85027 COMPLETE CBC AUTOMATED: CPT | Performed by: FAMILY MEDICINE

## 2021-10-14 PROCEDURE — 86696 HERPES SIMPLEX TYPE 2 TEST: CPT | Performed by: FAMILY MEDICINE

## 2021-10-14 PROCEDURE — 80061 LIPID PANEL: CPT | Performed by: FAMILY MEDICINE

## 2021-10-14 PROCEDURE — 90471 IMMUNIZATION ADMIN: CPT | Performed by: FAMILY MEDICINE

## 2021-10-14 PROCEDURE — 86780 TREPONEMA PALLIDUM: CPT | Performed by: FAMILY MEDICINE

## 2021-10-14 PROCEDURE — 99214 OFFICE O/P EST MOD 30 MIN: CPT | Mod: 25 | Performed by: FAMILY MEDICINE

## 2021-10-14 PROCEDURE — 87491 CHLMYD TRACH DNA AMP PROBE: CPT | Performed by: FAMILY MEDICINE

## 2021-10-14 PROCEDURE — 99396 PREV VISIT EST AGE 40-64: CPT | Mod: 25 | Performed by: FAMILY MEDICINE

## 2021-10-14 PROCEDURE — 80048 BASIC METABOLIC PNL TOTAL CA: CPT | Performed by: FAMILY MEDICINE

## 2021-10-14 PROCEDURE — 86803 HEPATITIS C AB TEST: CPT | Performed by: FAMILY MEDICINE

## 2021-10-14 PROCEDURE — 87340 HEPATITIS B SURFACE AG IA: CPT | Performed by: FAMILY MEDICINE

## 2021-10-14 RX ORDER — LEVOTHYROXINE SODIUM 100 UG/1
100 TABLET ORAL DAILY
Qty: 90 TABLET | Refills: 1 | Status: SHIPPED | OUTPATIENT
Start: 2021-10-14 | End: 2022-04-14

## 2021-10-14 RX ORDER — HYDROCHLOROTHIAZIDE 12.5 MG/1
12.5 TABLET ORAL DAILY
Qty: 90 TABLET | Refills: 1 | Status: SHIPPED | OUTPATIENT
Start: 2021-10-14 | End: 2022-04-14

## 2021-10-14 RX ORDER — LOSARTAN POTASSIUM 25 MG/1
25 TABLET ORAL DAILY
Qty: 90 TABLET | Refills: 1 | Status: SHIPPED | OUTPATIENT
Start: 2021-10-14 | End: 2022-04-14

## 2021-10-14 ASSESSMENT — ENCOUNTER SYMPTOMS
NAUSEA: 0
EYE PAIN: 0
PALPITATIONS: 0
BREAST MASS: 0
SHORTNESS OF BREATH: 0
PARESTHESIAS: 0
HEARTBURN: 0
DIARRHEA: 0
DIZZINESS: 0
FREQUENCY: 0
CHILLS: 0
FEVER: 0
HEMATOCHEZIA: 0
SORE THROAT: 0
MYALGIAS: 0
CONSTIPATION: 0
ABDOMINAL PAIN: 0
HEADACHES: 0
ARTHRALGIAS: 0
COUGH: 0
JOINT SWELLING: 0
HEMATURIA: 0
NERVOUS/ANXIOUS: 0
DYSURIA: 0
WEAKNESS: 0

## 2021-10-14 NOTE — PROGRESS NOTES
SUBJECTIVE:   CC: Mimi Melton is an 46 year old woman who presents for preventive health visit.       Patient has been advised of split billing requirements and indicates understanding: Yes  Healthy Habits:     Getting at least 3 servings of Calcium per day:  Yes    Bi-annual eye exam:  Yes    Dental care twice a year:  Yes    Sleep apnea or symptoms of sleep apnea:  None    Diet:  Regular (no restrictions), Low salt and Low fat/cholesterol    Frequency of exercise:  4-5 days/week    Duration of exercise:  45-60 minutes    Taking medications regularly:  Yes    Barriers to taking medications:  None    Medication side effects:  None    PHQ-2 Total Score: 0    Additional concerns today:  No    STD screening - multiple partners and sometimes doesn't use condoms. No current symptoms.  HTN - controlled doing well with medication.   Hypothyroidism - symptoms controlled     Today's PHQ-2 Score:   PHQ-2 ( 1999 [employer]) 10/12/2021   Q1: Little interest or pleasure in doing things 0   Q2: Feeling down, depressed or hopeless 0   PHQ-2 Score 0   Q1: Little interest or pleasure in doing things Not at all   Q2: Feeling down, depressed or hopeless Not at all   PHQ-2 Score 0       Abuse: Current or Past (Physical, Sexual or Emotional) - No  Do you feel safe in your environment? Yes    Have you ever done Advance Care Planning? (For example, a Health Directive, POLST, or a discussion with a medical provider or your loved ones about your wishes): No, advance care planning information given to patient to review.  Patient declined advance care planning discussion at this time.    Social History     Tobacco Use     Smoking status: Never Smoker     Smokeless tobacco: Never Used   Substance Use Topics     Alcohol use: Yes     Comment: 1-2x's/month if that.     If you drink alcohol do you typically have >3 drinks per day or >7 drinks per week? No    Alcohol Use 10/12/2021   Prescreen: >3 drinks/day or >7 drinks/week? No   Prescreen: >3  drinks/day or >7 drinks/week? -       Reviewed orders with patient.  Reviewed health maintenance and updated orders accordingly - Yes  Labs reviewed in EPIC    Breast Cancer Screening:    Breast CA Risk Assessment (FHS-7) 10/12/2021   Do you have a family history of breast, colon, or ovarian cancer? No / Unknown       Pertinent mammograms are reviewed under the imaging tab.    History of abnormal Pap smear: YES - updated in Problem List and Health Maintenance accordingly  PAP / HPV Latest Ref Rng & Units 10/30/2020 10/18/2019 12/8/2016   PAP (Historical) - NIL NIL NIL   HPV16 NEG:Negative Negative Negative Negative   HPV18 NEG:Negative Negative Negative Negative   HRHPV NEG:Negative Positive(A) Positive(A) Negative     Reviewed and updated as needed this visit by clinical staff   Allergies  Meds              Reviewed and updated as needed this visit by Provider                    Review of Systems   Constitutional: Negative for chills and fever.   HENT: Negative for congestion, ear pain, hearing loss and sore throat.    Eyes: Negative for pain and visual disturbance.   Respiratory: Negative for cough and shortness of breath.    Cardiovascular: Negative for chest pain, palpitations and peripheral edema.   Gastrointestinal: Negative for abdominal pain, constipation, diarrhea, heartburn, hematochezia and nausea.   Breasts:  Negative for tenderness, breast mass and discharge.   Genitourinary: Negative for dysuria, frequency, genital sores, hematuria, pelvic pain, urgency, vaginal bleeding and vaginal discharge.   Musculoskeletal: Negative for arthralgias, joint swelling and myalgias.   Skin: Negative for rash.   Neurological: Negative for dizziness, weakness, headaches and paresthesias.   Psychiatric/Behavioral: Negative for mood changes. The patient is not nervous/anxious.           OBJECTIVE:   Physical Exam   /80   Pulse 87   Temp 98  F (36.7  C) (Tympanic)   Wt 82.1 kg (181 lb)   LMP 09/15/2021   SpO2  99%   Breastfeeding No   BMI 29.66 kg/m    GENERAL: healthy, alert and no distress  EYES: Eyes grossly normal to inspection,conjunctivae and sclerae normal  HENT:  nose and mouth without ulcers or lesions  NECK: no adenopathy, no asymmetry, masses, or scars and thyroid normal to palpation  RESP: lungs clear to auscultation - no rales, rhonchi or wheezes  CV: regular rate and rhythm, normal S1 S2  ABDOMEN: soft, nontender, no hepatosplenomegaly, no masses and bowel sounds normal  MS: no gross musculoskeletal defects noted, no edema  NEURO: Normal strength and tone, mentation intact and speech normal  PSYCH: mentation appears normal, affect normal    Diagnostic Test Results:  Labs reviewed in Epic    ASSESSMENT/PLAN:     Routine general medical examination at a health care facility  - REVIEW OF HEALTH MAINTENANCE PROTOCOL ORDERS    Essential hypertension with goal blood pressure less than 140/90  - stable, refilled below  - f/u in 6 months  - losartan (COZAAR) 25 MG tablet; Take 1 tablet (25 mg) by mouth daily  Dispense: 90 tablet; Refill: 1  - hydrochlorothiazide (HYDRODIURIL) 12.5 MG tablet; Take 1 tablet (12.5 mg) by mouth daily  Dispense: 90 tablet; Refill: 1  - Basic metabolic panel  (Ca, Cl, CO2, Creat, Gluc, K, Na, BUN); Future  - Albumin Random Urine Quantitative with Creat Ratio; Future  - CBC with platelets; Future  - Basic metabolic panel  (Ca, Cl, CO2, Creat, Gluc, K, Na, BUN)  - Albumin Random Urine Quantitative with Creat Ratio  - CBC with platelets  - OFFICE/OUTPT VISIT,EST,LEVL IV    Hypothyroidism due to medication  - stable   - TSH WITH FREE T4 REFLEX; Future  - levothyroxine (SYNTHROID/LEVOTHROID) 100 MCG tablet; Take 1 tablet (100 mcg) by mouth daily  Dispense: 90 tablet; Refill: 1  - TSH WITH FREE T4 REFLEX    Lipid screening  - Lipid Profile (Chol, Trig, HDL, LDL calc); Future  - Lipid Profile (Chol, Trig, HDL, LDL calc)    Screening for diabetes mellitus  - Hemoglobin A1c; Future  - Hemoglobin  "A1c    Screen for STD (sexually transmitted disease)  - declined oral/rectal C/G; ordered below for further evaluation  - advised staying safe with condom use   - current tx BV  - Treponema Abs w Reflex to RPR and Titer; Future  - NEISSERIA GONORRHOEA PCR; Future  - CHLAMYDIA TRACHOMATIS PCR; Future  - Hepatitis B core antibody; Future  - Hepatitis B Surface Antibody; Future  - Hepatitis B surface antigen; Future  - HIV Antigen Antibody Combo; Future  - Herpes Simplex Virus 1 and 2 IgG; Future  - Hepatitis C Screen Reflex to HCV RNA Quant and Genotype; Future  - Wet prep - lab collect; Future  - Wet prep - lab collect  - CHLAMYDIA TRACHOMATIS PCR  - NEISSERIA GONORRHOEA PCR  - Treponema Abs w Reflex to RPR and Titer  - Hepatitis B core antibody  - Hepatitis B Surface Antibody  - Hepatitis B surface antigen  - HIV Antigen Antibody Combo  - Herpes Simplex Virus 1 and 2 IgG  - Hepatitis C Screen Reflex to HCV RNA Quant and Genotype  - OFFICE/OUTPT VISIT,GEOVANNA WALKER IV      Patient has been advised of split billing requirements and indicates understanding: Yes  COUNSELING:  Reviewed preventive health counseling, as reflected in patient instructions    Estimated body mass index is 29.33 kg/m  as calculated from the following:    Height as of 5/27/21: 1.664 m (5' 5.5\").    Weight as of 9/18/21: 81.2 kg (179 lb).        She reports that she has never smoked. She has never used smokeless tobacco.      Counseling Resources:  ATP IV Guidelines  Pooled Cohorts Equation Calculator  Breast Cancer Risk Calculator  BRCA-Related Cancer Risk Assessment: FHS-7 Tool  FRAX Risk Assessment  ICSI Preventive Guidelines  Dietary Guidelines for Americans, 2010  USDA's MyPlate  ASA Prophylaxis  Lung CA Screening    Nanette Penny MD  [facility]  "

## 2021-10-14 NOTE — PATIENT INSTRUCTIONS
Due for pap after first week of Dec 2021.         Preventive Health Recommendations  Female Ages 40 to 49    Yearly exam:     See your health care provider every year in order to  1. Review health changes.   2. Discuss preventive care.    3. Review your medicines if your doctor prescribed any.      Get a Pap test every three years (unless you have an abnormal result and your provider advises testing more often).      If you get Pap tests with HPV test, you only need to test every 5 years, unless you have an abnormal result. You do not need a Pap test if your uterus was removed (hysterectomy) and you have not had cancer.      You should be tested each year for STDs (sexually transmitted diseases), if you're at risk.     Ask your doctor if you should have a mammogram.      Have a colonoscopy (test for colon cancer) if someone in your family has had colon cancer or polyps before age 50.       Have a cholesterol test every 5 years.       Have a diabetes test (fasting glucose) after age 45. If you are at risk for diabetes, you should have this test every 3 years.    Shots: Get a flu shot each year. Get a tetanus shot every 10 years.     Nutrition:     Eat at least 5 servings of fruits and vegetables each day.    Eat whole-grain bread, whole-wheat pasta and brown rice instead of white grains and rice.    Get adequate Calcium and Vitamin D.      Lifestyle    Exercise at least 150 minutes a week (an average of 30 minutes a day, 5 days a week). This will help you control your weight and prevent disease.    Limit alcohol to one drink per day.    No smoking.     Wear sunscreen to prevent skin cancer.    See your dentist every six months for an exam and cleaning.

## 2021-10-15 ENCOUNTER — TELEPHONE (OUTPATIENT)
Dept: OPHTHALMOLOGY | Facility: CLINIC | Age: 46
End: 2021-10-15

## 2021-10-15 ENCOUNTER — OFFICE VISIT (OUTPATIENT)
Dept: OPHTHALMOLOGY | Facility: CLINIC | Age: 46
End: 2021-10-15
Payer: COMMERCIAL

## 2021-10-15 DIAGNOSIS — H05.249 CONSTANT EXOPHTHALMOS, UNSPECIFIED LATERALITY: ICD-10-CM

## 2021-10-15 DIAGNOSIS — H02.539 EYELID RETRACTION, UNSPECIFIED LATERALITY: ICD-10-CM

## 2021-10-15 DIAGNOSIS — Z11.59 ENCOUNTER FOR SCREENING FOR OTHER VIRAL DISEASES: ICD-10-CM

## 2021-10-15 DIAGNOSIS — E07.9 THYROID EYE DISEASE: Primary | ICD-10-CM

## 2021-10-15 DIAGNOSIS — H57.89 THYROID EYE DISEASE: Primary | ICD-10-CM

## 2021-10-15 LAB
C TRACH DNA SPEC QL NAA+PROBE: NEGATIVE
N GONORRHOEA DNA SPEC QL NAA+PROBE: NEGATIVE

## 2021-10-15 PROCEDURE — 99213 OFFICE O/P EST LOW 20 MIN: CPT | Mod: GC | Performed by: OPHTHALMOLOGY

## 2021-10-15 ASSESSMENT — CONF VISUAL FIELD
METHOD: COUNTING FINGERS
OD_NORMAL: 1
OS_NORMAL: 1

## 2021-10-15 ASSESSMENT — SLIT LAMP EXAM - LIDS
COMMENTS: LID RETRACTION: LOWER LID
COMMENTS: LID RETRACTION: LOWER LID

## 2021-10-15 ASSESSMENT — VISUAL ACUITY
OS_CC: 20/20
CORRECTION_TYPE: CONTACTS
METHOD: SNELLEN - LINEAR
OD_CC: 20/20

## 2021-10-15 ASSESSMENT — EXTERNAL EXAM - LEFT EYE: OS_EXAM: EXOPHTHALMOS

## 2021-10-15 ASSESSMENT — TONOMETRY
OS_IOP_MMHG: 22
IOP_METHOD: ICARE
OD_IOP_MMHG: 18

## 2021-10-15 ASSESSMENT — EXTERNAL EXAM - RIGHT EYE: OD_EXAM: EXOPHTHALMOS

## 2021-10-15 NOTE — NURSING NOTE
Chief Complaints and History of Present Illnesses   Patient presents with     Follow Up     Thyroid Eye Disease,  4/30/2018.     Chief Complaint(s) and History of Present Illness(es)     Follow Up     Laterality: both eyes    Onset: years ago    Frequency: constantly    Course: stable    Associated symptoms: dryness.  Negative for double vision, eye pain, floaters, flashes, swelling, discharge and itching    Treatments tried: no treatments    Pain scale: 0/10    Comments: Thyroid Eye Disease,  4/30/2018.              Comments     Pt here today for 6 mos follow up for thyroid eye disease.  States nothing to report. Pt denies any pain or discomfort.  No vision or eye health changes or concerns at this time.    Ocular meds = none    Usman MOLINA, DEANDRA 9:02 AM 10/15/2021

## 2021-10-15 NOTE — PROGRESS NOTES
Chief Complaint(s) and History of Present Illness(es)     Follow Up     Laterality: both eyes    Onset: years ago    Frequency: constantly    Course: stable    Associated symptoms: dryness.  Negative for double vision, eye pain,   floaters, flashes, swelling, discharge and itching    Treatments tried: no treatments    Pain scale: 0/10    Comments: Thyroid Eye Disease, LV 4/30/2018.              Comments     Pt here today for 6 mos follow up for thyroid eye disease.  States nothing to report. Pt denies any pain or discomfort.  No vision or eye health changes or concerns at this time.    Ocular meds = none    Usman MOLINA, DEANDRA 9:02 AM 10/15/2021          HPI: No new changes. Some eye dryness. No pain. Had thyroid labs checked yesterday.      Thyroid:  On levothyrxine. Stable dose.  TSH 1.54 on 10/14/21        Eye symptoms (since when):   Proptosis (better/worse/same since last visit): slightly worse   Diplopia(better/worse/same since last visit): same  Eyelid retraction(better/worse/same since last visit): same  Tearing(better/worse/same since last visit): same  Redness (better/worse/same since last visit): same  Pain ((better/worse/same since last visit): same  Pain to move the eyes (better/worse/same since last visit): same  Blurred vision: none     Ocular history:   Orbital decompression (date, details): 2008 bilat medial and lateral        Exam:   Pa (base):97  23/25   Better/worse same: worse  Strabismus (better/worse/same): same  Eyelid retraction (better/worse/same): same         Assessment & Plan     Mimi Melton is a 46 year old female with the following diagnoses:   1. Thyroid eye disease    2. Eyelid retraction, unspecified laterality    3. Constant exophthalmos, unspecified laterality         Worsening proptosis  Patient interested in surgery    PLAN:  Bilateral orbital decompression (lateral floor and fat) lower lid retraction repair (lateral tarsal strip + retractor release)            Khanh Bejarano MD  Ophthalmology Resident- PGY-4    Attending Physician Attestation:  I have seen and examined this patient with the resident .  I have confirmed and edited as necessary the chief complaint(s), history of present illness, review of systems, relevant history, and examination findings as documented by others.  I have personally reviewed the relevant tests, images, and reports as documented above.  I have confirmed and edited as necessary the assessment and plan and agree with this note.    - Niles Donnelly MD 9:37 AM 10/15/2021    Today with Mimi Melton, I reviewed the indications, risks, benefits, and alternatives of the proposed surgical procedure including, but not limited to, failure obtain the desired result  and need for additional surgery, bleeding, infection, loss of vision, loss of the eye, and the remote possibility of permanent damage to any organ system or death with the use of anesthesia.  I provided multiple opportunities for the questions, answered all questions to the best of my ability, and confirmed that my answers and my discussion were understood.     - Niles Donnelly MD 9:37 AM 10/15/2021

## 2021-10-15 NOTE — TELEPHONE ENCOUNTER
Spoke with patient to schedule surgery with Dr. Donnelly    Surgery was scheduled on 11/5 at [facility]  Patient will have H&P at [facility]     Patient is aware a COVID-19 test is needed before their procedure. The test should be with-in 4 days of their procedure.   Test Details: Date 11/2 Location [facility] lab    Post-Op visit was scheduled on 11/22  Patient is aware a / is needed day of surgery.   Surgery packet was mailed 10/15, patient has my direct contact information for any further questions.

## 2021-10-19 ENCOUNTER — TELEPHONE (OUTPATIENT)
Dept: FAMILY MEDICINE | Facility: CLINIC | Age: 46
End: 2021-10-19

## 2021-10-19 ENCOUNTER — TRANSFERRED RECORDS (OUTPATIENT)
Dept: HEALTH INFORMATION MANAGEMENT | Facility: CLINIC | Age: 46
End: 2021-10-19
Payer: COMMERCIAL

## 2021-10-19 DIAGNOSIS — B96.89 BACTERIAL VAGINOSIS: Primary | ICD-10-CM

## 2021-10-19 DIAGNOSIS — N76.0 BACTERIAL VAGINOSIS: Primary | ICD-10-CM

## 2021-10-19 DIAGNOSIS — B00.9 HSV-2 INFECTION: ICD-10-CM

## 2021-10-19 NOTE — TELEPHONE ENCOUNTER
I called pt.  Reviewed this result message.      1- PT would like the PRN med for herpes.  She was dx when she was 20 or 21 years old.  Occasional blisters. No big sx over the years.    2- She does want the pills for BV. The cream is not working.    Pharmacy pended.  No  set up.  PAZ Mtz

## 2021-10-19 NOTE — TELEPHONE ENCOUNTER
Addressed in visit on the same day as message; no further action required by triage.    KAROL Garcia RN  [facility]

## 2021-10-19 NOTE — TELEPHONE ENCOUNTER
RN, can you please inform pt of below labs      HSV 2 was positive and other STD was negative. Some people with herpes never have any symptoms. But other people can develop symptoms within a few weeks of being infected with the herpes virus. Symptoms usually include blisters in the genital area. The blisters can become painful open sores, which then crust over as they heal. People are most likely to spread herpes to a sex partner when they have blisters and open sores on their body. But people can also spread herpes to their sex partner when they do not have any symptoms. That is because herpes can be present on the body without causing any symptoms, like blisters or pain. If pt ever has symptoms, she needs to be seen in clinic to get prescribed antiviral medication. She can decrease the risk of spreading herpes to your partner by taking an antiviral medicine every day. She would need to inform her recent partners and have them get tested. She would also need to inform her future partners and also use condoms 100% of the time.   She can go to CDC website for further information.       LDL (bad cholesterol) slightly high. Low fat diet and staying active.     The 10-year ASCVD risk score (Eidson TERESA Ly., et al., 2013) is: 0.6%    Values used to calculate the score:      Age: 46 years      Sex: Female      Is Non- : No      Diabetic: No      Tobacco smoker: No      Systolic Blood Pressure: 115 mmHg      Is BP treated: Yes      HDL Cholesterol: 58 mg/dL      Total Cholesterol: 175 mg/dL     Rest of labs stable    DM

## 2021-10-21 RX ORDER — VALACYCLOVIR HYDROCHLORIDE 500 MG/1
500 TABLET, FILM COATED ORAL 2 TIMES DAILY
Qty: 6 TABLET | Refills: 0 | Status: SHIPPED | OUTPATIENT
Start: 2021-10-21 | End: 2024-04-19

## 2021-10-21 RX ORDER — METRONIDAZOLE 500 MG/1
500 TABLET ORAL 2 TIMES DAILY
Qty: 14 TABLET | Refills: 0 | Status: SHIPPED | OUTPATIENT
Start: 2021-10-21 | End: 2021-10-28

## 2021-10-27 DIAGNOSIS — M25.561 RIGHT KNEE PAIN: Primary | ICD-10-CM

## 2021-10-28 ENCOUNTER — ANCILLARY PROCEDURE (OUTPATIENT)
Dept: GENERAL RADIOLOGY | Facility: CLINIC | Age: 46
End: 2021-10-28
Attending: FAMILY MEDICINE
Payer: COMMERCIAL

## 2021-10-28 ENCOUNTER — OFFICE VISIT (OUTPATIENT)
Dept: FAMILY MEDICINE | Facility: CLINIC | Age: 46
End: 2021-10-28
Payer: COMMERCIAL

## 2021-10-28 ENCOUNTER — OFFICE VISIT (OUTPATIENT)
Dept: ORTHOPEDICS | Facility: CLINIC | Age: 46
End: 2021-10-28
Payer: COMMERCIAL

## 2021-10-28 VITALS
BODY MASS INDEX: 30.82 KG/M2 | WEIGHT: 185 LBS | HEIGHT: 65 IN | SYSTOLIC BLOOD PRESSURE: 126 MMHG | DIASTOLIC BLOOD PRESSURE: 80 MMHG

## 2021-10-28 VITALS
DIASTOLIC BLOOD PRESSURE: 80 MMHG | RESPIRATION RATE: 16 BRPM | HEIGHT: 65 IN | OXYGEN SATURATION: 97 % | BODY MASS INDEX: 30.82 KG/M2 | SYSTOLIC BLOOD PRESSURE: 126 MMHG | TEMPERATURE: 98.4 F | HEART RATE: 80 BPM | WEIGHT: 185 LBS

## 2021-10-28 DIAGNOSIS — Z01.818 PREOP GENERAL PHYSICAL EXAM: Primary | ICD-10-CM

## 2021-10-28 DIAGNOSIS — H02.9 EYELID ABNORMALITY: ICD-10-CM

## 2021-10-28 DIAGNOSIS — E03.2 HYPOTHYROIDISM DUE TO MEDICATION: ICD-10-CM

## 2021-10-28 DIAGNOSIS — H05.20 EXOPHTHALMOS: ICD-10-CM

## 2021-10-28 DIAGNOSIS — M25.561 PATELLOFEMORAL ARTHRALGIA OF RIGHT KNEE: Primary | ICD-10-CM

## 2021-10-28 DIAGNOSIS — M25.561 RIGHT KNEE PAIN: ICD-10-CM

## 2021-10-28 PROCEDURE — 99214 OFFICE O/P EST MOD 30 MIN: CPT | Performed by: PHYSICIAN ASSISTANT

## 2021-10-28 PROCEDURE — 99202 OFFICE O/P NEW SF 15 MIN: CPT | Performed by: FAMILY MEDICINE

## 2021-10-28 PROCEDURE — 73562 X-RAY EXAM OF KNEE 3: CPT | Mod: RT | Performed by: RADIOLOGY

## 2021-10-28 ASSESSMENT — MIFFLIN-ST. JEOR
SCORE: 1481.06
SCORE: 1481.06

## 2021-10-28 NOTE — PROGRESS NOTES
[facility]  [address]  [facility] [address]  Phone: [phone]  Primary Provider: Kalli Roldan  Pre-op Performing Provider: NICK MAYER      PREOPERATIVE EVALUATION:  Today's date: 10/28/2021    Mimi Melton is a 46 year old female who presents for a preoperative evaluation.    Surgical Information:  Surgery/Procedure: Bilateral orbital decompression with sonopet Bilateral lower lid retraction repair  Surgery Location: [facility]   Surgeon: Niles Donnelly MD  Surgery Date: 11/05/2021  Time of Surgery:   Where patient plans to recover: At home with family  Fax number for surgical facility: Note does not need to be faxed, will be available electronically in Epic.    Type of Anesthesia Anticipated: General    Assessment & Plan     The proposed surgical procedure is considered LOW risk.      ICD-10-CM    1. Preop general physical exam  Z01.818    2. 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 Eyelid abnormality  H02.9    4. Hypothyroidism due to medication  E03.2          Risks and Recommendations:  The patient has the following additional risks and recommendations for perioperative complications:   - No identified additional risk factors other than previously addressed    Medication Instructions:  Patient is to take all scheduled medications on the day of surgery    RECOMMENDATION:  APPROVAL GIVEN to proceed with proposed procedure, without further diagnostic evaluation.    Review of prior external note(s) from - recent PCP notes    15 minutes spent on the date of the encounter doing chart review, history and exam, documentation and further activities per the note        Subjective     HPI related to upcoming procedure: history of changes in lid pressures due to history of thyroid issues    Preop Questions 10/25/2021   1. Have you ever had a heart attack or stroke? No   2. Have you ever had surgery on your heart or  blood vessels, such as a stent placement, a coronary artery bypass, or surgery on an artery in your head, neck, heart, or legs? No   3. Do you have chest pain with activity? No   4. Do you have a history of  heart failure? No   5. Do you currently have a cold, bronchitis or symptoms of other infection? No   6. Do you have a cough, shortness of breath, or wheezing? No   7. Do you or anyone in your family have previous history of blood clots? No   8. Do you or does anyone in your family have a serious bleeding problem such as prolonged bleeding following surgeries or cuts? No   9. Have you ever had problems with anemia or been told to take iron pills? No   10. Have you had any abnormal blood loss such as black, tarry or bloody stools, or abnormal vaginal bleeding? No   11. Have you ever had a blood transfusion? No   12. Are you willing to have a blood transfusion if it is medically needed before, during, or after your surgery? NO    13. Have you or any of your relatives ever had problems with anesthesia? No   14. Do you have sleep apnea, excessive snoring or daytime drowsiness? No   15. Do you have any artifical heart valves or other implanted medical devices like a pacemaker, defibrillator, or continuous glucose monitor? No   16. Do you have artificial joints? No   17. Are you allergic to latex? No   18. Is there any chance that you may be pregnant? No       Health Care Directive:  Patient does not have a Health Care Directive or Living Will: Discussed advance care planning with patient; however, patient declined at this time.    Preoperative Review of :   reviewed - no record of controlled substances prescribed.      Status of Chronic Conditions:  See problem list for active medical problems.  Problems all longstanding and stable, except as noted/documented.  See ROS for pertinent symptoms related to these conditions.    HYPOTHYROIDISM - Patient has a longstanding history of chronic Hypothyroidism. Patient has  been doing well, noting no tremor, insomnia, hair loss or changes in skin texture. Continues to take medications as directed, without adverse reactions or side effects. Last TSH   Lab Results   Component Value Date    TSH 1.54 10/14/2021   .        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     Priority: Medium     Added automatically from request for surgery [id number]       Eyelid retraction, unspecified laterality 10/15/2021     Priority: Medium     Added automatically from request for surgery [id number]       Need for HPV vaccine 07/03/2020     Priority: Medium     Chronic seasonal allergic rhinitis, unspecified trigger 06/19/2018     Priority: Medium     Female stress incontinence 04/12/2017     Priority: Medium     Thyroid disease 08/09/2016     Priority: Medium     Cervical high risk HPV (human papillomavirus) test positive 12/01/2015     Priority: Medium     ECC, repeat pap and hpv all negative 9/09.   NIL paps: 2/10, 9/10, 12/11, 12/12. Plan pap in 3 yrs.  7/14: NIL pap. Plan cotest pap & HPV in 3 years.  12/2015: NIL pap, + HPV (not 16 or 18). Plan cotest pap & HPV in 1 year  Tracking started.  12/8/2016 Pap: NIL/neg HR HPV. Plan cotest in 3 years.  10/18/19 Pap: NIL, +HR HPV (not 16/18). Plan cotest in 1 year.  10/30/20: NIL Pap, + HR HPV (not 16 or 18). Plan [place] due bef 01/30/21.  11/9/20 Pt notified by phone.  12/02/20: [place] ECC Neg for dysplasia. Plan cotest in 1 year due 12/02/21. [facility]          Hypothyroidism due to acquired atrophy of thyroid 10/13/2015     Priority: Medium     Anemia 07/22/2014     Priority: Medium     Essential hypertension  with goal blood pressure less than 140/90 2012     Priority: Medium     CARDIOVASCULAR SCREENING; LDL GOAL LESS THAN 160 10/31/2010     Priority: Medium     Abnormal Pap smear of cervix 2009     Priority: Medium     See dx for Cervical High Risk HPV.        Exophthalmos 2007     Priority: Medium     Problem list name updated by automated process. Provider to review       Vaginitis and vulvovaginitis 2007     Priority: Medium     Problem list name updated by automated process. Provider to review       Surveillance of previously prescribed intrauterine contraceptive device 2004     Priority: Medium     Paragard IUD placed in May 19, 2011.        Past Medical History:   Diagnosis Date     Cervical high risk HPV (human papillomavirus) test positive 2015, 10/2019, 10/30/20     Chronic sinusitis Summer 2016    I get congested every 3-4 weeks     Esophageal reflux      Exophthalmos, unspecified      Hypothyroidism      Thyroid eye disease      Thyrotoxicosis without mention of goiter or other cause, without mention of thyrotoxic crisis or storm 2005    Had radioablation, On synthroid, seeing [facility]; takes synthroid     Unspecified essential hypertension 1980    meds since , on atenolol     Past Surgical History:   Procedure Laterality Date     C  DELIVERY ONLY  91    , Low Cervical     C  DELIVERY ONLY  97    , Low Cervical     C  DELIVERY ONLY  99    , Low Cervical     C INDUCED ABORTN BY D&C      Aspiration & Curettage, TAB x2     CYSTOSCOPY, SLING TRANSVAGINAL N/A 10/10/2017    Procedure: CYSTOSCOPY, SLING TRANSVAGINAL;  Midurethral Sling and Cystoscopy (Support the Urethra with Mesh Sling and Look in the Bladder);  Surgeon: Karin Amin MD;  Location: [facility] OR     [facility]  2008    [facility]      Umbilical hernia Repair      Current Outpatient Medications   Medication Sig Dispense Refill     ACETAMINOPHEN PO Take 1,000 mg by mouth 2 times daily as needed for pain (menstrual cramps)       benzoyl peroxide 5 % external liquid Wash face skin 1-2 times daily 148 g 3     clindamycin (CLEOCIN) 75 MG capsule Take 2 capsules (150 mg) by mouth 2 times daily 28 capsule 0     fexofenadine (ALLEGRA ALLERGY) 180 MG tablet Take 1-2 Tabl Fexofenadine against allergic reactions 30 tablet 3     fluticasone (FLONASE) 50 MCG/ACT nasal spray SPRAY 1 SPRAY INTO BOTH NOSTRILS 2 TIMES DAILY 48 mL 3     hydrochlorothiazide (HYDRODIURIL) 12.5 MG tablet Take 1 tablet (12.5 mg) by mouth daily 90 tablet 1     IBUPROFEN PO Take 800 mg by mouth every 4 hours as needed (takes for menstrual cramps.)        Lactobacillus-Inulin ([facility] DIGESTIVE [facility]) CAPS Take 1 capsule by mouth 2 times daily 100 capsule 11     levothyroxine (SYNTHROID/LEVOTHROID) 100 MCG tablet Take 1 tablet (100 mcg) by mouth daily 90 tablet 1     losartan (COZAAR) 25 MG tablet Take 1 tablet (25 mg) by mouth daily 90 tablet 1     mupirocin (BACTROBAN) 2 % ointment as needed        tretinoin (RETIN-A) 0.05 % external cream Apply topically At Bedtime Put on face 1-2 times daily (if irritated reduce to every other day) 20 g 3     valACYclovir (VALTREX) 500 MG tablet Take 1 tablet (500 mg) by mouth 2 times daily for 3 days 6 tablet 0       Allergies   Allergen Reactions     Dust Mites      Lisinopril Swelling     Swelling in lip     Mold      Cannot have any meds that contain mold.     No Known Drug Allergies         Social History     Tobacco Use     Smoking status: Never Smoker     Smokeless tobacco: Never Used   Substance Use Topics     Alcohol use: Yes     Comment: 1-2x's/month if that.     Family History   Problem Relation Age of Onset     Hypertension Mother      Hypertension Father      Hypertension Maternal Grandmother      Hypertension Paternal Grandmother      Hypertension Maternal  "Grandfather      Hypertension Paternal Grandfather      History   Drug Use     Types: Marijuana     Comment: 3x's/day         Objective     /80 (BP Location: Left arm, Cuff Size: Adult Regular)   Pulse 80   Temp 98.4  F (36.9  C)   Resp 16   Ht 1.645 m (5' 4.75\")   Wt 83.9 kg (185 lb)   SpO2 97%   BMI 31.02 kg/m      Physical Exam  GENERAL APPEARANCE: healthy, alert and no distress  HENT: ear canals and TM's normal and nose and mouth without ulcers or lesions  RESP: lungs clear to auscultation - no rales, rhonchi or wheezes  CV: regular rate and rhythm, normal S1 S2, no S3 or S4 and no murmur, click or rub   ABDOMEN: soft, nontender, no HSM or masses and bowel sounds normal  NEURO: Normal strength and tone, sensory exam grossly normal, mentation intact and speech normal    Recent Labs   Lab Test 10/14/21  0946 08/20/20  0819   HGB 13.1  --      --     141   POTASSIUM 3.4 3.6   CR 1.00 0.94   A1C 5.4  --         Diagnostics:  Recent Results (from the past 720 hour(s))   Wet prep - lab collect    Collection Time: 10/14/21  9:40 AM    Specimen: Vagina; Swab   Result Value Ref Range    Trichomonas Absent Absent    Yeast Absent Absent    Clue Cells Present (A) Absent    WBCs/high power field 1+ (A) None   TSH WITH FREE T4 REFLEX    Collection Time: 10/14/21  9:46 AM   Result Value Ref Range    TSH 1.54 0.40 - 4.00 mU/L   Basic metabolic panel  (Ca, Cl, CO2, Creat, Gluc, K, Na, BUN)    Collection Time: 10/14/21  9:46 AM   Result Value Ref Range    Sodium 135 133 - 144 mmol/L    Potassium 3.4 3.4 - 5.3 mmol/L    Chloride 103 94 - 109 mmol/L    Carbon Dioxide (CO2) 29 20 - 32 mmol/L    Anion Gap 3 3 - 14 mmol/L    Urea Nitrogen 10 7 - 30 mg/dL    Creatinine 1.00 0.52 - 1.04 mg/dL    Calcium 9.3 8.5 - 10.1 mg/dL    Glucose 87 70 - 99 mg/dL    GFR Estimate 68 >60 mL/min/1.73m2   Albumin Random Urine Quantitative with Creat Ratio    Collection Time: 10/14/21  9:46 AM   Result Value Ref Range    " Creatinine Urine mg/dL 82 mg/dL    Albumin Urine mg/L <5 mg/L    Albumin Urine mg/g Cr     Lipid Profile (Chol, Trig, HDL, LDL calc)    Collection Time: 10/14/21  9:46 AM   Result Value Ref Range    Cholesterol 175 <200 mg/dL    Triglycerides 68 <150 mg/dL    Direct Measure HDL 58 >=50 mg/dL    LDL Cholesterol Calculated 103 (H) <=100 mg/dL    Non HDL Cholesterol 117 <130 mg/dL    Patient Fasting > 8hrs? Yes    Hemoglobin A1c    Collection Time: 10/14/21  9:46 AM   Result Value Ref Range    Hemoglobin A1C 5.4 0.0 - 5.6 %   CBC with platelets    Collection Time: 10/14/21  9:46 AM   Result Value Ref Range    WBC Count 4.0 4.0 - 11.0 10e3/uL    RBC Count 4.38 3.80 - 5.20 10e6/uL    Hemoglobin 13.1 11.7 - 15.7 g/dL    Hematocrit 40.2 35.0 - 47.0 %    MCV 92 78 - 100 fL    MCH 29.9 26.5 - 33.0 pg    MCHC 32.6 31.5 - 36.5 g/dL    RDW 13.6 10.0 - 15.0 %    Platelet Count 421 150 - 450 10e3/uL   Treponema Abs w Reflex to RPR and Titer    Collection Time: 10/14/21  9:47 AM   Result Value Ref Range    Treponema Antibody Total Nonreactive Nonreactive   Hepatitis B core antibody    Collection Time: 10/14/21  9:47 AM   Result Value Ref Range    Hepatitis B Core Antibody Total Nonreactive Nonreactive   Hepatitis B Surface Antibody    Collection Time: 10/14/21  9:47 AM   Result Value Ref Range    Hepatitis B Surface Antibody 339.16 (H) <8.00 m[IU]/mL   Hepatitis B surface antigen    Collection Time: 10/14/21  9:47 AM   Result Value Ref Range    Hepatitis B Surface Antigen Nonreactive Nonreactive   HIV Antigen Antibody Combo    Collection Time: 10/14/21  9:47 AM   Result Value Ref Range    HIV Antigen Antibody Combo Nonreactive Nonreactive   Herpes Simplex Virus 1 and 2 IgG    Collection Time: 10/14/21  9:47 AM   Result Value Ref Range    HSV Type 1 IgG Instrument Value 0.46 <0.90 Index    Herpes Simplex Virus Type 1 IgG Antibody No HSV-1 IgG antibodies detected. No HSV-1 IgG antibodies detected    HSV Type 2 IgG Instrument Value  >23.60 (H) <0.90 Index    Herpes Simplex Virus Type 2 IgG Antibody Positive.  IgG antibody to HSV-2 detected. (A) No HSV-2 IgG antibodies detected   Hepatitis C Screen Reflex to HCV RNA Quant and Genotype    Collection Time: 10/14/21  9:47 AM   Result Value Ref Range    Hepatitis C Antibody Nonreactive Nonreactive   CHLAMYDIA TRACHOMATIS PCR    Collection Time: 10/14/21 10:44 AM    Specimen: Vagina; Swab   Result Value Ref Range    Chlamydia trachomatis Negative Negative   NEISSERIA GONORRHOEA PCR    Collection Time: 10/14/21 10:44 AM    Specimen: Vagina; Swab   Result Value Ref Range    Neisseria gonorrhoeae Negative Negative      No EKG required for low risk surgery (cataract, skin procedure, breast biopsy, etc).    Revised Cardiac Risk Index (RCRI):  The patient has the following serious cardiovascular risks for perioperative complications:   - No serious cardiac risks = 0 points     RCRI Interpretation: 0 points: Class I (very low risk - 0.4% complication rate)           Signed Electronically by: Amanda Carter PA-C  Copy of this evaluation report is provided to requesting physician.

## 2021-10-28 NOTE — PATIENT INSTRUCTIONS

## 2021-10-28 NOTE — NURSING NOTE
"Chief Complaint   Patient presents with     Pre-Op Exam     11/5/21 eye surgery     initial /80 (BP Location: Left arm, Cuff Size: Adult Regular)   Pulse 80   Temp 98.4  F (36.9  C)   Resp 16   Ht 1.645 m (5' 4.75\")   Wt 83.9 kg (185 lb)   SpO2 97%   BMI 31.02 kg/m   Estimated body mass index is 31.02 kg/m  as calculated from the following:    Height as of this encounter: 1.645 m (5' 4.75\").    Weight as of this encounter: 83.9 kg (185 lb).  BP completed using cuff size: regular.  L  arm      Health Maintenance that is potentially due pending provider review:  NONE    n/a    Farhat Greene ma  "

## 2021-10-28 NOTE — PROGRESS NOTES
Sports Medicine Clinic Visit    PCP: Kalli Roldan Geronimo is a 46 year old female who is seen  as self referral presenting with R knee ant knee crepitus and some mild instability. Pt mentions she is favoring her R knee slightly.    Injury: started to notice R knee crepitus about a month ago (~10/1/21)    Has been walking about 3 miles a day, has incorporated 20 seconds of increased running for exercise and has noticed the cracking in her R knee. She rollerskates.    With increased activity will have more cracking Sx, less Sx with rest.    Location of Pain: right knee  Duration of Pain: 1 month(s)  Rating of Pain: 1/10  Pain is better with: Nothing  Pain is worse with: short running intervals can cause increased creaking, does roller skate once a week and noticed some tenderness as she was skating  Additional Features: no swelling, or locking, or numbness/tingling  Treatment so far consists of: Rest  Prior History of related problems: no R knee issus in the past.    There were no vitals taken for this visit.      PMH:  Past Medical History:   Diagnosis Date     Cervical high risk HPV (human papillomavirus) test positive 12/2015 12/2015, 10/2019, 10/30/20     Chronic sinusitis Summer 2016    I get congested every 3-4 weeks     Esophageal reflux      Exophthalmos, unspecified      Hypothyroidism      Thyroid eye disease      Thyrotoxicosis without mention of goiter or other cause, without mention of thyrotoxic crisis or storm 03/2005    Had radioablation, On synthroid, seeing [facility]; takes synthroid     Unspecified essential hypertension 1980    meds since 2001, on atenolol       Active problem list:  Patient Active Problem List   Diagnosis     Surveillance of previously prescribed intrauterine contraceptive device     Vaginitis and vulvovaginitis     Exophthalmos     Abnormal Pap smear of cervix     CARDIOVASCULAR SCREENING; LDL GOAL LESS THAN 160     Essential hypertension with goal  blood pressure less than 140/90     Anemia     Hypothyroidism due to acquired atrophy of thyroid     Cervical high risk HPV (human papillomavirus) test positive     Thyroid disease     Female stress incontinence     Chronic seasonal allergic rhinitis, unspecified trigger     Need for HPV vaccine     Thyroid eye disease     Eyelid retraction, unspecified laterality       FH:  Family History   Problem Relation Age of Onset     Hypertension Mother      Hypertension Father      Hypertension Maternal Grandmother      Hypertension Paternal Grandmother      Hypertension Maternal Grandfather      Hypertension Paternal Grandfather        SH:  Social History     Socioeconomic History     Marital status: Single     Spouse name: Not on file     Number of children: Not on file     Years of education: Not on file     Highest education level: Not on file   Occupational History     Occupation: [employer]     Employer: [employer]   Tobacco Use     Smoking status: Never Smoker     Smokeless tobacco: Never Used   Substance and Sexual Activity     Alcohol use: Yes     Comment: 1-2x's/month if that.     Drug use: Yes     Types: Marijuana     Comment: 3x's/day     Sexual activity: Yes     Partners: Male     Birth control/protection: I.U.D.     Comment: Paraguard IUD,    Other Topics Concern     Parent/sibling w/ CABG, MI or angioplasty before 65F 55M? No   Social History Narrative    Caffeine intake/servings daily - 0-1, 12 oz of Mountain Dew    Calcium intake/servings daily - eats only cheese    Exercise None    Sunscreen used - Yes    Seatbelts used - Yes    Guns stored in the home - No    Self Breast Exam - No    Pap test up to date -  Yes, as of today    Eye exam up to date -  Yes    Dental exam up to date -  Yes    DEXA scan up to date -  Not Applicable    Flex Sig/Colonoscopy up to date -  Not Applicable    Mammography up to date -  Not Applicable    Immunizations reviewed and up to date -  Yes, within the last 10 years    Abuse: Current or Past (Physical, Sexual or Emotional) - No    Do you feel safe in your environment - Yes    Do you cope well with stress - Yes    Do you suffer from insomnia - No    Last updated by: Anu Vinson MA 12/21/2011             Social Determinants of Health     Financial Resource Strain:      Difficulty of Paying Living Expenses:    Food Insecurity:      Worried About Running Out of Food in the Last Year:      Ran Out of Food in the Last Year:    Transportation Needs:      Lack of Transportation (Medical):      Lack of Transportation (Non-Medical):    Physical Activity:      Days of Exercise per Week:      Minutes of Exercise per Session:    Stress:      Feeling of Stress :    Social Connections:      Frequency of Communication with Friends and Family:      Frequency of Social Gatherings with Friends and Family:      Attends Mandaeism Services:      Active Member of Clubs or Organizations:      Attends Club or Organization Meetings:      Marital Status:    Intimate Partner Violence:      Fear of Current or Ex-Partner:      Emotionally Abused:      Physically Abused:      Sexually Abused:        MEDS:  See EMR, reviewed  ALL:  See EMR, reviewed    REVIEW OF SYSTEMS:  CONSTITUTIONAL:NEGATIVE for fever, chills, change in weight  INTEGUMENTARY/SKIN: NEGATIVE for worrisome rashes, moles or lesions  EYES: NEGATIVE for vision changes or irritation  ENT/MOUTH: NEGATIVE for ear, mouth and throat problems  RESP:NEGATIVE for significant cough or SOB  BREAST: NEGATIVE for masses, tenderness or discharge  CV: NEGATIVE for chest pain, palpitations or peripheral edema  GI: NEGATIVE for nausea, abdominal pain, heartburn, or change in bowel habits  :NEGATIVE for frequency, dysuria, or hematuria  :NEGATIVE for frequency, dysuria, or hematuria  NEURO: NEGATIVE for weakness, dizziness or paresthesias  ENDOCRINE: NEGATIVE for temperature intolerance, skin/hair changes  HEME/ALLERGY/IMMUNE:  "NEGATIVE for bleeding problems  PSYCHIATRIC: NEGATIVE for changes in mood or affect              Inspection: Mild tenderness to medial patellar facet.  Nontender to the opposite knee medial patellar facet.      No effusion      AP/lateral alignment normal      Non-tender:  MCL, LCL, lateral joint line, medial joint line, IT band, pes anserine bursa, fibular head    Symmetrical medial and lateral patellar excursion, with no \"winking\" knee cap or obvious patella waldo    Active Range of Motion: full extension.  flexion allowed beyond 100 degrees.    Strength: quad  5/5, Hamstrings  5/5, Gastroc  5/5, Tibialis anterior  5/5, Peroneals  5/5     Special tests: normal Valgus stress test, normal Varus, negative Lachman's test, no apprehension with lateral stress of the patella.    Sensation intact.  Distal pulses intact.  Capillary refill normal.  Skin intact, without signs of cellulitis    Appropriate in conversation affect.    Went over standing x-rays of the bilateral knees that show no significant degenerative change including in the patellofemoral space.  There may be some mild relative medial joint space narrowing bilaterally, symmetrically, but overall good maintenance of the joint space.      A: right-sided knee patellofemoral discomfort    Plan: We discussed the concepts of strength protocol that can be taught by physical therapy that focuses on patellofemoral alignment, strength about the hip and pelvis and buttock, and patellar taping.  She declined this for now.  We discussed a Cho-Pat strap.  We discussed activity modification, exercises to consider, and those to avoid.  She will look for improvement with conservative cares and follow-up as needed.                "

## 2021-10-28 NOTE — LETTER
10/28/2021      RE: Mimi Melton  [address] [facility] [id number]       Sports Medicine Clinic Visit    PCP: Kalli Roldan    Mimi Melton is a 46 year old female who is seen  as self referral presenting with R knee ant knee crepitus and some mild instability. Pt mentions she is favoring her R knee slightly.    Injury: started to notice R knee crepitus about a month ago (~10/1/21)    Has been walking about 3 miles a day, has incorporated 20 seconds of increased running for exercise and has noticed the cracking in her R knee. She rollerskates.    With increased activity will have more cracking Sx, less Sx with rest.    Location of Pain: right knee  Duration of Pain: 1 month(s)  Rating of Pain: 1/10  Pain is better with: Nothing  Pain is worse with: short running intervals can cause increased creaking, does roller skate once a week and noticed some tenderness as she was skating  Additional Features: no swelling, or locking, or numbness/tingling  Treatment so far consists of: Rest  Prior History of related problems: no R knee issus in the past.    There were no vitals taken for this visit.      PMH:  Past Medical History:   Diagnosis Date     Cervical high risk HPV (human papillomavirus) test positive 12/2015 12/2015, 10/2019, 10/30/20     Chronic sinusitis Summer 2016    I get congested every 3-4 weeks     Esophageal reflux      Exophthalmos, unspecified      Hypothyroidism      Thyroid eye disease      Thyrotoxicosis without mention of goiter or other cause, without mention of thyrotoxic crisis or storm 03/2005    Had radioablation, On synthroid, seeing [facility]; takes synthroid     Unspecified essential hypertension 1980    meds since 2001, on atenolol       Active problem list:  Patient Active Problem List   Diagnosis     Surveillance of previously prescribed intrauterine contraceptive device     Vaginitis and vulvovaginitis     Exophthalmos     Abnormal Pap smear of cervix      CARDIOVASCULAR SCREENING; LDL GOAL LESS THAN 160     Essential hypertension with goal blood pressure less than 140/90     Anemia     Hypothyroidism due to acquired atrophy of thyroid     Cervical high risk HPV (human papillomavirus) test positive     Thyroid disease     Female stress incontinence     Chronic seasonal allergic rhinitis, unspecified trigger     Need for HPV vaccine     Thyroid eye disease     Eyelid retraction, unspecified laterality       FH:  Family History   Problem Relation Age of Onset     Hypertension Mother      Hypertension Father      Hypertension Maternal Grandmother      Hypertension Paternal Grandmother      Hypertension Maternal Grandfather      Hypertension Paternal Grandfather        SH:  Social History     Socioeconomic History     Marital status: Single     Spouse name: Not on file     Number of children: Not on file     Years of education: Not on file     Highest education level: Not on file   Occupational History     Occupation: [employer]     Employer: [employer]   Tobacco Use     Smoking status: Never Smoker     Smokeless tobacco: Never Used   Substance and Sexual Activity     Alcohol use: Yes     Comment: 1-2x's/month if that.     Drug use: Yes     Types: Marijuana     Comment: 3x's/day     Sexual activity: Yes     Partners: Male     Birth control/protection: I.U.D.     Comment: Paraguard IUD,    Other Topics Concern     Parent/sibling w/ CABG, MI or angioplasty before 65F 55M? No   Social History Narrative    Caffeine intake/servings daily - 0-1, 12 oz of Mountain Dew    Calcium intake/servings daily - eats only cheese    Exercise None    Sunscreen used - Yes    Seatbelts used - Yes    Guns stored in the home - No    Self Breast Exam - No    Pap test up to date -  Yes, as of today    Eye exam up to date -  Yes    Dental exam up to date -  Yes    DEXA scan up to date -  Not Applicable    Flex Sig/Colonoscopy up to date -  Not Applicable     Mammography up to date -  Not Applicable    Immunizations reviewed and up to date - Yes, within the last 10 years    Abuse: Current or Past (Physical, Sexual or Emotional) - No    Do you feel safe in your environment - Yes    Do you cope well with stress - Yes    Do you suffer from insomnia - No    Last updated by: Anu Vinson MA 12/21/2011             Social Determinants of Health     Financial Resource Strain:      Difficulty of Paying Living Expenses:    Food Insecurity:      Worried About Running Out of Food in the Last Year:      Ran Out of Food in the Last Year:    Transportation Needs:      Lack of Transportation (Medical):      Lack of Transportation (Non-Medical):    Physical Activity:      Days of Exercise per Week:      Minutes of Exercise per Session:    Stress:      Feeling of Stress :    Social Connections:      Frequency of Communication with Friends and Family:      Frequency of Social Gatherings with Friends and Family:      Attends Holiness Services:      Active Member of Clubs or Organizations:      Attends Club or Organization Meetings:      Marital Status:    Intimate Partner Violence:      Fear of Current or Ex-Partner:      Emotionally Abused:      Physically Abused:      Sexually Abused:        MEDS:  See EMR, reviewed  ALL:  See EMR, reviewed    REVIEW OF SYSTEMS:  CONSTITUTIONAL:NEGATIVE for fever, chills, change in weight  INTEGUMENTARY/SKIN: NEGATIVE for worrisome rashes, moles or lesions  EYES: NEGATIVE for vision changes or irritation  ENT/MOUTH: NEGATIVE for ear, mouth and throat problems  RESP:NEGATIVE for significant cough or SOB  BREAST: NEGATIVE for masses, tenderness or discharge  CV: NEGATIVE for chest pain, palpitations or peripheral edema  GI: NEGATIVE for nausea, abdominal pain, heartburn, or change in bowel habits  :NEGATIVE for frequency, dysuria, or hematuria  :NEGATIVE for frequency, dysuria, or hematuria  NEURO: NEGATIVE for weakness, dizziness or  "paresthesias  ENDOCRINE: NEGATIVE for temperature intolerance, skin/hair changes  HEME/ALLERGY/IMMUNE: NEGATIVE for bleeding problems  PSYCHIATRIC: NEGATIVE for changes in mood or affect              Inspection: Mild tenderness to medial patellar facet.  Nontender to the opposite knee medial patellar facet.      No effusion      AP/lateral alignment normal      Non-tender:  MCL, LCL, lateral joint line, medial joint line, IT band, pes anserine bursa, fibular head    Symmetrical medial and lateral patellar excursion, with no \"winking\" knee cap or obvious patella waldo    Active Range of Motion: full extension.  flexion allowed beyond 100 degrees.    Strength: quad  5/5, Hamstrings  5/5, Gastroc  5/5, Tibialis anterior  5/5, Peroneals  5/5     Special tests: normal Valgus stress test, normal Varus, negative Lachman's test, no apprehension with lateral stress of the patella.    Sensation intact.  Distal pulses intact.  Capillary refill normal.  Skin intact, without signs of cellulitis    Appropriate in conversation affect.    Went over standing x-rays of the bilateral knees that show no significant degenerative change including in the patellofemoral space.  There may be some mild relative medial joint space narrowing bilaterally, symmetrically, but overall good maintenance of the joint space.      A: right-sided knee patellofemoral discomfort    Plan: We discussed the concepts of strength protocol that can be taught by physical therapy that focuses on patellofemoral alignment, strength about the hip and pelvis and buttock, and patellar taping.  She declined this for now.  We discussed a Cho-Pat strap.  We discussed activity modification, exercises to consider, and those to avoid.  She will look for improvement with conservative cares and follow-up as needed.          Alex Abbott MD    "

## 2021-10-28 NOTE — H&P (VIEW-ONLY)
[facility]  [address]  [facility] [address]  Phone: [phone]  Primary Provider: Kalli Roldan  Pre-op Performing Provider: NICK MAYER      PREOPERATIVE EVALUATION:  Today's date: 10/28/2021    Mimi Melton is a 46 year old female who presents for a preoperative evaluation.    Surgical Information:  Surgery/Procedure: Bilateral orbital decompression with sonopet Bilateral lower lid retraction repair  Surgery Location: [facility]   Surgeon: Niles Donnelly MD  Surgery Date: 11/05/2021  Time of Surgery:   Where patient plans to recover: At home with family  Fax number for surgical facility: Note does not need to be faxed, will be available electronically in Epic.    Type of Anesthesia Anticipated: General    Assessment & Plan     The proposed surgical procedure is considered LOW risk.      ICD-10-CM    1. Preop general physical exam  Z01.818    2. 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 Eyelid abnormality  H02.9    4. Hypothyroidism due to medication  E03.2          Risks and Recommendations:  The patient has the following additional risks and recommendations for perioperative complications:   - No identified additional risk factors other than previously addressed    Medication Instructions:  Patient is to take all scheduled medications on the day of surgery    RECOMMENDATION:  APPROVAL GIVEN to proceed with proposed procedure, without further diagnostic evaluation.    Review of prior external note(s) from - recent PCP notes    15 minutes spent on the date of the encounter doing chart review, history and exam, documentation and further activities per the note        Subjective     HPI related to upcoming procedure: history of changes in lid pressures due to history of thyroid issues    Preop Questions 10/25/2021   1. Have you ever had a heart attack or stroke? No   2. Have you ever had surgery on your heart or  blood vessels, such as a stent placement, a coronary artery bypass, or surgery on an artery in your head, neck, heart, or legs? No   3. Do you have chest pain with activity? No   4. Do you have a history of  heart failure? No   5. Do you currently have a cold, bronchitis or symptoms of other infection? No   6. Do you have a cough, shortness of breath, or wheezing? No   7. Do you or anyone in your family have previous history of blood clots? No   8. Do you or does anyone in your family have a serious bleeding problem such as prolonged bleeding following surgeries or cuts? No   9. Have you ever had problems with anemia or been told to take iron pills? No   10. Have you had any abnormal blood loss such as black, tarry or bloody stools, or abnormal vaginal bleeding? No   11. Have you ever had a blood transfusion? No   12. Are you willing to have a blood transfusion if it is medically needed before, during, or after your surgery? NO    13. Have you or any of your relatives ever had problems with anesthesia? No   14. Do you have sleep apnea, excessive snoring or daytime drowsiness? No   15. Do you have any artifical heart valves or other implanted medical devices like a pacemaker, defibrillator, or continuous glucose monitor? No   16. Do you have artificial joints? No   17. Are you allergic to latex? No   18. Is there any chance that you may be pregnant? No       Health Care Directive:  Patient does not have a Health Care Directive or Living Will: Discussed advance care planning with patient; however, patient declined at this time.    Preoperative Review of :   reviewed - no record of controlled substances prescribed.      Status of Chronic Conditions:  See problem list for active medical problems.  Problems all longstanding and stable, except as noted/documented.  See ROS for pertinent symptoms related to these conditions.    HYPOTHYROIDISM - Patient has a longstanding history of chronic Hypothyroidism. Patient has  been doing well, noting no tremor, insomnia, hair loss or changes in skin texture. Continues to take medications as directed, without adverse reactions or side effects. Last TSH   Lab Results   Component Value Date    TSH 1.54 10/14/2021   .        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     Priority: Medium     Added automatically from request for surgery [id number]       Eyelid retraction, unspecified laterality 10/15/2021     Priority: Medium     Added automatically from request for surgery [id number]       Need for HPV vaccine 07/03/2020     Priority: Medium     Chronic seasonal allergic rhinitis, unspecified trigger 06/19/2018     Priority: Medium     Female stress incontinence 04/12/2017     Priority: Medium     Thyroid disease 08/09/2016     Priority: Medium     Cervical high risk HPV (human papillomavirus) test positive 12/01/2015     Priority: Medium     ECC, repeat pap and hpv all negative 9/09.   NIL paps: 2/10, 9/10, 12/11, 12/12. Plan pap in 3 yrs.  7/14: NIL pap. Plan cotest pap & HPV in 3 years.  12/2015: NIL pap, + HPV (not 16 or 18). Plan cotest pap & HPV in 1 year  Tracking started.  12/8/2016 Pap: NIL/neg HR HPV. Plan cotest in 3 years.  10/18/19 Pap: NIL, +HR HPV (not 16/18). Plan cotest in 1 year.  10/30/20: NIL Pap, + HR HPV (not 16 or 18). Plan [place] due bef 01/30/21.  11/9/20 Pt notified by phone.  12/02/20: [place] ECC Neg for dysplasia. Plan cotest in 1 year due 12/02/21. [facility]          Hypothyroidism due to acquired atrophy of thyroid 10/13/2015     Priority: Medium     Anemia 07/22/2014     Priority: Medium     Essential hypertension  with goal blood pressure less than 140/90 2012     Priority: Medium     CARDIOVASCULAR SCREENING; LDL GOAL LESS THAN 160 10/31/2010     Priority: Medium     Abnormal Pap smear of cervix 2009     Priority: Medium     See dx for Cervical High Risk HPV.        Exophthalmos 2007     Priority: Medium     Problem list name updated by automated process. Provider to review       Vaginitis and vulvovaginitis 2007     Priority: Medium     Problem list name updated by automated process. Provider to review       Surveillance of previously prescribed intrauterine contraceptive device 2004     Priority: Medium     Paragard IUD placed in May 19, 2011.        Past Medical History:   Diagnosis Date     Cervical high risk HPV (human papillomavirus) test positive 2015, 10/2019, 10/30/20     Chronic sinusitis Summer 2016    I get congested every 3-4 weeks     Esophageal reflux      Exophthalmos, unspecified      Hypothyroidism      Thyroid eye disease      Thyrotoxicosis without mention of goiter or other cause, without mention of thyrotoxic crisis or storm 2005    Had radioablation, On synthroid, seeing [facility]; takes synthroid     Unspecified essential hypertension 1980    meds since , on atenolol     Past Surgical History:   Procedure Laterality Date     C  DELIVERY ONLY  91    , Low Cervical     C  DELIVERY ONLY  97    , Low Cervical     C  DELIVERY ONLY  99    , Low Cervical     C INDUCED ABORTN BY D&C      Aspiration & Curettage, TAB x2     CYSTOSCOPY, SLING TRANSVAGINAL N/A 10/10/2017    Procedure: CYSTOSCOPY, SLING TRANSVAGINAL;  Midurethral Sling and Cystoscopy (Support the Urethra with Mesh Sling and Look in the Bladder);  Surgeon: Karin Amin MD;  Location: [facility] OR     EYE SURGERY  2008    [facility]      Umbilical hernia Repair      Current Outpatient Medications   Medication Sig Dispense Refill     ACETAMINOPHEN PO Take 1,000 mg by mouth 2 times daily as needed for pain (menstrual cramps)       benzoyl peroxide 5 % external liquid Wash face skin 1-2 times daily 148 g 3     clindamycin (CLEOCIN) 75 MG capsule Take 2 capsules (150 mg) by mouth 2 times daily 28 capsule 0     fexofenadine (ALLEGRA ALLERGY) 180 MG tablet Take 1-2 Tabl Fexofenadine against allergic reactions 30 tablet 3     fluticasone (FLONASE) 50 MCG/ACT nasal spray SPRAY 1 SPRAY INTO BOTH NOSTRILS 2 TIMES DAILY 48 mL 3     hydrochlorothiazide (HYDRODIURIL) 12.5 MG tablet Take 1 tablet (12.5 mg) by mouth daily 90 tablet 1     IBUPROFEN PO Take 800 mg by mouth every 4 hours as needed (takes for menstrual cramps.)        Lactobacillus-Inulin ([facility] DIGESTIVE [facility]) CAPS Take 1 capsule by mouth 2 times daily 100 capsule 11     levothyroxine (SYNTHROID/LEVOTHROID) 100 MCG tablet Take 1 tablet (100 mcg) by mouth daily 90 tablet 1     losartan (COZAAR) 25 MG tablet Take 1 tablet (25 mg) by mouth daily 90 tablet 1     mupirocin (BACTROBAN) 2 % ointment as needed        tretinoin (RETIN-A) 0.05 % external cream Apply topically At Bedtime Put on face 1-2 times daily (if irritated reduce to every other day) 20 g 3     valACYclovir (VALTREX) 500 MG tablet Take 1 tablet (500 mg) by mouth 2 times daily for 3 days 6 tablet 0       Allergies   Allergen Reactions     Dust Mites      Lisinopril Swelling     Swelling in lip     Mold      Cannot have any meds that contain mold.     No Known Drug Allergies         Social History     Tobacco Use     Smoking status: Never Smoker     Smokeless tobacco: Never Used   Substance Use Topics     Alcohol use: Yes     Comment: 1-2x's/month if that.     Family History   Problem Relation Age of Onset     Hypertension Mother      Hypertension Father      Hypertension Maternal Grandmother      Hypertension Paternal Grandmother      Hypertension Maternal  "Grandfather      Hypertension Paternal Grandfather      History   Drug Use     Types: Marijuana     Comment: 3x's/day         Objective     /80 (BP Location: Left arm, Cuff Size: Adult Regular)   Pulse 80   Temp 98.4  F (36.9  C)   Resp 16   Ht 1.645 m (5' 4.75\")   Wt 83.9 kg (185 lb)   SpO2 97%   BMI 31.02 kg/m      Physical Exam  GENERAL APPEARANCE: healthy, alert and no distress  HENT: ear canals and TM's normal and nose and mouth without ulcers or lesions  RESP: lungs clear to auscultation - no rales, rhonchi or wheezes  CV: regular rate and rhythm, normal S1 S2, no S3 or S4 and no murmur, click or rub   ABDOMEN: soft, nontender, no HSM or masses and bowel sounds normal  NEURO: Normal strength and tone, sensory exam grossly normal, mentation intact and speech normal    Recent Labs   Lab Test 10/14/21  0946 08/20/20  0819   HGB 13.1  --      --     141   POTASSIUM 3.4 3.6   CR 1.00 0.94   A1C 5.4  --         Diagnostics:  Recent Results (from the past 720 hour(s))   Wet prep - lab collect    Collection Time: 10/14/21  9:40 AM    Specimen: Vagina; Swab   Result Value Ref Range    Trichomonas Absent Absent    Yeast Absent Absent    Clue Cells Present (A) Absent    WBCs/high power field 1+ (A) None   TSH WITH FREE T4 REFLEX    Collection Time: 10/14/21  9:46 AM   Result Value Ref Range    TSH 1.54 0.40 - 4.00 mU/L   Basic metabolic panel  (Ca, Cl, CO2, Creat, Gluc, K, Na, BUN)    Collection Time: 10/14/21  9:46 AM   Result Value Ref Range    Sodium 135 133 - 144 mmol/L    Potassium 3.4 3.4 - 5.3 mmol/L    Chloride 103 94 - 109 mmol/L    Carbon Dioxide (CO2) 29 20 - 32 mmol/L    Anion Gap 3 3 - 14 mmol/L    Urea Nitrogen 10 7 - 30 mg/dL    Creatinine 1.00 0.52 - 1.04 mg/dL    Calcium 9.3 8.5 - 10.1 mg/dL    Glucose 87 70 - 99 mg/dL    GFR Estimate 68 >60 mL/min/1.73m2   Albumin Random Urine Quantitative with Creat Ratio    Collection Time: 10/14/21  9:46 AM   Result Value Ref Range    " Creatinine Urine mg/dL 82 mg/dL    Albumin Urine mg/L <5 mg/L    Albumin Urine mg/g Cr     Lipid Profile (Chol, Trig, HDL, LDL calc)    Collection Time: 10/14/21  9:46 AM   Result Value Ref Range    Cholesterol 175 <200 mg/dL    Triglycerides 68 <150 mg/dL    Direct Measure HDL 58 >=50 mg/dL    LDL Cholesterol Calculated 103 (H) <=100 mg/dL    Non HDL Cholesterol 117 <130 mg/dL    Patient Fasting > 8hrs? Yes    Hemoglobin A1c    Collection Time: 10/14/21  9:46 AM   Result Value Ref Range    Hemoglobin A1C 5.4 0.0 - 5.6 %   CBC with platelets    Collection Time: 10/14/21  9:46 AM   Result Value Ref Range    WBC Count 4.0 4.0 - 11.0 10e3/uL    RBC Count 4.38 3.80 - 5.20 10e6/uL    Hemoglobin 13.1 11.7 - 15.7 g/dL    Hematocrit 40.2 35.0 - 47.0 %    MCV 92 78 - 100 fL    MCH 29.9 26.5 - 33.0 pg    MCHC 32.6 31.5 - 36.5 g/dL    RDW 13.6 10.0 - 15.0 %    Platelet Count 421 150 - 450 10e3/uL   Treponema Abs w Reflex to RPR and Titer    Collection Time: 10/14/21  9:47 AM   Result Value Ref Range    Treponema Antibody Total Nonreactive Nonreactive   Hepatitis B core antibody    Collection Time: 10/14/21  9:47 AM   Result Value Ref Range    Hepatitis B Core Antibody Total Nonreactive Nonreactive   Hepatitis B Surface Antibody    Collection Time: 10/14/21  9:47 AM   Result Value Ref Range    Hepatitis B Surface Antibody 339.16 (H) <8.00 m[IU]/mL   Hepatitis B surface antigen    Collection Time: 10/14/21  9:47 AM   Result Value Ref Range    Hepatitis B Surface Antigen Nonreactive Nonreactive   HIV Antigen Antibody Combo    Collection Time: 10/14/21  9:47 AM   Result Value Ref Range    HIV Antigen Antibody Combo Nonreactive Nonreactive   Herpes Simplex Virus 1 and 2 IgG    Collection Time: 10/14/21  9:47 AM   Result Value Ref Range    HSV Type 1 IgG Instrument Value 0.46 <0.90 Index    Herpes Simplex Virus Type 1 IgG Antibody No HSV-1 IgG antibodies detected. No HSV-1 IgG antibodies detected    HSV Type 2 IgG Instrument Value  >23.60 (H) <0.90 Index    Herpes Simplex Virus Type 2 IgG Antibody Positive.  IgG antibody to HSV-2 detected. (A) No HSV-2 IgG antibodies detected   Hepatitis C Screen Reflex to HCV RNA Quant and Genotype    Collection Time: 10/14/21  9:47 AM   Result Value Ref Range    Hepatitis C Antibody Nonreactive Nonreactive   CHLAMYDIA TRACHOMATIS PCR    Collection Time: 10/14/21 10:44 AM    Specimen: Vagina; Swab   Result Value Ref Range    Chlamydia trachomatis Negative Negative   NEISSERIA GONORRHOEA PCR    Collection Time: 10/14/21 10:44 AM    Specimen: Vagina; Swab   Result Value Ref Range    Neisseria gonorrhoeae Negative Negative      No EKG required for low risk surgery (cataract, skin procedure, breast biopsy, etc).    Revised Cardiac Risk Index (RCRI):  The patient has the following serious cardiovascular risks for perioperative complications:   - No serious cardiac risks = 0 points     RCRI Interpretation: 0 points: Class I (very low risk - 0.4% complication rate)           Signed Electronically by: Amanda Carter PA-C  Copy of this evaluation report is provided to requesting physician.

## 2021-11-02 ENCOUNTER — LAB (OUTPATIENT)
Dept: LAB | Facility: CLINIC | Age: 46
End: 2021-11-02
Payer: COMMERCIAL

## 2021-11-02 DIAGNOSIS — Z11.59 ENCOUNTER FOR SCREENING FOR OTHER VIRAL DISEASES: ICD-10-CM

## 2021-11-02 PROCEDURE — U0003 INFECTIOUS AGENT DETECTION BY NUCLEIC ACID (DNA OR RNA); SEVERE ACUTE RESPIRATORY SYNDROME CORONAVIRUS 2 (SARS-COV-2) (CORONAVIRUS DISEASE [COVID-19]), AMPLIFIED PROBE TECHNIQUE, MAKING USE OF HIGH THROUGHPUT TECHNOLOGIES AS DESCRIBED BY CMS-2020-01-R: HCPCS

## 2021-11-02 PROCEDURE — U0005 INFEC AGEN DETEC AMPLI PROBE: HCPCS

## 2021-11-03 LAB — SARS-COV-2 RNA RESP QL NAA+PROBE: NEGATIVE

## 2021-11-04 ENCOUNTER — ANESTHESIA EVENT (OUTPATIENT)
Dept: SURGERY | Facility: AMBULATORY SURGERY CENTER | Age: 46
End: 2021-11-04
Payer: COMMERCIAL

## 2021-11-04 DIAGNOSIS — J30.2 CHRONIC SEASONAL ALLERGIC RHINITIS: ICD-10-CM

## 2021-11-04 DIAGNOSIS — T78.3XXA ANGIONEUROTIC EDEMA, INITIAL ENCOUNTER: ICD-10-CM

## 2021-11-04 DIAGNOSIS — L73.9 FOLLICULITIS: ICD-10-CM

## 2021-11-04 RX ORDER — FLUTICASONE PROPIONATE 50 MCG
1 SPRAY, SUSPENSION (ML) NASAL 2 TIMES DAILY
Qty: 48 ML | Refills: 3 | Status: SHIPPED | OUTPATIENT
Start: 2021-11-04 | End: 2023-05-30

## 2021-11-04 RX ORDER — FEXOFENADINE HCL 180 MG/1
TABLET ORAL
Qty: 30 TABLET | Refills: 3 | Status: SHIPPED | OUTPATIENT
Start: 2021-11-04 | End: 2023-05-30

## 2021-11-04 RX ORDER — BENZOYL PEROXIDE 50 MG/ML
LIQUID TOPICAL
Qty: 142 ML | Refills: 3 | Status: SHIPPED | OUTPATIENT
Start: 2021-11-04 | End: 2024-08-06

## 2021-11-05 ENCOUNTER — ANESTHESIA (OUTPATIENT)
Dept: SURGERY | Facility: AMBULATORY SURGERY CENTER | Age: 46
End: 2021-11-05
Payer: COMMERCIAL

## 2021-11-05 ENCOUNTER — HOSPITAL ENCOUNTER (OUTPATIENT)
Facility: AMBULATORY SURGERY CENTER | Age: 46
End: 2021-11-05
Attending: OPHTHALMOLOGY
Payer: COMMERCIAL

## 2021-11-05 VITALS
SYSTOLIC BLOOD PRESSURE: 137 MMHG | TEMPERATURE: 98.4 F | RESPIRATION RATE: 16 BRPM | OXYGEN SATURATION: 94 % | HEART RATE: 67 BPM | WEIGHT: 181 LBS | BODY MASS INDEX: 30.16 KG/M2 | HEIGHT: 65 IN | DIASTOLIC BLOOD PRESSURE: 94 MMHG

## 2021-11-05 DIAGNOSIS — H57.89 THYROID EYE DISEASE: ICD-10-CM

## 2021-11-05 DIAGNOSIS — E07.9 THYROID EYE DISEASE: ICD-10-CM

## 2021-11-05 DIAGNOSIS — H02.539 EYELID RETRACTION, UNSPECIFIED LATERALITY: ICD-10-CM

## 2021-11-05 DIAGNOSIS — H05.249 CONSTANT EXOPHTHALMOS, UNSPECIFIED LATERALITY: ICD-10-CM

## 2021-11-05 LAB
HCG UR QL: NEGATIVE
INTERNAL QC OK POCT: NORMAL

## 2021-11-05 PROCEDURE — 67445 EXPLR/DECOMPRESS EYE SOCKET: CPT | Mod: RT

## 2021-11-05 PROCEDURE — 81025 URINE PREGNANCY TEST: CPT | Performed by: PATHOLOGY

## 2021-11-05 PROCEDURE — 67911 REVISE EYELID DEFECT: CPT | Mod: E2 | Performed by: OPHTHALMOLOGY

## 2021-11-05 PROCEDURE — 67445 EXPLR/DECOMPRESS EYE SOCKET: CPT | Mod: 50 | Performed by: OPHTHALMOLOGY

## 2021-11-05 PROCEDURE — 67911 REVISE EYELID DEFECT: CPT | Mod: E4

## 2021-11-05 RX ORDER — DEXAMETHASONE SODIUM PHOSPHATE 4 MG/ML
INJECTION, SOLUTION INTRA-ARTICULAR; INTRALESIONAL; INTRAMUSCULAR; INTRAVENOUS; SOFT TISSUE PRN
Status: DISCONTINUED | OUTPATIENT
Start: 2021-11-05 | End: 2021-11-05

## 2021-11-05 RX ORDER — KETOROLAC TROMETHAMINE 30 MG/ML
INJECTION, SOLUTION INTRAMUSCULAR; INTRAVENOUS PRN
Status: DISCONTINUED | OUTPATIENT
Start: 2021-11-05 | End: 2021-11-05

## 2021-11-05 RX ORDER — SODIUM CHLORIDE, SODIUM LACTATE, POTASSIUM CHLORIDE, CALCIUM CHLORIDE 600; 310; 30; 20 MG/100ML; MG/100ML; MG/100ML; MG/100ML
INJECTION, SOLUTION INTRAVENOUS CONTINUOUS
Status: DISCONTINUED | OUTPATIENT
Start: 2021-11-05 | End: 2021-11-06 | Stop reason: HOSPADM

## 2021-11-05 RX ORDER — FENTANYL CITRATE 50 UG/ML
25 INJECTION, SOLUTION INTRAMUSCULAR; INTRAVENOUS
Status: DISCONTINUED | OUTPATIENT
Start: 2021-11-05 | End: 2021-11-06 | Stop reason: HOSPADM

## 2021-11-05 RX ORDER — ONDANSETRON 2 MG/ML
INJECTION INTRAMUSCULAR; INTRAVENOUS PRN
Status: DISCONTINUED | OUTPATIENT
Start: 2021-11-05 | End: 2021-11-05

## 2021-11-05 RX ORDER — ERYTHROMYCIN 5 MG/G
OINTMENT OPHTHALMIC
Qty: 3.5 G | Refills: 0 | Status: SHIPPED | OUTPATIENT
Start: 2021-11-05 | End: 2023-05-11

## 2021-11-05 RX ORDER — LIDOCAINE HYDROCHLORIDE AND EPINEPHRINE 10; 10 MG/ML; UG/ML
INJECTION, SOLUTION INFILTRATION; PERINEURAL PRN
Status: DISCONTINUED | OUTPATIENT
Start: 2021-11-05 | End: 2021-11-05 | Stop reason: HOSPADM

## 2021-11-05 RX ORDER — FENTANYL CITRATE 50 UG/ML
INJECTION, SOLUTION INTRAMUSCULAR; INTRAVENOUS PRN
Status: DISCONTINUED | OUTPATIENT
Start: 2021-11-05 | End: 2021-11-05

## 2021-11-05 RX ORDER — ACETAMINOPHEN 325 MG/1
975 TABLET ORAL ONCE
Status: COMPLETED | OUTPATIENT
Start: 2021-11-05 | End: 2021-11-05

## 2021-11-05 RX ORDER — LIDOCAINE HYDROCHLORIDE 20 MG/ML
INJECTION, SOLUTION INFILTRATION; PERINEURAL PRN
Status: DISCONTINUED | OUTPATIENT
Start: 2021-11-05 | End: 2021-11-05

## 2021-11-05 RX ORDER — ONDANSETRON 2 MG/ML
4 INJECTION INTRAMUSCULAR; INTRAVENOUS EVERY 30 MIN PRN
Status: DISCONTINUED | OUTPATIENT
Start: 2021-11-05 | End: 2021-11-06 | Stop reason: HOSPADM

## 2021-11-05 RX ORDER — LIDOCAINE 40 MG/G
CREAM TOPICAL
Status: DISCONTINUED | OUTPATIENT
Start: 2021-11-05 | End: 2021-11-05 | Stop reason: HOSPADM

## 2021-11-05 RX ORDER — OXYCODONE HYDROCHLORIDE 5 MG/1
5 TABLET ORAL EVERY 6 HOURS PRN
Qty: 15 TABLET | Refills: 0 | Status: SHIPPED | OUTPATIENT
Start: 2021-11-05 | End: 2021-11-08

## 2021-11-05 RX ORDER — HYDRALAZINE HYDROCHLORIDE 20 MG/ML
2.5-5 INJECTION INTRAMUSCULAR; INTRAVENOUS EVERY 10 MIN PRN
Status: DISCONTINUED | OUTPATIENT
Start: 2021-11-05 | End: 2021-11-05 | Stop reason: HOSPADM

## 2021-11-05 RX ORDER — PROPOFOL 10 MG/ML
INJECTION, EMULSION INTRAVENOUS PRN
Status: DISCONTINUED | OUTPATIENT
Start: 2021-11-05 | End: 2021-11-05

## 2021-11-05 RX ORDER — MEPERIDINE HYDROCHLORIDE 25 MG/ML
12.5 INJECTION INTRAMUSCULAR; INTRAVENOUS; SUBCUTANEOUS
Status: DISCONTINUED | OUTPATIENT
Start: 2021-11-05 | End: 2021-11-06 | Stop reason: HOSPADM

## 2021-11-05 RX ORDER — OXYCODONE HYDROCHLORIDE 5 MG/1
5 TABLET ORAL EVERY 4 HOURS PRN
Status: DISCONTINUED | OUTPATIENT
Start: 2021-11-05 | End: 2021-11-06 | Stop reason: HOSPADM

## 2021-11-05 RX ORDER — METOPROLOL TARTRATE 1 MG/ML
1-2 INJECTION, SOLUTION INTRAVENOUS EVERY 5 MIN PRN
Status: DISCONTINUED | OUTPATIENT
Start: 2021-11-05 | End: 2021-11-05 | Stop reason: HOSPADM

## 2021-11-05 RX ORDER — ERYTHROMYCIN 5 MG/G
OINTMENT OPHTHALMIC PRN
Status: DISCONTINUED | OUTPATIENT
Start: 2021-11-05 | End: 2021-11-05 | Stop reason: HOSPADM

## 2021-11-05 RX ORDER — ONDANSETRON 4 MG/1
4 TABLET, ORALLY DISINTEGRATING ORAL EVERY 30 MIN PRN
Status: DISCONTINUED | OUTPATIENT
Start: 2021-11-05 | End: 2021-11-06 | Stop reason: HOSPADM

## 2021-11-05 RX ORDER — SODIUM CHLORIDE, SODIUM LACTATE, POTASSIUM CHLORIDE, CALCIUM CHLORIDE 600; 310; 30; 20 MG/100ML; MG/100ML; MG/100ML; MG/100ML
INJECTION, SOLUTION INTRAVENOUS CONTINUOUS
Status: DISCONTINUED | OUTPATIENT
Start: 2021-11-05 | End: 2021-11-05 | Stop reason: HOSPADM

## 2021-11-05 RX ORDER — HYDROMORPHONE HYDROCHLORIDE 1 MG/ML
0.2 INJECTION, SOLUTION INTRAMUSCULAR; INTRAVENOUS; SUBCUTANEOUS EVERY 5 MIN PRN
Status: DISCONTINUED | OUTPATIENT
Start: 2021-11-05 | End: 2021-11-05 | Stop reason: HOSPADM

## 2021-11-05 RX ORDER — FENTANYL CITRATE 50 UG/ML
25 INJECTION, SOLUTION INTRAMUSCULAR; INTRAVENOUS EVERY 5 MIN PRN
Status: DISCONTINUED | OUTPATIENT
Start: 2021-11-05 | End: 2021-11-05 | Stop reason: HOSPADM

## 2021-11-05 RX ORDER — NEOMYCIN POLYMYXIN B SULFATES AND DEXAMETHASONE 3.5; 10000; 1 MG/ML; [USP'U]/ML; MG/ML
SUSPENSION/ DROPS OPHTHALMIC
Qty: 5 ML | Refills: 0 | Status: SHIPPED | OUTPATIENT
Start: 2021-11-05 | End: 2023-05-11

## 2021-11-05 RX ORDER — CEFAZOLIN SODIUM 1 G/3ML
INJECTION, POWDER, FOR SOLUTION INTRAMUSCULAR; INTRAVENOUS PRN
Status: DISCONTINUED | OUTPATIENT
Start: 2021-11-05 | End: 2021-11-05

## 2021-11-05 RX ORDER — PROPOFOL 10 MG/ML
INJECTION, EMULSION INTRAVENOUS CONTINUOUS PRN
Status: DISCONTINUED | OUTPATIENT
Start: 2021-11-05 | End: 2021-11-05

## 2021-11-05 RX ADMIN — FENTANYL CITRATE 25 MCG: 50 INJECTION, SOLUTION INTRAMUSCULAR; INTRAVENOUS at 10:39

## 2021-11-05 RX ADMIN — FENTANYL CITRATE 25 MCG: 50 INJECTION, SOLUTION INTRAMUSCULAR; INTRAVENOUS at 10:55

## 2021-11-05 RX ADMIN — FENTANYL CITRATE 25 MCG: 50 INJECTION, SOLUTION INTRAMUSCULAR; INTRAVENOUS at 10:46

## 2021-11-05 RX ADMIN — FENTANYL CITRATE 50 MCG: 50 INJECTION, SOLUTION INTRAMUSCULAR; INTRAVENOUS at 09:06

## 2021-11-05 RX ADMIN — LIDOCAINE HYDROCHLORIDE 80 MG: 20 INJECTION, SOLUTION INFILTRATION; PERINEURAL at 09:08

## 2021-11-05 RX ADMIN — PROPOFOL 180 MCG/KG/MIN: 10 INJECTION, EMULSION INTRAVENOUS at 09:06

## 2021-11-05 RX ADMIN — CEFAZOLIN SODIUM 2 G: 1 INJECTION, POWDER, FOR SOLUTION INTRAMUSCULAR; INTRAVENOUS at 09:18

## 2021-11-05 RX ADMIN — PROPOFOL 200 MG: 10 INJECTION, EMULSION INTRAVENOUS at 09:08

## 2021-11-05 RX ADMIN — SODIUM CHLORIDE, SODIUM LACTATE, POTASSIUM CHLORIDE, CALCIUM CHLORIDE: 600; 310; 30; 20 INJECTION, SOLUTION INTRAVENOUS at 09:03

## 2021-11-05 RX ADMIN — PROPOFOL 50 MG: 10 INJECTION, EMULSION INTRAVENOUS at 09:58

## 2021-11-05 RX ADMIN — OXYCODONE HYDROCHLORIDE 5 MG: 5 TABLET ORAL at 10:42

## 2021-11-05 RX ADMIN — FENTANYL CITRATE 50 MCG: 50 INJECTION, SOLUTION INTRAMUSCULAR; INTRAVENOUS at 09:58

## 2021-11-05 RX ADMIN — DEXAMETHASONE SODIUM PHOSPHATE 10 MG: 4 INJECTION, SOLUTION INTRA-ARTICULAR; INTRALESIONAL; INTRAMUSCULAR; INTRAVENOUS; SOFT TISSUE at 09:24

## 2021-11-05 RX ADMIN — ACETAMINOPHEN 975 MG: 325 TABLET ORAL at 07:35

## 2021-11-05 RX ADMIN — ONDANSETRON 4 MG: 2 INJECTION INTRAMUSCULAR; INTRAVENOUS at 09:25

## 2021-11-05 RX ADMIN — KETOROLAC TROMETHAMINE 30 MG: 30 INJECTION, SOLUTION INTRAMUSCULAR; INTRAVENOUS at 10:17

## 2021-11-05 ASSESSMENT — MIFFLIN-ST. JEOR: SCORE: 1462.92

## 2021-11-05 NOTE — ANESTHESIA CARE TRANSFER NOTE
Patient: Mimi Melton    Procedure: Procedure(s):  Bilateral orbital decompression with  Sonopet, Bilateral lower lid retraction repair  SONOPET EYE  REPAIR, RETRACTION, EYELID       Diagnosis: Thyroid eye disease [E05.00]  Eyelid retraction, unspecified laterality [H02.539]  Constant exophthalmos, unspecified laterality [H05.249]  Diagnosis Additional Information: No value filed.    Anesthesia Type:   No value filed.     Note:    Oropharynx: spontaneously breathing  Level of Consciousness: awake  Oxygen Supplementation: nasal cannula  Level of Supplemental Oxygen (L/min / FiO2): 4  Independent Airway: airway patency satisfactory and stable  Dentition: dentition unchanged  Vital Signs Stable: post-procedure vital signs reviewed and stable  Report to RN Given: handoff report given  Patient transferred to: PACU  Comments: Resps easy and regular. Report to PACU RN  Handoff Report: Identifed the Patient, Identified the Reponsible Provider, Reviewed the pertinent medical history, Discussed the surgical course, Reviewed Intra-OP anesthesia mangement and issues during anesthesia, Set expectations for post-procedure period and Allowed opportunity for questions and acknowledgement of understanding      Vitals:  Vitals Value Taken Time   /96 11/05/21 1031   Temp     Pulse 75 11/05/21 1034   Resp 16 11/05/21 1034   SpO2 97 % 11/05/21 1034   Vitals shown include unvalidated device data.    Electronically Signed By: MARGA MONSALVE CRNA  November 5, 2021  10:35 AM

## 2021-11-05 NOTE — BRIEF OP NOTE
[facility]    Brief Operative Note    Pre-operative diagnosis: Thyroid eye disease [E05.00]  Eyelid retraction, unspecified laterality [H02.539]  Constant exophthalmos, unspecified laterality [H05.249]  Post-operative diagnosis Same as pre-operative diagnosis    Procedure: Procedure(s):  Bilateral orbital decompression with  Sonopet, Bilateral lower lid retraction repair  SONOPET EYE  REPAIR, RETRACTION, EYELID  Surgeon: Surgeon(s) and Role:     * Niles Donnelly MD - Primary   Mirian Leal MD - assistant  Anesthesia: General   Estimated Blood Loss: Minimal    Drains: None  Specimens: * No specimens in log *  Findings:   None.  Complications: None.  Implants: * No implants in log *

## 2021-11-05 NOTE — OP NOTE
PREOPERATIVE DIAGNOSIS: Thyroid eye disease with exophthalmos, bilateral eye. Bilateral lower lid retraction.   POSTOPERATIVE DIAGNOSIS: Thyroid eye disease with exophthalmos, bilateral eye. Bilateral lower lid retraction.   PROCEDURE:Bilateral lateral wall bone and fat orbital decompression. Bilateral lower lid retraction repair.  SURGEON: Niles Donnelly MD   ASSISTANTS: Mirian Leal MD   ANESTHESIA: General with local infiltration of 1% lidocaine with epinephrine.   COMPLICATIONS: None.   ESTIMATED BLOOD LOSS: Less than 20 mL.   SPECIMENS: None.   HISTORY AND INDICATIONS: Mimi Melton  presented with severe exophthalmos secondary to thyroid eye disease with exposure keratitis. After the risks, benefits and alternatives to the proposed procedure were explained, informed consent was obtained.   DESCRIPTION OF PROCEDURE: Mimi Melton  was brought to the operating room and placed supine on the operating table. General anesthesia was induced.  The bilateral lateral canthal area and lower lids were infiltrated with local anesthetic and the area was prepped and draped in the typical sterile ophthalmic fashion. Attention was directed to the right side. Lateral canthal incision was made with a 15 blade and dissection carried down through the orbicularis with monopolar cautery. Lateral canthotomy and inferior cantholysis was performed. Dissection was carried along the orbital rim. A malleable retractor was used to protect the globe and the intraorbital contents and the periorbita elevated from the lateral orbital wall. Hemostasis was obtained with monopolar cautery. The lateral orbital wall was then removed the Sonopet.  The bone removal started just inside the lateral orbital rim and extended back to the thick bone of the trigone. The bone removal continued superiorly to the lacrimal fossa and inferiorly to the basin of the infraorbital fissure. The bone removal was confirmed with intraoperative navigation  throughout the procedure. Hemostasis was obtained with monopolar cautery and bone wax throughout this procedure. The inferotemporal periorbita was widely opened and the intraconal fat removed using monopolar cautery. A lateral tarsal strip was fashioned. A transconjunctival incision was made at this inferior tarsal border with the high temperature cautery and Russ scissors. The lower lid retractors were then dissected from the conjunctiva and resected with the Russ scissors. Hemostasis was obtained. Lateral tarsal strip was secured to the lateral orbital rim periosteum with a  5-0 Vicryl suture in a horizontal mattress fashion. Lateral canthal angle was closed with a gray line to gray line suture of 5-0 Vicryl. Skin was closed with interrupted 6-0 plain gut sutures. Ophthalmic antibiotic ointment was applied to the incision and into the eye. Attention was directed to the opposite side and the same procedure performed. The patient tolerated the procedure well. Mimi Melton  left the operating room in stable condition after being awoken after being awoken from the general anesthetic.     ALESIA POOL MD

## 2021-11-05 NOTE — DISCHARGE INSTRUCTIONS
Post-operative Instructions    Ophthalmic Plastic and Reconstructive Surgery  Niles Donnelly M.D.  Tila Logan M.D.    All instructions apply to the operated eye(s) or eyelid(s)    What to expect after surgery:    There will be some swelling, bruising, and likely a black eye (even into the lower eyelids and cheeks). Also expect crusting and discharge from the eye and/or incisions.     A small amount of surface bleeding is normal for the first 48 hours after surgery.    You may notice some bloody tears for the first few days after surgery. This is normal.    Your eye(s) and eyelid(s) may be painful and tender. This is normal after surgery. Use the pain medication as prescribed. If your pain does not improve despite the medication, contact the office.    Wound care and personal care:    If a patch or bandage has been placed, please leave this in place until seen in clinic. Prevent the bandage from getting wet.     Apply ice compresses 15 minutes on 15 minutes off while awake for the first 2 days after surgery, then switch to warm compresses 4 times a day until seen by your physician.     For warm packs you can place a cup of dry uncooked rice in a clean cotton sock. Place sock in microwave 30 seconds to one minute. Next place the warm sock into a plastic bag and wrap the bag with clean warm wet washcloth and place over operated eye.      You may shower or wash your hair the day after surgery. Do not bathe or go swimming for 1 week to prevent contamination of your wounds.    Do not apply make-up to the eyes or eyelids for 2 weeks after surgery.    Activity restrictions and driving:    Avoid heavy lifting, bending, exercise or strenuous activity for 1 week after surgery.    You may resume other activities and return to work as tolerated.    You may not resume driving until have you stopped using narcotic pain medications(such as Norco, Percocet, Tylenol #3).    Medications:    Restart all your regular home  medications and eye drops today. If you take Plavix or Aspirin on a regular basis, wait for 3 days after your surgery before restarting these in order to decrease the risk of bleeding complications.    Avoid aspirin and aspirin-like medications (Motrin, Aleve, Ibuprofen, [place] etc) for 5 days to reduce the risk of bleeding. You may take Tylenol (acetaminophen) for pain.    In addition to your home medications, take the following post-operative medications as prescribed by your physician:    Apply antibiotic ointment (erythromycin) to all sutures three times a day, and into the operated eye(s) at night.     Place one drop of antibiotic eyedrop (maxitrol) in both eyes four times a day for 10 days    Take scheduled extra strength Tylenol for pain.  You may take 1 to 2 pain pills (norco or oxycodone as prescribed) as needed for breakthrough pain up to every 6 hours.    The pain pills may make you drowsy. You must not drive a car, operate heavy machinery or drink alcohol while taking them.    The pain pills may cause constipation and nausea. Take them with some food to prevent a stomach upset. If you continue to experience nausea, call your physician.      WARNING: All the prescription pain medications listed above contain Tylenol (acetaminophen). You must not take more than 4,000 mg of acetaminophen per 24-hour period. This is equivalent to 6 tablets of Darvocet, 8 tablets of Vicodin, or 12 tablets of Norco, Percocet or Tylenol #3. If you take other over-the-counter medications containing acetaminophen, you must take the amount of acetaminophen into account and reduce the number of prescribed pain pills accordingly.    Contact information and follow-up:    Return to the Eye Clinic for a follow-up appointment with your physician as  scheduled. If no appointment has been scheduled, call [phone] for an  appointment with Dr. Donnelly within 1 to 2 weeks from your date of surgery.  -     Please email a few photos  of your eye(s) or other operative site(s) to [email or web address] prior to your follow up visit.      For severe pain, bleeding, or loss of vision, call the Eye Clinic at [phone].    After hours or on weekends and holidays, call [phone] and ask to speak with the ophthalmologist on call.    [facility]  Home Care Following Anesthesia  For 24 hours after surgery:  1. Get plenty of rest.  A responsible adult must stay with you for at least 24 hours after you leave the surgery center.  2. Do not drive or use heavy equipment.  If you have weakness or tingling, don't drive or use heavy equipment until this feeling goes away.   3. Do not drink alcohol.   4. Avoid strenuous or risky activities.  Ask for help when climbing stairs.  5. You may feel lightheaded.  IF so, sit for a few minutes before standing.  Have someone help you get up.   6. If you have nausea (feel sick to your stomach): Drink only clear liquids such as apple juice, ginger ale, broth or 7-Up.  Rest may also help.  Be sure to drink enough fluids.  Move to a regular diet as you feel able.   7. You may have a slight fever.  Call the doctor if your fever is over 100 F (37.7 C) (taken under the tongue) or lasts longer than 24 hours.  8. You may have a dry mouth, a sore throat, muscle aches or trouble sleeping. These should go away after 24 hours.  9. Do not make important or legal decisions.   10. It is recommended to avoid smoking.               Tips for taking pain medications  To get the best pain relief possible, remember these points:    Take pain medications as directed, before pain becomes severe.    Pain medication can upset your stomach: taking it with food may help.    Constipation is a common side effect of pain medication. Drink plenty of  fluids.    Eat foods high in fiber. Take a stool softener if recommended by your doctor or pharmacist.    Do not drink alcohol, drive or operate machinery while  taking pain medications.    Ask about other ways to control pain, such as with heat, ice or relaxation.    Tylenol/Acetaminophen Consumption  To help encourage the safe use of acetaminophen, the makers of TYLENOL  have lowered the maximum daily dose for single-ingredient Extra Strength TYLENOL  (acetaminophen) products sold in the [place] from 8 pills per day (4,000 mg) to 6 pills per day (3,000 mg). The dosing interval has also changed from 2 pills every 4-6 hours to 2 pills every 6 hours.    If you feel your pain relief is insufficient, you may take Tylenol/Acetaminophen in addition to your narcotic pain medication.     Be careful not to exceed 3,000 mg of Tylenol/Acetaminophen in a 24 hour period from all sources.    If you are taking extra strength Tylenol/acetaminophen (500 mg), the maximum dose is 6 tablets in 24 hours.    If you are taking regular strength acetaminophen (325 mg), the maximum dose is 9 tablets in 24 hours.    Call a doctor for any of the followin. Signs of infection (fever, growing tenderness at the surgery site, a large amount of drainage or bleeding, severe pain, foul-smelling drainage, redness, swelling).  2. It has been over 8 to 10 hours since surgery and you are still not able to urinate (pass water).  3. Headache for over 24 hours.  4. Numbness, tingling or weakness the day after surgery (if you had spinal anesthesia).  5. Signs of Covid-19 infection (temperature over 100 degrees, shortness of breath, cough, loss of taste/smell, generalized body aches, persistent headache, chills, sore throat, nausea/vomiting/diarrhea)  Your doctor is:  Dr. Niles Donnelly, Ophthalmology: [phone]                  Or dial [phone] and ask for the resident on call for:  Ophthalmology  For emergency care, call the:  [facility]:  [phone] (TTY for hearing impaired: [phone])

## 2021-11-06 ASSESSMENT — ENCOUNTER SYMPTOMS: SEIZURES: 0

## 2021-11-06 ASSESSMENT — LIFESTYLE VARIABLES: TOBACCO_USE: 0

## 2021-11-06 ASSESSMENT — COPD QUESTIONNAIRES: COPD: 0

## 2021-11-06 NOTE — ANESTHESIA POSTPROCEDURE EVALUATION
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    Anesthesia Type:  General    Note:  Disposition: Outpatient   Postop Pain Control: Uneventful            Sign Out: Well controlled pain   PONV: No   Neuro/Psych: Uneventful            Sign Out: Acceptable/Baseline neuro status   Airway/Respiratory: Uneventful            Sign Out: Acceptable/Baseline resp. status   CV/Hemodynamics: Uneventful            Sign Out: Acceptable CV status; No obvious hypovolemia; No obvious fluid overload   Other NRE: NONE   DID A NON-ROUTINE EVENT OCCUR? No           Last vitals:  Vitals Value Taken Time   /93 11/05/21 1100   Temp 36.9  C (98.4  F) 11/05/21 1100   Pulse 66 11/05/21 1104   Resp 25 11/05/21 1104   SpO2 90 % 11/05/21 1104   Vitals shown include unvalidated device data.    Electronically Signed By: Álvaro Parry MD  November 6, 2021  1:06 AM

## 2021-11-06 NOTE — ANESTHESIA PREPROCEDURE EVALUATION
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]    Procedure : Procedure(s):  Bilateral orbital decompression with  Sonopet, Bilateral lower lid retraction repair  SONOPET EYE  REPAIR, RETRACTION, EYELID          Past Medical History:   Diagnosis Date     Cervical high risk HPV (human papillomavirus) test positive 2015, 10/2019, 10/30/20     Chronic sinusitis Summer 2016    I get congested every 3-4 weeks     Esophageal reflux      Exophthalmos, unspecified      Hypothyroidism      Thyroid eye disease      Thyrotoxicosis without mention of goiter or other cause, without mention of thyrotoxic crisis or storm 2005    Had radioablation, On synthroid, seeing [facility]; takes synthroid     Unspecified essential hypertension 1980    meds since , on atenolol      Past Surgical History:   Procedure Laterality Date     C  DELIVERY ONLY  91    , Low Cervical     C  DELIVERY ONLY  97    , Low Cervical     C  DELIVERY ONLY  99    , Low Cervical     C INDUCED ABORTN BY D&C      Aspiration & Curettage, TAB x2     CYSTOSCOPY, SLING TRANSVAGINAL N/A 10/10/2017    Procedure: CYSTOSCOPY, SLING TRANSVAGINAL;  Midurethral Sling and Cystoscopy (Support the Urethra with Mesh Sling and Look in the Bladder);  Surgeon: Karin Amin MD;  Location: [facility] OR     EYE SURGERY  2008    Orbital Decompression Dr smart      REPAIR INCISIONAL HERNIA,REDUCIBLE      Umbilical hernia Repair      Allergies   Allergen Reactions     Dust Mites      Lisinopril Swelling     Swelling in lip     Mold      Cannot have any meds that contain mold.      Social History     Tobacco Use     Smoking status: Never Smoker     Smokeless tobacco: Never  Used   Substance Use Topics     Alcohol use: Yes     Comment: 1-2x's/month if that.      Wt Readings from Last 1 Encounters:   11/05/21 82.1 kg (181 lb)        Anesthesia Evaluation   Pt has had prior anesthetic.     No history of anesthetic complications       ROS/MED HX  ENT/Pulmonary:    (-) tobacco use, asthma and COPD   Neurologic:    (-) no seizures, no CVA and no TIA   Cardiovascular:     (+) hypertension----- (-) angina, BENAVIDEZ and angina   METS/Exercise Tolerance: >4 METS    Hematologic:       Musculoskeletal:       GI/Hepatic:     (+) GERD, Asymptomatic on medication,     Renal/Genitourinary:       Endo:     (+) thyroid problem, hypothyroidism,     Psychiatric/Substance Use:       Infectious Disease:       Malignancy:       Other:            Physical Exam    Airway        Mallampati: IV   TM distance: > 3 FB   Neck ROM: full   Mouth opening: > 3 cm    Respiratory Devices and Support         Dental  no notable dental history         Cardiovascular   cardiovascular exam normal          Pulmonary   pulmonary exam normal                OUTSIDE LABS:  CBC:   Lab Results   Component Value Date    WBC 4.0 10/14/2021    WBC 4.0 06/08/2018    HGB 13.1 10/14/2021    HGB 12.1 06/08/2018    HCT 40.2 10/14/2021    HCT 37.2 06/08/2018     10/14/2021     06/08/2018     BMP:   Lab Results   Component Value Date     10/14/2021     08/20/2020    POTASSIUM 3.4 10/14/2021    POTASSIUM 3.6 08/20/2020    CHLORIDE 103 10/14/2021    CHLORIDE 107 08/20/2020    CO2 29 10/14/2021    CO2 27 08/20/2020    BUN 10 10/14/2021    BUN 13 08/20/2020    CR 1.00 10/14/2021    CR 0.94 08/20/2020    GLC 87 10/14/2021    GLC 78 08/20/2020     COAGS:   Lab Results   Component Value Date    PTT 27 05/16/2013    INR 1.06 05/16/2013     POC:   Lab Results   Component Value Date    HCG Negative 11/05/2021     HEPATIC:   Lab Results   Component Value Date    ALBUMIN 3.8 06/08/2018    PROTTOTAL 8.6 01/08/2018    ALT 14 01/08/2018     AST 22 01/08/2018    ALKPHOS 35 (L) 01/08/2018    BILITOTAL 0.2 01/08/2018     OTHER:   Lab Results   Component Value Date    A1C 5.4 10/14/2021    DAXA 9.3 10/14/2021    PHOS 2.5 06/08/2018    TSH 1.54 10/14/2021    T4 1.19 10/18/2018    T3 685 (H) 05/10/2005    CRP <2.9 03/09/2017    SED 20 06/22/2005       Anesthesia Plan    ASA Status:  2   NPO Status:  NPO Appropriate    Anesthesia Type: General.     - Airway: LMA   Induction: Intravenous.   Maintenance: Balanced.        Consents    Anesthesia Plan(s) and associated risks, benefits, and realistic alternatives discussed. Questions answered and patient/representative(s) expressed understanding.     - Discussed with:  Patient      - Specific Concerns: PONV, sore throat, airway injury, major complications.        Postoperative Care    Pain management: IV analgesics, Multi-modal analgesia, Oral pain medications.   PONV prophylaxis: Ondansetron (or other 5HT-3), Dexamethasone or Solumedrol, Background Propofol Infusion     Comments:                Álvaro Parry MD

## 2021-11-09 NOTE — TELEPHONE ENCOUNTER
MEDICAL RECORDS REQUEST   [facility]  Urology Clinic  [address] [place] [address]  PHONE: [phone]  Fax: [phone]        FUTURE VISIT INFORMATION                                                   Mimi Melton : 1975 scheduled for future visit at [facility]    APPOINTMENT INFORMATION:    Date: 21    Provider:  Brennan    Reason for Visit/Diagnosis: Incontinence per pt    REFERRAL INFORMATION:    Referring provider:      Specialty:     Referring providers clinic:      Clinic contact number:      RECORDS REQUESTED FOR VISIT                                                     NOTES  STATUS/DETAILS   OFFICE NOTE from referring provider  no   OFFICE NOTE from other specialist  yes   DISCHARGE SUMMARY from hospital  no   DISCHARGE REPORT from the ER  no   OPERATIVE REPORT  no   MEDICATION LIST  yes   LABS     URINALYSIS (UA)  yes   URINE CYTOLOGY  yes   BLADDER CANCER     PATHOLOGY REPORT & SLIDES  no   PET SCAN (IMAGES & REPORT)  no   INFERTILITY     ALBUMIN  yes   FSH  no   LAST UROLOGY/OB GYN VISIT NOTE  yes   LH  no   SEMEN ANALYSIS (LAST 2)  no   SHBG  no   T  no   KIDNEY CANCER     CHEST XRAY (CXR)  no   MRI ABDOMEN  (REPORT & SLIDES)  no   PATHOLOGY REPORT & SLIDES  no   KIDNEY STONE     CT STONE  no   IMAGING (IMAGES & REPORT)  no   KUB  no   NEUROGENIC BLADDER      CT ABDOMEN/PELVIS (IMAGES & REPORT)  no   CYSTOGRAM (IMAGES & REPORT)  no   IVP (IMAGES & REPORT)  no   KUB (IMAGES & REPORT)  no   LABS  no   RENAL/BLADDER ULTRASOUND (IMAGES & REPORT)  no   PROSTATE CANCER     BONE SCAN  no   MRI OF PROSTATE  no   PATHOLOGY REPORT & SLIDES  no   PSA (LAB)  no   URETHRAL STRICTURE     RUG (IMAGES & REPORT)  no   VCUG  (IMAGES & REPORT)  no     PRE-VISIT CHECKLIST      Record collection complete Yes   Appointment appropriately scheduled           (right time/right provider) Yes   Joint diagnostic appointment coordinated  correctly          (ensure right order & amount of time) Yes   MyChart activation Yes   Questionnaire complete No

## 2021-11-15 ENCOUNTER — PRE VISIT (OUTPATIENT)
Dept: UROLOGY | Facility: CLINIC | Age: 46
End: 2021-11-15
Payer: COMMERCIAL

## 2021-11-15 NOTE — TELEPHONE ENCOUNTER
Reason for Visit: Consult    Diagnosis: Incontinence    Orders/Procedures/Records: in system    Contact Patient: n/a    Rooming Requirements: UA dip / PVR      Fred Irwin  11/15/21  10:13 AM

## 2021-11-17 ENCOUNTER — OFFICE VISIT (OUTPATIENT)
Dept: UROLOGY | Facility: CLINIC | Age: 46
End: 2021-11-17
Payer: COMMERCIAL

## 2021-11-17 ENCOUNTER — PRE VISIT (OUTPATIENT)
Dept: UROLOGY | Facility: CLINIC | Age: 46
End: 2021-11-17

## 2021-11-17 VITALS
SYSTOLIC BLOOD PRESSURE: 129 MMHG | HEART RATE: 90 BPM | WEIGHT: 180 LBS | BODY MASS INDEX: 30.73 KG/M2 | HEIGHT: 64 IN | DIASTOLIC BLOOD PRESSURE: 79 MMHG

## 2021-11-17 DIAGNOSIS — N39.3 STRESS INCONTINENCE: Primary | ICD-10-CM

## 2021-11-17 PROCEDURE — 99204 OFFICE O/P NEW MOD 45 MIN: CPT | Performed by: UROLOGY

## 2021-11-17 ASSESSMENT — MIFFLIN-ST. JEOR: SCORE: 1446.47

## 2021-11-17 ASSESSMENT — PAIN SCALES - GENERAL: PAINLEVEL: NO PAIN (0)

## 2021-11-17 NOTE — PROGRESS NOTES
"Urology Clinic Note      Date: 11/17/2021  Time: 9:47 AM  Patient: Mimi Melton  MRN: [id number]    Reason for Visit: KARMA    HPI/Subjective: Mimi Melton is a 46 year old female w/ h/o KARMA, 3 C-sections in the past, h/o midurethral sling in 2017 with excellent results. Initially without any leakage. She presents today after noticing some moisture in her underwear after urinating (post-void), also notes she does have some leaking when coughing/laughing when her bladder is more full. This has been going on for the last 4-5 months. No UTIs but has been struggling w/ recurrent BV. Does report some urgency but doesn't always know when her bladder is full. No UTI sx today.    She also reports having to push on her rectum (not vagina) to complete defecation for the last year, though this is intermittent. She has 2-3 BMs per day, normal caliber.     Cycles are every 21 days, she has a paraguard IUD.     PVR today 0.     Objective:  /79   Pulse 90   Ht 1.626 m (5' 4\")   Wt 81.6 kg (180 lb)   LMP 10/27/2021   BMI 30.90 kg/m    Gen: In NAD, conversant.  Resp: Breathing non-labored on room air.  CV: Warm and well perfused, RRR on peripheral pulse.  Abd: Soft, non-distended, non-tender.  Ext: Moving all 4, no BLE edema noted  Neuro: No focal deficits noted  :  Normal external genitalia, no urethral hypermobility noted, no leakage appreciated. No exposed mesh or evidence of prolapse. Able to identify pelvic floor muscles well, normal tone.    Labs/Imaging:    - STI screen negative  - UA in 09/2021 w/o abnormalities  - 01/2021 Pelvic US w/o abnormalities    Assessment & Plan: Mimi Melton is a 46 year old female who previously underwent midurethral sling in 2017 now presenting with some occasional leakage for the last 4-5 months. Discussed general bladder health and reinforced importance of regular voiding patterns to avoid scenarios where her bladder is overly full.  Exam today grossly normal, no POP or leakage " noted though her bladder was not full. Given that she is performing digital stimulation to complete defecation, likely related to constipation or stool quality.    - Encouraged her to look into bidet conversions for her home bathroom, increase fiber intake (metamucil, etc)  - RTC as needed        Moncho Jiang MD  Urology, PGY-2    Patient was seen, evaluated and plan was formulated in conjunction with me and I agree with the above.  Karin Amin MD    52 minutes spent on the date of the encounter doing chart review, history and exam, documentation and further activities per the note

## 2021-11-17 NOTE — LETTER
"11/17/2021       RE: Mimi Melton  [address] [address]     Dear Colleague,    Thank you for referring your patient, Mimi Melton, to the [facility] [place] [facility]. Please see a copy of my visit note below.    Urology Clinic Note      Date: 11/17/2021  Time: 9:47 AM  Patient: Mimi Melton  MRN: [id number]    Reason for Visit: KARMA    HPI/Subjective: Mimi Melton is a 46 year old female w/ h/o KARMA, 3 C-sections in the past, h/o midurethral sling in 2017 with excellent results. Initially without any leakage. She presents today after noticing some moisture in her underwear after urinating (post-void), also notes she does have some leaking when coughing/laughing when her bladder is more full. This has been going on for the last 4-5 months. No UTIs but has been struggling w/ recurrent BV. Does report some urgency but doesn't always know when her bladder is full. No UTI sx today.    She also reports having to push on her rectum (not vagina) to complete defecation for the last year, though this is intermittent. She has 2-3 BMs per day, normal caliber.     Cycles are every 21 days, she has a paraguard IUD.     PVR today 0.     Objective:  /79   Pulse 90   Ht 1.626 m (5' 4\")   Wt 81.6 kg (180 lb)   LMP 10/27/2021   BMI 30.90 kg/m    Gen: In NAD, conversant.  Resp: Breathing non-labored on room air.  CV: Warm and well perfused, RRR on peripheral pulse.  Abd: Soft, non-distended, non-tender.  Ext: Moving all 4, no BLE edema noted  Neuro: No focal deficits noted  :  Normal external genitalia, no urethral hypermobility noted, no leakage appreciated. No exposed mesh or evidence of prolapse. Able to identify pelvic floor muscles well, normal tone.    Labs/Imaging:    - STI screen negative  - UA in 09/2021 w/o abnormalities  - 01/2021 Pelvic US w/o abnormalities    Assessment & Plan: Mimi Melton is a 46 year old " female who previously underwent midurethral sling in 2017 now presenting with some occasional leakage for the last 4-5 months. Discussed general bladder health and reinforced importance of regular voiding patterns to avoid scenarios where her bladder is overly full.  Exam today grossly normal, no POP or leakage noted though her bladder was not full. Given that she is performing digital stimulation to complete defecation, likely related to constipation or stool quality.    - Encouraged her to look into bidet conversions for her home bathroom, increase fiber intake (metamucil, etc)  - RTC as needed        Moncho Jiang MD  Urology, PGY-2    Patient was seen, evaluated and plan was formulated in conjunction with me and I agree with the above.  Karin Amin MD    52 minutes spent on the date of the encounter doing chart review, history and exam, documentation and further activities per the note

## 2021-11-17 NOTE — NURSING NOTE
"Chief Complaint   Patient presents with     Consult     Incontinence       Blood pressure 129/79, pulse 90, height 1.626 m (5' 4\"), weight 81.6 kg (180 lb), last menstrual period 10/27/2021, not currently breastfeeding. Body mass index is 30.9 kg/m .    Patient Active Problem List   Diagnosis     Surveillance of previously prescribed intrauterine contraceptive device     Vaginitis and vulvovaginitis     Exophthalmos     Abnormal Pap smear of cervix     CARDIOVASCULAR SCREENING; LDL GOAL LESS THAN 160     Essential hypertension with goal blood pressure less than 140/90     Anemia     Hypothyroidism due to acquired atrophy of thyroid     Cervical high risk HPV (human papillomavirus) test positive     Thyroid disease     Female stress incontinence     Chronic seasonal allergic rhinitis, unspecified trigger     Need for HPV vaccine     Thyroid eye disease     Eyelid retraction, unspecified laterality       Allergies   Allergen Reactions     Dust Mites      Lisinopril Swelling     Swelling in lip     Mold      Cannot have any meds that contain mold.       Current Outpatient Medications   Medication Sig Dispense Refill     ACETAMINOPHEN PO Take 1,000 mg by mouth 2 times daily as needed for pain (menstrual cramps)       benzoyl peroxide (BENZOYL PEROXIDE WASH) 5 % external liquid WASH FACE SKIN 1-2 TIMES DAILY 142 mL 3     clindamycin (CLEOCIN) 75 MG capsule Take 2 capsules (150 mg) by mouth 2 times daily 28 capsule 0     erythromycin (ROMYCIN) 5 MG/GM ophthalmic ointment Apply small amount to incision sites three times daily, then apply to inner lower lid of operative eye(s) at bedtime, as directed. 3.5 g 0     fexofenadine (ALLEGRA) 180 MG tablet TAKE 1-2 TABLETS BY MOUTH AGAINST ALLERGIC REACTIONS 30 tablet 3     fluticasone (FLONASE) 50 MCG/ACT nasal spray SPRAY 1 SPRAY INTO BOTH NOSTRILS 2 TIMES DAILY 48 mL 3     hydrochlorothiazide (HYDRODIURIL) 12.5 MG tablet Take 1 tablet (12.5 mg) by mouth daily 90 tablet 1     " IBUPROFEN PO Take 800 mg by mouth every 4 hours as needed (takes for menstrual cramps.)        Lactobacillus-Inulin ([facility] DIGESTIVE HEALTH) CAPS Take 1 capsule by mouth 2 times daily 100 capsule 11     levothyroxine (SYNTHROID/LEVOTHROID) 100 MCG tablet Take 1 tablet (100 mcg) by mouth daily 90 tablet 1     losartan (COZAAR) 25 MG tablet Take 1 tablet (25 mg) by mouth daily 90 tablet 1     mupirocin (BACTROBAN) 2 % ointment as needed        neomycin-polymixin-dexamethasone (MAXITROL) 0.1 % ophthalmic suspension Place one drop in both eyes four times a day for 10 days 5 mL 0     tretinoin (RETIN-A) 0.05 % external cream Apply topically At Bedtime Put on face 1-2 times daily (if irritated reduce to every other day) 20 g 3     valACYclovir (VALTREX) 500 MG tablet Take 1 tablet (500 mg) by mouth 2 times daily for 3 days 6 tablet 0       Social History     Tobacco Use     Smoking status: Never Smoker     Smokeless tobacco: Never Used   Substance Use Topics     Alcohol use: Yes     Comment: 1-2x's/month if that.     Drug use: Yes     Types: Marijuana     Comment: 3x's/day       Fred Irwin  11/17/2021  9:36 AM

## 2021-11-22 ENCOUNTER — OFFICE VISIT (OUTPATIENT)
Dept: OPHTHALMOLOGY | Facility: CLINIC | Age: 46
End: 2021-11-22
Payer: COMMERCIAL

## 2021-11-22 DIAGNOSIS — H57.89 THYROID EYE DISEASE: Primary | ICD-10-CM

## 2021-11-22 DIAGNOSIS — Z98.890 POSTOPERATIVE EYE STATE: ICD-10-CM

## 2021-11-22 DIAGNOSIS — E07.9 THYROID EYE DISEASE: Primary | ICD-10-CM

## 2021-11-22 PROCEDURE — 99024 POSTOP FOLLOW-UP VISIT: CPT | Performed by: OPHTHALMOLOGY

## 2021-11-22 ASSESSMENT — TONOMETRY
IOP_METHOD: ICARE
OD_IOP_MMHG: 18
OS_IOP_MMHG: 20

## 2021-11-22 ASSESSMENT — CONF VISUAL FIELD
METHOD: COUNTING FINGERS
OD_NORMAL: 1
OS_NORMAL: 1

## 2021-11-22 ASSESSMENT — EXTERNAL EXAM - RIGHT EYE: OD_EXAM: PERIORBITAL EDEMA

## 2021-11-22 ASSESSMENT — VISUAL ACUITY
CORRECTION_TYPE: GLASSES
OD_CC: 20/20
OS_CC: 20/20
METHOD: SNELLEN - LINEAR

## 2021-11-22 ASSESSMENT — SLIT LAMP EXAM - LIDS
COMMENTS: INCISIONS CLEAN/DRY/INTACT
COMMENTS: INCISIONS CLEAN/DRY/INTACT

## 2021-11-22 ASSESSMENT — EXTERNAL EXAM - LEFT EYE: OS_EXAM: PERIORBITAL EDEMA

## 2021-11-22 NOTE — NURSING NOTE
Chief Complaints and History of Present Illnesses   Patient presents with     Follow Up     Thyroid Eye Disease,  4/30/2018.     Chief Complaint(s) and History of Present Illness(es)     Follow Up     Laterality: both eyes    Onset: years ago    Frequency: constantly    Course: stable    Associated symptoms: dryness.  Negative for double vision, eye pain, flashes, floaters, itching, discharge and swelling    Treatments tried: no treatments    Pain scale: 0/10    Comments: Thyroid Eye Disease,  4/30/2018.              Comments     PROCEDURE:Bilateral lateral wall bone and fat orbital decompression. Bilateral lower lid retraction repair.    Pt here today for post procedure visit.  States no pain or discomfort to report.   Pt would like to speak with Dr. Donnelly about procedure.  Things going well, wondering when she can wear her CL's again.    Ocular meds =   neomycin-polymixin-dexamethasone (MAXITROL) 0.1 % ophthalmic suspension  erythromycin (ROMYCIN) 5 MG/GM ophthalmic ointment      Usman MOLINA, DEANDRA 8:07 AM 11/22/2021

## 2021-11-22 NOTE — PROGRESS NOTES
Chief Complaints and History of Present Illnesses   Patient presents with     Follow Up     Thyroid Eye Disease,  4/30/2018.     Chief Complaint(s) and History of Present Illness(es)     Follow Up     In both eyes.  This started years ago.  Occurring constantly.  Since   onset it is stable.  Associated symptoms include dryness.  Negative for   double vision, eye pain, flashes, floaters, itching, discharge and   swelling.  Treatments tried include no treatments.  Pain was noted as   0/10. Additional comments: Thyroid Eye Disease,  4/30/2018.              Comments     PROCEDURE:Bilateral lateral wall bone and fat orbital decompression.   Bilateral lower lid retraction repair.    Pt here today for post procedure visit.  States no pain or discomfort to report.   Pt would like to speak with Dr. Donnelly about procedure.  Things going well, wondering when she can wear her CL's again.    Ocular meds =   neomycin-polymixin-dexamethasone (MAXITROL) 0.1 % ophthalmic suspension  erythromycin (ROMYCIN) 5 MG/GM ophthalmic ointment      DEANDRA Morin 8:07 AM 11/22/2021                    Assessment & Plan     Mimi Melton is a 46 year old female with the following diagnoses:   1. Thyroid eye disease    2. Postoperative eye state         Continue antibiotic ointment or bland lubricating ointment (eg vaseline or aquaphor) to the incision(s) two times a day.    Gently massage along the incision(s) two times a day.    Use warm soaks over the incision(s) four times a day until swelling and bruises resolve.    Return to clinic 2 months              Attending Physician Attestation:  Complete documentation of historical and exam elements from today's encounter can be found in the full encounter summary report (not reduplicated in this progress note).  I personally obtained the chief complaint(s) and history of present illness.  I confirmed and edited as necessary the review of systems, past medical/surgical history,  family history, social history, and examination findings as documented by others; and I examined the patient myself.  I personally reviewed the relevant tests, images, and reports as documented above.  I formulated and edited as necessary the assessment and plan and discussed the findings and management plan with the patient and family. I personally reviewed the ophthalmic test(s) associated with this encounter, and have completed the corresponding report(s) as necessary.   -Niles Donnelly MD

## 2021-12-06 ENCOUNTER — OFFICE VISIT (OUTPATIENT)
Dept: OBGYN | Facility: CLINIC | Age: 46
End: 2021-12-06
Payer: COMMERCIAL

## 2021-12-06 VITALS
HEIGHT: 64 IN | HEART RATE: 115 BPM | BODY MASS INDEX: 31.76 KG/M2 | DIASTOLIC BLOOD PRESSURE: 90 MMHG | WEIGHT: 186 LBS | OXYGEN SATURATION: 98 % | SYSTOLIC BLOOD PRESSURE: 134 MMHG

## 2021-12-06 DIAGNOSIS — R87.810 PAP SMEAR OF CERVIX SHOWS HIGH RISK HPV PRESENT: ICD-10-CM

## 2021-12-06 DIAGNOSIS — N89.8 VAGINAL IRRITATION: Primary | ICD-10-CM

## 2021-12-06 DIAGNOSIS — B96.89 BACTERIAL VAGINOSIS: ICD-10-CM

## 2021-12-06 DIAGNOSIS — D25.9 UTERINE LEIOMYOMA, UNSPECIFIED LOCATION: ICD-10-CM

## 2021-12-06 DIAGNOSIS — N76.0 BACTERIAL VAGINOSIS: ICD-10-CM

## 2021-12-06 DIAGNOSIS — Z23 NEED FOR TDAP VACCINATION: ICD-10-CM

## 2021-12-06 LAB
CLUE CELLS: PRESENT
TRICHOMONAS, WET PREP: ABNORMAL
WBC'S/HIGH POWER FIELD, WET PREP: ABNORMAL
YEAST, WET PREP: ABNORMAL

## 2021-12-06 PROCEDURE — 90715 TDAP VACCINE 7 YRS/> IM: CPT | Performed by: OBSTETRICS & GYNECOLOGY

## 2021-12-06 PROCEDURE — 87624 HPV HI-RISK TYP POOLED RSLT: CPT | Performed by: OBSTETRICS & GYNECOLOGY

## 2021-12-06 PROCEDURE — 87210 SMEAR WET MOUNT SALINE/INK: CPT | Performed by: OBSTETRICS & GYNECOLOGY

## 2021-12-06 PROCEDURE — 87591 N.GONORRHOEAE DNA AMP PROB: CPT | Performed by: OBSTETRICS & GYNECOLOGY

## 2021-12-06 PROCEDURE — 88175 CYTOPATH C/V AUTO FLUID REDO: CPT | Performed by: OBSTETRICS & GYNECOLOGY

## 2021-12-06 PROCEDURE — 99213 OFFICE O/P EST LOW 20 MIN: CPT | Mod: 25 | Performed by: OBSTETRICS & GYNECOLOGY

## 2021-12-06 PROCEDURE — 87491 CHLMYD TRACH DNA AMP PROBE: CPT | Performed by: OBSTETRICS & GYNECOLOGY

## 2021-12-06 PROCEDURE — 90471 IMMUNIZATION ADMIN: CPT | Performed by: OBSTETRICS & GYNECOLOGY

## 2021-12-06 RX ORDER — CLINDAMYCIN HCL 300 MG
300 CAPSULE ORAL 2 TIMES DAILY
Qty: 14 CAPSULE | Refills: 0 | Status: SHIPPED | OUTPATIENT
Start: 2021-12-06 | End: 2021-12-13

## 2021-12-06 RX ORDER — METRONIDAZOLE 7.5 MG/G
1 GEL VAGINAL WEEKLY
Qty: 70 G | Refills: 3 | Status: SHIPPED | OUTPATIENT
Start: 2021-12-06 | End: 2022-08-10

## 2021-12-06 ASSESSMENT — MIFFLIN-ST. JEOR: SCORE: 1473.69

## 2021-12-06 NOTE — PROGRESS NOTES
"GYN CLINIC VISIT  2021  CC: pelvic exam    HPI:  Mimi Melton is a 46 year old  who presents for pelvic exam.    Vagina is itchy and irritated at night.   H/o recurrent BV. Not much discharge but feels like BV.  Prefers oral clinda for treatment  Okay with weekly metrogel for suppression  No concern for STIs    Still having very periods, every 21d, bleeds for 4-5d.     Due for diagnostic pap -  ECC, repeat pap and hpv all negative .   NIL paps: 2/10, 9/10, , . Plan pap in 3 yrs.  : NIL pap. Plan cotest pap & HPV in 3 years.  2015: NIL pap, + HPV (not 16 or 18). Plan cotest pap & HPV in 1 year  Tracking started.  2016 Pap: NIL/neg HR HPV. Plan cotest in 3 years.  10/18/19 Pap: NIL, +HR HPV (not 16/18). Plan cotest in 1 year.  10/30/20: NIL Pap, + HR HPV (not 16 or 18). Plan [facility] due bef 21.  20 Pt notified by phone.  20: [facility] ECC Neg for dysplasia. Plan cotest in 1 year due 21. Provider advised the pt of the results and recommendations thru mychart. Pt viewed.   21 pap appt scheduled      Vitals:    21 0839   BP: (!) 134/90   Pulse: 115   SpO2: 98%   Weight: 84.4 kg (186 lb)   Height: 1.626 m (5' 4\")     Gen: alert, oriented, no distress,  pleasant  Abd: soft, nontender, nondistended  : normal external genitalia without lesions or erythema; normal, well supported urethra, normal Bartholins, normal Skenes; normal pink rugated vaginal mucosa, no lesions or abnormal discharge; medium speculum inserted without difficulty; normal appearing cervix without lesions, bleeding or discharge. IUD strings visualized. Bimanual exam shows 10 week sized fibroid uterus, mobile, no fundal tenderness, no CMT, no adnexal masses or tenderness.  Extr: warm, well perfused, nontender,  edema  Psych: affect bright, cooperative, responds appropriately        ASSESSMENT:  46 year old  presents with vaginal irritation due to recurrent BV. Due for diagnostic pap. " Fibroid uterus. IUD in place.    PLAN:  1. Vaginal irritation  - NEISSERIA GONORRHOEA PCR  - CHLAMYDIA TRACHOMATIS PCR  - Wet preparation    2. Bacterial vaginosis  Requests clinda for treatment   Discussed weekly metrogel after that for suppresion  - clindamycin (CLEOCIN) 300 MG capsule; Take 1 capsule (300 mg) by mouth 2 times daily for 7 days  Dispense: 14 capsule; Refill: 0  - metroNIDAZOLE (METROGEL) 0.75 % vaginal gel; Place 1 applicator (5 g) vaginally once a week  Dispense: 70 g; Refill: 3    3. Pap smear of cervix shows high risk HPV present  Follow up per ASCCP guidelines  - Pap diagnostic with HPV    4. Uterine leiomyoma, unspecified location  Has ultrasound scheduled next month    5. Need for Tdap vaccination    - TDAP VACCINE (Adacel, Boostrix)  [[id number]]  - VACCINE ADMINISTRATION [facility], INITIAL    Shanique Fish MD

## 2021-12-06 NOTE — NURSING NOTE
Clinic Administered Medication Documentation          Injectable Medication Documentation    Patient was given tdap. Prior to medication administration, verified patients identity using patient s name and date of birth. Please see MAR and medication order for additional information. Patient instructed to remain in clinic for 15 minutes.      Was entire vial of medication used? Yes  Vial/Syringe: Single dose vial  Expiration Date:  6/3/23  Was this medication supplied by the patient? No

## 2021-12-07 LAB
C TRACH DNA SPEC QL NAA+PROBE: NEGATIVE
N GONORRHOEA DNA SPEC QL NAA+PROBE: NEGATIVE

## 2021-12-09 LAB
BKR LAB AP GYN ADEQUACY: NORMAL
BKR LAB AP GYN INTERPRETATION: NORMAL
BKR LAB AP HPV REFLEX: NORMAL
BKR LAB AP PREVIOUS ABNL DX: NORMAL
BKR LAB AP PREVIOUS ABNORMAL: NORMAL
PATH REPORT.COMMENTS IMP SPEC: NORMAL
PATH REPORT.COMMENTS IMP SPEC: NORMAL
PATH REPORT.RELEVANT HX SPEC: NORMAL

## 2021-12-13 LAB
HUMAN PAPILLOMA VIRUS 16 DNA: NEGATIVE
HUMAN PAPILLOMA VIRUS 18 DNA: NEGATIVE
HUMAN PAPILLOMA VIRUS FINAL DIAGNOSIS: ABNORMAL
HUMAN PAPILLOMA VIRUS OTHER HR: POSITIVE

## 2021-12-14 ENCOUNTER — PATIENT OUTREACH (OUTPATIENT)
Dept: OBGYN | Facility: CLINIC | Age: 46
End: 2021-12-14
Payer: COMMERCIAL

## 2021-12-22 ENCOUNTER — IMMUNIZATION (OUTPATIENT)
Dept: NURSING | Facility: CLINIC | Age: 46
End: 2021-12-22
Payer: COMMERCIAL

## 2021-12-22 PROCEDURE — 0004A PR COVID VAC PFIZER DIL RECON 30 MCG/0.3 ML IM: CPT

## 2021-12-22 PROCEDURE — 91300 PR COVID VAC PFIZER DIL RECON 30 MCG/0.3 ML IM: CPT

## 2022-01-10 ENCOUNTER — ANCILLARY PROCEDURE (OUTPATIENT)
Dept: ULTRASOUND IMAGING | Facility: CLINIC | Age: 47
End: 2022-01-10
Attending: OBSTETRICS & GYNECOLOGY
Payer: COMMERCIAL

## 2022-01-10 ENCOUNTER — LAB (OUTPATIENT)
Dept: LAB | Facility: CLINIC | Age: 47
End: 2022-01-10
Payer: COMMERCIAL

## 2022-01-10 ENCOUNTER — OFFICE VISIT (OUTPATIENT)
Dept: OBGYN | Facility: CLINIC | Age: 47
End: 2022-01-10
Payer: COMMERCIAL

## 2022-01-10 VITALS
WEIGHT: 184.5 LBS | HEART RATE: 82 BPM | HEIGHT: 65 IN | DIASTOLIC BLOOD PRESSURE: 87 MMHG | BODY MASS INDEX: 30.74 KG/M2 | SYSTOLIC BLOOD PRESSURE: 128 MMHG

## 2022-01-10 DIAGNOSIS — B96.89 BV (BACTERIAL VAGINOSIS): ICD-10-CM

## 2022-01-10 DIAGNOSIS — N89.8 VAGINAL DISCHARGE: ICD-10-CM

## 2022-01-10 DIAGNOSIS — N76.0 BV (BACTERIAL VAGINOSIS): ICD-10-CM

## 2022-01-10 DIAGNOSIS — D25.9 UTERINE LEIOMYOMA, UNSPECIFIED LOCATION: ICD-10-CM

## 2022-01-10 DIAGNOSIS — Z30.011 ENCOUNTER FOR INITIAL PRESCRIPTION OF CONTRACEPTIVE PILLS: ICD-10-CM

## 2022-01-10 DIAGNOSIS — T83.32XA MALPOSITIONED IUD, INITIAL ENCOUNTER: Primary | ICD-10-CM

## 2022-01-10 PROCEDURE — 87210 SMEAR WET MOUNT SALINE/INK: CPT

## 2022-01-10 PROCEDURE — 58301 REMOVE INTRAUTERINE DEVICE: CPT | Performed by: OBSTETRICS & GYNECOLOGY

## 2022-01-10 PROCEDURE — 99213 OFFICE O/P EST LOW 20 MIN: CPT | Mod: 25 | Performed by: OBSTETRICS & GYNECOLOGY

## 2022-01-10 PROCEDURE — 76830 TRANSVAGINAL US NON-OB: CPT | Performed by: OBSTETRICS & GYNECOLOGY

## 2022-01-10 RX ORDER — METRONIDAZOLE 500 MG/1
500 TABLET ORAL 2 TIMES DAILY
Qty: 14 TABLET | Refills: 0 | Status: SHIPPED | OUTPATIENT
Start: 2022-01-10 | End: 2022-01-17

## 2022-01-10 RX ORDER — ACETAMINOPHEN AND CODEINE PHOSPHATE 120; 12 MG/5ML; MG/5ML
0.35 SOLUTION ORAL DAILY
Qty: 84 TABLET | Refills: 3 | Status: SHIPPED | OUTPATIENT
Start: 2022-01-10 | End: 2022-10-20

## 2022-01-10 RX ORDER — L. ACIDOPHILUS/L. RHAMNOSUS 15B CELL
1 CAPSULE ORAL DAILY
Qty: 30 CAPSULE | Refills: 1 | Status: SHIPPED | OUTPATIENT
Start: 2022-01-10 | End: 2023-05-11

## 2022-01-10 ASSESSMENT — MIFFLIN-ST. JEOR: SCORE: 1478.8

## 2022-01-10 NOTE — PROGRESS NOTES
"HPI:  Mimi Melton is a 46 year old female  here for a consultation and pelvic ultrasound follow-up regarding: IUD placement and fibroid follow-up.    She has a known 4-5 cm fundal fibroid and gets yearly pelvic ultrasounds to follow her fibroid.  Per patient report.     She had a copper IUD that was replaced 2021.     Has had chronic BV   For 'forever\"     Has had a copper IUD for the past 25 yrs.  Just keeps getting a new one when it expires.  In December she was at the \[facility]\"" and did a V steam treatment. The next day she had severe uterine cramping. The following period this month was also very painful     pelvic ultrasound today shows IUD in the cervix.   She has 2 partners.  One has a vasectomy and the other one is 73 yrs old. She has used iuds for 25 yrs and can't be on the pill due to htn. Wants to try the progesterone oral contraceptive pill.      Patient's last menstrual period was 2022 (exact date).         Past GYN history:   Lab Results   Component Value Date    PAP NIL 10/30/2020    PAP NIL 10/18/2019    PAP NIL 2016           Past Medical History:   Diagnosis Date     Cervical high risk HPV (human papillomavirus) test positive 2015, 10/2019, 10/30/20     Chronic sinusitis Summer 2016    I get congested every 3-4 weeks     Esophageal reflux      Exophthalmos, unspecified      Hypothyroidism      Thyroid eye disease      Thyrotoxicosis without mention of goiter or other cause, without mention of thyrotoxic crisis or storm 2005    Had radioablation, On synthroid, seeing [facility]; takes synthroid     Unspecified essential hypertension 1980    meds since , on atenolol       Past Surgical History:   Procedure Laterality Date     CYSTOSCOPY, SLING TRANSVAGINAL N/A 10/10/2017    Procedure: CYSTOSCOPY, SLING TRANSVAGINAL;  Midurethral Sling and Cystoscopy (Support the Urethra with Mesh Sling and Look in the Bladder);  Surgeon: Karin Amin MD;  Location: [facility]" OR     EYE SURGERY  2008    Orbital Decompression Dr smart      REPAIR INCISIONAL HERNIA,REDUCIBLE      Umbilical hernia Repair     ORBITOTOMY DECOMPRESSION Bilateral 2021    Procedure: Bilateral orbital decompression with  Sonopet, Bilateral lower lid retraction repair;  Surgeon: Niles Donnelly MD;  Location: [facility] OR     REPAIR RETRACTION LID Bilateral 2021    Procedure: REPAIR, RETRACTION, EYELID;  Surgeon: Niles Donnelly MD;  Location: [facility] OR     SONOPET EYE Bilateral 2021    Procedure: SONOPET EYE;  Surgeon: Niles Donnelly MD;  Location: [facility] OR     ZZC  DELIVERY ONLY  91    , Low Cervical     ZZC  DELIVERY ONLY  97    , Low Cervical     ZZC  DELIVERY ONLY  99    , Low Cervical     ZZC INDUCED ABORTN BY D&C      Aspiration & Curettage, TAB x2       Family History   Problem Relation Age of Onset     Hypertension Mother      Hypertension Father      Hypertension Maternal Grandmother      Hypertension Paternal Grandmother      Hypertension Maternal Grandfather      Hypertension Paternal Grandfather          Medications:    Current Outpatient Medications:      ACETAMINOPHEN PO, Take 1,000 mg by mouth 2 times daily as needed for pain (menstrual cramps), Disp: , Rfl:      benzoyl peroxide (BENZOYL PEROXIDE WASH) 5 % external liquid, WASH FACE SKIN 1-2 TIMES DAILY, Disp: 142 mL, Rfl: 3     erythromycin (ROMYCIN) 5 MG/GM ophthalmic ointment, Apply small amount to incision sites three times daily, then apply to inner lower lid of operative eye(s) at bedtime, as directed., Disp: 3.5 g, Rfl: 0     fexofenadine (ALLEGRA) 180 MG tablet, TAKE 1-2 TABLETS BY MOUTH AGAINST ALLERGIC REACTIONS, Disp: 30 tablet, Rfl: 3     fluticasone (FLONASE) 50 MCG/ACT nasal spray, SPRAY 1 SPRAY INTO BOTH NOSTRILS 2 TIMES DAILY, Disp: 48 mL, Rfl: 3     hydrochlorothiazide (HYDRODIURIL) 12.5 MG tablet, Take 1 tablet (12.5 mg) by mouth daily,  "Disp: 90 tablet, Rfl: 1     IBUPROFEN PO, Take 800 mg by mouth every 4 hours as needed (takes for menstrual cramps.) , Disp: , Rfl:      Lactobacillus (FLORAJEN WOMEN) CAPS, Take 1 Dose by mouth daily, Disp: 30 capsule, Rfl: 1     levothyroxine (SYNTHROID/LEVOTHROID) 100 MCG tablet, Take 1 tablet (100 mcg) by mouth daily, Disp: 90 tablet, Rfl: 1     losartan (COZAAR) 25 MG tablet, Take 1 tablet (25 mg) by mouth daily, Disp: 90 tablet, Rfl: 1     metroNIDAZOLE (FLAGYL) 500 MG tablet, Take 1 tablet (500 mg) by mouth 2 times daily for 7 days, Disp: 14 tablet, Rfl: 0     metroNIDAZOLE (METROGEL) 0.75 % vaginal gel, Place 1 applicator (5 g) vaginally once a week, Disp: 70 g, Rfl: 3     mupirocin (BACTROBAN) 2 % ointment, as needed , Disp: , Rfl:      norethindrone (MICRONOR) 0.35 MG tablet, Take 1 tablet (0.35 mg) by mouth daily, Disp: 84 tablet, Rfl: 3     Lactobacillus-Inulin ([facility] DIGESTIVE [facility]) CAPS, Take 1 capsule by mouth 2 times daily (Patient not taking: Reported on 1/10/2022), Disp: 100 capsule, Rfl: 11     neomycin-polymixin-dexamethasone (MAXITROL) 0.1 % ophthalmic suspension, Place one drop in both eyes four times a day for 10 days (Patient not taking: Reported on 1/10/2022), Disp: 5 mL, Rfl: 0     tretinoin (RETIN-A) 0.05 % external cream, Apply topically At Bedtime Put on face 1-2 times daily (if irritated reduce to every other day) (Patient not taking: Reported on 1/10/2022), Disp: 20 g, Rfl: 3     valACYclovir (VALTREX) 500 MG tablet, Take 1 tablet (500 mg) by mouth 2 times daily for 3 days, Disp: 6 tablet, Rfl: 0    Allergies:  Dust mites, Lisinopril, and Mold    ROS:  She denies abnormal vaginal bleeding, discharge or unusual pelvic pain, no dysuria, frequency or hematuria.    EXAM:  /87 (Patient Position: Sitting, Cuff Size: Adult Regular)   Pulse 82   Ht 1.645 m (5' 4.75\")   Wt 83.7 kg (184 lb 8 oz)   LMP 01/04/2022 (Exact Date)   BMI 30.94 kg/m      General - pleasant female in no " acute distress.  Neurological - Alert and oriented  Psych:  normal mood and affect.  normal respiratory and cardiovascular effort   Breast - deferred.  Abdomen - soft, nontender, nondistended.  Musculoskeletal - no gross deformities.     Pelvic:  EG - normal adult female.  BUS - within normal limits.  Vagina - well rugated, moderate discharge.  Cervix - no lesions, no CMT. BME is not performed. IUD strings noted     Procedure:  After consent was obtained from the patient, IUD strings were grasped with ring forcep and IUD removed intact with moderate resistance.  Patient felt moderate cramping and was given ibuprofen following the removal.   Scant bleeding noted.    ASSESSMENT:  Encounter Diagnoses   Name Primary?     Malpositioned IUD, initial encounter Yes     Encounter for initial prescription of contraceptive pills      BV (bacterial vaginosis)         PLAN:   Patient with chronic BV so given rx for metronidazole pills, floragen as well.   discussed may clear up for good now that the IUD is out.   We discussed options for contraception including tubal ligation and she is considering that but for now will take the prescription for micronor. discussed need to use condoms for 2 wks after starting the pill.   The IUD was removed today without complications.    Final US reading was not available to discussed the size of her fibroid today.  Will await the final US reading.     No orders of the defined types were placed in this encounter.        Delaney Mccarty MD

## 2022-01-27 ENCOUNTER — OFFICE VISIT (OUTPATIENT)
Dept: FAMILY MEDICINE | Facility: CLINIC | Age: 47
End: 2022-01-27
Payer: COMMERCIAL

## 2022-01-27 ENCOUNTER — NURSE TRIAGE (OUTPATIENT)
Dept: NURSING | Facility: CLINIC | Age: 47
End: 2022-01-27
Payer: COMMERCIAL

## 2022-01-27 VITALS
SYSTOLIC BLOOD PRESSURE: 125 MMHG | DIASTOLIC BLOOD PRESSURE: 86 MMHG | OXYGEN SATURATION: 98 % | HEART RATE: 93 BPM | TEMPERATURE: 98.4 F | RESPIRATION RATE: 16 BRPM

## 2022-01-27 DIAGNOSIS — Z91.048 ALLERGY TO MOLD: ICD-10-CM

## 2022-01-27 DIAGNOSIS — B96.89 ACUTE BACTERIAL RHINOSINUSITIS: Primary | ICD-10-CM

## 2022-01-27 DIAGNOSIS — H92.02 OTALGIA, LEFT: ICD-10-CM

## 2022-01-27 DIAGNOSIS — J01.90 ACUTE BACTERIAL RHINOSINUSITIS: Primary | ICD-10-CM

## 2022-01-27 PROCEDURE — 69209 REMOVE IMPACTED EAR WAX UNI: CPT | Mod: 50 | Performed by: NURSE PRACTITIONER

## 2022-01-27 PROCEDURE — U0003 INFECTIOUS AGENT DETECTION BY NUCLEIC ACID (DNA OR RNA); SEVERE ACUTE RESPIRATORY SYNDROME CORONAVIRUS 2 (SARS-COV-2) (CORONAVIRUS DISEASE [COVID-19]), AMPLIFIED PROBE TECHNIQUE, MAKING USE OF HIGH THROUGHPUT TECHNOLOGIES AS DESCRIBED BY CMS-2020-01-R: HCPCS | Performed by: NURSE PRACTITIONER

## 2022-01-27 PROCEDURE — 99213 OFFICE O/P EST LOW 20 MIN: CPT | Mod: 25 | Performed by: NURSE PRACTITIONER

## 2022-01-27 PROCEDURE — U0005 INFEC AGEN DETEC AMPLI PROBE: HCPCS | Performed by: NURSE PRACTITIONER

## 2022-01-27 ASSESSMENT — ENCOUNTER SYMPTOMS
MYALGIAS: 0
HEADACHES: 0
COUGH: 0

## 2022-01-27 NOTE — TELEPHONE ENCOUNTER
Patient is calling and states that she is allergic to molds.  Patient went to [place] and feels that she was living in a house with mold.  Today is taking flonase and allergy medicine and not getting better.  Patient is having sinus pressure.  Patient flew yesterday and her ears were very painful on the airplane.  Left side of nose is clogged up.      COVID 19 Nurse Triage Plan/Patient Instructions    Please be aware that novel coronavirus (COVID-19) may be circulating in the community. If you develop symptoms such as fever, cough, or SOB or if you have concerns about the presence of another infection including coronavirus (COVID-19), please contact your health care provider or visit [email or web address].     Disposition/Instructions    In-Person Visit with provider recommended. Reference Visit Selection Guide.    Thank you for taking steps to prevent the spread of this virus.  o Limit your contact with others.  o Wear a simple mask to cover your cough.  o Wash your hands well and often.    Resources    [facility]: About COVID-19: [email or web address]    CDC: What to Do If You're Sick: www.cdc.gov/coronavirus/2019-ncov/about/steps-when-sick.html    CDC: Ending Home Isolation: www.cdc.gov/coronavirus/2019-ncov/hcp/disposition-in-home-patients.html     CDC: Caring for Someone: www.cdc.gov/coronavirus/2019-ncov/if-you-are-sick/care-for-someone.html     [facility]: Interim Guidance for Hospital Discharge to Home: www.health.[facility].[place].us/diseases/coronavirus/hcp/hospdischarge.pdf    [facility] clinical trials (COVID-19 research studies): [email or web address]-clinical-trials     Below are the COVID-19 hotlines at the [place] Department of Health ([facility]). Interpreters are available.   o For health questions: Call 401-701-7265 or 1-428.356.2552 (7 a.m. to 7 p.m.)  o For questions about schools and childcare: Call 312-123-5827 or 1-442.352.9652 (7 a.m. to 7 p.m.)                        Reason for Disposition    SEVERE sinus pain    Additional Information    Negative: Sounds like a life-threatening emergency to the triager    Negative: Difficulty breathing, and not from stuffy nose (e.g., not relieved by cleaning out the nose)    Negative: SEVERE headache and has fever    Negative: Patient sounds very sick or weak to the triager    Protocols used: SINUS PAIN OR CONGESTION-A-[place]

## 2022-01-27 NOTE — PATIENT INSTRUCTIONS
Stop Afrin -can only take this for 3 days.    Continue your fluticasone and one of the 2 pills you are taking.     Start Augmentin for possible bacterial sinus infection.  This can cause diarrhea, so please eat yogurt to help prevent this side effect.    If this is bacterial, your sinuses should feel much better in 4 days or so.  Consider recheck if still very congested and no better after 4 days.    Covid test back in about 2 days.    Continue Tylenol or ibuprofen as needed for discomfort.  Warm packs to your face, steam.        Patient Education     Acute Bacterial Rhinosinusitis (ABRS)    Acute bacterial rhinosinusitis (ABRS) is an infection of your nasal cavity and sinuses. It s caused by bacteria. Acute means that you ve had symptoms for less than 4 weeks, but possibly up to 12 weeks.  Understanding your sinuses  The nasal cavity is the large air-filled space behind your nose. The sinuses are a group of spaces formed by the bones of your face. They connect with your nasal cavity. ABRS causes the tissue lining these spaces to become inflamed. Mucus may not drain normally. This leads to facial pain and other symptoms.  What causes ABRS?  ABRS most often follows an upper respiratory infection caused by a virus. Bacteria then infect the lining of your nasal cavity and sinuses. But you can also get ABRS if you have:    Nasal allergies    Long-term nasal swelling and congestion not caused by allergies    Blockage in the nose  Symptoms of ABRS  The symptoms of ABRS may be different for each person and include:    Nasal congestion or blockage    Pain or pressure in the face    Thick, colored drainage from the nose  Other symptoms may include:    Runny nose    Fluid draining from the nose down the throat (postnasal drip)    Headache    Cough    Pain    Fever  Diagnosing ABRS  ABRS may be diagnosed if you ve had an upper respiratory infection like a cold and cough for 10 or more days without improvement or with worsening  symptoms. Your healthcare provider will ask about your symptoms and your medical history. The provider will check your vital signs, including your temperature. You ll have a physical exam. The healthcare provider will check your ears, nose, and throat. You likely won t need any tests. If ABRS comes back, you may have a culture or other tests.  Treatment for ABRS  Treatment may include:    Antibiotic medicine. This is for symptoms that last for at least 10 to 14 days.    Nasal corticosteroid medicine. Drops or spray used in the nose can lessen swelling and congestion.    Over-the-counter pain medicine. This is to lessen sinus pain and pressure.    Nasal decongestant medicine. Spray or drops may help to lessen congestion. Do not use them for more than a few days.    Salt wash (saline irrigation). This can help to loosen mucus.  Possible complications of ABRS  ABRS may come back or become long-term (chronic). In rare cases, ABRS may cause complications such as:     Inflamed tissue around the brain and spinal cord (meningitis)    Inflamed tissue around the eyes (orbital cellulitis)    Inflamed bones around the sinuses (osteitis)  These problems may need to be treated in a hospital with intravenous (IV) antibiotic medicine or surgery.  When to call the healthcare provider  Call your healthcare provider if you have any of the following:    Symptoms that don t get better, or get worse    Symptoms that don t get better after 3 to 5 days on antibiotics    Trouble seeing    Swelling around your eyes    Confusion or trouble staying awake   Zaid last reviewed this educational content on 5/1/2017 2000-2021 [employer]. All rights reserved. This information is not intended as a substitute for professional medical care. Always follow your healthcare professional's instructions.
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
## 2022-01-28 LAB — SARS-COV-2 RNA RESP QL NAA+PROBE: NEGATIVE

## 2022-01-31 ENCOUNTER — OFFICE VISIT (OUTPATIENT)
Dept: OPHTHALMOLOGY | Facility: CLINIC | Age: 47
End: 2022-01-31
Payer: COMMERCIAL

## 2022-01-31 DIAGNOSIS — Z98.890 POSTOPERATIVE EYE STATE: Primary | ICD-10-CM

## 2022-01-31 DIAGNOSIS — H57.89 THYROID EYE DISEASE: ICD-10-CM

## 2022-01-31 DIAGNOSIS — H05.249 CONSTANT EXOPHTHALMOS, UNSPECIFIED LATERALITY: ICD-10-CM

## 2022-01-31 DIAGNOSIS — E07.9 THYROID EYE DISEASE: ICD-10-CM

## 2022-01-31 DIAGNOSIS — H02.539 EYELID RETRACTION, UNSPECIFIED LATERALITY: ICD-10-CM

## 2022-01-31 PROCEDURE — 99213 OFFICE O/P EST LOW 20 MIN: CPT | Mod: GC | Performed by: OPHTHALMOLOGY

## 2022-01-31 ASSESSMENT — VISUAL ACUITY
OD_CC: 20/20
METHOD: SNELLEN - LINEAR
OS_CC: 20/20
OS_CC+: -1
CORRECTION_TYPE: CONTACTS

## 2022-01-31 ASSESSMENT — EXTERNAL EXAM - RIGHT EYE: OD_EXAM: EXOPHTHALMOS

## 2022-01-31 ASSESSMENT — TONOMETRY
OS_IOP_MMHG: 21
OD_IOP_MMHG: 20
IOP_METHOD: ICARE

## 2022-01-31 ASSESSMENT — EXTERNAL EXAM - LEFT EYE: OS_EXAM: EXOPHTHALMOS

## 2022-01-31 NOTE — PROGRESS NOTES
"Chief Complaints and History of Present Illnesses   Patient presents with     Post Op (Ophthalmology) Both Eyes     POM#2 Bilateral lateral wall bone and fat orbital decompression. BLL retraction repair     Chief Complaint(s) and History of Present Illness(es)     Post Op (Ophthalmology) Both Eyes     In both eyes.  Associated symptoms include Negative for double vision,   dryness and eye pain.  Treatments tried include no treatments.  Pain was   noted as 0/10. Additional comments: POM#2 Bilateral lateral wall bone and   fat orbital decompression. BLL retraction repair              Comments     Pt notes that her VA doesn't seem as crisp, when smiling for pics, she   feels taht the RUL is lower and the BETZAIDA is higher, wide open. She wonders   if her eye movement is normal, feels her eyes looked crossed sometimes,   denies diplopia. Not currently using gtts or claus.     Ting Casanova COT January 31, 2022 8:15 AM       She feels that when she looks at pictures, the right eye seems more closed than the left. Also when she sees pictures of herself, she feels that her eyes sometimes appear that they are not \"working together.\" No diplopia. No pain. Since the surgery she believes the swelling and incisions have been healing well.           Assessment & Plan     Mimi Melton is a 46 year old female with the following diagnoses:   1. Postoperative eye state    2. Thyroid eye disease    3. Eyelid retraction, unspecified laterality    4. Constant exophthalmos, unspecified laterality       2.5 months S/p Bilateral lateral wall bone and fat orbital decompression and bilateral lower lid retraction repair on 11/5/2021.     Doing well overall with slightly less exophthalmos, well-healed s/p surgery.     PLAN:  - Start artificial tears 3-4 times per day.   - Warm compresses BID.   - Follow-up 6 months, sooner if issues.          Jett Dockery MD  Resident Physician, PGY-2  Ophthalmology            Attending Physician Attestation:  " Complete documentation of historical and exam elements from today's encounter can be found in the full encounter summary report (not reduplicated in this progress note).  I personally obtained the chief complaint(s) and history of present illness.  I confirmed and edited as necessary the review of systems, past medical/surgical history, family history, social history, and examination findings as documented by others; and I examined the patient myself.  I personally reviewed the relevant tests, images, and reports as documented above.  I formulated and edited as necessary the assessment and plan and discussed the findings and management plan with the patient and family. I personally reviewed the ophthalmic test(s) associated with this encounter, agree with the interpretation(s) as documented by the resident/fellow, and have edited the corresponding report(s) as necessary.   -Niles Donnelly MD  8:25 AM 1/31/2022

## 2022-01-31 NOTE — NURSING NOTE
Chief Complaints and History of Present Illnesses   Patient presents with     Post Op (Ophthalmology) Both Eyes     POM#2 Bilateral lateral wall bone and fat orbital decompression. BLL retraction repair     Chief Complaint(s) and History of Present Illness(es)     Post Op (Ophthalmology) Both Eyes     Laterality: both eyes    Associated symptoms: Negative for double vision, dryness and eye pain    Treatments tried: no treatments    Pain scale: 0/10    Comments: POM#2 Bilateral lateral wall bone and fat orbital decompression. BLL retraction repair              Comments     Pt notes that her VA doesn't seem as crisp, when smiling for pics, she feels taht the RUL is lower and the BETZAIDA is higher, wide open. She wonders if her eye movement is normal, feels her eyes looked crossed sometimes, denies diplopia. Not currently using gtts or claus.     Ting Casanova COT January 31, 2022 8:15 AM

## 2022-02-03 ENCOUNTER — E-VISIT (OUTPATIENT)
Dept: FAMILY MEDICINE | Facility: CLINIC | Age: 47
End: 2022-02-03
Payer: COMMERCIAL

## 2022-02-03 DIAGNOSIS — M54.9 BACK PAIN, UNSPECIFIED BACK LOCATION, UNSPECIFIED BACK PAIN LATERALITY, UNSPECIFIED CHRONICITY: Primary | ICD-10-CM

## 2022-02-03 PROCEDURE — 99421 OL DIG E/M SVC 5-10 MIN: CPT | Performed by: FAMILY MEDICINE

## 2022-02-13 ENCOUNTER — NURSE TRIAGE (OUTPATIENT)
Dept: NURSING | Facility: CLINIC | Age: 47
End: 2022-02-13
Payer: COMMERCIAL

## 2022-02-13 NOTE — PROGRESS NOTES
CHIEF COMPLAINT:   Ear Concern      HISTORY OF PRESENT ILLNESS    Mimi was seen at the behest of MARGA Bishop for cerumen impaction(s).  She complains of some itching from the left ear.  She recently had cerumen removed in the primary care office but they were unable to completely remove the cerumen from the left-hand side.  She does instrument the ears with Q-tips.  She has a long history of nasal congestion and allergies.  She had a DCR in the past as well as closed reduction of nasal fracture.  She does see an allergist/dermatologist.  She is not on immunotherapy at present but this is been in the discussions.  She is on fluticasone regularly but is not sure it really helps much with the nasal congestion.  She was previously using Afrin nasal spray as another way to improve to improve the congestion    SNOT 22 = 38     REVIEW OF SYSTEMS    Review of Systems as per HPI and PMHx, otherwise 10 system review system are negative.     Dust mites, Lisinopril, and Mold     There were no vitals taken for this visit.    HEAD: Normal appearance and symmetry:  No cutaneous lesions.      NECK:  supple     EARS: normal TM, EACs; there is some marginal scarring of the left tympanic membrane posteriorly     NOSE:     Dorsum:   straight  Septum:  deviated LEFT  Mucosa:  moist  Inferior turbinates:  boggy, pale, enlarged   Middle meatus:  Nasal polyps noted bilaterally      After AFRIN application      After AFRIN application         ORAL CAVITY/OROPHARYNX:     Lips:  Normal.  Tongue: normal, midline  Mucosa:   no lesions  Tonsils:  1+     NECK:  Trachea:  midline.              Thyroid:  normal              Adenopathy:  none        NEURO:   Alert and Oriented     GAIT AND STATION:  normal     RESPIRATORY:   Symmetry and Respiratory effort     PSYCH:  Normal mood and affect     SKIN:   warm and dry     AUDIOGRAM: wnl    IMPRESSION:    Encounter Diagnoses   Name Primary?     Bilateral impacted cerumen Yes     Nasal  congestion      Nasal polyp           RECOMMENDATIONS:      Orders Placed This Encounter   Procedures     Nasal Endoscopy, Diag     CT Sinus w/o Contrast    Astelin nasal spray as directed    RTC 1 month for CT review.   Patient would likely benefit from immunotherapy

## 2022-02-14 ENCOUNTER — OFFICE VISIT (OUTPATIENT)
Dept: AUDIOLOGY | Facility: CLINIC | Age: 47
End: 2022-02-14

## 2022-02-14 ENCOUNTER — OFFICE VISIT (OUTPATIENT)
Dept: OTOLARYNGOLOGY | Facility: CLINIC | Age: 47
End: 2022-02-14
Payer: COMMERCIAL

## 2022-02-14 DIAGNOSIS — R09.81 NASAL CONGESTION: ICD-10-CM

## 2022-02-14 DIAGNOSIS — Z01.10 NORMAL HEARING EXAM: Primary | ICD-10-CM

## 2022-02-14 DIAGNOSIS — Z01.10 NORMAL HEARING NOTED ON EXAMINATION: ICD-10-CM

## 2022-02-14 DIAGNOSIS — J33.9 NASAL POLYP: ICD-10-CM

## 2022-02-14 DIAGNOSIS — H61.23 BILATERAL IMPACTED CERUMEN: Primary | ICD-10-CM

## 2022-02-14 PROCEDURE — 99207 PR NO CHARGE LOS: CPT | Performed by: AUDIOLOGIST

## 2022-02-14 PROCEDURE — 31231 NASAL ENDOSCOPY DX: CPT | Performed by: OTOLARYNGOLOGY

## 2022-02-14 PROCEDURE — 92557 COMPREHENSIVE HEARING TEST: CPT | Performed by: AUDIOLOGIST

## 2022-02-14 PROCEDURE — 99203 OFFICE O/P NEW LOW 30 MIN: CPT | Mod: 25 | Performed by: OTOLARYNGOLOGY

## 2022-02-14 PROCEDURE — 92550 TYMPANOMETRY & REFLEX THRESH: CPT | Performed by: AUDIOLOGIST

## 2022-02-14 RX ORDER — AZELASTINE 1 MG/ML
SPRAY, METERED NASAL
Qty: 30 ML | Refills: 11 | Status: SHIPPED | OUTPATIENT
Start: 2022-02-14 | End: 2023-05-30

## 2022-02-14 NOTE — TELEPHONE ENCOUNTER
Pt called stating she at 2:15 pm she was roller skating  And feel on the back of her head hitting the sa floor. Pt denied vomiting , open skin or bruise, stated she didn't feel anything. Pt stated she feel like she fell the back of her head. Pt reported her blood pressure is 129/91.No symptoms now besides feeling she felt on the back of her head.        Per protocal RN paged on call provider, received a return call from yudy Avila and provider was informed and  Advised pt if she develops neurological changes such as severe headache, vomiting dizziness, weakness or abdominal behavior or gate problem to go to the emergency room.      RN called pt back and relayed provider advice, and she verbalized understanding.      Rufino Penny RN  [facility]       COVID 19 Nurse Triage Plan/Patient Instructions    Please be aware that novel coronavirus (COVID-19) may be circulating in the community. If you develop symptoms such as fever, cough, or SOB or if you have concerns about the presence of another infection including coronavirus (COVID-19), please contact your health care provider or visit [email or web address].     Disposition/Instructions    Home care recommended. Follow home care protocol based instructions.    Thank you for taking steps to prevent the spread of this virus.  o Limit your contact with others.  o Wear a simple mask to cover your cough.  o Wash your hands well and often.    Resources    [facility]: About COVID-19: [email or web address]    CDC: What to Do If You're Sick: www.cdc.gov/coronavirus/2019-ncov/about/steps-when-sick.html    CDC: Ending Home Isolation: www.cdc.gov/coronavirus/2019-ncov/hcp/disposition-in-home-patients.html     CDC: Caring for Someone: www.cdc.gov/coronavirus/2019-ncov/if-you-are-sick/care-for-someone.html     [facility]: Interim Guidance for Hospital Discharge to Home:  [email or web address]/diseases/coronavirus/hcp/hospdischarge.pdf    [facility] clinical trials (COVID-19 research studies): [email or web address]     Below are the COVID-19 hotlines at the [place] Department [facility] ([facility]). Interpreters are available.   o For health questions: Call 653-419-4977 or 1-336.538.2430 (7 a.m. to 7 p.m.)  o For questions about schools and childcare: Call 644-134-6795 or 1-516.581.6030 (7 a.m. to 7 p.m.)            Reason for Disposition    Dangerous injury (e.g., MVA, diving, trampoline, contact sports, fall > 10 feet or 3 meters) or severe blow from hard object (e.g., golf club or baseball bat)    Additional Information    Negative: [1] ACUTE NEURO SYMPTOM AND [2] present now  (DEFINITION: difficult to awaken OR confused thinking and talking OR slurred speech OR weakness of arms OR unsteady walking)    Negative: Knocked out (unconscious) > 1 minute    Negative: Seizure (convulsion) occurred  (Exception: prior history of seizures and now alert and without Acute Neuro Symptoms)    Negative: Penetrating head injury (e.g., knife, gun shot wound, metal object)    Negative: [1] Major bleeding (e.g., actively dripping or spurting) AND [2] can't be stopped    Negative: [1] Dangerous mechanism of injury (e.g., MVA, diving, trampoline, contact sports, fall > 10 feet or 3 meters) AND [2] NECK pain AND [3] began < 1 hour after injury    Negative: Sounds like a life-threatening emergency to the triager    Negative: [1] Diagnosed with concussion AND [2] within last 14 days    Negative: [1] Traumatic brain injury (mTBI; concussion) AND [2] more than 14 days since head injury    Negative: Can't remember what happened (amnesia)    Negative: Vomiting once or more    Negative: [1] Loss of vision or double vision AND [2] present now    Negative: Watery or blood-tinged fluid dripping from the NOSE or EARS now  (Exception: tears from crying or nosebleed from nasal  "trauma)    Negative: [1] One or two \"black eyes\" (bruising, purple color of eyelids) AND [2] onset within 24 hours of head injury    Negative: Large swelling or bruise > 2 inches (5 cm)    Negative: Skin is split open or gaping  (or length > 1/2 inch or 12 mm)    Negative: [1] Bleeding AND [2] won't stop after 10 minutes of direct pressure (using correct technique)    Negative: Sounds like a serious injury to the triager    Negative: [1] ACUTE NEURO SYMPTOM AND [2] now fine  (DEFINITION: difficult to awaken OR confused thinking and talking OR slurred speech OR weakness of arms OR unsteady walking)    Negative: [1] Knocked out (unconscious) < 1 minute AND [2] now fine    Negative: [1] SEVERE headache AND [2] not improved 2 hours after pain medicine/ice packs    Protocols used: HEAD INJURY-A-AH      "

## 2022-02-14 NOTE — PROGRESS NOTES
AUDIOLOGY REPORT    SUMMARY: Audiology visit completed. See audiogram for results.      RECOMMENDATIONS: Follow-up with ENT.    Vicky Coto, CCC-A  [place] Licensed Audiologist #[id number]

## 2022-02-22 ENCOUNTER — OFFICE VISIT (OUTPATIENT)
Dept: ALLERGY | Facility: CLINIC | Age: 47
End: 2022-02-22
Payer: COMMERCIAL

## 2022-02-22 DIAGNOSIS — J30.9 ALLERGIC RHINOCONJUNCTIVITIS: ICD-10-CM

## 2022-02-22 DIAGNOSIS — T78.3XXD ANGIOEDEMA, SUBSEQUENT ENCOUNTER: Primary | ICD-10-CM

## 2022-02-22 DIAGNOSIS — J45.30 MILD PERSISTENT ASTHMA WITHOUT COMPLICATION: ICD-10-CM

## 2022-02-22 DIAGNOSIS — H10.10 ALLERGIC RHINOCONJUNCTIVITIS: ICD-10-CM

## 2022-02-22 PROCEDURE — 99213 OFFICE O/P EST LOW 20 MIN: CPT | Performed by: DERMATOLOGY

## 2022-02-22 RX ORDER — PREDNISONE 10 MG/1
TABLET ORAL
Qty: 18 TABLET | Refills: 0 | Status: SHIPPED | OUTPATIENT
Start: 2022-02-22 | End: 2022-03-02

## 2022-02-22 ASSESSMENT — PAIN SCALES - GENERAL: PAINLEVEL: NO PAIN (0)

## 2022-02-22 NOTE — NURSING NOTE
Dermatology Rooming Note    Mimi Melton's goals for this visit include:   Chief Complaint   Patient presents with     Allergy Recheck     Faby states she has been worse the last 8 weeks since she left [place] and was exposed to mold     Kayrene Stadtlander, CMA
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
## 2022-03-01 ENCOUNTER — HOSPITAL ENCOUNTER (OUTPATIENT)
Dept: CT IMAGING | Facility: HOSPITAL | Age: 47
Discharge: HOME | End: 2022-03-01
Attending: OTOLARYNGOLOGY | Admitting: OTOLARYNGOLOGY
Payer: COMMERCIAL

## 2022-03-01 DIAGNOSIS — J33.9 NASAL POLYP: ICD-10-CM

## 2022-03-01 DIAGNOSIS — R09.81 NASAL CONGESTION: ICD-10-CM

## 2022-03-01 PROCEDURE — 70486 CT MAXILLOFACIAL W/O DYE: CPT

## 2022-03-02 ENCOUNTER — TELEPHONE (OUTPATIENT)
Dept: OTOLARYNGOLOGY | Facility: CLINIC | Age: 47
End: 2022-03-02
Payer: COMMERCIAL

## 2022-03-02 DIAGNOSIS — J33.9 NASAL POLYP: Primary | ICD-10-CM

## 2022-03-02 RX ORDER — PREDNISONE 10 MG/1
10 TABLET ORAL DAILY
Qty: 14 TABLET | Refills: 0 | Status: SHIPPED | OUTPATIENT
Start: 2022-03-02 | End: 2022-03-16

## 2022-03-02 NOTE — TELEPHONE ENCOUNTER
Talked with Mimi about her CT.  Per Dr. Sahu I informed the patient that he sent her a 14 day course of prednisone after CT report of possible nasal polyp.  Patient wanted me to let Dr. Sahu know that she was just on Prednisone that was prescribed by an allergy doctor her last day to take it was Tuesday. She is wondering if it is fine to continue the prednisone that Dr. Sahu  prescribed.  She also mentioned that her left side is still congested.   Told patient I would get back to her.  She expressed understanding.    [facility]      Maryann Pepe RN  [facility]  ENT  Critical access hospital[address] [address]  [email or web address]   Office:[phone]  Employed by [facility]

## 2022-03-03 NOTE — TELEPHONE ENCOUNTER
Talked with Mimi and let her know that per Dr. Sahu that she should hold off on the prednisone prescription that her sent over.  Patient expressed understanding.    [facility]      Maryann Pepe RN  [place] [id number] [address] [address]  [email or web address]   Office:[phone]  Employed by [facility]

## 2022-03-14 ENCOUNTER — OFFICE VISIT (OUTPATIENT)
Dept: OTOLARYNGOLOGY | Facility: CLINIC | Age: 47
End: 2022-03-14
Payer: COMMERCIAL

## 2022-03-14 DIAGNOSIS — J32.0 CHRONIC MAXILLARY SINUSITIS: Primary | ICD-10-CM

## 2022-03-14 DIAGNOSIS — J32.2 CHRONIC ETHMOIDAL SINUSITIS: ICD-10-CM

## 2022-03-14 DIAGNOSIS — J32.1 CHRONIC FRONTAL SINUSITIS: ICD-10-CM

## 2022-03-14 DIAGNOSIS — J33.9 NASAL POLYP: ICD-10-CM

## 2022-03-14 PROCEDURE — 31231 NASAL ENDOSCOPY DX: CPT | Performed by: OTOLARYNGOLOGY

## 2022-03-14 PROCEDURE — 99213 OFFICE O/P EST LOW 20 MIN: CPT | Mod: 25 | Performed by: OTOLARYNGOLOGY

## 2022-03-14 RX ORDER — SOD CHLOR,BICARB/SQUEEZ BOTTLE
1 PACKET, WITH RINSE DEVICE NASAL 2 TIMES DAILY
Qty: 180 EACH | Refills: 0 | Status: SHIPPED | OUTPATIENT
Start: 2022-03-14 | End: 2022-06-12

## 2022-03-14 RX ORDER — BUDESONIDE 0.5 MG/2ML
INHALANT ORAL
Qty: 360 ML | Refills: 0 | Status: SHIPPED | OUTPATIENT
Start: 2022-03-14 | End: 2023-05-11

## 2022-03-14 NOTE — PROGRESS NOTES
CHIEF COMPLAINT:  Recheck      HISTORY OF PRESENT ILLNESS    Mimi was seen in follow up for nose and sinus congestion.  Overall, she is going better after the prednisone. Did not tolerate the astelin spray due to bad taste in her throat.  She is back to using the flonase spray.  Overall she feels like she is improved.  CT sinus was obtained on the last day of her oral prednisone.     SNOT 22 = 27    CT SINUS:    1.  Left-sided sinusitis with near complete opacification of the left frontal, anterior ethmoid, and maxillary sinuses, with high attenuation secretions centrally in the left maxillary sinus. Effacement of the left nasal cavity potentially representing a   nasal cavity polyp, direct visualization recommended.  2.  Mild scattered mucosal thickening and secretions within the right-sided sinuses and sphenoid sinuses.       REVIEW OF SYSTEMS    Review of Systems: a 10-system review is reviewed at this encounter.  See scanned document.     Dust mites, Lisinopril, and Mold     PHYSICAL EXAM:        HEAD: Normal appearance and symmetry:  No cutaneous lesions.      EARS:   Auricles normal         NASAL ENDOSCOPY    LEFT SIDE:  Middle meatal polyps present      LEFT middle meatal polyps         ORAL CAVITY/OROPHARYNX:    Lips:  Normal.     NECK:  Trachea:  midline       NEURO:   Alert and Oriented    GAIT AND STATION:  normal     RESPIRATORY:   Symmetry and Respiratory effort    PSYCH:   normal mood and affect    SKIN:  warm and dry         IMPRESSION:   Encounter Diagnoses   Name Primary?     Chronic maxillary sinusitis Yes     Chronic frontal sinusitis      Chronic ethmoidal sinusitis      Nasal polyp               RECOMMENDATIONS:    No orders of the defined types were placed in this encounter.     Orders Placed This Encounter   Medications     Hypertonic Nasal Wash (SINUS RINSE) bottle     Sig: Spray 1 each into both nostrils 2 times daily     Dispense:  180 each     Refill:  0     Add 1 vial of budesonide to  240 ml of saline and rinse 2x daily     budesonide (PULMICORT) 0.5 MG/2ML neb solution     Sig: Add 1 vial to 240 ml of saline and rinse 2x daily     Dispense:  360 mL     Refill:  0      Return visit 3 months for repeat CT sinus/nasal endoscopy  Recommend immunotherapy to help ameliorate nasal polyposis  Budesonide rinses as directed  Continue flonase nasal spray

## 2022-03-19 ENCOUNTER — OFFICE VISIT (OUTPATIENT)
Dept: FAMILY MEDICINE | Facility: CLINIC | Age: 47
End: 2022-03-19
Payer: COMMERCIAL

## 2022-03-19 VITALS
HEART RATE: 106 BPM | SYSTOLIC BLOOD PRESSURE: 128 MMHG | OXYGEN SATURATION: 97 % | TEMPERATURE: 97.6 F | DIASTOLIC BLOOD PRESSURE: 84 MMHG

## 2022-03-19 DIAGNOSIS — B96.89 BACTERIAL VAGINOSIS: Primary | ICD-10-CM

## 2022-03-19 DIAGNOSIS — N89.8 VAGINAL DISCHARGE: ICD-10-CM

## 2022-03-19 DIAGNOSIS — N76.0 BACTERIAL VAGINOSIS: Primary | ICD-10-CM

## 2022-03-19 PROCEDURE — 87591 N.GONORRHOEAE DNA AMP PROB: CPT | Performed by: FAMILY MEDICINE

## 2022-03-19 PROCEDURE — 99213 OFFICE O/P EST LOW 20 MIN: CPT | Performed by: FAMILY MEDICINE

## 2022-03-19 PROCEDURE — 87210 SMEAR WET MOUNT SALINE/INK: CPT | Performed by: FAMILY MEDICINE

## 2022-03-19 PROCEDURE — 87491 CHLMYD TRACH DNA AMP PROBE: CPT | Performed by: FAMILY MEDICINE

## 2022-03-19 RX ORDER — METRONIDAZOLE 500 MG/1
500 TABLET ORAL 2 TIMES DAILY
Qty: 14 TABLET | Refills: 0 | Status: SHIPPED | OUTPATIENT
Start: 2022-03-19 | End: 2022-03-26

## 2022-03-19 NOTE — PROGRESS NOTES
Assessment:       Bacterial vaginosis  Metronidazole    Vaginal discharge    - Wet prep - Clinic Collect  - Neisseria gonorrhoeae PCR - Clinic Collect  - Chlamydia trachomatis PCR - Clinic Collect         Plan:     Patient with bacterial vaginosis.  Will start on metronidazole suspect her irregular spotting is likely due to starting her new hormonal OCPs.  Counseled regarding this.  Follow-up with her PCP if this is becoming bothersome to her.  Follow-up also if developing any new or worsening symptoms.  We will notify her of the results of her gonorrhea and chlamydia testing when it comes back.    MEDICATIONS:   Orders Placed This Encounter   Medications     metroNIDAZOLE (FLAGYL) 500 MG tablet     Sig: Take 1 tablet (500 mg) by mouth 2 times daily for 7 days     Dispense:  14 tablet     Refill:  0       Subjective:       46 year old female presents for evaluation irregular spotting and some slight vaginal discharge.  She had a ParaGard IUD for the past 20 years but recently had this taken out in January as she was getting recurrent episodes of bacterial vaginosis.  She was started on norethindrone OCPs and since that time has had some irregular light spotting.  She has occasionally had some vaginal itching at night but has not noticed this during the day.  She has a very light discharge.  She has had a slight vaginal odor.  She wants to make sure she does not have an STD, yeast infection, or bacterial vaginosis and that this is just due to hormonal changes.  She denies any abdominal pain or back pain.  No fevers or nausea.    Patient Active Problem List   Diagnosis     Surveillance of previously prescribed intrauterine contraceptive device     Vaginitis and vulvovaginitis     Exophthalmos     Abnormal Pap smear of cervix     CARDIOVASCULAR SCREENING; LDL GOAL LESS THAN 160     Essential hypertension with goal blood pressure less than 140/90     Anemia     Hypothyroidism due to acquired atrophy of thyroid      Cervical high risk HPV (human papillomavirus) test positive     Thyroid disease     Female stress incontinence     Chronic seasonal allergic rhinitis, unspecified trigger     Need for HPV vaccine     Thyroid eye disease     Eyelid retraction, unspecified laterality       Past Medical History:   Diagnosis Date     Cervical high risk HPV (human papillomavirus) test positive 2015, 10/2019, 10/30/20     Chronic sinusitis Summer     I get congested every 3-4 weeks     Esophageal reflux      Exophthalmos, unspecified      Hypothyroidism      Thyroid eye disease      Thyrotoxicosis without mention of goiter or other cause, without mention of thyrotoxic crisis or storm 2005    Had radioablation, On synthroid, seeing [facility]; takes synthroid     Unspecified essential hypertension 1980    meds since , on atenolol       Past Surgical History:   Procedure Laterality Date     CYSTOSCOPY, SLING TRANSVAGINAL N/A 10/10/2017    Procedure: CYSTOSCOPY, SLING TRANSVAGINAL;  Midurethral Sling and Cystoscopy (Support the Urethra with Mesh Sling and Look in the Bladder);  Surgeon: Karin Amin MD;  Location: [facility] OR     EYE SURGERY  2008    Orbital Decompression Dr smart      REPAIR INCISIONAL HERNIA,REDUCIBLE      Umbilical hernia Repair     ORBITOTOMY DECOMPRESSION Bilateral 2021    Procedure: Bilateral orbital decompression with  Sonopet, Bilateral lower lid retraction repair;  Surgeon: Niles Donnelly MD;  [place] [facility] [place]     REPAIR RETRACTION LID Bilateral 2021    Procedure: REPAIR, RETRACTION, EYELID;  Surgeon: Niles Donnelly MD;  Location: [facility] OR     SONOPET EYE Bilateral 2021    Procedure: SONOPET EYE;  Surgeon: Niles Donnelly MD;  Location: [facility] OR     ZZC  DELIVERY ONLY  91    , Low Cervical     ZZC  DELIVERY ONLY  97    , Low Cervical     ZZC  DELIVERY ONLY  99    , Low Cervical     ZZC  INDUCED ABORTN BY D&C      Aspiration & Curettage, TAB x2       Current Outpatient Medications   Medication     ACETAMINOPHEN PO     azelastine (ASTELIN) 0.1 % nasal spray     benzoyl peroxide (BENZOYL PEROXIDE WASH) 5 % external liquid     budesonide (PULMICORT) 0.5 MG/2ML neb solution     fexofenadine (ALLEGRA) 180 MG tablet     fluticasone (FLONASE) 50 MCG/ACT nasal spray     fluticasone-salmeterol (ADVAIR) 250-50 MCG/DOSE inhaler     hydrochlorothiazide (HYDRODIURIL) 12.5 MG tablet     Hypertonic Nasal Wash (SINUS RINSE) bottle     IBUPROFEN PO     levothyroxine (SYNTHROID/LEVOTHROID) 100 MCG tablet     losartan (COZAAR) 25 MG tablet     norethindrone (MICRONOR) 0.35 MG tablet     erythromycin (ROMYCIN) 5 MG/GM ophthalmic ointment     Lactobacillus (FLORAJEN WOMEN) CAPS     Lactobacillus-Inulin ([facility] DIGESTIVE [facility]) CAPS     metroNIDAZOLE (METROGEL) 0.75 % vaginal gel     mupirocin (BACTROBAN) 2 % ointment     neomycin-polymixin-dexamethasone (MAXITROL) 0.1 % ophthalmic suspension     tretinoin (RETIN-A) 0.05 % external cream     valACYclovir (VALTREX) 500 MG tablet     No current facility-administered medications for this visit.       Allergies   Allergen Reactions     Dust Mites      Lisinopril Swelling     Swelling in lip     Mold      Cannot have any meds that contain mold.       Family History   Problem Relation Age of Onset     Hypertension Mother      Hypertension Father      Hypertension Maternal Grandmother      Hypertension Paternal Grandmother      Hypertension Maternal Grandfather      Hypertension Paternal Grandfather        Social History     Socioeconomic History     Marital status: Single     Spouse name: Not on file     Number of children: Not on file     Years of education: Not on file     Highest education level: Not on file   Occupational History     Occupation: [employer]     Employer: [employer]   Tobacco Use     Smoking status: Never Smoker      Smokeless tobacco: Never Used   Substance and Sexual Activity     Alcohol use: Yes     Comment: 1-2x's/month if that.     Drug use: Yes     Types: Marijuana     Comment: 3x's/day     Sexual activity: Yes     Partners: Male     Birth control/protection: I.U.D.     Comment: Paraguard IUD,    Other Topics Concern     Parent/sibling w/ CABG, MI or angioplasty before 65F 55M? No   Social History Narrative    Caffeine intake/servings daily - 0-1, 12 oz of Mountain Dew    Calcium intake/servings daily - eats only cheese    Exercise None    Sunscreen used - Yes    Seatbelts used - Yes    Guns stored in the home - No    Self Breast Exam - No    Pap test up to date -  Yes, as of today    Eye exam up to date -  Yes    Dental exam up to date -  Yes    DEXA scan up to date -  Not Applicable    Flex Sig/Colonoscopy up to date -  Not Applicable    Mammography up to date -  Not Applicable    Immunizations reviewed and up to date - Yes, within the last 10 years    Abuse: Current or Past (Physical, Sexual or Emotional) - No    Do you feel safe in your environment - Yes    Do you cope well with stress - Yes    Do you suffer from insomnia - No    Last updated by: Anu Vinson MA 12/21/2011             Social Determinants of Health     Financial Resource Strain: Not on file   Food Insecurity: Not on file   Transportation Needs: Not on file   Physical Activity: Not on file   Stress: Not on file   Social Connections: Not on file   Intimate Partner Violence: Not on file   Housing Stability: Not on file         Review of Systems  Pertinent items are noted in HPI.      Objective:     /84 (BP Location: Right arm, Patient Position: Chair, Cuff Size: Adult Regular)   Pulse 106   Temp 97.6  F (36.4  C) (Tympanic)   SpO2 97%      General appearance: alert, appears stated age and cooperative  Pelvic: deferred, self swab wet prep obtained.    Results for orders placed or performed in visit on 03/19/22   Wet prep - Clinic Collect      Status: Abnormal    Specimen: Vagina; Swab   Result Value Ref Range    Trichomonas Absent Absent    Yeast Absent Absent    Clue Cells Present (A) Absent    WBCs/high power field 1+ (A) None       This note has been dictated using voice recognition software. Any grammatical or context distortions are unintentional and inherent to the software

## 2022-03-20 LAB
C TRACH DNA SPEC QL NAA+PROBE: NEGATIVE
N GONORRHOEA DNA SPEC QL NAA+PROBE: NEGATIVE

## 2022-03-22 ENCOUNTER — LAB (OUTPATIENT)
Dept: FAMILY MEDICINE | Facility: CLINIC | Age: 47
End: 2022-03-22
Attending: FAMILY MEDICINE
Payer: COMMERCIAL

## 2022-03-22 DIAGNOSIS — Z20.822 SUSPECTED 2019 NOVEL CORONAVIRUS INFECTION: ICD-10-CM

## 2022-03-22 LAB — SARS-COV-2 RNA RESP QL NAA+PROBE: NEGATIVE

## 2022-03-22 PROCEDURE — U0005 INFEC AGEN DETEC AMPLI PROBE: HCPCS

## 2022-03-22 PROCEDURE — U0003 INFECTIOUS AGENT DETECTION BY NUCLEIC ACID (DNA OR RNA); SEVERE ACUTE RESPIRATORY SYNDROME CORONAVIRUS 2 (SARS-COV-2) (CORONAVIRUS DISEASE [COVID-19]), AMPLIFIED PROBE TECHNIQUE, MAKING USE OF HIGH THROUGHPUT TECHNOLOGIES AS DESCRIBED BY CMS-2020-01-R: HCPCS

## 2022-03-23 ENCOUNTER — ANCILLARY PROCEDURE (OUTPATIENT)
Dept: MAMMOGRAPHY | Facility: CLINIC | Age: 47
End: 2022-03-23
Payer: COMMERCIAL

## 2022-03-23 DIAGNOSIS — Z12.31 VISIT FOR SCREENING MAMMOGRAM: ICD-10-CM

## 2022-03-23 PROCEDURE — 77063 BREAST TOMOSYNTHESIS BI: CPT | Mod: GC | Performed by: RADIOLOGY

## 2022-03-23 PROCEDURE — 77067 SCR MAMMO BI INCL CAD: CPT | Mod: GC | Performed by: RADIOLOGY

## 2022-03-29 ENCOUNTER — MYC MEDICAL ADVICE (OUTPATIENT)
Dept: OBGYN | Facility: CLINIC | Age: 47
End: 2022-03-29
Payer: COMMERCIAL

## 2022-03-29 DIAGNOSIS — N76.0 BV (BACTERIAL VAGINOSIS): Primary | ICD-10-CM

## 2022-03-29 DIAGNOSIS — B96.89 BV (BACTERIAL VAGINOSIS): Primary | ICD-10-CM

## 2022-03-30 ENCOUNTER — LAB (OUTPATIENT)
Dept: LAB | Facility: CLINIC | Age: 47
End: 2022-03-30
Payer: COMMERCIAL

## 2022-03-30 DIAGNOSIS — N76.0 BV (BACTERIAL VAGINOSIS): ICD-10-CM

## 2022-03-30 DIAGNOSIS — B96.89 BV (BACTERIAL VAGINOSIS): ICD-10-CM

## 2022-03-30 PROCEDURE — 87210 SMEAR WET MOUNT SALINE/INK: CPT

## 2022-03-30 RX ORDER — CLINDAMYCIN HCL 300 MG
300 CAPSULE ORAL 2 TIMES DAILY
Qty: 14 CAPSULE | Refills: 0 | Status: SHIPPED | OUTPATIENT
Start: 2022-03-30 | End: 2022-08-10

## 2022-03-30 NOTE — TELEPHONE ENCOUNTER
"Mimi writing MyChart today concerned that recent course of antibiotics did not resolve bacterial vaginosis. Endorses \"discharge and odor.\" Wet prep on 3/19 positive for clue cells. Prescribed metronidazole, Mimi reports having taken medication as prescribed. Requests repeat wet prep. Ordered. Bibiana is primary OB but not in clinic, routing to Dr. Johnson for FYI as results will likely come to her inbox.   Joycelyn Hutton RN   "

## 2022-04-14 ENCOUNTER — OFFICE VISIT (OUTPATIENT)
Dept: FAMILY MEDICINE | Facility: CLINIC | Age: 47
End: 2022-04-14
Payer: COMMERCIAL

## 2022-04-14 VITALS
TEMPERATURE: 98.2 F | DIASTOLIC BLOOD PRESSURE: 78 MMHG | BODY MASS INDEX: 31.36 KG/M2 | WEIGHT: 187 LBS | OXYGEN SATURATION: 96 % | SYSTOLIC BLOOD PRESSURE: 118 MMHG | HEART RATE: 85 BPM

## 2022-04-14 DIAGNOSIS — I10 ESSENTIAL HYPERTENSION WITH GOAL BLOOD PRESSURE LESS THAN 140/90: ICD-10-CM

## 2022-04-14 DIAGNOSIS — Z12.11 SCREEN FOR COLON CANCER: ICD-10-CM

## 2022-04-14 DIAGNOSIS — J45.30 MILD PERSISTENT ASTHMA WITHOUT COMPLICATION: ICD-10-CM

## 2022-04-14 DIAGNOSIS — E03.2 HYPOTHYROIDISM DUE TO MEDICATION: ICD-10-CM

## 2022-04-14 DIAGNOSIS — B96.89 BACTERIAL VAGINOSIS: ICD-10-CM

## 2022-04-14 DIAGNOSIS — Z13.1 SCREENING FOR DIABETES MELLITUS: ICD-10-CM

## 2022-04-14 DIAGNOSIS — N76.0 BACTERIAL VAGINOSIS: ICD-10-CM

## 2022-04-14 LAB
ALBUMIN SERPL-MCNC: 3.7 G/DL (ref 3.4–5)
ALP SERPL-CCNC: 39 U/L (ref 40–150)
ALT SERPL W P-5'-P-CCNC: 16 U/L (ref 0–50)
ANION GAP SERPL CALCULATED.3IONS-SCNC: 7 MMOL/L (ref 3–14)
AST SERPL W P-5'-P-CCNC: 20 U/L (ref 0–45)
BILIRUB SERPL-MCNC: 0.3 MG/DL (ref 0.2–1.3)
BUN SERPL-MCNC: 8 MG/DL (ref 7–30)
CALCIUM SERPL-MCNC: 9.4 MG/DL (ref 8.5–10.1)
CHLORIDE BLD-SCNC: 106 MMOL/L (ref 94–109)
CLUE CELLS: PRESENT
CO2 SERPL-SCNC: 25 MMOL/L (ref 20–32)
CREAT SERPL-MCNC: 1.05 MG/DL (ref 0.52–1.04)
ERYTHROCYTE [DISTWIDTH] IN BLOOD BY AUTOMATED COUNT: 13 % (ref 10–15)
GFR SERPL CREATININE-BSD FRML MDRD: 66 ML/MIN/1.73M2
GLUCOSE BLD-MCNC: 86 MG/DL (ref 70–99)
HBA1C MFR BLD: 5.5 % (ref 0–5.6)
HCT VFR BLD AUTO: 39.7 % (ref 35–47)
HGB BLD-MCNC: 12.9 G/DL (ref 11.7–15.7)
MCH RBC QN AUTO: 30 PG (ref 26.5–33)
MCHC RBC AUTO-ENTMCNC: 32.5 G/DL (ref 31.5–36.5)
MCV RBC AUTO: 92 FL (ref 78–100)
PLATELET # BLD AUTO: 346 10E3/UL (ref 150–450)
POTASSIUM BLD-SCNC: 3.7 MMOL/L (ref 3.4–5.3)
PROT SERPL-MCNC: 7.6 G/DL (ref 6.8–8.8)
RBC # BLD AUTO: 4.3 10E6/UL (ref 3.8–5.2)
SODIUM SERPL-SCNC: 138 MMOL/L (ref 133–144)
TRICHOMONAS, WET PREP: ABNORMAL
TSH SERPL DL<=0.005 MIU/L-ACNC: 1.52 MU/L (ref 0.4–4)
WBC # BLD AUTO: 4.5 10E3/UL (ref 4–11)
WBC'S/HIGH POWER FIELD, WET PREP: ABNORMAL
YEAST, WET PREP: ABNORMAL

## 2022-04-14 PROCEDURE — 83036 HEMOGLOBIN GLYCOSYLATED A1C: CPT | Performed by: FAMILY MEDICINE

## 2022-04-14 PROCEDURE — 87210 SMEAR WET MOUNT SALINE/INK: CPT | Performed by: FAMILY MEDICINE

## 2022-04-14 PROCEDURE — 99214 OFFICE O/P EST MOD 30 MIN: CPT | Performed by: FAMILY MEDICINE

## 2022-04-14 PROCEDURE — 85027 COMPLETE CBC AUTOMATED: CPT | Performed by: FAMILY MEDICINE

## 2022-04-14 PROCEDURE — 84443 ASSAY THYROID STIM HORMONE: CPT | Performed by: FAMILY MEDICINE

## 2022-04-14 PROCEDURE — 80053 COMPREHEN METABOLIC PANEL: CPT | Performed by: FAMILY MEDICINE

## 2022-04-14 PROCEDURE — 36415 COLL VENOUS BLD VENIPUNCTURE: CPT | Performed by: FAMILY MEDICINE

## 2022-04-14 RX ORDER — LOSARTAN POTASSIUM 25 MG/1
25 TABLET ORAL DAILY
Qty: 90 TABLET | Refills: 3 | Status: SHIPPED | OUTPATIENT
Start: 2022-04-14 | End: 2022-10-20

## 2022-04-14 RX ORDER — LEVOTHYROXINE SODIUM 100 UG/1
100 TABLET ORAL DAILY
Qty: 90 TABLET | Refills: 3 | Status: SHIPPED | OUTPATIENT
Start: 2022-04-14 | End: 2022-10-20

## 2022-04-14 RX ORDER — HYDROCHLOROTHIAZIDE 12.5 MG/1
12.5 TABLET ORAL DAILY
Qty: 90 TABLET | Refills: 3 | Status: SHIPPED | OUTPATIENT
Start: 2022-04-14 | End: 2022-10-20

## 2022-04-14 ASSESSMENT — ASTHMA QUESTIONNAIRES: ACT_TOTALSCORE: 23

## 2022-04-14 NOTE — PROGRESS NOTES
Assessment & Plan       Essential hypertension with goal blood pressure less than 140/90  - controlled, continue current regimen  - losartan (COZAAR) 25 MG tablet; Take 1 tablet (25 mg) by mouth daily  Dispense: 90 tablet; Refill: 3  - hydrochlorothiazide (HYDRODIURIL) 12.5 MG tablet; Take 1 tablet (12.5 mg) by mouth daily  Dispense: 90 tablet; Refill: 3  - CBC with platelets; Future  - Comprehensive metabolic panel (BMP + Alb, Alk Phos, ALT, AST, Total. Bili, TP); Future    - CBC with platelets  - Comprehensive metabolic panel (BMP + Alb, Alk Phos, ALT, AST, Total. Bili, TP)  - TSH with free T4 reflex    Hypothyroidism due to medication  - TSH with free T4 reflex; Future  - levothyroxine (SYNTHROID/LEVOTHROID) 100 MCG tablet; Take 1 tablet (100 mcg) by mouth daily  Dispense: 90 tablet; Refill: 3    Bacterial vaginosis  - recent tx, recheck to ensure infection cleared   - Wet prep - lab collect; Future  - Wet prep - lab collect    Mild persistent asthma without complication  ACT Total Scores 4/14/2022   ACT TOTAL SCORE (Goal Greater than or Equal to 20) 23   In the past 12 months, how many times did you visit the emergency room for your asthma without being admitted to the hospital? 0   In the past 12 months, how many times were you hospitalized overnight because of your asthma? 0   - Mimi not aware of diagnosis, will discuss with allergist.   - Defer pneumonia vaccine for now.     Screen for colon cancer  - Adult Gastro Ref - Procedure Only; Future    Screening for diabetes mellitus  - Hemoglobin A1c; Future  - Hemoglobin A1c    Return in about 6 months (around 10/14/2022) for Routine Visit.    Nanette Penny MD  [facility]    Dennis Le is a 46 year old who presents for the following health issues     History of Present Illness       Reason for visit:  Labs to follow up on meds and want to know how to check for blood clots since i am taking birth control  pills.  Symptom onset:  1-3 days ago  Symptom intensity:  Mild    She eats 0-1 servings of fruits and vegetables daily.She consumes 3 sweetened beverage(s) daily.She exercises with enough effort to increase her heart rate 30 to 60 minutes per day.  She exercises with enough effort to increase her heart rate 3 or less days per week.   She is taking medications regularly.     Jan 2022 - She started on progesterone only pill. Her body is changing since birth control pill. She doesn't get a menstrual cycle. Discussed s/e POP. No concerns for DVT/PE.    Review of Systems         Objective    There were no vitals taken for this visit.  There is no height or weight on file to calculate BMI.  Physical Exam

## 2022-04-27 ENCOUNTER — OFFICE VISIT (OUTPATIENT)
Dept: ALLERGY | Facility: CLINIC | Age: 47
End: 2022-04-27
Payer: COMMERCIAL

## 2022-04-27 DIAGNOSIS — J30.9 ALLERGIC RHINOCONJUNCTIVITIS: Primary | ICD-10-CM

## 2022-04-27 DIAGNOSIS — J30.2 CHRONIC SEASONAL ALLERGIC RHINITIS: ICD-10-CM

## 2022-04-27 DIAGNOSIS — H10.10 ALLERGIC RHINOCONJUNCTIVITIS: Primary | ICD-10-CM

## 2022-04-27 DIAGNOSIS — T78.3XXD ANGIOEDEMA, SUBSEQUENT ENCOUNTER: ICD-10-CM

## 2022-04-27 PROCEDURE — 95004 PERQ TESTS W/ALRGNC XTRCS: CPT | Performed by: DERMATOLOGY

## 2022-04-27 PROCEDURE — 99213 OFFICE O/P EST LOW 20 MIN: CPT | Mod: 25 | Performed by: DERMATOLOGY

## 2022-04-27 ASSESSMENT — PAIN SCALES - GENERAL: PAINLEVEL: NO PAIN (0)
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
## 2022-04-27 NOTE — PATIENT INSTRUCTIONS
Mold Allergy    Molds are thread-like micro-fungi - they occur all over the world and grow particularly in places where there is moisture. If living spaces are infested with mold, it must be removed quickly. It may pose a health risk.    Triggers of mold allergy   The most important known allergens among fungi are Aspergillus species, Alternaria alternata, Cladosporium species and Penicillium species, which all belong to the category of molds. Molds are found all over the world, but predominantly in nature, mainly in the soil, in dead organic matter. Indoors molds develop in places where it is damp, such as areas affected by leaks, in angel cracks or when the relative air humidity is high. Poorly maintained ventilation systems, air humidifiers, aquariums, or decorative fountains and a lot of plants in a room can also lead to a mold infestation.    Symptoms   Mold allergy, like other respiratory allergies, is manifest as allergic runny nose, streaming eyes, cough and shortness of breath and is frequently accompanied by asthma. As well as allergic reactions, molds also cause irritation of the airways, the mucous membranes of the eyes and the skin. The growth of mold is often associated with a distinctively musty smell of earth, damp and mushrooms, which additionally impairs well-being.    Therapy and treatment   Any fungal infestation in living areas must be removed rapidly and properly. It is always important to get rid of the cause, hence eradicate the damp problem. Otherwise the mold will quickly reappear. People with health problems should not remove even small patches of mold growth. Before the mold is cleared and in the weeks following its clearance, rooms should also be aired frequently and dust should be removed.    Tips and tricks:   Keep relative air humidity to a maximum of 45 per cent in winter  Keep temperature between 66 and 73 C in winter  Air rooms thoroughly for five to ten minutes twice to three  "times a day  Position furniture at least ten centimetres away from walls  No plants in the bedroom area  Dispose of compost or organic waste on a daily basis or store temporarily outside the home  Do not use air humidifiers or only use them if air humidity is continuously monitored        Modified from \"[facility]\" by [facility]! [facility].   "

## 2022-04-27 NOTE — NURSING NOTE
Chief Complaint   Patient presents with     Allergy Recheck     Mimi is here today for a 2 month follow up and she said that things have been going pretty good. She is here for some testing today but is uncertain which ones. She has been without her allergy medicine for 1 week.     Yosef Jiang, Paramedic
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
## 2022-05-19 ENCOUNTER — LAB (OUTPATIENT)
Dept: LAB | Facility: CLINIC | Age: 47
End: 2022-05-19
Payer: COMMERCIAL

## 2022-05-19 DIAGNOSIS — I10 ESSENTIAL HYPERTENSION WITH GOAL BLOOD PRESSURE LESS THAN 140/90: ICD-10-CM

## 2022-05-19 LAB
ALBUMIN SERPL-MCNC: 3.8 G/DL (ref 3.5–5)
ALP SERPL-CCNC: 39 U/L (ref 45–120)
ALT SERPL W P-5'-P-CCNC: <9 U/L (ref 0–45)
ANION GAP SERPL CALCULATED.3IONS-SCNC: 9 MMOL/L (ref 5–18)
AST SERPL W P-5'-P-CCNC: 14 U/L (ref 0–40)
BILIRUB SERPL-MCNC: 0.4 MG/DL (ref 0–1)
BUN SERPL-MCNC: 10 MG/DL (ref 8–22)
CALCIUM SERPL-MCNC: 9.8 MG/DL (ref 8.5–10.5)
CHLORIDE BLD-SCNC: 102 MMOL/L (ref 98–107)
CO2 SERPL-SCNC: 29 MMOL/L (ref 22–31)
CREAT SERPL-MCNC: 0.99 MG/DL (ref 0.6–1.1)
GFR SERPL CREATININE-BSD FRML MDRD: 71 ML/MIN/1.73M2
GLUCOSE BLD-MCNC: 90 MG/DL (ref 70–125)
POTASSIUM BLD-SCNC: 3.9 MMOL/L (ref 3.5–5)
PROT SERPL-MCNC: 7.3 G/DL (ref 6–8)
SODIUM SERPL-SCNC: 140 MMOL/L (ref 136–145)

## 2022-05-19 PROCEDURE — 80053 COMPREHEN METABOLIC PANEL: CPT

## 2022-05-19 PROCEDURE — 36415 COLL VENOUS BLD VENIPUNCTURE: CPT

## 2022-05-20 ENCOUNTER — MYC MEDICAL ADVICE (OUTPATIENT)
Dept: FAMILY MEDICINE | Facility: CLINIC | Age: 47
End: 2022-05-20
Payer: COMMERCIAL

## 2022-05-20 DIAGNOSIS — R74.8 LOW SERUM ALKALINE PHOSPHATASE: Primary | ICD-10-CM

## 2022-05-23 NOTE — TELEPHONE ENCOUNTER
Dr. Penny-  1. It is writer's understanding patient is asking about alkaline phophatase (result is 39)   A. Does this require any follow up?    Thank you!  CLEO WirghtN, RN  [facility]

## 2022-05-23 NOTE — TELEPHONE ENCOUNTER
Writer responded via [employer].    CLEO WrightN, RN  [facility]

## 2022-05-24 NOTE — TELEPHONE ENCOUNTER
"Dr. Penny-[facility] pended. Please advise if recheck in 3 months would be reasonable?    \"Thank you. I would like to continue monitoring my lab levels. Will you enter a lab visit to recheck at whatever time you think is a good amount of time.     Thank you so much I appreciate your explanation.  Mimi \"    Thank you!  CLEO WrightN, RN  [facility]    "

## 2022-06-08 ENCOUNTER — HOSPITAL ENCOUNTER (OUTPATIENT)
Dept: CT IMAGING | Facility: HOSPITAL | Age: 47
Discharge: HOME | End: 2022-06-08
Attending: OTOLARYNGOLOGY | Admitting: OTOLARYNGOLOGY
Payer: COMMERCIAL

## 2022-06-08 DIAGNOSIS — J33.9 NASAL POLYP: ICD-10-CM

## 2022-06-08 DIAGNOSIS — J32.1 CHRONIC FRONTAL SINUSITIS: ICD-10-CM

## 2022-06-08 DIAGNOSIS — J32.0 CHRONIC MAXILLARY SINUSITIS: ICD-10-CM

## 2022-06-08 DIAGNOSIS — J32.2 CHRONIC ETHMOIDAL SINUSITIS: ICD-10-CM

## 2022-06-08 PROCEDURE — 70486 CT MAXILLOFACIAL W/O DYE: CPT

## 2022-06-14 NOTE — PROGRESS NOTES
CHIEF COMPLAINT:  Recheck      HISTORY OF PRESENT ILLNESS    Mimi was seen in follow up for nasal endoscopy.  Patient states that she was out of town this weekend and forgot to take her allergy medication so she is feeling more congested.  Her recent CT scan shows some improvement and the left maxillary sinus.  She has a history of thyroid eye disease and has had decompressions done in the past.  She has had a hard time finding a soda water and soda has not used her budesonide rinses.  She did not like the taste of Astelin nasal spray so she is back on her Flonase.  She is interested in medical therapy for polyps but is not interested in sinus surgery at this time.    SNOT 22 = 20    Recent CT scan:      Diminished opacification of the left maxillary sinus and paranasal sinus drainage pathway overall from 03/01/2022.   Some polypoid membrane thickening persists in the left maxillary sinus. The postoperative paranasal sinus drainage pathways are now   patent bilaterally.     2.  Stable postoperative changes right-sided paranasal sinuses from previous evaluation.     3.  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 This may reflect sequelae of remote trauma or be related to chronic inflammatory changes with some bony   resorption      My previous note:    Encounter Diagnoses   Name Primary?     Chronic maxillary sinusitis Yes     Chronic frontal sinusitis       Chronic ethmoidal sinusitis       Nasal polyp           RECOMMENDATIONS:     No orders of the defined types were placed in this encounter.          Orders Placed This Encounter   Medications     Hypertonic Nasal Wash (SINUS RINSE) bottle       Sig: Spray 1 each into both nostrils 2 times daily       Dispense:  180 each       Refill:  0       Add 1 vial of budesonide to 240 ml of saline and rinse 2x daily     budesonide (PULMICORT) 0.5 MG/2ML neb solution        Sig: Add 1 vial to 240 ml of saline and rinse 2x daily       Dispense:  360 mL       Refill:  0      Return visit 3 months for repeat CT sinus/nasal endoscopy  Recommend immunotherapy to help ameliorate nasal polyposis  Budesonide rinses as directed  Continue flonase nasal spray            REVIEW OF SYSTEMS    Review of Systems: a 10-system review is reviewed at this encounter.  See scanned document.     Dust mites, Lisinopril, and Mold     PHYSICAL EXAM:        HEAD: Normal appearance and symmetry:  No cutaneous lesions.      EARS:   Auricles normal         NASAL ENDOSCOPY    Dorsum:   Straight  Septum midline    Inferior turbinates 3-4+ inflamed  Secretions clear  Middle meatus right clear left ; 2 large polyps                   ORAL CAVITY/OROPHARYNX:    Lips:  Normal.     NECK:  Trachea:  midline       NEURO:   Alert and Oriented    GAIT AND STATION:  normal     RESPIRATORY:   Symmetry and Respiratory effort    PSYCH:   normal mood and affect    SKIN:  warm and dry         IMPRESSION:   Encounter Diagnoses   Name Primary?     Nasal polyp Yes     Allergic rhinitis, unspecified seasonality, unspecified trigger          RECOMMENDATIONS:    Start  Budesonide rinses (if you cannot find distilled water, boil tap water for 2 minutes and the use after cooled)  Will discuss dupixent with her allergist  Return visit 3 months

## 2022-06-15 ENCOUNTER — OFFICE VISIT (OUTPATIENT)
Dept: OTOLARYNGOLOGY | Facility: CLINIC | Age: 47
End: 2022-06-15
Payer: COMMERCIAL

## 2022-06-15 DIAGNOSIS — J33.9 NASAL POLYP: Primary | ICD-10-CM

## 2022-06-15 DIAGNOSIS — J30.9 ALLERGIC RHINITIS, UNSPECIFIED SEASONALITY, UNSPECIFIED TRIGGER: ICD-10-CM

## 2022-06-15 PROCEDURE — 99213 OFFICE O/P EST LOW 20 MIN: CPT | Mod: 25 | Performed by: OTOLARYNGOLOGY

## 2022-06-15 PROCEDURE — 31231 NASAL ENDOSCOPY DX: CPT | Performed by: OTOLARYNGOLOGY

## 2022-06-15 NOTE — LETTER
6/15/2022         RE: Mimi Melton  [address]  [place] [address]        Dear Colleague,    Thank you for referring your patient, Mimi Melton, to the [facility]. Please see a copy of my visit note below.    CHIEF COMPLAINT:  Recheck      HISTORY OF PRESENT ILLNESS    Mimi was seen in follow up for nasal endoscopy.  Patient states that she was out of town this weekend and forgot to take her allergy medication so she is feeling more congested.  Her recent CT scan shows some improvement and the left maxillary sinus.  She has a history of thyroid eye disease and has had decompressions done in the past.  She has had a hard time finding a soda water and soda has not used her budesonide rinses.  She did not like the taste of Astelin nasal spray so she is back on her Flonase.  She is interested in medical therapy for polyps but is not interested in sinus surgery at this time.    SNOT 22 = 20    Recent CT scan:      Diminished opacification of the left maxillary sinus and paranasal sinus drainage pathway overall from 03/01/2022.   Some polypoid membrane thickening persists in the left maxillary sinus. The postoperative paranasal sinus drainage pathways are now   patent bilaterally.     2.  Stable postoperative changes right-sided paranasal sinuses from previous evaluation.     3.  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 This may reflect sequelae of remote trauma or be related to chronic inflammatory changes with some bony   resorption      My previous note:    Encounter Diagnoses   Name Primary?     Chronic maxillary sinusitis Yes     Chronic frontal sinusitis       Chronic ethmoidal sinusitis       Nasal polyp           RECOMMENDATIONS:     No orders of the defined types were placed in this encounter.          Orders Placed This Encounter   Medications     Hypertonic  Nasal Wash (SINUS RINSE) bottle       Sig: Spray 1 each into both nostrils 2 times daily       Dispense:  180 each       Refill:  0       Add 1 vial of budesonide to 240 ml of saline and rinse 2x daily     budesonide (PULMICORT) 0.5 MG/2ML neb solution       Sig: Add 1 vial to 240 ml of saline and rinse 2x daily       Dispense:  360 mL       Refill:  0      Return visit 3 months for repeat CT sinus/nasal endoscopy  Recommend immunotherapy to help ameliorate nasal polyposis  Budesonide rinses as directed  Continue flonase nasal spray            REVIEW OF SYSTEMS    Review of Systems: a 10-system review is reviewed at this encounter.  See scanned document.     Dust mites, Lisinopril, and Mold     PHYSICAL EXAM:        HEAD: Normal appearance and symmetry:  No cutaneous lesions.      EARS:   Auricles normal         NASAL ENDOSCOPY    Dorsum:   Straight  Septum midline    Inferior turbinates 3-4+ inflamed  Secretions clear  Middle meatus right clear left ; 2 large polyps                   ORAL CAVITY/OROPHARYNX:    Lips:  Normal.     NECK:  Trachea:  midline       NEURO:   Alert and Oriented    GAIT AND STATION:  normal     RESPIRATORY:   Symmetry and Respiratory effort    PSYCH:   normal mood and affect    SKIN:  warm and dry         IMPRESSION:   Encounter Diagnoses   Name Primary?     Nasal polyp Yes     Allergic rhinitis, unspecified seasonality, unspecified trigger          RECOMMENDATIONS:    Start  Budesonide rinses (if you cannot find distilled water, boil tap water for 2 minutes and the use after cooled)  Will discuss dupixent with her allergist  Return visit 3 months        Again, thank you for allowing me to participate in the care of your patient.        Sincerely,        Mariusz Sahu MD

## 2022-07-08 ENCOUNTER — LAB (OUTPATIENT)
Dept: FAMILY MEDICINE | Facility: CLINIC | Age: 47
End: 2022-07-08
Attending: FAMILY MEDICINE
Payer: COMMERCIAL

## 2022-07-08 DIAGNOSIS — Z20.822 CLOSE EXPOSURE TO 2019 NOVEL CORONAVIRUS: ICD-10-CM

## 2022-07-08 LAB — SARS-COV-2 RNA RESP QL NAA+PROBE: NEGATIVE

## 2022-07-08 PROCEDURE — U0003 INFECTIOUS AGENT DETECTION BY NUCLEIC ACID (DNA OR RNA); SEVERE ACUTE RESPIRATORY SYNDROME CORONAVIRUS 2 (SARS-COV-2) (CORONAVIRUS DISEASE [COVID-19]), AMPLIFIED PROBE TECHNIQUE, MAKING USE OF HIGH THROUGHPUT TECHNOLOGIES AS DESCRIBED BY CMS-2020-01-R: HCPCS

## 2022-07-08 PROCEDURE — U0005 INFEC AGEN DETEC AMPLI PROBE: HCPCS

## 2022-07-18 ENCOUNTER — MYC MEDICAL ADVICE (OUTPATIENT)
Dept: FAMILY MEDICINE | Facility: CLINIC | Age: 47
End: 2022-07-18

## 2022-07-19 NOTE — TELEPHONE ENCOUNTER
Writer responded via [employer].    CLEO WrightN, RN  [facility]

## 2022-07-21 ENCOUNTER — LAB (OUTPATIENT)
Dept: LAB | Facility: CLINIC | Age: 47
End: 2022-07-21
Payer: COMMERCIAL

## 2022-07-21 DIAGNOSIS — R74.8 LOW SERUM ALKALINE PHOSPHATASE: ICD-10-CM

## 2022-07-21 LAB
ALBUMIN SERPL BCG-MCNC: 4.5 G/DL (ref 3.5–5.2)
ALP SERPL-CCNC: 36 U/L (ref 35–104)
ALT SERPL W P-5'-P-CCNC: 12 U/L (ref 10–35)
ANION GAP SERPL CALCULATED.3IONS-SCNC: 7 MMOL/L (ref 7–15)
AST SERPL W P-5'-P-CCNC: 23 U/L (ref 10–35)
BILIRUB SERPL-MCNC: 0.4 MG/DL
BUN SERPL-MCNC: 10.6 MG/DL (ref 6–20)
CALCIUM SERPL-MCNC: 9.8 MG/DL (ref 8.6–10)
CHLORIDE SERPL-SCNC: 100 MMOL/L (ref 98–107)
CREAT SERPL-MCNC: 1.08 MG/DL (ref 0.51–0.95)
DEPRECATED HCO3 PLAS-SCNC: 30 MMOL/L (ref 22–29)
GFR SERPL CREATININE-BSD FRML MDRD: 64 ML/MIN/1.73M2
GLUCOSE SERPL-MCNC: 99 MG/DL (ref 70–99)
MAGNESIUM SERPL-MCNC: 1.8 MG/DL (ref 1.7–2.3)
POTASSIUM SERPL-SCNC: 3.9 MMOL/L (ref 3.4–5.3)
PROT SERPL-MCNC: 7.7 G/DL (ref 6.4–8.3)
SODIUM SERPL-SCNC: 137 MMOL/L (ref 136–145)

## 2022-07-21 PROCEDURE — 36415 COLL VENOUS BLD VENIPUNCTURE: CPT

## 2022-07-21 PROCEDURE — 80053 COMPREHEN METABOLIC PANEL: CPT

## 2022-07-21 PROCEDURE — 83735 ASSAY OF MAGNESIUM: CPT

## 2022-07-21 PROCEDURE — 99000 SPECIMEN HANDLING OFFICE-LAB: CPT

## 2022-07-21 PROCEDURE — 84630 ASSAY OF ZINC: CPT | Mod: 90

## 2022-07-24 LAB — ZINC SERPL-MCNC: 79.6 UG/DL

## 2022-08-08 ENCOUNTER — LAB (OUTPATIENT)
Dept: LAB | Facility: CLINIC | Age: 47
End: 2022-08-08

## 2022-08-08 ENCOUNTER — OFFICE VISIT (OUTPATIENT)
Dept: OPHTHALMOLOGY | Facility: CLINIC | Age: 47
End: 2022-08-08
Payer: COMMERCIAL

## 2022-08-08 DIAGNOSIS — E07.9 THYROID EYE DISEASE: ICD-10-CM

## 2022-08-08 DIAGNOSIS — H57.89 THYROID EYE DISEASE: ICD-10-CM

## 2022-08-08 DIAGNOSIS — H02.539 EYELID RETRACTION, UNSPECIFIED LATERALITY: ICD-10-CM

## 2022-08-08 DIAGNOSIS — H57.89 THYROID EYE DISEASE: Primary | ICD-10-CM

## 2022-08-08 DIAGNOSIS — E07.9 THYROID EYE DISEASE: Primary | ICD-10-CM

## 2022-08-08 PROCEDURE — 99213 OFFICE O/P EST LOW 20 MIN: CPT | Mod: GC | Performed by: OPHTHALMOLOGY

## 2022-08-08 PROCEDURE — 99000 SPECIMEN HANDLING OFFICE-LAB: CPT

## 2022-08-08 PROCEDURE — 84445 ASSAY OF TSI GLOBULIN: CPT | Mod: 90

## 2022-08-08 PROCEDURE — 36415 COLL VENOUS BLD VENIPUNCTURE: CPT

## 2022-08-08 ASSESSMENT — MARGIN REFLEX DISTANCE
OS_MRD1: 4
OD_MRD1: 4

## 2022-08-08 ASSESSMENT — VISUAL ACUITY
OS_CC: 20/20
OD_CC+: -1
OS_CC+: -3
CORRECTION_TYPE: CONTACTS
METHOD: SNELLEN - LINEAR
OD_CC: 20/20

## 2022-08-08 ASSESSMENT — TONOMETRY
OD_IOP_MMHG: 18
OS_IOP_MMHG: 22
IOP_METHOD: TONOPEN

## 2022-08-08 ASSESSMENT — EXTERNAL EXAM - LEFT EYE: OS_EXAM: EXOPHTHALMOS

## 2022-08-08 ASSESSMENT — CONF VISUAL FIELD
OS_NORMAL: 1
METHOD: COUNTING FINGERS
OD_NORMAL: 1

## 2022-08-08 ASSESSMENT — EXTERNAL EXAM - RIGHT EYE: OD_EXAM: EXOPHTHALMOS
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
## 2022-08-08 NOTE — PROGRESS NOTES
Chief Complaints and History of Present Illnesses   Patient presents with     Post Op (Ophthalmology) Both Eyes     Bilateral orbital decompression with  Sonopet, Bilateral lower lid retraction repair.  SONOPET EYE.  REPAIR, RETRACTION, EYELID.     Chief Complaint(s) and History of Present Illness(es)     Post Op (Ophthalmology) Both Eyes     In both eyes.  Onset was sudden.  This started 9 months ago.  Since onset   it is stable.  Associated symptoms include floaters.  Negative for glare,   haloes, dryness, eye pain, tearing, photophobia and flashes.  Treatments   tried include no treatments.  Pain was noted as 0/10. Additional comments:   Bilateral orbital decompression with  Sonopet, Bilateral lower lid   retraction repair.  SONOPET EYE.  REPAIR, RETRACTION, EYELID.              Comments     Pt states vision is stable, NVA is getting worse.  No change to floaters.  No ocular medications.    TRAE Navarrete August 8, 2022 7:54 AM                     Assessment & Plan     Mimi Melton is a 47 year old female with the following diagnoses:   1. Thyroid eye disease    2. Eyelid retraction, unspecified laterality         S/p Bilateral lateral wall bone and fat orbital decompression and bilateral lower lid retraction repair on 11/5/2021.     TSI (10/13/14): 4.6    amina measurements increased today, back to baseline measurements. Last TSI was in 2014. Could consider repeating TSI to see if pt is active, however patient is managed on synthroid and is not bothered by his proptosis and would not want additional surgery at this time.       Return to clinic 6 months        pt smokes marijuana but does not smoke cigarettes.     Tila Logan MD  Oculoplastics Fellow    Attending Physician Attestation:  I have seen and examined this patient with the fellow .  I have confirmed and edited as necessary the chief complaint(s), history of present illness, review of systems, relevant history, and examination findings as  documented by others.  I have personally reviewed the relevant tests, images, and reports as documented above.  I have confirmed and edited as necessary the assessment and plan and agree with this note.    - Niles Donnelly MD 8:21 AM 8/8/2022

## 2022-08-10 ENCOUNTER — OFFICE VISIT (OUTPATIENT)
Dept: FAMILY MEDICINE | Facility: CLINIC | Age: 47
End: 2022-08-10
Payer: COMMERCIAL

## 2022-08-10 ENCOUNTER — NURSE TRIAGE (OUTPATIENT)
Dept: NURSING | Facility: CLINIC | Age: 47
End: 2022-08-10

## 2022-08-10 VITALS
OXYGEN SATURATION: 97 % | RESPIRATION RATE: 18 BRPM | WEIGHT: 189 LBS | DIASTOLIC BLOOD PRESSURE: 76 MMHG | HEART RATE: 86 BPM | SYSTOLIC BLOOD PRESSURE: 110 MMHG | TEMPERATURE: 98.2 F | BODY MASS INDEX: 31.69 KG/M2

## 2022-08-10 DIAGNOSIS — N76.0 BACTERIAL VAGINOSIS: Primary | ICD-10-CM

## 2022-08-10 DIAGNOSIS — B96.89 BACTERIAL VAGINOSIS: Primary | ICD-10-CM

## 2022-08-10 PROCEDURE — 99213 OFFICE O/P EST LOW 20 MIN: CPT | Performed by: PHYSICIAN ASSISTANT

## 2022-08-10 PROCEDURE — 87491 CHLMYD TRACH DNA AMP PROBE: CPT | Performed by: PHYSICIAN ASSISTANT

## 2022-08-10 PROCEDURE — 87591 N.GONORRHOEAE DNA AMP PROB: CPT | Performed by: PHYSICIAN ASSISTANT

## 2022-08-10 PROCEDURE — 87210 SMEAR WET MOUNT SALINE/INK: CPT | Performed by: PHYSICIAN ASSISTANT

## 2022-08-10 RX ORDER — METRONIDAZOLE 7.5 MG/G
1 GEL VAGINAL DAILY
Qty: 25 G | Refills: 0 | Status: SHIPPED | OUTPATIENT
Start: 2022-08-10 | End: 2022-08-15

## 2022-08-11 LAB
C TRACH DNA SPEC QL PROBE+SIG AMP: NEGATIVE
N GONORRHOEA DNA SPEC QL NAA+PROBE: NEGATIVE

## 2022-08-11 NOTE — TELEPHONE ENCOUNTER
Patient called.  She wants to know when she will next qualify for the 2 nd booster of the covid vaccine.  Patient states she travels often and has graves disease.    Patient has an appointment in Oct and wants to know if she can get it then.    Will route a message to PCP to follow up.    Marlyn Connell RN   08/10/22 8:01 PM  [facility] Nurse Advisor      Reason for Disposition    [1] Caller requesting NON-URGENT health information AND [2] PCP's office is the best resource    Additional Information    Negative: [1] Caller is not with the adult (patient) AND [2] reporting urgent symptoms    Negative: Lab result questions    Negative: Medication questions    Negative: Caller can't be reached by phone    Negative: Caller has already spoken to PCP or another triager    Negative: RN needs further essential information from caller in order to complete triage    Negative: Requesting regular office appointment    Protocols used: INFORMATION ONLY CALL - NO TRIAGE-A-

## 2022-08-11 NOTE — PROGRESS NOTES
URGENT CARE VISIT:    SUBJECTIVE:   Mimi Melton is a 47 year old female who presents with malodorous vaginal discharge since 2 day(s) ago. Denies fever, abdominal pain, itching, pelvic pain, dysuria, urinary frequency, nausea and vomiting. Symptoms have been stable.  Treatment tried include none with no relief of symptoms.     Sexually active: yes, single partner  She has history of BV.    PMH:   Past Medical History:   Diagnosis Date     Cervical high risk HPV (human papillomavirus) test positive 12/2015 12/2015, 10/2019, 10/30/20     Chronic sinusitis Summer 2016    I get congested every 3-4 weeks     Esophageal reflux      Exophthalmos, unspecified      Hypothyroidism      Thyroid eye disease      Thyrotoxicosis without mention of goiter or other cause, without mention of thyrotoxic crisis or storm 03/2005    Had radioablation, On synthroid, seeing [facility]; takes synthroid     Unspecified essential hypertension 1980    meds since 2001, on atenolol     Allergies: Dust mites, Lisinopril, and Mold   Medications:   Current Outpatient Medications   Medication Sig Dispense Refill     ACETAMINOPHEN PO Take 1,000 mg by mouth 2 times daily as needed for pain (menstrual cramps)       azelastine (ASTELIN) 0.1 % nasal spray 2 sprays each nostril at bedtime 30 mL 11     benzoyl peroxide (BENZOYL PEROXIDE WASH) 5 % external liquid WASH FACE SKIN 1-2 TIMES DAILY 142 mL 3     budesonide (PULMICORT) 0.5 MG/2ML neb solution Add 1 vial to 240 ml of saline and rinse 2x daily 360 mL 0     erythromycin (ROMYCIN) 5 MG/GM ophthalmic ointment Apply small amount to incision sites three times daily, then apply to inner lower lid of operative eye(s) at bedtime, as directed. 3.5 g 0     fexofenadine (ALLEGRA) 180 MG tablet TAKE 1-2 TABLETS BY MOUTH AGAINST ALLERGIC REACTIONS 30 tablet 3     fluticasone (FLONASE) 50 MCG/ACT nasal spray SPRAY 1 SPRAY INTO BOTH NOSTRILS 2 TIMES DAILY 48 mL 3     fluticasone-salmeterol (ADVAIR)  250-50 MCG/DOSE inhaler Inhale 1 puff into the lungs every 12 hours 1 each 1     hydrochlorothiazide (HYDRODIURIL) 12.5 MG tablet Take 1 tablet (12.5 mg) by mouth daily 90 tablet 3     IBUPROFEN PO Take 800 mg by mouth every 4 hours as needed (takes for menstrual cramps.)        Lactobacillus (FLORAJEN WOMEN) CAPS Take 1 Dose by mouth daily 30 capsule 1     Lactobacillus-Inulin ([facility] DIGESTIVE [facility]) CAPS Take 1 capsule by mouth 2 times daily 100 capsule 11     levothyroxine (SYNTHROID/LEVOTHROID) 100 MCG tablet Take 1 tablet (100 mcg) by mouth daily 90 tablet 3     losartan (COZAAR) 25 MG tablet Take 1 tablet (25 mg) by mouth daily 90 tablet 3     metroNIDAZOLE (METROGEL) 0.75 % vaginal gel Place 1 applicator (5 g) vaginally daily for 5 days 25 g 0     mupirocin (BACTROBAN) 2 % ointment as needed       neomycin-polymixin-dexamethasone (MAXITROL) 0.1 % ophthalmic suspension Place one drop in both eyes four times a day for 10 days 5 mL 0     norethindrone (MICRONOR) 0.35 MG tablet Take 1 tablet (0.35 mg) by mouth daily 84 tablet 3     tretinoin (RETIN-A) 0.05 % external cream Apply topically At Bedtime Put on face 1-2 times daily (if irritated reduce to every other day) 20 g 3     valACYclovir (VALTREX) 500 MG tablet Take 1 tablet (500 mg) by mouth 2 times daily for 3 days 6 tablet 0     Social History:   Social History     Tobacco Use     Smoking status: Never Smoker     Smokeless tobacco: Never Used   Substance Use Topics     Alcohol use: Yes     Comment: 1-2x's/month if that.         ROS:   Review of systems negative except as stated above.    OBJECTIVE:  /76 (BP Location: Right arm, Patient Position: Sitting, Cuff Size: Adult Large)   Pulse 86   Temp 98.2  F (36.8  C) (Oral)   Resp 18   Wt 85.7 kg (189 lb)   LMP 07/27/2022   SpO2 97%   BMI 31.69 kg/m    GENERAL APPEARANCE: healthy, alert and no distress  CV: regular rates and rhythm, normal S1 S2, no murmur noted  ABDOMEN:  soft, nontender, no  HSM or masses and bowel sounds normal  BACK: No CVA tenderness  SKIN: no suspicious lesions or rashes  Genitourinary: deferred    Labs:  Results for orders placed or performed in visit on 08/10/22   Wet prep - Clinic Collect     Status: Abnormal    Specimen: Vagina; Swab   Result Value Ref Range    Trichomonas Absent Absent    Yeast Absent Absent    Clue Cells Present (A) Absent    WBCs/high power field 2+ (A) None         ASSESSMENT:    ICD-10-CM    1. Bacterial vaginosis  N76.0 Chlamydia & Gonorrhea by PCR, G[facility]/Range - Clinic Collect    B96.89 Wet prep - Clinic Collect     metroNIDAZOLE (METROGEL) 0.75 % vaginal gel       PLAN:  Patient Instructions   Patient was educated on the natural course of condition. Take medication as prescribed. Side effects discussed. No alcohol while taking this medication. G/C testing is pending. Conservative measures discussed including avoid sexual intercourse until infection clears. Although bacterial vaginosis is not transmitted sexually, having sex puts you at risk. This may be prevented by using condoms. See your primary care provider if symptoms worsen or do not improve in 7 days. Seek emergency care if you develop severe pelvic pain.     Patient verbalized understanding and is agreeable to plan. The patient was discharged ambulatory and in stable condition.    Lyndsey Smith PA-C ....................  8/10/2022   7:47 PM

## 2022-08-11 NOTE — PATIENT INSTRUCTIONS
Patient was educated on the natural course of condition. Take medication as prescribed. Side effects discussed. No alcohol while taking this medication. G/C testing is pending. Conservative measures discussed including avoid sexual intercourse until infection clears. Although bacterial vaginosis is not transmitted sexually, having sex puts you at risk. This may be prevented by using condoms. See your primary care provider if symptoms worsen or do not improve in 7 days. Seek emergency care if you develop severe pelvic pain.

## 2022-08-15 ENCOUNTER — MYC MEDICAL ADVICE (OUTPATIENT)
Dept: FAMILY MEDICINE | Facility: CLINIC | Age: 47
End: 2022-08-15

## 2022-08-15 DIAGNOSIS — E03.2 HYPOTHYROIDISM DUE TO MEDICATION: Primary | ICD-10-CM

## 2022-08-15 LAB — TSI SER-ACNC: 3.8 TSI INDEX

## 2022-08-18 NOTE — TELEPHONE ENCOUNTER
Dr. Penny -    Patient requesting thyroid labs. Pended.     Crystal Ram, BSN RN  [facility]

## 2022-08-25 ENCOUNTER — LAB (OUTPATIENT)
Dept: LAB | Facility: CLINIC | Age: 47
End: 2022-08-25
Payer: COMMERCIAL

## 2022-08-25 DIAGNOSIS — E03.2 HYPOTHYROIDISM DUE TO MEDICATION: ICD-10-CM

## 2022-08-25 LAB — TSH SERPL DL<=0.005 MIU/L-ACNC: 1.18 UIU/ML (ref 0.3–4.2)

## 2022-08-25 PROCEDURE — 36415 COLL VENOUS BLD VENIPUNCTURE: CPT

## 2022-08-25 PROCEDURE — 84443 ASSAY THYROID STIM HORMONE: CPT

## 2022-09-06 DIAGNOSIS — I10 ESSENTIAL HYPERTENSION WITH GOAL BLOOD PRESSURE LESS THAN 140/90: Primary | ICD-10-CM

## 2022-09-08 ENCOUNTER — PRE VISIT (OUTPATIENT)
Dept: NEPHROLOGY | Facility: CLINIC | Age: 47
End: 2022-09-08

## 2022-09-08 ENCOUNTER — OFFICE VISIT (OUTPATIENT)
Dept: NEPHROLOGY | Facility: CLINIC | Age: 47
End: 2022-09-08
Attending: INTERNAL MEDICINE
Payer: COMMERCIAL

## 2022-09-08 ENCOUNTER — LAB (OUTPATIENT)
Dept: LAB | Facility: CLINIC | Age: 47
End: 2022-09-08
Payer: COMMERCIAL

## 2022-09-08 VITALS
OXYGEN SATURATION: 97 % | SYSTOLIC BLOOD PRESSURE: 122 MMHG | DIASTOLIC BLOOD PRESSURE: 81 MMHG | WEIGHT: 183.5 LBS | BODY MASS INDEX: 30.77 KG/M2 | HEART RATE: 66 BPM | TEMPERATURE: 98.4 F

## 2022-09-08 DIAGNOSIS — I10 HYPERTENSION, ESSENTIAL: Primary | ICD-10-CM

## 2022-09-08 DIAGNOSIS — I10 ESSENTIAL HYPERTENSION WITH GOAL BLOOD PRESSURE LESS THAN 140/90: ICD-10-CM

## 2022-09-08 LAB
ALBUMIN MFR UR ELPH: <6 MG/DL
ALBUMIN SERPL BCG-MCNC: 4.3 G/DL (ref 3.5–5.2)
ALBUMIN UR-MCNC: NEGATIVE MG/DL
ANION GAP SERPL CALCULATED.3IONS-SCNC: 9 MMOL/L (ref 7–15)
APPEARANCE UR: CLEAR
BILIRUB UR QL STRIP: NEGATIVE
BUN SERPL-MCNC: 7.8 MG/DL (ref 6–20)
CALCIUM SERPL-MCNC: 9.4 MG/DL (ref 8.6–10)
CHLORIDE SERPL-SCNC: 102 MMOL/L (ref 98–107)
COLOR UR AUTO: YELLOW
CREAT SERPL-MCNC: 1.08 MG/DL (ref 0.51–0.95)
CREAT UR-MCNC: 129 MG/DL
DEPRECATED CALCIDIOL+CALCIFEROL SERPL-MC: 25 UG/L (ref 20–75)
DEPRECATED HCO3 PLAS-SCNC: 28 MMOL/L (ref 22–29)
ERYTHROCYTE [DISTWIDTH] IN BLOOD BY AUTOMATED COUNT: 13.2 % (ref 10–15)
GFR SERPL CREATININE-BSD FRML MDRD: 64 ML/MIN/1.73M2
GLUCOSE SERPL-MCNC: 94 MG/DL (ref 70–99)
GLUCOSE UR STRIP-MCNC: NEGATIVE MG/DL
HCT VFR BLD AUTO: 41 % (ref 35–47)
HGB BLD-MCNC: 13.3 G/DL (ref 11.7–15.7)
HGB UR QL STRIP: NEGATIVE
KETONES UR STRIP-MCNC: NEGATIVE MG/DL
LEUKOCYTE ESTERASE UR QL STRIP: NEGATIVE
MCH RBC QN AUTO: 29.7 PG (ref 26.5–33)
MCHC RBC AUTO-ENTMCNC: 32.4 G/DL (ref 31.5–36.5)
MCV RBC AUTO: 92 FL (ref 78–100)
NITRATE UR QL: NEGATIVE
PH UR STRIP: 6 [PH] (ref 5–7)
PHOSPHATE SERPL-MCNC: 2.9 MG/DL (ref 2.5–4.5)
PLATELET # BLD AUTO: 370 10E3/UL (ref 150–450)
POTASSIUM SERPL-SCNC: 3.9 MMOL/L (ref 3.4–5.3)
PROT/CREAT 24H UR: NORMAL MG/G{CREAT}
PTH-INTACT SERPL-MCNC: 45 PG/ML (ref 15–65)
RBC # BLD AUTO: 4.48 10E6/UL (ref 3.8–5.2)
RBC URINE: 1 /HPF
SODIUM SERPL-SCNC: 139 MMOL/L (ref 136–145)
SP GR UR STRIP: 1.02 (ref 1–1.03)
SQUAMOUS EPITHELIAL: 1 /HPF
UROBILINOGEN UR STRIP-MCNC: NORMAL MG/DL
WBC # BLD AUTO: 4 10E3/UL (ref 4–11)
WBC URINE: <1 /HPF

## 2022-09-08 PROCEDURE — 83970 ASSAY OF PARATHORMONE: CPT | Performed by: PATHOLOGY

## 2022-09-08 PROCEDURE — 99204 OFFICE O/P NEW MOD 45 MIN: CPT | Mod: GC | Performed by: INTERNAL MEDICINE

## 2022-09-08 PROCEDURE — 84156 ASSAY OF PROTEIN URINE: CPT | Performed by: PATHOLOGY

## 2022-09-08 PROCEDURE — 99000 SPECIMEN HANDLING OFFICE-LAB: CPT | Performed by: PATHOLOGY

## 2022-09-08 PROCEDURE — G0463 HOSPITAL OUTPT CLINIC VISIT: HCPCS

## 2022-09-08 PROCEDURE — 82565 ASSAY OF CREATININE: CPT | Performed by: PATHOLOGY

## 2022-09-08 PROCEDURE — 84520 ASSAY OF UREA NITROGEN: CPT | Performed by: PATHOLOGY

## 2022-09-08 PROCEDURE — 82306 VITAMIN D 25 HYDROXY: CPT | Mod: 90 | Performed by: PATHOLOGY

## 2022-09-08 PROCEDURE — 81001 URINALYSIS AUTO W/SCOPE: CPT | Performed by: PATHOLOGY

## 2022-09-08 PROCEDURE — 85027 COMPLETE CBC AUTOMATED: CPT | Performed by: PATHOLOGY

## 2022-09-08 PROCEDURE — 82040 ASSAY OF SERUM ALBUMIN: CPT | Performed by: PATHOLOGY

## 2022-09-08 PROCEDURE — 84100 ASSAY OF PHOSPHORUS: CPT | Performed by: PATHOLOGY

## 2022-09-08 PROCEDURE — 82310 ASSAY OF CALCIUM: CPT | Performed by: PATHOLOGY

## 2022-09-08 PROCEDURE — 36415 COLL VENOUS BLD VENIPUNCTURE: CPT | Performed by: PATHOLOGY

## 2022-09-08 PROCEDURE — 80051 ELECTROLYTE PANEL: CPT | Performed by: PATHOLOGY

## 2022-09-08 ASSESSMENT — PAIN SCALES - GENERAL: PAINLEVEL: NO PAIN (0)

## 2022-09-08 NOTE — PROGRESS NOTES
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 She was previously seen by Dr. Meyer in 2018. She also has a history of chronic sinusitis, and esophageal reflux.     She has longstanding HTN, since childhood/adolescence, starting on atenolol at age 26, and then lisinopril and HCTZ. Had previous episodes of hypotension on atenolol and lisinopril. She had urine albumin checked by primary care in May 2018 and had 500mg/g albuminuria. The patient has had UA positive for proteinuria at times in the past in the setting of hematuria and bacteria but the majority of UAs are without either, including most recently. Also had protein in urine urine during her pregnancies. Creatinine has been in the 0.8-1.0 range since around 2005. The patient has renal US in 2011 without arterial pathology found. No atherosclerosis on CT Angio coronary arteries, done in 2011. 2011 Echo notable for EF 55%. Has a history of recurrent swelling of the lips thought to be angioedema from chronic lisinopril use, and was switched to losartan/HCTZ in 2022. Recent blood pressure readings are quite variable and had ranged between 106//100.     Her most recent laboratory evaluation was performed back in July.  Renal panel showed a creatinine of 1.08 mg/dL with an estimated GFR 64 mL/min.  Her serum bicarbonate was elevated at 28.  Her serum sodium, potassium were normal.  Serum albumin was normal.  LFTs were normal. TSH was 1.18. Thyroid Stim Imm 3.8.  PTH 45, Ca2+ 9.4. Hgb 13.3.     Home blood pressure readings have been ~110s/80, and she takes her blood pressure every other week or so. No hypotensive episodes. No dizziness or syncope. Some lower extremity and hand edema when in a very hot climate, but otherwise no edema. No chest pain or shortness of breath. No hematuria. No dysuria. She is taking losartan 25 mg, hydrochlorothiazide 12.5mg. Exercise 3-4x a week, Has fluctuations in her weight depending on her diet, but no significant gains or losses over the past year. She is eating fruits and vegetables, and trying to minimize sugars and red meats.     Is not taking NSAIDs except after her eye surgery, only uses tylenol for pain control. No history of kidney stones in her or her family. She reports that her mother had HTN but onset only at age 50. No known kidney disease family hx. The patient has no hx of hypokalemia dating back to 2005. Son had HTN starting at age 17 with an unknown cardiomyopathy and is on anti-hypertensives. The patient reports that she had pre-eclampsia with all three of her children.     Smokes marijuana that may have tobacco in it. No cigarette use of chewing tobacco. 1-2 drinks every other weekend.     PAST MEDICAL HISTORY:  Reviewed with patient on 09/08/2022     Past Medical History:   Diagnosis Date     Cervical high risk HPV (human papillomavirus) test positive 12/2015 12/2015, 10/2019, 10/30/20     Chronic sinusitis Summer 2016    I get congested every 3-4 weeks     Esophageal reflux      Exophthalmos, unspecified      Hypothyroidism      Thyroid eye disease      Thyrotoxicosis without mention of goiter or other cause, without mention of thyrotoxic crisis or storm 03/2005    Had radioablation, On synthroid, seeing [facility]; takes synthroid     Unspecified essential hypertension 1980    meds since 2001, on atenolol       Past Surgical History:   Procedure Laterality Date     CYSTOSCOPY, SLING TRANSVAGINAL N/A 10/10/2017     Procedure: CYSTOSCOPY, SLING TRANSVAGINAL;  Midurethral Sling and Cystoscopy (Support the Urethra with Mesh Sling and Look in the Bladder);  Surgeon: Karin Amin MD;  Location: [facility]     EYE SURGERY  2008    Orbital Decompression Dr smart     HC REPAIR INCISIONAL HERNIA,REDUCIBLE      Umbilical hernia Repair     ORBITOTOMY DECOMPRESSION Bilateral 2021    Procedure: Bilateral orbital decompression with  Sonopet, Bilateral lower lid retraction repair;  Surgeon: Niles Donnelly MD;  Location: [facility] OR     REPAIR RETRACTION LID Bilateral 2021    Procedure: REPAIR, RETRACTION, EYELID;  Surgeon: Niles Donnelly MD;  Location: [facility] OR     SONOPET EYE Bilateral 2021    Procedure: SONOPET EYE;  Surgeon: Niles Donnelly MD;  Location: [facility] OR     ZZC  DELIVERY ONLY  91    , Low Cervical     ZZC  DELIVERY ONLY  97    , Low Cervical     ZZC  DELIVERY ONLY  99    , Low Cervical     ZZC INDUCED ABORTN BY D&C      Aspiration & Curettage, TAB x2        MEDICATIONS:  PTA Meds  Current Outpatient Medications   Medication     ACETAMINOPHEN PO     azelastine (ASTELIN) 0.1 % nasal spray     benzoyl peroxide (BENZOYL PEROXIDE WASH) 5 % external liquid     budesonide (PULMICORT) 0.5 MG/2ML neb solution     erythromycin (ROMYCIN) 5 MG/GM ophthalmic ointment     fexofenadine (ALLEGRA) 180 MG tablet     fluticasone (FLONASE) 50 MCG/ACT nasal spray     fluticasone-salmeterol (ADVAIR) 250-50 MCG/DOSE inhaler     hydrochlorothiazide (HYDRODIURIL) 12.5 MG tablet     IBUPROFEN PO     Lactobacillus (FLORAJEN WOMEN) CAPS     Lactobacillus-Inulin ([facility] DIGEST[facility]) CAPS     levothyroxine (SYNTHROID/LEVOTHROID) 100 MCG tablet     losartan (COZAAR) 25 MG tablet     mupirocin (BACTROBAN) 2 % ointment     neomycin-polymixin-dexamethasone (MAXITROL) 0.1 % ophthalmic suspension     norethindrone (MICRONOR) 0.35 MG tablet     tretinoin (RETIN-A)  0.05 % external cream     valACYclovir (VALTREX) 500 MG tablet     No current facility-administered medications for this visit.         ALLERGIES:    Allergies   Allergen Reactions     Dust Mites      Lisinopril Swelling     Swelling in lip     Mold      Cannot have any meds that contain mold.       REVIEW OF SYSTEMS:  A comprehensive of systems was negative except as noted above.    SOCIAL HISTORY:   Social History     Socioeconomic History     Marital status: Single     Spouse name: Not on file     Number of children: Not on file     Years of education: Not on file     Highest education level: Not on file   Occupational History     Occupation: [employer]     Employer: [employer]   Tobacco Use     Smoking status: Never Smoker     Smokeless tobacco: Never Used   Substance and Sexual Activity     Alcohol use: Yes     Comment: 1-2x's/month if that.     Drug use: Yes     Types: Marijuana     Comment: 3x's/day     Sexual activity: Yes     Partners: Male     Birth control/protection: I.U.D.     Comment: Paraguard IUD,    Other Topics Concern     Parent/sibling w/ CABG, MI or angioplasty before 65F 55M? No   Social History Narrative    Caffeine intake/servings daily - 0-1, 12 oz of Mountain Dew    Calcium intake/servings daily - eats only cheese    Exercise None    Sunscreen used - Yes    Seatbelts used - Yes    Guns stored in the home - No    Self Breast Exam - No    Pap test up to date -  Yes, as of today    Eye exam up to date -  Yes    Dental exam up to date -  Yes    DEXA scan up to date -  Not Applicable    Flex Sig/Colonoscopy up to date -  Not Applicable    Mammography up to date -  Not Applicable    Immunizations reviewed and up to date - Yes, within the last 10 years    Abuse: Current or Past (Physical, Sexual or Emotional) - No    Do you feel safe in your environment - Yes    Do you cope well with stress - Yes    Do you suffer from insomnia - No    Last updated by: Anu  Karel BATRES 12/21/2011             Social Determinants of Health     Financial Resource Strain: Not on file   Food Insecurity: Not on file   Transportation Needs: Not on file   Physical Activity: Not on file   Stress: Not on file   Social Connections: Not on file   Intimate Partner Violence: Not on file   Housing Stability: Not on file     Reviewed with patient.    FAMILY MEDICAL HISTORY:   Family History   Problem Relation Age of Onset     Hypertension Mother      Hypertension Father      Hypertension Maternal Grandmother      Hypertension Maternal Grandfather      Hypertension Paternal Grandmother      Hypertension Paternal Grandfather      Diabetes No family hx of      Glaucoma No family hx of      Macular Degeneration No family hx of      Reviewed with patient.    PHYSICAL EXAM:   @FLOWSTAT(6,257814,896359,721721)@      @FLOWSTAT(8)@ @FLOWSTAT(9,519411,10)@       /81   Pulse 66   Temp 98.4  F (36.9  C)   Wt 83.2 kg (183 lb 8 oz)   LMP 07/27/2022   SpO2 97%   BMI 30.77 kg/m     [unfilled]    Weight: 83.2 kg (183 lb 8 oz)     GENERAL APPEARANCE: no apparent distress, awake, conversational  EYES: no scleral icterus, pupils equal  Endo: no goiter, no moon facies  Lymphatics: no cervical or supraclavicular LAD  Pulmonary: lungs clear to auscultation with equal breath sounds bilaterally, no clubbing, normal work of breathing on room air  CV: regular rhythm, normal rate, no rub. No murmur appreciated.    - JVD not elevated   - Edema none  GI: soft, nontender, normal bowel sounds  MS: no evidence of inflammation in joints, no muscle tenderness  : no pretty  SKIN: no rash, warm, dry, no cyanosis  NEURO: face symmetric, no asterixis     LABS:   Last Renal Panel:  Sodium   Date Value Ref Range Status   09/08/2022 139 136 - 145 mmol/L Final   08/20/2020 141 133 - 144 mmol/L Final     Potassium   Date Value Ref Range Status   09/08/2022 3.9 3.4 - 5.3 mmol/L Final   05/19/2022 3.9 3.5 - 5.0 mmol/L Final    08/20/2020 3.6 3.4 - 5.3 mmol/L Final     Chloride   Date Value Ref Range Status   09/08/2022 102 98 - 107 mmol/L Final   05/19/2022 102 98 - 107 mmol/L Final   08/20/2020 107 94 - 109 mmol/L Final     Carbon Dioxide   Date Value Ref Range Status   08/20/2020 27 20 - 32 mmol/L Final     Carbon Dioxide (CO2)   Date Value Ref Range Status   09/08/2022 28 22 - 29 mmol/L Final   05/19/2022 29 22 - 31 mmol/L Final     Anion Gap   Date Value Ref Range Status   09/08/2022 9 7 - 15 mmol/L Final   05/19/2022 9 5 - 18 mmol/L Final   08/20/2020 7 3 - 14 mmol/L Final     Glucose   Date Value Ref Range Status   09/08/2022 94 70 - 99 mg/dL Final   05/19/2022 90 70 - 125 mg/dL Final   08/20/2020 78 70 - 99 mg/dL Final     Urea Nitrogen   Date Value Ref Range Status   09/08/2022 7.8 6.0 - 20.0 mg/dL Final   05/19/2022 10 8 - 22 mg/dL Final   08/20/2020 13 7 - 30 mg/dL Final     Creatinine   Date Value Ref Range Status   09/08/2022 1.08 (H) 0.51 - 0.95 mg/dL Final   08/20/2020 0.94 0.52 - 1.04 mg/dL Final     GFR Estimate   Date Value Ref Range Status   09/08/2022 64 >60 mL/min/1.73m2 Final     Comment:     Effective December 21, 2021 eGFRcr in adults is calculated using the 2021 CKD-EPI creatinine equation which includes age and gender (Vanda et al., NEJM, DOI: 10.1056/IXUVwf7487856)   08/20/2020 74 >60 mL/min/[1.73_m2] Final     Comment:     Non  GFR Calc  Starting 12/18/2018, serum creatinine based estimated GFR (eGFR) will be   calculated using the Chronic Kidney Disease Epidemiology Collaboration   (CKD-EPI) equation.       Calcium   Date Value Ref Range Status   09/08/2022 9.4 8.6 - 10.0 mg/dL Final   08/20/2020 9.1 8.5 - 10.1 mg/dL Final     Phosphorus   Date Value Ref Range Status   09/08/2022 2.9 2.5 - 4.5 mg/dL Final   06/08/2018 2.5 2.5 - 4.5 mg/dL Final     Albumin   Date Value Ref Range Status   09/08/2022 4.3 3.5 - 5.2 g/dL Final   05/19/2022 3.8 3.5 - 5.0 g/dL Final   06/08/2018 3.8 3.4 - 5.0 g/dL  Final       URINE STUDIES  Recent Labs   Lab Test 09/08/22  1020 09/18/21  1151 07/06/21  1035 05/27/21  1155 07/15/18  1955 06/08/18  0804   COLOR Yellow Yellow Yellow Yellow Yellow Yellow   APPEARANCE Clear Clear Clear Clear Clear Clear   URINEGLC Negative Negative Negative Negative Negative Negative   URINEBILI Negative Negative Negative Negative Negative Negative   URINEKETONE Negative Negative Negative Negative Negative Negative   SG 1.025 1.020 1.025 1.020 1.020 1.015   UBLD Negative Negative Trace* Trace* Negative Negative   URINEPH 6.0 6.5 7.5* 6.5 7.5* 6.0   PROTEIN Negative Negative Negative Negative Negative Negative   UROBILINOGEN  --  0.2 0.2 0.2 1.0  --    NITRITE Negative Negative Negative Negative Negative Negative   LEUKEST Negative Negative Negative Negative Negative Negative   RBCU 1  --  O - 2 O - 2  --  2   WBCU <1  --  0 - 5 0 - 5  --  2     Recent Labs   Lab Test 06/08/18  0804   UTPG 0.05     PTH  Recent Labs   Lab Test 09/08/22  1014 06/08/18  0804   PTHI 45 41     IRON STUDIES  Recent Labs   Lab Test 06/08/18  0804   IRON 62      IRONSAT 16   REMINGTON 8*       IMAGING:  I review the Renal US on 8/23/11 which showed:  CONCLUSIONS:     RT Kidney:  The flow velocities in the right renal arteries are   within the normal limits indicating no significant stenosis.  The   renal artery appears normal without signs of atherosclerosis or   fibromuscular dysplasia.          The right kidney measures 11.7 cm in length which lies within the   normal range.          LT Kidney: The flow velocities in the left renal arteries are within   the normal range indicating no significant stenosis.  The left renal   artery appears normal without signs of atherosclerosis or   fibromuscular dysplasia.          The left kidney measures 11.0 cm in length which lies within the   normal range.       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    #Essential hypertension with goal blood pressure less than 140/90  #Resolved Transient albuminuria / transient proteinuria  The patient has longstanding HTN since her teens in the setting of significant family history of hypertension. Prior renal US in 2011 without evidence of renal artery stenosis, echocardiogram notable for borderline low EF of 55%, CT coronary angio without atherosclerosis. No hx of hypokalemia. BP well controlled on current medications (no changes today), although single medication control with only a higher dose of losartan could be considered at future visits. Pt without proteinuria at this time and therefore no indication for SGLT-2 inhibitor start today. UAs are not c/w with UTI or bacteruria. No hematuria today and eGFR only slightly decreased, around her baseline. Will repeat renal and cardiac echo imaging, and pursue secondary HTN laboratory work-up; given time since previous work-up.     Check echocardiogram, renal ultrasound.  - Check aldosterone, renin, fractionated metanephrines and salivary cortisol.    Follow-up in 6 months or as needed.     Jeff Martinez MD on 9/8/2022 at 10:57 AM  Im Resident, PGY-1

## 2022-09-08 NOTE — NURSING NOTE
Chief Complaint   Patient presents with     Consult     New pt. Consult.     Blood pressure 122/81, pulse 66, temperature 98.4  F (36.9  C), weight 83.2 kg (183 lb 8 oz), last menstrual period 07/27/2022, SpO2 97 %, not currently breastfeeding.    RONY CINTRON

## 2022-09-08 NOTE — TELEPHONE ENCOUNTER
RECORDS RECEIVED FROM: Internal / Self   DATE RECEIVED: 09.08.2022   NOTES STATUS DETAILS   OFFICE NOTE from referring provider     OFFICE NOTE from other specialist  Internal  04.14.2022 Nanette Penny MD     *Only VASCULITIS or LUPUS gather office notes for the following     *PULMONARY       *ENT     *DERMATOLOGY     *RHEUMATOLOGY     DISCHARGE SUMMARY from hospital     DISCHARGE REPORT from the ER     MEDICATION LIST Internal    IMAGING  (NEED IMAGES AND REPORTS)     KIDNEY CT SCAN     KIDNEY ULTRASOUND     MR ABDOMEN     NUCLEAR MEDICINE RENAL     LABS     CBC Internal 04.14.2022   CMP Internal 04.14.2022   BMP Internal 10.14.2021   UA Internal 09.18.2021   URINE PROTEIN Internal 09.18.1021   RENAL PANEL     BIOPSY     KIDNEY BIOPSY

## 2022-09-08 NOTE — PATIENT INSTRUCTIONS
It was a pleasure taking care of you today.  I've included a brief summary of our discussion and care plan from today's visit below.  Please review this information with your primary care provider.    My recommendations are summarized as follows:  - Check echocardiogram, renal ultrasound.  - Check aldosterone, renin, fractionated metanephrines and salivary cortisol.  - Return to Nephrology Clinic in 6 months to review your progress.    Who do I call with any questions after my visit?  Please be in touch if there are any further questions that arise following today's visit.  There are multiple ways to contact your nephrology care team.      During business hours, you may reach your Nephrology Care Team or schedule or reschedule an appointment or lab at [phone].      If you need to schedule imaging, please call [phone].   To schedule a COVID test, please call [phone].    You can always send a secure message through [employer]. [employer] messages are answered by your nurse or doctor typically within 24-48 hours. Please allow extra time on weekends and holidays.      For urgent/emergent questions after business hours, you may reach the on-call Nephrology Fellow by contacting the [facility]  at [phone].     How will I get the results of any tests ordered?    You will receive all of your results.  If you have signed up for [employer], any tests ordered at your visit will be available to you once resulted on [employer]. Typically the physician reviews them and may or may not make further recommendations. If there are urgent results that require a change in your care plan, your physician or nurse will call you to discuss the next steps. If you are not on [employer], a letter may be generated and mailed to you with your results.      Sincerely,    Al Shen MD  [facility]  Division of Nephrology and Hypertension

## 2022-09-08 NOTE — PROGRESS NOTES
Nephrology Outpatient Consult Note    Requesting Provider  Referred MD Benigno  No address on file    Chief Complaint/Reason for Visit  Hypertension. Abnormal kidney function.     History of Present Illness  This is a 47-year-old female who was referred to our clinic for further work-up and management of hypertension, and abnormal kidney function.    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  She also has a history of chronic sinusitis, and esophageal reflux.    With regards to her kidney function, this is noted to be slightly abnormal.  We have laboratory data going back to 2005, and her serum creatinine had ranged between 0.8 to 1.0 mg/dL.    Her most recent laboratory evaluation was performed back in July.  Renal panel showed a creatinine of 1.08 mg/dL with an estimated GFR 64 mL/min.  Her serum bicarbonate was elevated at 30.  Her serum sodium, potassium were normal.  Serum albumin was normal.  LFTs were normal.  TSH was 1.1.    Overall, she feels well.  She denies acute symptoms.  She is interested to see if she can start an SGLT 2 inhibitor to protect her kidneys.  She has no shortness of breath.  No chest pain.  No lower extremity edema.  No dysuria, hematuria.    The following portions of the patient's history were reviewed and updated as appropriate: allergies, current medications, past family history, past medical history, past social history, past surgical history and problem list.    Subjective   Review of Systems  A comprehensive review of systems was performed. Pertinent positives and negatives are described above.    Past Medical/Surgical History  Patient  has a past medical history of Cervical high risk HPV (human papillomavirus) test positive (12/2015), Chronic sinusitis (Summer 2016), Esophageal reflux, Exophthalmos, unspecified, Hypothyroidism, Thyroid eye disease, Thyrotoxicosis without mention of goiter or  other cause, without mention of thyrotoxic crisis or storm (2005), and Unspecified essential hypertension ().    She has no past medical history of Arthritis, Cancer (H), Cerebral infarction (H), Congestive heart failure (H), Congestive heart failure, unspecified, COPD (chronic obstructive pulmonary disease) (H), Coronary artery disease, CVA (cerebral infarction), Depressive disorder, Diabetes (H), Heart disease, History of blood transfusion, or Uncomplicated asthma.  Patient  has a past surgical history that includes  DELIVERY ONLY (91);  DELIVERY ONLY (97);  DELIVERY ONLY (99); INDUCED ABORTN BY D&C; REPAIR INCISIONAL HERNIA,REDUCIBLE; Eye surgery (2008); Cystoscopy, Sling Transvaginal (N/A, 10/10/2017); Orbitotomy decompression (Bilateral, 2021); Sonopet Eye (Bilateral, 2021); and Repair retraction lid (Bilateral, 2021).    Social History  Patient  reports that she has never smoked. She has never used smokeless tobacco. She reports current alcohol use. She reports current drug use. Drug: Marijuana.    Family History  family history includes Hypertension in her father, maternal grandfather, maternal grandmother, mother, paternal grandfather, and paternal grandmother.    Objective   Vital Signs  Blood pressure 122/81, pulse 66, temperature 98.4  F (36.9  C), weight 83.2 kg (183 lb 8 oz), last menstrual period 2022, SpO2 97 %, not currently breastfeeding. Body mass index is 30.77 kg/m .    Physical Examination  General:  Well appearing, well nourished in no distress.  Oriented x 3, normal mood and affect.  Head:  normocephalic, atraumatic    Eyes: conjunctiva clear, EOM intact, PERRL.   Throat:  No erythema, exudates or lesions.   Neck:  neck supple, no masses  Heart:  RRR, no murmur   Lungs:  CTA bilaterally, no wheezes, rhonchi, rales.  Breathing unlabored.   Abdomen:  Soft, NT/ND, no HSM, no masses.   Extremities: No deformities, clubbing,  cyanosis, or edema.   Neurologic: A/O x 3. No focal deficits.    Lab Results   Component Value Date    WBC 4.0 09/08/2022    HGB 13.3 09/08/2022    HCT 41.0 09/08/2022    MCV 92 09/08/2022     09/08/2022     09/08/2022    BUN 7.8 09/08/2022    ANIONGAP 9 09/08/2022    ALBUMIN 4.3 09/08/2022     Lab Results   Component Value Date    UROBILINOGEN 0.2 09/18/2021     Current Outpatient Medications   Medication     ACETAMINOPHEN PO     azelastine (ASTELIN) 0.1 % nasal spray     benzoyl peroxide (BENZOYL PEROXIDE WASH) 5 % external liquid     budesonide (PULMICORT) 0.5 MG/2ML neb solution     erythromycin (ROMYCIN) 5 MG/GM ophthalmic ointment     fexofenadine (ALLEGRA) 180 MG tablet     fluticasone (FLONASE) 50 MCG/ACT nasal spray     fluticasone-salmeterol (ADVAIR) 250-50 MCG/DOSE inhaler     hydrochlorothiazide (HYDRODIURIL) 12.5 MG tablet     Lactobacillus (FLORAJEN WOMEN) CAPS     Lactobacillus-Inulin ([facility]) CAPS     levothyroxine (SYNTHROID/LEVOTHROID) 100 MCG tablet     losartan (COZAAR) 25 MG tablet     mupirocin (BACTROBAN) 2 % ointment     neomycin-polymixin-dexamethasone (MAXITROL) 0.1 % ophthalmic suspension     norethindrone (MICRONOR) 0.35 MG tablet     tretinoin (RETIN-A) 0.05 % external cream     valACYclovir (VALTREX) 500 MG tablet     No current facility-administered medications for this visit.     Assessment & Plan   Patient Active Problem List   Diagnosis     Surveillance of previously prescribed intrauterine contraceptive device     Vaginitis and vulvovaginitis     Exophthalmos     Abnormal Pap smear of cervix     CARDIOVASCULAR SCREENING; LDL GOAL LESS THAN 160     Essential hypertension with goal blood pressure less than 140/90     Anemia     Hypothyroidism due to acquired atrophy of thyroid     Cervical high risk HPV (human papillomavirus) test positive     Thyroid disease     Female stress incontinence     Chronic seasonal allergic rhinitis, unspecified trigger      Need for HPV vaccine     Thyroid eye disease     Eyelid retraction, unspecified laterality       Overall, the patient is doing well.  Her kidney function is mildly reduced.  I suspect this is the result of chronic hypertension as well as preeclampsia in her previous pregnancies.    Her blood pressure is well controlled at home.  Her blood pressure reading today is 122/81.  Given her young age, I pointed out that it would be reasonable to target a blood pressure goal of 120/70.  Her blood pressure seems to be around that range at home.    With regards to question about SGL 2 inhibitors, I indicated that given that she is not a diabetic, she does not have proteinuria, that the medication is not indicated.    Her cardiac examination was normal today.  However, she indicated that she was told that she has a murmur in the past.  Furthermore, her echocardiogram back in 2011 showed a borderline ejection fraction of 55%.  Given that along with chronic hypertension I think it would be reasonable to repeat her echocardiogram.    Will also be reasonable to obtain a basic work-up for secondary hypertension.  Given that the patient had hypertension since young age I indicated that a genetics counseling would be indicated.  However, she is not interested in that.    My recommendations are the following:  - Check echocardiogram, renal ultrasound.  - Check aldosterone, renin, fractionated metanephrines and salivary cortisol.  - Return to Nephrology Clinic in 6 months to review your progress.    All questions were answered to the patient's satisfaction.  Total time of this encounter on the date of service was 50 minutes.    Orders placed today:  Orders Placed This Encounter   Procedures     US Renal Complete     Aldosterone     Renin activity     Cortisol Saliva     Catecholamines Fractionated     Echocardiogram Complete

## 2022-09-08 NOTE — LETTER
9/8/2022       RE: Mimi Melton  [address]  [facility] [address]     Dear Colleague,    Thank you for referring your patient, Mimi Melton, to the [facility] [place] [facility]. Please see a copy of my visit note below.    Nephrology Outpatient Consult Note    Requesting Provider  Referred MD Benigno  No address on file    Chief Complaint/Reason for Visit  Hypertension. Abnormal kidney function.     History of Present Illness  This is a 47-year-old female who was referred to our clinic for further work-up and management of hypertension, and abnormal kidney function.    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  She also has a history of chronic sinusitis, and esophageal reflux.    With regards to her kidney function, this is noted to be slightly abnormal.  We have laboratory data going back to 2005, and her serum creatinine had ranged between 0.8 to 1.0 mg/dL.    Her most recent laboratory evaluation was performed back in July.  Renal panel showed a creatinine of 1.08 mg/dL with an estimated GFR 64 mL/min.  Her serum bicarbonate was elevated at 30.  Her serum sodium, potassium were normal.  Serum albumin was normal.  LFTs were normal.  TSH was 1.1.    Overall, she feels well.  She denies acute symptoms.  She is interested to see if she can start an SGLT 2 inhibitor to protect her kidneys.  She has no shortness of breath.  No chest pain.  No lower extremity edema.  No dysuria, hematuria.    The following portions of the patient's history were reviewed and updated as appropriate: allergies, current medications, past family history, past medical history, past social history, past surgical history and problem list.    Subjective   Review of Systems  A comprehensive review of systems was performed. Pertinent positives and negatives are  described above.    Past Medical/Surgical History  Patient  has a past medical history of Cervical high risk HPV (human papillomavirus) test positive (2015), Chronic sinusitis (Summer 2016), Esophageal reflux, Exophthalmos, unspecified, Hypothyroidism, Thyroid eye disease, Thyrotoxicosis without mention of goiter or other cause, without mention of thyrotoxic crisis or storm (2005), and Unspecified essential hypertension ().    She has no past medical history of Arthritis, Cancer (H), Cerebral infarction (H), Congestive heart failure (H), Congestive heart failure, unspecified, COPD (chronic obstructive pulmonary disease) (H), Coronary artery disease, CVA (cerebral infarction), Depressive disorder, Diabetes (H), Heart disease, History of blood transfusion, or Uncomplicated asthma.  Patient  has a past surgical history that includes  DELIVERY ONLY (91);  DELIVERY ONLY (97);  DELIVERY ONLY (99); INDUCED ABORTN BY D&C; REPAIR INCISIONAL HERNIA,REDUCIBLE; Eye surgery (2008); Cystoscopy, Sling Transvaginal (N/A, 10/10/2017); Orbitotomy decompression (Bilateral, 2021); Sonopet Eye (Bilateral, 2021); and Repair retraction lid (Bilateral, 2021).    Social History  Patient  reports that she has never smoked. She has never used smokeless tobacco. She reports current alcohol use. She reports current drug use. Drug: Marijuana.    Family History  family history includes Hypertension in her father, maternal grandfather, maternal grandmother, mother, paternal grandfather, and paternal grandmother.    Objective   Vital Signs  Blood pressure 122/81, pulse 66, temperature 98.4  F (36.9  C), weight 83.2 kg (183 lb 8 oz), last menstrual period 2022, SpO2 97 %, not currently breastfeeding. Body mass index is 30.77 kg/m .    Physical Examination  General:  Well appearing, well nourished in no distress.  Oriented x 3, normal mood and affect.  Head:  normocephalic,  atraumatic    Eyes: conjunctiva clear, EOM intact, PERRL.   Throat:  No erythema, exudates or lesions.   Neck:  neck supple, no masses  Heart:  RRR, no murmur   Lungs:  CTA bilaterally, no wheezes, rhonchi, rales.  Breathing unlabored.   Abdomen:  Soft, NT/ND, no HSM, no masses.   Extremities: No deformities, clubbing, cyanosis, or edema.   Neurologic: A/O x 3. No focal deficits.    Lab Results   Component Value Date    WBC 4.0 09/08/2022    HGB 13.3 09/08/2022    HCT 41.0 09/08/2022    MCV 92 09/08/2022     09/08/2022     09/08/2022    BUN 7.8 09/08/2022    ANIONGAP 9 09/08/2022    ALBUMIN 4.3 09/08/2022     Lab Results   Component Value Date    UROBILINOGEN 0.2 09/18/2021     Current Outpatient Medications   Medication     ACETAMINOPHEN PO     azelastine (ASTELIN) 0.1 % nasal spray     benzoyl peroxide (BENZOYL PEROXIDE WASH) 5 % external liquid     budesonide (PULMICORT) 0.5 MG/2ML neb solution     erythromycin (ROMYCIN) 5 MG/GM ophthalmic ointment     fexofenadine (ALLEGRA) 180 MG tablet     fluticasone (FLONASE) 50 MCG/ACT nasal spray     fluticasone-salmeterol (ADVAIR) 250-50 MCG/DOSE inhaler     hydrochlorothiazide (HYDRODIURIL) 12.5 MG tablet     Lactobacillus (FLORAJEN WOMEN) CAPS     Lactobacillus-Inulin ([facility] DIGESTIVE HEALTH) CAPS     levothyroxine (SYNTHROID/LEVOTHROID) 100 MCG tablet     losartan (COZAAR) 25 MG tablet     mupirocin (BACTROBAN) 2 % ointment     neomycin-polymixin-dexamethasone (MAXITROL) 0.1 % ophthalmic suspension     norethindrone (MICRONOR) 0.35 MG tablet     tretinoin (RETIN-A) 0.05 % external cream     valACYclovir (VALTREX) 500 MG tablet     No current facility-administered medications for this visit.     Assessment & Plan   Patient Active Problem List   Diagnosis     Surveillance of previously prescribed intrauterine contraceptive device     Vaginitis and vulvovaginitis     Exophthalmos     Abnormal Pap smear of cervix     CARDIOVASCULAR SCREENING; LDL GOAL  LESS THAN 160     Essential hypertension with goal blood pressure less than 140/90     Anemia     Hypothyroidism due to acquired atrophy of thyroid     Cervical high risk HPV (human papillomavirus) test positive     Thyroid disease     Female stress incontinence     Chronic seasonal allergic rhinitis, unspecified trigger     Need for HPV vaccine     Thyroid eye disease     Eyelid retraction, unspecified laterality       Overall, the patient is doing well.  Her kidney function is mildly reduced.  I suspect this is the result of chronic hypertension as well as preeclampsia in her previous pregnancies.    Her blood pressure is well controlled at home.  Her blood pressure reading today is 122/81.  Given her young age, I pointed out that it would be reasonable to target a blood pressure goal of 120/70.  Her blood pressure seems to be around that range at home.    With regards to question about SGL 2 inhibitors, I indicated that given that she is not a diabetic, she does not have proteinuria, that the medication is not indicated.    Her cardiac examination was normal today.  However, she indicated that she was told that she has a murmur in the past.  Furthermore, her echocardiogram back in 2011 showed a borderline ejection fraction of 55%.  Given that along with chronic hypertension I think it would be reasonable to repeat her echocardiogram.    Will also be reasonable to obtain a basic work-up for secondary hypertension.  Given that the patient had hypertension since young age I indicated that a genetics counseling would be indicated.  However, she is not interested in that.    My recommendations are the following:  - Check echocardiogram, renal ultrasound.  - Check aldosterone, renin, fractionated metanephrines and salivary cortisol.  - Return to Nephrology Clinic in 6 months to review your progress.    All questions were answered to the patient's satisfaction.  Total time of this encounter on the date of service was 50  minutes.    Orders placed today:  Orders Placed This Encounter   Procedures     US Renal Complete     Aldosterone     Renin activity     Cortisol Saliva     Catecholamines Fractionated     Echocardiogram Complete               Nephrology Initial Consult  September 8, 2022      Mimi Melton MRN:[id number] YOB: 1975  Primary care provider: Nanette Penny  Requesting physician: Self referred    REASON FOR CONSULT: hypertension, abnormal kidney function    HISTORY OF PRESENT ILLNESS:  Mimi Melton is a 47-year-old female with a history of HTN, pre-eclampsia, and Grave's disease s/p radioablation and c/b thyroid eye disease on synthroid who was referred to our clinic for further work-up and management of hypertension, and abnormal kidney function. She was previously seen by Dr. Meyer in 2018. She also has a history of chronic sinusitis, and esophageal reflux.     She has longstanding HTN, since childhood/adolescence, starting on atenolol at age 26, and then lisinopril and HCTZ. Had previous episodes of hypotension on atenolol and lisinopril. She had urine albumin checked by primary care in May 2018 and had 500mg/g albuminuria. The patient has had UA positive for proteinuria at times in the past in the setting of hematuria and bacteria but the majority of UAs are without either, including most recently. Also had protein in urine urine during her pregnancies. Creatinine has been in the 0.8-1.0 range since around 2005. The patient has renal US in 2011 without arterial pathology found. No atherosclerosis on CT Angio coronary arteries, done in 2011. 2011 Echo notable for EF 55%. Has a history of recurrent swelling of the lips thought to be angioedema from chronic lisinopril use, and was switched to losartan/HCTZ in 2022. Recent blood pressure readings are quite variable and had ranged between 106//100.     Her most recent laboratory evaluation was performed back in July.  Renal panel showed a  creatinine of 1.08 mg/dL with an estimated GFR 64 mL/min.  Her serum bicarbonate was elevated at 28.  Her serum sodium, potassium were normal.  Serum albumin was normal.  LFTs were normal. TSH was 1.18. Thyroid Stim Imm 3.8. PTH 45, Ca2+ 9.4. Hgb 13.3.     Home blood pressure readings have been ~110s/80, and she takes her blood pressure every other week or so. No hypotensive episodes. No dizziness or syncope. Some lower extremity and hand edema when in a very hot climate, but otherwise no edema. No chest pain or shortness of breath. No hematuria. No dysuria. She is taking losartan 25 mg, hydrochlorothiazide 12.5mg. Exercise 3-4x a week, Has fluctuations in her weight depending on her diet, but no significant gains or losses over the past year. She is eating fruits and vegetables, and trying to minimize sugars and red meats.     Is not taking NSAIDs except after her eye surgery, only uses tylenol for pain control. No history of kidney stones in her or her family. She reports that her mother had HTN but onset only at age 50. No known kidney disease family hx. The patient has no hx of hypokalemia dating back to 2005. Son had HTN starting at age 17 with an unknown cardiomyopathy and is on anti-hypertensives. The patient reports that she had pre-eclampsia with all three of her children.     Smokes marijuana that may have tobacco in it. No cigarette use of chewing tobacco. 1-2 drinks every other weekend.     PAST MEDICAL HISTORY:  Reviewed with patient on 09/08/2022     Past Medical History:   Diagnosis Date     Cervical high risk HPV (human papillomavirus) test positive 12/2015 12/2015, 10/2019, 10/30/20     Chronic sinusitis Summer 2016    I get congested every 3-4 weeks     Esophageal reflux      Exophthalmos, unspecified      Hypothyroidism      Thyroid eye disease      Thyrotoxicosis without mention of goiter or other cause, without mention of thyrotoxic crisis or storm 03/2005    Had radioablation, On synthroid,  seeing [facility]; takes synthroid     Unspecified essential hypertension 1980    meds since , on atenolol       Past Surgical History:   Procedure Laterality Date     CYSTOSCOPY, SLING TRANSVAGINAL N/A 10/10/2017    Procedure: CYSTOSCOPY, SLING TRANSVAGINAL;  Midurethral Sling and Cystoscopy (Support the Urethra with Mesh Sling and Look in the Bladder);  Surgeon: Karin Amin MD;  Location: [facility] OR     EYE SURGERY  2008    Orbital Decompression Dr smart     HC REPAIR INCISIONAL HERNIA,REDUCIBLE      Umbilical hernia Repair     ORBITOTOMY DECOMPRESSION Bilateral 2021    Procedure: Bilateral orbital decompression with  Sonopet, Bilateral lower lid retraction repair;  Surgeon: Niles Donnelly MD;  Location: [facility] OR     REPAIR RETRACTION LID Bilateral 2021    Procedure: REPAIR, RETRACTION, EYELID;  Surgeon: Niles Donnelly MD;  Location: [facility] OR     SONOPET EYE Bilateral 2021    Procedure: SONOPET EYE;  Surgeon: Niles Donnelly MD;  Location: [facility] OR     ZZC  DELIVERY ONLY  91    , Low Cervical     ZZC  DELIVERY ONLY  97    , Low Cervical     ZZC  DELIVERY ONLY  99    , Low Cervical     ZZC INDUCED ABORTN BY D&C      Aspiration & Curettage, TAB x2        MEDICATIONS:  PTA Meds  Current Outpatient Medications   Medication     ACETAMINOPHEN PO     azelastine (ASTELIN) 0.1 % nasal spray     benzoyl peroxide (BENZOYL PEROXIDE WASH) 5 % external liquid     budesonide (PULMICORT) 0.5 MG/2ML neb solution     erythromycin (ROMYCIN) 5 MG/GM ophthalmic ointment     fexofenadine (ALLEGRA) 180 MG tablet     fluticasone (FLONASE) 50 MCG/ACT nasal spray     fluticasone-salmeterol (ADVAIR) 250-50 MCG/DOSE inhaler     hydrochlorothiazide (HYDRODIURIL) 12.5 MG tablet     IBUPROFEN PO     Lactobacillus (FLORAJEN WOMEN) CAPS     Lactobacillus-Inulin ([facility] DIGESTIVE HEALTH) CAPS     levothyroxine (SYNTHROID/LEVOTHROID) 100 MCG  tablet     losartan (COZAAR) 25 MG tablet     mupirocin (BACTROBAN) 2 % ointment     neomycin-polymixin-dexamethasone (MAXITROL) 0.1 % ophthalmic suspension     norethindrone (MICRONOR) 0.35 MG tablet     tretinoin (RETIN-A) 0.05 % external cream     valACYclovir (VALTREX) 500 MG tablet     No current facility-administered medications for this visit.         ALLERGIES:    Allergies   Allergen Reactions     Dust Mites      Lisinopril Swelling     Swelling in lip     Mold      Cannot have any meds that contain mold.       REVIEW OF SYSTEMS:  A comprehensive of systems was negative except as noted above.    SOCIAL HISTORY:   Social History     Socioeconomic History     Marital status: Single     Spouse name: Not on file     Number of children: Not on file     Years of education: Not on file     Highest education level: Not on file   Occupational History     Occupation: [employer]     Employer: [employer]   Tobacco Use     Smoking status: Never Smoker     Smokeless tobacco: Never Used   Substance and Sexual Activity     Alcohol use: Yes     Comment: 1-2x's/month if that.     Drug use: Yes     Types: Marijuana     Comment: 3x's/day     Sexual activity: Yes     Partners: Male     Birth control/protection: I.U.D.     Comment: Paraguard IUD,    Other Topics Concern     Parent/sibling w/ CABG, MI or angioplasty before 65F 55M? No   Social History Narrative    Caffeine intake/servings daily - 0-1, 12 oz of Mountain Dew    Calcium intake/servings daily - eats only cheese    Exercise None    Sunscreen used - Yes    Seatbelts used - Yes    Guns stored in the home - No    Self Breast Exam - No    Pap test up to date -  Yes, as of today    Eye exam up to date -  Yes    Dental exam up to date -  Yes    DEXA scan up to date -  Not Applicable    Flex Sig/Colonoscopy up to date -  Not Applicable    Mammography up to date -  Not Applicable    Immunizations reviewed and up to date - Yes, within  the last 10 years    Abuse: Current or Past (Physical, Sexual or Emotional) - No    Do you feel safe in your environment - Yes    Do you cope well with stress - Yes    Do you suffer from insomnia - No    Last updated by: Anu Vinson MA 12/21/2011             Social Determinants of Health     Financial Resource Strain: Not on file   Food Insecurity: Not on file   Transportation Needs: Not on file   Physical Activity: Not on file   Stress: Not on file   Social Connections: Not on file   Intimate Partner Violence: Not on file   Housing Stability: Not on file     Reviewed with patient.    FAMILY MEDICAL HISTORY:   Family History   Problem Relation Age of Onset     Hypertension Mother      Hypertension Father      Hypertension Maternal Grandmother      Hypertension Maternal Grandfather      Hypertension Paternal Grandmother      Hypertension Paternal Grandfather      Diabetes No family hx of      Glaucoma No family hx of      Macular Degeneration No family hx of      Reviewed with patient.    PHYSICAL EXAM:   @FLOWSTAT(6,581838,399844,998729)@      @FLOWSTAT(8)@ @FLOWSTAT(9,895359,10)@       /81   Pulse 66   Temp 98.4  F (36.9  C)   Wt 83.2 kg (183 lb 8 oz)   LMP 07/27/2022   SpO2 97%   BMI 30.77 kg/m     [unfilled]    Weight: 83.2 kg (183 lb 8 oz)     GENERAL APPEARANCE: no apparent distress, awake, conversational  EYES: no scleral icterus, pupils equal  Endo: no goiter, no moon facies  Lymphatics: no cervical or supraclavicular LAD  Pulmonary: lungs clear to auscultation with equal breath sounds bilaterally, no clubbing, normal work of breathing on room air  CV: regular rhythm, normal rate, no rub. No murmur appreciated.    - JVD not elevated   - Edema none  GI: soft, nontender, normal bowel sounds  MS: no evidence of inflammation in joints, no muscle tenderness  : no pretty  SKIN: no rash, warm, dry, no cyanosis  NEURO: face symmetric, no asterixis     LABS:   Last Renal Panel:  Sodium   Date Value Ref  Range Status   09/08/2022 139 136 - 145 mmol/L Final   08/20/2020 141 133 - 144 mmol/L Final     Potassium   Date Value Ref Range Status   09/08/2022 3.9 3.4 - 5.3 mmol/L Final   05/19/2022 3.9 3.5 - 5.0 mmol/L Final   08/20/2020 3.6 3.4 - 5.3 mmol/L Final     Chloride   Date Value Ref Range Status   09/08/2022 102 98 - 107 mmol/L Final   05/19/2022 102 98 - 107 mmol/L Final   08/20/2020 107 94 - 109 mmol/L Final     Carbon Dioxide   Date Value Ref Range Status   08/20/2020 27 20 - 32 mmol/L Final     Carbon Dioxide (CO2)   Date Value Ref Range Status   09/08/2022 28 22 - 29 mmol/L Final   05/19/2022 29 22 - 31 mmol/L Final     Anion Gap   Date Value Ref Range Status   09/08/2022 9 7 - 15 mmol/L Final   05/19/2022 9 5 - 18 mmol/L Final   08/20/2020 7 3 - 14 mmol/L Final     Glucose   Date Value Ref Range Status   09/08/2022 94 70 - 99 mg/dL Final   05/19/2022 90 70 - 125 mg/dL Final   08/20/2020 78 70 - 99 mg/dL Final     Urea Nitrogen   Date Value Ref Range Status   09/08/2022 7.8 6.0 - 20.0 mg/dL Final   05/19/2022 10 8 - 22 mg/dL Final   08/20/2020 13 7 - 30 mg/dL Final     Creatinine   Date Value Ref Range Status   09/08/2022 1.08 (H) 0.51 - 0.95 mg/dL Final   08/20/2020 0.94 0.52 - 1.04 mg/dL Final     GFR Estimate   Date Value Ref Range Status   09/08/2022 64 >60 mL/min/1.73m2 Final     Comment:     Effective December 21, 2021 eGFRcr in adults is calculated using the 2021 CKD-EPI creatinine equation which includes age and gender (Vanda dai al., NEJM, DOI: 10.1056/VFHSnu5642132)   08/20/2020 74 >60 mL/min/[1.73_m2] Final     Comment:     Non  GFR Calc  Starting 12/18/2018, serum creatinine based estimated GFR (eGFR) will be   calculated using the Chronic Kidney Disease Epidemiology Collaboration   (CKD-EPI) equation.       Calcium   Date Value Ref Range Status   09/08/2022 9.4 8.6 - 10.0 mg/dL Final   08/20/2020 9.1 8.5 - 10.1 mg/dL Final     Phosphorus   Date Value Ref Range Status   09/08/2022  2.9 2.5 - 4.5 mg/dL Final   06/08/2018 2.5 2.5 - 4.5 mg/dL Final     Albumin   Date Value Ref Range Status   09/08/2022 4.3 3.5 - 5.2 g/dL Final   05/19/2022 3.8 3.5 - 5.0 g/dL Final   06/08/2018 3.8 3.4 - 5.0 g/dL Final       URINE STUDIES  Recent Labs   Lab Test 09/08/22  1020 09/18/21  1151 07/06/21  1035 05/27/21  1155 07/15/18  1955 06/08/18  0804   COLOR Yellow Yellow Yellow Yellow Yellow Yellow   APPEARANCE Clear Clear Clear Clear Clear Clear   URINEGLC Negative Negative Negative Negative Negative Negative   URINEBILI Negative Negative Negative Negative Negative Negative   URINEKETONE Negative Negative Negative Negative Negative Negative   SG 1.025 1.020 1.025 1.020 1.020 1.015   UBLD Negative Negative Trace* Trace* Negative Negative   URINEPH 6.0 6.5 7.5* 6.5 7.5* 6.0   PROTEIN Negative Negative Negative Negative Negative Negative   UROBILINOGEN  --  0.2 0.2 0.2 1.0  --    NITRITE Negative Negative Negative Negative Negative Negative   LEUKEST Negative Negative Negative Negative Negative Negative   RBCU 1  --  O - 2 O - 2  --  2   WBCU <1  --  0 - 5 0 - 5  --  2     Recent Labs   Lab Test 06/08/18  0804   UTPG 0.05     PTH  Recent Labs   Lab Test 09/08/22  1014 06/08/18  0804   PTHI 45 41     IRON STUDIES  Recent Labs   Lab Test 06/08/18  0804   IRON 62      IRONSAT 16   REMINGTON 8*       IMAGING:  I review the Renal US on 8/23/11 which showed:  CONCLUSIONS:     RT Kidney:  The flow velocities in the right renal arteries are   within the normal limits indicating no significant stenosis.  The   renal artery appears normal without signs of atherosclerosis or   fibromuscular dysplasia.          The right kidney measures 11.7 cm in length which lies within the   normal range.          LT Kidney: The flow velocities in the left renal arteries are within   the normal range indicating no significant stenosis.  The left renal   artery appears normal without signs of atherosclerosis or   fibromuscular dysplasia.           The left kidney measures 11.0 cm in length which lies within the   normal range.       ASSESSMENT AND RECOMMENDATIONS:   Mimi Melton is a 47-year-old female with a history of HTN, pre-eclampsia, and Grave's disease s/p radioablation and c/b thyroid eye disease on synthroid who was referred to our clinic for further work-up and management of hypertension.    #Essential hypertension with goal blood pressure less than 140/90  #Resolved Transient albuminuria / transient proteinuria  The patient has longstanding HTN since her teens in the setting of significant family history of hypertension. Prior renal US in 2011 without evidence of renal artery stenosis, echocardiogram notable for borderline low EF of 55%, CT coronary angio without atherosclerosis. No hx of hypokalemia. BP well controlled on current medications (no changes today), although single medication control with only a higher dose of losartan could be considered at future visits. Pt without proteinuria at this time and therefore no indication for SGLT-2 inhibitor start today. UAs are not c/w with UTI or bacteruria. No hematuria today and eGFR only slightly decreased, around her baseline. Will repeat renal and cardiac echo imaging, and pursue secondary HTN laboratory work-up; given time since previous work-up.     Check echocardiogram, renal ultrasound.  - Check aldosterone, renin, fractionated metanephrines and salivary cortisol.    Follow-up in 6 months or as needed.     Jeff Martinez MD on 9/8/2022 at 10:57 AM  Im Resident, PGY-1        Attestation signed by Al Shen MD at 9/8/2022  2:26 PM:  I was the supervising physician in the delivery of the service.  I verified the history of present illness and the patient's clinical course. I personally evaluated and examined the patient on the date of this note.    I reviewed the relevant labs and imaging studies, and participated in the formulation of a diagnostic and treatment plan.  I have reviewed  and discussed with the resident his history, physical and plan. This note reflects my direct involvement in the patient's care.    Please see my Complete separate note for additions/changes/billing.      Again, thank you for allowing me to participate in the care of your patient.      Sincerely,    Al Shen MD

## 2022-09-13 NOTE — PROGRESS NOTES
CHIEF COMPLAINT:  Patient presents with:  Follow Up: Pt reports that things are a lot better          HISTORY OF PRESENT ILLNESS    Mimi was seen in follow up for nasal congestion/CRS.  She is followed by allergy at [facility] and has an apointment later this month.  She has not been doing her steroid rinses regularly and infrequently uses flonase.  She does take claritin regularly at bedtime.     Sino-Nasal Outcome Test (SNOT - 22)    1. Need to Blow Nose: Mild or slight  2. Nasal Blockage: Very mild  3. Sneezing: Very mild  4. Runny Nose: Mild or slight  5. Cough: Very mild  6. Post-nasal discharge: None  7. Thick nasal discharge: Very mild  8. Ear fullness: Severe  9. Dizziness: None  10. Ear Pain: None  11. Facial pain/pressure: None  12. Decreased Sense of Smell/Taste: None  13. Difficulty falling asleep: Moderate  14. Wake up at night: Moderate  15. Lack of a good night's sleep: Mild or slight  16. Wake up tired: Mild or slight  17. Fatigue: Very mild  18. Reduced Productivity: None  19. Reduced Concentration: None  20. Frustrated/restless/irritable: None  21. Sad: None  22. Embarrassed: None    Total Score: 23    COPYRIGHT 1996. [facility] [place],[facility]      REVIEW OF SYSTEMS    Review of Systems: a 10-system review is reviewed at this encounter.  See scanned document.     Dust mites, Lisinopril, and Mold     PHYSICAL EXAM:        HEAD: Normal appearance and symmetry:  No cutaneous lesions.      EARS:   Auricles normal         NASAL ENDOSCOPY    Dorsum:   Straight  Septum: midline  ITH: 3-4+                    ORAL CAVITY/OROPHARYNX:    Lips:  Normal.     NECK:  Trachea:  midline       NEURO:   Alert and Oriented    GAIT AND STATION:  normal     RESPIRATORY:   Symmetry and Respiratory effort    PSYCH:   normal mood and affect    SKIN:  warm and dry         IMPRESSION:   Encounter Diagnoses   Name Primary?     Nasal polyp Yes     Nasal congestion        RECOMMENDATIONS:    Resume budesonide  rinses BID  Return visit 6 weeks for repeat nasal endoscopy and CT review.

## 2022-09-14 ENCOUNTER — OFFICE VISIT (OUTPATIENT)
Dept: OTOLARYNGOLOGY | Facility: CLINIC | Age: 47
End: 2022-09-14
Payer: COMMERCIAL

## 2022-09-14 DIAGNOSIS — R09.81 NASAL CONGESTION: ICD-10-CM

## 2022-09-14 DIAGNOSIS — J33.9 NASAL POLYP: Primary | ICD-10-CM

## 2022-09-14 DIAGNOSIS — J32.0 CHRONIC MAXILLARY SINUSITIS: ICD-10-CM

## 2022-09-14 PROCEDURE — 31231 NASAL ENDOSCOPY DX: CPT | Performed by: OTOLARYNGOLOGY

## 2022-09-14 NOTE — LETTER
9/14/2022         RE: Mimi Melton  [address]  [place] [address]        Dear Colleague,    Thank you for referring your patient, Mimi Melton, to the [facility]. Please see a copy of my visit note below.    CHIEF COMPLAINT:  Patient presents with:  Follow Up: Pt reports that things are a lot better          HISTORY OF PRESENT ILLNESS    Mimi was seen in follow up for nasal congestion/CRS.  She is followed by allergy at [facility] and has an apointment later this month.  She has not been doing her steroid rinses regularly and infrequently uses flonase.  She does take claritin regularly at bedtime.     Sino-Nasal Outcome Test (SNOT - 22)    1. Need to Blow Nose: Mild or slight  2. Nasal Blockage: Very mild  3. Sneezing: Very mild  4. Runny Nose: Mild or slight  5. Cough: Very mild  6. Post-nasal discharge: None  7. Thick nasal discharge: Very mild  8. Ear fullness: Severe  9. Dizziness: None  10. Ear Pain: None  11. Facial pain/pressure: None  12. Decreased Sense of Smell/Taste: None  13. Difficulty falling asleep: Moderate  14. Wake up at night: Moderate  15. Lack of a good night's sleep: Mild or slight  16. Wake up tired: Mild or slight  17. Fatigue: Very mild  18. Reduced Productivity: None  19. Reduced Concentration: None  20. Frustrated/restless/irritable: None  21. Sad: None  22. Embarrassed: None    Total Score: 23    COPYRIGHT 1996. [facility] [place]      REVIEW OF SYSTEMS    Review of Systems: a 10-system review is reviewed at this encounter.  See scanned document.     Dust mites, Lisinopril, and Mold     PHYSICAL EXAM:        HEAD: Normal appearance and symmetry:  No cutaneous lesions.      EARS:   Auricles normal         NASAL ENDOSCOPY    Dorsum:   Straight  Septum: midline  ITH: 3-4+                    ORAL CAVITY/OROPHARYNX:    Lips:  Normal.     NECK:  Trachea:  midline       NEURO:   Alert and Oriented    GAIT AND STATION:   normal     RESPIRATORY:   Symmetry and Respiratory effort    PSYCH:   normal mood and affect    SKIN:  warm and dry         IMPRESSION:   Encounter Diagnoses   Name Primary?     Nasal polyp Yes     Nasal congestion        RECOMMENDATIONS:    Resume budesonide rinses BID  Return visit 6 weeks for repeat nasal endoscopy and CT review.        Again, thank you for allowing me to participate in the care of your patient.        Sincerely,        Mariusz Sahu MD

## 2022-09-19 ENCOUNTER — ANCILLARY PROCEDURE (OUTPATIENT)
Dept: CARDIOLOGY | Facility: CLINIC | Age: 47
End: 2022-09-19
Attending: INTERNAL MEDICINE
Payer: COMMERCIAL

## 2022-09-19 ENCOUNTER — LAB (OUTPATIENT)
Dept: LAB | Facility: CLINIC | Age: 47
End: 2022-09-19
Payer: COMMERCIAL

## 2022-09-19 DIAGNOSIS — I10 HYPERTENSION, ESSENTIAL: ICD-10-CM

## 2022-09-19 LAB — LVEF ECHO: NORMAL

## 2022-09-19 PROCEDURE — 36415 COLL VENOUS BLD VENIPUNCTURE: CPT | Performed by: PATHOLOGY

## 2022-09-19 PROCEDURE — 84244 ASSAY OF RENIN: CPT | Mod: 90 | Performed by: PATHOLOGY

## 2022-09-19 PROCEDURE — 82088 ASSAY OF ALDOSTERONE: CPT | Mod: 90 | Performed by: PATHOLOGY

## 2022-09-19 PROCEDURE — 99000 SPECIMEN HANDLING OFFICE-LAB: CPT | Performed by: PATHOLOGY

## 2022-09-19 PROCEDURE — 82384 ASSAY THREE CATECHOLAMINES: CPT | Mod: 90 | Performed by: PATHOLOGY

## 2022-09-19 PROCEDURE — 93306 TTE W/DOPPLER COMPLETE: CPT | Mod: GC | Performed by: INTERNAL MEDICINE

## 2022-09-20 LAB — ALDOST SERPL-MCNC: 5.3 NG/DL (ref 0–31)

## 2022-09-23 LAB
CATECHOLS PLAS-IMP: NORMAL
DOPAMINE SERPL-MCNC: <20 PG/ML
EPINEPH PLAS-MCNC: 14 PG/ML
NOREPINEPH PLAS-MCNC: 348 PG/ML
RENIN PLAS-CCNC: 0.1 NG/ML/HR
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
## 2022-09-28 ENCOUNTER — OFFICE VISIT (OUTPATIENT)
Dept: ALLERGY | Facility: CLINIC | Age: 47
End: 2022-09-28
Payer: COMMERCIAL

## 2022-09-28 DIAGNOSIS — L73.9 FOLLICULITIS: Primary | ICD-10-CM

## 2022-09-28 DIAGNOSIS — L70.8 OTHER ACNE: ICD-10-CM

## 2022-09-28 PROCEDURE — 99213 OFFICE O/P EST LOW 20 MIN: CPT | Performed by: DERMATOLOGY

## 2022-09-28 RX ORDER — CLINDAMYCIN PHOSPHATE 10 UG/ML
LOTION TOPICAL 2 TIMES DAILY
Qty: 60 ML | Refills: 3 | Status: SHIPPED | OUTPATIENT
Start: 2022-09-28 | End: 2023-05-30

## 2022-09-28 ASSESSMENT — PAIN SCALES - GENERAL: PAINLEVEL: NO PAIN (0)

## 2022-09-28 NOTE — NURSING NOTE
Chief Complaint   Patient presents with     Allergy Testing Followup     Mimi is here today for allergy follow up and ENT wants Dr. Paz to start pt on biologix.     Claire Jauregui RN
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
## 2022-10-15 ENCOUNTER — HEALTH MAINTENANCE LETTER (OUTPATIENT)
Age: 47
End: 2022-10-15

## 2022-10-20 ENCOUNTER — MYC MEDICAL ADVICE (OUTPATIENT)
Dept: FAMILY MEDICINE | Facility: CLINIC | Age: 47
End: 2022-10-20

## 2022-10-20 ENCOUNTER — OFFICE VISIT (OUTPATIENT)
Dept: FAMILY MEDICINE | Facility: CLINIC | Age: 47
End: 2022-10-20
Payer: COMMERCIAL

## 2022-10-20 VITALS
BODY MASS INDEX: 30.16 KG/M2 | HEART RATE: 75 BPM | SYSTOLIC BLOOD PRESSURE: 110 MMHG | HEIGHT: 65 IN | TEMPERATURE: 97.4 F | DIASTOLIC BLOOD PRESSURE: 80 MMHG | WEIGHT: 181 LBS | RESPIRATION RATE: 15 BRPM | OXYGEN SATURATION: 98 %

## 2022-10-20 DIAGNOSIS — N89.8 VAGINAL DISCHARGE: ICD-10-CM

## 2022-10-20 DIAGNOSIS — I10 ESSENTIAL HYPERTENSION WITH GOAL BLOOD PRESSURE LESS THAN 140/90: ICD-10-CM

## 2022-10-20 DIAGNOSIS — Z12.11 SCREEN FOR COLON CANCER: ICD-10-CM

## 2022-10-20 DIAGNOSIS — Z13.220 LIPID SCREENING: ICD-10-CM

## 2022-10-20 DIAGNOSIS — Z11.3 SCREEN FOR STD (SEXUALLY TRANSMITTED DISEASE): ICD-10-CM

## 2022-10-20 DIAGNOSIS — Z30.011 ENCOUNTER FOR INITIAL PRESCRIPTION OF CONTRACEPTIVE PILLS: ICD-10-CM

## 2022-10-20 DIAGNOSIS — B37.31 CANDIDIASIS OF VULVA AND VAGINA: ICD-10-CM

## 2022-10-20 DIAGNOSIS — E03.2 HYPOTHYROIDISM DUE TO MEDICATION: ICD-10-CM

## 2022-10-20 DIAGNOSIS — Z00.00 ROUTINE GENERAL MEDICAL EXAMINATION AT A HEALTH CARE FACILITY: Primary | ICD-10-CM

## 2022-10-20 DIAGNOSIS — Z11.59 NEED FOR HEPATITIS B SCREENING TEST: ICD-10-CM

## 2022-10-20 DIAGNOSIS — Z12.4 CERVICAL CANCER SCREENING: ICD-10-CM

## 2022-10-20 LAB
CHOLEST SERPL-MCNC: 177 MG/DL
CLUE CELLS: PRESENT
FASTING STATUS PATIENT QL REPORTED: NO
HBV CORE AB SERPL QL IA: NONREACTIVE
HBV SURFACE AB SERPL IA-ACNC: 287.01 M[IU]/ML
HBV SURFACE AB SERPL IA-ACNC: REACTIVE M[IU]/ML
HBV SURFACE AG SERPL QL IA: NONREACTIVE
HCV AB SERPL QL IA: NONREACTIVE
HDLC SERPL-MCNC: 53 MG/DL
HIV 1+2 AB+HIV1 P24 AG SERPL QL IA: NONREACTIVE
LDLC SERPL CALC-MCNC: 113 MG/DL
NONHDLC SERPL-MCNC: 124 MG/DL
T PALLIDUM AB SER QL: NONREACTIVE
TRICHOMONAS, WET PREP: ABNORMAL
TRIGL SERPL-MCNC: 56 MG/DL
WBC'S/HIGH POWER FIELD, WET PREP: ABNORMAL
YEAST, WET PREP: PRESENT

## 2022-10-20 PROCEDURE — 87389 HIV-1 AG W/HIV-1&-2 AB AG IA: CPT | Performed by: FAMILY MEDICINE

## 2022-10-20 PROCEDURE — 99396 PREV VISIT EST AGE 40-64: CPT | Mod: 25 | Performed by: FAMILY MEDICINE

## 2022-10-20 PROCEDURE — 90686 IIV4 VACC NO PRSV 0.5 ML IM: CPT | Performed by: FAMILY MEDICINE

## 2022-10-20 PROCEDURE — 86803 HEPATITIS C AB TEST: CPT | Performed by: FAMILY MEDICINE

## 2022-10-20 PROCEDURE — 99213 OFFICE O/P EST LOW 20 MIN: CPT | Mod: 25 | Performed by: FAMILY MEDICINE

## 2022-10-20 PROCEDURE — 87491 CHLMYD TRACH DNA AMP PROBE: CPT | Performed by: FAMILY MEDICINE

## 2022-10-20 PROCEDURE — 86780 TREPONEMA PALLIDUM: CPT | Performed by: FAMILY MEDICINE

## 2022-10-20 PROCEDURE — 87340 HEPATITIS B SURFACE AG IA: CPT | Performed by: FAMILY MEDICINE

## 2022-10-20 PROCEDURE — 80061 LIPID PANEL: CPT | Performed by: FAMILY MEDICINE

## 2022-10-20 PROCEDURE — 86706 HEP B SURFACE ANTIBODY: CPT | Performed by: FAMILY MEDICINE

## 2022-10-20 PROCEDURE — 90471 IMMUNIZATION ADMIN: CPT | Performed by: FAMILY MEDICINE

## 2022-10-20 PROCEDURE — 87591 N.GONORRHOEAE DNA AMP PROB: CPT | Performed by: FAMILY MEDICINE

## 2022-10-20 PROCEDURE — 86704 HEP B CORE ANTIBODY TOTAL: CPT | Performed by: FAMILY MEDICINE

## 2022-10-20 PROCEDURE — 87210 SMEAR WET MOUNT SALINE/INK: CPT | Performed by: FAMILY MEDICINE

## 2022-10-20 PROCEDURE — 36415 COLL VENOUS BLD VENIPUNCTURE: CPT | Performed by: FAMILY MEDICINE

## 2022-10-20 RX ORDER — LEVOTHYROXINE SODIUM 100 UG/1
100 TABLET ORAL DAILY
Qty: 90 TABLET | Refills: 3 | Status: SHIPPED | OUTPATIENT
Start: 2022-10-20 | End: 2023-11-01

## 2022-10-20 RX ORDER — FLUCONAZOLE 150 MG/1
150 TABLET ORAL ONCE
Qty: 1 TABLET | Refills: 0 | Status: SHIPPED | OUTPATIENT
Start: 2022-10-20 | End: 2022-10-20

## 2022-10-20 RX ORDER — METRONIDAZOLE 7.5 MG/G
1 GEL VAGINAL DAILY
Qty: 25 G | Refills: 0 | Status: SHIPPED | OUTPATIENT
Start: 2022-10-20 | End: 2022-10-25

## 2022-10-20 RX ORDER — HYDROCHLOROTHIAZIDE 12.5 MG/1
12.5 TABLET ORAL DAILY
Qty: 90 TABLET | Refills: 3 | Status: SHIPPED | OUTPATIENT
Start: 2022-10-20 | End: 2023-11-01

## 2022-10-20 RX ORDER — ACETAMINOPHEN AND CODEINE PHOSPHATE 120; 12 MG/5ML; MG/5ML
0.35 SOLUTION ORAL DAILY
Qty: 84 TABLET | Refills: 0 | Status: SHIPPED | OUTPATIENT
Start: 2022-10-20 | End: 2022-12-23

## 2022-10-20 RX ORDER — LOSARTAN POTASSIUM 25 MG/1
25 TABLET ORAL DAILY
Qty: 90 TABLET | Refills: 3 | Status: SHIPPED | OUTPATIENT
Start: 2022-10-20 | End: 2023-11-01

## 2022-10-20 NOTE — PROGRESS NOTES
SUBJECTIVE:   CC: Mimi is an 47 year old who presents for preventive health visit.       Patient has been advised of split billing requirements and indicates understanding: Yes  Healthy Habits:    Getting at least 3 servings of Calcium per day:  Yes    Bi-annual eye exam:  Yes    Dental care twice a year:  Yes    Sleep apnea or symptoms of sleep apnea:  None    Diet:  Other    Frequency of exercise:  2-3 days/week    Duration of exercise:  45-60 minutes    Taking medications regularly:  Yes    Barriers to taking medications:  None    Medication side effects:  None    PHQ-2 Total Score:    Additional concerns today:  Yes      Pt want to check for BV and metro gel for BV         Today's PHQ-2 Score:   PHQ-2 ( 1999 Pfizer) 8/8/2022   Q1: Little interest or pleasure in doing things 0   Q2: Feeling down, depressed or hopeless 0   PHQ-2 Score 0   PHQ-2 Total Score (12-17 Years)- Positive if 3 or more points; Administer PHQ-A if positive -   Q1: Little interest or pleasure in doing things -   Q2: Feeling down, depressed or hopeless -   PHQ-2 Score -       Abuse: Current or Past (Physical, Sexual or Emotional) - No  Do you feel safe in your environment? Yes        Social History     Tobacco Use     Smoking status: Never     Smokeless tobacco: Never   Substance Use Topics     Alcohol use: Yes     Comment: 1-2x's/month if that.       Alcohol Use 10/14/2021   Prescreen: >3 drinks/day or >7 drinks/week? -   Prescreen: >3 drinks/day or >7 drinks/week? No       Reviewed orders with patient.  Reviewed health maintenance and updated orders accordingly - Yes      Breast Cancer Screening:    Breast CA Risk Assessment (FHS-7) 10/12/2021   Do you have a family history of breast, colon, or ovarian cancer? No / Unknown     Pertinent mammograms are reviewed under the imaging tab.    History of abnormal Pap smear: YES - updated in Problem List and Health Maintenance accordingly  PAP / HPV Latest Ref Rng & Units 12/6/2021 10/30/2020  "10/18/2019   PAP   Negative for Intraepithelial Lesion or Malignancy (NILM) - -   PAP (Historical) - - NIL NIL   HPV16 Negative Negative Negative Negative   HPV18 Negative Negative Negative Negative   HRHPV Negative Positive(A) Positive(A) Positive(A)     Reviewed and updated as needed this visit by clinical staff                  Reviewed and updated as needed this visit by Provider                     Review of Systems       OBJECTIVE:   Physical Exam   /80 (BP Location: Right arm, Patient Position: Sitting, Cuff Size: Adult Large)   Pulse 75   Temp 97.4  F (36.3  C) (Temporal)   Resp 15   Ht 1.651 m (5' 5\")   Wt 82.1 kg (181 lb)   LMP 10/11/2022 (Exact Date)   SpO2 98%   BMI 30.12 kg/m    GENERAL: healthy, alert and no distress  EYES: Eyes grossly normal to inspection,  conjunctivae and sclerae normal  HENT: ear canals and TM's normal  NECK: no adenopathy, no asymmetry, masses, or scars and thyroid normal to palpation  RESP: lungs clear to auscultation - no rales, rhonchi or wheezes  CV: regular rate and rhythm, normal S1 S2  ABDOMEN: soft, nontender, no hepatosplenomegaly, no masses and bowel sounds normal  MS: no gross musculoskeletal defects noted, no edema  SKIN: no suspicious lesions or rashes  NEURO: Normal strength and tone, mentation intact and speech normal  PSYCH: mentation appears normal, affect normal/bright    Diagnostic Test Results:  Labs reviewed in Epic    ASSESSMENT/PLAN:     Routine general medical examination at a health care facility  - will schedule COVID vaccine    Essential hypertension with goal blood pressure less than 140/90  - refill below   - losartan (COZAAR) 25 MG tablet; Take 1 tablet (25 mg) by mouth daily  Dispense: 90 tablet; Refill: 3  - hydrochlorothiazide (HYDRODIURIL) 12.5 MG tablet; Take 1 tablet (12.5 mg) by mouth daily  Dispense: 90 tablet; Refill: 3    Vaginal discharge  - Wet prep - lab collect; Future  - Wet prep - lab collect; Future  - metroNIDAZOLE " "(METROGEL) 0.75 % vaginal gel; Place 1 applicator (5 g) vaginally daily for 5 days  Dispense: 25 g; Refill: 0    Hypothyroidism due to medication  - labs are up to date   - levothyroxine (SYNTHROID/LEVOTHROID) 100 MCG tablet; Take 1 tablet (100 mcg) by mouth daily  Dispense: 90 tablet; Refill: 3    Screen for colon cancer  - Colonoscopy Screening  Referral; Future    Cervical cancer screening  - scheduled with gynecologist     Encounter for initial prescription of contraceptive pills  - norethindrone (MICRONOR) 0.35 MG tablet; Take 1 tablet (0.35 mg) by mouth daily  Dispense: 84 tablet; Refill: 0    Need for hepatitis B screening test  - Hepatitis B core antibody; Future  - Hepatitis B Surface Antibody; Future  - Hepatitis B surface antigen; Future    Screen for STD (sexually transmitted disease)  - Treponema Abs w Reflex to RPR and Titer; Future  - NEISSERIA GONORRHOEA PCR; Future  - CHLAMYDIA TRACHOMATIS PCR; Future  - Hepatitis C antibody; Future  - HIV Antigen Antibody Combo; Future  - NEISSERIA GONORRHOEA PCR; Future  - CHLAMYDIA TRACHOMATIS PCR; Future    Lipid screening  - Lipid panel reflex to direct LDL Non-fasting; Future    Patient has been advised of split billing requirements and indicates understanding: Yes      COUNSELING:  Reviewed preventive health counseling, as reflected in patient instructions    Estimated body mass index is 30.77 kg/m  as calculated from the following:    Height as of 1/10/22: 1.645 m (5' 4.75\").    Weight as of 9/8/22: 83.2 kg (183 lb 8 oz).        She reports that she has never smoked. She has never used smokeless tobacco.      Counseling Resources:  ATP IV Guidelines  Pooled Cohorts Equation Calculator  Breast Cancer Risk Calculator  BRCA-Related Cancer Risk Assessment: FHS-7 Tool  FRAX Risk Assessment  ICSI Preventive Guidelines  Dietary Guidelines for Americans, 2010  USDA's MyPlate  ASA Prophylaxis  Lung CA Screening    Nanette Penny MD  [facility] " [facility]

## 2022-10-21 LAB
C TRACH DNA SPEC QL NAA+PROBE: NEGATIVE
N GONORRHOEA DNA SPEC QL NAA+PROBE: NEGATIVE

## 2022-10-21 RX ORDER — FLUCONAZOLE 150 MG/1
150 TABLET ORAL ONCE
Qty: 1 TABLET | Refills: 1 | Status: CANCELLED | OUTPATIENT
Start: 2022-10-21 | End: 2022-10-21

## 2022-10-21 NOTE — TELEPHONE ENCOUNTER
Patient asking for refills on diflucan as she takes another dose 1-3 days after initial dose    MARK Hernandez RN  [facility]

## 2022-10-24 RX ORDER — FLUCONAZOLE 150 MG/1
150 TABLET ORAL
Qty: 2 TABLET | Refills: 0 | Status: SHIPPED | OUTPATIENT
Start: 2022-10-24 | End: 2022-10-28

## 2022-11-16 ENCOUNTER — IMMUNIZATION (OUTPATIENT)
Dept: NURSING | Facility: CLINIC | Age: 47
End: 2022-11-16
Payer: COMMERCIAL

## 2022-11-16 PROCEDURE — 0124A COVID-19,PF,PFIZER BOOSTER BIVALENT: CPT

## 2022-11-16 PROCEDURE — 91312 COVID-19,PF,PFIZER BOOSTER BIVALENT: CPT

## 2022-11-17 ENCOUNTER — HOSPITAL ENCOUNTER (OUTPATIENT)
Dept: CT IMAGING | Facility: HOSPITAL | Age: 47
Discharge: HOME | End: 2022-11-17
Attending: OTOLARYNGOLOGY | Admitting: OTOLARYNGOLOGY
Payer: COMMERCIAL

## 2022-11-17 ENCOUNTER — LAB (OUTPATIENT)
Dept: LAB | Facility: CLINIC | Age: 47
End: 2022-11-17
Payer: COMMERCIAL

## 2022-11-17 DIAGNOSIS — J32.0 CHRONIC MAXILLARY SINUSITIS: ICD-10-CM

## 2022-11-17 DIAGNOSIS — R09.81 NASAL CONGESTION: ICD-10-CM

## 2022-11-17 DIAGNOSIS — B96.89 BACTERIAL VAGINOSIS: Primary | ICD-10-CM

## 2022-11-17 DIAGNOSIS — N89.8 VAGINAL DISCHARGE: ICD-10-CM

## 2022-11-17 DIAGNOSIS — N76.0 BACTERIAL VAGINOSIS: Primary | ICD-10-CM

## 2022-11-17 PROCEDURE — 70486 CT MAXILLOFACIAL W/O DYE: CPT

## 2022-11-17 PROCEDURE — 87210 SMEAR WET MOUNT SALINE/INK: CPT

## 2022-11-18 RX ORDER — METRONIDAZOLE 500 MG/1
500 TABLET ORAL 2 TIMES DAILY
Qty: 14 TABLET | Refills: 0 | Status: SHIPPED | OUTPATIENT
Start: 2022-11-18 | End: 2022-11-25

## 2022-11-21 ENCOUNTER — OFFICE VISIT (OUTPATIENT)
Dept: OTOLARYNGOLOGY | Facility: CLINIC | Age: 47
End: 2022-11-21
Payer: COMMERCIAL

## 2022-11-21 ENCOUNTER — TELEPHONE (OUTPATIENT)
Dept: OTOLARYNGOLOGY | Facility: CLINIC | Age: 47
End: 2022-11-21

## 2022-11-21 ENCOUNTER — PATIENT OUTREACH (OUTPATIENT)
Dept: FAMILY MEDICINE | Facility: CLINIC | Age: 47
End: 2022-11-21

## 2022-11-21 DIAGNOSIS — J31.0 CHRONIC RHINITIS: ICD-10-CM

## 2022-11-21 DIAGNOSIS — R87.810 CERVICAL HIGH RISK HPV (HUMAN PAPILLOMAVIRUS) TEST POSITIVE: Primary | ICD-10-CM

## 2022-11-21 DIAGNOSIS — R09.81 NASAL CONGESTION: Primary | ICD-10-CM

## 2022-11-21 PROCEDURE — 99214 OFFICE O/P EST MOD 30 MIN: CPT | Performed by: OTOLARYNGOLOGY

## 2022-11-21 NOTE — LETTER
11/21/2022         RE: Mimi Melton  [address]  [place] [address]        Dear Colleague,    Thank you for referring your patient, Mimi Melton, to the [facility]. Please see a copy of my visit note below.    CHIEF COMPLAINT:  Patient presents with:  Ent Problem: Ct review, follow up visit         HISTORY OF PRESENT ILLNESS    Mimi was seen in follow up after previous 9/14/2022 visit for CT SINUS review. She has been doing her sinus rinses routinely with the budesonide and feels like they have really helped.  She is using the astelin spray from Dr. Salas but doesn't like the taste.    SNOT 22 = 11    CT SINUS 11/17    1.  Redemonstration of postoperative changes including bilateral posterior ethmoidectomies and bilateral maxillary uncinectomies and antrostomies.     2.  When compared to 06/08/2022, there is improved aeration of the left frontal sinus with some persistent mucosal thickening along the inferior aspect. There is at least partial occlusion of the left frontoethmoidal recess, improved in appearance   compared to the prior study.     3.  There is improved aeration of the left-sided ethmoid air cells.     4.  Interval development of mild mucosal thickening along the inferomedial right sphenoid sinus with at least partial occlusion of the bilateral sphenoid ostia.     5.  Unchanged polypoid mucosal thickening along the inferior aspect of the left maxillary sinus. The postsurgical maxillary sinus drainage pathways are patent    My last note:     Name Primary?     Nasal polyp Yes     Nasal congestion           RECOMMENDATIONS:     Resume budesonide rinses BID  Return visit 6 weeks for repeat nasal endoscopy and CT review.      REVIEW OF SYSTEMS    Review of Systems: a 10-system review is reviewed at this encounter.  See scanned document.       Allergies   Allergen Reactions     Dust Mites      Lisinopril Swelling     Swelling in lip     Mold      Cannot have  any meds that contain mold.           PHYSICAL EXAM:        HEAD: Normal appearance and symmetry:  No cutaneous lesions.      EARS:   Auricles normal     NOSE:    Dorsum:   straight       ORAL CAVITY/OROPHARYNX:    Lips:  Normal.     NECK:  Trachea:  midline     NEURO:   Alert and Oriented    GAIT AND STATION:  normal     RESPIRATORY:   Symmetry and Respiratory effort    PSYCH:   normal mood and affect    SKIN:  warm and dry         IMPRESSION:   Encounter Diagnoses   Name Primary?     Nasal congestion Yes     Chronic rhinitis        RECOMMENDATIONS:    Continue on nasal irrigations, will stop budesonide rinses.     Return visit 6 months.       All questions were answered.   The patient is agreeable with this plan of care.         Again, thank you for allowing me to participate in the care of your patient.        Sincerely,        Mariusz Sahu MD

## 2022-11-21 NOTE — TELEPHONE ENCOUNTER
Spoke to pt and informed her per NB that she can stop the budesonide rinses and she can just go back to regular saline rinses. Pt was agreeable to this new plan.     ANAYELI BATRES

## 2022-11-21 NOTE — PROGRESS NOTES
CHIEF COMPLAINT:  Patient presents with:  Ent Problem: Ct review, follow up visit         HISTORY OF PRESENT ILLNESS    Mimi was seen in follow up after previous 9/14/2022 visit for CT SINUS review. She has been doing her sinus rinses routinely with the budesonide and feels like they have really helped.  She is using the astelin spray from Dr. Salas but doesn't like the taste.    SNOT 22 = 11    CT SINUS 11/17    1.  Redemonstration of postoperative changes including bilateral posterior ethmoidectomies and bilateral maxillary uncinectomies and antrostomies.     2.  When compared to 06/08/2022, there is improved aeration of the left frontal sinus with some persistent mucosal thickening along the inferior aspect. There is at least partial occlusion of the left frontoethmoidal recess, improved in appearance   compared to the prior study.     3.  There is improved aeration of the left-sided ethmoid air cells.     4.  Interval development of mild mucosal thickening along the inferomedial right sphenoid sinus with at least partial occlusion of the bilateral sphenoid ostia.     5.  Unchanged polypoid mucosal thickening along the inferior aspect of the left maxillary sinus. The postsurgical maxillary sinus drainage pathways are patent    My last note:     Name Primary?     Nasal polyp Yes     Nasal congestion           RECOMMENDATIONS:     Resume budesonide rinses BID  Return visit 6 weeks for repeat nasal endoscopy and CT review.      REVIEW OF SYSTEMS    Review of Systems: a 10-system review is reviewed at this encounter.  See scanned document.       Allergies   Allergen Reactions     Dust Mites      Lisinopril Swelling     Swelling in lip     Mold      Cannot have any meds that contain mold.           PHYSICAL EXAM:        HEAD: Normal appearance and symmetry:  No cutaneous lesions.      EARS:   Auricles normal     NOSE:    Dorsum:   straight       ORAL CAVITY/OROPHARYNX:    Lips:  Normal.     NECK:  Trachea:  midline      NEURO:   Alert and Oriented    GAIT AND STATION:  normal     RESPIRATORY:   Symmetry and Respiratory effort    PSYCH:   normal mood and affect    SKIN:  warm and dry         IMPRESSION:   Encounter Diagnoses   Name Primary?     Nasal congestion Yes     Chronic rhinitis        RECOMMENDATIONS:    Continue on nasal irrigations, will stop budesonide rinses.     Return visit 6 months.       All questions were answered.   The patient is agreeable with this plan of care.

## 2022-12-06 ENCOUNTER — LAB (OUTPATIENT)
Dept: LAB | Facility: CLINIC | Age: 47
End: 2022-12-06
Payer: COMMERCIAL

## 2022-12-06 DIAGNOSIS — N89.8 VAGINAL DISCHARGE: ICD-10-CM

## 2022-12-06 LAB
CLUE CELLS: NORMAL
TRICHOMONAS, WET PREP: NORMAL
WBC'S/HIGH POWER FIELD, WET PREP: NORMAL
YEAST, WET PREP: NORMAL

## 2022-12-06 PROCEDURE — 87210 SMEAR WET MOUNT SALINE/INK: CPT

## 2022-12-11 ENCOUNTER — HOSPITAL ENCOUNTER (EMERGENCY)
Facility: HOSPITAL | Age: 47
Discharge: HOME | End: 2022-12-11
Attending: EMERGENCY MEDICINE | Admitting: EMERGENCY MEDICINE
Payer: COMMERCIAL

## 2022-12-11 VITALS
DIASTOLIC BLOOD PRESSURE: 80 MMHG | SYSTOLIC BLOOD PRESSURE: 130 MMHG | HEART RATE: 70 BPM | OXYGEN SATURATION: 99 % | RESPIRATION RATE: 23 BRPM | TEMPERATURE: 97.9 F

## 2022-12-11 DIAGNOSIS — R11.2 NAUSEA AND VOMITING, UNSPECIFIED VOMITING TYPE: ICD-10-CM

## 2022-12-11 DIAGNOSIS — F45.8 HYPERVENTILATION SYNDROME: ICD-10-CM

## 2022-12-11 LAB
ALBUMIN SERPL BCG-MCNC: 4.3 G/DL (ref 3.5–5.2)
ALP SERPL-CCNC: 37 U/L (ref 35–104)
ALT SERPL W P-5'-P-CCNC: 14 U/L (ref 10–35)
ANION GAP SERPL CALCULATED.3IONS-SCNC: 16 MMOL/L (ref 7–15)
AST SERPL W P-5'-P-CCNC: 32 U/L (ref 10–35)
BASOPHILS # BLD AUTO: 0 10E3/UL (ref 0–0.2)
BASOPHILS NFR BLD AUTO: 0 %
BILIRUB SERPL-MCNC: 0.4 MG/DL
BUN SERPL-MCNC: 9.5 MG/DL (ref 6–20)
CALCIUM SERPL-MCNC: 9.6 MG/DL (ref 8.6–10)
CHLORIDE SERPL-SCNC: 99 MMOL/L (ref 98–107)
CREAT SERPL-MCNC: 0.92 MG/DL (ref 0.51–0.95)
DEPRECATED HCO3 PLAS-SCNC: 23 MMOL/L (ref 22–29)
EOSINOPHIL # BLD AUTO: 0 10E3/UL (ref 0–0.7)
EOSINOPHIL NFR BLD AUTO: 0 %
ERYTHROCYTE [DISTWIDTH] IN BLOOD BY AUTOMATED COUNT: 12.6 % (ref 10–15)
GFR SERPL CREATININE-BSD FRML MDRD: 77 ML/MIN/1.73M2
GLUCOSE SERPL-MCNC: 128 MG/DL (ref 70–99)
HCT VFR BLD AUTO: 38.7 % (ref 35–47)
HGB BLD-MCNC: 12.9 G/DL (ref 11.7–15.7)
HOLD SPECIMEN: NORMAL
IMM GRANULOCYTES # BLD: 0 10E3/UL
IMM GRANULOCYTES NFR BLD: 0 %
LIPASE SERPL-CCNC: 8 U/L (ref 13–60)
LYMPHOCYTES # BLD AUTO: 1.1 10E3/UL (ref 0.8–5.3)
LYMPHOCYTES NFR BLD AUTO: 16 %
MAGNESIUM SERPL-MCNC: 1.6 MG/DL (ref 1.7–2.3)
MCH RBC QN AUTO: 29.7 PG (ref 26.5–33)
MCHC RBC AUTO-ENTMCNC: 33.3 G/DL (ref 31.5–36.5)
MCV RBC AUTO: 89 FL (ref 78–100)
MONOCYTES # BLD AUTO: 0.4 10E3/UL (ref 0–1.3)
MONOCYTES NFR BLD AUTO: 6 %
NEUTROPHILS # BLD AUTO: 5.4 10E3/UL (ref 1.6–8.3)
NEUTROPHILS NFR BLD AUTO: 78 %
NRBC # BLD AUTO: 0 10E3/UL
NRBC BLD AUTO-RTO: 0 /100
PLATELET # BLD AUTO: 387 10E3/UL (ref 150–450)
POTASSIUM SERPL-SCNC: 3.6 MMOL/L (ref 3.4–5.3)
PROT SERPL-MCNC: 7.8 G/DL (ref 6.4–8.3)
RBC # BLD AUTO: 4.34 10E6/UL (ref 3.8–5.2)
SODIUM SERPL-SCNC: 138 MMOL/L (ref 136–145)
WBC # BLD AUTO: 7 10E3/UL (ref 4–11)

## 2022-12-11 PROCEDURE — 258N000003 HC RX IP 258 OP 636: Performed by: EMERGENCY MEDICINE

## 2022-12-11 PROCEDURE — 96374 THER/PROPH/DIAG INJ IV PUSH: CPT

## 2022-12-11 PROCEDURE — 83735 ASSAY OF MAGNESIUM: CPT | Performed by: EMERGENCY MEDICINE

## 2022-12-11 PROCEDURE — 96361 HYDRATE IV INFUSION ADD-ON: CPT

## 2022-12-11 PROCEDURE — 85025 COMPLETE CBC W/AUTO DIFF WBC: CPT | Performed by: EMERGENCY MEDICINE

## 2022-12-11 PROCEDURE — 96375 TX/PRO/DX INJ NEW DRUG ADDON: CPT

## 2022-12-11 PROCEDURE — 93005 ELECTROCARDIOGRAM TRACING: CPT | Performed by: EMERGENCY MEDICINE

## 2022-12-11 PROCEDURE — 36415 COLL VENOUS BLD VENIPUNCTURE: CPT | Performed by: EMERGENCY MEDICINE

## 2022-12-11 PROCEDURE — 99285 EMERGENCY DEPT VISIT HI MDM: CPT | Mod: 25

## 2022-12-11 PROCEDURE — 83690 ASSAY OF LIPASE: CPT | Performed by: EMERGENCY MEDICINE

## 2022-12-11 PROCEDURE — 80053 COMPREHEN METABOLIC PANEL: CPT | Performed by: EMERGENCY MEDICINE

## 2022-12-11 PROCEDURE — 250N000011 HC RX IP 250 OP 636: Performed by: EMERGENCY MEDICINE

## 2022-12-11 RX ORDER — ONDANSETRON 4 MG/1
4 TABLET, ORALLY DISINTEGRATING ORAL EVERY 8 HOURS PRN
Qty: 10 TABLET | Refills: 0 | Status: SHIPPED | OUTPATIENT
Start: 2022-12-11 | End: 2022-12-14

## 2022-12-11 RX ORDER — ONDANSETRON 2 MG/ML
4 INJECTION INTRAMUSCULAR; INTRAVENOUS ONCE
Status: COMPLETED | OUTPATIENT
Start: 2022-12-11 | End: 2022-12-11

## 2022-12-11 RX ORDER — LORAZEPAM 2 MG/ML
1 INJECTION INTRAMUSCULAR ONCE
Status: COMPLETED | OUTPATIENT
Start: 2022-12-11 | End: 2022-12-11

## 2022-12-11 RX ADMIN — LORAZEPAM 1 MG: 2 INJECTION INTRAMUSCULAR; INTRAVENOUS at 12:57

## 2022-12-11 RX ADMIN — FAMOTIDINE 20 MG: 10 INJECTION, SOLUTION INTRAVENOUS at 14:22

## 2022-12-11 RX ADMIN — SODIUM CHLORIDE 1000 ML: 9 INJECTION, SOLUTION INTRAVENOUS at 12:57

## 2022-12-11 RX ADMIN — ONDANSETRON 4 MG: 2 INJECTION INTRAMUSCULAR; INTRAVENOUS at 12:43

## 2022-12-11 ASSESSMENT — ENCOUNTER SYMPTOMS
NAUSEA: 1
NUMBNESS: 1
VOMITING: 1
LIGHT-HEADEDNESS: 1
ABDOMINAL PAIN: 1

## 2022-12-11 ASSESSMENT — ACTIVITIES OF DAILY LIVING (ADL)
ADLS_ACUITY_SCORE: 35
ADLS_ACUITY_SCORE: 35

## 2022-12-11 NOTE — ED PROVIDER NOTES
EMERGENCY DEPARTMENT ENCOUNTER      NAME: Mimi Melton  AGE: 47 year old female  YOB: 1975  MRN: [id number]  EVALUATION DATE & TIME: 2022 12:16 PM    PCP: Nanette Penny    ED PROVIDER: Perry Jacques DO      Chief Complaint   Patient presents with     Numbness     Nausea & Vomiting         FINAL IMPRESSION:  1. Nausea and vomiting, unspecified vomiting type    2. Hyperventilation syndrome          ED COURSE & MEDICAL DECISION MAKIN-year-old female scented to the ED for evaluation of nausea and vomiting and abdominal pain that started earlier today.  Prior to arrival the patient also developed significant shortness of breath that was later followed by bilateral arm paresthesias, facial paresthesias, and cramping of both hands.  Upon arrival to the ED the patient was noted to be hypertensive and tachypneic.  I was called into the patient's room to evaluate the patient shortly after arrival.  The patient appeared to be in acute distress.  However, this was clearly due to hyperventilation.  The patient was instructed to slow her breathing down.  She did not have any objective neurologic deficits noted.  The patient also did not appear to have any reproducible abdominal testicle patient noted.      Following initial evaluation the patient was given IV Zofran and lorazepam to treat her nausea and anxiety.  She was also given a liter normal saline and IV famotidine    An EKG was obtained which revealed normal sinus rhythm without any concerning ST or T wave changes.    CBC, BMP, hepatic panel, and lipase were all reassuring.    The patient was reevaluated and informed of the laboratory results.  At the time of reevaluation the patient was noted to be sitting up in bed resting comfortably in no apparent distress or discomfort.  The patient states that her paresthesias, hand cramping, shortness of breath, abdominal pain, and nausea vomiting have all resolved completely.   The exact cause of  her symptoms was not clear.  The patient admits to smoking marijuana on a daily basis.  She was informed that that the symptoms could be related to cyclical vomiting from marijuana use.  However, the patient states that she has experienced similar symptoms in the past when drinking alcohol.  She states that she only had 2 drinks last night but that she has had a similar reaction to very little alcohol in the past.  The patient was therefore instructed to stop using alcohol.  She was also instructed to stop or cut back on her marijuana use.  The patient was also informed that her initial symptoms were related to hyperventilation.  The patient was educated on hyperinflation and reassured.    The patient was able to tolerate a p.o. challenge without any worsening nausea, vomiting, or abdominal pain.  The patient was then discharged home with Zofran to use as needed for any further episodes of nausea or vomiting.  She was instructed to follow-up with her primary care provider for reevaluation or to return back to ED sooner for any worsening nausea vomiting, abdominal pain, or any other new or concerning symptoms.      Pertinent Labs & Imaging studies reviewed. (See chart for details)  12:36 PM I met with the patient to gather history and to perform my initial exam. We discussed plans for the ED course, including diagnostic testing and treatment.   2:45 PM Discussed discharge with the patient, she is agreeable with this plan.    At the conclusion of the encounter I discussed the results of all of the tests and the disposition. The questions were answered. The patient or family acknowledged understanding and was agreeable with the care plan.     PPE worn: n95 mask, goggles    MEDICATIONS GIVEN IN THE EMERGENCY:  Medications   ondansetron (ZOFRAN) injection 4 mg (4 mg Intravenous Given 12/11/22 1243)   LORazepam (ATIVAN) injection 1 mg (1 mg Intravenous Given 12/11/22 1257)   0.9% sodium chloride BOLUS (0 mLs Intravenous  Stopped 12/11/22 1429)   famotidine (PEPCID) injection 20 mg (20 mg Intravenous Given 12/11/22 1422)       NEW PRESCRIPTIONS STARTED AT TODAY'S ER VISIT  Discharge Medication List as of 12/11/2022  3:01 PM      START taking these medications    Details   ondansetron (ZOFRAN ODT) 4 MG ODT tab Take 1 tablet (4 mg) by mouth every 8 hours as needed for nausea, Disp-10 tablet, R-0, E-Prescribe                =================================================================    HPI    Patient information was obtained from: the patient    Use of : N/A        Mimihernandez Melton is a 47 year old female who presents to this ED for evaluation of abdominal pain and vomiting.     The patient began experiencing lower abdominal pain this morning with associated nausea and vomiting. She notes that since onset, she began hyperventilating and experienced extremity numbness, hand cramping, and feeling lightheaded. The patient denies any history of anxiety or panic attacks. No other symptoms noted at this time.       REVIEW OF SYSTEMS   Review of Systems   Respiratory:        Positive for hyperventilation   Gastrointestinal: Positive for abdominal pain (lower), nausea and vomiting.   Neurological: Positive for light-headedness and numbness (extremities).   All other systems reviewed and are negative.       PAST MEDICAL HISTORY:  Past Medical History:   Diagnosis Date     Cervical high risk HPV (human papillomavirus) test positive 12/2015 12/2015, 10/2019, 10/30/20     Chronic sinusitis Summer 2016    I get congested every 3-4 weeks     Esophageal reflux      Exophthalmos, unspecified      Hypothyroidism      Thyroid eye disease      Thyrotoxicosis without mention of goiter or other cause, without mention of thyrotoxic crisis or storm 03/2005    Had radioablation, On synthroid, seeing [facility]; takes synthroid     Unspecified essential hypertension 1980    meds since 2001, on atenolol       PAST SURGICAL HISTORY:  Past  Surgical History:   Procedure Laterality Date     CYSTOSCOPY, SLING TRANSVAGINAL N/A 10/10/2017    Procedure: CYSTOSCOPY, SLING TRANSVAGINAL;  Midurethral Sling and Cystoscopy (Support the Urethra with Mesh Sling and Look in the Bladder);  Surgeon: Karin Amin MD;  Location: [facility] OR     EYE SURGERY  2008    Orbital Decompression Dr smart     HC REPAIR INCISIONAL HERNIA,REDUCIBLE      Umbilical hernia Repair     ORBITOTOMY DECOMPRESSION Bilateral 2021    Procedure: Bilateral orbital decompression with  Sonopet, Bilateral lower lid retraction repair;  Surgeon: Niles Donnelly MD;  Location: [facility] OR     REPAIR RETRACTION LID Bilateral 2021    Procedure: REPAIR, RETRACTION, EYELID;  Surgeon: Niles Donnelly MD;  Location: [facility] OR     SONOPET EYE Bilateral 2021    Procedure: SONOPET EYE;  Surgeon: Niles Donnelly MD;  Location: [facility] OR     ZZC  DELIVERY ONLY  91    , Low Cervical     ZZC  DELIVERY ONLY  97    , Low Cervical     ZZC  DELIVERY ONLY  99    , Low Cervical     ZZC INDUCED ABORTN BY D&C      Aspiration & Curettage, TAB x2           CURRENT MEDICATIONS:    ondansetron (ZOFRAN ODT) 4 MG ODT tab  ACETAMINOPHEN PO  azelastine (ASTELIN) 0.1 % nasal spray  benzoyl peroxide (BENZOYL PEROXIDE WASH) 5 % external liquid  benzoyl peroxide 5 % external liquid  budesonide (PULMICORT) 0.5 MG/2ML neb solution  clindamycin (CLEOCIN T) 1 % external lotion  erythromycin (ROMYCIN) 5 MG/GM ophthalmic ointment  fexofenadine (ALLEGRA) 180 MG tablet  fluticasone (FLONASE) 50 MCG/ACT nasal spray  fluticasone-salmeterol (ADVAIR) 250-50 MCG/DOSE inhaler  hydrochlorothiazide (HYDRODIURIL) 12.5 MG tablet  Lactobacillus (FLORAJEN WOMEN) CAPS  Lactobacillus-Inulin ([facility] D[facility]EST[facility] HEALTH) CAPS  levothyroxine (SYNTHROID/LEVOTHROID) 100 MCG tablet  losartan (COZAAR) 25 MG tablet  mupirocin (BACTROBAN) 2 %  ointment  neomycin-polymixin-dexamethasone (MAXITROL) 0.1 % ophthalmic suspension  norethindrone (MICRONOR) 0.35 MG tablet  tretinoin (RETIN-A) 0.05 % external cream  valACYclovir (VALTREX) 500 MG tablet        ALLERGIES:  Allergies   Allergen Reactions     Dust Mites      Lisinopril Swelling     Swelling in lip     Mold      Cannot have any meds that contain mold.       FAMILY HISTORY:  Family History   Problem Relation Age of Onset     Hypertension Mother      Hypertension Father      Hypertension Maternal Grandmother      Hypertension Maternal Grandfather      Hypertension Paternal Grandmother      Hypertension Paternal Grandfather      Diabetes No family hx of      Glaucoma No family hx of      Macular Degeneration No family hx of        SOCIAL HISTORY:   Social History     Socioeconomic History     Marital status: Single   Occupational History     Occupation: [employer]     Employer: [employer]   Tobacco Use     Smoking status: Never     Smokeless tobacco: Never   Substance and Sexual Activity     Alcohol use: Yes     Comment: 1-2x's/month if that.     Drug use: Yes     Types: Marijuana     Comment: 3x's/day     Sexual activity: Yes     Partners: Male     Birth control/protection: I.U.D.     Comment: Paraguard IUD,    Other Topics Concern     Parent/sibling w/ CABG, MI or angioplasty before 65F 55M? No   Social History Narrative    Caffeine intake/servings daily - 0-1, 12 oz of Mountain Dew    Calcium intake/servings daily - eats only cheese    Exercise None    Sunscreen used - Yes    Seatbelts used - Yes    Guns stored in the home - No    Self Breast Exam - No    Pap test up to date -  Yes, as of today    Eye exam up to date -  Yes    Dental exam up to date -  Yes    DEXA scan up to date -  Not Applicable    Flex Sig/Colonoscopy up to date -  Not Applicable    Mammography up to date -  Not Applicable    Immunizations reviewed and up to date - Yes, within the last 10  years    Abuse: Current or Past (Physical, Sexual or Emotional) - No    Do you feel safe in your environment - Yes    Do you cope well with stress - Yes    Do you suffer from insomnia - No    Last updated by: Anu Vinson MA 12/21/2011               VITALS:  /80   Pulse 70   Temp 97.9  F (36.6  C) (Oral)   Resp 23   SpO2 99%     PHYSICAL EXAM    General presentation: Alert, Vital signs reviewed. NAD  HENT: ENT inspection is normal. Oropharynx is moist and clear.   Eye: Pupils are equal and reactive to light. EOMI  Neck: The neck is supple, with full ROM, with no evidence of meningismus.  Pulmonary: Currently in no acute respiratory distress. Normal, non labored respirations, the lung sounds are normal with good equal air movement. Clear to auscultation bilaterally.   Circulatory: Regular rate and rhythm. Peripheral pulses are strong and equal. No murmurs, rubs, or gallops.   Abdominal: The abdomen is soft. Nontender. No rigidity, guarding, or rebound. Bowel sounds normal.   Neurologic: Alert, oriented to person, place, and time. No motor deficit. No sensory deficit. Cranial nerves II through XII are intact.  Musculoskeletal: No extremity tenderness. Full range of motion in all extremities. No extremity edema.   Skin: Skin color is normal. No rash. Warm. Dry to touch.      LAB:  All pertinent labs reviewed and interpreted.  Results for orders placed or performed during the hospital encounter of 12/11/22   Extra Red Top Tube   Result Value Ref Range    Hold Specimen JIC    Extra Green Top (Lithium Heparin) Tube   Result Value Ref Range    Hold Specimen JIC    Extra Purple Top Tube   Result Value Ref Range    Hold Specimen JIC    Comprehensive metabolic panel   Result Value Ref Range    Sodium 138 136 - 145 mmol/L    Potassium 3.6 3.4 - 5.3 mmol/L    Chloride 99 98 - 107 mmol/L    Carbon Dioxide (CO2) 23 22 - 29 mmol/L    Anion Gap 16 (H) 7 - 15 mmol/L    Urea Nitrogen 9.5 6.0 - 20.0 mg/dL    Creatinine 0.92  0.51 - 0.95 mg/dL    Calcium 9.6 8.6 - 10.0 mg/dL    Glucose 128 (H) 70 - 99 mg/dL    Alkaline Phosphatase 37 35 - 104 U/L    AST 32 10 - 35 U/L    ALT 14 10 - 35 U/L    Protein Total 7.8 6.4 - 8.3 g/dL    Albumin 4.3 3.5 - 5.2 g/dL    Bilirubin Total 0.4 <=1.2 mg/dL    GFR Estimate 77 >60 mL/min/1.73m2   Result Value Ref Range    Lipase 8 (L) 13 - 60 U/L   Result Value Ref Range    Magnesium 1.6 (L) 1.7 - 2.3 mg/dL   CBC with platelets and differential   Result Value Ref Range    WBC Count 7.0 4.0 - 11.0 10e3/uL    RBC Count 4.34 3.80 - 5.20 10e6/uL    Hemoglobin 12.9 11.7 - 15.7 g/dL    Hematocrit 38.7 35.0 - 47.0 %    MCV 89 78 - 100 fL    MCH 29.7 26.5 - 33.0 pg    MCHC 33.3 31.5 - 36.5 g/dL    RDW 12.6 10.0 - 15.0 %    Platelet Count 387 150 - 450 10e3/uL    % Neutrophils 78 %    % Lymphocytes 16 %    % Monocytes 6 %    % Eosinophils 0 %    % Basophils 0 %    % Immature Granulocytes 0 %    NRBCs per 100 WBC 0 <1 /100    Absolute Neutrophils 5.4 1.6 - 8.3 10e3/uL    Absolute Lymphocytes 1.1 0.8 - 5.3 10e3/uL    Absolute Monocytes 0.4 0.0 - 1.3 10e3/uL    Absolute Eosinophils 0.0 0.0 - 0.7 10e3/uL    Absolute Basophils 0.0 0.0 - 0.2 10e3/uL    Absolute Immature Granulocytes 0.0 <=0.4 10e3/uL    Absolute NRBCs 0.0 10e3/uL       RADIOLOGY:  Reviewed all pertinent imaging. Please see official radiology report.  No orders to display       EKG:    Normal sinus rhythm.  Rate of 84.  Normal QRS.  Prolonged QT.  No ST or T wave changes noted.  No previous EKG available for comparison.    I have independently reviewed and interpreted the EKG(s) documented above.      I, Sandrita Berg , am serving as a scribe to document services personally performed by Jaremy Georgie, DO based on my observation and the provider's statements to me. I, Perry Jacques, attest that Sandrita Berg is acting in a scribe capacity, has observed my performance of the services and has documented them in accordance with my direction.    Perry Jacques,  DO  Emergency Medicine  [facility] EMERGENCY DEPARTMENT  [facility] [facility] [address]  [phone]       Perry Jacques DO  12/11/22 1943

## 2022-12-11 NOTE — ED TRIAGE NOTES
"Presents from home via [facility] with numbness/tingling to all extremities since 0200 and n/v since same time. Pt vomiting during triage. Pt reported equal numbness to all extremities when first arrived, then stated that it changed to mostly RUE. Pt unable to sit still, stating \"somethings not right\" multiple times.     Triage Assessment     Row Name 12/11/22 1227       Triage Assessment (Adult)    Airway WDL WDL       Respiratory WDL    Respiratory WDL WDL       Skin Circulation/Temperature WDL    Skin Circulation/Temperature WDL WDL       Cardiac WDL    Cardiac WDL WDL       Peripheral/Neurovascular WDL    Peripheral Neurovascular WDL X;neurovascular assessment upper;neurovascular assessment lower       LUE Neurovascular Assessment    Sensation LUE numbness present;tingling present       RUE Neurovascular Assessment    Sensation RUE numbness present;tingling present       LLE Neurovascular Assessment    Sensation LLE numbness present;tingling present       RLE Neurovascular Assessment    Sensation RLE numbness present;tingling present              " NAIWR x 3

## 2022-12-11 NOTE — ED NOTES
Pt reports she thinks the drinks she had last night caused vomiting. States she had 2 drinks prior to beginning of numbness/vomiting. Pt also reports feeling that she is withdrawing from THC. Smoked marijuana last at 0100, drinks were after this. Pt reports she tried to smoke this AM but could not d/t vomiting.

## 2022-12-11 NOTE — ED NOTES
[facility]  Expected date:   Expected time:   Means of arrival: Ambulance  Comments:  [place]: Numbness and tingling

## 2022-12-11 NOTE — DISCHARGE INSTRUCTIONS
Your laboratory tests all appear reassuring.  The exact cause of your nausea and vomiting and abdominal pain remains unclear, but it is possible that it could be related to alcohol or marijuana use.    Continue to use the Zofran as needed for any further episodes of nausea or vomiting.  Follow-up with your primary care provider for reevaluation as needed or return back to ED sooner for any worsening nausea vomiting, abdominal pain, fevers, or any other new or concerning symptoms.

## 2022-12-22 ENCOUNTER — MYC MEDICAL ADVICE (OUTPATIENT)
Dept: OBGYN | Facility: CLINIC | Age: 47
End: 2022-12-22

## 2022-12-22 ENCOUNTER — OFFICE VISIT (OUTPATIENT)
Dept: OBGYN | Facility: CLINIC | Age: 47
End: 2022-12-22
Payer: COMMERCIAL

## 2022-12-22 VITALS
HEART RATE: 112 BPM | SYSTOLIC BLOOD PRESSURE: 144 MMHG | WEIGHT: 179.4 LBS | TEMPERATURE: 98.4 F | BODY MASS INDEX: 29.85 KG/M2 | DIASTOLIC BLOOD PRESSURE: 90 MMHG

## 2022-12-22 DIAGNOSIS — B97.7 HIGH RISK HUMAN PAPILLOMA VIRUS (HPV) INFECTION OF CERVIX: ICD-10-CM

## 2022-12-22 DIAGNOSIS — N76.0 BACTERIAL VAGINOSIS: ICD-10-CM

## 2022-12-22 DIAGNOSIS — N76.1 CHRONIC VAGINITIS: ICD-10-CM

## 2022-12-22 DIAGNOSIS — B96.89 BACTERIAL VAGINOSIS: ICD-10-CM

## 2022-12-22 DIAGNOSIS — Z11.3 SCREEN FOR STD (SEXUALLY TRANSMITTED DISEASE): ICD-10-CM

## 2022-12-22 DIAGNOSIS — R19.8 RECTAL PRESSURE: ICD-10-CM

## 2022-12-22 DIAGNOSIS — N72 HIGH RISK HUMAN PAPILLOMA VIRUS (HPV) INFECTION OF CERVIX: ICD-10-CM

## 2022-12-22 DIAGNOSIS — Z30.011 ENCOUNTER FOR INITIAL PRESCRIPTION OF CONTRACEPTIVE PILLS: ICD-10-CM

## 2022-12-22 DIAGNOSIS — D25.9 UTERINE LEIOMYOMA, UNSPECIFIED LOCATION: Primary | ICD-10-CM

## 2022-12-22 PROCEDURE — 88175 CYTOPATH C/V AUTO FLUID REDO: CPT | Performed by: OBSTETRICS & GYNECOLOGY

## 2022-12-22 PROCEDURE — 87591 N.GONORRHOEAE DNA AMP PROB: CPT | Performed by: OBSTETRICS & GYNECOLOGY

## 2022-12-22 PROCEDURE — 87624 HPV HI-RISK TYP POOLED RSLT: CPT | Performed by: OBSTETRICS & GYNECOLOGY

## 2022-12-22 PROCEDURE — 87491 CHLMYD TRACH DNA AMP PROBE: CPT | Performed by: OBSTETRICS & GYNECOLOGY

## 2022-12-22 PROCEDURE — 87210 SMEAR WET MOUNT SALINE/INK: CPT | Performed by: OBSTETRICS & GYNECOLOGY

## 2022-12-22 PROCEDURE — 99215 OFFICE O/P EST HI 40 MIN: CPT | Performed by: OBSTETRICS & GYNECOLOGY

## 2022-12-22 RX ORDER — AMOXICILLIN 250 MG
1 CAPSULE ORAL DAILY
Qty: 60 TABLET | Refills: 3 | Status: SHIPPED | OUTPATIENT
Start: 2022-12-22 | End: 2023-05-11

## 2022-12-22 RX ORDER — L. ACIDOPHILUS/L. RHAMNOSUS 15B CELL
1 CAPSULE ORAL DAILY
Qty: 60 CAPSULE | Refills: 1 | Status: SHIPPED | OUTPATIENT
Start: 2022-12-22 | End: 2023-05-11

## 2022-12-22 RX ORDER — METRONIDAZOLE 7.5 MG/G
1 GEL VAGINAL DAILY
Qty: 70 G | Refills: 0 | Status: SHIPPED | OUTPATIENT
Start: 2022-12-22 | End: 2022-12-29

## 2022-12-22 RX ORDER — METRONIDAZOLE 500 MG/1
500 TABLET ORAL 2 TIMES DAILY
Qty: 14 TABLET | Refills: 0 | Status: SHIPPED | OUTPATIENT
Start: 2022-12-22 | End: 2022-12-29

## 2022-12-22 NOTE — PROGRESS NOTES
HPI:  Mimi Melton is a 47 year old female  here for a consultation regarding:    - needs pap and pelvic -- h/o HR HPV so needs yearly  - issues with oral contraceptive pill   - difficulty with defecation  - follow-up on known large fibroid  - vaginal discharge/odor has been a chronic problem with BV wants to be tested.   - std screen      Pap hx:  ECC, repeat pap and hpv all negative .   NIL paps: 2/10, 9/10, , . Plan pap in 3 yrs.  : NIL pap. Plan cotest pap & HPV in 3 years.  2015: NIL pap, + HPV (not 16 or 18). Plan cotest pap & HPV in 1 year  2016 Pap: NIL/neg HR HPV. Plan cotest in 3 years.  10/18/19 Pap: NIL, +HR HPV (not 16/18). Plan cotest in 1 year.  10/30/20: NIL Pap, + HR HPV (not 16 or 18). Plan [place] due bef 21.  20: [place] ECC Neg for dysplasia. Plan cotest in 1 year due 21. Provider [facility] the pt [facility] and [facility] Pt viewed.   21 NIL pap, +HR HPV (not 16/18). Per ASCCP guidelines, plan cotest in 1 year.     In 2022 her IUD was removed.  She was started on Micronor for contraception due to high blood pressure.  She was unable to go on combination OCPs.  I reviewed her menstrual pattern based on her period betty.  Looks like a normal monthly cycle.  Her cycles tend to be light and sometimes just dark brown spotting.  This is what she was concerned about.  Discussed that as women progressed toward the late 40s periods can become lighter as the natural process of hormones continues to drop.  We discussed alternative options for her including another IUD, Nexplanon, Depo-Provera.  She would like to stay on Micronor for the time being.  she does remember to take it daily.    She has a known uterine fibroid and has been followed in the past with approximately once a year ultrasound.  Most recent ultrasound suggested 4-1/2 cm fibroid.  She would like to have this assessed today.  Has also had trouble with passing  BM's and has to put her hand by her rectum to be able to pass the stools.   This is been going on for about a year.  She is scheduled for colonoscopy coming up soon, because of this problem.    Patient requesting STD screen today.  Her final concern is ongoing issues with chronic bacterial vaginosis.  She has a standing order from her primary care provider to come in as needed for wet preps.  She would like a wet prep today.  Has intermittent malodorous discharge sometimes accompanies the spotting on her menstrual cycle.             Past GYN history: see PAP history                           Past Medical History:   Diagnosis Date     Cervical high risk HPV (human papillomavirus) test positive 12/2015 12/2015, 10/2019, 10/30/20     Chronic sinusitis Summer 2016    I get congested every 3-4 weeks     Esophageal reflux      Exophthalmos, unspecified      Hypothyroidism      Motion sickness      PONV (postoperative nausea and vomiting)      Thyroid eye disease      Thyrotoxicosis without mention of goiter or other cause, without mention of thyrotoxic crisis or storm 03/2005    Had radioablation, On synthroid, seeing [facility]; takes synthroid     Unspecified essential hypertension 1980    meds since 2001, on atenolol       Past Surgical History:   Procedure Laterality Date     CYSTOSCOPY, SLING TRANSVAGINAL N/A 10/10/2017    Procedure: CYSTOSCOPY, SLING TRANSVAGINAL;  Midurethral Sling and Cystoscopy (Support the Urethra with Mesh Sling and Look in the Bladder);  Surgeon: Karin Amin MD;  Location:  OR     EYE SURGERY  6/6/2008    Orbital Decompression Dr smart      REPAIR INCISIONAL HERNIA,REDUCIBLE      Umbilical hernia Repair     ORBITOTOMY DECOMPRESSION Bilateral 11/5/2021    Procedure: Bilateral orbital decompression with  Sonopet, Bilateral lower lid retraction repair;  Surgeon: Niles Donnelly MD;  [place] [facility] [place]     REPAIR RETRACTION LID Bilateral 11/5/2021    Procedure: REPAIR,  RETRACTION, EYELID;  Surgeon: Niles Donnelly MD;  Location: [facility] OR     SONOPET EYE Bilateral 2021    Procedure: SONOPET EYE;  Surgeon: Niles Donnelly MD;  Location: [facility] OR     ZZC  DELIVERY ONLY  91    , Low Cervical     ZZC  DELIVERY ONLY  97    , Low Cervical     ZZC  DELIVERY ONLY  99    , Low Cervical     ZZC INDUCED ABORTN BY D&C      Aspiration & Curettage, TAB x2       Family History   Problem Relation Age of Onset     Hypertension Mother      Hypertension Father      Hypertension Maternal Grandmother      Hypertension Maternal Grandfather      Hypertension Paternal Grandmother      Hypertension Paternal Grandfather      Diabetes No family hx of      Glaucoma No family hx of      Macular Degeneration No family hx of          Medications:    Current Outpatient Medications:      ACETAMINOPHEN PO, Take 1,000 mg by mouth 2 times daily as needed for pain (menstrual cramps), Disp: , Rfl:      benzoyl peroxide (BENZOYL PEROXIDE WASH) 5 % external liquid, WASH FACE SKIN 1-2 TIMES DAILY, Disp: 142 mL, Rfl: 3     benzoyl peroxide 5 % external liquid, 2xdaily wash face, Disp: 148 mL, Rfl: 4     budesonide (PULMICORT) 0.5 MG/2ML neb solution, Add 1 vial to 240 ml of saline and rinse 2x daily, Disp: 360 mL, Rfl: 0     clindamycin (CLEOCIN T) 1 % external lotion, Apply topically 2 times daily, Disp: 60 mL, Rfl: 3     fexofenadine (ALLEGRA) 180 MG tablet, TAKE 1-2 TABLETS BY MOUTH AGAINST ALLERGIC REACTIONS, Disp: 30 tablet, Rfl: 3     fluticasone (FLONASE) 50 MCG/ACT nasal spray, SPRAY 1 SPRAY INTO BOTH NOSTRILS 2 TIMES DAILY, Disp: 48 mL, Rfl: 3     hydrochlorothiazide (HYDRODIURIL) 12.5 MG tablet, Take 1 tablet (12.5 mg) by mouth daily, Disp: 90 tablet, Rfl: 3     Lactobacillus (FLORAJEN WOMEN) CAPS, Take 1 Dose by mouth daily, Disp: 60 capsule, Rfl: 1     Lactobacillus-Inulin ([facility] DIGESTIVE [facility]) CAPS, Take 1 capsule by mouth 2 times  daily, Disp: 100 capsule, Rfl: 11     levothyroxine (SYNTHROID/LEVOTHROID) 100 MCG tablet, Take 1 tablet (100 mcg) by mouth daily, Disp: 90 tablet, Rfl: 3     losartan (COZAAR) 25 MG tablet, Take 1 tablet (25 mg) by mouth daily, Disp: 90 tablet, Rfl: 3     mupirocin (BACTROBAN) 2 % ointment, as needed, Disp: , Rfl:      norethindrone (MICRONOR) 0.35 MG tablet, Take 1 tablet (0.35 mg) by mouth daily, Disp: 84 tablet, Rfl: 0     senna-docusate (SENOKOT-S/PERICOLACE) 8.6-50 MG tablet, Take 1 tablet by mouth daily, Disp: 60 tablet, Rfl: 3     tretinoin (RETIN-A) 0.05 % external cream, Apply topically At Bedtime Put on face 1-2 times daily (if irritated reduce to every other day), Disp: 20 g, Rfl: 3     azelastine (ASTELIN) 0.1 % nasal spray, 2 sprays each nostril at bedtime (Patient not taking: Reported on 12/22/2022), Disp: 30 mL, Rfl: 11     erythromycin (ROMYCIN) 5 MG/GM ophthalmic ointment, Apply small amount to incision sites three times daily, then apply to inner lower lid of operative eye(s) at bedtime, as directed. (Patient not taking: Reported on 12/22/2022), Disp: 3.5 g, Rfl: 0     fluticasone-salmeterol (ADVAIR) 250-50 MCG/DOSE inhaler, Inhale 1 puff into the lungs every 12 hours (Patient not taking: Reported on 12/22/2022), Disp: 1 each, Rfl: 1     Lactobacillus (FLORAJEN WOMEN) CAPS, Take 1 Dose by mouth daily (Patient not taking: Reported on 12/22/2022), Disp: 30 capsule, Rfl: 1     neomycin-polymixin-dexamethasone (MAXITROL) 0.1 % ophthalmic suspension, Place one drop in both eyes four times a day for 10 days (Patient not taking: Reported on 12/22/2022), Disp: 5 mL, Rfl: 0     valACYclovir (VALTREX) 500 MG tablet, Take 1 tablet (500 mg) by mouth 2 times daily for 3 days, Disp: 6 tablet, Rfl: 0    Allergies:  Dust mites, Lisinopril, and Mold      EXAM:  BP (!) 137/94   Pulse 112   Temp 98.4  F (36.9  C) (Oral)   Wt 81.4 kg (179 lb 6.4 oz)   BMI 29.85 kg/m      General - pleasant female in no acute  distress.  Neurological - Alert and oriented  Psych:  normal mood and affect.  normal respiratory and cardiovascular effort   Breast - deferred.  Abdomen - soft, nontender, nondistended, no masses.  Musculoskeletal - no gross deformities.  Skin- no rashes seen   Pelvic:  EG - normal adult female.  BUS - within normal limits.  Vagina - well rugated, no discharge.  Cervix - no lesions, no CMT.  Uterus - firm, with a deep posterior fibroid which is exquisitely tender to palpation and compressing on the rectum  Adnexae - no masses or tenderness.  RV - deferred.    ASSESSMENT/PLAN:     (D25.9) Uterine leiomyoma, unspecified location  (primary encounter diagnosis)  Comment: On exam today the fibroid is exquisitely tender.  It is also concerning that it may be compressing on her rectum and causing difficulty with defecation.  We discussed this finding and I sent a message to her provider planning to perform a colonoscopy so they are aware of this finding.  Discussed that the tenderness could be related to a degenerating fibroid which would be unusual at her age.  We will assess the ultrasound to see if there is growth occurring with the fibroid.  Because it is disturbing her quality of life she may require surgery for removal of the uterus.  She does not feel comfortable with removing her uterus at this time but felt okay with having a myomectomy.  We will discuss that further once we get the results of her pelvic ultrasound.  Plan: US Pelvic Complete with Transvaginal    (Z11.3) Screen for STD (sexually transmitted disease)    Plan: per orders     (N76.1) Chronic vaginitis  Comment:   Plan: Wet prep - Clinic Collect, Lactobacillus         (FLORAJEN WOMEN) CAPS      (N72,  B97.7) High risk human papilloma virus (HPV) infection of cervix  Comment:   Plan: Pap diagnostic with HPV    (R19.8) Rectal pressure  Comment: Has an upcoming colonoscopy.  I sent a message to her provider indicating that she has a deep pelvic fibroid  "which may be compressing the rectum.  Just for FYI to him prior to the procedure.  We discussed the importance of maintaining soft stools to improve her ability to pass her bowel movements.  Plan: senna-docusate (SENOKOT-S/PERICOLACE) 8.6-50 MG        tablet            Orders Placed This Encounter   Procedures     US Pelvic Complete with Transvaginal      I spent a total of > 50 minutes reviewing records, reports, documentation and discussing with the patient on the date of encounter.   Her last, today was:  \" you spent a lot of time with me today, Doc. \"       Delaney Mccarty MD     "

## 2022-12-22 NOTE — Clinical Note
Mi Souza,  I am Mimi's gyn doc.  I did a pelvic exam today and she has a fibroid that is on the back wall of the uterus likely partially compressing her rectum.  You might note difficulty inserting the scope when you do her colonoscopy. This may also be contributing to her difficulty with passing her stools.  Delaney Mccarty MD

## 2022-12-23 ENCOUNTER — ANESTHESIA EVENT (OUTPATIENT)
Dept: SURGERY | Facility: AMBULATORY SURGERY CENTER | Age: 47
End: 2022-12-23
Payer: COMMERCIAL

## 2022-12-23 LAB
C TRACH DNA SPEC QL NAA+PROBE: NEGATIVE
N GONORRHOEA DNA SPEC QL NAA+PROBE: NEGATIVE

## 2022-12-23 RX ORDER — ACETAMINOPHEN AND CODEINE PHOSPHATE 120; 12 MG/5ML; MG/5ML
0.35 SOLUTION ORAL DAILY
Qty: 84 TABLET | Refills: 3 | Status: SHIPPED | OUTPATIENT
Start: 2022-12-23 | End: 2023-11-01

## 2022-12-26 ENCOUNTER — LAB (OUTPATIENT)
Dept: FAMILY MEDICINE | Facility: CLINIC | Age: 47
End: 2022-12-26
Attending: FAMILY MEDICINE
Payer: COMMERCIAL

## 2022-12-26 DIAGNOSIS — Z01.818 PRE-OPERATIVE GENERAL PHYSICAL EXAMINATION: ICD-10-CM

## 2022-12-26 LAB
BKR LAB AP GYN ADEQUACY: NORMAL
BKR LAB AP GYN INTERPRETATION: NORMAL
BKR LAB AP HPV REFLEX: NORMAL
BKR LAB AP PREVIOUS ABNORMAL: NORMAL
PATH REPORT.COMMENTS IMP SPEC: NORMAL
PATH REPORT.COMMENTS IMP SPEC: NORMAL
PATH REPORT.RELEVANT HX SPEC: NORMAL
SARS-COV-2 RNA RESP QL NAA+PROBE: NEGATIVE

## 2022-12-26 PROCEDURE — U0003 INFECTIOUS AGENT DETECTION BY NUCLEIC ACID (DNA OR RNA); SEVERE ACUTE RESPIRATORY SYNDROME CORONAVIRUS 2 (SARS-COV-2) (CORONAVIRUS DISEASE [COVID-19]), AMPLIFIED PROBE TECHNIQUE, MAKING USE OF HIGH THROUGHPUT TECHNOLOGIES AS DESCRIBED BY CMS-2020-01-R: HCPCS

## 2022-12-26 PROCEDURE — U0005 INFEC AGEN DETEC AMPLI PROBE: HCPCS

## 2022-12-27 ENCOUNTER — MYC MEDICAL ADVICE (OUTPATIENT)
Dept: FAMILY MEDICINE | Facility: CLINIC | Age: 47
End: 2022-12-27

## 2022-12-27 ENCOUNTER — HOSPITAL ENCOUNTER (OUTPATIENT)
Facility: AMBULATORY SURGERY CENTER | Age: 47
Discharge: HOME | End: 2022-12-27
Attending: COLON & RECTAL SURGERY
Payer: COMMERCIAL

## 2022-12-27 ENCOUNTER — ANESTHESIA (OUTPATIENT)
Dept: SURGERY | Facility: AMBULATORY SURGERY CENTER | Age: 47
End: 2022-12-27
Payer: COMMERCIAL

## 2022-12-27 VITALS
SYSTOLIC BLOOD PRESSURE: 105 MMHG | TEMPERATURE: 97 F | DIASTOLIC BLOOD PRESSURE: 65 MMHG | RESPIRATION RATE: 18 BRPM | HEART RATE: 76 BPM | WEIGHT: 181 LBS | OXYGEN SATURATION: 100 % | BODY MASS INDEX: 30.16 KG/M2 | HEIGHT: 65 IN

## 2022-12-27 DIAGNOSIS — R11.2 NAUSEA AND VOMITING, UNSPECIFIED VOMITING TYPE: Primary | ICD-10-CM

## 2022-12-27 LAB — COLONOSCOPY: NORMAL

## 2022-12-27 RX ORDER — ONDANSETRON 4 MG/1
4 TABLET, ORALLY DISINTEGRATING ORAL EVERY 30 MIN PRN
Status: DISCONTINUED | OUTPATIENT
Start: 2022-12-27 | End: 2022-12-28 | Stop reason: HOSPADM

## 2022-12-27 RX ORDER — ONDANSETRON 2 MG/ML
INJECTION INTRAMUSCULAR; INTRAVENOUS PRN
Status: DISCONTINUED | OUTPATIENT
Start: 2022-12-27 | End: 2022-12-27

## 2022-12-27 RX ORDER — LIDOCAINE HYDROCHLORIDE 10 MG/ML
INJECTION, SOLUTION INFILTRATION; PERINEURAL PRN
Status: DISCONTINUED | OUTPATIENT
Start: 2022-12-27 | End: 2022-12-27

## 2022-12-27 RX ORDER — SODIUM CHLORIDE, SODIUM LACTATE, POTASSIUM CHLORIDE, CALCIUM CHLORIDE 600; 310; 30; 20 MG/100ML; MG/100ML; MG/100ML; MG/100ML
INJECTION, SOLUTION INTRAVENOUS CONTINUOUS
Status: DISCONTINUED | OUTPATIENT
Start: 2022-12-27 | End: 2022-12-28 | Stop reason: HOSPADM

## 2022-12-27 RX ORDER — LIDOCAINE 40 MG/G
CREAM TOPICAL
Status: DISCONTINUED | OUTPATIENT
Start: 2022-12-27 | End: 2022-12-28 | Stop reason: HOSPADM

## 2022-12-27 RX ORDER — FENTANYL CITRATE 0.05 MG/ML
25 INJECTION, SOLUTION INTRAMUSCULAR; INTRAVENOUS
Status: DISCONTINUED | OUTPATIENT
Start: 2022-12-27 | End: 2022-12-28 | Stop reason: HOSPADM

## 2022-12-27 RX ORDER — MEPERIDINE HYDROCHLORIDE 25 MG/ML
12.5 INJECTION INTRAMUSCULAR; INTRAVENOUS; SUBCUTANEOUS
Status: DISCONTINUED | OUTPATIENT
Start: 2022-12-27 | End: 2022-12-28 | Stop reason: HOSPADM

## 2022-12-27 RX ORDER — ONDANSETRON 4 MG/1
4 TABLET, ORALLY DISINTEGRATING ORAL
Status: DISCONTINUED | OUTPATIENT
Start: 2022-12-27 | End: 2022-12-28 | Stop reason: HOSPADM

## 2022-12-27 RX ORDER — FENTANYL CITRATE 0.05 MG/ML
25 INJECTION, SOLUTION INTRAMUSCULAR; INTRAVENOUS EVERY 5 MIN PRN
Status: DISCONTINUED | OUTPATIENT
Start: 2022-12-27 | End: 2022-12-28 | Stop reason: HOSPADM

## 2022-12-27 RX ORDER — PROPOFOL 10 MG/ML
INJECTION, EMULSION INTRAVENOUS CONTINUOUS PRN
Status: DISCONTINUED | OUTPATIENT
Start: 2022-12-27 | End: 2022-12-27

## 2022-12-27 RX ORDER — PROPOFOL 10 MG/ML
INJECTION, EMULSION INTRAVENOUS PRN
Status: DISCONTINUED | OUTPATIENT
Start: 2022-12-27 | End: 2022-12-27

## 2022-12-27 RX ORDER — ONDANSETRON 2 MG/ML
4 INJECTION INTRAMUSCULAR; INTRAVENOUS EVERY 30 MIN PRN
Status: DISCONTINUED | OUTPATIENT
Start: 2022-12-27 | End: 2022-12-28 | Stop reason: HOSPADM

## 2022-12-27 RX ADMIN — PROPOFOL 30 MG: 10 INJECTION, EMULSION INTRAVENOUS at 07:12

## 2022-12-27 RX ADMIN — PROPOFOL 200 MCG/KG/MIN: 10 INJECTION, EMULSION INTRAVENOUS at 07:11

## 2022-12-27 RX ADMIN — LIDOCAINE HYDROCHLORIDE 50 MG: 10 INJECTION, SOLUTION INFILTRATION; PERINEURAL at 07:11

## 2022-12-27 RX ADMIN — PROPOFOL 20 MG: 10 INJECTION, EMULSION INTRAVENOUS at 07:19

## 2022-12-27 RX ADMIN — SODIUM CHLORIDE, SODIUM LACTATE, POTASSIUM CHLORIDE, CALCIUM CHLORIDE: 600; 310; 30; 20 INJECTION, SOLUTION INTRAVENOUS at 07:10

## 2022-12-27 RX ADMIN — PROPOFOL 30 MG: 10 INJECTION, EMULSION INTRAVENOUS at 07:14

## 2022-12-27 RX ADMIN — ONDANSETRON 4 MG: 2 INJECTION INTRAMUSCULAR; INTRAVENOUS at 07:26

## 2022-12-27 RX ADMIN — PROPOFOL 30 MG: 10 INJECTION, EMULSION INTRAVENOUS at 07:18

## 2022-12-27 NOTE — TELEPHONE ENCOUNTER
See RN response to patient's mychart message re:abd issues    CLEO DuranN RN  [facility]

## 2022-12-27 NOTE — ANESTHESIA POSTPROCEDURE EVALUATION
Patient: Mimi Melton    Procedure: Procedure(s):  COLONOSCOPY       Anesthesia Type:  MAC    Note:  Disposition: Outpatient   Postop Pain Control: Uneventful            Sign Out: Well controlled pain   PONV: No   Neuro/Psych: Uneventful            Sign Out: Acceptable/Baseline neuro status   Airway/Respiratory: Uneventful            Sign Out: Acceptable/Baseline resp. status   CV/Hemodynamics: Uneventful            Sign Out: Acceptable CV status; No obvious hypovolemia; No obvious fluid overload   Other NRE: NONE   DID A NON-ROUTINE EVENT OCCUR? No           Last vitals:  Vitals Value Taken Time   /65 12/27/22 0800   Temp 97  F (36.1  C) 12/27/22 0800   Pulse 85 12/27/22 0817   Resp 18 12/27/22 0800   SpO2 94 % 12/27/22 0817   Vitals shown include unvalidated device data.    Electronically Signed By: Mac Christianson MD  December 27, 2022  8:41 AM

## 2022-12-27 NOTE — ANESTHESIA PREPROCEDURE EVALUATION
Anesthesia Pre-Procedure Evaluation    Patient: Mimi Melton   MRN: [id number] : 1975        Procedure : Procedure(s):  COLONOSCOPY          Past Medical History:   Diagnosis Date     Cervical high risk HPV (human papillomavirus) test positive 2015, 10/2019, 10/30/20     Chronic sinusitis Summer 2016    I get congested every 3-4 weeks     Esophageal reflux      Exophthalmos, unspecified      Hypothyroidism      Motion sickness      PONV (postoperative nausea and vomiting)      Thyroid eye disease      Thyrotoxicosis without mention of goiter or other cause, without mention of thyrotoxic crisis or storm 2005    Had radioablation, On synthroid, seeing [facility]; takes synthroid     Unspecified essential hypertension 1980    meds since , on atenolol      Past Surgical History:   Procedure Laterality Date     CYSTOSCOPY, SLING TRANSVAGINAL N/A 10/10/2017    Procedure: CYSTOSCOPY, SLING TRANSVAGINAL;  Midurethral Sling and Cystoscopy (Support the Urethra with Mesh Sling and Look in the Bladder);  Surgeon: Karin Amin MD;  [facility]     EYE SURGERY  2008    Orbital Decompression Dr smart     HC REPAIR INCISIONAL HERNIA,REDUCIBLE      Umbilical hernia Repair     ORBITOTOMY DECOMPRESSION Bilateral 2021    Procedure: Bilateral orbital decompression with  Sonopet, Bilateral lower lid retraction repair;  Surgeon: Niles Donnelly MD;  Location: [facility] [place] [place]     REPAIR RETRACTION LID Bilateral 2021    Procedure: REPAIR, RETRACTION, EYELID;  Surgeon: Niles Donnelly MD;  Location: [facility] OR     SONOPET EYE Bilateral 2021    Procedure: SONOPET EYE;  Surgeon: Niles Donnelly MD;  Location: [facility] OR     ZZC  DELIVERY ONLY  91    , Low Cervical     ZZC  DELIVERY ONLY  97    , Low Cervical     ZZC  DELIVERY ONLY  99    , Low Cervical     ZZC INDUCED ABORTN BY D&C      Aspiration & Curettage, TAB x2       Allergies   Allergen Reactions     Dust Mites      Lisinopril Swelling     Swelling in lip     Mold      Cannot have any meds that contain mold.      Social History     Tobacco Use     Smoking status: Never     Smokeless tobacco: Never   Substance Use Topics     Alcohol use: Yes     Comment: 1-2x's/month if that.      Wt Readings from Last 1 Encounters:   12/27/22 82.1 kg (181 lb)        Anesthesia Evaluation   Pt has had prior anesthetic.     History of anesthetic complications  - motion sickness and PONV.      ROS/MED HX  ENT/Pulmonary:  - neg pulmonary ROS     Neurologic:  - neg neurologic ROS     Cardiovascular:     (+) hypertension-----    METS/Exercise Tolerance: >4 METS    Hematologic:     (+) anemia,     Musculoskeletal:  - neg musculoskeletal ROS     GI/Hepatic:     (+) GERD, Asymptomatic on medication,     Renal/Genitourinary:  - neg Renal ROS     Endo:     (+) thyroid problem, hypothyroidism, Obesity (BMI 30),     Psychiatric/Substance Use:       Infectious Disease:       Malignancy:       Other:            Physical Exam    Airway        Mallampati: II   TM distance: > 3 FB   Neck ROM: full   Mouth opening: > 3 cm    Respiratory Devices and Support         Dental  no notable dental history         Cardiovascular   cardiovascular exam normal          Pulmonary   pulmonary exam normal                OUTSIDE LABS:  CBC:   Lab Results   Component Value Date    WBC 7.0 12/11/2022    WBC 4.0 09/08/2022    HGB 12.9 12/11/2022    HGB 13.3 09/08/2022    HCT 38.7 12/11/2022    HCT 41.0 09/08/2022     12/11/2022     09/08/2022     BMP:   Lab Results   Component Value Date     12/11/2022     09/08/2022    POTASSIUM 3.6 12/11/2022    POTASSIUM 3.9 09/08/2022    CHLORIDE 99 12/11/2022    CHLORIDE 102 09/08/2022    CO2 23 12/11/2022    CO2 28 09/08/2022    BUN 9.5 12/11/2022    BUN 7.8 09/08/2022    CR 0.92 12/11/2022    CR 1.08 (H) 09/08/2022     (H) 12/11/2022    GLC 94 09/08/2022      COAGS:   Lab Results   Component Value Date    PTT 27 05/16/2013    INR 1.06 05/16/2013     POC:   Lab Results   Component Value Date    HCG Negative 11/05/2021     HEPATIC:   Lab Results   Component Value Date    ALBUMIN 4.3 12/11/2022    PROTTOTAL 7.8 12/11/2022    ALT 14 12/11/2022    AST 32 12/11/2022    ALKPHOS 37 12/11/2022    BILITOTAL 0.4 12/11/2022     OTHER:   Lab Results   Component Value Date    A1C 5.5 04/14/2022    DAXA 9.6 12/11/2022    PHOS 2.9 09/08/2022    MAG 1.6 (L) 12/11/2022    LIPASE 8 (L) 12/11/2022    TSH 1.18 08/25/2022    T4 1.19 10/18/2018    T3 685 (H) 05/10/2005    CRP <2.9 03/09/2017    SED 20 06/22/2005       Anesthesia Plan    ASA Status:  2   NPO Status:  NPO Appropriate    Anesthesia Type: MAC.              Consents    Anesthesia Plan(s) and associated risks, benefits, and realistic alternatives discussed. Questions answered and patient/representative(s) expressed understanding.     - Discussed: Risks, Benefits and Alternatives for BOTH SEDATION and the PROCEDURE were discussed     - Discussed with:  Patient      - Extended Intubation/Ventilatory Support Discussed: No.      - Patient is DNR/DNI Status: No    Use of blood products discussed: No .     Postoperative Care       PONV prophylaxis: Dexamethasone or Solumedrol, Ondansetron (or other 5HT-3)     Comments:    Other Comments: Propofol sedation            Mac Christianson MD

## 2022-12-27 NOTE — H&P
[facility]   History and Physical       History of Present Illness: 47 year old female w/ hx of nausea and vomitng, HTN, and hypothyroid here for screening colonoscopy.     Past Medical History:   Diagnosis Date     Cervical high risk HPV (human papillomavirus) test positive 2015, 10/2019, 10/30/20     Chronic sinusitis Summer 2016    I get congested every 3-4 weeks     Esophageal reflux      Exophthalmos, unspecified      Hypothyroidism      Motion sickness      PONV (postoperative nausea and vomiting)      Thyroid eye disease      Thyrotoxicosis without mention of goiter or other cause, without mention of thyrotoxic crisis or storm 2005    Had radioablation, On synthroid, seeing [facility]; takes synthroid     Unspecified essential hypertension 1980    meds since , on atenolol       Allergies   Allergen Reactions     Dust Mites      Lisinopril Swelling     Swelling in lip     Mold      Cannot have any meds that contain mold.       Past Surgical History:   Procedure Laterality Date     CYSTOSCOPY, SLING TRANSVAGINAL N/A 10/10/2017    Procedure: CYSTOSCOPY, SLING TRANSVAGINAL;  Midurethral Sling and Cystoscopy (Support the Urethra with Mesh Sling and Look in the Bladder);  Surgeon: Karin Amin MD;  Location:  OR     EYE SURGERY  2008    Orbital Decompression Dr smart      REPAIR INCISIONAL HERNIA,REDUCIBLE      Umbilical hernia Repair     ORBITOTOMY DECOMPRESSION Bilateral 2021    Procedure: Bilateral orbital decompression with  Sonopet, Bilateral lower lid retraction repair;  Surgeon: Niles Donnelly MD;  Location: [facility] OR     REPAIR RETRACTION LID Bilateral 2021    Procedure: REPAIR, RETRACTION, EYELID;  Surgeon: Niles Donnelly MD;  Location: [facility] OR     SONOPET EYE Bilateral 2021    Procedure: SONOPET EYE;  Surgeon: Niles Donnelly MD;  Location: [facility] OR     [facility]  DELIVERY ONLY  91    , Low Cervical      ZZC  DELIVERY ONLY  97    , Low Cervical     ZZC  DELIVERY ONLY  99    , Low Cervical     ZZC INDUCED ABORTN BY D&C      Aspiration & Curettage, TAB x2       Family History   Problem Relation Age of Onset     Hypertension Mother      Hypertension Father      Hypertension Maternal Grandmother      Hypertension Maternal Grandfather      Hypertension Paternal Grandmother      Hypertension Paternal Grandfather      Diabetes No family hx of      Glaucoma No family hx of      Macular Degeneration No family hx of        Social History     Socioeconomic History     Marital status: Single     Spouse name: Not on file     Number of children: Not on file     Years of education: Not on file     Highest education level: Not on file   Occupational History     Occupation: [employer]     Employer: [employer]   Tobacco Use     Smoking status: Never     Smokeless tobacco: Never   Substance and Sexual Activity     Alcohol use: Yes     Comment: 1-2x's/month if that.     Drug use: Yes     Types: Marijuana     Comment: 3x's/day     Sexual activity: Yes     Partners: Male     Birth control/protection: I.U.D.     Comment: Paraguard IUD,    Other Topics Concern     Parent/sibling w/ CABG, MI or angioplasty before 65F 55M? No   Social History Narrative    Caffeine intake/servings daily - 0-1, 12 oz of Mountain Dew    Calcium intake/servings daily - eats only cheese    Exercise None    Sunscreen used - Yes    Seatbelts used - Yes    Guns stored in the home - No    Self Breast Exam - No    Pap test up to date -  Yes, as of today    Eye exam up to date -  Yes    Dental exam up to date -  Yes    DEXA scan up to date -  Not Applicable    Flex Sig/Colonoscopy up to date -  Not Applicable    Mammography up to date -  Not Applicable    Immunizations reviewed and up to date - Yes, within the last 10 years    Abuse: Current or Past (Physical, Sexual or Emotional) - No     Do you feel safe in your environment - Yes    Do you cope well with stress - Yes    Do you suffer from insomnia - No    Last updated by: Anu Vinson MA 12/21/2011             Social Determinants of Health     Financial Resource Strain: Not on file   Food Insecurity: Not on file   Transportation Needs: Not on file   Physical Activity: Not on file   Stress: Not on file   Social Connections: Not on file   Intimate Partner Violence: Not on file   Housing Stability: Not on file       Current Outpatient Medications   Medication     ACETAMINOPHEN PO     benzoyl peroxide (BENZOYL PEROXIDE WASH) 5 % external liquid     benzoyl peroxide 5 % external liquid     budesonide (PULMICORT) 0.5 MG/2ML neb solution     fexofenadine (ALLEGRA) 180 MG tablet     fluticasone (FLONASE) 50 MCG/ACT nasal spray     hydrochlorothiazide (HYDRODIURIL) 12.5 MG tablet     Lactobacillus (FLORAJEN WOMEN) CAPS     levothyroxine (SYNTHROID/LEVOTHROID) 100 MCG tablet     losartan (COZAAR) 25 MG tablet     norethindrone (MICRONOR) 0.35 MG tablet     azelastine (ASTELIN) 0.1 % nasal spray     clindamycin (CLEOCIN T) 1 % external lotion     erythromycin (ROMYCIN) 5 MG/GM ophthalmic ointment     fluticasone-salmeterol (ADVAIR) 250-50 MCG/DOSE inhaler     Lactobacillus (FLORAJEN WOMEN) CAPS     Lactobacillus-Inulin ([facility] DIGESTIVE [facility]) CAPS     metroNIDAZOLE (FLAGYL) 500 MG tablet     metroNIDAZOLE (METROGEL) 0.75 % vaginal gel     mupirocin (BACTROBAN) 2 % ointment     neomycin-polymixin-dexamethasone (MAXITROL) 0.1 % ophthalmic suspension     senna-docusate (SENOKOT-S/PERICOLACE) 8.6-50 MG tablet     tretinoin (RETIN-A) 0.05 % external cream     valACYclovir (VALTREX) 500 MG tablet     Current Facility-Administered Medications   Medication     lactated ringers infusion     lidocaine (LMX4) kit     lidocaine 1 % 0.1-1 mL     PRE OP antibiotics NOT needed for this surgical procedure     sodium chloride (PF) 0.9% PF flush 3 mL     sodium chloride  (PF) 0.9% PF flush 3 mL       Review of Systems:   A full 10 point review of systems was taken and is negative aside from what is noted above in the HPI.    Consitutional / General: Denies wt changes, fevers, chills or sweats.  Skin: Denies rashes, bruising, pruritis, or bleeding tendencies.   ENT: Denies trauma, headache, hearing loss, tinnitus, dysphagia  Eyes: Denies double vision  Respiratory: Denies SOB, cough, sputum production or dyspnea on exertion.   Cardiovascular: Denies chest pain, palpitations, orthostatic hypotension  Hematology / Lymph: Denies hx of blood clots or pulmonary embolism  Gastrointestinal:  Denies loss of appetite, dysphagia, N/V, constipation or diarrhea.   Genitourinary: Denies dysuria, frequency, urgency, hesitancy, incontinence, nocturia, hematuria, difficulty starting or stopping their stream, hx of stones or hx of uti's.   Neurologic: Denies headache, LOC, memory loss, vertigo, syncope or seizures.   Musculoskeletal: Denies back pain, numbness, tingling or hx of back surgery  Psychological: alert and oriented    Intake/Output past 24 hrs:  No intake or output data in the 24 hours ending 12/27/22 0704    Physical Exam:  Temp:  [98.3  F (36.8  C)] 98.3  F (36.8  C)  Resp:  [16] 16  BP: (139)/(79) 139/79  SpO2:  [97 %] 97 %  General: NAD, alert, cooperative  Head: normocephalic, without abnormality / atraumatic  Respiratory: non-labored breathing, CTA-B  Cardiac: RRR +S1/S2  Abdomen: soft, non-tender, non-distended.  No guarding or rebound   Perianal/Rectal: deferred   Skin: no rashes or lesions  Musculoskeletal: moves all four extremities equally; no calf edema or tenderness  Psychological: alert and oriented, answers questions appropriately  Neurological: cranial nerves grossly intact    CBC RESULTS:   Recent Labs   Lab Test 12/11/22  1241   WBC 7.0   RBC 4.34   HGB 12.9   HCT 38.7   MCV 89   MCH 29.7   MCHC 33.3   RDW 12.6          Last Comprehensive Metabolic Panel:  Sodium    Date Value Ref Range Status   12/11/2022 138 136 - 145 mmol/L Final   08/20/2020 141 133 - 144 mmol/L Final     Potassium   Date Value Ref Range Status   12/11/2022 3.6 3.4 - 5.3 mmol/L Final   05/19/2022 3.9 3.5 - 5.0 mmol/L Final   08/20/2020 3.6 3.4 - 5.3 mmol/L Final     Chloride   Date Value Ref Range Status   12/11/2022 99 98 - 107 mmol/L Final   05/19/2022 102 98 - 107 mmol/L Final   08/20/2020 107 94 - 109 mmol/L Final     Carbon Dioxide   Date Value Ref Range Status   08/20/2020 27 20 - 32 mmol/L Final     Carbon Dioxide (CO2)   Date Value Ref Range Status   12/11/2022 23 22 - 29 mmol/L Final   05/19/2022 29 22 - 31 mmol/L Final     Anion Gap   Date Value Ref Range Status   12/11/2022 16 (H) 7 - 15 mmol/L Final   05/19/2022 9 5 - 18 mmol/L Final   08/20/2020 7 3 - 14 mmol/L Final     Glucose   Date Value Ref Range Status   12/11/2022 128 (H) 70 - 99 mg/dL Final   05/19/2022 90 70 - 125 mg/dL Final   08/20/2020 78 70 - 99 mg/dL Final     Urea Nitrogen   Date Value Ref Range Status   12/11/2022 9.5 6.0 - 20.0 mg/dL Final   05/19/2022 10 8 - 22 mg/dL Final   08/20/2020 13 7 - 30 mg/dL Final     Creatinine   Date Value Ref Range Status   12/11/2022 0.92 0.51 - 0.95 mg/dL Final   08/20/2020 0.94 0.52 - 1.04 mg/dL Final     GFR Estimate   Date Value Ref Range Status   12/11/2022 77 >60 mL/min/1.73m2 Final     Comment:     Effective December 21, 2021 eGFRcr in adults is calculated using the 2021 CKD-EPI creatinine equation which includes age and gender (Vanda dai al., NEJM, DOI: 10.1056/PAKKmm8982806)   08/20/2020 74 >60 mL/min/[1.73_m2] Final     Comment:     Non  GFR Calc  Starting 12/18/2018, serum creatinine based estimated GFR (eGFR) will be   calculated using the Chronic Kidney Disease Epidemiology Collaboration   (CKD-EPI) equation.       Calcium   Date Value Ref Range Status   12/11/2022 9.6 8.6 - 10.0 mg/dL Final   08/20/2020 9.1 8.5 - 10.1 mg/dL Final       Assessment: Mimi Melton  is a 47 year old female presenting for colonoscopy.     Plan: Colonoscopy under JONATHAN Souza MD, TIM  [facility]  Office: [phone]  12/27/2022 7:04 AM

## 2022-12-27 NOTE — ANESTHESIA CARE TRANSFER NOTE
Patient: Mimi Melton    Procedure: Procedure(s):  COLONOSCOPY       Diagnosis: Colon cancer screening [Z12.11]  Diagnosis Additional Information: No value filed.    Anesthesia Type:   MAC     Note:    Oropharynx: oropharynx clear of all foreign objects and spontaneously breathing  Level of Consciousness: drowsy  Oxygen Supplementation: room air    Independent Airway: airway patency satisfactory and stable  Dentition: dentition unchanged  Vital Signs Stable: post-procedure vital signs reviewed and stable  Report to RN Given: handoff report given  Patient transferred to: Phase II  Comments:     Handoff Report: Identifed the Patient, Identified the Reponsible Provider, Reviewed the pertinent medical history, Discussed the surgical course, Reviewed Intra-OP anesthesia mangement and issues during anesthesia, Set expectations for post-procedure period and Allowed opportunity for questions and acknowledgement of understanding      Vitals:  Vitals Value Taken Time   BP     Temp     Pulse     Resp     SpO2         Electronically Signed By: MARGA Pizarro CRNA  December 27, 2022  7:42 AM

## 2022-12-27 NOTE — TELEPHONE ENCOUNTER
Writer responded via [email or web address].    CLEO WrightN, RN-BC  [facility]

## 2022-12-27 NOTE — TELEPHONE ENCOUNTER
Dr Penny,    Pt asking for GI referral     She writes:    Yes it s related to the ER visit and  my recent colonoscopy. I threw up all the stuff for my colonoscopy so the dr could not get a clear view. I don t remember if I said anything to dr Olivares but I know something is wrong because I am throwing up too much and my stomach is sensitive to many things so I have to be very careful what I eat and that is not normal.

## 2022-12-28 ENCOUNTER — TELEPHONE (OUTPATIENT)
Dept: GASTROENTEROLOGY | Facility: CLINIC | Age: 47
End: 2022-12-28

## 2022-12-28 LAB
HUMAN PAPILLOMA VIRUS 16 DNA: NEGATIVE
HUMAN PAPILLOMA VIRUS 18 DNA: NEGATIVE
HUMAN PAPILLOMA VIRUS FINAL DIAGNOSIS: NORMAL
HUMAN PAPILLOMA VIRUS OTHER HR: NEGATIVE

## 2022-12-28 NOTE — TELEPHONE ENCOUNTER
Health Call Center    Phone Message    May a detailed message be left on voicemail: yes     Reason for Call: Appointment Intake    Referring Provider Name: Nanette Penny MD  Diagnosis and/or Symptoms: Nausea and vomiting, unspecified vomiting type [R11.2]    Per triage notes, patient is to be seen as a GI Urgent, but is currently scheduled on 03/17/2023. Current appointment is outside requested time frame. Please review for further follow up per scheduling guidelines. Thanks!     Action Taken: Message routed to:  [facility] ([facility] GI    Travel Screening: Not Applicable

## 2022-12-30 ENCOUNTER — ANCILLARY PROCEDURE (OUTPATIENT)
Dept: ULTRASOUND IMAGING | Facility: CLINIC | Age: 47
End: 2022-12-30
Attending: OBSTETRICS & GYNECOLOGY
Payer: COMMERCIAL

## 2022-12-30 DIAGNOSIS — D25.9 UTERINE LEIOMYOMA, UNSPECIFIED LOCATION: ICD-10-CM

## 2022-12-30 PROCEDURE — 76830 TRANSVAGINAL US NON-OB: CPT | Performed by: OBSTETRICS & GYNECOLOGY

## 2022-12-30 PROCEDURE — 76856 US EXAM PELVIC COMPLETE: CPT | Performed by: OBSTETRICS & GYNECOLOGY

## 2023-01-02 ENCOUNTER — PATIENT OUTREACH (OUTPATIENT)
Dept: OBGYN | Facility: CLINIC | Age: 48
End: 2023-01-02

## 2023-01-02 DIAGNOSIS — R87.810 CERVICAL HIGH RISK HPV (HUMAN PAPILLOMAVIRUS) TEST POSITIVE: Primary | ICD-10-CM

## 2023-01-04 ENCOUNTER — MYC MEDICAL ADVICE (OUTPATIENT)
Dept: OBGYN | Facility: CLINIC | Age: 48
End: 2023-01-04
Payer: COMMERCIAL

## 2023-01-04 NOTE — TELEPHONE ENCOUNTER
US from 1/11/21:  Impression:   The ovaries were visualized and no abnormalities were seen.  An IUD is located within the uterine cavity.  One left fundal fibroid as measured above.  Recommend clinical correlation.    US from 1/10/22:  Impression:   Uterus contains a fundal fibroid measuring 5.4cm.  It does distort the endometrium.  IUD is malpositioned in cervix/lower uterine segment.    Bilateral ovaries are WNL.    US from 12/30:  Impression:   The uterus was enlarged with left fundal fibroid measuring 5.3 x 5.0 x 4.2cm which is similar in size to study done 1/10/2022.  Endometrial thickness is normal 4.1mm.  Bilateral ovaries visualized and within normal limits.  Recommend clinical correlation.    I don't see a note regarding two fibroids?  Mycharted patient back to clarify.    Vicky De Leon, RN

## 2023-01-09 ENCOUNTER — HOSPITAL ENCOUNTER (OUTPATIENT)
Dept: BEHAVIORAL HEALTH | Facility: CLINIC | Age: 48
Discharge: HOME | End: 2023-01-09
Attending: FAMILY MEDICINE | Admitting: FAMILY MEDICINE
Payer: COMMERCIAL

## 2023-01-09 ENCOUNTER — LAB (OUTPATIENT)
Dept: LAB | Facility: CLINIC | Age: 48
End: 2023-01-09
Payer: COMMERCIAL

## 2023-01-09 ENCOUNTER — TELEPHONE (OUTPATIENT)
Dept: BEHAVIORAL HEALTH | Facility: CLINIC | Age: 48
End: 2023-01-09

## 2023-01-09 DIAGNOSIS — Z11.3 SCREEN FOR STD (SEXUALLY TRANSMITTED DISEASE): ICD-10-CM

## 2023-01-09 DIAGNOSIS — N89.8 VAGINAL DISCHARGE: ICD-10-CM

## 2023-01-09 LAB
CLUE CELLS: PRESENT
TRICHOMONAS, WET PREP: ABNORMAL
WBC'S/HIGH POWER FIELD, WET PREP: ABNORMAL
YEAST, WET PREP: PRESENT

## 2023-01-09 PROCEDURE — 90791 PSYCH DIAGNOSTIC EVALUATION: CPT | Mod: GT,95 | Performed by: COUNSELOR

## 2023-01-09 PROCEDURE — 87491 CHLMYD TRACH DNA AMP PROBE: CPT

## 2023-01-09 PROCEDURE — 87591 N.GONORRHOEAE DNA AMP PROB: CPT

## 2023-01-09 PROCEDURE — 87210 SMEAR WET MOUNT SALINE/INK: CPT

## 2023-01-09 ASSESSMENT — COLUMBIA-SUICIDE SEVERITY RATING SCALE - C-SSRS
6. HAVE YOU EVER DONE ANYTHING, STARTED TO DO ANYTHING, OR PREPARED TO DO ANYTHING TO END YOUR LIFE?: NO
4. HAVE YOU HAD THESE THOUGHTS AND HAD SOME INTENTION OF ACTING ON THEM?: NO
5. HAVE YOU STARTED TO WORK OUT OR WORKED OUT THE DETAILS OF HOW TO KILL YOURSELF? DO YOU INTEND TO CARRY OUT THIS PLAN?: NO
3. HAVE YOU BEEN THINKING ABOUT HOW YOU MIGHT KILL YOURSELF?: NO
2. HAVE YOU ACTUALLY HAD ANY THOUGHTS OF KILLING YOURSELF IN THE PAST MONTH?: NO
1. IN THE PAST MONTH, HAVE YOU WISHED YOU WERE DEAD OR WISHED YOU COULD GO TO SLEEP AND NOT WAKE UP?: NO

## 2023-01-09 ASSESSMENT — ANXIETY QUESTIONNAIRES
1. FEELING NERVOUS, ANXIOUS, OR ON EDGE: SEVERAL DAYS
7. FEELING AFRAID AS IF SOMETHING AWFUL MIGHT HAPPEN: SEVERAL DAYS
6. BECOMING EASILY ANNOYED OR IRRITABLE: NEARLY EVERY DAY
GAD7 TOTAL SCORE: 12
2. NOT BEING ABLE TO STOP OR CONTROL WORRYING: MORE THAN HALF THE DAYS
GAD7 TOTAL SCORE: 12
5. BEING SO RESTLESS THAT IT IS HARD TO SIT STILL: SEVERAL DAYS
3. WORRYING TOO MUCH ABOUT DIFFERENT THINGS: MORE THAN HALF THE DAYS
4. TROUBLE RELAXING: MORE THAN HALF THE DAYS

## 2023-01-09 ASSESSMENT — PATIENT HEALTH QUESTIONNAIRE - PHQ9: SUM OF ALL RESPONSES TO PHQ QUESTIONS 1-9: 5

## 2023-01-09 NOTE — TELEPHONE ENCOUNTER
Patient have a video appointment today at 8:30am with [facility]. Writer placed a call this morning to check in. Unable to leave a voicemail behind. Patient's voicemail box is not set up.

## 2023-01-09 NOTE — PATIENT INSTRUCTIONS
Date: Friday, 1/20/2023  Time: 9:00 am - 10:00 am  Provider: Katarina Rodriguez MS  Location: [facility], [address] [place] [address], [address], [place] [address]  Phone: [phone]  Type: Teletherapy    Patient Instructions  For Telehealth we will need your paperwork before you are seen. Email/fax/mail accepted. Website will give directions to us, necessary paperwork found at. [email or web address]
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
## 2023-01-10 LAB
C TRACH DNA SPEC QL NAA+PROBE: NEGATIVE
N GONORRHOEA DNA SPEC QL NAA+PROBE: NEGATIVE

## 2023-01-10 NOTE — PROGRESS NOTES
CORINE received and faxed to Provider: Katarina Rodriguez MS Location: [facility], [facility], [address] [place] [address], [address], [place] [address]

## 2023-01-10 NOTE — ADDENDUM NOTE
Encounter addended by: Star Plasencia [facility] on: 1/10/2023 12:05 PM   Actions taken: Clinical Note Signed

## 2023-01-11 ENCOUNTER — MYC MEDICAL ADVICE (OUTPATIENT)
Dept: FAMILY MEDICINE | Facility: CLINIC | Age: 48
End: 2023-01-11

## 2023-01-11 ENCOUNTER — VIRTUAL VISIT (OUTPATIENT)
Dept: GASTROENTEROLOGY | Facility: CLINIC | Age: 48
End: 2023-01-11
Attending: FAMILY MEDICINE
Payer: COMMERCIAL

## 2023-01-11 DIAGNOSIS — R11.2 NAUSEA AND VOMITING, UNSPECIFIED VOMITING TYPE: ICD-10-CM

## 2023-01-16 ENCOUNTER — TELEPHONE (OUTPATIENT)
Dept: FAMILY MEDICINE | Facility: CLINIC | Age: 48
End: 2023-01-16

## 2023-01-16 DIAGNOSIS — B96.89 BACTERIAL VAGINOSIS: ICD-10-CM

## 2023-01-16 DIAGNOSIS — N76.0 BACTERIAL VAGINOSIS: ICD-10-CM

## 2023-01-16 DIAGNOSIS — B37.31 CANDIDAL VULVOVAGINITIS: Primary | ICD-10-CM

## 2023-01-16 RX ORDER — FLUCONAZOLE 150 MG/1
150 TABLET ORAL ONCE
Qty: 1 TABLET | Refills: 0 | Status: SHIPPED | OUTPATIENT
Start: 2023-01-16 | End: 2023-01-16

## 2023-01-16 RX ORDER — METRONIDAZOLE 500 MG/1
500 TABLET ORAL 2 TIMES DAILY
Qty: 14 TABLET | Refills: 0 | Status: SHIPPED | OUTPATIENT
Start: 2023-01-16 | End: 2023-01-23

## 2023-01-18 ENCOUNTER — VIRTUAL VISIT (OUTPATIENT)
Dept: GASTROENTEROLOGY | Facility: CLINIC | Age: 48
End: 2023-01-18
Payer: COMMERCIAL

## 2023-01-18 ENCOUNTER — VIRTUAL VISIT (OUTPATIENT)
Dept: OBGYN | Facility: CLINIC | Age: 48
End: 2023-01-18
Payer: COMMERCIAL

## 2023-01-18 ENCOUNTER — MYC MEDICAL ADVICE (OUTPATIENT)
Dept: OBGYN | Facility: CLINIC | Age: 48
End: 2023-01-18

## 2023-01-18 VITALS — WEIGHT: 180 LBS | BODY MASS INDEX: 29.95 KG/M2

## 2023-01-18 DIAGNOSIS — R11.2 NAUSEA AND VOMITING, UNSPECIFIED VOMITING TYPE: Primary | ICD-10-CM

## 2023-01-18 DIAGNOSIS — Z53.9 ERRONEOUS ENCOUNTER--DISREGARD: Primary | ICD-10-CM

## 2023-01-18 DIAGNOSIS — R10.13 EPIGASTRIC PAIN: ICD-10-CM

## 2023-01-18 PROCEDURE — 99204 OFFICE O/P NEW MOD 45 MIN: CPT | Mod: 95 | Performed by: PHYSICIAN ASSISTANT

## 2023-01-18 ASSESSMENT — PAIN SCALES - GENERAL: PAINLEVEL: NO PAIN (0)

## 2023-01-18 NOTE — PROGRESS NOTES
GASTROENTEROLOGY NEW PATIENT VIDEO VISIT    CC/REFERRING MD:    Nanette Penny  No ref. provider found    REASON FOR CONSULTATION:   No ref. provider found for   Chief Complaint   Patient presents with     Video Visit       HISTORY OF PRESENT ILLNESS:    Mimi Melton is a 47 year old female with a past medical history significant for hypertension, hypothyroidism, seasonal allergies, and asthma who is being evaluated via a billable video visit for recurrent nausea and vomiting with associated abdominal discomfort.  Patient states that for several months now, she has been experiencing recurring episodes of nausea and vomiting postprandially.  There is also associated gastrointestinal discomfort as well.  Has not noticed an obvious pattern for this, though notes that triggers can include alcohol as well as other foods, such as dairy.  This is not always consistent, though.  She tends to eat a pretty bland diet of lean proteins to avoid symptoms.  There does not seem to be any associated dysphagia, regurgitation, or acid reflux.  She has not had any issues with bowel movements or bloody stool.  She is not currently using any medications for her symptoms.  She did have an ER visit recently due to the episodic nausea and vomiting.  Labs were reassuring, normal CBC, CMP, lipase.  She has not had upper endoscopy before.  She did have colonoscopy last month, had only fair prep, felt like she is having difficulty digesting food, 3-year follow-up was recommended.  No recent abdominal imaging to review.  History of  section, no other abdominal surgeries.  She drinks alcohol minimally, does smoke marijuana.  At recent ER visit, the ER physician did discuss potential for cyclic vomiting syndrome and cannabinoid hyperemesis with patient.    I have reviewed and updated the patient's Past Medical History, Social History, Family History and Medication List.    Exam:    General appearance:  Healthy appearing adult,  in no acute distress  Eyes:  Sclera anicteric, Pupils round and reactive to light  Ears, nose, mouth and throat:  No obvious external lesions of ears and nose.  Hearing intact  Neck:  Symmetric, No obvious external lesions  Respiratory:  Normal respiration, no use of accessory muscles   MSK:  No visual upper extremity, neck or facial muscle atrophy  ABD:  No visual abdominal distention, no audible borborygmi  Skin:  No rashes or jaundice   Psychiatric:  Oriented to person, place and time, Appropriate mood and affect.   Neurologic:  Peripheral muscle function and dexterity appear to be intact      PERTINENT STUDIES have been reviewed.    ASSESSMENT/PLAN:    Mimi Melton is a 47 year old female who presents for evaluation of recurrent episodes of postprandial nausea and vomiting with abdominal discomfort.  We spent some time discussing potential etiologies, which would include GERD, functional dyspepsia, EOE, peptic ulcer disease/gastritis, biliary colic, and cyclic vomiting syndrome/cannabinoid hyperemesis.  I think it would be reasonable to start with EGD as well as gastric emptying scan and upper abdominal ultrasound to evaluate for these conditions.  She has prescription for antiemetic from ER, has not needed to use it yet.  I did review that she can use this as needed.  We will plan for follow-up after these tests are completed to review results and discuss additional work-up and treatments as indicated.  Patient stated understanding of plan and we will follow-up with her after these tests.      Video-Visit Details    Video Visit Time: 14 minutes    Type of service:  Video Visit    Originating Location (pt. Location): Home    Distant Location (provider location):  Off-site    Platform used for Video Visit: Valeria Castro PA-C    RTC after testing    Thank you for this consultation.  It was a pleasure to participate in the care of this patient; please contact us with any further questions.  A total  of 30 minutes was spent with reviewing the chart, discussing with the patient, documentation and coordination of care.    This note was created with voice recognition software, and while reviewed for accuracy, typos may remain.     Murphy Castro PA-C  Division of Gastroenterology, Hepatology and Nutrition  [facility]  [phone]

## 2023-01-18 NOTE — PROGRESS NOTES
Mimi is a 47 year old who is being evaluated via a billable video visit.      How would you like to obtain your AVS? MyChart  If the video visit is dropped, the invitation should be resent by: Text to cell phone: [phone]  Will anyone else be joining your video visit? No

## 2023-01-18 NOTE — PROGRESS NOTES
Phone-Visit Details    Type of service:  Phone Visit    Originating Location (pt. Location): Home    Distant Location (provider location): On-site    Provider unable to call patient due to inpatient care responsibilities.  Appointment rescheduled.

## 2023-01-23 ENCOUNTER — HOSPITAL ENCOUNTER (OUTPATIENT)
Dept: ULTRASOUND IMAGING | Facility: HOSPITAL | Age: 48
Discharge: HOME | End: 2023-01-23
Attending: PHYSICIAN ASSISTANT | Admitting: PHYSICIAN ASSISTANT
Payer: COMMERCIAL

## 2023-01-23 DIAGNOSIS — R10.13 EPIGASTRIC PAIN: ICD-10-CM

## 2023-01-23 DIAGNOSIS — R11.2 NAUSEA AND VOMITING, UNSPECIFIED VOMITING TYPE: ICD-10-CM

## 2023-01-23 PROCEDURE — 76705 ECHO EXAM OF ABDOMEN: CPT

## 2023-01-23 NOTE — RESULT ENCOUNTER NOTE
Mi Le,    Your abdominal ultrasound is normal, no evidence of any issues with the gallbladder.    Please let me know if you have any questions.    Sincerely,  Murphy Castro PA-C

## 2023-01-25 ENCOUNTER — VIRTUAL VISIT (OUTPATIENT)
Dept: OBGYN | Facility: CLINIC | Age: 48
End: 2023-01-25
Payer: COMMERCIAL

## 2023-01-25 DIAGNOSIS — Z13.29 SCREENING FOR THYROID DISORDER: ICD-10-CM

## 2023-01-25 DIAGNOSIS — D25.9 UTERINE LEIOMYOMA, UNSPECIFIED LOCATION: ICD-10-CM

## 2023-01-25 DIAGNOSIS — E55.9 VITAMIN D DEFICIENCY: ICD-10-CM

## 2023-01-25 DIAGNOSIS — N76.1 CHRONIC VAGINITIS: Primary | ICD-10-CM

## 2023-01-25 PROCEDURE — 99214 OFFICE O/P EST MOD 30 MIN: CPT | Mod: 95 | Performed by: OBSTETRICS & GYNECOLOGY

## 2023-01-25 NOTE — PROGRESS NOTES
Telemedicine Visit: The patient's condition can be safely assessed and treated via synchronous audio and visual telemedicine encounter.      Reason for Telemedicine Visit: Patient has requested telehealth visit    Originating Site (Patient Location): Patient's home        Distant Location (provider location):  On-site    Consent:  The patient/guardian has verbally consented to: the potential risks and benefits of telemedicine (video visit) versus in person care; bill my insurance or make self-payment for services provided; and responsibility for payment of non-covered services.     Mode of Communication:  telephone encounter     Start time: 3:05 -3:28  Length of phone call 23 min     As the provider I attest to compliance with applicable laws and regulations related to telemedicine.    Mimi is a 47 year old who is being evaluated via a billable telephone visit.       What phone number would you like to be contacted at? home  How would you like to obtain your AVS? MyChart    Distant Location (provider location):  On-site    Assessment & Plan   Problem List Items Addressed This Visit    None  Visit Diagnoses     Chronic vaginitis    -  Primary    Relevant Medications    Boric Acid Vaginal 600 MG SUPP    Other Relevant Orders    Wet prep - Clinic Collect    Uterine leiomyoma, unspecified location        Vitamin D deficiency        Relevant Orders    Vitamin D Deficiency    Screening for thyroid disorder        Relevant Orders    TSH with free T4 reflex           Review of the result(s) of each unique test - wet preps, vitamin d level and tsh   Ordering of each unique test       30 minutes spent on the date of the encounter doing chart review, history and exam, documentation and further activities per the note       MEDICATIONS:   Orders Placed This Encounter   Medications     Boric Acid Vaginal 600 MG SUPP     Sig: Place 1 capsule vaginally At Bedtime for 14 days     Dispense:  30 suppository     Refill:  3           - Continue other medications without change       - will add in high dose vitamin D supplement if level is low  FURTHER TESTING:       - repeat pelvic  Ultrasound each January to monitor for fibroid growth  FUTURE APPOINTMENTS:       - Follow-up visit in December this yr with me for pap smear      Delaney Mccarty MD  [facility] Hospital[facility]ALIK Le is a 47 year old  presenting for the following health issues:  Follow Up (Re: Ultrasound.)    At the time of her last pelvic exam she was exquisitely tender on exam and the period came the next day.   Generally the uterus is not that painful.      [facility] Obstetrics and Gynecology  Referring Provider: Delaney Mccarty MD  Sonographer:  Milena Soni RDMS  Indication: Fibroids  LMP: No LMP recorded.     Gynecological Ultrasonography:   Uterus: retroverted. Contour is irregular w/ myomata: 1 Fundal 5.3 x 5.0 x 4.2 cm.  Size: 8.2 x 7.6 x 5.5 cm  Endometrium: Thickness Total 4.1 mm  Right Ovary: 3.5 x 1.8 x 1.4 cm. Wnl  Left Ovary: 2.9 x 2.2 x 1.9 cm. Wnl  Cul de Sac Free Fluid: No free fluid     Impression:   The uterus was enlarged with left fundal fibroid measuring 5.3 x 5.0 x 4.2cm which is similar in size to study done 1/10/2022.  Endometrial thickness is normal 4.1mm.  Bilateral ovaries visualized and within normal limits.  Recommend clinical correlation.     Periods are regular and not heavy.      Intermittently has trouble with having BM's sometimes has to press on the rectum to help the BM's      Plan to keep monitoring the fibroid on a yearly basis.    Wants to do q January.     Fibroid was 3.6 cm in 2013       HPI     Also struggling with recurrent vaginitis for so long.  Recently tried boric acid suppository.    Tried that for 2 nights in a row       Review of Systems   + vag discharge  No rectal or pelvic pain  No abnormal bleeding        Objective           Vitals:  No vitals were obtained today due to virtual  visit.    Physical Exam   healthy, alert and no distress  PSYCH: Alert and oriented times 3; coherent speech, normal   rate and volume, able to articulate logical thoughts, able   to abstract reason, no tangential thoughts, no hallucinations   or delusions  Her affect is normal  RESP: No cough, no audible wheezing, able to talk in full sentences  Remainder of exam unable to be completed due to telephone visits    Lab on 01/09/2023   Component Date Value Ref Range Status     Trichomonas 01/09/2023 Absent  Absent Final     Yeast 01/09/2023 Present (A)  Absent Final     Clue Cells 01/09/2023 Present (A)  Absent Final     WBCs/high power field 01/09/2023 1+ (A)  None Final     Neisseria gonorrhoeae 01/09/2023 Negative  Negative Final    Negative for N. gonorrhoeae rRNA by transcription mediated amplification. A negative result by transcription mediated amplification does not preclude the presence of C. trachomatis infection because results are dependent on proper and adequate collection, absence of inhibitors and sufficient rRNA to be detected.     Chlamydia trachomatis 01/09/2023 Negative  Negative Final    A negative result by transcription mediated amplification does not preclude the presence of C. trachomatis infection because results are dependent on proper and adequate collection, absence of inhibitors and sufficient rRNA to be detected.     US Abdomen Limited    Result Date: 1/23/2023  EXAM: US ABDOMEN LIMITED LOCATION: [facility] DATE/TIME: 1/23/2023 7:17 AM INDICATION:  Nausea and vomiting, unspecified vomiting type, Epigastric pain COMPARISON: Renal ultrasound, 9/19/2022 TECHNIQUE: Limited abdominal ultrasound. FINDINGS: GALLBLADDER: Normal. No gallstones, wall thickening, or pericholecystic fluid. Negative sonographic Meyer's sign. BILE DUCTS: No biliary dilatation. The common duct measures 5 mm. LIVER: Normal parenchyma with smooth contour. No focal mass. RIGHT KIDNEY: No  hydronephrosis. PANCREAS: The visualized portions are normal. No ascites.     IMPRESSION: 1.  Normal limited abdominal ultrasound.     Phone call duration: 23 minutes

## 2023-01-26 ENCOUNTER — HOSPITAL ENCOUNTER (OUTPATIENT)
Dept: NUCLEAR MEDICINE | Facility: HOSPITAL | Age: 48
Discharge: HOME | End: 2023-01-26
Attending: PHYSICIAN ASSISTANT
Payer: COMMERCIAL

## 2023-01-26 ENCOUNTER — LAB (OUTPATIENT)
Dept: LAB | Facility: CLINIC | Age: 48
End: 2023-01-26
Payer: COMMERCIAL

## 2023-01-26 DIAGNOSIS — R11.2 NAUSEA AND VOMITING, UNSPECIFIED VOMITING TYPE: ICD-10-CM

## 2023-01-26 DIAGNOSIS — R10.13 EPIGASTRIC PAIN: ICD-10-CM

## 2023-01-26 DIAGNOSIS — Z13.29 SCREENING FOR THYROID DISORDER: ICD-10-CM

## 2023-01-26 DIAGNOSIS — N76.1 CHRONIC VAGINITIS: ICD-10-CM

## 2023-01-26 DIAGNOSIS — E55.9 VITAMIN D DEFICIENCY: ICD-10-CM

## 2023-01-26 LAB
CLUE CELLS: PRESENT
DEPRECATED CALCIDIOL+CALCIFEROL SERPL-MC: 12 UG/L (ref 20–75)
TRICHOMONAS, WET PREP: ABNORMAL
TSH SERPL DL<=0.005 MIU/L-ACNC: 1.76 UIU/ML (ref 0.3–4.2)
WBC'S/HIGH POWER FIELD, WET PREP: ABNORMAL
YEAST, WET PREP: ABNORMAL

## 2023-01-26 PROCEDURE — A9541 TC99M SULFUR COLLOID: HCPCS | Performed by: PHYSICIAN ASSISTANT

## 2023-01-26 PROCEDURE — 84443 ASSAY THYROID STIM HORMONE: CPT

## 2023-01-26 PROCEDURE — 36415 COLL VENOUS BLD VENIPUNCTURE: CPT

## 2023-01-26 PROCEDURE — 78264 GASTRIC EMPTYING IMG STUDY: CPT

## 2023-01-26 PROCEDURE — 82306 VITAMIN D 25 HYDROXY: CPT

## 2023-01-26 PROCEDURE — 343N000001 HC RX 343: Performed by: PHYSICIAN ASSISTANT

## 2023-01-26 PROCEDURE — 87210 SMEAR WET MOUNT SALINE/INK: CPT

## 2023-01-26 RX ADMIN — Medication 1 MILLICURIE: at 07:24

## 2023-01-27 ENCOUNTER — MEDICAL CORRESPONDENCE (OUTPATIENT)
Dept: HEALTH INFORMATION MANAGEMENT | Facility: CLINIC | Age: 48
End: 2023-01-27

## 2023-01-27 ENCOUNTER — VIRTUAL VISIT (OUTPATIENT)
Dept: FAMILY MEDICINE | Facility: CLINIC | Age: 48
End: 2023-01-27
Payer: COMMERCIAL

## 2023-01-27 DIAGNOSIS — F32.9 MAJOR DEPRESSIVE DISORDER, REMISSION STATUS UNSPECIFIED, UNSPECIFIED WHETHER RECURRENT: ICD-10-CM

## 2023-01-27 DIAGNOSIS — B37.9 ANTIBIOTIC-INDUCED YEAST INFECTION: Primary | ICD-10-CM

## 2023-01-27 DIAGNOSIS — F43.10 PTSD (POST-TRAUMATIC STRESS DISORDER): ICD-10-CM

## 2023-01-27 DIAGNOSIS — T36.95XA ANTIBIOTIC-INDUCED YEAST INFECTION: Primary | ICD-10-CM

## 2023-01-27 DIAGNOSIS — Z02.89 ENCOUNTER FOR COMPLETION OF FORM WITH PATIENT: ICD-10-CM

## 2023-01-27 DIAGNOSIS — F41.1 GAD (GENERALIZED ANXIETY DISORDER): ICD-10-CM

## 2023-01-27 PROCEDURE — 99215 OFFICE O/P EST HI 40 MIN: CPT | Mod: VID | Performed by: FAMILY MEDICINE

## 2023-01-27 RX ORDER — FLUCONAZOLE 150 MG/1
150 TABLET ORAL
Qty: 2 TABLET | Refills: 0 | Status: SHIPPED | OUTPATIENT
Start: 2023-01-27 | End: 2023-01-31

## 2023-01-27 ASSESSMENT — ASTHMA QUESTIONNAIRES
QUESTION_4 LAST FOUR WEEKS HOW OFTEN HAVE YOU USED YOUR RESCUE INHALER OR NEBULIZER MEDICATION (SUCH AS ALBUTEROL): NOT AT ALL
ACT_TOTALSCORE: 25
QUESTION_3 LAST FOUR WEEKS HOW OFTEN DID YOUR ASTHMA SYMPTOMS (WHEEZING, COUGHING, SHORTNESS OF BREATH, CHEST TIGHTNESS OR PAIN) WAKE YOU UP AT NIGHT OR EARLIER THAN USUAL IN THE MORNING: NOT AT ALL
QUESTION_1 LAST FOUR WEEKS HOW MUCH OF THE TIME DID YOUR ASTHMA KEEP YOU FROM GETTING AS MUCH DONE AT WORK, SCHOOL OR AT HOME: NONE OF THE TIME
ACT_TOTALSCORE: 25
QUESTION_5 LAST FOUR WEEKS HOW WOULD YOU RATE YOUR ASTHMA CONTROL: COMPLETELY CONTROLLED
QUESTION_2 LAST FOUR WEEKS HOW OFTEN HAVE YOU HAD SHORTNESS OF BREATH: NOT AT ALL

## 2023-01-27 NOTE — PROGRESS NOTES
Mimi is a 47 year old who is being evaluated via a billable video visit.      How would you like to obtain your AVS? MyChart  If the video visit is dropped, the invitation should be resent by: Text to cell phone: [phone]  Will anyone else be joining your video visit? No          Assessment & Plan     Antibiotic-induced yeast infection  - fluconazole (DIFLUCAN) 150 MG tablet; Take 1 tablet (150 mg) by mouth every 3 days for 2 doses  Dispense: 2 tablet; Refill: 0    Encounter for completion of form with patient  Major depressive disorder, remission status unspecified, unspecified whether recurrent  BALWINDER (generalized anxiety disorder)  PTSD (post-traumatic stress disorder)  - Forms completed with patient and emailed to Mimi's email. Copy scanned into chart. Continue therapy. Continue to monitor.     40 minutes spent on the date of the encounter doing patient visit   Total time 54 minutes     Nanette Penny MD  [facility]    Dennis Le is a 47 year old, presenting for the following health issues:  No chief complaint on file.      HPI     She needs [facility] paperwork for mental joel  She has been suing [facility] since March 2020  Speech impairment 10//2022   She has stress and PTSD due to race based traumatic stress. Her work supervisor started berating her. Manager started doing those things and every time patient talks to her she get uncomfortable and she can't talk. Every time she talks, comments are that her voice is unprofessional along with her tone or she is intimidating.   Starting Dec 2022 - she told work she she doesn't feel comfortable or safe. She feels that she can't talk  She works in virtual and has been typing in the chat as she can't talk. She feels traumatized by her manager. She hasn't talked to her manager since 12/6/22. She is typing and shaking.   Work requested that she has work accommodations.   She is seeing therapist at [facility] and diagnosed  with PSTD, depression and anxiety.     Review of Systems         Objective           Vitals:  No vitals were obtained today due to virtual visit.    Physical Exam                   Video-Visit Details      Telephone 39 minutes

## 2023-01-30 NOTE — RESULT ENCOUNTER NOTE
Arnold Le,    The results of your gastric emptying scan are available for you to review.  This test was read as normal, meaning that there is no gastroparesis.  As we discussed during your visit, I recommend proceeding with upper endoscopy for further evaluation of your symptoms.  You can call our scheduling office at [phone], option 2, to schedule.    Please let me know if you have any questions.    Sincerely,  Murphy Castro PA-C

## 2023-02-03 ENCOUNTER — LAB (OUTPATIENT)
Dept: LAB | Facility: CLINIC | Age: 48
End: 2023-02-03
Payer: COMMERCIAL

## 2023-02-03 DIAGNOSIS — N89.8 VAGINAL DISCHARGE: ICD-10-CM

## 2023-02-03 LAB
CLUE CELLS: ABNORMAL
TRICHOMONAS, WET PREP: ABNORMAL
WBC'S/HIGH POWER FIELD, WET PREP: ABNORMAL
YEAST, WET PREP: PRESENT

## 2023-02-03 PROCEDURE — 87210 SMEAR WET MOUNT SALINE/INK: CPT

## 2023-02-07 RX ORDER — ERGOCALCIFEROL 1.25 MG/1
50000 CAPSULE, LIQUID FILLED ORAL WEEKLY
Qty: 12 CAPSULE | Refills: 0 | Status: SHIPPED | OUTPATIENT
Start: 2023-02-07 | End: 2023-05-11

## 2023-02-13 ENCOUNTER — OFFICE VISIT (OUTPATIENT)
Dept: OPHTHALMOLOGY | Facility: CLINIC | Age: 48
End: 2023-02-13
Payer: COMMERCIAL

## 2023-02-13 DIAGNOSIS — H02.539 EYELID RETRACTION, UNSPECIFIED LATERALITY: ICD-10-CM

## 2023-02-13 DIAGNOSIS — H57.89 THYROID EYE DISEASE: Primary | ICD-10-CM

## 2023-02-13 DIAGNOSIS — E07.9 THYROID EYE DISEASE: Primary | ICD-10-CM

## 2023-02-13 PROCEDURE — 92285 EXTERNAL OCULAR PHOTOGRAPHY: CPT | Mod: GC | Performed by: OPHTHALMOLOGY

## 2023-02-13 PROCEDURE — 99213 OFFICE O/P EST LOW 20 MIN: CPT | Mod: GC | Performed by: OPHTHALMOLOGY

## 2023-02-13 ASSESSMENT — CONF VISUAL FIELD
OS_SUPERIOR_TEMPORAL_RESTRICTION: 0
OD_SUPERIOR_TEMPORAL_RESTRICTION: 0
OD_INFERIOR_TEMPORAL_RESTRICTION: 0
METHOD: COUNTING FINGERS
OS_INFERIOR_TEMPORAL_RESTRICTION: 0
OD_NORMAL: 1
OS_SUPERIOR_NASAL_RESTRICTION: 0
OD_SUPERIOR_NASAL_RESTRICTION: 0
OS_INFERIOR_NASAL_RESTRICTION: 0
OD_INFERIOR_NASAL_RESTRICTION: 0
OS_NORMAL: 1

## 2023-02-13 ASSESSMENT — MARGIN REFLEX DISTANCE
OS_MRD1: 2
OD_MRD1: 2

## 2023-02-13 ASSESSMENT — VISUAL ACUITY
CORRECTION_TYPE: GLASSES
METHOD: SNELLEN - LINEAR
OD_CC+: -1
OS_CC+: -1
OD_CC: 20/20
OS_CC: 20/20

## 2023-02-13 ASSESSMENT — EXTERNAL EXAM - RIGHT EYE: OD_EXAM: EXOPHTHALMOS

## 2023-02-13 ASSESSMENT — LAGOPHTHALMOS
OD_LAGOPHTHALMOS: 0
OS_LAGOPHTHALMOS: 0

## 2023-02-13 ASSESSMENT — TONOMETRY
OD_IOP_MMHG: 17
IOP_METHOD: ICARE
OS_IOP_MMHG: 17

## 2023-02-13 ASSESSMENT — EXTERNAL EXAM - LEFT EYE: OS_EXAM: EXOPHTHALMOS

## 2023-02-13 NOTE — PROGRESS NOTES
Chief Complaints and History of Present Illnesses   Patient presents with     Follow Up     Pt here for 6 month follow up on S/p Bilateral lateral wall bone and fat orbital decompression with Sonopet and bilateral lower lid retraction repair DOS 11/5/2021     Chief Complaint(s) and History of Present Illness(es)     Follow Up    In both eyes. Additional comments: Pt here for 6 month follow up on S/p   Bilateral lateral wall bone and fat orbital decompression with Sonopet and   bilateral lower lid retraction repair DOS 11/5/2021           Comments    Pt notes decreased vision at near. No other concerns.   TRACY Graves on 2/13/2023 at 7:43 AM                      Assessment & Plan     Mimi Melton is a 47 year old female with the following diagnoses:   1. Thyroid eye disease    2. Eyelid retraction, unspecified laterality         S/p bilateral lateral wall bone and fat orbit decompression and bilateral lower lid retraction repair on 11/5/21  - TSI 8/8/22 3.8 (down-trending, prior was 4.6)  - measuring 1mm more proptosis each eye today, but TSI downtrending, patient happy with the results and not interested in more surgery  - pt bothered by ptosis RUL (notices more in pictures) - upneeq sample today  - follow up 6 months               Tila Logan MD  Oculoplastics Fellow      Attending Physician Attestation:  I have seen and examined this patient with the fellow .  I have confirmed and edited as necessary the chief complaint(s), history of present illness, review of systems, relevant history, and examination findings as documented by others.  I have personally reviewed the relevant tests, images, and reports as documented above.  I have confirmed and edited as necessary the assessment and plan and agree with this note.    - Niles Donnelly MD 9:18 AM 2/13/2023

## 2023-02-13 NOTE — NURSING NOTE
Chief Complaints and History of Present Illnesses   Patient presents with     Follow Up     Pt here for 6 month follow up on S/p Bilateral lateral wall bone and fat orbital decompression with Sonopet and bilateral lower lid retraction repair DOS 11/5/2021     Chief Complaint(s) and History of Present Illness(es)     Follow Up            Laterality: both eyes    Comments: Pt here for 6 month follow up on S/p Bilateral lateral wall bone and fat orbital decompression with Sonopet and bilateral lower lid retraction repair DOS 11/5/2021          Comments    Pt notes decreased vision at near. No other concerns.   TRACY Graves on 2/13/2023 at 7:43 AM

## 2023-02-14 ENCOUNTER — MYC MEDICAL ADVICE (OUTPATIENT)
Dept: OBGYN | Facility: CLINIC | Age: 48
End: 2023-02-14
Payer: COMMERCIAL

## 2023-02-14 NOTE — CONFIDENTIAL NOTE
Patient wondering if and when she should repeat her labs (vitamin D)    Last labs 1/26 - prescribed 1250 mcg for 12 weeks     Vicky De Leon RN

## 2023-02-15 DIAGNOSIS — I10 HYPERTENSION, ESSENTIAL: Primary | ICD-10-CM

## 2023-02-17 NOTE — TELEPHONE ENCOUNTER
please let her know: She should be taking the 50K unit dose once weekly for 12 wks.  Then she can go down to a 1000 international unit(s) dose daily.  She will not need to come back to get checked after that.  Her vitamin D will be normal after taking that.

## 2023-02-20 NOTE — LETTER
8/16/2017       RE: Mimi Melton  [address] [address]     Dear Colleague,    Thank you for referring your patient, Mimi Melton, to the [facility] UROLOGIC [facility]CERS at [facility]. Please see a copy of my visit note below.    Reason for Visit:  F/u on pelvital Study    Clinical Data:  Ms. Melton is a 41 y/o female with hx of stress urinary incontinence who decided to participate in the pelvital study.  She reports improvement in her over all incontinence.  She has been using less pads than before and is pleased with the results.  She uses 1 panty liner per day for incontinence.  She did not do it every day.  She was only doing it 2-3 days per week.  However when she did do it consecutively she found that she did not have any incontinence.  She reports not liking to insert something vaginally every day.     A/P:  41 y/o female in the pelvital study with some success overall and no adverse side effects but in discussing further options she would like to proceed with a midurethral sling. We discussed the use of mesh in surgery and the risks which include but are not limited to infection, bleeding, exposure of mesh, vaginal pain, injury to the bladder/urethra or any structures in the abdomen or pelvis, as well as risk of incontinence or urinary retention. There is also risk of need for catheterization and possible further surgery.   We discussed the FDA public health notification at length.  The pt. Verbalized understanding and had a chance to ask her questions which were all answered.  The patient would like to proceed with a midurethral sling.  -schedule midurethral sling.    Thank you for allowing me to participate in the care of  Ms. Mimi Melton and I will keep you updated on her progress.    Over 25 min spent with patient,  >50% in discussion and coordination of care.  Again, thank you for allowing me to participate in the  Active Topiramate refill request dated 2/18/23  Pending to provider.     RN, please refuse as this is a duplicate request.  Pt notified via [address] care of your patient.    Karin Amin MD

## 2023-02-23 ENCOUNTER — TELEPHONE (OUTPATIENT)
Dept: GASTROENTEROLOGY | Facility: CLINIC | Age: 48
End: 2023-02-23

## 2023-02-23 NOTE — TELEPHONE ENCOUNTER
"    Screening Questions  BLUE  KIND OF PREP RED  LOCATION [review exclusion criteria] GREEN  SEDATION TYPE        y Are you active on mychart?       Matthew Ordering/Referring Provider?        PO What type of coverage do you have?      n Have you had a positive covid test in the last 14 days?     30.9 1. BMI  [BMI 40+ - review exclusion criteria]    y  2. Are you able to give consent for your medical care? [IF NO,RN REVIEW]          n  3. Are you taking any prescription pain medications on a routine schedule   (ex narcotics: oxycodone, roxicodone, oxycontin,  and percocet)? [RN Review]        n  3a. EXTENDED PREP What kind of prescription?     n 4. Do you have any chemical dependencies such as alcohol, street drugs, or methadone?        **If yes 3- 5 , please schedule with MAC sedation.**          IF YES TO ANY 6 - 10 - HOSPITAL SETTING ONLY.     n 6.   Do you need assistance transferring?     n 7.   Have you had a heart or lung transplant?    n 8.   Are you currently on dialysis?   n 9.   Do you use daily home oxygen?   n 10. Do you take nitroglycerin?   10a. n If yes, how often?     11. [FEMALES]  n Are you currently pregnant?    11a. n If yes, how many weeks? [ Greater than 12 weeks, OR NEEDED]    n 12. Do you have Pulmonary Hypertension? *NEED PAC APPT AT [facility] w/ MAC*     n 13. [review exclusion criteria]  Do you have any implantable devices in your body (pacemaker, defib, LVAD)?    n 14. In the past 6 months, have you had any heart related issues including cardiomyopathy or heart attack?     14a. n If yes, did it require cardiac stenting if so when?     n 15. Have you had a stroke or Transient ischemic attack (TIA - aka  mini stroke ) within 6 months?      n 16. Do you have mod to severe Obstructive Sleep Apnea?  [Hospital only]    n 17. Do you have SEVERE AND UNCONTROLLED asthma? *NEED PAC APPT AT [facility] w/MAC*     n 18. Are you currently taking any blood thinners?     18a. If yes, inform patient to \"follow up w/ " "ordering provider for bridging instructions.\"    n 19. Do you take the medication Phentermine?    19a. If yes, \"Hold for 7 days before procedure.  Please consult your prescribing provider if you have questions about holding this medication.\"       20. Do you have chronic kidney disease?        21. Do you have a diagnosis of diabetes?       22. On a regular basis do you go 3-5 days between bowel movements?      23. Preferred LOCAL Pharmacy for Pre Prescription    [ LIST ONLY ONE PHARMACY]        [facility] [id number] [place] [facility] - [place], [place] - [address] [place], [place] [address]T PHARMACY [address] [place], [place] - [address] [id number] - [place] - [address] [facility] [address]  [facility][id number] - [place], [place] - [address] [facility] [facility] [place] - ONE [facility] AT PORTAL TO REGISTERED [facility] SITES  [facility] [id number] [place] [facility] - [place], [place] - [address] [address] [address]        - CLOSING REMINDERS -    Informed patient they will need an adult    Cannot take any type of public or medical transportation alone    Conscious Sedation- Needs  for 6 hours after the procedure       MAC/General-Needs  for 24 hours after procedure    Pre-Procedure Covid test to be completed [[facility] PCR Testing Required]    Confirmed Nurse will call to complete assessment       - SCHEDULING DETAILS -  n Hospital Setting Required? If yes, what is the exclusion?:    Corazon  Surgeon    3/30/23  Date of Procedure  Upper Endoscopy [EGD]  Type of Procedure Scheduled  [facility]        MAC Sedation Type     n PAC / Pre-op Required                 "

## 2023-03-14 ENCOUNTER — TELEPHONE (OUTPATIENT)
Dept: GASTROENTEROLOGY | Facility: CLINIC | Age: 48
End: 2023-03-14

## 2023-03-14 NOTE — TELEPHONE ENCOUNTER
Pre assessment questions completed for upcoming Upper endoscopy (EGD) procedure scheduled on 3.30.23    COVID policy reviewed.     Reviewed procedural arrival time 0845 and facility location [facility] [place]; [address] [place] [address], [address] Floor, [place] [address]    Designated  policy reviewed. Instructed to have someone stay 24 hours post procedure.     Anticoagulation/blood thinners? No    Electronic implanted devices? No    Diabetic? No    Reviewed EGD procedure prep instructions.     Patient verbalized understanding and had no questions or concerns at this time.    Tamra Greenwood RN  Endoscopy Procedure Pre Assessment RN

## 2023-03-24 ENCOUNTER — ANCILLARY PROCEDURE (OUTPATIENT)
Dept: MAMMOGRAPHY | Facility: CLINIC | Age: 48
End: 2023-03-24
Attending: FAMILY MEDICINE
Payer: COMMERCIAL

## 2023-03-24 DIAGNOSIS — Z12.31 VISIT FOR SCREENING MAMMOGRAM: ICD-10-CM

## 2023-03-24 PROCEDURE — 77063 BREAST TOMOSYNTHESIS BI: CPT | Mod: GC | Performed by: RADIOLOGY

## 2023-03-24 PROCEDURE — 77067 SCR MAMMO BI INCL CAD: CPT | Mod: GC | Performed by: RADIOLOGY

## 2023-03-28 ENCOUNTER — LAB (OUTPATIENT)
Dept: LAB | Facility: CLINIC | Age: 48
End: 2023-03-28
Payer: COMMERCIAL

## 2023-03-28 DIAGNOSIS — N89.8 VAGINAL DISCHARGE: ICD-10-CM

## 2023-03-28 PROCEDURE — 87210 SMEAR WET MOUNT SALINE/INK: CPT

## 2023-03-29 ENCOUNTER — ANCILLARY PROCEDURE (OUTPATIENT)
Dept: MAMMOGRAPHY | Facility: CLINIC | Age: 48
End: 2023-03-29
Attending: FAMILY MEDICINE
Payer: COMMERCIAL

## 2023-03-29 DIAGNOSIS — R92.8 ABNORMAL MAMMOGRAM OF LEFT BREAST: ICD-10-CM

## 2023-03-29 PROCEDURE — 77065 DX MAMMO INCL CAD UNI: CPT | Mod: LT | Performed by: RADIOLOGY

## 2023-03-29 PROCEDURE — 76642 ULTRASOUND BREAST LIMITED: CPT | Mod: LT | Performed by: RADIOLOGY

## 2023-03-29 PROCEDURE — G0279 TOMOSYNTHESIS, MAMMO: HCPCS | Performed by: RADIOLOGY

## 2023-03-30 ENCOUNTER — ANESTHESIA EVENT (OUTPATIENT)
Dept: SURGERY | Facility: AMBULATORY SURGERY CENTER | Age: 48
End: 2023-03-30
Payer: COMMERCIAL

## 2023-03-30 ENCOUNTER — HOSPITAL ENCOUNTER (OUTPATIENT)
Facility: AMBULATORY SURGERY CENTER | Age: 48
Discharge: HOME | End: 2023-03-30
Attending: STUDENT IN AN ORGANIZED HEALTH CARE EDUCATION/TRAINING PROGRAM
Payer: COMMERCIAL

## 2023-03-30 ENCOUNTER — ANESTHESIA (OUTPATIENT)
Dept: SURGERY | Facility: AMBULATORY SURGERY CENTER | Age: 48
End: 2023-03-30
Payer: COMMERCIAL

## 2023-03-30 VITALS
OXYGEN SATURATION: 99 % | WEIGHT: 180 LBS | HEIGHT: 65 IN | HEART RATE: 73 BPM | TEMPERATURE: 97.1 F | RESPIRATION RATE: 16 BRPM | DIASTOLIC BLOOD PRESSURE: 82 MMHG | BODY MASS INDEX: 29.99 KG/M2 | SYSTOLIC BLOOD PRESSURE: 121 MMHG

## 2023-03-30 VITALS — HEART RATE: 85 BPM

## 2023-03-30 LAB
HCG UR QL: NEGATIVE
INTERNAL QC OK POCT: NORMAL
POCT KIT EXPIRATION DATE: NORMAL
POCT KIT LOT NUMBER: NORMAL
UPPER GI ENDOSCOPY: NORMAL

## 2023-03-30 PROCEDURE — 81025 URINE PREGNANCY TEST: CPT | Performed by: PATHOLOGY

## 2023-03-30 PROCEDURE — 88305 TISSUE EXAM BY PATHOLOGIST: CPT | Mod: TC | Performed by: STUDENT IN AN ORGANIZED HEALTH CARE EDUCATION/TRAINING PROGRAM

## 2023-03-30 PROCEDURE — 43239 EGD BIOPSY SINGLE/MULTIPLE: CPT

## 2023-03-30 PROCEDURE — 88305 TISSUE EXAM BY PATHOLOGIST: CPT | Mod: 26 | Performed by: PATHOLOGY

## 2023-03-30 RX ORDER — GLYCOPYRROLATE 0.2 MG/ML
INJECTION, SOLUTION INTRAMUSCULAR; INTRAVENOUS PRN
Status: DISCONTINUED | OUTPATIENT
Start: 2023-03-30 | End: 2023-03-30

## 2023-03-30 RX ORDER — PROPOFOL 10 MG/ML
INJECTION, EMULSION INTRAVENOUS PRN
Status: DISCONTINUED | OUTPATIENT
Start: 2023-03-30 | End: 2023-03-30

## 2023-03-30 RX ORDER — ONDANSETRON 2 MG/ML
4 INJECTION INTRAMUSCULAR; INTRAVENOUS EVERY 6 HOURS PRN
Status: DISCONTINUED | OUTPATIENT
Start: 2023-03-30 | End: 2023-03-31 | Stop reason: HOSPADM

## 2023-03-30 RX ORDER — ONDANSETRON 2 MG/ML
4 INJECTION INTRAMUSCULAR; INTRAVENOUS
Status: DISCONTINUED | OUTPATIENT
Start: 2023-03-30 | End: 2023-03-30 | Stop reason: HOSPADM

## 2023-03-30 RX ORDER — ONDANSETRON 4 MG/1
4 TABLET, ORALLY DISINTEGRATING ORAL EVERY 6 HOURS PRN
Status: DISCONTINUED | OUTPATIENT
Start: 2023-03-30 | End: 2023-03-31 | Stop reason: HOSPADM

## 2023-03-30 RX ORDER — FLUMAZENIL 0.1 MG/ML
0.2 INJECTION, SOLUTION INTRAVENOUS
Status: ACTIVE | OUTPATIENT
Start: 2023-03-30 | End: 2023-03-30

## 2023-03-30 RX ORDER — NALOXONE HYDROCHLORIDE 0.4 MG/ML
0.4 INJECTION, SOLUTION INTRAMUSCULAR; INTRAVENOUS; SUBCUTANEOUS
Status: DISCONTINUED | OUTPATIENT
Start: 2023-03-30 | End: 2023-03-31 | Stop reason: HOSPADM

## 2023-03-30 RX ORDER — PROCHLORPERAZINE MALEATE 10 MG
10 TABLET ORAL EVERY 6 HOURS PRN
Status: DISCONTINUED | OUTPATIENT
Start: 2023-03-30 | End: 2023-03-31 | Stop reason: HOSPADM

## 2023-03-30 RX ORDER — LIDOCAINE 40 MG/G
CREAM TOPICAL
Status: DISCONTINUED | OUTPATIENT
Start: 2023-03-30 | End: 2023-03-30 | Stop reason: HOSPADM

## 2023-03-30 RX ORDER — NALOXONE HYDROCHLORIDE 0.4 MG/ML
0.2 INJECTION, SOLUTION INTRAMUSCULAR; INTRAVENOUS; SUBCUTANEOUS
Status: DISCONTINUED | OUTPATIENT
Start: 2023-03-30 | End: 2023-03-31 | Stop reason: HOSPADM

## 2023-03-30 RX ORDER — PROPOFOL 10 MG/ML
INJECTION, EMULSION INTRAVENOUS CONTINUOUS PRN
Status: DISCONTINUED | OUTPATIENT
Start: 2023-03-30 | End: 2023-03-30

## 2023-03-30 RX ORDER — SODIUM CHLORIDE, SODIUM LACTATE, POTASSIUM CHLORIDE, CALCIUM CHLORIDE 600; 310; 30; 20 MG/100ML; MG/100ML; MG/100ML; MG/100ML
INJECTION, SOLUTION INTRAVENOUS CONTINUOUS PRN
Status: DISCONTINUED | OUTPATIENT
Start: 2023-03-30 | End: 2023-03-30

## 2023-03-30 RX ORDER — LIDOCAINE HYDROCHLORIDE 20 MG/ML
INJECTION, SOLUTION INFILTRATION; PERINEURAL PRN
Status: DISCONTINUED | OUTPATIENT
Start: 2023-03-30 | End: 2023-03-30

## 2023-03-30 RX ADMIN — PROPOFOL 50 MG: 10 INJECTION, EMULSION INTRAVENOUS at 10:06

## 2023-03-30 RX ADMIN — LIDOCAINE HYDROCHLORIDE 80 MG: 20 INJECTION, SOLUTION INFILTRATION; PERINEURAL at 10:03

## 2023-03-30 RX ADMIN — PROPOFOL 50 MG: 10 INJECTION, EMULSION INTRAVENOUS at 10:09

## 2023-03-30 RX ADMIN — PROPOFOL 200 MCG/KG/MIN: 10 INJECTION, EMULSION INTRAVENOUS at 10:06

## 2023-03-30 RX ADMIN — PROPOFOL 30 MG: 10 INJECTION, EMULSION INTRAVENOUS at 10:13

## 2023-03-30 RX ADMIN — GLYCOPYRROLATE 0.2 MG: 0.2 INJECTION, SOLUTION INTRAMUSCULAR; INTRAVENOUS at 10:06

## 2023-03-30 RX ADMIN — PROPOFOL 20 MG: 10 INJECTION, EMULSION INTRAVENOUS at 10:11

## 2023-03-30 RX ADMIN — SODIUM CHLORIDE, SODIUM LACTATE, POTASSIUM CHLORIDE, CALCIUM CHLORIDE: 600; 310; 30; 20 INJECTION, SOLUTION INTRAVENOUS at 10:00

## 2023-03-30 NOTE — ANESTHESIA POSTPROCEDURE EVALUATION
Patient: Mimi Melton    Procedure: Procedure(s):  Esophagoscopy, gastroscopy, duodenoscopy (EGD), combined       Anesthesia Type:  MAC    Note:  Disposition: Outpatient   Postop Pain Control: Uneventful            Sign Out: Well controlled pain   PONV: No   Neuro/Psych: Uneventful            Sign Out: Acceptable/Baseline neuro status   Airway/Respiratory: Uneventful            Sign Out: Acceptable/Baseline resp. status   CV/Hemodynamics: Uneventful            Sign Out: Acceptable CV status; No obvious hypovolemia; No obvious fluid overload   Other NRE: NONE   DID A NON-ROUTINE EVENT OCCUR? No           Last vitals:  Vitals Value Taken Time   /82 03/30/23 1043   Temp 36.2  C (97.1  F) 03/30/23 1043   Pulse 73 03/30/23 1043   Resp 16 03/30/23 1043   SpO2 99 % 03/30/23 1043       Electronically Signed By: Lazaro Van MD, MD  March 30, 2023  11:48 AM

## 2023-03-30 NOTE — H&P
Mimi FLANAGAN Geronimo  [id number]  female  47 year old      Reason for procedure/surgery: EGD for nausea and vomiting      Patient Active Problem List   Diagnosis     Surveillance of previously prescribed intrauterine contraceptive device     Vaginitis and vulvovaginitis     Exophthalmos     Abnormal Pap smear of cervix     CARDIOVASCULAR SCREENING; LDL GOAL LESS THAN 160     Essential hypertension with goal blood pressure less than 140/90     Anemia     Hypothyroidism due to acquired atrophy of thyroid     Cervical high risk HPV (human papillomavirus) test positive     Thyroid disease     Female stress incontinence     Chronic seasonal allergic rhinitis, unspecified trigger     Need for HPV vaccine     Thyroid eye disease     Eyelid retraction, unspecified laterality       Past Surgical History:    Past Surgical History:   Procedure Laterality Date     COLONOSCOPY N/A 2022    Procedure: COLONOSCOPY;  Surgeon: Marah Souza MD;  Location: [facility]     CYSTOSCOPY, SLING TRANSVAGINAL N/A 10/10/2017    Procedure: CYSTOSCOPY, SLING TRANSVAGINAL;  Midurethral Sling and Cystoscopy (Support the Urethra with Mesh Sling and Look in the Bladder);  Surgeon: Karin Amin MD;  [facility]     EYE SURGERY  2008    Orbital Decompression Dr smart     ORBITOTOMY DECOMPRESSION Bilateral 2021    Procedure: Bilateral orbital decompression with  Sonopet, Bilateral lower lid retraction repair;  Surgeon: Niles Donnelly MD;  Location: [facility]     REPAIR RETRACTION LID Bilateral 2021    Procedure: REPAIR, RETRACTION, EYELID;  Surgeon: Niles Donnelly MD;  Location: [facility] OR     SONOPET EYE Bilateral 2021    Procedure: SONOPET EYE;  Surgeon: Niles Donnelly MD;  Location: [facility] OR     ZZC  DELIVERY ONLY  91    , Low Cervical     ZZC  DELIVERY ONLY  97    , Low Cervical     ZZC  DELIVERY ONLY  99    , Low Cervical     ZZC INDUCED  ABORTN BY D&C      Aspiration & Curettage, TAB x2     ZZHC REPAIR INCISIONAL HERNIA,REDUCIBLE      Umbilical hernia Repair       Past Medical History:   Past Medical History:   Diagnosis Date     Cervical high risk HPV (human papillomavirus) test positive 12/2015 12/2015, 10/2019, 10/30/20     Chronic sinusitis Summer 2016    I get congested every 3-4 weeks     Esophageal reflux      Exophthalmos, unspecified      Hypothyroidism      Motion sickness      PONV (postoperative nausea and vomiting)      Thyroid eye disease      Thyrotoxicosis without mention of goiter or other cause, without mention of thyrotoxic crisis or storm 03/2005    Had radioablation, On synthroid, [facility]; takes synthroid     Unspecified essential hypertension 1980    meds since 2001, on atenolol       Social History:   Social History     Tobacco Use     Smoking status: Never     Smokeless tobacco: Never   Substance Use Topics     Alcohol use: Yes     Comment: 1-2x's/month if that.       Family History:   Family History   Problem Relation Age of Onset     Hypertension Mother      Hypertension Father      Hypertension Maternal Grandmother      Hypertension Maternal Grandfather      Hypertension Paternal Grandmother      Hypertension Paternal Grandfather      Diabetes No family hx of      Glaucoma No family hx of      Macular Degeneration No family hx of        Allergies:   Allergies   Allergen Reactions     Dust Mites      Lisinopril Swelling     Swelling in lip     Mold      Cannot have any meds that contain mold.       Active Medications:   Current Outpatient Medications   Medication Sig Dispense Refill     ACETAMINOPHEN PO Take 1,000 mg by mouth 2 times daily as needed for pain (menstrual cramps)       azelastine (ASTELIN) 0.1 % nasal spray 2 sprays each nostril at bedtime 30 mL 11     benzoyl peroxide (BENZOYL PEROXIDE WASH) 5 % external liquid WASH FACE SKIN 1-2 TIMES DAILY 142 mL 3     benzoyl peroxide 5 % external liquid  2xdaily wash face 148 mL 4     budesonide (PULMICORT) 0.5 MG/2ML neb solution Add 1 vial to 240 ml of saline and rinse 2x daily 360 mL 0     clindamycin (CLEOCIN T) 1 % external lotion Apply topically 2 times daily 60 mL 3     erythromycin (ROMYCIN) 5 MG/GM ophthalmic ointment Apply small amount to incision sites three times daily, then apply to inner lower lid of operative eye(s) at bedtime, as directed. 3.5 g 0     fexofenadine (ALLEGRA) 180 MG tablet TAKE 1-2 TABLETS BY MOUTH AGAINST ALLERGIC REACTIONS 30 tablet 3     fluticasone (FLONASE) 50 MCG/ACT nasal spray SPRAY 1 SPRAY INTO BOTH NOSTRILS 2 TIMES DAILY 48 mL 3     fluticasone-salmeterol (ADVAIR) 250-50 MCG/DOSE inhaler Inhale 1 puff into the lungs every 12 hours 1 each 1     hydrochlorothiazide (HYDRODIURIL) 12.5 MG tablet Take 1 tablet (12.5 mg) by mouth daily 90 tablet 3     Lactobacillus (FLORAJEN WOMEN) CAPS Take 1 Dose by mouth daily 60 capsule 1     Lactobacillus (FLORAJEN WOMEN) CAPS Take 1 Dose by mouth daily 30 capsule 1     Lactobacillus-Inulin ([facility]) CAPS Take 1 capsule by mouth 2 times daily 100 capsule 11     levothyroxine (SYNTHROID/LEVOTHROID) 100 MCG tablet Take 1 tablet (100 mcg) by mouth daily 90 tablet 3     losartan (COZAAR) 25 MG tablet Take 1 tablet (25 mg) by mouth daily 90 tablet 3     mupirocin (BACTROBAN) 2 % ointment as needed       neomycin-polymixin-dexamethasone (MAXITROL) 0.1 % ophthalmic suspension Place one drop in both eyes four times a day for 10 days 5 mL 0     norethindrone (MICRONOR) 0.35 MG tablet Take 1 tablet (0.35 mg) by mouth daily 84 tablet 3     senna-docusate (SENOKOT-S/PERICOLACE) 8.6-50 MG tablet Take 1 tablet by mouth daily 60 tablet 3     tretinoin (RETIN-A) 0.05 % external cream Apply topically At Bedtime Put on face 1-2 times daily (if irritated reduce to every other day) 20 g 3     valACYclovir (VALTREX) 500 MG tablet Take 1 tablet (500 mg) by mouth 2 times daily for 3 days 6 tablet  "0     vitamin D2 (ERGOCALCIFEROL) 34882 units (1250 mcg) capsule Take 1 capsule (50,000 Units) by mouth once a week 12 capsule 0       Systemic Review:   CONSTITUTIONAL: NEGATIVE for fever, chills, change in weight  ENT/MOUTH: NEGATIVE for ear, mouth and throat problems  RESP: NEGATIVE for significant cough or SOB  CV: NEGATIVE for chest pain, palpitations or peripheral edema    Physical Examination:   Vital Signs: /86 (BP Location: Left arm)   Pulse 72   Temp 97.6  F (36.4  C) (Temporal)   Resp 16   Ht 1.651 m (5' 5\")   Wt 81.6 kg (180 lb)   SpO2 98%   BMI 29.95 kg/m    GENERAL: healthy, alert and no distress  NECK: no adenopathy, no asymmetry, masses, or scars  RESP: lungs clear to auscultation - no rales, rhonchi or wheezes  CV: regular rate and rhythm, normal S1 S2, no S3 or S4, no murmur, click or rub, no peripheral edema and peripheral pulses strong  ABDOMEN: soft, nontender, no hepatosplenomegaly, no masses and bowel sounds normal  MS: no gross musculoskeletal defects noted, no edema      Plan: Appropriate to proceed as scheduled.      Janette Chandra MD  3/30/2023    PCP:  Nanette Penny Y    "

## 2023-03-30 NOTE — ANESTHESIA CARE TRANSFER NOTE
Patient: Mimi Melton    Procedure: Procedure(s):  Esophagoscopy, gastroscopy, duodenoscopy (EGD), combined       Diagnosis: Nausea and vomiting, unspecified vomiting type [R11.2]  Epigastric pain [R10.13]  Diagnosis Additional Information: No value filed.    Anesthesia Type:   MAC     Note:    Oropharynx: spontaneously breathing  Level of Consciousness: awake  Oxygen Supplementation: room air    Independent Airway: airway patency satisfactory and stable  Dentition: dentition unchanged  Vital Signs Stable: post-procedure vital signs reviewed and stable  Report to RN Given: handoff report given  Patient transferred to: Phase II    Handoff Report: Identifed the Patient, Identified the Reponsible Provider, Reviewed the pertinent medical history, Discussed the surgical course, Reviewed Intra-OP anesthesia mangement and issues during anesthesia, Set expectations for post-procedure period and Allowed opportunity for questions and acknowledgement of understanding      Vitals:  Vitals Value Taken Time   /78    Temp 36.2  C (97.1  F) 03/30/23 1027   Pulse 88 03/30/23 1027   Resp 16 03/30/23 1027   SpO2 100 % 03/30/23 1027       Electronically Signed By: MARGA Wolf CRNA  March 30, 2023  10:29 AM

## 2023-03-30 NOTE — ANESTHESIA PREPROCEDURE EVALUATION
Anesthesia Pre-Procedure Evaluation    Patient: Mimi Melton   MRN: [id number] : 1975        Procedure : Procedure(s):  Esophagoscopy, gastroscopy, duodenoscopy (EGD), combined          Past Medical History:   Diagnosis Date     Cervical high risk HPV (human papillomavirus) test positive 2015, 10/2019, 10/30/20     Chronic sinusitis Summer 2016    I get congested every 3-4 weeks     Esophageal reflux      Exophthalmos, unspecified      Hypothyroidism      Motion sickness      PONV (postoperative nausea and vomiting)      Thyroid eye disease      Thyrotoxicosis without mention of goiter or other cause, without mention of thyrotoxic crisis or storm 2005    Had radioablation, On synthroid, seeing [facility]; takes synthroid     Unspecified essential hypertension 1980    meds since , on atenolol      Past Surgical History:   Procedure Laterality Date     COLONOSCOPY N/A 2022    Procedure: COLONOSCOPY;  Surgeon: Marah Souza MD;  Location: [facility]     CYSTOSCOPY, SLING TRANSVAGINAL N/A 10/10/2017    Procedure: CYSTOSCOPY, SLING TRANSVAGINAL;  Midurethral Sling and Cystoscopy (Support the Urethra with Mesh Sling and Look in the Bladder);  Surgeon: Karin Amin MD;  [facility]     EYE SURGERY  2008    Orbital Decompression Dr smart     ORBITOTOMY DECOMPRESSION Bilateral 2021    Procedure: Bilateral orbital decompression with  Sonopet, Bilateral lower lid retraction repair;  Surgeon: Niles Donnelly MD;  [place] [facility]     REPAIR RETRACTION LID Bilateral 2021    Procedure: REPAIR, RETRACTION, EYELID;  Surgeon: Niles Donnelly MD;  Location: [facility]     SONOPET EYE Bilateral 2021    Procedure: SONOPET EYE;  Surgeon: Niles Donnelly MD;  Location: [facility]     ZZC  DELIVERY ONLY  91    , Low Cervical     ZZC  DELIVERY ONLY  97    , Low Cervical     ZZC  DELIVERY ONLY  99     , Low Cervical     ZZC INDUCED ABORTN BY D&C      Aspiration & Curettage, TAB x2     ZZHC REPAIR INCISIONAL HERNIA,REDUCIBLE      Umbilical hernia Repair      Allergies   Allergen Reactions     Dust Mites      Lisinopril Swelling     Swelling in lip     Mold      Cannot have any meds that contain mold.      Social History     Tobacco Use     Smoking status: Never     Smokeless tobacco: Never   Substance Use Topics     Alcohol use: Yes     Comment: 1-2x's/month if that.      Wt Readings from Last 1 Encounters:   23 81.6 kg (180 lb)        Anesthesia Evaluation            ROS/MED HX  ENT/Pulmonary:  - neg pulmonary ROS     Neurologic:  - neg neurologic ROS     Cardiovascular:     (+) hypertension-----    METS/Exercise Tolerance:     Hematologic:  - neg hematologic  ROS     Musculoskeletal:  - neg musculoskeletal ROS     GI/Hepatic:     (+) GERD, Symptomatic,     Renal/Genitourinary:       Endo:     (+) thyroid problem, hypothyroidism,     Psychiatric/Substance Use:  - neg psychiatric ROS     Infectious Disease:  - neg infectious disease ROS     Malignancy:  - neg malignancy ROS     Other:               OUTSIDE LABS:  CBC:   Lab Results   Component Value Date    WBC 7.0 2022    WBC 4.0 2022    HGB 12.9 2022    HGB 13.3 2022    HCT 38.7 2022    HCT 41.0 2022     2022     2022     BMP:   Lab Results   Component Value Date     2022     2022    POTASSIUM 3.6 2022    POTASSIUM 3.9 2022    CHLORIDE 99 2022    CHLORIDE 102 2022    CO2 23 2022    CO2 28 2022    BUN 9.5 2022    BUN 7.8 2022    CR 0.92 2022    CR 1.08 (H) 2022     (H) 2022    GLC 94 2022     COAGS:   Lab Results   Component Value Date    PTT 27 2013    INR 1.06 2013     POC:   Lab Results   Component Value Date    HCG Negative 2021     HEPATIC:   Lab Results    Component Value Date    ALBUMIN 4.3 12/11/2022    PROTTOTAL 7.8 12/11/2022    ALT 14 12/11/2022    AST 32 12/11/2022    ALKPHOS 37 12/11/2022    BILITOTAL 0.4 12/11/2022     OTHER:   Lab Results   Component Value Date    A1C 5.5 04/14/2022    DAXA 9.6 12/11/2022    PHOS 2.9 09/08/2022    MAG 1.6 (L) 12/11/2022    LIPASE 8 (L) 12/11/2022    TSH 1.76 01/26/2023    T4 1.19 10/18/2018    T3 685 (H) 05/10/2005    CRP <2.9 03/09/2017    SED 20 06/22/2005       Anesthesia Plan    ASA Status:  2      Anesthesia Type: MAC.     - Reason for MAC: immobility needed, straight local not clinically adequate              Consents    Anesthesia Plan(s) and associated risks, benefits, and realistic alternatives discussed. Questions answered and patient/representative(s) expressed understanding.     - Discussed: Risks, Benefits and Alternatives for BOTH SEDATION and the PROCEDURE were discussed     - Discussed with:  Patient      - Extended Intubation/Ventilatory Support Discussed: No.      - Patient is DNR/DNI Status: No    Use of blood products discussed: No .     Postoperative Care    Pain management: IV analgesics, Oral pain medications.   PONV prophylaxis: Ondansetron (or other 5HT-3), Dexamethasone or Solumedrol     Comments:                Lazaro Van MD, MD

## 2023-03-31 LAB
PATH REPORT.COMMENTS IMP SPEC: NORMAL
PATH REPORT.COMMENTS IMP SPEC: NORMAL
PATH REPORT.FINAL DX SPEC: NORMAL
PATH REPORT.GROSS SPEC: NORMAL
PATH REPORT.MICROSCOPIC SPEC OTHER STN: NORMAL
PATH REPORT.RELEVANT HX SPEC: NORMAL
PHOTO IMAGE: NORMAL

## 2023-04-03 ENCOUNTER — MYC MEDICAL ADVICE (OUTPATIENT)
Dept: GASTROENTEROLOGY | Facility: CLINIC | Age: 48
End: 2023-04-03
Payer: COMMERCIAL

## 2023-04-03 DIAGNOSIS — K29.80 PEPTIC DUODENITIS: Primary | ICD-10-CM

## 2023-04-03 RX ORDER — OMEPRAZOLE 40 MG/1
40 CAPSULE, DELAYED RELEASE ORAL DAILY
Qty: 60 CAPSULE | Refills: 0 | Status: SHIPPED | OUTPATIENT
Start: 2023-04-03 | End: 2023-05-30

## 2023-04-03 NOTE — TELEPHONE ENCOUNTER
Replied to [email or web address] message that prescription was sent to requested pharmacy.     Domonique Caceres RN

## 2023-04-05 ENCOUNTER — ANCILLARY PROCEDURE (OUTPATIENT)
Dept: MAMMOGRAPHY | Facility: CLINIC | Age: 48
End: 2023-04-05
Attending: FAMILY MEDICINE
Payer: COMMERCIAL

## 2023-04-05 DIAGNOSIS — R92.8 ABNORMAL MAMMOGRAM OF LEFT BREAST: ICD-10-CM

## 2023-04-05 PROCEDURE — 19083 BX BREAST 1ST LESION US IMAG: CPT | Mod: LT | Performed by: RADIOLOGY

## 2023-04-05 PROCEDURE — 88305 TISSUE EXAM BY PATHOLOGIST: CPT | Mod: TC | Performed by: FAMILY MEDICINE

## 2023-04-05 PROCEDURE — 88305 TISSUE EXAM BY PATHOLOGIST: CPT | Mod: 26 | Performed by: PATHOLOGY

## 2023-04-07 ENCOUNTER — TELEPHONE (OUTPATIENT)
Dept: MAMMOGRAPHY | Facility: CLINIC | Age: 48
End: 2023-04-07
Payer: COMMERCIAL

## 2023-04-07 NOTE — TELEPHONE ENCOUNTER
Spoke with patient regarding left breast biopsy results, which indicate fibroadenoma. Notified patient that the radiologist's recommendation is annual screening mammography. Patient verbalized understanding of these results and all questions answered to her satisfaction.

## 2023-05-11 ENCOUNTER — LAB (OUTPATIENT)
Dept: LAB | Facility: CLINIC | Age: 48
End: 2023-05-11
Payer: COMMERCIAL

## 2023-05-11 ENCOUNTER — OFFICE VISIT (OUTPATIENT)
Dept: NEPHROLOGY | Facility: CLINIC | Age: 48
End: 2023-05-11
Attending: INTERNAL MEDICINE
Payer: COMMERCIAL

## 2023-05-11 VITALS
WEIGHT: 186.3 LBS | BODY MASS INDEX: 31 KG/M2 | SYSTOLIC BLOOD PRESSURE: 125 MMHG | OXYGEN SATURATION: 99 % | TEMPERATURE: 98.6 F | DIASTOLIC BLOOD PRESSURE: 84 MMHG | HEART RATE: 74 BPM

## 2023-05-11 DIAGNOSIS — N18.2 BENIGN HYPERTENSION WITH CKD (CHRONIC KIDNEY DISEASE), STAGE II: ICD-10-CM

## 2023-05-11 DIAGNOSIS — Z11.3 SCREEN FOR STD (SEXUALLY TRANSMITTED DISEASE): ICD-10-CM

## 2023-05-11 DIAGNOSIS — I10 HYPERTENSION, ESSENTIAL: ICD-10-CM

## 2023-05-11 DIAGNOSIS — I12.9 BENIGN HYPERTENSION WITH CKD (CHRONIC KIDNEY DISEASE), STAGE II: ICD-10-CM

## 2023-05-11 DIAGNOSIS — N18.2 BENIGN HYPERTENSION WITH CKD (CHRONIC KIDNEY DISEASE), STAGE II: Primary | ICD-10-CM

## 2023-05-11 DIAGNOSIS — I12.9 BENIGN HYPERTENSION WITH CKD (CHRONIC KIDNEY DISEASE), STAGE II: Primary | ICD-10-CM

## 2023-05-11 LAB
ALBUMIN MFR UR ELPH: 8.1 MG/DL (ref 1–14)
ALBUMIN SERPL BCG-MCNC: 4.3 G/DL (ref 3.5–5.2)
ALBUMIN UR-MCNC: NEGATIVE MG/DL
ANION GAP SERPL CALCULATED.3IONS-SCNC: 8 MMOL/L (ref 7–15)
APPEARANCE UR: CLEAR
BILIRUB UR QL STRIP: NEGATIVE
BUN SERPL-MCNC: 10.6 MG/DL (ref 6–20)
CALCIUM SERPL-MCNC: 9.7 MG/DL (ref 8.6–10)
CHLORIDE SERPL-SCNC: 102 MMOL/L (ref 98–107)
COLOR UR AUTO: ABNORMAL
CREAT SERPL-MCNC: 1.1 MG/DL (ref 0.51–0.95)
CREAT UR-MCNC: 203 MG/DL
CYSTATIN C (ROCHE): 0.8 MG/L (ref 0.6–1)
DEPRECATED HCO3 PLAS-SCNC: 29 MMOL/L (ref 22–29)
GFR SERPL CREATININE-BSD FRML MDRD: 62 ML/MIN/1.73M2
GFR SERPL CREATININE-BSD FRML MDRD: >90 ML/MIN/1.73M2
GLUCOSE SERPL-MCNC: 93 MG/DL (ref 70–99)
GLUCOSE UR STRIP-MCNC: NEGATIVE MG/DL
HGB BLD-MCNC: 12.8 G/DL (ref 11.7–15.7)
HGB UR QL STRIP: NEGATIVE
KETONES UR STRIP-MCNC: ABNORMAL MG/DL
LEUKOCYTE ESTERASE UR QL STRIP: NEGATIVE
MUCOUS THREADS #/AREA URNS LPF: PRESENT /LPF
NITRATE UR QL: NEGATIVE
PH UR STRIP: 6 [PH] (ref 5–7)
PHOSPHATE SERPL-MCNC: 3 MG/DL (ref 2.5–4.5)
POTASSIUM SERPL-SCNC: 3.4 MMOL/L (ref 3.4–5.3)
PROT/CREAT 24H UR: 0.04 MG/MG CR (ref 0–0.2)
RBC URINE: 2 /HPF
SODIUM SERPL-SCNC: 139 MMOL/L (ref 136–145)
SP GR UR STRIP: 1.02 (ref 1–1.03)
SQUAMOUS EPITHELIAL: 1 /HPF
UROBILINOGEN UR STRIP-MCNC: NORMAL MG/DL
WBC URINE: 1 /HPF

## 2023-05-11 PROCEDURE — 36415 COLL VENOUS BLD VENIPUNCTURE: CPT | Performed by: PATHOLOGY

## 2023-05-11 PROCEDURE — 85018 HEMOGLOBIN: CPT | Performed by: PATHOLOGY

## 2023-05-11 PROCEDURE — 99214 OFFICE O/P EST MOD 30 MIN: CPT | Performed by: INTERNAL MEDICINE

## 2023-05-11 PROCEDURE — G0463 HOSPITAL OUTPT CLINIC VISIT: HCPCS | Performed by: INTERNAL MEDICINE

## 2023-05-11 PROCEDURE — 81001 URINALYSIS AUTO W/SCOPE: CPT | Performed by: PATHOLOGY

## 2023-05-11 PROCEDURE — 99000 SPECIMEN HANDLING OFFICE-LAB: CPT | Performed by: PATHOLOGY

## 2023-05-11 PROCEDURE — 84156 ASSAY OF PROTEIN URINE: CPT | Performed by: PATHOLOGY

## 2023-05-11 PROCEDURE — 80069 RENAL FUNCTION PANEL: CPT | Performed by: PATHOLOGY

## 2023-05-11 PROCEDURE — 82610 CYSTATIN C: CPT | Mod: 90 | Performed by: PATHOLOGY

## 2023-05-11 ASSESSMENT — PAIN SCALES - GENERAL: PAINLEVEL: NO PAIN (0)

## 2023-05-11 NOTE — PROGRESS NOTES
Nephrology Outpatient Visit Note    Chief Complain/Reason for Visit  Hypertension. Abnormal kidney function.     History of Present Illness  This is a 47 year old female who presents for routine follow-up appointment.  Please see my initial consult note dated 9/8/2022 for details.  Briefly, the patient had abnormal kidney function going back to 2005 with serum creatinine measurements around 1 mg/dL. 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  She also has a history of chronic sinusitis, and esophageal reflux.    She completed her lab work this afternoon.  Some of the results are still pending.  However, her renal panel reveals a creatinine of 1.1 mg/dL which is her baseline.  Her electrolytes are normal.  Serum albumin is normal.  Hemoglobin is 12.8.    Overall she is doing well today.  She denies any acute health issues or symptoms.  However, she is concerned that her kidney function is slightly worse compared to her kidney function from last year.    The following portions of the patient's history were reviewed and updated as appropriate: allergies, current medications, past family history, past medical history, past social history, past surgical history and problem list.    Review of Systems  A comprehensive review of systems was performed. Pertinent positives and negatives are described above.    Social and Family History  Patient reports that she has never smoked. She has never used smokeless tobacco. She reports current alcohol use. She reports current drug use. Drug: Marijuana.  family history includes Hypertension in her father, maternal grandfather, maternal grandmother, mother, paternal grandfather, and paternal grandmother.    Vital Signs  Blood pressure 125/84, pulse 74, temperature 98.6  F (37  C), weight 84.5 kg (186 lb 4.8 oz), SpO2 99 %, not currently breastfeeding. Body mass index is 31 kg/m .    Physical  Examination  General:  Well appearing, well nourished in no distress.  Oriented x 3, normal mood and affect.  Head:  normocephalic, atraumatic    Eyes: conjunctiva clear, EOM intact, PERRL.   Throat:  No erythema, exudates or lesions.   Neck:  neck supple, no masses  Heart:  RRR, no murmur   Lungs:  CTA bilaterally, no wheezes, rhonchi, rales.  Breathing unlabored.   Abdomen:  Soft, NT/ND, no HSM, no masses.   Extremities: No deformities, clubbing, cyanosis, or edema.   Neurologic: A/O x 3. No focal deficits.     Objective   I reviewed pertinent laboratory workup and imaging findings.  Lab Results   Component Value Date    WBC 7.0 12/11/2022    HGB 12.8 05/11/2023    HCT 38.7 12/11/2022    MCV 89 12/11/2022     12/11/2022     05/11/2023    BUN 10.6 05/11/2023    ANIONGAP 8 05/11/2023    ALBUMIN 4.3 05/11/2023     Lab Results   Component Value Date    UROBILINOGEN 0.2 09/18/2021     Current Outpatient Medications   Medication     ACETAMINOPHEN PO     azelastine (ASTELIN) 0.1 % nasal spray     benzoyl peroxide (BENZOYL PEROXIDE WASH) 5 % external liquid     clindamycin (CLEOCIN T) 1 % external lotion     fexofenadine (ALLEGRA) 180 MG tablet     fluticasone (FLONASE) 50 MCG/ACT nasal spray     hydrochlorothiazide (HYDRODIURIL) 12.5 MG tablet     levothyroxine (SYNTHROID/LEVOTHROID) 100 MCG tablet     losartan (COZAAR) 25 MG tablet     mupirocin (BACTROBAN) 2 % ointment     norethindrone (MICRONOR) 0.35 MG tablet     omeprazole (PRILOSEC) 40 MG DR capsule     tretinoin (RETIN-A) 0.05 % external cream     valACYclovir (VALTREX) 500 MG tablet     No current facility-administered medications for this visit.     Assessment & Plan   Patient Active Problem List   Diagnosis     Surveillance of previously prescribed intrauterine contraceptive device     Vaginitis and vulvovaginitis     Exophthalmos     Abnormal Pap smear of cervix     CARDIOVASCULAR SCREENING; LDL GOAL LESS THAN 160     Essential hypertension with  [de-identified] : JEANE GUTIERREZ is a 33 year female who presents with right hip pain. An MRI of the patient's right hip was ordered to evaluate for labral tearing. The patient will follow up with Dr. Seals for MRI review as she has already failed nonoperative treatment for her right hip labral tear which was diagnosed on an MRI in 2019.   goal blood pressure less than 140/90     Anemia     Hypothyroidism due to acquired atrophy of thyroid     Cervical high risk HPV (human papillomavirus) test positive     Thyroid disease     Female stress incontinence     Chronic seasonal allergic rhinitis, unspecified trigger     Need for HPV vaccine     Thyroid eye disease     Eyelid retraction, unspecified laterality     Overall, the patient is doing well.  Her kidney function is mildly reduced.  I suspect this is the result of chronic hypertension as well as preeclampsia in her previous pregnancies.     Her blood pressure is well controlled.  Blood renin activity, serum aldosterone levels were checked last year and are normal.  She also had an echocardiogram that was reassuring. She is not a diabetic.  She does not have a family history of kidney problems.  I reassured the patient that overall, her kidney function is stable.  However, I think would be reasonable to obtain a serum Cystatin C with EGFR measurement.    My recommendations are the following:  -I added a serum Cystatin C with an EGFR measurement.  I also added a urinalysis.  I will let you know when I get the test results.  -Check your blood pressure at home. Goal blood pressure is 120-130/70-80. Call my office if your blood pressure readings are running higher than that.  - Return to Nephrology Clinic in 6 months to 1 year to review your progress.    Total time was of this encounter on the date of service was 30 minutes. All questions were answered to the patient's satisfaction.  Chart documentation was completed, in part, with [employer] voice-recognition software. Even though reviewed, some grammatical, spelling, and word errors may remain.    Orders placed today:  Orders Placed This Encounter   Procedures     Cystatin C with GFR

## 2023-05-11 NOTE — PATIENT INSTRUCTIONS
It was a pleasure taking care of you today.  I've included a brief summary of our discussion and care plan from today's visit below.  Please review this information with your primary care provider.    My recommendations are summarized as follows:  -I added a serum Cystatin C with an EGFR measurement.  I also added a urinalysis.  I will let you know when I get the test results.  -Check your blood pressure at home. Goal blood pressure is 120-130/70-80. Call my office if your blood pressure readings are running higher than that.  - Return to Nephrology Clinic in 6 months to 1 year to review your progress.    Who do I call with any questions after my visit?  Please be in touch if there are any further questions that arise following today's visit.  There are multiple ways to contact your nephrology care team.      During business hours, you may reach your Nephrology Care Team or schedule or reschedule an appointment or lab at [phone].      If you need to schedule imaging, please call [phone].   To schedule a COVID test, please call [phone].    You can always send a secure message through [employer]. [employer] messages are answered by your nurse or doctor typically within 24-48 hours. Please allow extra time on weekends and holidays.      For urgent/emergent questions after business hours, you may reach the on-call Nephrology Fellow by contacting the [facility]  at [phone].     How will I get the results of any tests ordered?    You will receive all of your results.  If you have signed up for [employer], any tests ordered at your visit will be available to you once resulted on [employer]. Typically the physician reviews them and may or may not make further recommendations. If there are urgent results that require a change in your care plan, your physician or nurse will call you to discuss the next steps. If you are not on [employer], a letter may be generated and mailed to you with your  results.      Sincerely,    Al Shen MD  [facility]  Division of Nephrology and Hypertension

## 2023-05-11 NOTE — NURSING NOTE
Chief Complaint   Patient presents with     RECHECK     Return visit.     Blood pressure 125/84, pulse 74, temperature 98.6  F (37  C), weight 84.5 kg (186 lb 4.8 oz), SpO2 99 %, not currently breastfeeding.    RONY CINTRON

## 2023-05-11 NOTE — LETTER
5/11/2023       RE: Mimi Melton  [address]  [facility] [address]     Dear Colleague,    Thank you for referring your patient, Mimi Melton, to the [facility] [place] at [facility]. Please see a copy of my visit note below.    Nephrology Outpatient Visit Note    Chief Complain/Reason for Visit  Hypertension. Abnormal kidney function.     History of Present Illness  This is a 47 year old female who presents for routine follow-up appointment.  Please see my initial consult note dated 9/8/2022 for details.  Briefly, the patient had abnormal kidney function going back to 2005 with serum creatinine measurements around 1 mg/dL. 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  She also has a history of chronic sinusitis, and esophageal reflux.    She completed her lab work this afternoon.  Some of the results are still pending.  However, her renal panel reveals a creatinine of 1.1 mg/dL which is her baseline.  Her electrolytes are normal.  Serum albumin is normal.  Hemoglobin is 12.8.    Overall she is doing well today.  She denies any acute health issues or symptoms.  However, she is concerned that her kidney function is slightly worse compared to her kidney function from last year.    The following portions of the patient's history were reviewed and updated as appropriate: allergies, current medications, past family history, past medical history, past social history, past surgical history and problem list.    Review of Systems  A comprehensive review of systems was performed. Pertinent positives and negatives are described above.    Social and Family History  Patient reports that she has never smoked. She has never used smokeless tobacco. She reports current alcohol use. She reports current drug use. Drug: Marijuana.  family history includes  Hypertension in her father, maternal grandfather, maternal grandmother, mother, paternal grandfather, and paternal grandmother.    Vital Signs  Blood pressure 125/84, pulse 74, temperature 98.6  F (37  C), weight 84.5 kg (186 lb 4.8 oz), SpO2 99 %, not currently breastfeeding. Body mass index is 31 kg/m .    Physical Examination  General:  Well appearing, well nourished in no distress.  Oriented x 3, normal mood and affect.  Head:  normocephalic, atraumatic    Eyes: conjunctiva clear, EOM intact, PERRL.   Throat:  No erythema, exudates or lesions.   Neck:  neck supple, no masses  Heart:  RRR, no murmur   Lungs:  CTA bilaterally, no wheezes, rhonchi, rales.  Breathing unlabored.   Abdomen:  Soft, NT/ND, no HSM, no masses.   Extremities: No deformities, clubbing, cyanosis, or edema.   Neurologic: A/O x 3. No focal deficits.     Objective   I reviewed pertinent laboratory workup and imaging findings.  Lab Results   Component Value Date    WBC 7.0 12/11/2022    HGB 12.8 05/11/2023    HCT 38.7 12/11/2022    MCV 89 12/11/2022     12/11/2022     05/11/2023    BUN 10.6 05/11/2023    ANIONGAP 8 05/11/2023    ALBUMIN 4.3 05/11/2023     Lab Results   Component Value Date    UROBILINOGEN 0.2 09/18/2021     Current Outpatient Medications   Medication    ACETAMINOPHEN PO    azelastine (ASTELIN) 0.1 % nasal spray    benzoyl peroxide (BENZOYL PEROXIDE WASH) 5 % external liquid    clindamycin (CLEOCIN T) 1 % external lotion    fexofenadine (ALLEGRA) 180 MG tablet    fluticasone (FLONASE) 50 MCG/ACT nasal spray    hydrochlorothiazide (HYDRODIURIL) 12.5 MG tablet    levothyroxine (SYNTHROID/LEVOTHROID) 100 MCG tablet    losartan (COZAAR) 25 MG tablet    mupirocin (BACTROBAN) 2 % ointment    norethindrone (MICRONOR) 0.35 MG tablet    omeprazole (PRILOSEC) 40 MG DR capsule    tretinoin (RETIN-A) 0.05 % external cream    valACYclovir (VALTREX) 500 MG tablet     No current facility-administered medications for this visit.      Assessment & Plan   Patient Active Problem List   Diagnosis    Surveillance of previously prescribed intrauterine contraceptive device    Vaginitis and vulvovaginitis    Exophthalmos    Abnormal Pap smear of cervix    CARDIOVASCULAR SCREENING; LDL GOAL LESS THAN 160    Essential hypertension with goal blood pressure less than 140/90    Anemia    Hypothyroidism due to acquired atrophy of thyroid    Cervical high risk HPV (human papillomavirus) test positive    Thyroid disease    Female stress incontinence    Chronic seasonal allergic rhinitis, unspecified trigger    Need for HPV vaccine    Thyroid eye disease    Eyelid retraction, unspecified laterality     Overall, the patient is doing well.  Her kidney function is mildly reduced.  I suspect this is the result of chronic hypertension as well as preeclampsia in her previous pregnancies.     Her blood pressure is well controlled.  Blood renin activity, serum aldosterone levels were checked last year and are normal.  She also had an echocardiogram that was reassuring. She is not a diabetic.  She does not have a family history of kidney problems.  I reassured the patient that overall, her kidney function is stable.  However, I think would be reasonable to obtain a serum Cystatin C with EGFR measurement.    My recommendations are the following:  -I added a serum Cystatin C with an EGFR measurement.  I also added a urinalysis.  I will let you know when I get the test results.  -Check your blood pressure at home. Goal blood pressure is 120-130/70-80. Call my office if your blood pressure readings are running higher than that.  - Return to Nephrology Clinic in 6 months to 1 year to review your progress.    Total time was of this encounter on the date of service was 30 minutes. All questions were answered to the patient's satisfaction.  Chart documentation was completed, in part, with [employer] voice-recognition software. Even though reviewed, some grammatical, spelling,  and word errors may remain.    Orders placed today:  Orders Placed This Encounter   Procedures    Cystatin C with GFR             Again, thank you for allowing me to participate in the care of your patient.      Sincerely,    Al Shen MD

## 2023-05-22 ENCOUNTER — OFFICE VISIT (OUTPATIENT)
Dept: OTOLARYNGOLOGY | Facility: CLINIC | Age: 48
End: 2023-05-22
Payer: COMMERCIAL

## 2023-05-22 DIAGNOSIS — H61.21 IMPACTED CERUMEN OF RIGHT EAR: ICD-10-CM

## 2023-05-22 DIAGNOSIS — H93.8X3 EAR FULLNESS, BILATERAL: Primary | ICD-10-CM

## 2023-05-22 DIAGNOSIS — J30.2 SEASONAL ALLERGIC RHINITIS, UNSPECIFIED TRIGGER: ICD-10-CM

## 2023-05-22 PROCEDURE — 69210 REMOVE IMPACTED EAR WAX UNI: CPT | Performed by: OTOLARYNGOLOGY

## 2023-05-22 PROCEDURE — 99213 OFFICE O/P EST LOW 20 MIN: CPT | Mod: 25 | Performed by: OTOLARYNGOLOGY

## 2023-05-22 RX ORDER — LORATADINE 10 MG/1
10 TABLET ORAL DAILY
COMMUNITY

## 2023-05-22 RX ORDER — CROMOLYN SODIUM 5.2 MG
AEROSOL, SPRAY WITH PUMP (ML) NASAL
Qty: 13 ML | Refills: 11 | Status: SHIPPED | OUTPATIENT
Start: 2023-05-22 | End: 2023-11-01

## 2023-05-22 NOTE — PROGRESS NOTES
CHIEF COMPLAINT:  Patient presents with:  Follow Up: Pt states she is doing better besides now her allergies are stating to bother her so she has been more congested but she is using Claritin daily and saline rinses every other day, she also stated her ears are filled with wax and would like these cleaned out today          HISTORY OF PRESENT ILLNESS    Mimi was seen in follow up after previous 11/21/2022 visit for recheck.  She is experience more allergy symptoms at present.  She gets good relief from her nasal saline rinses, especially when she uses then 2x daily.  Currently she is rinsing every other day.       My previous note    Encounter Diagnoses   Name Primary?     Nasal congestion Yes     Chronic rhinitis        RECOMMENDATIONS:     Continue on nasal irrigations, will stop budesonide rinses.      Return visit 6 months.             REVIEW OF SYSTEMS    Review of Systems: a 10-system review is reviewed at this encounter.  See scanned document.       Allergies   Allergen Reactions     Dust Mites      Lisinopril Swelling     Swelling in lip     Mold      Cannot have any meds that contain mold.           PHYSICAL EXAM:        HEAD: Normal appearance and symmetry:  No cutaneous lesions.      EARS:        RIGHT: external ears normal; canals clear; TM's normal; + cerumen   LEFT:   external ears normal; canals clear; TM's normal     CERUMEN IMPACTION REMOVAL    After obtaining verbal consent, and using the binocular microscope.  Cerumen impaction(s) were removed from the affected ear canal(s)  using a wire loops and/or suction.  The patient tolerated the procedure well without incident.         NOSE:    Dorsum:   Straight  Septum: midline  ITH:  3+ hypertrophy       ORAL CAVITY/OROPHARYNX:    Lips:  Normal.     NECK:  Trachea:  midline     NEURO:   Alert and Oriented    GAIT AND STATION:  normal     RESPIRATORY:   Symmetry and Respiratory effort    PSYCH:   normal mood and affect    SKIN:  warm and dry          IMPRESSION:   Encounter Diagnoses   Name Primary?     Ear fullness, bilateral Yes     Impacted cerumen of right ear      Seasonal allergic rhinitis, unspecified trigger             RECOMMENDATIONS:    Orders Placed This Encounter   Procedures     Remove Cerumen     Orders Placed This Encounter   Medications     loratadine (CLARITIN) 10 MG tablet     Sig: Take 10 mg by mouth daily     cromolyn sodium (NASALCROM) 5.2 MG/ACT nasal aerosol     Si sprays each nostril up to 3x daily as needed for nasal congstion     Dispense:  13 mL     Refill:  11     Return 6 months for nasal endosocopy  Continue regular saline rinses  Trial of nasalcrom as directed

## 2023-05-22 NOTE — LETTER
5/22/2023         RE: Mimi Melton  [address]  [facility] [address]        Dear Colleague,    Thank you for referring your patient, Mimi Melton, to the [facility]. Please see a copy of my visit note below.    CHIEF COMPLAINT:  Patient presents with:  Follow Up: Pt states she is doing better besides now her allergies are stating to bother her so she has been more congested but she is using Claritin daily and saline rinses every other day, she also stated her ears are filled with wax and would like these cleaned out today          HISTORY OF PRESENT ILLNESS    Mimi was seen in follow up after previous 11/21/2022 visit for recheck.  She is experience more allergy symptoms at present.  She gets good relief from her nasal saline rinses, especially when she uses then 2x daily.  Currently she is rinsing every other day.       My previous note    Encounter Diagnoses   Name Primary?     Nasal congestion Yes     Chronic rhinitis        RECOMMENDATIONS:     Continue on nasal irrigations, will stop budesonide rinses.      Return visit 6 months.             REVIEW OF SYSTEMS    Review of Systems: a 10-system review is reviewed at this encounter.  See scanned document.       Allergies   Allergen Reactions     Dust Mites      Lisinopril Swelling     Swelling in lip     Mold      Cannot have any meds that contain mold.           PHYSICAL EXAM:        HEAD: Normal appearance and symmetry:  No cutaneous lesions.      EARS:        RIGHT: external ears normal; canals clear; TM's normal; + cerumen   LEFT:   external ears normal; canals clear; TM's normal     CERUMEN IMPACTION REMOVAL    After obtaining verbal consent, and using the binocular microscope.  Cerumen impaction(s) were removed from the affected ear canal(s)  using a wire loops and/or suction.  The patient tolerated the procedure well without incident.         NOSE:    Dorsum:   Straight  Septum: midline  ITH:  3+  hypertrophy       ORAL CAVITY/OROPHARYNX:    Lips:  Normal.     NECK:  Trachea:  midline     NEURO:   Alert and Oriented    GAIT AND STATION:  normal     RESPIRATORY:   Symmetry and Respiratory effort    PSYCH:   normal mood and affect    SKIN:  warm and dry         IMPRESSION:   Encounter Diagnoses   Name Primary?     Ear fullness, bilateral Yes     Impacted cerumen of right ear      Seasonal allergic rhinitis, unspecified trigger             RECOMMENDATIONS:    Orders Placed This Encounter   Procedures     Remove Cerumen     Orders Placed This Encounter   Medications     loratadine (CLARITIN) 10 MG tablet     Sig: Take 10 mg by mouth daily     cromolyn sodium (NASALCROM) 5.2 MG/ACT nasal aerosol     Si sprays each nostril up to 3x daily as needed for nasal congstion     Dispense:  13 mL     Refill:  11     Return 6 months for nasal endosocopy  Continue regular saline rinses  Trial of nasalcrom as directed        Again, thank you for allowing me to participate in the care of your patient.        Sincerely,        Mariusz Sahu MD

## 2023-05-30 ENCOUNTER — VIRTUAL VISIT (OUTPATIENT)
Dept: GASTROENTEROLOGY | Facility: CLINIC | Age: 48
End: 2023-05-30
Payer: COMMERCIAL

## 2023-05-30 DIAGNOSIS — K29.80 PEPTIC DUODENITIS: ICD-10-CM

## 2023-05-30 PROCEDURE — 99213 OFFICE O/P EST LOW 20 MIN: CPT | Mod: VID | Performed by: PHYSICIAN ASSISTANT

## 2023-05-30 RX ORDER — OMEPRAZOLE 40 MG/1
40 CAPSULE, DELAYED RELEASE ORAL DAILY
Qty: 90 CAPSULE | Refills: 1 | Status: SHIPPED | OUTPATIENT
Start: 2023-05-30 | End: 2023-11-23

## 2023-05-30 ASSESSMENT — PAIN SCALES - GENERAL: PAINLEVEL: NO PAIN (0)

## 2023-05-30 NOTE — NURSING NOTE
Is the patient currently in the state of [place]? YES    Visit mode:VIDEO    If the visit is dropped, the patient can be reconnected by: VIDEO VISIT: Text to cell phone: [phone]    Will anyone else be joining the visit? NO      How would you like to obtain your AVS? MyChart    Are changes needed to the allergy or medication list? NO    Reason for visit: No chief complaint on file.

## 2023-05-30 NOTE — PROGRESS NOTES
GASTROENTEROLOGY Follow-up VIDEO VISIT    CC/REFERRING MD:    Nanette Penny  No ref. provider found    REASON FOR CONSULTATION:   No ref. provider found for   Chief Complaint   Patient presents with     RECHECK       HISTORY OF PRESENT ILLNESS:    Mimi Melton is a 47 year old female who is being evaluated via a billable video visit for follow-up of symptoms of recurrent nausea and vomiting and upper abdominal discomfort.  Her initial visit with me was in January of this year.  She had had an ER visit where cyclic vomiting syndrome/cannabinoid hyperemesis was suspected.  I sent her for some additional testing, which included abdominal ultrasound, gastric emptying scan, both of which were normal.  She ultimately had EGD that was notable for peptic duodenitis on pathology and some gastric atrophy.  I started her on a 2-month course of omeprazole, which she recently completed and states that most of her symptoms have resolved.  She has found that consumption of alcohol seems to worsen symptoms, so she is continuing to avoid this.  She otherwise feels comfortable continuing on omeprazole going forward.  She has not had any problems with nausea or vomiting since starting the omeprazole.      I have reviewed and updated the patient's Past Medical History, Social History, Family History and Medication List.    Exam:    General appearance:  Healthy appearing adult, in no acute distress  Eyes:  Sclera anicteric, Pupils round and reactive to light  Ears, nose, mouth and throat:  No obvious external lesions of ears and nose.  Hearing intact  Neck:  Symmetric, No obvious external lesions  Respiratory:  Normal respiration, no use of accessory muscles   MSK:  No visual upper extremity, neck or facial muscle atrophy  ABD:  No visual abdominal distention, no audible borborygmi  Skin:  No rashes or jaundice   Psychiatric:  Oriented to person, place and time, Appropriate mood and affect.   Neurologic:  Peripheral muscle  function and dexterity appear to be intact      PERTINENT STUDIES have been reviewed.    ASSESSMENT/PLAN:    Mimi Melton is a 47 year old female who presents for follow up of symptoms of recurrent nausea, vomiting, epigastric pain.  She has done well with continued use of 40 mg omeprazole daily and avoidance of alcohol.  I encouraged her to continue on this and we will reevaluate in 6 months.  At that point, we can try to taper off the omeprazole and replace with alternative antacid.  She was advised to follow-up with me sooner as needed.    1. Peptic duodenitis  - omeprazole (PRILOSEC) 40 MG DR capsule; Take 1 capsule (40 mg) by mouth daily  Dispense: 90 capsule; Refill: 1      Video-Visit Details    Video Visit Time: 9 minutes    Type of service:  Video Visit    Originating Location (pt. Location): Home    Distant Location (provider location):  On-site    Platform used for Video Visit: Salma    A total of 20 minutes was spent with reviewing the chart, discussing with the patient, documentation and coordination of care.    Murphy Castro PA-C  Division of Gastroenterology, Hepatology, and Nutrition  [facility]  [phone]    RTC 6 months

## 2023-06-01 ENCOUNTER — TELEPHONE (OUTPATIENT)
Dept: GASTROENTEROLOGY | Facility: CLINIC | Age: 48
End: 2023-06-01
Payer: COMMERCIAL

## 2023-06-05 ENCOUNTER — NURSE TRIAGE (OUTPATIENT)
Dept: FAMILY MEDICINE | Facility: CLINIC | Age: 48
End: 2023-06-05
Payer: COMMERCIAL

## 2023-06-05 NOTE — TELEPHONE ENCOUNTER
S-(situation): heel of foot is painful. Hard to bear wt.  On 6/3/23- pt walked 3 miles and was then on feet all day in flip flops.  Pain started on 6/4/23  B-(background): see above    A-(assessment): no blister, no redness,some swelling. Can't bear wt.  Heal and ankle pain.    R-(recommendations): Be seen today. Pt wanting a foot xray.  Pt will try to be seen in the [facility] today,.If no appts, pt should be seen in [facility]. Pt agreed with this plan.  Transferred call to .    Reason for Disposition    SEVERE pain (e.g., excruciating, unable to do any normal activities)    Additional Information    Negative: Entire foot is cool or blue in comparison to other foot    Negative: Purple or black skin on foot or toe    Negative: Red area or streak and fever    Negative: Swollen foot and fever    Negative: Patient sounds very sick or weak to the triager    Protocols used: FOOT PAIN-A-JUAN LUIS Mtz RN

## 2023-06-19 ENCOUNTER — LAB (OUTPATIENT)
Dept: LAB | Facility: CLINIC | Age: 48
End: 2023-06-19
Payer: COMMERCIAL

## 2023-06-19 DIAGNOSIS — N89.8 VAGINAL DISCHARGE: ICD-10-CM

## 2023-06-19 PROCEDURE — 87210 SMEAR WET MOUNT SALINE/INK: CPT

## 2023-06-20 ENCOUNTER — MYC MEDICAL ADVICE (OUTPATIENT)
Dept: FAMILY MEDICINE | Facility: CLINIC | Age: 48
End: 2023-06-20
Payer: COMMERCIAL

## 2023-06-20 DIAGNOSIS — B37.9 ANTIBIOTIC-INDUCED YEAST INFECTION: ICD-10-CM

## 2023-06-20 DIAGNOSIS — N76.1 CHRONIC VAGINITIS: ICD-10-CM

## 2023-06-20 DIAGNOSIS — B96.89 BACTERIAL VAGINOSIS: Primary | ICD-10-CM

## 2023-06-20 DIAGNOSIS — N76.0 BACTERIAL VAGINOSIS: Primary | ICD-10-CM

## 2023-06-20 DIAGNOSIS — T36.95XA ANTIBIOTIC-INDUCED YEAST INFECTION: ICD-10-CM

## 2023-06-21 RX ORDER — CLINDAMYCIN HCL 300 MG
300 CAPSULE ORAL 2 TIMES DAILY
Qty: 14 CAPSULE | Refills: 0 | Status: SHIPPED | OUTPATIENT
Start: 2023-06-21 | End: 2023-06-28

## 2023-06-21 NOTE — TELEPHONE ENCOUNTER
I will send in treatment for Clindamycin BID X 7 days   Due to chronic vaginitis - I would recommend discussing with Dr. Mccarty further management of boric acid.   DM

## 2023-06-21 NOTE — TELEPHONE ENCOUNTER
Dr. Penny: also forwarding to OBGYN.  Unless she is using daily or has had several instances of BV she should have refills of boric acid.    Yes I use the boric acid sometimes but I don t know how often to use it. Does it cure BV? Are you going to send a rx to the pharmacy? Will you send the one that starts with a C?    Iris IVERSON RN  [facility]

## 2023-06-21 NOTE — TELEPHONE ENCOUNTER
Please see below message-patient called in to discuss further-is wondering why nothing has been rx'd-please address    [facility] in [place]

## 2023-06-22 ENCOUNTER — TELEPHONE (OUTPATIENT)
Dept: FAMILY MEDICINE | Facility: CLINIC | Age: 48
End: 2023-06-22
Payer: COMMERCIAL

## 2023-06-22 RX ORDER — FLUCONAZOLE 150 MG/1
150 TABLET ORAL
Qty: 3 TABLET | Refills: 0 | Status: SHIPPED | OUTPATIENT
Start: 2023-06-22 | End: 2023-06-29

## 2023-06-22 NOTE — TELEPHONE ENCOUNTER
Dr. Penny --    Patient placed on clindamycin on 6/21/2023 - 6/28/2023.     Patient would like an order for yeast which she states she usually gets with an antibiotic.     Pharmacy: [id number] - [place], [place] - [address]    Liyah Pfeiffer, BSN RN  [facility]

## 2023-06-22 NOTE — TELEPHONE ENCOUNTER
Please ask Dr. Penny if she can prescribe a couple pills for yeast. I usually get yeast when I take an antibiotic.     Thank you.  [id number] - [place], [place] - [address]  [address] [place] [address] E  [place] [address]  Phone: [phone] Fax: [phone]

## 2023-07-03 ENCOUNTER — OFFICE VISIT (OUTPATIENT)
Dept: OBGYN | Facility: CLINIC | Age: 48
End: 2023-07-03
Payer: COMMERCIAL

## 2023-07-03 VITALS
BODY MASS INDEX: 31.25 KG/M2 | DIASTOLIC BLOOD PRESSURE: 83 MMHG | TEMPERATURE: 98.7 F | HEART RATE: 67 BPM | WEIGHT: 187.8 LBS | SYSTOLIC BLOOD PRESSURE: 119 MMHG

## 2023-07-03 DIAGNOSIS — N76.1 CHRONIC VAGINITIS: Primary | ICD-10-CM

## 2023-07-03 DIAGNOSIS — N76.0 BV (BACTERIAL VAGINOSIS): ICD-10-CM

## 2023-07-03 DIAGNOSIS — K57.30 DIVERTICULOSIS OF LARGE INTESTINE WITHOUT HEMORRHAGE: ICD-10-CM

## 2023-07-03 DIAGNOSIS — D25.9 UTERINE LEIOMYOMA, UNSPECIFIED LOCATION: ICD-10-CM

## 2023-07-03 DIAGNOSIS — B96.89 BV (BACTERIAL VAGINOSIS): ICD-10-CM

## 2023-07-03 PROCEDURE — 87210 SMEAR WET MOUNT SALINE/INK: CPT | Performed by: OBSTETRICS & GYNECOLOGY

## 2023-07-03 PROCEDURE — 99214 OFFICE O/P EST MOD 30 MIN: CPT | Performed by: OBSTETRICS & GYNECOLOGY

## 2023-07-03 NOTE — PROGRESS NOTES
HPI:  Mimi Melton is a 48 year old female here for a consultation regarding: recurrent vaginitis.     Here to discuss recurrent bacterial vaginosis, referred by her primary doctor.   Chart review:     Component      Latest Ref Rng 1/9/2023  4:54 PM 1/26/2023  10:05 AM 2/3/2023  12:16 PM 3/28/2023  4:35 PM   Trichomonas      Absent  Absent  Absent  Absent  Absent    Yeast      Absent  Present !  Absent  Present !  Absent    Clue cells      Absent  Present !  Present !  Absent  Present !    WBCs/high power field      None  1+ !  1+ !  2+ !  1+ !      Component      Latest Ref Rng 6/19/2023  10:39 AM 7/3/2023  3:18 PM   Trichomonas      Absent  Absent  Absent    Yeast      Absent  Absent  Absent    Clue cells      Absent  Present !  Present !    WBCs/high power field      None  1+ !  None          Her primary doc just treated her for BV then she had to take diflucan for the yeast infection that follows.      Tried boric acid which keeps the odor under good control       Patient's last menstrual period was 07/02/2023.       Patient has known fibroids.  Large one deep in the pelvis.   ? Possibly contributing to vaginal symptoms.     Patient was noted to have diverticulosis noted on recent colonoscopy.  Has questions about that as well today.            Past GYN history:   Lab Results   Component Value Date    PAP NIL 10/30/2020    PAP NIL 10/18/2019    PAP NIL 12/08/2016       Past Medical History:   Diagnosis Date     Cervical high risk HPV (human papillomavirus) test positive 12/2015 12/2015, 10/2019, 10/30/20     Chronic sinusitis Summer 2016    I get congested every 3-4 weeks     Esophageal reflux      Exophthalmos, unspecified      Hypothyroidism      Motion sickness      PONV (postoperative nausea and vomiting)      Thyroid eye disease      Thyrotoxicosis without mention of goiter or other cause, without mention of thyrotoxic crisis or storm 03/2005    Had radioablation, On synthroid, seeing N  Endocrine; takes synthroid     Unspecified essential hypertension 1980    meds since , on atenolol       Past Surgical History:   Procedure Laterality Date     COLONOSCOPY N/A 2022    Procedure: COLONOSCOPY;  Surgeon: Marah Souza MD;  Location: [facility]     CYSTOSCOPY, SLING TRANSVAGINAL N/A 10/10/2017    Procedure: CYSTOSCOPY, SLING TRANSVAGINAL;  Midurethral Sling and Cystoscopy (Support the Urethra with Mesh Sling and Look in the Bladder);  Surgeon: Karin Amin MD;  Location: [facility]     ESOPHAGOSCOPY, GASTROSCOPY, DUODENOSCOPY (EGD), COMBINED N/A 3/30/2023    Procedure: Esophagoscopy, gastroscopy, duodenoscopy (EGD), combined;  Surgeon: Janette Chandra MD;  Location: [facility]     EYE SURGERY  2008    Orbital Decompression Dr smart     ORBITOTOMY DECOMPRESSION Bilateral 2021    Procedure: Bilateral orbital decompression with  Sonopet, Bilateral lower lid retraction repair;  Surgeon: Niles Donnelly MD;  Location: [facility] OR     REPAIR RETRACTION LID Bilateral 2021    Procedure: REPAIR, RETRACTION, EYELID;  Surgeon: Niles Donnelly MD;  Location: [facility] OR     SONOPET EYE Bilateral 2021    Procedure: SONOPET EYE;  Surgeon: Niles Donnelly MD;  Location: [facility] OR     ZZC  DELIVERY ONLY  91    , Low Cervical     ZZC  DELIVERY ONLY  97    , Low Cervical     ZZC  DELIVERY ONLY  99    , Low Cervical     ZZC INDUCED ABORTN BY D&C      Aspiration & Curettage, TAB x2     ZZHC REPAIR INCISIONAL HERNIA,REDUCIBLE      Umbilical hernia Repair       Family History   Problem Relation Age of Onset     Hypertension Mother      Hypertension Father      Hypertension Maternal Grandmother      Hypertension Maternal Grandfather      Hypertension Paternal Grandmother      Hypertension Paternal Grandfather      Diabetes No family hx of      Glaucoma No family hx of      Macular Degeneration No family hx of           Medications:    Current Outpatient Medications:      ACETAMINOPHEN PO, Take 1,000 mg by mouth 2 times daily as needed for pain (menstrual cramps), Disp: , Rfl:      benzoyl peroxide (BENZOYL PEROXIDE WASH) 5 % external liquid, WASH FACE SKIN 1-2 TIMES DAILY, Disp: 142 mL, Rfl: 3     cromolyn sodium (NASALCROM) 5.2 MG/ACT nasal aerosol, 1 sprays each nostril up to 3x daily as needed for nasal congstion, Disp: 13 mL, Rfl: 11     hydrochlorothiazide (HYDRODIURIL) 12.5 MG tablet, Take 1 tablet (12.5 mg) by mouth daily, Disp: 90 tablet, Rfl: 3     levothyroxine (SYNTHROID/LEVOTHROID) 100 MCG tablet, Take 1 tablet (100 mcg) by mouth daily, Disp: 90 tablet, Rfl: 3     loratadine (CLARITIN) 10 MG tablet, Take 10 mg by mouth daily, Disp: , Rfl:      losartan (COZAAR) 25 MG tablet, Take 1 tablet (25 mg) by mouth daily, Disp: 90 tablet, Rfl: 3     norethindrone (MICRONOR) 0.35 MG tablet, Take 1 tablet (0.35 mg) by mouth daily, Disp: 84 tablet, Rfl: 3     omeprazole (PRILOSEC) 40 MG DR capsule, Take 1 capsule (40 mg) by mouth daily, Disp: 90 capsule, Rfl: 1     tretinoin (RETIN-A) 0.05 % external cream, Apply topically At Bedtime Put on face 1-2 times daily (if irritated reduce to every other day), Disp: 20 g, Rfl: 3     valACYclovir (VALTREX) 500 MG tablet, Take 1 tablet (500 mg) by mouth 2 times daily for 3 days, Disp: 6 tablet, Rfl: 0    Allergies:  Dust mites, Lisinopril, and Mold         EXAM:  /83   Pulse 67   Temp 98.7  F (37.1  C) (Temporal)   Wt 85.2 kg (187 lb 12.8 oz)   LMP 07/02/2023   Breastfeeding No   BMI 31.25 kg/m      General - pleasant female in no acute distress.  Neurological - Alert and oriented  Psych:  normal mood and affect.  normal respiratory and cardiovascular effort       Pelvic: deferred.  Patient requested to do a self collect swab.     ASSESSMENT:/PLAN:     (N76.1) Chronic vaginitis  (primary encounter diagnosis)  Comment:  discussed vaginal pH, and when that is chronically off  balance, (more basic) then environment is favorable for infection.  Diet, blood (menses), semen, chronic abx use can contribute to changing the vaginal microbiome to make the vaginal chronically infected.   Plan: discussed pH stabilizer treatment with otc products, boric acid, and probiotics     (N76.0,  B96.89) BV (bacterial vaginosis)  Comment:  Current wet prep shows this.   discussed that symptoms can come and go spontaneously without needing to always be treated, unless she is   symptomatic.    Plan: Wet prep - lab collect, metroNIDAZOLE         (METROGEL) 0.75 % vaginal gel     (K57.30) Diverticulosis of large intestine without hemorrhage  Comment:noted on colonoscopy review today -- incidental finding   Patient is unaware of this diagnosis and has questions about this  Plan: briefly reviewed the dx and this is not diverticulitis.    She was given references and educational material to review for management.      I spent a total of 36 minutes reviewing records, reports, documentation and discussing with the patient on the date of encounter.       Delaney Mccarty MD

## 2023-07-04 RX ORDER — METRONIDAZOLE 7.5 MG/G
1 GEL VAGINAL DAILY
Qty: 70 G | Refills: 0 | Status: SHIPPED | OUTPATIENT
Start: 2023-07-04 | End: 2023-07-11

## 2023-08-08 ENCOUNTER — TELEPHONE (OUTPATIENT)
Dept: FAMILY MEDICINE | Facility: CLINIC | Age: 48
End: 2023-08-08

## 2023-08-08 NOTE — PROGRESS NOTES
Patient has lab appointment on 08/17.  Need orders ASAP.  Thank you!     States needs to do a wet prep

## 2023-08-10 ENCOUNTER — MYC MEDICAL ADVICE (OUTPATIENT)
Dept: FAMILY MEDICINE | Facility: CLINIC | Age: 48
End: 2023-08-10
Payer: COMMERCIAL

## 2023-08-10 ENCOUNTER — MYC MEDICAL ADVICE (OUTPATIENT)
Dept: OBGYN | Facility: CLINIC | Age: 48
End: 2023-08-10
Payer: COMMERCIAL

## 2023-08-10 DIAGNOSIS — N89.8 VAGINAL DISCHARGE: Primary | ICD-10-CM

## 2023-08-10 NOTE — TELEPHONE ENCOUNTER
Pt requesting wet prep prior to visit with Dr. Mccarty on 8/21  Routing to provider to advise.  Beth Jean Baptiste, RN on 8/10/2023 at 1:49 PM

## 2023-08-17 ENCOUNTER — LAB (OUTPATIENT)
Dept: LAB | Facility: CLINIC | Age: 48
End: 2023-08-17
Payer: COMMERCIAL

## 2023-08-17 DIAGNOSIS — I12.9 BENIGN HYPERTENSION WITH CKD (CHRONIC KIDNEY DISEASE), STAGE II: Primary | ICD-10-CM

## 2023-08-17 DIAGNOSIS — N18.2 BENIGN HYPERTENSION WITH CKD (CHRONIC KIDNEY DISEASE), STAGE II: Primary | ICD-10-CM

## 2023-08-17 LAB
CLUE CELLS: PRESENT
CREAT UR-MCNC: 139 MG/DL
MICROALBUMIN UR-MCNC: <12 MG/L
MICROALBUMIN/CREAT UR: NORMAL MG/G{CREAT}
TRICHOMONAS, WET PREP: ABNORMAL
WBC'S/HIGH POWER FIELD, WET PREP: ABNORMAL
YEAST, WET PREP: ABNORMAL

## 2023-08-17 PROCEDURE — 87210 SMEAR WET MOUNT SALINE/INK: CPT | Performed by: OBSTETRICS & GYNECOLOGY

## 2023-08-17 PROCEDURE — 82043 UR ALBUMIN QUANTITATIVE: CPT

## 2023-08-17 PROCEDURE — 82570 ASSAY OF URINE CREATININE: CPT

## 2023-08-21 ENCOUNTER — VIRTUAL VISIT (OUTPATIENT)
Dept: OBGYN | Facility: CLINIC | Age: 48
End: 2023-08-21
Payer: COMMERCIAL

## 2023-08-21 ENCOUNTER — OFFICE VISIT (OUTPATIENT)
Dept: OPHTHALMOLOGY | Facility: CLINIC | Age: 48
End: 2023-08-21
Payer: COMMERCIAL

## 2023-08-21 ENCOUNTER — LAB (OUTPATIENT)
Dept: LAB | Facility: CLINIC | Age: 48
End: 2023-08-21
Payer: COMMERCIAL

## 2023-08-21 DIAGNOSIS — H05.249 CONSTANT EXOPHTHALMOS, UNSPECIFIED LATERALITY: ICD-10-CM

## 2023-08-21 DIAGNOSIS — H57.89 THYROID EYE DISEASE: Primary | ICD-10-CM

## 2023-08-21 DIAGNOSIS — E07.9 THYROID EYE DISEASE: ICD-10-CM

## 2023-08-21 DIAGNOSIS — N76.1 CHRONIC VAGINITIS: Primary | ICD-10-CM

## 2023-08-21 DIAGNOSIS — H57.89 THYROID EYE DISEASE: ICD-10-CM

## 2023-08-21 DIAGNOSIS — E07.9 THYROID EYE DISEASE: Primary | ICD-10-CM

## 2023-08-21 LAB — TSH SERPL DL<=0.005 MIU/L-ACNC: 1.57 UIU/ML (ref 0.3–4.2)

## 2023-08-21 PROCEDURE — 84443 ASSAY THYROID STIM HORMONE: CPT | Performed by: PATHOLOGY

## 2023-08-21 PROCEDURE — 99214 OFFICE O/P EST MOD 30 MIN: CPT | Mod: GC | Performed by: OPHTHALMOLOGY

## 2023-08-21 PROCEDURE — 99213 OFFICE O/P EST LOW 20 MIN: CPT | Mod: VID | Performed by: OBSTETRICS & GYNECOLOGY

## 2023-08-21 PROCEDURE — 36415 COLL VENOUS BLD VENIPUNCTURE: CPT | Performed by: PATHOLOGY

## 2023-08-21 PROCEDURE — 99000 SPECIMEN HANDLING OFFICE-LAB: CPT | Performed by: PATHOLOGY

## 2023-08-21 PROCEDURE — 84445 ASSAY OF TSI GLOBULIN: CPT | Mod: 90 | Performed by: PATHOLOGY

## 2023-08-21 RX ORDER — METRONIDAZOLE 7.5 MG/G
1 GEL VAGINAL AT BEDTIME
Qty: 70 G | Refills: 0 | Status: SHIPPED | OUTPATIENT
Start: 2023-08-21 | End: 2023-09-04

## 2023-08-21 ASSESSMENT — EXTERNAL EXAM - LEFT EYE: OS_EXAM: EXOPHTHALMOS

## 2023-08-21 ASSESSMENT — CONF VISUAL FIELD
OS_INFERIOR_NASAL_RESTRICTION: 0
OD_SUPERIOR_NASAL_RESTRICTION: 0
OS_SUPERIOR_TEMPORAL_RESTRICTION: 0
OS_SUPERIOR_NASAL_RESTRICTION: 0
OS_NORMAL: 1
OS_INFERIOR_TEMPORAL_RESTRICTION: 0
OD_INFERIOR_NASAL_RESTRICTION: 0
OD_INFERIOR_TEMPORAL_RESTRICTION: 0
METHOD: COUNTING FINGERS
OD_SUPERIOR_TEMPORAL_RESTRICTION: 0
OD_NORMAL: 1

## 2023-08-21 ASSESSMENT — TONOMETRY
IOP_METHOD: ICARE
OD_IOP_MMHG: 28
OS_IOP_MMHG: 19
IOP_METHOD: ICARE
OS_IOP_MMHG: 28
OD_IOP_MMHG: 19

## 2023-08-21 ASSESSMENT — LAGOPHTHALMOS
OD_LAGOPHTHALMOS: 0
OS_LAGOPHTHALMOS: 0

## 2023-08-21 ASSESSMENT — VISUAL ACUITY
METHOD: SNELLEN - LINEAR
OS_CC: 20/20
OD_CC+: -2
OD_CC: 20/20
CORRECTION_TYPE: CONTACTS

## 2023-08-21 ASSESSMENT — EXTERNAL EXAM - RIGHT EYE: OD_EXAM: EXOPHTHALMOS

## 2023-08-21 ASSESSMENT — LEVATOR FUNCTION
OD_LEVATOR: 19
OS_LEVATOR: 19

## 2023-08-21 NOTE — PROGRESS NOTES
A copy of the American Heart Association low cholesterol diet is provided, and the diet is discussed with the patient.

## 2023-08-21 NOTE — NURSING NOTE
"Chief Complaints and History of Present Illnesses   Patient presents with    Follow Up     Chief Complaint(s) and History of Present Illness(es)       Follow Up              Laterality: both eyes    Associated symptoms: Negative for redness, tearing, flashes and floaters              Comments    S/p for 6 month follow up on S/p Bilateral lateral wall bone and fat orbital decompression with Sonopet and bilateral lower lid retraction repair DOS 11/5/2021.   Patient denies pain and discomfort each eye. Patient states \"sometimes I feel like my eyes don't move together\", following last procedure.  Liz Lorenzo, TRACY August 21, 2023 8:07 AM                     "

## 2023-08-21 NOTE — PROGRESS NOTES
"Mimi is a 48 year old who is being evaluated via a billable telephone visit.      How would you like to obtain your AVS? MyChart  If the telephone visit is dropped, the invitation should be resent by: Text to cell phone: [phone]  Will anyone else be joining your telephone visit? No      Start time: 1:02  End time: 1:19  Total phone time 17 minutes  Total time spent with this patient was 23 minutes including counseling and documentation.    Provider onsite  Patient at home    Assessment & Plan     (N76.1) Chronic vaginitis  (primary encounter diagnosis)  Comment:  Recurrent BV post menstrual cycles  Plan: metroNIDAZOLE (METROGEL-VAGINAL) 0.75 % vaginal        Gel         Discussed the physiological changes of vaginal pH as women go through their menstrual cycles, and importance of pH balance to avoid vaginitis.  Recommend using a pH stabilizer on the very end of her menstrual cycle to prevent the onset of bacterial vaginosis.  She can use boric acid to offset the smell if she gets an infection.  She was given a prescription for treating her current bacterial vaginosis.  Moving forward, the last couple days of her next menstrual cycle she will begin to use the refresh pH stabilizer for 3-4 nights in a row to see if she can prevent bacterial vaginosis from occurring.  She understood the plan and will give it a try.       Review of prior external note(s) from - CareEverywhere information from Discuss reviewed  No LOS data to display   Time spent by me doing chart review, history and exam, documentation and further activities per the note       BMI:   Estimated body mass index is 31.25 kg/m  as calculated from the following:    Height as of 3/30/23: 1.651 m (5' 5\").    Weight as of 7/3/23: 85.2 kg (187 lb 12.8 oz).       Patient will follow-up after she returns from vacation on October 3.    Delaney Mccarty MD  [facility]            Dennis Le is a 48 year old, presenting for the " following health issues:  Chronic vaginitis                     I have BV again,  it did clear up with the last course of metrogel and she was fine until her menstrual cycle   I probably always have it but it is really strong after my cycle   Has not tried that repHresh.  Although she does have it.  Boric acid helps with the smell.   She notices a pattern that the symptoms always come on the last couple days of her period.    She is on micronor and always has vaginal moisture.    Because of taking the birth control pill her menstrual cycles are very light.  There are just several days of spotting.  The symptoms always start on the last couple days of spotting.  It is a very predictable problem.    Never has a problem with vaginal dryness.  She has not been sexually active since May.  No vaginal irritation or pain.  She does note some irritation externally and an odor when she has bacterial vaginosis.            Objective           Vitals:  No vitals were obtained today due to virtual visit.    Physical Exam   NAD  Pleasant cooperative   No respiratory distress.

## 2023-08-21 NOTE — PROGRESS NOTES
"Chief Complaints and History of Present Illnesses   Patient presents with    Follow Up     Chief Complaint(s) and History of Present Illness(es)     Follow Up    In both eyes.  Associated symptoms include Negative for redness, tearing,   flashes and floaters.           Comments    S/p for 6 month follow up on S/p Bilateral lateral wall bone and fat   orbital decompression with Sonopet and bilateral lower lid retraction   repair DOS 11/5/2021.   Patient denies pain and discomfort each eye. Patient states \"sometimes I   feel like my eyes don't move together\", following last procedure.  TRACY Saxena August 21, 2023 8:07 AM          Assessment & Plan     Mimi Melton is a 48 year old female with the following diagnoses:   1. Thyroid eye disease    2. Constant exophthalmos, unspecified laterality       Thyroid eye disease - stable   1. Spontaneous orbital pain.     0  2. Gaze evoked orbital pain.     0  3. Eyelid swelling due to active thyroid eye disease  1  4. Eyelid erythema.       0  5. Conjunctival redness due to active thyroid eye disease . 0  6. Chemosis.        0  7. Inflammation of caruncle OR plica.    0    Patients assessed after follow-up can be scored out of 10 by  including items 8-10.    8. Increase of > 2mm in proptosis.    0   9. Decrease in uniocular excursion in any direction of > 8 . 0  10. Decrease of acuity equivalent to 1 Snellen line.  0    JAREN SCORE = 1         PLAN:  Check TSI, TSH  Return to clinic 6 months              Bar Jain MD  PGY-2, Ophthalmology  [facility]  08/21/23    Attending Physician Attestation:  I have seen and examined this patient with the resident .  I have confirmed and edited as necessary the chief complaint(s), history of present illness, review of systems, relevant history, and examination findings as documented by others.  I have personally reviewed the relevant tests, images, and reports as documented above.  I have confirmed and edited as " necessary the assessment and plan and agree with this note.    - Niles Donnelly MD 8:55 AM 8/21/2023

## 2023-08-25 LAB — TSI SER-ACNC: 3.5 TSI INDEX

## 2023-10-24 ENCOUNTER — MYC MEDICAL ADVICE (OUTPATIENT)
Dept: OBGYN | Facility: CLINIC | Age: 48
End: 2023-10-24
Payer: COMMERCIAL

## 2023-10-24 DIAGNOSIS — N89.8 VAGINAL DISCHARGE: Primary | ICD-10-CM

## 2023-10-25 NOTE — TELEPHONE ENCOUNTER
Pt requesting wet prep.- order pended  Pt declining e-visit, was told by Dr. Mccarty she can contact clinic for wet prep if needed  Routing to provider to advise.  Beth Jean Baptiste RN on 10/25/2023 at 8:44 AM

## 2023-10-26 ENCOUNTER — LAB (OUTPATIENT)
Dept: LAB | Facility: CLINIC | Age: 48
End: 2023-10-26
Payer: COMMERCIAL

## 2023-10-26 DIAGNOSIS — N89.8 VAGINAL DISCHARGE: ICD-10-CM

## 2023-10-26 PROCEDURE — 87210 SMEAR WET MOUNT SALINE/INK: CPT

## 2023-10-27 RX ORDER — METRONIDAZOLE 7.5 MG/G
1 GEL VAGINAL DAILY
Qty: 70 G | Refills: 0 | Status: SHIPPED | OUTPATIENT
Start: 2023-10-27 | End: 2024-01-31

## 2023-10-27 NOTE — TELEPHONE ENCOUNTER
+clue cells on wet prep  Pt requesting metrogel  Pended to pharmacy  Routing to provider to advise.  Beth Jean Baptiste RN on 10/27/2023 at 8:45 AM

## 2023-10-30 ENCOUNTER — TRANSFERRED RECORDS (OUTPATIENT)
Dept: HEALTH INFORMATION MANAGEMENT | Facility: CLINIC | Age: 48
End: 2023-10-30

## 2023-11-01 ENCOUNTER — MYC MEDICAL ADVICE (OUTPATIENT)
Dept: FAMILY MEDICINE | Facility: CLINIC | Age: 48
End: 2023-11-01

## 2023-11-01 ENCOUNTER — OFFICE VISIT (OUTPATIENT)
Dept: FAMILY MEDICINE | Facility: CLINIC | Age: 48
End: 2023-11-01
Payer: COMMERCIAL

## 2023-11-01 VITALS
HEART RATE: 69 BPM | OXYGEN SATURATION: 97 % | WEIGHT: 185.2 LBS | TEMPERATURE: 97.3 F | HEIGHT: 66 IN | SYSTOLIC BLOOD PRESSURE: 130 MMHG | BODY MASS INDEX: 29.77 KG/M2 | DIASTOLIC BLOOD PRESSURE: 86 MMHG | RESPIRATION RATE: 16 BRPM

## 2023-11-01 DIAGNOSIS — E03.2 HYPOTHYROIDISM DUE TO MEDICATION: ICD-10-CM

## 2023-11-01 DIAGNOSIS — I10 ESSENTIAL HYPERTENSION WITH GOAL BLOOD PRESSURE LESS THAN 140/90: ICD-10-CM

## 2023-11-01 DIAGNOSIS — Z00.00 ROUTINE GENERAL MEDICAL EXAMINATION AT A HEALTH CARE FACILITY: ICD-10-CM

## 2023-11-01 DIAGNOSIS — Z30.41 ENCOUNTER FOR SURVEILLANCE OF CONTRACEPTIVE PILLS: ICD-10-CM

## 2023-11-01 DIAGNOSIS — Z13.220 LIPID SCREENING: ICD-10-CM

## 2023-11-01 DIAGNOSIS — Z78.9 HEPATITIS B IMMUNE: ICD-10-CM

## 2023-11-01 DIAGNOSIS — N89.8 VAGINAL DISCHARGE: ICD-10-CM

## 2023-11-01 LAB
ALBUMIN SERPL BCG-MCNC: 4.1 G/DL (ref 3.5–5.2)
ALP SERPL-CCNC: 37 U/L (ref 35–104)
ALT SERPL W P-5'-P-CCNC: 10 U/L (ref 0–50)
ANION GAP SERPL CALCULATED.3IONS-SCNC: 9 MMOL/L (ref 7–15)
AST SERPL W P-5'-P-CCNC: 17 U/L (ref 0–45)
BILIRUB SERPL-MCNC: 0.2 MG/DL
BUN SERPL-MCNC: 10.4 MG/DL (ref 6–20)
CALCIUM SERPL-MCNC: 9.5 MG/DL (ref 8.6–10)
CHLORIDE SERPL-SCNC: 103 MMOL/L (ref 98–107)
CHOLEST SERPL-MCNC: 163 MG/DL
CLUE CELLS: NORMAL
CREAT SERPL-MCNC: 1.07 MG/DL (ref 0.51–0.95)
CREAT UR-MCNC: 68.2 MG/DL
DEPRECATED HCO3 PLAS-SCNC: 28 MMOL/L (ref 22–29)
EGFRCR SERPLBLD CKD-EPI 2021: 64 ML/MIN/1.73M2
ERYTHROCYTE [DISTWIDTH] IN BLOOD BY AUTOMATED COUNT: 13 % (ref 10–15)
GLUCOSE SERPL-MCNC: 88 MG/DL (ref 70–99)
HCT VFR BLD AUTO: 36.6 % (ref 35–47)
HDLC SERPL-MCNC: 52 MG/DL
HGB BLD-MCNC: 11.9 G/DL (ref 11.7–15.7)
LDLC SERPL CALC-MCNC: 101 MG/DL
MCH RBC QN AUTO: 29.6 PG (ref 26.5–33)
MCHC RBC AUTO-ENTMCNC: 32.5 G/DL (ref 31.5–36.5)
MCV RBC AUTO: 91 FL (ref 78–100)
MICROALBUMIN UR-MCNC: <12 MG/L
MICROALBUMIN/CREAT UR: NORMAL MG/G{CREAT}
NONHDLC SERPL-MCNC: 111 MG/DL
PLATELET # BLD AUTO: 356 10E3/UL (ref 150–450)
POTASSIUM SERPL-SCNC: 3.7 MMOL/L (ref 3.4–5.3)
PROT SERPL-MCNC: 7 G/DL (ref 6.4–8.3)
RBC # BLD AUTO: 4.02 10E6/UL (ref 3.8–5.2)
SODIUM SERPL-SCNC: 140 MMOL/L (ref 135–145)
TRICHOMONAS, WET PREP: NORMAL
TRIGL SERPL-MCNC: 51 MG/DL
TSH SERPL DL<=0.005 MIU/L-ACNC: 0.72 UIU/ML (ref 0.3–4.2)
VIT D+METAB SERPL-MCNC: 27 NG/ML (ref 20–50)
WBC # BLD AUTO: 3.9 10E3/UL (ref 4–11)
WBC'S/HIGH POWER FIELD, WET PREP: NORMAL
YEAST, WET PREP: NORMAL

## 2023-11-01 PROCEDURE — 82570 ASSAY OF URINE CREATININE: CPT | Performed by: FAMILY MEDICINE

## 2023-11-01 PROCEDURE — 36415 COLL VENOUS BLD VENIPUNCTURE: CPT | Performed by: FAMILY MEDICINE

## 2023-11-01 PROCEDURE — 87210 SMEAR WET MOUNT SALINE/INK: CPT | Performed by: FAMILY MEDICINE

## 2023-11-01 PROCEDURE — 82306 VITAMIN D 25 HYDROXY: CPT | Performed by: FAMILY MEDICINE

## 2023-11-01 PROCEDURE — 99214 OFFICE O/P EST MOD 30 MIN: CPT | Mod: 25 | Performed by: FAMILY MEDICINE

## 2023-11-01 PROCEDURE — 90471 IMMUNIZATION ADMIN: CPT | Performed by: FAMILY MEDICINE

## 2023-11-01 PROCEDURE — 84443 ASSAY THYROID STIM HORMONE: CPT | Performed by: FAMILY MEDICINE

## 2023-11-01 PROCEDURE — 99396 PREV VISIT EST AGE 40-64: CPT | Mod: 25 | Performed by: FAMILY MEDICINE

## 2023-11-01 PROCEDURE — 80053 COMPREHEN METABOLIC PANEL: CPT | Performed by: FAMILY MEDICINE

## 2023-11-01 PROCEDURE — 90686 IIV4 VACC NO PRSV 0.5 ML IM: CPT | Performed by: FAMILY MEDICINE

## 2023-11-01 PROCEDURE — 85027 COMPLETE CBC AUTOMATED: CPT | Performed by: FAMILY MEDICINE

## 2023-11-01 PROCEDURE — 82043 UR ALBUMIN QUANTITATIVE: CPT | Performed by: FAMILY MEDICINE

## 2023-11-01 PROCEDURE — 80061 LIPID PANEL: CPT | Performed by: FAMILY MEDICINE

## 2023-11-01 RX ORDER — ACETAMINOPHEN AND CODEINE PHOSPHATE 120; 12 MG/5ML; MG/5ML
0.35 SOLUTION ORAL DAILY
Qty: 84 TABLET | Refills: 3 | Status: SHIPPED | OUTPATIENT
Start: 2023-11-01 | End: 2024-01-31

## 2023-11-01 RX ORDER — HYDROCHLOROTHIAZIDE 12.5 MG/1
12.5 TABLET ORAL DAILY
Qty: 90 TABLET | Refills: 3 | Status: SHIPPED | OUTPATIENT
Start: 2023-11-01

## 2023-11-01 RX ORDER — LOSARTAN POTASSIUM 25 MG/1
25 TABLET ORAL DAILY
Qty: 90 TABLET | Refills: 3 | Status: SHIPPED | OUTPATIENT
Start: 2023-11-01

## 2023-11-01 RX ORDER — LEVOTHYROXINE SODIUM 100 UG/1
100 TABLET ORAL DAILY
Qty: 90 TABLET | Refills: 3 | Status: SHIPPED | OUTPATIENT
Start: 2023-11-01

## 2023-11-01 ASSESSMENT — ENCOUNTER SYMPTOMS
COUGH: 0
HEARTBURN: 0
BREAST MASS: 0
CONSTIPATION: 0
PARESTHESIAS: 0
WEAKNESS: 0
DYSURIA: 0
DIARRHEA: 0
PALPITATIONS: 0
HEMATOCHEZIA: 0
HEADACHES: 0
ABDOMINAL PAIN: 0
SORE THROAT: 0
JOINT SWELLING: 0
NERVOUS/ANXIOUS: 0
NAUSEA: 0
MYALGIAS: 0
HEMATURIA: 0
CHILLS: 0
DIZZINESS: 0
FREQUENCY: 0
FEVER: 0
SHORTNESS OF BREATH: 0
EYE PAIN: 0
ARTHRALGIAS: 0

## 2023-11-01 ASSESSMENT — ASTHMA QUESTIONNAIRES
QUESTION_2 LAST FOUR WEEKS HOW OFTEN HAVE YOU HAD SHORTNESS OF BREATH: NOT AT ALL
QUESTION_1 LAST FOUR WEEKS HOW MUCH OF THE TIME DID YOUR ASTHMA KEEP YOU FROM GETTING AS MUCH DONE AT WORK, SCHOOL OR AT HOME: NONE OF THE TIME
QUESTION_3 LAST FOUR WEEKS HOW OFTEN DID YOUR ASTHMA SYMPTOMS (WHEEZING, COUGHING, SHORTNESS OF BREATH, CHEST TIGHTNESS OR PAIN) WAKE YOU UP AT NIGHT OR EARLIER THAN USUAL IN THE MORNING: NOT AT ALL
QUESTION_4 LAST FOUR WEEKS HOW OFTEN HAVE YOU USED YOUR RESCUE INHALER OR NEBULIZER MEDICATION (SUCH AS ALBUTEROL): NOT AT ALL
QUESTION_5 LAST FOUR WEEKS HOW WOULD YOU RATE YOUR ASTHMA CONTROL: COMPLETELY CONTROLLED
ACT_TOTALSCORE: 25
ACT_TOTALSCORE: 25

## 2023-11-01 ASSESSMENT — PATIENT HEALTH QUESTIONNAIRE - PHQ9
SUM OF ALL RESPONSES TO PHQ QUESTIONS 1-9: 0
SUM OF ALL RESPONSES TO PHQ QUESTIONS 1-9: 0
10. IF YOU CHECKED OFF ANY PROBLEMS, HOW DIFFICULT HAVE THESE PROBLEMS MADE IT FOR YOU TO DO YOUR WORK, TAKE CARE OF THINGS AT HOME, OR GET ALONG WITH OTHER PEOPLE: NOT DIFFICULT AT ALL

## 2023-11-01 NOTE — COMMUNITY RESOURCES LIST (ENGLISH)
11/01/2023   [facility]  N/A  For questions about this resource list or additional care needs, please contact your primary care clinic or care manager.  Phone: [phone]   Email: N/A   Address: [facility] [place] [address]   Hours: N/A        Financial Stability       Utility payment assistance  1  [facility] Distance: 1.77 miles      Phone/Virtual   [address] [place] [address]  Language: English  Hours: Mon - Fri 9:00 AM - 4:00 PM , Sun 9:30 AM - 12:00 PM  Fees: Free   Phone: (759) 193-3845 Email: [email or web address] Website: [email or web address]     2  [place] [employer] [facility] [place]'s Telephone Assistance Plan (TAP) and Federal Lifeline and Affordable Connectivity Program (ACP) Distance: 3.09 miles      Phone/Virtual   [address] [place] [address]  Language: English  Fees: Free   Phone: (258) 576-6488 Email: consumer.yefri@[facility]. Website: https://[place].gov/puc/consumers/telephone/          Important Numbers & Websites       Emergency Services   911  City Services   311  Poison Control   (222) 198-7037  Suicide Prevention Lifeline   (918) 607-5200 (TALK)  Child Abuse Hotline   (615) 782-1250 (4-A-Child)  Sexual Assault Hotline   (108) 596-9157 (HOPE)  National Runaway Safeline   (688) 356-8451 (RUNAWAY)  All-Options Talkline   (236) 210-7768  Substance Abuse Referral   (385) 948-1752 (HELP)

## 2023-11-01 NOTE — PROGRESS NOTES
SUBJECTIVE:   CC: Mimi is an 48 year old who presents for preventive health visit.       11/1/2023     8:53 AM   Additional Questions   Roomed by NOELLE RN       Healthy Habits:     Getting at least 3 servings of Calcium per day:  Yes    Bi-annual eye exam:  Yes    Dental care twice a year:  Yes    Sleep apnea or symptoms of sleep apnea:  None    Diet:  Low salt and Other    Frequency of exercise:  2-3 days/week    Duration of exercise:  Greater than 60 minutes    Taking medications regularly:  Yes    Medication side effects:  Not applicable    Additional concerns today:  No      Today's PHQ-9 Score:       11/1/2023     4:49 AM   PHQ-9 SCORE   PHQ-9 Total Score MyChart 0   PHQ-9 Total Score 0           Social History     Tobacco Use    Smoking status: Never    Smokeless tobacco: Never   Substance Use Topics    Alcohol use: Yes     Comment: 1-2x's/month if that.           11/1/2023     4:52 AM   Alcohol Use   Prescreen: >3 drinks/day or >7 drinks/week? No     Reviewed orders with patient.  Reviewed health maintenance and updated orders accordingly - Yes      Breast Cancer Screening:        10/12/2021     5:28 PM   Breast CA Risk Assessment (FHS-7)   Do you have a family history of breast, colon, or ovarian cancer? No / Unknown       Pertinent mammograms are reviewed under the imaging tab.    History of abnormal Pap smear: YES - updated in Problem List and Health Maintenance accordingly      Latest Ref Rng & Units 12/22/2022     2:18 PM 12/6/2021     8:52 AM 10/30/2020     2:02 PM   PAP / HPV   PAP  Negative for Intraepithelial Lesion or Malignancy (NILM)  Negative for Intraepithelial Lesion or Malignancy (NILM)     PAP (Historical)    NIL    HPV 16 DNA Negative Negative  Negative     HPV 18 DNA Negative Negative  Negative     Other HR HPV Negative Negative  Positive       Reviewed and updated as needed this visit by clinical staff   Tobacco  Allergies  Meds              Reviewed and updated as needed this visit  "by Provider                     Review of Systems   Constitutional:  Negative for chills and fever.   HENT:  Negative for congestion, ear pain, hearing loss and sore throat.    Eyes:  Negative for pain and visual disturbance.   Respiratory:  Negative for cough and shortness of breath.    Cardiovascular:  Negative for chest pain, palpitations and peripheral edema.   Gastrointestinal:  Negative for abdominal pain, constipation, diarrhea, heartburn, hematochezia and nausea.   Breasts:  Negative for tenderness, breast mass and discharge.   Genitourinary:  Negative for dysuria, frequency, genital sores, hematuria, pelvic pain, urgency, vaginal bleeding and vaginal discharge.   Musculoskeletal:  Negative for arthralgias, joint swelling and myalgias.   Skin:  Negative for rash.   Neurological:  Negative for dizziness, weakness, headaches and paresthesias.   Psychiatric/Behavioral:  Negative for mood changes. The patient is not nervous/anxious.         OBJECTIVE:   /86 (BP Location: Right arm, Patient Position: Sitting, Cuff Size: Adult Regular)   Pulse 69   Temp 97.3  F (36.3  C) (Temporal)   Resp 16   Ht 1.685 m (5' 6.34\")   Wt 84 kg (185 lb 3.2 oz)   LMP  (LMP Unknown)   SpO2 97%   BMI 29.59 kg/m    Physical Exam          ASSESSMENT/PLAN:     Routine general medical examination at a health care facility  Up to date with mammogram due 4/2024, up to date with colonoscopy due 12/2025   Scheduled for pap 12/2023  Mimi will schedule COVID vaccine  No history of asthma, AAP not indicated     Essential hypertension with goal blood pressure less than 140/90  - stable, refill below   - hydrochlorothiazide (HYDRODIURIL) 12.5 MG tablet; Take 1 tablet (12.5 mg) by mouth daily  Dispense: 90 tablet; Refill: 3  - losartan (COZAAR) 25 MG tablet; Take 1 tablet (25 mg) by mouth daily  Dispense: 90 tablet; Refill: 3  - CBC with platelets; Future  - Comprehensive metabolic panel (BMP + Alb, Alk Phos, ALT, AST, Total. Bili, " "TP); Future  - Vitamin D Deficiency; Future  - Albumin Random Urine Quantitative with Creat Ratio; Future  - CBC with platelets  - Comprehensive metabolic panel (BMP + Alb, Alk Phos, ALT, AST, Total. Bili, TP)  - Vitamin D Deficiency  - Albumin Random Urine Quantitative with Creat Ratio  - CBC with platelets; Future    Hypothyroidism due to medication  - levothyroxine (SYNTHROID/LEVOTHROID) 100 MCG tablet; Take 1 tablet (100 mcg) by mouth daily  Dispense: 90 tablet; Refill: 3  - TSH with free T4 reflex; Future  - TSH with free T4 reflex    Encounter for surveillance of contraceptive pills  - norethindrone (MICRONOR) 0.35 MG tablet; Take 1 tablet (0.35 mg) by mouth daily  Dispense: 84 tablet; Refill: 3    Lipid screening  - Lipid panel reflex to direct LDL Fasting; Future  - Lipid panel reflex to direct LDL Fasting    Hepatitis B immune    Vaginal discharge  - She is concerned about yeast infection, ordered wet prep and tx as indicated  - Wet prep - Clinic Collect         Patient has been advised of split billing requirements and indicates understanding: Yes      COUNSELING:  Reviewed preventive health counseling, as reflected in patient instructions      BMI:   Estimated body mass index is 29.59 kg/m  as calculated from the following:    Height as of this encounter: 1.685 m (5' 6.34\").    Weight as of this encounter: 84 kg (185 lb 3.2 oz).         She reports that she has never smoked. She has never used smokeless tobacco.          Nanette Pneny MD  [facility]  Answers submitted by the patient for this visit:  Patient Health Questionnaire (Submitted on 11/1/2023)  If you checked off any problems, how difficult have these problems made it for you to do your work, take care of things at home, or get along with other people?: Not difficult at all  PHQ9 TOTAL SCORE: 0    "

## 2023-11-01 NOTE — COMMUNITY RESOURCES LIST (ENGLISH)
11/01/2023   [facility] [place]  N/A  For questions about this resource list or additional care needs, please contact your primary care clinic or care manager.  Phone: [phone]   Email: N/A   Address: [facility] [place] [address]   Hours: N/A        Financial Stability       Utility payment assistance  1  [facility] Distance: 1.77 miles      Phone/Virtual   [address] [place] [address]  Language: English  Hours: Mon - Fri 9:00 AM - 4:00 PM , Sun 9:30 AM - 12:00 PM  Fees: Free   Phone: (311) [phone] Email: [email or web address].org Website: [email or web address]/[place]/     2  [place] [employer] [facility] [place]'s Telephone Assistance Plan (TAP) and Federal Lifeline and Affordable Connectivity Program (ACP) Distance: 3.09 miles      Phone/Virtual   [address] [place] [address]  Language: English  Fees: Free   Phone: (904) 378-7984 Email: consumer.yefri@[facility]. Website: [place]://[place]puc/consumers/telephone/          Important Numbers & Websites       Emergency Services   911  City Services   311  Poison Control   (601) 751-7130  Suicide Prevention Lifeline   (666) 834-3200 (TALK)  Child Abuse Hotline   (780) 950-6925 (4-A-Child)  Sexual Assault Hotline   (810) 683-9279 (HOPE)  National Runaway Safeline   (627) 547-6168 (RUNAWAY)  All-Options Talkline   (207) 835-2287  Substance Abuse Referral   (850) 846-1029 (HELP)

## 2023-11-07 ENCOUNTER — MYC MEDICAL ADVICE (OUTPATIENT)
Dept: UROLOGY | Facility: CLINIC | Age: 48
End: 2023-11-07
Payer: COMMERCIAL

## 2023-11-23 DIAGNOSIS — K29.80 PEPTIC DUODENITIS: ICD-10-CM

## 2023-11-26 NOTE — PROGRESS NOTES
CHIEF COMPLAINT:  Patient presents with:  Follow Up: Recheck nose, pt would like a covid test and feels she may have a sinus infection as well.          HISTORY OF PRESENT ILLNESS    Mimi was seen in follow up after previous 2023 visit after trial of nasalcrom spray.   Patient is complaining of sinus congestion and is requesting a covid test.   She was exposed to mold at her sisters house over the thanksgiving holiday which triggered hear nasal congestion and ear fullness.   She does saline rinses regularly with good effect and OTC allergy med.             My previous visit:      rders Placed This Encounter   Procedures    Remove Cerumen           Orders Placed This Encounter   Medications    loratadine (CLARITIN) 10 MG tablet       Sig: Take 10 mg by mouth daily    cromolyn sodium (NASALCROM) 5.2 MG/ACT nasal aerosol       Si sprays each nostril up to 3x daily as needed for nasal congstion       Dispense:  13 mL       Refill:  11      Return 6 months for nasal endosocopy  Continue regular saline rinses  Trial of nasalcrom as directed      Allergies   Allergen Reactions    Dust Mites     Lisinopril Swelling     Swelling in lip    Mold      Cannot have any meds that contain mold.        PHYSICAL EXAM:    EAR MICROSOCOPY       Right:  scant cerumen (removed with wire loop)   Left:    myringosclerosis present posterior margine    Nasal Endosocopy:    Septum: midline  ITH: 2+  Mucosa: moist intact  Secretions:  none             IMPRESSION:   Encounter Diagnoses   Name Primary?    Congestion of paranasal sinus Yes    Ear fullness, bilateral             RECOMMENDATIONS:    Orders Placed This Encounter   Procedures    Nasal Endoscopy, Diag    Symptomatic COVID-19 Virus (Coronavirus) by PCR Nose      No orders of the defined types were placed in this encounter.    Return visit 6 months.

## 2023-11-27 ENCOUNTER — OFFICE VISIT (OUTPATIENT)
Dept: OTOLARYNGOLOGY | Facility: CLINIC | Age: 48
End: 2023-11-27
Payer: COMMERCIAL

## 2023-11-27 DIAGNOSIS — R09.81 CONGESTION OF PARANASAL SINUS: Primary | ICD-10-CM

## 2023-11-27 DIAGNOSIS — H93.8X3 EAR FULLNESS, BILATERAL: ICD-10-CM

## 2023-11-27 LAB — SARS-COV-2 RNA RESP QL NAA+PROBE: NEGATIVE

## 2023-11-27 PROCEDURE — 99213 OFFICE O/P EST LOW 20 MIN: CPT | Mod: 25 | Performed by: OTOLARYNGOLOGY

## 2023-11-27 PROCEDURE — 31231 NASAL ENDOSCOPY DX: CPT | Performed by: OTOLARYNGOLOGY

## 2023-11-27 PROCEDURE — 87635 SARS-COV-2 COVID-19 AMP PRB: CPT | Performed by: OTOLARYNGOLOGY

## 2023-11-27 NOTE — LETTER
2023         RE: Mimi Melton  [address]  [facility] [address]        Dear Colleague,    Thank you for referring your patient, Mimi Melton, to the [facility]. Please see a copy of my visit note below.    CHIEF COMPLAINT:  Patient presents with:  Follow Up: Recheck nose, pt would like a covid test and feels she may have a sinus infection as well.          HISTORY OF PRESENT ILLNESS    Mimi was seen in follow up after previous 2023 visit after trial of nasalcrom spray.   Patient is complaining of sinus congestion and is requesting a covid test.         My previous visit:      rders Placed This Encounter   Procedures     Remove Cerumen           Orders Placed This Encounter   Medications     loratadine (CLARITIN) 10 MG tablet       Sig: Take 10 mg by mouth daily     cromolyn sodium (NASALCROM) 5.2 MG/ACT nasal aerosol       Si sprays each nostril up to 3x daily as needed for nasal congstion       Dispense:  13 mL       Refill:  11      Return 6 months for nasal endosocopy  Continue regular saline rinses  Trial of nasalcrom as directed      Allergies   Allergen Reactions     Dust Mites      Lisinopril Swelling     Swelling in lip     Mold      Cannot have any meds that contain mold.                 IMPRESSION:   Encounter Diagnosis   Name Primary?     Congestion of paranasal sinus Yes            RECOMMENDATIONS:    Orders Placed This Encounter   Procedures     Symptomatic COVID-19 Virus (Coronavirus) by PCR Nose      No orders of the defined types were placed in this encounter.        Again, thank you for allowing me to participate in the care of your patient.        Sincerely,        Mariusz Sahu MD

## 2023-11-27 NOTE — NURSING NOTE
Sino-Nasal Outcome Test (SNOT - 22)    1. Need to Blow Nose: Severe  2. Nasal Blockage: Severe  3. Sneezing: Mild or slight  4. Runny Nose: Severe  5. Cough: Very mild  6. Post-nasal discharge: Moderate  7. Thick nasal discharge: Moderate  8. Ear fullness: Moderate  9. Dizziness: None  10. Ear Pain: None  11. Facial pain/pressure: None  12. Decreased Sense of Smell/Taste: Very mild  13. Difficulty falling asleep: Very mild  14. Wake up at night: Moderate  15. Lack of a good night's sleep: Moderate  16. Wake up tired: Moderate  17. Fatigue: Very mild  18. Reduced Productivity: Mild or slight  19. Reduced Concentration: Very mild  20. Frustrated/restless/irritable: None  21. Sad: None  22. Embarrassed: None    Total Score: 39    COPYRIGHT 1996. [facility] [place] . [place]

## 2023-11-28 RX ORDER — OMEPRAZOLE 40 MG/1
40 CAPSULE, DELAYED RELEASE ORAL DAILY
Qty: 90 CAPSULE | Refills: 1 | Status: SHIPPED | OUTPATIENT
Start: 2023-11-28 | End: 2024-05-20

## 2023-11-28 NOTE — TELEPHONE ENCOUNTER
omeprazole (PRILOSEC) 40 MG DR capsule   Last Written Prescription Date:  5/30/2023  Last Fill Quantity: 90,   # refills: 1  Last Office Visit : 5/30/2023  Future Office visit:  11/30/2023  90 Tabs, 1 Refill sent to chyna Fontanez RN  Central Triage Red Flags/Med Refills  PPI Protocol [id number]/23/2023 08:16 AM   Protocol Details Not on Clopidogrel (unless Pantoprazole ordered)    No diagnosis of osteoporosis on record    Recent (12 mo) or future (30 days) visit within the authorizing provider's specialty    Medication is active on med list    Patient is age 18 or older    No active pregnacy on record    No positive pregnancy test in past 12 months       To be filled at: [id number] - [place], [place] - [address]

## 2023-11-30 ENCOUNTER — VIRTUAL VISIT (OUTPATIENT)
Dept: GASTROENTEROLOGY | Facility: CLINIC | Age: 48
End: 2023-11-30
Attending: PHYSICIAN ASSISTANT
Payer: COMMERCIAL

## 2023-11-30 VITALS — HEIGHT: 64 IN | WEIGHT: 185 LBS | BODY MASS INDEX: 31.58 KG/M2

## 2023-11-30 DIAGNOSIS — R10.13 EPIGASTRIC PAIN: ICD-10-CM

## 2023-11-30 DIAGNOSIS — R11.2 NAUSEA AND VOMITING, UNSPECIFIED VOMITING TYPE: ICD-10-CM

## 2023-11-30 DIAGNOSIS — K29.80 PEPTIC DUODENITIS: Primary | ICD-10-CM

## 2023-11-30 PROCEDURE — 99213 OFFICE O/P EST LOW 20 MIN: CPT | Mod: VID | Performed by: PHYSICIAN ASSISTANT

## 2023-11-30 ASSESSMENT — PAIN SCALES - GENERAL: PAINLEVEL: NO PAIN (0)

## 2023-11-30 NOTE — NURSING NOTE
Is the patient currently in the state of [place]? YES    Visit mode:VIDEO    If the visit is dropped, the patient can be reconnected by: VIDEO VISIT: Send to e-mail at: [email or web address]    Will anyone else be joining the visit? NO  (If patient encounters technical issues they should call [phone]    How would you like to obtain your AVS? MyChart    Are changes needed to the allergy or medication list? No    Reason for visit: ANNY CASTELLON

## 2023-11-30 NOTE — PROGRESS NOTES
GASTROENTEROLOGY Follow-up VIDEO VISIT    CC/REFERRING MD:    Nanette Penny    REASON FOR CONSULTATION:   Murphy Castro for   Chief Complaint   Patient presents with    RECHECK       HISTORY OF PRESENT ILLNESS:    Mimi Melton is a 48 year old female who is being evaluated via a billable video visit for follow-up.  My last visit with patient was about 6 months ago.  She initially established care with me in January of this year.  She had been seen in the ER with symptoms of recurrent nausea, vomiting, and upper abdominal pain.  Her workup is included abdominal ultrasound, gastric emptying scan, and EGD.  The first 2 tests were normal and EGD was notable for peptic duodenitis on pathology with some gastric atrophy.  I elected to start her on omeprazole, which strongly improved her symptoms.  I had her continue this over the last 6 months.  This has gone well, she has not had any recurrent nausea or vomiting or abdominal pain since being on the omeprazole.  If she misses a day, she will get some gas and bloating.  She seems to be tolerating the medication well.  She did have her physical recently with primary care doctor, labs were notable for normal hemoglobin, WBC of 3.9 (previously 4.0 for multiple years), normal CMP, normal vitamin D.  He does have some questions about long-term omeprazole use and if there is anything we need to be concerned about there.  Weight has been stable.  No other GI concerns today.      I have reviewed and updated the patient's Past Medical History, Social History, Family History and Medication List.    Exam:    General appearance:  Healthy appearing adult, in no acute distress  Eyes:  Sclera anicteric, Pupils round and reactive to light  Ears, nose, mouth and throat:  No obvious external lesions of ears and nose.  Hearing intact  Neck:  Symmetric, No obvious external lesions  Respiratory:  Normal respiration, no use of accessory muscles   MSK:  No visual upper  extremity, neck or facial muscle atrophy  ABD:  No visual abdominal distention, no audible borborygmi  Skin:  No rashes or jaundice   Psychiatric:  Oriented to person, place and time, Appropriate mood and affect.   Neurologic:  Peripheral muscle function and dexterity appear to be intact      PERTINENT STUDIES have been reviewed.    ASSESSMENT/PLAN:    Mimi Melton is a 48 year old female who presents for follow up of symptoms of recurrent nausea, vomiting, and upper abdominal pain.  She has done well on omeprazole 40 mg daily as long as she remembers to take it.  She is getting some bloating and gas if she misses a dose.  I recommended that she continue on the 40 mg daily for the next 6 months.  We will check her PPI labs and recheck her CBC to assess for any micronutrient deficiency.  We will correct any deficiencies of supplementation as needed.  We will follow-up in 6 months and if she is continuing to do well, at that point we will work on trying to taper down the dose of omeprazole as tolerated.  She stated understanding of plan and I will see her in 6 months, sooner as needed.    1. Peptic duodenitis    2. Nausea and vomiting, unspecified vomiting type  - Magnesium; Future  - Ferritin; Future  - Iron and iron binding capacity; Future  - Vitamin B12; Future    3. Epigastric pain      Video-Visit Details    Video Visit Time: 11 minutes    Type of service:  Video Visit    Originating Location (pt. Location): Home    Distant Location (provider location):  On-site    Platform used for Video Visit: [employer]    A total of 18 minutes was spent with reviewing the chart, discussing with the patient, documentation and coordination of care.    Murphy Castro PA-C  Division of Gastroenterology, Hepatology, and Nutrition  [facility]  [phone]    RT 6 months

## 2023-12-06 ENCOUNTER — VIRTUAL VISIT (OUTPATIENT)
Dept: UROLOGY | Facility: CLINIC | Age: 48
End: 2023-12-06
Payer: COMMERCIAL

## 2023-12-06 DIAGNOSIS — N36.42 INTRINSIC SPHINCTER DEFICIENCY: Primary | ICD-10-CM

## 2023-12-06 DIAGNOSIS — N39.3 STRESS INCONTINENCE: ICD-10-CM

## 2023-12-06 PROCEDURE — 99215 OFFICE O/P EST HI 40 MIN: CPT | Mod: 95 | Performed by: UROLOGY

## 2023-12-06 RX ORDER — CEFAZOLIN SODIUM 2 G/50ML
2 SOLUTION INTRAVENOUS SEE ADMIN INSTRUCTIONS
Status: CANCELLED | OUTPATIENT
Start: 2023-12-06

## 2023-12-06 RX ORDER — CEFAZOLIN SODIUM 2 G/50ML
2 SOLUTION INTRAVENOUS
Status: CANCELLED | OUTPATIENT
Start: 2023-12-06

## 2023-12-06 ASSESSMENT — PAIN SCALES - GENERAL: PAINLEVEL: NO PAIN (0)

## 2023-12-06 NOTE — NURSING NOTE
Is the patient currently in the state of [place]? YES    Visit mode:VIDEO    If the visit is dropped, the patient can be reconnected by: VIDEO VISIT: Send to e-mail at: [email or web address]    Will anyone else be joining the visit? NO  (If patient encounters technical issues they should call [phone]    How would you like to obtain your AVS? MyChart    Are changes needed to the allergy or medication list? Pt stated no changes to allergies and Pt stated no med changes    Reason for visit: RECHECK (Pt request, seen 2 years ago)    Ashley KEBEDEF

## 2023-12-06 NOTE — PROGRESS NOTES
Virtual Visit Details    Type of service:  Video Visit     Originating Location (pt. Location): Home    Distant Location (provider location):  Off-site  Platform used for Video Visit: [employer]

## 2023-12-06 NOTE — PROGRESS NOTES
Reason for visit:  f/u on urinary symptoms    Clinical Data:  Ms. Melton is a 48 year old female w/ h/o KARMA, 3 C-sections in the past, h/o midurethral sling in 2017 with excellent results. She is now having some recurrence of her stress urinary incontinence.  She denies any difficulty voiding.    Assessment & Plan: Mimi Melton is a 48 year old female with stress urinary incontinence.  She previously underwent midurethral sling in 2017 and now presenting with some increased stress urinary incontinence.  We discussed augmenting the sling with some periurethral bulking and she would like to do that.  -schedule periurethal bulking for stress incontinence due to ISD    Thank you for allowing me to participate in the care of  Ms. Mimi Melton and I will keep you updated on her progress.        Karin Amin MD    52 minutes spent on the date of the encounter doing chart review, history and exam, documentation and further activities per the note

## 2023-12-06 NOTE — LETTER
12/6/2023       RE: Mimi Melton  [address]  [place] [address]     Dear Colleague,    Thank you for referring your patient, Mimi Melton, to the [facility] [place] [facility]. Please see a copy of my visit note below.    Virtual Visit Details    Type of service:  Video Visit     Originating Location (pt. Location): Home    Distant Location (provider location):  Off-site  Platform used for Video Visit: [employer]    Reason for visit:  f/u on urinary symptoms    Clinical Data:  Ms. Melton is a 48 year old female w/ h/o KARMA, 3 C-sections in the past, h/o midurethral sling in 2017 with excellent results. She is now having some recurrence of her stress urinary incontinence.  She denies any difficulty voiding.    Assessment & Plan: Mimi Melton is a 48 year old female with stress urinary incontinence.  She previously underwent midurethral sling in 2017 and now presenting with some increased stress urinary incontinence.  We discussed augmenting the sling with some periurethral bulking and she would like to do that.  -schedule periurethal bulking for stress incontinence due to ISD    Thank you for allowing me to participate in the care of  Ms. Mimi Melton and I will keep you updated on her progress.        Karin Amin MD    52 minutes spent on the date of the encounter doing chart review, history and exam, documentation and further activities per the note

## 2023-12-08 ENCOUNTER — LAB (OUTPATIENT)
Dept: LAB | Facility: CLINIC | Age: 48
End: 2023-12-08
Payer: COMMERCIAL

## 2023-12-08 ENCOUNTER — TELEPHONE (OUTPATIENT)
Dept: UROLOGY | Facility: CLINIC | Age: 48
End: 2023-12-08

## 2023-12-08 DIAGNOSIS — I10 ESSENTIAL HYPERTENSION WITH GOAL BLOOD PRESSURE LESS THAN 140/90: ICD-10-CM

## 2023-12-08 DIAGNOSIS — R11.2 NAUSEA AND VOMITING, UNSPECIFIED VOMITING TYPE: ICD-10-CM

## 2023-12-08 PROBLEM — N36.42 INTRINSIC SPHINCTER DEFICIENCY: Status: ACTIVE | Noted: 2023-12-06

## 2023-12-08 PROBLEM — N39.3 STRESS INCONTINENCE: Status: ACTIVE | Noted: 2023-12-06

## 2023-12-08 LAB
ERYTHROCYTE [DISTWIDTH] IN BLOOD BY AUTOMATED COUNT: 13.1 % (ref 10–15)
FERRITIN SERPL-MCNC: 28 NG/ML (ref 6–175)
HCT VFR BLD AUTO: 37.8 % (ref 35–47)
HGB BLD-MCNC: 12.7 G/DL (ref 11.7–15.7)
IRON BINDING CAPACITY (ROCHE): 335 UG/DL (ref 240–430)
IRON SATN MFR SERPL: 52 % (ref 15–46)
IRON SERPL-MCNC: 174 UG/DL (ref 37–145)
MAGNESIUM SERPL-MCNC: 1.9 MG/DL (ref 1.7–2.3)
MCH RBC QN AUTO: 30.1 PG (ref 26.5–33)
MCHC RBC AUTO-ENTMCNC: 33.6 G/DL (ref 31.5–36.5)
MCV RBC AUTO: 90 FL (ref 78–100)
PLATELET # BLD AUTO: 379 10E3/UL (ref 150–450)
RBC # BLD AUTO: 4.22 10E6/UL (ref 3.8–5.2)
VIT B12 SERPL-MCNC: 208 PG/ML (ref 232–1245)
WBC # BLD AUTO: 4 10E3/UL (ref 4–11)

## 2023-12-08 PROCEDURE — 36415 COLL VENOUS BLD VENIPUNCTURE: CPT

## 2023-12-08 PROCEDURE — 85027 COMPLETE CBC AUTOMATED: CPT

## 2023-12-08 PROCEDURE — 82607 VITAMIN B-12: CPT

## 2023-12-08 PROCEDURE — 83540 ASSAY OF IRON: CPT

## 2023-12-08 PROCEDURE — 83550 IRON BINDING TEST: CPT

## 2023-12-08 PROCEDURE — 83735 ASSAY OF MAGNESIUM: CPT

## 2023-12-08 PROCEDURE — 82728 ASSAY OF FERRITIN: CPT

## 2023-12-08 NOTE — TELEPHONE ENCOUNTER
Patient is scheduled for a surgical procedure with Dr. Amin      Spoke with: Patient via phone      Date of Surgery/Procedure: Tuesday February 06, 2024    Location: [facility] OR      Informed patient they will need an adult : Yes     Pre-op: Yes      H&P: Patient to schedule    Post-op: Wednesday March 06, 2024     Additional comments:      Surgery packet: [facility]     Patient is aware that surgery time is tentative to change and to expect a call 3-1 business days from Pre Admission Nursing for instructions and arrival time

## 2023-12-11 ENCOUNTER — MYC MEDICAL ADVICE (OUTPATIENT)
Dept: OBGYN | Facility: CLINIC | Age: 48
End: 2023-12-11
Payer: COMMERCIAL

## 2023-12-11 DIAGNOSIS — E53.8 VITAMIN B12 DEFICIENCY (NON ANEMIC): Primary | ICD-10-CM

## 2023-12-11 RX ORDER — LANOLIN ALCOHOL/MO/W.PET/CERES
1000 CREAM (GRAM) TOPICAL DAILY
Qty: 90 TABLET | Refills: 3 | Status: SHIPPED | OUTPATIENT
Start: 2023-12-11

## 2023-12-11 NOTE — RESULT ENCOUNTER NOTE
Arnold Le,    Your recent set of labs are available for you to review.  The only nutritional deficiency I see is that your vitamin B12 level is mildly low.  I recommend starting a vitamin B12 supplement.  I will send this to your pharmacy, take 1 tablet daily.    Please let me know if you have any questions.    Sincerely,  Murphy QUINTANILLA [facility] GI, Hepatology, and Nutrition

## 2023-12-12 DIAGNOSIS — E83.19 INCREASED STORAGE IRON: Primary | ICD-10-CM

## 2023-12-26 ASSESSMENT — ENCOUNTER SYMPTOMS
HEMATURIA: 0
COUGH: 0
SHORTNESS OF BREATH: 0
HEMATOCHEZIA: 0
MYALGIAS: 0
DIZZINESS: 0
FEVER: 0
DIARRHEA: 0
FREQUENCY: 0
BREAST MASS: 0
NAUSEA: 0
PALPITATIONS: 0
WEAKNESS: 0
ABDOMINAL PAIN: 0
HEADACHES: 0
DYSURIA: 0
PARESTHESIAS: 0
CHILLS: 0
ARTHRALGIAS: 0
EYE PAIN: 0
SORE THROAT: 0
NERVOUS/ANXIOUS: 0
JOINT SWELLING: 0
CONSTIPATION: 0
HEARTBURN: 0

## 2023-12-28 ENCOUNTER — MYC MEDICAL ADVICE (OUTPATIENT)
Dept: OBGYN | Facility: CLINIC | Age: 48
End: 2023-12-28

## 2023-12-28 ENCOUNTER — OFFICE VISIT (OUTPATIENT)
Dept: OBGYN | Facility: CLINIC | Age: 48
End: 2023-12-28
Payer: COMMERCIAL

## 2023-12-28 VITALS
HEART RATE: 85 BPM | DIASTOLIC BLOOD PRESSURE: 99 MMHG | BODY MASS INDEX: 31.76 KG/M2 | WEIGHT: 185 LBS | SYSTOLIC BLOOD PRESSURE: 150 MMHG | OXYGEN SATURATION: 96 %

## 2023-12-28 DIAGNOSIS — Z11.3 SCREEN FOR STD (SEXUALLY TRANSMITTED DISEASE): ICD-10-CM

## 2023-12-28 DIAGNOSIS — N91.1 SECONDARY AMENORRHEA: ICD-10-CM

## 2023-12-28 DIAGNOSIS — N76.0 BV (BACTERIAL VAGINOSIS): ICD-10-CM

## 2023-12-28 DIAGNOSIS — D25.9 UTERINE LEIOMYOMA, UNSPECIFIED LOCATION: ICD-10-CM

## 2023-12-28 DIAGNOSIS — K57.30 DIVERTICULOSIS OF LARGE INTESTINE WITHOUT HEMORRHAGE: ICD-10-CM

## 2023-12-28 DIAGNOSIS — R87.810 CERVICAL HIGH RISK HPV (HUMAN PAPILLOMAVIRUS) TEST POSITIVE: ICD-10-CM

## 2023-12-28 DIAGNOSIS — Z00.00 ROUTINE GENERAL MEDICAL EXAMINATION AT A HEALTH CARE FACILITY: Primary | ICD-10-CM

## 2023-12-28 DIAGNOSIS — R19.01 RIGHT UPPER QUADRANT ABDOMINAL MASS: ICD-10-CM

## 2023-12-28 DIAGNOSIS — B96.89 BV (BACTERIAL VAGINOSIS): ICD-10-CM

## 2023-12-28 PROCEDURE — 99214 OFFICE O/P EST MOD 30 MIN: CPT | Mod: 25 | Performed by: OBSTETRICS & GYNECOLOGY

## 2023-12-28 PROCEDURE — G0145 SCR C/V CYTO,THINLAYER,RESCR: HCPCS | Performed by: OBSTETRICS & GYNECOLOGY

## 2023-12-28 PROCEDURE — 87624 HPV HI-RISK TYP POOLED RSLT: CPT | Performed by: OBSTETRICS & GYNECOLOGY

## 2023-12-28 PROCEDURE — 99396 PREV VISIT EST AGE 40-64: CPT | Performed by: OBSTETRICS & GYNECOLOGY

## 2023-12-28 PROCEDURE — 87210 SMEAR WET MOUNT SALINE/INK: CPT | Performed by: OBSTETRICS & GYNECOLOGY

## 2023-12-28 RX ORDER — METRONIDAZOLE 7.5 MG/G
1 GEL VAGINAL DAILY
Qty: 35 G | Refills: 0 | Status: SHIPPED | OUTPATIENT
Start: 2023-12-28 | End: 2024-01-31

## 2023-12-28 NOTE — PROGRESS NOTES
Mimi is a 48 year old  female who presents for annual exam.     Menses are a and absent lasting  varies  days.  Menses flow: varies.  No LMP recorded (lmp unknown). (Menstrual status: Birth Control).. Using micronor for contraception.  She is not currently considering pregnancy.  She is not getting cycles on oral contraceptive pills.  Has been on this pill for about 1-2 yrs.  No period for 87 days, since September.    Not sexually active or in a relationship at the present time.  Bought a house with her son.  They both travel a lot.   Besides routine health maintenance, she has no other health concerns today.  Wants std screen and wet prep today. Since no menses, her chronic BV issue has improved a lot.   Would like a follow-up US to assess her fibroid.       Overall is feeling really well.   Wt Readings from Last 4 Encounters:   23 83.9 kg (185 lb)   23 83.9 kg (185 lb)   23 84 kg (185 lb 3.2 oz)   23 85.2 kg (187 lb 12.8 oz)      GYNECOLOGIC HISTORY:  Menarche: 13  Age at first intercourse: 14 Number of lifetime partners: m5  Mimi is not sexually active with 0male partner(s) and is not currently in monogamous relationship with 0.    History sexually transmitted infections:No STD history  STI testing offered?  Accepted  SUSAN exposure: No  History of abnormal Pap smear: YES - updated in Problem List and Health Maintenance accordingly  Family history of breast CA: No  Family history of uterine/ovarian CA: No    Family history of colon CA: No    HEALTH MAINTENANCE:  Cholesterol: (  Cholesterol   Date Value Ref Range Status   2023 163 <200 mg/dL Final   10/20/2022 177 <200 mg/dL Final   10/07/2020 180 <200 mg/dL Final   2019 175 <200 mg/dL Final    History of abnormal lipids: No  Mammo: 2023 . History of abnormal Mammo: No.  Regular Self Breast Exams: Yes  Calcium/Vitamin D intake: source:  dairy, dietary supplement(s) Adequate? Yes  TSH: (  TSH   Date Value Ref Range  Status   2023 0.72 0.30 - 4.20 uIU/mL Final   2022 1.52 0.40 - 4.00 mU/L Final   2020 0.49 0.40 - 4.00 mU/L Final    )  Pap; (  Lab Results   Component Value Date    PAP NIL 10/30/2020    PAP NIL 10/18/2019    PAP NIL 2016    )    HISTORY:  OB History    Para Term  AB Living   5 3 0 3 2 3   SAB IAB Ectopic Multiple Live Births   0 2 0 0 3      # Outcome Date GA Lbr Leeroy/2nd Weight Sex Delivery Anes PTL Lv   5  99 34w0d  2.041 kg (4 lb 8 oz)  CS   FARIDEH      Birth Comments: htn, pih   4  97 36w0d  2.948 kg (6 lb 8 oz)  CS   FARIDEH      Birth Comments: htn, pih   3 IAB 96     IAB   DEC   2 IAB 92     IAB   DEC   1  91 30w0d  1.304 kg (2 lb 14 oz)  CS   FARIDEH      Birth Comments: htn, pih     Past Medical History:   Diagnosis Date    Cervical high risk HPV (human papillomavirus) test positive 2015, 10/2019, 10/30/20    Chronic sinusitis Summer 2016    I get congested every 3-4 weeks    Esophageal reflux     Exophthalmos, unspecified     Hypothyroidism     Motion sickness     PONV (postoperative nausea and vomiting)     Thyroid eye disease     Thyrotoxicosis without mention of goiter or other cause, without mention of thyrotoxic crisis or storm 2005    Had radioablation, On synthroid, seeing N End[facility]; takes synthroid    Unspecified essential hypertension 1980    meds since , on atenolol     Past Surgical History:   Procedure Laterality Date    COLONOSCOPY N/A 2022    Procedure: COLONOSCOPY;  Surgeon: Marah Souza MD;  Location: [facility]    CYSTOSCOPY, SLING TRANSVAGINAL N/A 10/10/2017    Procedure: CYSTOSCOPY, SLING TRANSVAGINAL;  Midurethral Sling and Cystoscopy (Support the Urethra with Mesh Sling and Look in the Bladder);  Surgeon: Karin Amin MD;  Location: [facility]    ESOPHAGOSCOPY, GASTROSCOPY, DUODENOSCOPY (EGD), COMBINED N/A 3/30/2023    Procedure: Esophagoscopy, gastroscopy,  duodenoscopy (EGD), combined;  Surgeon: Janette Chandra MD;  [place] [facility] [place]    [place] SURGERY  2008    Orbital Decompression Dr smart    ORBITOTOMY DECOMPRESSION Bilateral 2021    Procedure: Bilateral orbital decompression with  Sonopet, Bilateral lower lid retraction repair;  Surgeon: Niles Donnelly MD;  Location: [facility] OR    REPAIR RETRACTION LID Bilateral 2021    Procedure: REPAIR, RETRACTION, EYELID;  Surgeon: Niles Donnelly MD;  Location: [facility] OR    SONOPET Bilateral 2021    Procedure: SONOPET EYE;  Surgeon: Niles Donnelly MD;  Location: [facility] OR    ZZC  DELIVERY ONLY  91    , Low Cervical    ZZC  DELIVERY ONLY  97    , Low Cervical    ZZC  DELIVERY ONLY  99    , Low Cervical    ZZC INDUCED ABORTN BY D&C      Aspiration & Curettage, TAB x2    ZZHC REPAIR INCISIONAL HERNIA,REDUCIBLE      Umbilical hernia Repair     Family History   Problem Relation Age of Onset    Hypertension Mother     Hypertension Father     Hypertension Maternal Grandmother     Hypertension Maternal Grandfather     Hypertension Paternal Grandmother     Hypertension Paternal Grandfather     Diabetes No family hx of     Glaucoma No family hx of     Macular Degeneration No family hx of      Social History     Socioeconomic History    Marital status: Single     Spouse name: None    Number of children: None    Years of education: None    Highest education level: None   Occupational History    Occupation: [employer]     Employer: [employer]   Tobacco Use    Smoking status: Never    Smokeless tobacco: Never   Vaping Use    Vaping Use: Never used   Substance and Sexual Activity    Alcohol use: Yes     Comment: 1-2x's/month if that.    Drug use: Yes     Types: Marijuana     Comment: 3x's/day    Sexual activity: Yes     Partners: Male     Birth control/protection: I.U.D.     Comment: Paraguard IUD,    Other Topics  Concern    Parent/sibling w/ CABG, MI or angioplasty before 65F 55M? No   Social History Narrative    Caffeine intake/servings daily - 0-1, 12 oz of Mountain Dew    Calcium intake/servings daily - eats only cheese    Exercise None    Sunscreen used - Yes    Seatbelts used - Yes    Guns stored in the home - No    Self Breast Exam - No    Pap test up to date -  Yes, as of today    Eye exam up to date -  Yes    Dental exam up to date -  Yes    DEXA scan up to date -  Not Applicable    Flex Sig/Colonoscopy up to date -  Not Applicable    Mammography up to date -  Not Applicable    Immunizations reviewed and up to date - Yes, within the last 10 years    Abuse: Current or Past (Physical, Sexual or Emotional) - No    Do you feel safe in your environment - Yes    Do you cope well with stress - Yes    Do you suffer from insomnia - No    Last updated by: Anu Vinson MA 12/21/2011             Social Determinants of Health     Financial Resource Strain: High Risk (11/1/2023)    Financial Resource Strain     Within the past 12 months, have you or your family members you live with been unable to get utilities (heat, electricity) when it was really needed?: Yes   Food Insecurity: Low Risk  (11/1/2023)    Food Insecurity     Within the past 12 months, did you worry that your food would run out before you got money to buy more?: No     Within the past 12 months, did the food you bought just not last and you didn t have money to get more?: No   Transportation Needs: Low Risk  (11/1/2023)    Transportation Needs     Within the past 12 months, has lack of transportation kept you from medical appointments, getting your medicines, non-medical meetings or appointments, work, or from getting things that you need?: No   Interpersonal Safety: Low Risk  (11/1/2023)    Interpersonal Safety     Do you feel physically and emotionally safe where you currently live?: Yes     Within the past 12 months, have you been hit, slapped, kicked or otherwise  physically hurt by someone?: No     Within the past 12 months, have you been humiliated or emotionally abused in other ways by your partner or ex-partner?: No   Housing Stability: Low Risk  (11/1/2023)    Housing Stability     Do you have housing? : Yes     Are you worried about losing your housing?: No       Current Outpatient Medications:     ACETAMINOPHEN PO, Take 1,000 mg by mouth 2 times daily as needed for pain (menstrual cramps), Disp: , Rfl:     benzoyl peroxide (BENZOYL PEROXIDE WASH) 5 % external liquid, WASH FACE SKIN 1-2 TIMES DAILY, Disp: 142 mL, Rfl: 3    cyanocobalamin (VITAMIN B-12) 1000 MCG tablet, Take 1 tablet (1,000 mcg) by mouth daily, Disp: 90 tablet, Rfl: 3    hydrochlorothiazide (HYDRODIURIL) 12.5 MG tablet, Take 1 tablet (12.5 mg) by mouth daily, Disp: 90 tablet, Rfl: 3    levothyroxine (SYNTHROID/LEVOTHROID) 100 MCG tablet, Take 1 tablet (100 mcg) by mouth daily, Disp: 90 tablet, Rfl: 3    loratadine (CLARITIN) 10 MG tablet, Take 10 mg by mouth daily, Disp: , Rfl:     losartan (COZAAR) 25 MG tablet, Take 1 tablet (25 mg) by mouth daily, Disp: 90 tablet, Rfl: 3    metroNIDAZOLE (METROGEL) 0.75 % vaginal gel, Place 1 applicator (5 g) vaginally daily, Disp: 70 g, Rfl: 0    norethindrone (MICRONOR) 0.35 MG tablet, Take 1 tablet (0.35 mg) by mouth daily, Disp: 84 tablet, Rfl: 3    omeprazole (PRILOSEC) 40 MG DR capsule, Take 1 capsule (40 mg) by mouth daily, Disp: 90 capsule, Rfl: 1    tretinoin (RETIN-A) 0.05 % external cream, Apply topically At Bedtime Put on face 1-2 times daily (if irritated reduce to every other day) (Patient not taking: Reported on 11/1/2023), Disp: 20 g, Rfl: 3    valACYclovir (VALTREX) 500 MG tablet, Take 1 tablet (500 mg) by mouth 2 times daily for 3 days, Disp: 6 tablet, Rfl: 0     Allergies   Allergen Reactions    Dust Mites     Lisinopril Swelling     Swelling in lip    Mold      Cannot have any meds that contain mold.       Past medical, surgical, social and family  history were reviewed and updated in EPIC.    ROS:   C:     NEGATIVE for fever, chills, change in weight  I:       NEGATIVE for worrisome rashes, moles or lesions  E:     NEGATIVE for vision changes or irritation  E/M: NEGATIVE for ear, mouth and throat problems  R:     NEGATIVE for significant cough or SOB  CV:   NEGATIVE for chest pain, palpitations or peripheral edema  GI:     NEGATIVE for nausea, abdominal pain, heartburn, or change in bowel habits  :   NEGATIVE for frequency, dysuria, hematuria, vaginal discharge, ++  irregular bleeding  M:     NEGATIVE for significant arthralgias or myalgia  N:      NEGATIVE for weakness, dizziness or paresthesias  E:      NEGATIVE for temperature intolerance, skin/hair changes  P:      NEGATIVE for changes in mood or affect.    EXAM:  BP (!) 150/99   Pulse 85   Wt 83.9 kg (185 lb)   LMP  (LMP Unknown)   SpO2 96%   BMI 31.76 kg/m     BMI: Body mass index is 31.76 kg/m .  Constitutional: healthy, alert and no distress  Head: Normocephalic. No masses, lesions, tenderness or abnormalities  Neck: Neck supple. Trachea midline. No adenopathy. Thyroid symmetric, normal size.   Cardiovascular: RRR.   Respiratory: CTA bilaterally    Breast: deferred   Gastrointestinal: Abdomen soft, non-tender, non-distended. 4-5 cm firmness over the gastric region    :  Vulva:  No external lesions, normal female hair distribution   Urethra:  Midline, non-tender, well supported, no discharge  Vagina:  Moist, pink, no abnormal discharge, no lesions  Uterus:  enlarged, large posterior fibroid, non-tender, hard to appreciate size   Ovaries:  No masses appreciated, non-tender   Rectal Exam: deferred  Musculoskeletal: extremities normal  Skin: no suspicious lesions or rashes  Psychiatric: Affect appropriate, cooperative,mentation appears normal.     COUNSELING:   Reviewed preventive health counseling, as reflected in patient instructions       Regular exercise       Healthy diet/nutrition        Vision screening       Alcohol Use       Contraception       Family planning       Folic Acid       Osteoporosis prevention/bone health       Safe sex practices/STD prevention       Colorectal Cancer Screening       Consider Hep C screening for all patients one time for ages 18-79 years       Syphilis screening for high risk patients        HIV screeninx in teen years, 1x in adult years, and at intervals if high risk       (Jazz)menopause management   reports that she has never smoked. She has never used smokeless tobacco.    Body mass index is 31.76 kg/m .  Weight management plan: Discussed healthy diet and exercise guidelines  FRAX Risk Assessment    ASSESSMENT:  48 year old female with satisfactory annual exam  (Z00.00) Routine general medical examination at a health care facility  (primary encounter diagnosis)  Comment: discussed routine  health care maintenance   Plan: discussed healthy lifestyle, diet, exercise.   (D25.9) Uterine leiomyoma, unspecified location  Comment: known fibroid, low in pelvis, feels different today on exam. Not so deep in the pelvis and most posterior to the uterus.  She made it clear that she does not want a hysterectomy.   Plan: US Pelvic Complete with Transvaginal,   CT Abdomen Pelvis w Contrast this is to assess the gastric mass and the fibroid        (K57.30) Diverticulosis of large intestine without hemorrhage  Comment: noted on colonoscopy, patient did not remember discussing this  Plan: keep stools regular    (R87.810) Cervical high risk HPV (human papillomavirus) test positive  Plan: Pap diagnostic with HPV        (Z11.3) Screen for STD (sexually transmitted disease)  Comment:    Plan: Hepatitis B surface antigen, Hepatitis C         antibody, Treponema Abs w Reflex to RPR and         Titer, HIV Antigen Antibody Combo, NEISSERIA         GONORRHOEA PCR, CHLAMYDIA TRACHOMATIS PCR          (N91.1) Secondary amenorrhea  Comment:  likely prolonged use of micronor, discussed also  perimenopause possibly, she would like FSH  Plan: Follicle stimulating hormone,         After discontinuing oral contraceptive pills.   We discussed coming off oral contraceptive pills, she feels it has increased her breast size and would like to discontinue them since she is not cycling and not needing contraception at the present time.     (R19.01) Right upper quadrant abdominal mass  Comment: mass noted in gastric region, ? Stool in colon. Patient requesting to proceed with CTS for further assessment.  Plan: CT Abdomen Pelvis w Contrast          (N76.0,  B96.89) BV (bacterial vaginosis)  Comment:  not clinically noticeable.  But patient requested due to h/o frequent BV,   Plan: metroNIDAZOLE (METROGEL) 0.75 % vaginal gel  expectant management ok too if she is not symptomatic.          Delaney Mccarty MD

## 2024-01-02 ENCOUNTER — MYC MEDICAL ADVICE (OUTPATIENT)
Dept: OBGYN | Facility: CLINIC | Age: 49
End: 2024-01-02
Payer: COMMERCIAL

## 2024-01-02 ENCOUNTER — HOSPITAL ENCOUNTER (OUTPATIENT)
Dept: CT IMAGING | Facility: HOSPITAL | Age: 49
Discharge: HOME | End: 2024-01-02
Attending: OBSTETRICS & GYNECOLOGY | Admitting: OBSTETRICS & GYNECOLOGY
Payer: COMMERCIAL

## 2024-01-02 DIAGNOSIS — R19.01 RIGHT UPPER QUADRANT ABDOMINAL MASS: ICD-10-CM

## 2024-01-02 DIAGNOSIS — D25.9 UTERINE LEIOMYOMA, UNSPECIFIED LOCATION: ICD-10-CM

## 2024-01-02 PROCEDURE — 250N000011 HC RX IP 250 OP 636: Performed by: OBSTETRICS & GYNECOLOGY

## 2024-01-02 PROCEDURE — 74177 CT ABD & PELVIS W/CONTRAST: CPT

## 2024-01-02 RX ORDER — IOPAMIDOL 755 MG/ML
90 INJECTION, SOLUTION INTRAVASCULAR ONCE
Status: COMPLETED | OUTPATIENT
Start: 2024-01-02 | End: 2024-01-02

## 2024-01-02 RX ADMIN — IOPAMIDOL 90 ML: 755 INJECTION, SOLUTION INTRAVENOUS at 14:45

## 2024-01-03 NOTE — TELEPHONE ENCOUNTER
Please Explain to patient that the indication says there is an abd mass but that is what I wrote for ordering the CTS      The CTS does not show a mass  Under impression, it just shows myomatous uterus which we already know about.   RR

## 2024-01-04 ENCOUNTER — TELEPHONE (OUTPATIENT)
Dept: FAMILY MEDICINE | Facility: CLINIC | Age: 49
End: 2024-01-04
Payer: COMMERCIAL

## 2024-01-04 NOTE — TELEPHONE ENCOUNTER
Reason for Call:  Appointment Request    Patient requesting this type of appt: Pre-op    Requested provider:  any provider    Reason patient unable to be scheduled: Not within requested timeframe    When does patient want to be seen/preferred time: 1-2 weeks    Comments: patient has a surgery on 2/6 and prefers the [facility] location requesting work in    Could we send this information to you in [place] or would you prefer to receive a phone call?:   Patient would prefer a phone call   Okay to leave a detailed message?: Yes at Cell number on file:    Telephone Information:   Mobile [phone]       Call taken on 1/4/2024 at 12:39 PM by Iris Byers

## 2024-01-04 NOTE — TELEPHONE ENCOUNTER
Spoke patient regarding no openings at [facility]. Patient is scheduled on [facility]

## 2024-01-30 ENCOUNTER — TELEPHONE (OUTPATIENT)
Dept: UROLOGY | Facility: CLINIC | Age: 49
End: 2024-01-30
Payer: COMMERCIAL

## 2024-01-30 DIAGNOSIS — N39.3 STRESS INCONTINENCE: Primary | ICD-10-CM

## 2024-01-30 DIAGNOSIS — N36.42 INTRINSIC SPHINCTER DEFICIENCY: ICD-10-CM

## 2024-01-30 NOTE — TELEPHONE ENCOUNTER
Patient has her pre-op scheduled for tomorrow with her primary at the [facility]. Patient will have her urine done after the appointment     Elizabeth Rondon, RN, BSN  Care Coordinator Urology  [facility] [place]  [facility]  [phone]

## 2024-01-31 ENCOUNTER — OFFICE VISIT (OUTPATIENT)
Dept: FAMILY MEDICINE | Facility: CLINIC | Age: 49
End: 2024-01-31
Payer: COMMERCIAL

## 2024-01-31 VITALS
HEART RATE: 73 BPM | OXYGEN SATURATION: 99 % | BODY MASS INDEX: 31.49 KG/M2 | RESPIRATION RATE: 20 BRPM | SYSTOLIC BLOOD PRESSURE: 120 MMHG | DIASTOLIC BLOOD PRESSURE: 80 MMHG | WEIGHT: 189 LBS | TEMPERATURE: 97.7 F | HEIGHT: 65 IN

## 2024-01-31 DIAGNOSIS — Z11.3 SCREEN FOR STD (SEXUALLY TRANSMITTED DISEASE): ICD-10-CM

## 2024-01-31 DIAGNOSIS — N36.42 INTRINSIC SPHINCTER DEFICIENCY: ICD-10-CM

## 2024-01-31 DIAGNOSIS — Z01.818 PREOPERATIVE EXAMINATION: Primary | ICD-10-CM

## 2024-01-31 DIAGNOSIS — H05.20 EXOPHTHALMOS: ICD-10-CM

## 2024-01-31 DIAGNOSIS — N39.3 STRESS INCONTINENCE OF URINE: ICD-10-CM

## 2024-01-31 DIAGNOSIS — I10 ESSENTIAL HYPERTENSION WITH GOAL BLOOD PRESSURE LESS THAN 140/90: ICD-10-CM

## 2024-01-31 DIAGNOSIS — E83.19 INCREASED STORAGE IRON: ICD-10-CM

## 2024-01-31 DIAGNOSIS — N39.3 STRESS INCONTINENCE: ICD-10-CM

## 2024-01-31 DIAGNOSIS — N91.1 SECONDARY AMENORRHEA: ICD-10-CM

## 2024-01-31 DIAGNOSIS — R87.810 CERVICAL HIGH RISK HPV (HUMAN PAPILLOMAVIRUS) TEST POSITIVE: ICD-10-CM

## 2024-01-31 DIAGNOSIS — E03.4 HYPOTHYROIDISM DUE TO ACQUIRED ATROPHY OF THYROID: ICD-10-CM

## 2024-01-31 DIAGNOSIS — R87.618 OTHER ABNORMAL CYTOLOGICAL FINDING OF SPECIMEN FROM CERVIX: ICD-10-CM

## 2024-01-31 LAB
ERYTHROCYTE [DISTWIDTH] IN BLOOD BY AUTOMATED COUNT: 12.9 % (ref 10–15)
HCT VFR BLD AUTO: 37.2 % (ref 35–47)
HCV AB SERPL QL IA: NONREACTIVE
HGB BLD-MCNC: 12.1 G/DL (ref 11.7–15.7)
MCH RBC QN AUTO: 29.7 PG (ref 26.5–33)
MCHC RBC AUTO-ENTMCNC: 32.5 G/DL (ref 31.5–36.5)
MCV RBC AUTO: 91 FL (ref 78–100)
PLATELET # BLD AUTO: 293 10E3/UL (ref 150–450)
RBC # BLD AUTO: 4.07 10E6/UL (ref 3.8–5.2)
T PALLIDUM AB SER QL: NONREACTIVE
WBC # BLD AUTO: 4.5 10E3/UL (ref 4–11)

## 2024-01-31 PROCEDURE — 83001 ASSAY OF GONADOTROPIN (FSH): CPT | Performed by: FAMILY MEDICINE

## 2024-01-31 PROCEDURE — 85027 COMPLETE CBC AUTOMATED: CPT | Performed by: FAMILY MEDICINE

## 2024-01-31 PROCEDURE — 87389 HIV-1 AG W/HIV-1&-2 AB AG IA: CPT | Performed by: FAMILY MEDICINE

## 2024-01-31 PROCEDURE — 87591 N.GONORRHOEAE DNA AMP PROB: CPT | Performed by: FAMILY MEDICINE

## 2024-01-31 PROCEDURE — 83540 ASSAY OF IRON: CPT | Performed by: FAMILY MEDICINE

## 2024-01-31 PROCEDURE — 80048 BASIC METABOLIC PNL TOTAL CA: CPT | Performed by: FAMILY MEDICINE

## 2024-01-31 PROCEDURE — 87491 CHLMYD TRACH DNA AMP PROBE: CPT | Performed by: FAMILY MEDICINE

## 2024-01-31 PROCEDURE — 87340 HEPATITIS B SURFACE AG IA: CPT | Performed by: FAMILY MEDICINE

## 2024-01-31 PROCEDURE — 83550 IRON BINDING TEST: CPT | Performed by: FAMILY MEDICINE

## 2024-01-31 PROCEDURE — 99214 OFFICE O/P EST MOD 30 MIN: CPT | Performed by: FAMILY MEDICINE

## 2024-01-31 PROCEDURE — 86803 HEPATITIS C AB TEST: CPT | Performed by: FAMILY MEDICINE

## 2024-01-31 PROCEDURE — 82728 ASSAY OF FERRITIN: CPT | Performed by: FAMILY MEDICINE

## 2024-01-31 PROCEDURE — 86780 TREPONEMA PALLIDUM: CPT | Performed by: FAMILY MEDICINE

## 2024-01-31 PROCEDURE — 87086 URINE CULTURE/COLONY COUNT: CPT | Performed by: FAMILY MEDICINE

## 2024-01-31 PROCEDURE — 36415 COLL VENOUS BLD VENIPUNCTURE: CPT | Performed by: FAMILY MEDICINE

## 2024-01-31 ASSESSMENT — PAIN SCALES - GENERAL: PAINLEVEL: NO PAIN (0)

## 2024-01-31 NOTE — PATIENT INSTRUCTIONS
Preparing for Your Surgery  Getting started  A nurse will call you to review your health history and instructions. They will give you an arrival time based on your scheduled surgery time. Please be ready to share:  Your doctor's clinic name and phone number  Your medical, surgical, and anesthesia history  A list of allergies and sensitivities  A list of medicines, including herbal treatments and over-the-counter drugs  Whether the patient has a legal guardian (ask how to send us the papers in advance)  Please tell us if you're pregnant--or if there's any chance you might be pregnant. Some surgeries may injure a fetus (unborn baby), so they require a pregnancy test. Surgeries that are safe for a fetus don't always need a test, and you can choose whether to have one.   If you have a child who's having surgery, please ask for a copy of Preparing for Your Child's Surgery.    Preparing for surgery  Within 10 to 30 days of surgery: Have a pre-op exam (sometimes called an H&P, or History and Physical). This can be done at a clinic or pre-operative center.  If you're having a , you may not need this exam. Talk to your care team.  At your pre-op exam, talk to your care team about all medicines you take. If you need to stop any medicines before surgery, ask when to start taking them again.  We do this for your safety. Many medicines can make you bleed too much during surgery. Some change how well surgery (anesthesia) drugs work.  Call your insurance company to let them know you're having surgery. (If you don't have insurance, call [phone].)  Call your clinic if there's any change in your health. This includes signs of a cold or flu (sore throat, runny nose, cough, rash, fever). It also includes a scrape or scratch near the surgery site.  If you have questions on the day of surgery, call your hospital or surgery center.  Eating and drinking guidelines  For your safety: Unless your surgeon tells you otherwise,  follow the guidelines below.  Eat and drink as usual until 8 hours before you arrive for surgery. After that, no food or milk.  Drink clear liquids until 2 hours before you arrive. These are liquids you can see through, like water, Gatorade, and Propel Water. They also include plain black coffee and tea (no cream or milk), candy, and breath mints. You can spit out gum when you arrive.  If you drink alcohol: Stop drinking it the night before surgery.  If your care team tells you to take medicine on the morning of surgery, it's okay to take it with a sip of water.  Preventing infection  Shower or bathe the night before and morning of your surgery. Follow the instructions your clinic gave you. (If no instructions, use regular soap.)  Don't shave or clip hair near your surgery site. We'll remove the hair if needed.  Don't smoke or vape the morning of surgery. You may chew nicotine gum up to 2 hours before surgery. A nicotine patch is okay.  Note: Some surgeries require you to completely quit smoking and nicotine. Check with your surgeon.  Your care team will make every effort to keep you safe from infection. We will:  Clean our hands often with soap and water (or an alcohol-based hand rub).  Clean the skin at your surgery site with a special soap that kills germs.  Give you a special gown to keep you warm. (Cold raises the risk of infection.)  Wear special hair covers, masks, gowns and gloves during surgery.  Give antibiotic medicine, if prescribed. Not all surgeries need antibiotics.  What to bring on the day of surgery  Photo ID and insurance card  Copy of your health care directive, if you have one  Glasses and hearing aids (bring cases)  You can't wear contacts during surgery  Inhaler and eye drops, if you use them (tell us about these when you arrive)  CPAP machine or breathing device, if you use them  A few personal items, if spending the night  If you have . . .  A pacemaker, ICD (cardiac defibrillator) or other  implant: Bring the ID card.  An implanted stimulator: Bring the remote control.  A legal guardian: Bring a copy of the certified (court-stamped) guardianship papers.  Please remove any jewelry, including body piercings. Leave jewelry and other valuables at home.  If you're going home the day of surgery  You must have a responsible adult drive you home. They should stay with you overnight as well.  If you don't have someone to stay with you, and you aren't safe to go home alone, we may keep you overnight. Insurance often won't pay for this.  After surgery  If it's hard to control your pain or you need more pain medicine, please call your surgeon's office.  Questions?   If you have any questions for your care team, list them here: _________________________________________________________________________________________________________________________________________________________________________ ____________________________________ ____________________________________ ____________________________________  For informational purposes only. Not to replace the advice of your health care provider. Copyright   2003, 2019 [employer] [facility]. All rights reserved. Clinically reviewed by Dede Wagner MD. [employer] 449668 - REV 12/22.    How to Take Your Medication Before Surgery  - Take all of your medications before surgery except as noted below  - HOLD (do not take) your HYDROCHLOROTHIAZIDE on the morning of surgery.   - do not take losartan 25 mg daily on AM of surgery

## 2024-01-31 NOTE — PROGRESS NOTES
Preoperative Evaluation  [facility]  [address]  [facility] [address]  Phone: [phone]  Fax: [phone]  Primary Provider: Nanette Penny  Pre-op Performing Provider: ALESIA JIMENEZ  2024       Mimi is a 48 year old, presenting for the following:  Pre-Op Exam (24, cystoscopy, Dr. Amin, [facility])        2024     3:10 PM   Additional Questions   Roomed by      Surgical Information  Surgery/Procedure: cystoscopy  Surgery Location: [facility]  Surgeon: Dr. Amin  Surgery Date: 2024  Time of Surgery: TBD  Where patient plans to recover: At home with family  Fax number for surgical facility: Note does not need to be faxed, will be available electronically in Epic.    Assessment & Plan     The proposed surgical procedure is considered INTERMEDIATE risk.    Preoperative examination  Preoperative examination completed.  No contraindication to scheduled procedure identified.    Stress incontinence of urine  Stress urinary incontinence described.  History of  x 3.  Prior urinary incontinence surgery described approximately 5 years ago.  - CBC with platelets  - CBC with platelets    Essential hypertension with goal blood pressure less than 140/90  Hypertension historically.  He is treated with hydrochlorothiazide 12.5 mg daily and losartan 25 mg daily.  Will hold both medicines morning of surgery.  - Basic metabolic panel  - Basic metabolic panel    Other abnormal cytological finding of specimen from cervix  History of abnormal Pap smear described with high risk HPV historically.    Cervical high risk HPV (human papillomavirus) test positive  Continues to follow with GYN for ongoing monitoring.    Exophthalmos  History of exophthalmos with hyperthyroidism historically now utilizing levothyroxine 100 mcg daily for thyroid replacement.    Hypothyroidism due to acquired atrophy of thyroid  As above, utilizing levothyroxine 100 mcg daily  for thyroid replacement.    Increased storage iron  Increase storage iron with iron studies obtained.  - Ferritin  - Iron and iron binding capacity    Secondary amenorrhea  FSH regarding history of secondary amenorrhea.  - Follicle stimulating hormone    Screen for STD (sexually transmitted disease)  STD screen performed.  - Hepatitis B surface antigen  - Hepatitis C antibody  - Treponema Abs w Reflex to RPR and Titer  - HIV Antigen Antibody Combo  - NEISSERIA GONORRHOEA PCR  - CHLAMYDIA TRACHOMATIS PCR    Stress incontinence  Stress incontinence with urine culture obtained.  - Urine Culture Aerobic Bacterial    Intrinsic sphincter deficiency  Stress incontinence noted with urine culture obtained.  - Urine Culture Aerobic Bacterial              - No identified additional risk factors other than previously addressed    Antiplatelet or Anticoagulation Medication Instructions   - Patient is on no antiplatelet or anticoagulation medications.    Additional Medication Instructions  Patient is to take all scheduled medications on the day of surgery EXCEPT for modifications listed below:   - ACE/ARB: HOLD on day of surgery (minimum 11 hours for general anesthesia).   - Diuretics: HOLD on the day of surgery.    Recommendation  APPROVAL GIVEN to proceed with proposed procedure, without further diagnostic evaluation.          Subjective       HPI related to upcoming procedure: Patient seen today for preoperative clearance.  Stress urinary incontinence historically.  Does have underlying history of hypertension utilizes losartan 25 mg daily and hydrochlorothiazide 12.5 mg daily historically.  Levothyroxine 100 mcg daily for thyroid replacement with history of hyperthyroidism with orbital decompression surgery x 3 in the past.  Utilizes omeprazole 40 mg daily.  Notes needs STI screen per specialist.  Patient with secondary amenorrhea and risk for increased iron storage and has other standing orders that need to be completed  "today.  Patient has been in good health and just recently returned from [place].  Past medical social and family history reviewed and updated as noted below.  Comprehensive review of systems as above otherwise all negative.      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 Have you ever had a heart attack or stroke? No   2. Have you ever had surgery on your heart or blood vessels, such as a stent placement, a coronary artery bypass, or surgery on an artery in your head, neck, heart, or legs? No   3. Do you have chest pain with activity? No   4. Do you have a history of  heart failure? No   5. Do you currently have a cold, bronchitis or symptoms of other infection? No   6. Do you have a cough, shortness of breath, or wheezing? No   7. Do you or anyone in your family have previous history of blood clots? No   8. Do you or does anyone in your family have a serious bleeding problem such as prolonged bleeding following surgeries or cuts? No   9. Have you ever had problems with anemia or been told to take iron pills? No   10. Have you had any abnormal blood loss such as black, tarry or bloody stools, or abnormal vaginal bleeding? No   11. Have you ever had a blood transfusion? No   12. Are you willing to have a blood transfusion if it is medically needed before, during, or after your surgery? Yes   13. Have you or any of your relatives ever had problems with anesthesia? No   14. Do you have sleep apnea, excessive snoring or daytime drowsiness? No "   15. Do you have any artifical heart valves or other implanted medical devices like a pacemaker, defibrillator, or continuous glucose monitor? No   16. Do you have artificial joints? No   17. Are you allergic to latex? No   18. Is there any chance that you may be pregnant? No       Health Care Directive  Patient does not have a Health Care Directive or Living Will: Discussed advance care planning with patient; information given to patient to review.    Preoperative Review of    reviewed - no record of controlled substances prescribed.      Status of Chronic Conditions:  See problem list for active medical problems.  Problems all longstanding and stable, except as noted/documented.  See ROS for pertinent symptoms related to these conditions.    HYPERTENSION - Patient has longstanding history of HTN , currently denies any symptoms referable to elevated blood pressure. Specifically denies chest pain, palpitations, dyspnea, orthopnea, PND or peripheral edema. Blood pressure readings have been in normal range. Current medication regimen is as listed below. Patient denies any side effects of medication.     HYPOTHYROIDISM - Patient has a longstanding history of chronic Hypothyroidism. Patient has been doing well, noting no tremor, insomnia, hair loss or changes in skin texture. Continues to take medications as directed, without adverse reactions or side effects. Last TSH   Lab Results   Component Value Date    TSH 0.72 11/01/2023   .      Patient Active Problem List    Diagnosis Date Noted    Intrinsic sphincter deficiency 12/06/2023     Priority: Medium    Stress incontinence 12/06/2023     Priority: Medium    Diverticulosis of large intestine without hemorrhage 07/03/2023     Priority: Medium    Thyroid eye disease 10/15/2021     Priority: Medium     Added automatically from request for surgery [id number]      Eyelid retraction, unspecified laterality 10/15/2021     Priority: Medium     Added automatically from  request for surgery [id number]      Need for HPV vaccine 07/03/2020     Priority: Medium    Chronic seasonal allergic rhinitis, unspecified trigger 06/19/2018     Priority: Medium    Female stress incontinence 04/12/2017     Priority: Medium    Thyroid disease 08/09/2016     Priority: Medium    Cervical high risk HPV (human papillomavirus) test positive 12/01/2015     Priority: Medium     ECC, repeat pap and hpv all negative 9/09.   NIL paps: 2/10, 9/10, 12/11, 12/12. Plan pap in 3 yrs.  7/14: NIL pap. Plan cotest pap & HPV in 3 years.  12/2015: NIL pap, + HPV (not 16 or 18). Plan cotest pap & HPV in 1 year  12/8/2016 Pap: NIL/neg HR HPV. Plan cotest in 3 years.  10/18/19 Pap: NIL, +HR HPV (not 16/18). Plan cotest in 1 year.  10/30/20: NIL Pap, + HR HPV (not 16 or 18). Plan Sh[place]an due bef 01/30/21.  12/02/20: Sh[place]an ECC Neg for dysplasia. Plan cotest in 1 year due 12/02/21.   12/6/21 NIL pap, +HR HPV (not 16/18). Per ASCCP guidelines, plan cotest in 1 year.  12/22/22 NIL, Neg HPV. Plan 1 yr co-test   12/27/23 NIL pap, Neg HPV. Plan 3 yr co-test      Hypothyroidism due to acquired atrophy of thyroid 10/13/2015     Priority: Medium    Anemia 07/22/2014     Priority: Medium    Essential hypertension with goal blood pressure less than 140/90 12/28/2012     Priority: Medium    CARDIOVASCULAR SCREENING; LDL GOAL LESS THAN 160 10/31/2010     Priority: Medium    Abnormal Pap smear of cervix 09/09/2009     Priority: Medium     See dx for Cervical High Risk HPV.       Exophthalmos 12/04/2007     Priority: Medium     Problem list name updated by automated process. Provider to review      BV (bacterial vaginosis) 06/19/2007     Priority: Medium     Problem list name updated by automated process. Provider to review      Surveillance of previously prescribed intrauterine contraceptive device 12/20/2004     Priority: Medium     Paragard IUD placed in May 19, 2011.        Past Medical History:   Diagnosis Date    Cervical high risk HPV  (human papillomavirus) test positive 2015, 10/2019, 10/30/20    Chronic sinusitis Summer 2016    I get congested every 3-4 weeks    Esophageal reflux     Exophthalmos, unspecified     Hypothyroidism     Motion sickness     PONV (postoperative nausea and vomiting)     Thyroid eye disease     Thyrotoxicosis without mention of goiter or other cause, without mention of thyrotoxic crisis or storm 2005    Had radioablation, On synthroid, seeing [facility]; takes synthroid    Unspecified essential hypertension 1980    meds since , on atenolol     Past Surgical History:   Procedure Laterality Date    COLONOSCOPY N/A 2022    Procedure: COLONOSCOPY;  Surgeon: Marah Souza MD;  Location: [facility]    CYSTOSCOPY, SLING TRANSVAGINAL N/A 10/10/2017    Procedure: CYSTOSCOPY, SLING TRANSVAGINAL;  Midurethral Sling and Cystoscopy (Support the Urethra with Mesh Sling and Look in the Bladder);  Surgeon: Karin Amin MD;  Location: [facility]    ESOPHAGOSCOPY, GASTROSCOPY, DUODENOSCOPY (EGD), COMBINED N/A 3/30/2023    Procedure: Esophagoscopy, gastroscopy, duodenoscopy (EGD), combined;  Surgeon: Janette Chandra MD;  Location: [facility] OR    EYE SURGERY  2008    Orbital Decompression Dr smart    ORBITOTOMY DECOMPRESSION Bilateral 2021    Procedure: Bilateral orbital decompression with  Sonopet, Bilateral lower lid retraction repair;  Surgeon: Niles Donnelly MD;  Location: [facility] OR    REPAIR RETRACTION LID Bilateral 2021    Procedure: REPAIR, RETRACTION, EYELID;  Surgeon: Niles Donnelly MD;  Location: [facility] OR    SONOPET Bilateral 2021    Procedure: SONOPET EYE;  Surgeon: Niles Donnelly MD;  Location: [facility] OR    ZZC  DELIVERY ONLY  91    , Low Cervical    ZZC  DELIVERY ONLY  97    , Low Cervical    ZZC  DELIVERY ONLY  99    , Low Cervical    ZZC INDUCED ABORTN BY D&C      Aspiration & Curettage, TAB x2     ZZHC REPAIR INCISIONAL HERNIA,REDUCIBLE      Umbilical hernia Repair     Current Outpatient Medications   Medication Sig Dispense Refill    ACETAMINOPHEN PO Take 1,000 mg by mouth 2 times daily as needed for pain (menstrual cramps)      benzoyl peroxide (BENZOYL PEROXIDE WASH) 5 % external liquid WASH FACE SKIN 1-2 TIMES DAILY 142 mL 3    cyanocobalamin (VITAMIN B-12) 1000 MCG tablet Take 1 tablet (1,000 mcg) by mouth daily 90 tablet 3    hydrochlorothiazide (HYDRODIURIL) 12.5 MG tablet Take 1 tablet (12.5 mg) by mouth daily 90 tablet 3    levothyroxine (SYNTHROID/LEVOTHROID) 100 MCG tablet Take 1 tablet (100 mcg) by mouth daily 90 tablet 3    loratadine (CLARITIN) 10 MG tablet Take 10 mg by mouth daily      losartan (COZAAR) 25 MG tablet Take 1 tablet (25 mg) by mouth daily 90 tablet 3    omeprazole (PRILOSEC) 40 MG DR capsule Take 1 capsule (40 mg) by mouth daily 90 capsule 1    tretinoin (RETIN-A) 0.05 % external cream Apply topically At Bedtime Put on face 1-2 times daily (if irritated reduce to every other day) 20 g 3    metroNIDAZOLE (METROGEL) 0.75 % vaginal gel Place 1 applicator (5 g) vaginally daily (Patient not taking: Reported on 1/31/2024) 35 g 0    metroNIDAZOLE (METROGEL) 0.75 % vaginal gel Place 1 applicator (5 g) vaginally daily (Patient not taking: Reported on 1/31/2024) 70 g 0    norethindrone (MICRONOR) 0.35 MG tablet Take 1 tablet (0.35 mg) by mouth daily (Patient not taking: Reported on 1/31/2024) 84 tablet 3    valACYclovir (VALTREX) 500 MG tablet Take 1 tablet (500 mg) by mouth 2 times daily for 3 days 6 tablet 0       Allergies   Allergen Reactions    Dust Mites     Lisinopril Swelling     Swelling in lip    Mold      Cannot have any meds that contain mold.        Social History     Tobacco Use    Smoking status: Never    Smokeless tobacco: Never   Substance Use Topics    Alcohol use: Yes     Comment: 1-2x's/month if that.     Family History   Problem Relation Age of Onset    Hypertension  "Mother     Hypertension Father     Hypertension Maternal Grandmother     Hypertension Maternal Grandfather     Hypertension Paternal Grandmother     Hypertension Paternal Grandfather     Diabetes No family hx of     Glaucoma No family hx of     Macular Degeneration No family hx of      History   Drug Use    Types: Marijuana     Comment: 3x's/day         Review of Systems    Review of Systems  Constitutional, HEENT, cardiovascular, pulmonary, GI, , musculoskeletal, neuro, skin, endocrine and psych systems are negative, except as otherwise noted.  Objective    /80   Pulse 73   Temp 97.7  F (36.5  C)   Resp 20   Ht 1.651 m (5' 5\")   Wt 85.7 kg (189 lb)   LMP 09/04/2023 (Exact Date)   SpO2 99%   BMI 31.45 kg/m     Estimated body mass index is 31.45 kg/m  as calculated from the following:    Height as of this encounter: 1.651 m (5' 5\").    Weight as of this encounter: 85.7 kg (189 lb).    Physical Exam  GENERAL: alert and no distress  EYES: Eyes grossly normal to inspection, PERRL and conjunctivae and sclerae normal  HENT: ear canals and TM's normal, nose and mouth without ulcers or lesions  NECK: no adenopathy, no asymmetry, masses, or scars  RESP: lungs clear to auscultation - no rales, rhonchi or wheezes  CV: regular rate and rhythm, normal S1 S2, no S3 or S4, no murmur, click or rub, no peripheral edema  ABDOMEN: soft, nontender, no hepatosplenomegaly, no masses and bowel sounds normal  MS: no gross musculoskeletal defects noted, no edema  SKIN: no suspicious lesions or rashes  NEURO: Normal strength and tone, mentation intact and speech normal  PSYCH: mentation appears normal, affect normal/bright  LYMPH: no cervical, supraclavicular, axillary, or inguinal adenopathy    Recent Labs   Lab Test 12/08/23  0825 11/01/23  0940 05/11/23  1432 05/19/22  1449 04/14/22  0853   HGB 12.7 11.9 12.8   < > 12.9    356  --    < > 346   NA  --  140 139   < > 138   POTASSIUM  --  3.7 3.4   < > 3.7   CR  --  " 1.07* 1.10*   < > 1.05*   A1C  --   --   --   --  5.5    < > = values in this interval not displayed.        Diagnostics  Labs pending at this time.  Results will be reviewed when available.  No results found for this or any previous visit (from the past 24 hour(s)).   No EKG required, no history of coronary heart disease, significant arrhythmia, peripheral arterial disease or other structural heart disease.    Revised Cardiac Risk Index (RCRI)  The patient has the following serious cardiovascular risks for perioperative complications:   - No serious cardiac risks = 0 points     RCRI Interpretation: 0 points: Class I (very low risk - 0.4% complication rate)         Signed Electronically by: Niles Magallanes MD  Copy of this evaluation report is provided to requesting physician.

## 2024-02-01 LAB
ANION GAP SERPL CALCULATED.3IONS-SCNC: 10 MMOL/L (ref 7–15)
BUN SERPL-MCNC: 11.4 MG/DL (ref 6–20)
C TRACH DNA SPEC QL NAA+PROBE: NEGATIVE
CALCIUM SERPL-MCNC: 9.6 MG/DL (ref 8.6–10)
CHLORIDE SERPL-SCNC: 101 MMOL/L (ref 98–107)
CREAT SERPL-MCNC: 1.03 MG/DL (ref 0.51–0.95)
DEPRECATED HCO3 PLAS-SCNC: 27 MMOL/L (ref 22–29)
EGFRCR SERPLBLD CKD-EPI 2021: 67 ML/MIN/1.73M2
FERRITIN SERPL-MCNC: 33 NG/ML (ref 6–175)
FSH SERPL IRP2-ACNC: 18.3 MIU/ML
GLUCOSE SERPL-MCNC: 85 MG/DL (ref 70–99)
HBV SURFACE AG SERPL QL IA: NONREACTIVE
HIV 1+2 AB+HIV1 P24 AG SERPL QL IA: NONREACTIVE
IRON BINDING CAPACITY (ROCHE): 331 UG/DL (ref 240–430)
IRON SATN MFR SERPL: 29 % (ref 15–46)
IRON SERPL-MCNC: 96 UG/DL (ref 37–145)
N GONORRHOEA DNA SPEC QL NAA+PROBE: NEGATIVE
POTASSIUM SERPL-SCNC: 3.7 MMOL/L (ref 3.4–5.3)
SODIUM SERPL-SCNC: 138 MMOL/L (ref 135–145)

## 2024-02-02 ENCOUNTER — ANCILLARY PROCEDURE (OUTPATIENT)
Dept: ULTRASOUND IMAGING | Facility: CLINIC | Age: 49
End: 2024-02-02
Attending: OBSTETRICS & GYNECOLOGY
Payer: COMMERCIAL

## 2024-02-02 DIAGNOSIS — D25.9 UTERINE LEIOMYOMA, UNSPECIFIED LOCATION: ICD-10-CM

## 2024-02-02 LAB — BACTERIA UR CULT: NO GROWTH

## 2024-02-02 PROCEDURE — 76856 US EXAM PELVIC COMPLETE: CPT | Performed by: OBSTETRICS & GYNECOLOGY

## 2024-02-02 PROCEDURE — 76830 TRANSVAGINAL US NON-OB: CPT | Performed by: OBSTETRICS & GYNECOLOGY

## 2024-02-02 NOTE — RESULT ENCOUNTER NOTE
Arnold Le,    Your recent set of blood tests are looking good, ferritin, iron studies, and blood counts are in normal range.    Sincerely,  Murphy QUINTANILLA [facility]at[facility] See HPI

## 2024-02-05 ENCOUNTER — ANESTHESIA EVENT (OUTPATIENT)
Dept: SURGERY | Facility: AMBULATORY SURGERY CENTER | Age: 49
End: 2024-02-05
Payer: COMMERCIAL

## 2024-02-05 ENCOUNTER — VIRTUAL VISIT (OUTPATIENT)
Dept: OBGYN | Facility: CLINIC | Age: 49
End: 2024-02-05
Payer: COMMERCIAL

## 2024-02-05 ENCOUNTER — TELEPHONE (OUTPATIENT)
Dept: OBGYN | Facility: CLINIC | Age: 49
End: 2024-02-05

## 2024-02-05 DIAGNOSIS — N89.8 VAGINAL DISCHARGE: ICD-10-CM

## 2024-02-05 DIAGNOSIS — D25.9 UTERINE LEIOMYOMA, UNSPECIFIED LOCATION: Primary | ICD-10-CM

## 2024-02-05 DIAGNOSIS — N92.6 IRREGULAR BLEEDING: ICD-10-CM

## 2024-02-05 PROCEDURE — 99442 PR PHYSICIAN TELEPHONE EVALUATION 11-20 MIN: CPT | Mod: 93 | Performed by: OBSTETRICS & GYNECOLOGY

## 2024-02-05 NOTE — PROGRESS NOTES
"Telephone Encounter     Mimi Melton   2024     Start time: 2:09   End time: 2:23  Provider location: onsite   Patient location: home     Total phone time 14 minutes.     I spent a total of 28 minutes reviewing records, reports, documentation and discussing with the patient on the date of encounter.       Mimi Melton is a 48 year old  who is being evaluated via a billable telephone visit.      How would you like to obtain your AVS? MyChart  Will anyone else be joining your telephone visit? No  [id number]}        Assessment & Plan     Uterine leiomyoma, unspecified location  Largest fibroid is Stable   Overall growth of the uterus from 8 --> 9 cm with multiple small fibroids    Vaginal discharge  Would like a standing order for wet prep since chronic bacterial vaginosis   - Wet prep - lab collect; Future    Irregular bleeding  Cycles suppressed with micronor but now coming off to see how her cycles are.  Will monitor for 6 months and see, if bleeding increases, she will let me know.     Review of the result(s) of each unique test - CTS, pelvic ultrasound, +9 labs from her last visit with me  Ordering of each unique test --  wet prep          FURTHER TESTING:       -Wet prep  FUTURE APPOINTMENTS:       - Follow-up visit in approximately 6 months to a year, sooner if heavy bleeding occurs prior to that time    No follow-ups on file.    Subjective   Mimi Melton is a 48 year old presenting for the following health issues:  US, CTS and lab follow-up today from her last visit  2023.      Had known fibroids.  Needed follow-up US to assess for growth of fibroids.     Review from last visit:   She is not getting cycles on oral contraceptive pills. Has been on this pill for about 1-2 yrs. No period for 87 days, since September.     Stopped Micronor pills on 24   No period since then.    FSH was 18 previously  Was taking micronor so was not having periods.   \"My worry is that the periods " "will come back and be heavy. \"     I don't want to deal with heavy periods any more, \"I don't have the capacity for dealing with that.\"    Recent ultrasound review:    Gynecological Ultrasonography:   Uterus: retroverted. Contour is irregular w/ myomata: 1 Midline Fundal Intramural 5.22 x 3.19 x 4.43 cm.  Size: 9.75 x 7.14 x 5.39 cm  Endometrium: Thickness Total 5.69 mm  Findings:   Right Ovary: 2.62 x 1.47 x 1.31 cm. Wnl  Left Ovary: 3.17 x 3.15 x 2.11 cm. Wnl  Cul de Sac Free Fluid: No free fluid  Technique: Transvaginal Imaging performed  Transabdominal Imaging performed     Impression:   The uterus is enlarged and retroverted. There is a midline fundal intramural 5.2 x 3.2 x 4.4cm, similar in size to previous studies on 12/30/22 and 1/10/22.  The endometrium appears normal and measures 5.7mm.   Ovaries are visualized and appear within normal limits.     Recent CT scan was reviewed.  Myomatous uterus was noted.         Objective        Wt Readings from Last 4 Encounters:   01/31/24 85.7 kg (189 lb)   12/28/23 83.9 kg (185 lb)   11/30/23 83.9 kg (185 lb)   11/01/23 84 kg (185 lb 3.2 oz)        No vitals were obtained today due to virtual visit.        Physical Exam   General: Alert and no distress //Respiratory: No audible wheeze, cough, or shortness of breath // Psychiatric:  Appropriate affect, tone, and pace of words                                         "

## 2024-02-05 NOTE — ANESTHESIA PREPROCEDURE EVALUATION
Anesthesia Pre-Procedure Evaluation    Patient: Mimi Melton   MRN: [id number] : 1975        Procedure : Procedure(s):  CYSTOSCOPY, WITH PERIURETHRAL BULKING AGENT INJECTION (look in the bladder and inject filler into the walls of the urethra          Past Medical History:   Diagnosis Date    Cervical high risk HPV (human papillomavirus) test positive 2015, 10/2019, 10/30/20    Chronic sinusitis Summer 2016    I get congested every 3-4 weeks    Esophageal reflux     Exophthalmos, unspecified     Hypothyroidism     Motion sickness     PONV (postoperative nausea and vomiting)     Thyroid eye disease     Thyrotoxicosis without mention of goiter or other cause, without mention of thyrotoxic crisis or storm 2005    Had radioablation, On synthroid, seeing [facility]; takes synthroid    Unspecified essential hypertension 1980    meds since , on atenolol      Past Surgical History:   Procedure Laterality Date    COLONOSCOPY N/A 2022    Procedure: COLONOSCOPY;  Surgeon: Marah Souza MD;  Location: [facility] OR    CYSTOSCOPY, SLING TRANSVAGINAL N/A 10/10/2017    Procedure: CYSTOSCOPY, SLING TRANSVAGINAL;  Midurethral Sling and Cystoscopy (Support the Urethra with Mesh Sling and Look in the Bladder);  Surgeon: Karin Amin MD;  Location: [facility] OR    ESOPHAGOSCOPY, GASTROSCOPY, DUODENOSCOPY (EGD), COMBINED N/A 3/30/2023    Procedure: Esophagoscopy, gastroscopy, duodenoscopy (EGD), combined;  Surgeon: Janette Chandra MD;  [place] [facility]    EYE SURGERY  2008    Orbital Decompression Dr smart    ORBITOTOMY DECOMPRESSION Bilateral 2021    Procedure: Bilateral orbital decompression with  Sonopet, Bilateral lower lid retraction repair;  Surgeon: Niles Donnelly MD;  Location: [facility] OR    REPAIR RETRACTION LID Bilateral 2021    Procedure: REPAIR, RETRACTION, EYELID;  Surgeon: Niles Donnelly MD;  Location: [facility] OR    SONOPET Bilateral 2021    Procedure:  SONOPET EYE;  Surgeon: Niles Donnelly MD;  Location: [facility] OR    ZZC  DELIVERY ONLY  91    , Low Cervical    ZZC  DELIVERY ONLY  97    , Low Cervical    ZZC  DELIVERY ONLY  99    , Low Cervical    ZZC INDUCED ABORTN BY D&C      Aspiration & Curettage, TAB x2    ZZHC REPAIR INCISIONAL HERNIA,REDUCIBLE      Umbilical hernia Repair      Allergies   Allergen Reactions    Dust Mites     Lisinopril Swelling     Swelling in lip    Mold      Cannot have any meds that contain mold.      Social History     Tobacco Use    Smoking status: Never    Smokeless tobacco: Never   Substance Use Topics    Alcohol use: Yes     Comment: 1-2x's/month if that.      Wt Readings from Last 1 Encounters:   24 85.7 kg (189 lb)           Physical Exam    Airway        Mallampati: II   TM distance: > 3 FB   Neck ROM: full   Mouth opening: > 3 cm    Respiratory Devices and Support         Dental       (+) Minor Abnormalities - some fillings, tiny chips      Cardiovascular   cardiovascular exam normal          Pulmonary   pulmonary exam normal                OUTSIDE LABS:  CBC:   Lab Results   Component Value Date    WBC 4.5 2024    WBC 4.0 2023    HGB 12.1 2024    HGB 12.7 2023    HCT 37.2 2024    HCT 37.8 2023     2024     2023     BMP:   Lab Results   Component Value Date     2024     2023    POTASSIUM 3.7 2024    POTASSIUM 3.7 2023    CHLORIDE 101 2024    CHLORIDE 103 2023    CO2 27 2024    CO2 28 2023    BUN 11.4 2024    BUN 10.4 2023    CR 1.03 (H) 2024    CR 1.07 (H) 2023    GLC 85 2024    GLC 88 2023     COAGS:   Lab Results   Component Value Date    PTT 27 2013    INR 1.06 2013     POC:   Lab Results   Component Value Date    HCG Negative 2023     HEPATIC:   Lab Results   Component Value Date  "   ALBUMIN 4.1 11/01/2023    PROTTOTAL 7.0 11/01/2023    ALT 10 11/01/2023    AST 17 11/01/2023    ALKPHOS 37 11/01/2023    BILITOTAL 0.2 11/01/2023     OTHER:   Lab Results   Component Value Date    A1C 5.5 04/14/2022    DAXA 9.6 01/31/2024    PHOS 3.0 05/11/2023    MAG 1.9 12/08/2023    LIPASE 8 (L) 12/11/2022    TSH 0.72 11/01/2023    T4 1.19 10/18/2018    T3 685 (H) 05/10/2005    CRP <2.9 03/09/2017    SED 20 06/22/2005       Anesthesia Plan    ASA Status:  2    NPO Status:  NPO Appropriate    Anesthesia Type: MAC.     - Reason for MAC: straight local not clinically adequate   Induction: Intravenous, Propofol.   Maintenance: TIVA.        Consents    Anesthesia Plan(s) and associated risks, benefits, and realistic alternatives discussed. Questions answered and patient/representative(s) expressed understanding.     - Discussed:     - Discussed with:  Patient      - Extended Intubation/Ventilatory Support Discussed: No.      - Patient is DNR/DNI Status: No     Use of blood products discussed: No .     Postoperative Care    Pain management: IV analgesics, Oral pain medications, Multi-modal analgesia.   PONV prophylaxis: Dexamethasone or Solumedrol, Ondansetron (or other 5HT-3), Background Propofol Infusion     Comments:               Evan Alejandro MD    I have reviewed the pertinent notes and labs in the chart from the past 30 days and (re)examined the patient.  Any updates or changes from those notes are reflected in this note.              # Obesity: Estimated body mass index is 31.45 kg/m  as calculated from the following:    Height as of 1/31/24: 1.651 m (5' 5\").    Weight as of 1/31/24: 85.7 kg (189 lb).      "

## 2024-02-05 NOTE — TELEPHONE ENCOUNTER
[facility] Call Center    Phone Message    May a detailed message be left on voicemail: no     Reason for Call: Other: Patient is calling because she had a virtual appointment with the provider at 1:20pm but is calling because it is 2pm and patient stated that the provider called 10 minutes before and patient couldn't answer then but is calling back. Patient states she really needs to talk to the provider about her results. Please call the patient back. Thank you.      Action Taken: Other: obgyn    Travel Screening: Not Applicable

## 2024-02-06 ENCOUNTER — HOSPITAL ENCOUNTER (OUTPATIENT)
Facility: AMBULATORY SURGERY CENTER | Age: 49
Discharge: HOME | End: 2024-02-06
Attending: UROLOGY
Payer: COMMERCIAL

## 2024-02-06 ENCOUNTER — ANESTHESIA (OUTPATIENT)
Dept: SURGERY | Facility: AMBULATORY SURGERY CENTER | Age: 49
End: 2024-02-06
Payer: COMMERCIAL

## 2024-02-06 VITALS
BODY MASS INDEX: 30.82 KG/M2 | SYSTOLIC BLOOD PRESSURE: 128 MMHG | RESPIRATION RATE: 18 BRPM | OXYGEN SATURATION: 99 % | HEIGHT: 65 IN | WEIGHT: 185 LBS | HEART RATE: 72 BPM | TEMPERATURE: 97 F | DIASTOLIC BLOOD PRESSURE: 89 MMHG

## 2024-02-06 LAB
HCG UR QL: NEGATIVE
INTERNAL QC OK POCT: NORMAL
POCT KIT EXPIRATION DATE: NORMAL
POCT KIT LOT NUMBER: NORMAL

## 2024-02-06 PROCEDURE — 51715 ENDOSCOPIC INJECTION/IMPLANT: CPT | Mod: GC | Performed by: UROLOGY

## 2024-02-06 PROCEDURE — 51715 ENDOSCOPIC INJECTION/IMPLANT: CPT

## 2024-02-06 PROCEDURE — L8606 SYNTHETIC IMPLNT URINARY 1ML: HCPCS

## 2024-02-06 PROCEDURE — 81025 URINE PREGNANCY TEST: CPT | Performed by: PATHOLOGY

## 2024-02-06 DEVICE — BULKAMID URETHRAL BULKING SYSTEM CHARGE BY SYRINGE 50050: Type: IMPLANTABLE DEVICE | Site: URETHRA | Status: FUNCTIONAL

## 2024-02-06 RX ORDER — ONDANSETRON 2 MG/ML
4 INJECTION INTRAMUSCULAR; INTRAVENOUS EVERY 30 MIN PRN
Status: DISCONTINUED | OUTPATIENT
Start: 2024-02-06 | End: 2024-02-07 | Stop reason: HOSPADM

## 2024-02-06 RX ORDER — PROPOFOL 10 MG/ML
INJECTION, EMULSION INTRAVENOUS CONTINUOUS PRN
Status: DISCONTINUED | OUTPATIENT
Start: 2024-02-06 | End: 2024-02-06

## 2024-02-06 RX ORDER — CEFAZOLIN SODIUM 2 G/50ML
2 SOLUTION INTRAVENOUS SEE ADMIN INSTRUCTIONS
Status: DISCONTINUED | OUTPATIENT
Start: 2024-02-06 | End: 2024-02-06 | Stop reason: HOSPADM

## 2024-02-06 RX ORDER — SODIUM CHLORIDE, SODIUM LACTATE, POTASSIUM CHLORIDE, CALCIUM CHLORIDE 600; 310; 30; 20 MG/100ML; MG/100ML; MG/100ML; MG/100ML
INJECTION, SOLUTION INTRAVENOUS CONTINUOUS PRN
Status: DISCONTINUED | OUTPATIENT
Start: 2024-02-06 | End: 2024-02-06

## 2024-02-06 RX ORDER — OXYCODONE HYDROCHLORIDE 5 MG/1
5 TABLET ORAL
Status: DISCONTINUED | OUTPATIENT
Start: 2024-02-06 | End: 2024-02-07 | Stop reason: HOSPADM

## 2024-02-06 RX ORDER — LIDOCAINE 40 MG/G
CREAM TOPICAL
Status: DISCONTINUED | OUTPATIENT
Start: 2024-02-06 | End: 2024-02-06 | Stop reason: HOSPADM

## 2024-02-06 RX ORDER — CEFAZOLIN SODIUM 2 G/50ML
2 SOLUTION INTRAVENOUS
Status: COMPLETED | OUTPATIENT
Start: 2024-02-06 | End: 2024-02-06

## 2024-02-06 RX ORDER — OXYCODONE HYDROCHLORIDE 5 MG/1
10 TABLET ORAL
Status: DISCONTINUED | OUTPATIENT
Start: 2024-02-06 | End: 2024-02-07 | Stop reason: HOSPADM

## 2024-02-06 RX ORDER — LIDOCAINE HYDROCHLORIDE 20 MG/ML
INJECTION, SOLUTION INFILTRATION; PERINEURAL PRN
Status: DISCONTINUED | OUTPATIENT
Start: 2024-02-06 | End: 2024-02-06

## 2024-02-06 RX ORDER — ONDANSETRON 4 MG/1
4 TABLET, ORALLY DISINTEGRATING ORAL EVERY 30 MIN PRN
Status: DISCONTINUED | OUTPATIENT
Start: 2024-02-06 | End: 2024-02-07 | Stop reason: HOSPADM

## 2024-02-06 RX ORDER — SODIUM CHLORIDE, SODIUM LACTATE, POTASSIUM CHLORIDE, CALCIUM CHLORIDE 600; 310; 30; 20 MG/100ML; MG/100ML; MG/100ML; MG/100ML
INJECTION, SOLUTION INTRAVENOUS CONTINUOUS
Status: DISCONTINUED | OUTPATIENT
Start: 2024-02-06 | End: 2024-02-06 | Stop reason: HOSPADM

## 2024-02-06 RX ORDER — PROPOFOL 10 MG/ML
INJECTION, EMULSION INTRAVENOUS PRN
Status: DISCONTINUED | OUTPATIENT
Start: 2024-02-06 | End: 2024-02-06

## 2024-02-06 RX ORDER — ONDANSETRON 2 MG/ML
INJECTION INTRAMUSCULAR; INTRAVENOUS PRN
Status: DISCONTINUED | OUTPATIENT
Start: 2024-02-06 | End: 2024-02-06

## 2024-02-06 RX ORDER — FENTANYL CITRATE 50 UG/ML
INJECTION, SOLUTION INTRAMUSCULAR; INTRAVENOUS PRN
Status: DISCONTINUED | OUTPATIENT
Start: 2024-02-06 | End: 2024-02-06

## 2024-02-06 RX ORDER — ACETAMINOPHEN 325 MG/1
975 TABLET ORAL ONCE
Status: COMPLETED | OUTPATIENT
Start: 2024-02-06 | End: 2024-02-06

## 2024-02-06 RX ORDER — IBUPROFEN 200 MG
600 TABLET ORAL EVERY 6 HOURS PRN
Status: DISCONTINUED | OUTPATIENT
Start: 2024-02-06 | End: 2024-02-07 | Stop reason: HOSPADM

## 2024-02-06 RX ADMIN — PROPOFOL 150 MCG/KG/MIN: 10 INJECTION, EMULSION INTRAVENOUS at 08:55

## 2024-02-06 RX ADMIN — CEFAZOLIN SODIUM 2 G: 2 SOLUTION INTRAVENOUS at 08:45

## 2024-02-06 RX ADMIN — ONDANSETRON 4 MG: 2 INJECTION INTRAMUSCULAR; INTRAVENOUS at 08:55

## 2024-02-06 RX ADMIN — LIDOCAINE HYDROCHLORIDE 40 MG: 20 INJECTION, SOLUTION INFILTRATION; PERINEURAL at 08:48

## 2024-02-06 RX ADMIN — SODIUM CHLORIDE, SODIUM LACTATE, POTASSIUM CHLORIDE, CALCIUM CHLORIDE: 600; 310; 30; 20 INJECTION, SOLUTION INTRAVENOUS at 07:37

## 2024-02-06 RX ADMIN — PROPOFOL 50 MG: 10 INJECTION, EMULSION INTRAVENOUS at 09:03

## 2024-02-06 RX ADMIN — FENTANYL CITRATE 50 MCG: 50 INJECTION, SOLUTION INTRAMUSCULAR; INTRAVENOUS at 08:45

## 2024-02-06 RX ADMIN — ACETAMINOPHEN 975 MG: 325 TABLET ORAL at 07:23

## 2024-02-06 RX ADMIN — FENTANYL CITRATE 50 MCG: 50 INJECTION, SOLUTION INTRAMUSCULAR; INTRAVENOUS at 09:01

## 2024-02-06 RX ADMIN — LIDOCAINE HYDROCHLORIDE 10 MG: 20 INJECTION, SOLUTION INFILTRATION; PERINEURAL at 08:51

## 2024-02-06 RX ADMIN — PROPOFOL 50 MG: 10 INJECTION, EMULSION INTRAVENOUS at 09:10

## 2024-02-06 RX ADMIN — LIDOCAINE HYDROCHLORIDE 10 MG: 20 INJECTION, SOLUTION INFILTRATION; PERINEURAL at 08:55

## 2024-02-06 RX ADMIN — SODIUM CHLORIDE, SODIUM LACTATE, POTASSIUM CHLORIDE, CALCIUM CHLORIDE: 600; 310; 30; 20 INJECTION, SOLUTION INTRAVENOUS at 08:31

## 2024-02-06 NOTE — DISCHARGE INSTRUCTIONS
[facility]  Home Care Following Anesthesia  For 24 hours after surgery:  Get plenty of rest.  A responsible adult must stay with you for at least 24 hours after you leave the surgery center.  Do not drive or use heavy equipment.  If you have weakness or tingling, don't drive or use heavy equipment until this feeling goes away.   Do not drink alcohol.   Avoid strenuous or risky activities.  Ask for help when climbing stairs.  You may feel lightheaded.  IF so, sit for a few minutes before standing.  Have someone help you get up.   If you have nausea (feel sick to your stomach): Drink only clear liquids such as apple juice, ginger ale, broth or 7-Up.  Rest may also help.  Be sure to drink enough fluids.  Move to a regular diet as you feel able.   You may have a slight fever.  Call the doctor if your fever is over 100 F (37.7 C) (taken under the tongue) or lasts longer than 24 hours.  You may have a dry mouth, a sore throat, muscle aches or trouble sleeping. These should go away after 24 hours.  Do not make important or legal decisions.   It is recommended to avoid smoking.               Tips for taking pain medications  To get the best pain relief possible, remember these points:  Take pain medications as directed, before pain becomes severe.  Pain medication can upset your stomach: taking it with food may help.  Constipation is a common side effect of pain medication. Drink plenty of  fluids.  Eat foods high in fiber. Take a stool softener if recommended by your doctor or pharmacist.  Do not drink alcohol, drive or operate machinery while taking pain medications.  Ask about other ways to control pain, such as with heat, ice or relaxation.    Tylenol/Acetaminophen Consumption    If you feel your pain relief is insufficient, you may take Tylenol/Acetaminophen in addition to your narcotic pain medication.   Be careful not to exceed 4,000 mg of Tylenol/Acetaminophen in a 24 hour  period from all sources.  If you are taking extra strength Tylenol/acetaminophen (500 mg), the maximum dose is 8 tablets in 24 hours.  If you are taking regular strength acetaminophen (325 mg), the maximum dose is 12 tablets in 24 hours.  Tylenol 975mg last taken at 7:23am. Next available dose anytime after 1:23pm.     Call a doctor for any of the following:  Signs of infection (fever, growing tenderness at the surgery site, a large amount of drainage or bleeding, severe pain, foul-smelling drainage, redness, swelling).  It has been over 8 to 10 hours since surgery and you are still not able to urinate (pass water).  Headache for over 24 hours.  Numbness, tingling or weakness the day after surgery (if you had spinal anesthesia).  Signs of Covid-19 infection (temperature over 100 degrees, shortness of breath, cough, loss of taste/smell, generalized body aches, persistent headache, chills, sore throat, nausea/vomiting/diarrhea)  Your doctor is:       Dr. Karin Amin, Prostate and Urology: [phone]               Or dial [phone] and ask for the resident on call for:  Prostate Urology  For emergency care, call the:  [facility]:  [phone] (TTY for hearing impaired: [phone])

## 2024-02-06 NOTE — OP NOTE
Pre-op Dx: stress incontinence and intrinsic sphincter deficiency  Post-op Dx: Same   Procedure performed: cystoscopy and periurethral bulking with Bulkamid  Surgeon: Karin Amin MD   Assistant surgeons: Al Malcolm MD   Anesthesia: mac   EBL: 2 cc   Complications: None   Disposition: Stable to PACU   Clinical Indication: Ms. Mimi Melton is a 48 year old female with a hx of stress incontinence thought to be secondary to ISD.  We discussed options and elected to proceed with the above.    Clinical Procedure:   Pt. Was identified correctly, consented and placed in the lithotomy position.  She was cleaned and preparred in the usual sterile fashion.  Lidocain gel was inserted into the urethra and given time to take effect.  The small bulkamid cystoscope was then inserted through the urethra and into the bladder.  The urethra was wnl, open bladder neck.  The bladder was with 1+ trabeculation.  No tumors, diverticulae, or stones, however some squamous metaplasia.  Bilateral u/o's were effluxing clear urine.   A needle was then inserted and the scope was withdrawn to the area of the mid urethra and inserted at the 7, 5, 2, and 10 o'clock positions until a nice coaptation of the urethra was observed.  A total of 3 syringes was injected in total.  There was very good coaptation at the end of the procedure. The cystoscope was then withdrawn.  The pt. Tolerated the procedure well.  She will need to void prior to discharge.    Al Malcolm MD   Urology Resident PGY-4      Patient was seen, evaluated and plan was formulated in conjunction with me and I agree with the above.  I was present for the entire procedure.  Karin Amin MD

## 2024-02-06 NOTE — ANESTHESIA POSTPROCEDURE EVALUATION
Patient: Mimi Melton    Procedure: Procedure(s):  CYSTOSCOPY, WITH PERIURETHRAL BULKING AGENT INJECTION (look in the bladder and inject filler into the walls of the urethra       Anesthesia Type:  MAC    Note:  Disposition: Outpatient   Postop Pain Control: Uneventful            Sign Out: Well controlled pain   PONV: No   Neuro/Psych: Uneventful            Sign Out: Acceptable/Baseline neuro status   Airway/Respiratory: Uneventful            Sign Out: Acceptable/Baseline resp. status   CV/Hemodynamics: Uneventful            Sign Out: Acceptable CV status; No obvious hypovolemia; No obvious fluid overload   Other NRE:    DID A NON-ROUTINE EVENT OCCUR?            Last vitals:  Vitals Value Taken Time   /78 02/06/24 0921   Temp 35.9  C (96.6  F) 02/06/24 0921   Pulse     Resp 14 02/06/24 0921   SpO2 96 % 02/06/24 0921       Electronically Signed By: Evan Alejandro MD  February 6, 2024  9:27 AM

## 2024-02-06 NOTE — ANESTHESIA CARE TRANSFER NOTE
Patient: Mimi Melton    Procedure: Procedure(s):  CYSTOSCOPY, WITH PERIURETHRAL BULKING AGENT INJECTION (look in the bladder and inject filler into the walls of the urethra       Diagnosis: Intrinsic sphincter deficiency [N36.42]  Stress incontinence [N39.3]  Diagnosis Additional Information: No value filed.    Anesthesia Type:   MAC     Note:      Level of Consciousness: drowsy  Oxygen Supplementation: room air    Independent Airway: airway patency satisfactory and stable  Dentition: dentition unchanged  Vital Signs Stable: post-procedure vital signs reviewed and stable  Report to RN Given: handoff report given  Patient transferred to: Phase II    Handoff Report: Identifed the Patient, Identified the Reponsible Provider, Reviewed the pertinent medical history, Discussed the surgical course, Reviewed Intra-OP anesthesia mangement and issues during anesthesia, Set expectations for post-procedure period and Allowed opportunity for questions and acknowledgement of understanding      Vitals:  Vitals Value Taken Time   /78 02/06/24 0921   Temp 35.9  C (96.6  F) 02/06/24 0921   Pulse 66    Resp 14 02/06/24 0921   SpO2 96 % 02/06/24 0921       Electronically Signed By: MARGA Santizo CRNA  February 6, 2024  9:24 AM

## 2024-02-06 NOTE — OR NURSING
Patient wondering if she is able to take ibuprofen in addition to tylenol. OK to take ibuprofen per Dr. Amin. Jmaaica Gonzalez on 2/6/2024 at 10:22 AM

## 2024-02-13 ENCOUNTER — PRE VISIT (OUTPATIENT)
Dept: UROLOGY | Facility: CLINIC | Age: 49
End: 2024-02-13
Payer: COMMERCIAL

## 2024-02-13 NOTE — TELEPHONE ENCOUNTER
Reason for visit: follow up post op     Relevant information: periurethral bulking agent 2/6/24, history stress incontinence    Records/imaging/labs/orders: in epic    Pt called: No need for a call    At Rooming: virtual visit    Zeus Nunn  2/13/2024  10:03 AM

## 2024-02-29 ENCOUNTER — VIRTUAL VISIT (OUTPATIENT)
Dept: OBGYN | Facility: CLINIC | Age: 49
End: 2024-02-29
Payer: COMMERCIAL

## 2024-02-29 DIAGNOSIS — Z30.011 ENCOUNTER FOR INITIAL PRESCRIPTION OF CONTRACEPTIVE PILLS: Primary | ICD-10-CM

## 2024-02-29 DIAGNOSIS — E53.8 VITAMIN B12 DEFICIENCY (NON ANEMIC): ICD-10-CM

## 2024-02-29 DIAGNOSIS — E83.118 OTHER HEMOCHROMATOSIS: ICD-10-CM

## 2024-02-29 DIAGNOSIS — R79.89 ABNORMAL CBC: ICD-10-CM

## 2024-02-29 DIAGNOSIS — E56.9 VITAMIN DEFICIENCY: ICD-10-CM

## 2024-02-29 PROCEDURE — 99441 PR PHYSICIAN TELEPHONE EVALUATION 5-10 MIN: CPT | Mod: 93 | Performed by: OBSTETRICS & GYNECOLOGY

## 2024-02-29 RX ORDER — ACETAMINOPHEN AND CODEINE PHOSPHATE 120; 12 MG/5ML; MG/5ML
0.35 SOLUTION ORAL DAILY
Qty: 84 TABLET | Refills: 0 | Status: SHIPPED | OUTPATIENT
Start: 2024-02-29 | End: 2024-05-28

## 2024-02-29 NOTE — PROGRESS NOTES
15Mimi is a 48 year old who is being evaluated via a billable telephone visit.      How would you like to obtain your AVS? MyChart  If the  visit is dropped, the invitation should be resent by: Text to cell phone: [phone]  Will anyone else be joining your video visit? No      Telephone Encounter     Mimi Melton   February 29, 2024     Start time: 1:50   End time: 1:59   Provider location: on site  Patient location: home    Total phone time 9 minutes.     I spent a total of 20minutes reviewing records, reports, documentation and discussing with the patient on the date of encounter.         Assessment & Plan     Encounter for initial prescription of contraceptive pills  Patient reports hematochromotosis from taking micronor.  Her iron normalized one month after she discontinued the pill.  She would like to restart the pill.  When she discontinued the pill, her menstrual cycle started again.  Emotionally having menstrual cycles at this point is difficult for her.  So would like to restart taking Micronor.  - norethindrone (MICRONOR) 0.35 MG tablet; Take 1 tablet (0.35 mg) by mouth daily for 84 days    Vitamin B12 deficiency (non anemic)  Does not have high source of B12 in her diet.  Has been taking supplement and would like to recheck her B12 level at the next visit  - Vitamin B12; Future    Other hemochromatosis  Related to taking progesterone contraceptive  Now that her iron is normal she plans to restart the pill   we will repeat her labs in 4 to 6 months    - Ferritin; Future  - Iron and iron binding capacity; Future  - CBC with platelets; Future    Vitamin deficiency  - Vitamin D Deficiency; Future    Review of prior external note(s) from - from dr Matthew Montano  Review of the result(s) of each unique test - all iron studies, CBC, vit B12   Ordering of each unique test  Prescription drug management  20 minutes spent by me on the date of the encounter doing chart review, history and exam, documentation  and further activities per the note            No follow-ups on file.    Dennis Le is a 48 year old, presenting for the following health issues:  Constipation (Would like to discusses contraception that will help stop mentration)    HPI   Came off oral contraceptive pill (micronor)  had been on for 12-18 months, d/t   elevated iron studies   Just had a normal cycle   2/14-2/20  normal cycle  Came off because her iron level was so high.     Ferritin   Date Value Ref Range Status   01/31/2024 33 6 - 175 ng/mL Final   06/08/2018 8 (L) 12 - 150 ng/mL Final     Iron   Date Value Ref Range Status   01/31/2024 96 37 - 145 ug/dL Final   06/08/2018 62 35 - 180 ug/dL Final     Iron Binding Cap   Date Value Ref Range Status   06/08/2018 401 240 - 430 ug/dL Final     Iron Binding Capacity   Date Value Ref Range Status   01/31/2024 331 240 - 430 ug/dL Final       EXAM:  Last menstrual period 09/04/2023, not currently breastfeeding.  BMI= There is no height or weight on file to calculate BMI.  Patient's last menstrual period was 09/04/2023 (exact date).  General - pleasant female in no acute distress.  Neurological - alert and oriented X 3  Psychiatric - normal mood and affect

## 2024-03-04 ENCOUNTER — OFFICE VISIT (OUTPATIENT)
Dept: OPHTHALMOLOGY | Facility: CLINIC | Age: 49
End: 2024-03-04
Payer: COMMERCIAL

## 2024-03-04 DIAGNOSIS — H57.89 THYROID EYE DISEASE: Primary | ICD-10-CM

## 2024-03-04 DIAGNOSIS — E07.9 THYROID EYE DISEASE: Primary | ICD-10-CM

## 2024-03-04 PROCEDURE — 92285 EXTERNAL OCULAR PHOTOGRAPHY: CPT | Mod: GC | Performed by: OPHTHALMOLOGY

## 2024-03-04 PROCEDURE — 99213 OFFICE O/P EST LOW 20 MIN: CPT | Mod: GC | Performed by: OPHTHALMOLOGY

## 2024-03-04 ASSESSMENT — CONF VISUAL FIELD
OD_SUPERIOR_NASAL_RESTRICTION: 0
OD_INFERIOR_TEMPORAL_RESTRICTION: 0
OS_SUPERIOR_NASAL_RESTRICTION: 0
OD_NORMAL: 1
OS_INFERIOR_NASAL_RESTRICTION: 0
OS_NORMAL: 1
METHOD: COUNTING FINGERS
OD_SUPERIOR_TEMPORAL_RESTRICTION: 0
OS_INFERIOR_TEMPORAL_RESTRICTION: 0
OS_SUPERIOR_TEMPORAL_RESTRICTION: 0
OD_INFERIOR_NASAL_RESTRICTION: 0

## 2024-03-04 ASSESSMENT — TONOMETRY
OS_IOP_MMHG: 24
OD_IOP_MMHG: 15
IOP_METHOD: ICARE
IOP_METHOD: ICARE
OS_IOP_MMHG: 21
OD_IOP_MMHG: 13

## 2024-03-04 ASSESSMENT — VISUAL ACUITY
OD_CC: 20/20
OS_CC: 20/20
METHOD: SNELLEN - LINEAR
CORRECTION_TYPE: GLASSES

## 2024-03-04 ASSESSMENT — MARGIN REFLEX DISTANCE
OS_MRD2: 7
OS_MRD1: 3
OD_MRD2: 7
OD_MRD1: 3

## 2024-03-04 ASSESSMENT — EXTERNAL EXAM - RIGHT EYE: OD_EXAM: EXOPHTHALMOS

## 2024-03-04 ASSESSMENT — EXTERNAL EXAM - LEFT EYE: OS_EXAM: EXOPHTHALMOS

## 2024-03-04 ASSESSMENT — SLIT LAMP EXAM - LIDS
COMMENTS: INF SCLERAL SHOW
COMMENTS: INF SCLERAL SHOW

## 2024-03-04 NOTE — PROGRESS NOTES
Chief Complaints and History of Present Illnesses   Patient presents with    Follow Up     Thyroid eye disease     Chief Complaint(s) and History of Present Illness(es)     Follow Up    In both eyes.  Onset was gradual.  Severity is mild.  Associated symptoms   include Negative for eye pain.  Response to treatment was no improvement.   Additional comments: Thyroid eye disease           Comments    Occasionally feels like her left eye does not follow her right eye. Denies   eyelid swelling.  Denies double vision.  Occasional artificial tears when   in dry environements..       Assessment & Plan     Mimi Melton is a 48 year old female with the following diagnoses:   1. Thyroid eye disease       Thyroid eye disease - stable   1. Spontaneous orbital pain.                                                      0  2. Gaze evoked orbital pain.                                                      0  3. Eyelid swelling due to active thyroid eye disease                  0  4. Eyelid erythema.                                                                    0  5. Conjunctival redness due to active thyroid eye disease .      0  6. Chemosis.                                                                              0  7. Inflammation of caruncle OR plica.                                        0     Patients assessed after follow-up can be scored out of 10 by  including items 8-10.     8. Increase of > 2mm in proptosis.                                            0           9. Decrease in uniocular excursion in any direction of > 8 .     0  10. Decrease of acuity equivalent to 1 Snellen line.                 0     JAREN SCORE = 0/10    Most recent TSI (10/2023) improved compared to prior measurements.  Most recent TSH wnl.    Chronic, inactive VENKATA  No recent changes to vision or new, bothersome ocular symptoms.  Stable proptosis and eyelid position. No new surface inflammation.    Patient would like to follow with us  yearly            Lynn Reza MD  Oculoplastic Surgery Fellow    Attending Physician Attestation:  I have seen and examined this patient with the fellow .  I have confirmed and edited as necessary the chief complaint(s), history of present illness, review of systems, relevant history, and examination findings as documented by others.  I have personally reviewed the relevant tests, images, and reports as documented above.  I have confirmed and edited as necessary the assessment and plan and agree with this note.    - Niles Donnelly MD 9:13 AM 3/4/2024

## 2024-03-04 NOTE — NURSING NOTE
Chief Complaints and History of Present Illnesses   Patient presents with    Follow Up     Thyroid eye disease     Chief Complaint(s) and History of Present Illness(es)       Follow Up              Laterality: both eyes    Onset: gradual    Severity: mild    Associated symptoms: Negative for eye pain    Response to treatment: no improvement    Comments: Thyroid eye disease              Comments    Occasionally feels like her left eye does not follow her right eye. Denies eyelid swelling.  Denies double vision.  Occasional artificial tears when in dry environements..

## 2024-03-06 ENCOUNTER — VIRTUAL VISIT (OUTPATIENT)
Dept: UROLOGY | Facility: CLINIC | Age: 49
End: 2024-03-06
Payer: COMMERCIAL

## 2024-03-06 ENCOUNTER — TELEPHONE (OUTPATIENT)
Dept: UROLOGY | Facility: CLINIC | Age: 49
End: 2024-03-06

## 2024-03-06 DIAGNOSIS — I10 HYPERTENSION, ESSENTIAL: Primary | ICD-10-CM

## 2024-03-06 DIAGNOSIS — N39.3 STRESS INCONTINENCE: Primary | ICD-10-CM

## 2024-03-06 PROCEDURE — 99213 OFFICE O/P EST LOW 20 MIN: CPT | Mod: 95 | Performed by: UROLOGY

## 2024-03-06 ASSESSMENT — PAIN SCALES - GENERAL: PAINLEVEL: NO PAIN (0)

## 2024-03-06 NOTE — NURSING NOTE
Is the patient currently in the state of [place]? YES    Visit mode:VIDEO    If the visit is dropped, the patient can be reconnected by: VIDEO VISIT: Text to cell phone:   Telephone Information:   Mobile [phone]    and VIDEO VISIT: Send to e-mail at: [email or web address]    Will anyone else be joining the visit? NO  (If patient encounters technical issues they should call [phone])    How would you like to obtain your AVS? MyChart    Are changes needed to the allergy or medication list? No    Reason for visit: RECHECK (1 month post op, DOS 2/6)    Ashley CASTELLON

## 2024-03-06 NOTE — PROGRESS NOTES
Reason for visit:  f/u on urinary symptoms    Clinical Data:  Ms. Melton is a 48 year old female w/ h/o KARMA, 3 C-sections in the past, h/o midurethral sling in 2017 with excellent results. She then started to have recurrence of her stress urinary incontinence.  She denies any difficulty voiding.  She underwent periurethral bulking on 2/6/24 and reports some improvement in her symptoms.  She is 70-80% better.      Assessment & Plan: Mimi Melton is a 48 year old female with stress urinary incontinence.  She previously underwent midurethral sling in 2017 and then had some recurrence of her stress urinary incontinence.  She underwent periurethral bulking and she reports to be doing about 80% better.  We discussed augmenting it further with some more periurethral bulking and observe.  We also discussed trying the pelvital flyte device again that she has.    -she will try the pelvital device  -f/u in 6 months for symptoms check    Thank you for allowing me to participate in the care of  Ms. Mimi Melton and I will keep you updated on her progress.    Karin Amin MD

## 2024-03-06 NOTE — LETTER
3/6/2024       RE: Mimi Melton  [address]  [place] [facility] [place] [address]     Dear Colleague,    Thank you for referring your patient, Mimi Melton, to the [facility] [place] [facility]. Please see a copy of my visit note below.    Reason for visit:  f/u on urinary symptoms    Clinical Data:  Ms. Melton is a 48 year old female w/ h/o KARMA, 3 C-sections in the past, h/o midurethral sling in 2017 with excellent results. She then started to have recurrence of her stress urinary incontinence.  She denies any difficulty voiding.  She underwent periurethral bulking on 2/6/24 and reports some improvement in her symptoms.  She is 70-80% better.      Assessment & Plan: Mimi Melton is a 48 year old female with stress urinary incontinence.  She previously underwent midurethral sling in 2017 and then had some recurrence of her stress urinary incontinence.  She underwent periurethral bulking and she reports to be doing about 80% better.  We discussed augmenting it further with some more periurethral bulking and observe.  We also discussed trying the pelvital flyte device again that she has.    -she will try the pelvital device  -f/u in 6 months for symptoms check    Thank you for allowing me to participate in the care of  Ms. Mimi Melton and I will keep you updated on her progress.    Karin Amin MD          Virtual Visit Details    Type of service:  Video Visit     Originating Location (pt. Location): Home    Distant Location (provider location):  Off-site  Platform used for Video Visit: Salma

## 2024-03-14 ENCOUNTER — LAB (OUTPATIENT)
Dept: LAB | Facility: CLINIC | Age: 49
End: 2024-03-14
Payer: COMMERCIAL

## 2024-03-14 ENCOUNTER — OFFICE VISIT (OUTPATIENT)
Dept: NEPHROLOGY | Facility: CLINIC | Age: 49
End: 2024-03-14
Attending: INTERNAL MEDICINE
Payer: COMMERCIAL

## 2024-03-14 VITALS
BODY MASS INDEX: 31.1 KG/M2 | DIASTOLIC BLOOD PRESSURE: 80 MMHG | HEART RATE: 79 BPM | SYSTOLIC BLOOD PRESSURE: 114 MMHG | WEIGHT: 184 LBS | OXYGEN SATURATION: 99 %

## 2024-03-14 DIAGNOSIS — E87.6 HYPOKALEMIA: Primary | ICD-10-CM

## 2024-03-14 DIAGNOSIS — I10 HYPERTENSION, ESSENTIAL: ICD-10-CM

## 2024-03-14 LAB
ANION GAP SERPL CALCULATED.3IONS-SCNC: 10 MMOL/L (ref 7–15)
BUN SERPL-MCNC: 10.5 MG/DL (ref 6–20)
CALCIUM SERPL-MCNC: 9.6 MG/DL (ref 8.6–10)
CHLORIDE SERPL-SCNC: 103 MMOL/L (ref 98–107)
CREAT SERPL-MCNC: 1.07 MG/DL (ref 0.51–0.95)
DEPRECATED HCO3 PLAS-SCNC: 27 MMOL/L (ref 22–29)
EGFRCR SERPLBLD CKD-EPI 2021: 64 ML/MIN/1.73M2
GLUCOSE SERPL-MCNC: 108 MG/DL (ref 70–99)
POTASSIUM SERPL-SCNC: 3.2 MMOL/L (ref 3.4–5.3)
SODIUM SERPL-SCNC: 140 MMOL/L (ref 135–145)

## 2024-03-14 PROCEDURE — 99000 SPECIMEN HANDLING OFFICE-LAB: CPT | Performed by: PATHOLOGY

## 2024-03-14 PROCEDURE — 36415 COLL VENOUS BLD VENIPUNCTURE: CPT | Performed by: PATHOLOGY

## 2024-03-14 PROCEDURE — 82570 ASSAY OF URINE CREATININE: CPT | Performed by: INTERNAL MEDICINE

## 2024-03-14 PROCEDURE — 99214 OFFICE O/P EST MOD 30 MIN: CPT | Performed by: INTERNAL MEDICINE

## 2024-03-14 PROCEDURE — 80048 BASIC METABOLIC PNL TOTAL CA: CPT | Performed by: PATHOLOGY

## 2024-03-14 PROCEDURE — 99213 OFFICE O/P EST LOW 20 MIN: CPT | Performed by: INTERNAL MEDICINE

## 2024-03-14 NOTE — PROGRESS NOTES
Nephrology Outpatient Visit Note (03/14/2024)    Chief Complain/Reason for Visit  Hypertension. Elevated serum creatinine.     History of Present Illness  This is a 48 year old female who presents for routine follow-up appointment.  Please see my initial consult note dated 9/8/2022 for details.  Briefly, the patient had abnormal kidney function going back to 2005 with serum creatinine measurements around 1 mg/dL. 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  She also has a history of chronic sinusitis, and esophageal reflux.    Her labs from today are pending.  However, she had a chemistry panel back in January of this year.  Her serum creatinine was 1 mg/dL her serum sodium, potassium, chloride, bicarbonate, calcium were normal.  Her CBC is normal.       Overall she is doing well today.  She denies any acute health issues or symptoms.     The following portions of the patient's history were reviewed and updated as appropriate: allergies, current medications, past family history, past medical history, past social history, past surgical history, and problem list.    Subjective   Review of Systems  A comprehensive review of systems was performed. Pertinent positives and negatives are described above.    Social and Family History  Patient reports that she has never smoked. She has never used smokeless tobacco. She reports current alcohol use. She reports current drug use. Drug: Marijuana.  family history includes Hypertension in her father, maternal grandfather, maternal grandmother, mother, paternal grandfather, and paternal grandmother.     Examination  Blood pressure 114/80, pulse 79, weight 83.5 kg (184 lb), last menstrual period 09/04/2023, SpO2 99%, not currently breastfeeding. Body mass index is 31.1 kg/m .  General:  Comfortable  Head:  normocephalic, atraumatic    Eyes: conjunctiva clear, EOM intact, PERRL.   Throat:   No erythema, exudates or lesions.   Neck:  neck supple, no masses  Heart:  RRR, no murmur   Lungs:  CTA bilaterally, no wheezes, rhonchi, rales.  Breathing unlabored.   Abdomen:  Soft, NT/ND, no HSM, no masses.   Extremities: No deformities, clubbing, cyanosis, or edema.   Neurologic: A/O x 3. No focal deficits.    Objective   Pertinent Laboratory and Imaging Results  Most Recent Serum Chemistries  Lab Results   Component Value Date    WBC 4.5 01/31/2024    HGB 12.1 01/31/2024    HCT 37.2 01/31/2024     01/31/2024     03/14/2024    POTASSIUM 3.2 (L) 03/14/2024    CHLORIDE 103 03/14/2024    CO2 27 03/14/2024    BUN 10.5 03/14/2024    CR 1.07 (H) 03/14/2024     (H) 03/14/2024    SED 20 06/22/2005    AST 17 11/01/2023    ALT 10 11/01/2023    ALKPHOS 37 11/01/2023    INR 1.06 05/16/2013     Most Recent Urinalysis  Lab Results   Component Value Date    COLOR Light Yellow 05/11/2023    APPEARANCE Clear 05/11/2023    URINEGLC Negative 05/11/2023    URINEBILI Negative 05/11/2023    URINEKETONE Trace (A) 05/11/2023    SG 1.020 05/11/2023    URINEPH 6.0 05/11/2023    PROTEIN Negative 05/11/2023    UROBILINOGEN 0.2 09/18/2021    NITRITE Negative 05/11/2023    LEUKEST Negative 05/11/2023     Current Outpatient Medications   Medication    ACETAMINOPHEN PO    benzoyl peroxide (BENZOYL PEROXIDE WASH) 5 % external liquid    cyanocobalamin (VITAMIN B-12) 1000 MCG tablet    hydrochlorothiazide (HYDRODIURIL) 12.5 MG tablet    levothyroxine (SYNTHROID/LEVOTHROID) 100 MCG tablet    loratadine (CLARITIN) 10 MG tablet    losartan (COZAAR) 25 MG tablet    norethindrone (MICRONOR) 0.35 MG tablet    omeprazole (PRILOSEC) 40 MG DR capsule    valACYclovir (VALTREX) 500 MG tablet     No current facility-administered medications for this visit.        Assessment & Plan   # Elevated serum creatinine    Overall, the patient is doing well.  Her serum creatinine is slightly elevated.  However, EGFR based on Cystatin C is normal.   Furthermore, her urinalysis is.  Her kidneys appeared normal on a recent CT scan of the abdomen.  It is possible that the patient has a mild chronic kidney disease due chronic hypertension as well as preeclampsia in her previous pregnancies.    She is not a diabetic.  She does not have a family history of kidney problems.  I reassured the patient that overall, her kidney function is stable.     She does not smoke tobacco.  However, she smokes weed, and I counseled her and recommended that she stops.  She exercises regularly.  She knows to avoid NSAIDs.    # Hypokalemia  She is slightly hypokalemic today.  I instructed her to liberalize fluid that is rich in potassium and encouraged her to eat more fruits and vegetables.  Recheck BMP in 1 month    # Hypertension  Her blood pressure is well controlled.  Blood renin activity, serum aldosterone levels were checked last year and are normal.    # Thyroid eye disease    My recommendations are the following:  - You can eat food that has potassium it in  - Recheck your BMP in one month  - Return to Nephrology Clinic in 1 year to review your progress.     Total time was of this encounter on the date of service was 30 minutes. All questions were answered to the patient's satisfaction.  Chart documentation was completed, in part, with [employer] voice-recognition software. Even though reviewed, some grammatical, spelling, and word errors may remain.    Orders placed today:  Orders Placed This Encounter   Procedures    Basic metabolic panel     Al Shen MD  Division of Nephrology and Hypertension  [employer]   Vocera Web Console ([employer])

## 2024-03-14 NOTE — PATIENT INSTRUCTIONS
It was a pleasure taking care of you today.  I've included a brief summary of our discussion and care plan from today's visit below.      My recommendations are summarized as follows:  - You can eat food that has potassium it in  - Recheck your BMP in one month  - Return to Nephrology Clinic in 1 year to review your progress.     Who do I call with any questions after my visit?  Please be in touch if there are any further questions that arise following today's visit.  There are multiple ways to contact your nephrology care team.      During business hours, you may reach your Nephrology RN Care Coordinator, Shereen Us at [phone]. For urgent/emergent questions after business hours, you may reach the on-call Nephrology Fellow by contacting the [facility]  at [phone]. You can always send a secure message through [employer].  [employer] messages are answered by your nurse or doctor typically within 24 hours.  Please allow extra time on weekends and holidays.      How do I schedule appointments?  To schedule or reschedule an appointment, please call [phone]. To schedule imaging please call [phone]. To schedule your lab appointment at [facility] 1st floor lab call [phone].    How will I get the results of any tests ordered?    You will receive all of your results.  If you have signed up for [employer], any tests ordered at your visit will be available to you after your physician reviews them.  Typically this takes 1-2 weeks.  If there are urgent results that require a change in your care plan, your physician or nurse will call you to discuss the next steps.      What is [employer]?  [employer] is a secure way for you to access all of your healthcare records from the [facility].  It is a web based computer program, so you can sign on to it from any location.  It also allows you to send secure messages to your care team.  I recommend signing up for [employer] access if  you have not already done so and are comfortable with using a computer.      Sincerely,    Al Shen MD  [facility]  Division of Nephrology and Hypertension

## 2024-03-14 NOTE — LETTER
3/14/2024       RE: Mimi Melton  [address]  [place] [address]     Dear Colleague,    Thank you for referring your patient, Mimi Melton, to the [facility] [place] at [facility]. Please see a copy of my visit note below.        Nephrology Outpatient Visit Note (03/14/2024)    Chief Complain/Reason for Visit  Hypertension. Elevated serum creatinine.     History of Present Illness  This is a 48 year old female who presents for routine follow-up appointment.  Please see my initial consult note dated 9/8/2022 for details.  Briefly, the patient had abnormal kidney function going back to 2005 with serum creatinine measurements around 1 mg/dL. 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  She also has a history of chronic sinusitis, and esophageal reflux.    Her labs from today are pending.  However, she had a chemistry panel back in January of this year.  Her serum creatinine was 1 mg/dL her serum sodium, potassium, chloride, bicarbonate, calcium were normal.  Her CBC is normal.       Overall she is doing well today.  She denies any acute health issues or symptoms.     The following portions of the patient's history were reviewed and updated as appropriate: allergies, current medications, past family history, past medical history, past social history, past surgical history, and problem list.    Subjective  Review of Systems  A comprehensive review of systems was performed. Pertinent positives and negatives are described above.    Social and Family History  Patient reports that she has never smoked. She has never used smokeless tobacco. She reports current alcohol use. She reports current drug use. Drug: Marijuana.  family history includes Hypertension in her father, maternal grandfather, maternal grandmother, mother, paternal  grandfather, and paternal grandmother.     Examination  Blood pressure 114/80, pulse 79, weight 83.5 kg (184 lb), last menstrual period 09/04/2023, SpO2 99%, not currently breastfeeding. Body mass index is 31.1 kg/m .  General:  Comfortable  Head:  normocephalic, atraumatic    Eyes: conjunctiva clear, EOM intact, PERRL.   Throat:  No erythema, exudates or lesions.   Neck:  neck supple, no masses  Heart:  RRR, no murmur   Lungs:  CTA bilaterally, no wheezes, rhonchi, rales.  Breathing unlabored.   Abdomen:  Soft, NT/ND, no HSM, no masses.   Extremities: No deformities, clubbing, cyanosis, or edema.   Neurologic: A/O x 3. No focal deficits.    Objective  Pertinent Laboratory and Imaging Results  Most Recent Serum Chemistries  Lab Results   Component Value Date    WBC 4.5 01/31/2024    HGB 12.1 01/31/2024    HCT 37.2 01/31/2024     01/31/2024     03/14/2024    POTASSIUM 3.2 (L) 03/14/2024    CHLORIDE 103 03/14/2024    CO2 27 03/14/2024    BUN 10.5 03/14/2024    CR 1.07 (H) 03/14/2024     (H) 03/14/2024    SED 20 06/22/2005    AST 17 11/01/2023    ALT 10 11/01/2023    ALKPHOS 37 11/01/2023    INR 1.06 05/16/2013     Most Recent Urinalysis  Lab Results   Component Value Date    COLOR Light Yellow 05/11/2023    APPEARANCE Clear 05/11/2023    URINEGLC Negative 05/11/2023    URINEBILI Negative 05/11/2023    URINEKETONE Trace (A) 05/11/2023    SG 1.020 05/11/2023    URINEPH 6.0 05/11/2023    PROTEIN Negative 05/11/2023    UROBILINOGEN 0.2 09/18/2021    NITRITE Negative 05/11/2023    LEUKEST Negative 05/11/2023     Current Outpatient Medications   Medication     ACETAMINOPHEN PO     benzoyl peroxide (BENZOYL PEROXIDE WASH) 5 % external liquid     cyanocobalamin (VITAMIN B-12) 1000 MCG tablet     hydrochlorothiazide (HYDRODIURIL) 12.5 MG tablet     levothyroxine (SYNTHROID/LEVOTHROID) 100 MCG tablet     loratadine (CLARITIN) 10 MG tablet     losartan (COZAAR) 25 MG tablet     norethindrone (MICRONOR) 0.35  MG tablet     omeprazole (PRILOSEC) 40 MG DR capsule     valACYclovir (VALTREX) 500 MG tablet     No current facility-administered medications for this visit.        Assessment & Plan  # Elevated serum creatinine    Overall, the patient is doing well.  Her serum creatinine is slightly elevated.  However, EGFR based on Cystatin C is normal.  Furthermore, her urinalysis is.  Her kidneys appeared normal on a recent CT scan of the abdomen.  It is possible that the patient has a mild chronic kidney disease due chronic hypertension as well as preeclampsia in her previous pregnancies.    She is not a diabetic.  She does not have a family history of kidney problems.  I reassured the patient that overall, her kidney function is stable.     She does not smoke tobacco.  However, she smokes weed, and I counseled her and recommended that she stops.  She exercises regularly.  She knows to avoid NSAIDs.    # Hypokalemia  She is slightly hypokalemic today.  I instructed her to liberalize fluid that is rich in potassium and encouraged her to eat more fruits and vegetables.  Recheck BMP in 1 month    # Hypertension  Her blood pressure is well controlled.  Blood renin activity, serum aldosterone levels were checked last year and are normal.    # Thyroid eye disease    My recommendations are the following:  - You can eat food that has potassium it in  - Recheck your BMP in one month  - Return to Nephrology Clinic in 1 year to review your progress.     Total time was of this encounter on the date of service was 30 minutes. All questions were answered to the patient's satisfaction.  Chart documentation was completed, in part, with [employer] voice-recognition software. Even though reviewed, some grammatical, spelling, and word errors may remain.    Orders placed today:  Orders Placed This Encounter   Procedures     Basic metabolic panel     Al Shen MD  Division of Nephrology and Hypertension  [employer]   [employer]  Console ([email or web address])       Again, thank you for allowing me to participate in the care of your patient.      Sincerely,    Al Shen MD

## 2024-03-15 LAB
CREAT UR-MCNC: 192 MG/DL
MICROALBUMIN UR-MCNC: 21.4 MG/L
MICROALBUMIN/CREAT UR: 11.15 MG/G CR (ref 0–25)

## 2024-03-27 ENCOUNTER — ANCILLARY PROCEDURE (OUTPATIENT)
Dept: MAMMOGRAPHY | Facility: CLINIC | Age: 49
End: 2024-03-27
Attending: FAMILY MEDICINE
Payer: COMMERCIAL

## 2024-03-27 DIAGNOSIS — Z12.31 VISIT FOR SCREENING MAMMOGRAM: ICD-10-CM

## 2024-03-27 PROCEDURE — 77063 BREAST TOMOSYNTHESIS BI: CPT

## 2024-04-01 ENCOUNTER — ANCILLARY PROCEDURE (OUTPATIENT)
Dept: MAMMOGRAPHY | Facility: CLINIC | Age: 49
End: 2024-04-01
Attending: FAMILY MEDICINE
Payer: COMMERCIAL

## 2024-04-01 ENCOUNTER — LAB (OUTPATIENT)
Dept: LAB | Facility: CLINIC | Age: 49
End: 2024-04-01
Payer: COMMERCIAL

## 2024-04-01 DIAGNOSIS — N64.89 BREAST ASYMMETRY: ICD-10-CM

## 2024-04-01 DIAGNOSIS — E87.6 HYPOKALEMIA: ICD-10-CM

## 2024-04-01 DIAGNOSIS — N89.8 VAGINAL DISCHARGE: ICD-10-CM

## 2024-04-01 PROBLEM — H02.539 EYELID RETRACTION: Status: ACTIVE | Noted: 2021-10-15

## 2024-04-01 PROCEDURE — 87210 SMEAR WET MOUNT SALINE/INK: CPT

## 2024-04-01 PROCEDURE — 76642 ULTRASOUND BREAST LIMITED: CPT | Mod: LT

## 2024-04-03 ENCOUNTER — HOSPITAL ENCOUNTER (OUTPATIENT)
Facility: AMBULATORY SURGERY CENTER | Age: 49
End: 2024-04-03
Attending: SURGERY
Payer: COMMERCIAL

## 2024-04-03 ENCOUNTER — OFFICE VISIT (OUTPATIENT)
Dept: SURGERY | Facility: CLINIC | Age: 49
End: 2024-04-03
Attending: FAMILY MEDICINE
Payer: COMMERCIAL

## 2024-04-03 VITALS — WEIGHT: 184 LBS | BODY MASS INDEX: 30.66 KG/M2 | HEIGHT: 65 IN

## 2024-04-03 DIAGNOSIS — N63.21 MASS OF UPPER OUTER QUADRANT OF LEFT BREAST: Primary | ICD-10-CM

## 2024-04-03 PROCEDURE — 99204 OFFICE O/P NEW MOD 45 MIN: CPT | Performed by: SURGERY

## 2024-04-03 PROCEDURE — 99213 OFFICE O/P EST LOW 20 MIN: CPT | Performed by: SURGERY

## 2024-04-03 NOTE — LETTER
4/3/2024         RE: Mimi Melton  [address]  [place] [address]        Dear Colleague,    Thank you for referring your patient, Mimi Melton, to the [facility] BREAST CLINIC [place]. Please see a copy of my visit note below.    History:  This is a 48 year old female who I'm asked to see by Dr. Rebolledo for evaluation of a left breast mass.  This was picked up on screening mammogram.  She had a biopsy of it last year.  It came back as a fibroadenoma.  This year on her screening mammogram she had significant growth of this mass.  She is now able to feel the mass.  It is tender to palpation.  She denies having any other breast symptoms such as nipple discharge or skin changes.    Allergies:  Lisinopril    Past medical history:  Hypertension  Hypothyroidism    Past surgical history:  Umbilical hernia repair  D&C   x3  Eyelid surgery/orbital decompression  Cystoscopy  Thyroidectomy    Medications:     ACETAMINOPHEN PO, Take 1,000 mg by mouth 2 times daily as needed for pain (menstrual cramps), Disp: , Rfl:      benzoyl peroxide (BENZOYL PEROXIDE WASH) 5 % external liquid, WASH FACE SKIN 1-2 TIMES DAILY, Disp: 142 mL, Rfl: 3     cyanocobalamin (VITAMIN B-12) 1000 MCG tablet, Take 1 tablet (1,000 mcg) by mouth daily, Disp: 90 tablet, Rfl: 3     hydrochlorothiazide (HYDRODIURIL) 12.5 MG tablet, Take 1 tablet (12.5 mg) by mouth daily, Disp: 90 tablet, Rfl: 3     levothyroxine (SYNTHROID/LEVOTHROID) 100 MCG tablet, Take 1 tablet (100 mcg) by mouth daily, Disp: 90 tablet, Rfl: 3     loratadine (CLARITIN) 10 MG tablet, Take 10 mg by mouth daily, Disp: , Rfl:      losartan (COZAAR) 25 MG tablet, Take 1 tablet (25 mg) by mouth daily, Disp: 90 tablet, Rfl: 3     norethindrone (MICRONOR) 0.35 MG tablet, Take 1 tablet (0.35 mg) by mouth daily for 84 days, Disp: 84 tablet, Rfl: 0     omeprazole (PRILOSEC) 40 MG DR capsule, Take 1 capsule (40 mg) by mouth daily, Disp: 90 capsule, Rfl:  "1     valACYclovir (VALTREX) 500 MG tablet, Take 1 tablet (500 mg) by mouth 2 times daily for 3 days, Disp: 6 tablet, Rfl: 0    Family history:  She has no family history of breast cancer.    Social history:  Rarely consumes alcohol.  Utilizes marijuana.  Denies tobacco and illicit drug use.  Has 3 children and 1 grandchild.    Review of Systems:  General: No complaints or constitutional symptoms  Skin: No complaints or symptoms   Hematologic/Lymphatic: No symptoms or complaints  Psychiatric: No symptoms or complaints  Endocrine: No excessive fatigue, no hypermetabolic symptoms reported  Respiratory: No cough, shortness of breath, or wheezing  Cardiovascular: No chest pain or dyspnea on exertion  Breast: Per HPI  Gastrointestinal: No abdominal pain, nausea, diarrhea, or constipation  Musculoskeletal: No recent injuries reported  Neurological: No focal neurologic defects reported.      Physical Exam:  Ht 1.638 m (5' 4.5\")   Wt 83.5 kg (184 lb)   BMI 31.10 kg/m    General: Alert, cooperative, appears stated age   Skin: Skin color, texture, turgor normal, no rashes or lesions   Lymphatic: No obvious adenopathy, no swelling   Eyes: No scleral icterus, pupils equal  HENT: No traumatic injury to the head or face, no gross abnormalities  Lungs: Normal respiratory effort, breath sounds equal bilaterally  Heart: Regular rate and rhythm  Breasts: Left breast 2:00 zone 3 has a 1 cm palpable well-circumscribed and mobile lesion consistent with her known underlying fibroadenoma  Abdomen: Soft, non-distended and non-tender to palpation  Neurologic: Grossly intact    Imaging:  Pertinent images personally reviewed by myself and discussed with the patient.    Radiology reports:  EXAM: US BREAST LEFT LIMITED 1-3 QUADRANTS, 4/1/2024 12:58 PM     COMPARISONS: 3/27/2024, 4/5/2023, 3/29/2023, 3/24/2023     HISTORY: Breast asymmetry     FINDINGS: Targeted left breast ultrasound was performed. In the 1:30  position 10 cm from the nipple " a hypoechoic oval circumscribed mass  with parallel orientation and increased through transmission is again  identified now measuring 18 x 21 x 12 mm and previously measuring 9 x  8 x 5 mm.                                                                   IMPRESSION: BI-RADS CATEGORY: 4 - Suspicious.    (BIRADS 4A: Low suspicion for malignancy)     RECOMMENDED FOLLOW-UP: Surgical Consultation.  The mass was previously biopsied in April 2023 demonstrating a benign  fibroadenoma. However, it is had a 9-10 fold increase in size by  volume compared to the prior exam 11 months ago. Given this rapid  growth surgical excision is recommended.    My interpretation:  Well-circumscribed mass seen within the ultrasound images in the left breast upper outer quadrant    Pathology:  LEFT breast, 1:30, 10 cm from nipple, ultrasound guided core biopsy:  -Fibroadenoma with usual ductal hyperplasia  -Negative for atypia or malignancy    IMPRESSION:  Enlarging left breast mass.      PLAN:   Discussed the surgical options for treatment of a significantly enlarging breast mass with an excisional biopsy.  This is typically an outpatient procedure under local MAC anesthetic.  The risks and benefits of surgery were explained.  Also talked about expected recovery time.  Further treatment will be dependent upon the final pathology.  We'll schedule a left breast excisional biopsy at her earliest convenience.    Dena Chase DO  General Surgeon  [facility]  Breast [facility]  [address] [place] [id number]  Office: [phone]  Employed by - [facility]      Again, thank you for allowing me to participate in the care of your patient.        Sincerely,        Dena Chase DO

## 2024-04-03 NOTE — NURSING NOTE
Mimi presents to [facility] for a surgical consult with Dr. Chase  regarding an enlarging breast mass.  Patient had recent imaging, see report for details.  RN assessment and EMR update.  Patient met with Dr. Chase .  See dictation for details of visit. She will plan surgical excision of lesion.  Pre and post op teaching, written and verbal, provided to patient.  Walked her to Mark/Farhat for surgery scheduling.  Follow up pending biopsy results.

## 2024-04-03 NOTE — PROGRESS NOTES
History:  This is a 48 year old female who I'm asked to see by Dr. Rebolledo for evaluation of a left breast mass.  This was picked up on screening mammogram.  She had a biopsy of it last year.  It came back as a fibroadenoma.  This year on her screening mammogram she had significant growth of this mass.  She is now able to feel the mass.  It is tender to palpation.  She denies having any other breast symptoms such as nipple discharge or skin changes.    Allergies:  Lisinopril    Past medical history:  Hypertension  Hypothyroidism    Past surgical history:  Umbilical hernia repair  D&C   x3  Eyelid surgery/orbital decompression  Cystoscopy  Thyroidectomy    Medications:    ACETAMINOPHEN PO, Take 1,000 mg by mouth 2 times daily as needed for pain (menstrual cramps), Disp: , Rfl:     benzoyl peroxide (BENZOYL PEROXIDE WASH) 5 % external liquid, WASH FACE SKIN 1-2 TIMES DAILY, Disp: 142 mL, Rfl: 3    cyanocobalamin (VITAMIN B-12) 1000 MCG tablet, Take 1 tablet (1,000 mcg) by mouth daily, Disp: 90 tablet, Rfl: 3    hydrochlorothiazide (HYDRODIURIL) 12.5 MG tablet, Take 1 tablet (12.5 mg) by mouth daily, Disp: 90 tablet, Rfl: 3    levothyroxine (SYNTHROID/LEVOTHROID) 100 MCG tablet, Take 1 tablet (100 mcg) by mouth daily, Disp: 90 tablet, Rfl: 3    loratadine (CLARITIN) 10 MG tablet, Take 10 mg by mouth daily, Disp: , Rfl:     losartan (COZAAR) 25 MG tablet, Take 1 tablet (25 mg) by mouth daily, Disp: 90 tablet, Rfl: 3    norethindrone (MICRONOR) 0.35 MG tablet, Take 1 tablet (0.35 mg) by mouth daily for 84 days, Disp: 84 tablet, Rfl: 0    omeprazole (PRILOSEC) 40 MG DR capsule, Take 1 capsule (40 mg) by mouth daily, Disp: 90 capsule, Rfl: 1    valACYclovir (VALTREX) 500 MG tablet, Take 1 tablet (500 mg) by mouth 2 times daily for 3 days, Disp: 6 tablet, Rfl: 0    Family history:  She has no family history of breast cancer.    Social history:  Rarely consumes alcohol.  Utilizes marijuana.  Denies tobacco and  "illicit drug use.  Has 3 children and 1 grandchild.    Review of Systems:  General: No complaints or constitutional symptoms  Skin: No complaints or symptoms   Hematologic/Lymphatic: No symptoms or complaints  Psychiatric: No symptoms or complaints  Endocrine: No excessive fatigue, no hypermetabolic symptoms reported  Respiratory: No cough, shortness of breath, or wheezing  Cardiovascular: No chest pain or dyspnea on exertion  Breast: Per HPI  Gastrointestinal: No abdominal pain, nausea, diarrhea, or constipation  Musculoskeletal: No recent injuries reported  Neurological: No focal neurologic defects reported.      Physical Exam:  Ht 1.638 m (5' 4.5\")   Wt 83.5 kg (184 lb)   BMI 31.10 kg/m    General: Alert, cooperative, appears stated age   Skin: Skin color, texture, turgor normal, no rashes or lesions   Lymphatic: No obvious adenopathy, no swelling   Eyes: No scleral icterus, pupils equal  HENT: No traumatic injury to the head or face, no gross abnormalities  Lungs: Normal respiratory effort, breath sounds equal bilaterally  Heart: Regular rate and rhythm  Breasts: Left breast 2:00 zone 3 has a 1 cm palpable well-circumscribed and mobile lesion consistent with her known underlying fibroadenoma  Abdomen: Soft, non-distended and non-tender to palpation  Neurologic: Grossly intact    Imaging:  Pertinent images personally reviewed by myself and discussed with the patient.    Radiology reports:  EXAM: US BREAST LEFT LIMITED 1-3 QUADRANTS, 4/1/2024 12:58 PM     COMPARISONS: 3/27/2024, 4/5/2023, 3/29/2023, 3/24/2023     HISTORY: Breast asymmetry     FINDINGS: Targeted left breast ultrasound was performed. In the 1:30  position 10 cm from the nipple a hypoechoic oval circumscribed mass  with parallel orientation and increased through transmission is again  identified now measuring 18 x 21 x 12 mm and previously measuring 9 x  8 x 5 mm.                                                                   IMPRESSION: BI-RADS " CATEGORY: 4 - Suspicious.    (BIRADS 4A: Low suspicion for malignancy)     RECOMMENDED FOLLOW-UP: Surgical Consultation.  The mass was previously biopsied in April 2023 demonstrating a benign  fibroadenoma. However, it is had a 9-10 fold increase in size by  volume compared to the prior exam 11 months ago. Given this rapid  growth surgical excision is recommended.    My interpretation:  Well-circumscribed mass seen within the ultrasound images in the left breast upper outer quadrant    Pathology:  LEFT breast, 1:30, 10 cm from nipple, ultrasound guided core biopsy:  -Fibroadenoma with usual ductal hyperplasia  -Negative for atypia or malignancy    IMPRESSION:  Enlarging left breast mass.      PLAN:   Discussed the surgical options for treatment of a significantly enlarging breast mass with an excisional biopsy.  This is typically an outpatient procedure under local MAC anesthetic.  The risks and benefits of surgery were explained.  Also talked about expected recovery time.  Further treatment will be dependent upon the final pathology.  We'll schedule a left breast excisional biopsy at her earliest convenience.    Dena Chase DO  General Surgeon  [facility]  [facility]  [address] [place] [id number]  Office: [phone]  Employed by - [facility]

## 2024-04-04 DIAGNOSIS — N76.0 BV (BACTERIAL VAGINOSIS): Primary | ICD-10-CM

## 2024-04-04 DIAGNOSIS — B96.89 BV (BACTERIAL VAGINOSIS): Primary | ICD-10-CM

## 2024-04-04 RX ORDER — METRONIDAZOLE 7.5 MG/G
GEL VAGINAL AT BEDTIME
Status: DISCONTINUED | OUTPATIENT
Start: 2024-04-04 | End: 2024-04-04 | Stop reason: HOSPADM

## 2024-04-04 RX ORDER — METRONIDAZOLE 500 MG/1
500 TABLET ORAL 2 TIMES DAILY
Qty: 14 TABLET | Refills: 0 | Status: SHIPPED | OUTPATIENT
Start: 2024-04-04 | End: 2024-04-05 | Stop reason: ALTCHOICE

## 2024-04-05 ENCOUNTER — MYC MEDICAL ADVICE (OUTPATIENT)
Dept: OBGYN | Facility: CLINIC | Age: 49
End: 2024-04-05
Payer: COMMERCIAL

## 2024-04-05 DIAGNOSIS — B96.89 BV (BACTERIAL VAGINOSIS): Primary | ICD-10-CM

## 2024-04-05 DIAGNOSIS — N76.0 BV (BACTERIAL VAGINOSIS): Primary | ICD-10-CM

## 2024-04-05 RX ORDER — METRONIDAZOLE 7.5 MG/G
1 GEL VAGINAL DAILY
Qty: 25 G | Refills: 0 | Status: SHIPPED | OUTPATIENT
Start: 2024-04-05 | End: 2024-04-10

## 2024-04-17 ENCOUNTER — LAB (OUTPATIENT)
Dept: LAB | Facility: CLINIC | Age: 49
End: 2024-04-17
Payer: COMMERCIAL

## 2024-04-17 DIAGNOSIS — E87.6 HYPOKALEMIA: ICD-10-CM

## 2024-04-17 PROCEDURE — 80048 BASIC METABOLIC PNL TOTAL CA: CPT

## 2024-04-17 PROCEDURE — 36415 COLL VENOUS BLD VENIPUNCTURE: CPT

## 2024-04-18 LAB
ANION GAP SERPL CALCULATED.3IONS-SCNC: 9 MMOL/L (ref 7–15)
BUN SERPL-MCNC: 8.9 MG/DL (ref 6–20)
CALCIUM SERPL-MCNC: 9.4 MG/DL (ref 8.6–10)
CHLORIDE SERPL-SCNC: 102 MMOL/L (ref 98–107)
CREAT SERPL-MCNC: 1 MG/DL (ref 0.51–0.95)
DEPRECATED HCO3 PLAS-SCNC: 29 MMOL/L (ref 22–29)
EGFRCR SERPLBLD CKD-EPI 2021: 70 ML/MIN/1.73M2
GLUCOSE SERPL-MCNC: 93 MG/DL (ref 70–99)
POTASSIUM SERPL-SCNC: 3.7 MMOL/L (ref 3.4–5.3)
SODIUM SERPL-SCNC: 140 MMOL/L (ref 135–145)

## 2024-04-19 ENCOUNTER — OFFICE VISIT (OUTPATIENT)
Dept: FAMILY MEDICINE | Facility: CLINIC | Age: 49
End: 2024-04-19
Payer: COMMERCIAL

## 2024-04-19 VITALS
DIASTOLIC BLOOD PRESSURE: 87 MMHG | TEMPERATURE: 96.7 F | BODY MASS INDEX: 31.65 KG/M2 | OXYGEN SATURATION: 99 % | HEART RATE: 73 BPM | SYSTOLIC BLOOD PRESSURE: 133 MMHG | WEIGHT: 187.3 LBS | RESPIRATION RATE: 16 BRPM

## 2024-04-19 DIAGNOSIS — J10.1 INFLUENZA B: Primary | ICD-10-CM

## 2024-04-19 LAB
DEPRECATED S PYO AG THROAT QL EIA: NEGATIVE
FLUAV AG SPEC QL IA: NEGATIVE
FLUBV AG SPEC QL IA: POSITIVE
GROUP A STREP BY PCR: NOT DETECTED

## 2024-04-19 PROCEDURE — 87635 SARS-COV-2 COVID-19 AMP PRB: CPT | Performed by: PHYSICIAN ASSISTANT

## 2024-04-19 PROCEDURE — 99213 OFFICE O/P EST LOW 20 MIN: CPT | Performed by: PHYSICIAN ASSISTANT

## 2024-04-19 PROCEDURE — 87804 INFLUENZA ASSAY W/OPTIC: CPT | Performed by: PHYSICIAN ASSISTANT

## 2024-04-19 PROCEDURE — 87651 STREP A DNA AMP PROBE: CPT | Performed by: PHYSICIAN ASSISTANT

## 2024-04-19 NOTE — PATIENT INSTRUCTIONS
Ok to take Mucinex or Delsym for cough, avoid medications that have a nasal decongestant  Fluids and rest   Tylenol for sore throat and body aches

## 2024-04-19 NOTE — PROGRESS NOTES
Assessment & Plan     1. Influenza B  On exam, lungs are CTAB without sign of respiratory distress. Throat without PTA or RPA and TM clear B/L. No nuchal rigidity   Suspect she has a viral illness, and influenza test is positive for influenza B.  She is out of the window for treatment with Tamiflu.  Supportive care is encouraged.  - Streptococcus A Rapid Screen w/Reflex to PCR - Clinic Collect  - Symptomatic COVID-19 Virus (Coronavirus) by PCR; Future  - Influenza A/B antigen  - Symptomatic COVID-19 Virus (Coronavirus) by PCR Nose  - Group A Streptococcus PCR Throat Swab        Return in about 5 days (around 4/24/2024), or if symptoms worsen or fail to improve.    Diagnosis and treatment plan was reviewed with patient and/or family.   We went over any labs or imaging. Discussed worsening symptoms or little to no relief despite treatment plan to follow-up with PCP or return to clinic.  Patient verbalizes understanding. All questions were addressed and answered.     Floresita Clemens PA-C  [facility]    CHIEF COMPLAINT:   Chief Complaint   Patient presents with    Cough     Dry cough, x 2 days, has been taking tylenol, mother dx with COVID yesterday, slight runny nose and congestion (pt has allergies also), no fever     Subjective     Mimi is a 48 year old female who presents to clinic today for evaluation of dry cough for the past 2-3 days.  She endorses having a sore throat and dry cough.  She has been taking Tylenol for her symptoms.  She has a history of allergies, so initially attributed to that.  Endorses having bodyaches.  She was exposed to influenza and COVID over the past week.      Past Medical History:   Diagnosis Date    Cervical high risk HPV (human papillomavirus) test positive 12/2015 12/2015, 10/2019, 10/30/20    Chronic sinusitis Summer 2016    I get congested every 3-4 weeks    Esophageal reflux     Exophthalmos, unspecified     Hypothyroidism     Motion sickness      PONV (postoperative nausea and vomiting)     Thyroid eye disease     Thyrotoxicosis without mention of goiter or other cause, without mention of thyrotoxic crisis or storm 2005    Had radioablation, On synthroid, seeing [facility]; takes synthroid    Unspecified essential hypertension 1980    meds since , on atenolol     Past Surgical History:   Procedure Laterality Date    COLONOSCOPY N/A 2022    Procedure: COLONOSCOPY;  Surgeon: Marah Souza MD;  Location: [facility] OR    CYSTOSCOPY, INJECT COLLAGEN, COMBINED N/A 2024    Procedure: CYSTOSCOPY, WITH PERIURETHRAL BULKING AGENT INJECTION (look in the bladder and inject filler into the walls of the urethra;  Surgeon: Karin Amin MD;  Location: [facility] OR    CYSTOSCOPY, SLING TRANSVAGINAL N/A 10/10/2017    Procedure: CYSTOSCOPY, SLING TRANSVAGINAL;  Midurethral Sling and Cystoscopy (Support the Urethra with Mesh Sling and Look in the Bladder);  Surgeon: Karin Amin MD;  [facility]    ESOPHAGOSCOPY, GASTROSCOPY, DUODENOSCOPY (EGD), COMBINED N/A 3/30/2023    Procedure: Esophagoscopy, gastroscopy, duodenoscopy (EGD), combined;  Surgeon: Janette Chandra MD;  Location: [facility] OR    EYE SURGERY  2008    Orbital Decompression Dr smart    ORBITOTOMY DECOMPRESSION Bilateral 2021    Procedure: Bilateral orbital decompression with  Sonopet, Bilateral lower lid retraction repair;  Surgeon: Niles Donnelly MD;  Location: [facility] OR    REPAIR RETRACTION LID Bilateral 2021    Procedure: REPAIR, RETRACTION, EYELID;  Surgeon: Niles Donnelly MD;  Location: [facility] OR    SONOPET Bilateral 2021    Procedure: SONOPET EYE;  Surgeon: Niles Donnelly MD;  Location: [facility] OR    ZZC  DELIVERY ONLY  91    , Low Cervical    ZZC  DELIVERY ONLY  97    , Low Cervical    ZZC  DELIVERY ONLY  99    , Low Cervical    ZZC INDUCED ABORTN BY D&C      Aspiration & Curettage, TAB x2     ZZHC REPAIR INCISIONAL HERNIA,REDUCIBLE      Umbilical hernia Repair     Social History     Tobacco Use    Smoking status: Never    Smokeless tobacco: Never   Substance Use Topics    Alcohol use: Yes     Comment: 1-2x's/month if that.     Current Outpatient Medications   Medication Sig Dispense Refill    ACETAMINOPHEN PO Take 1,000 mg by mouth 2 times daily as needed for pain (menstrual cramps)      benzoyl peroxide (BENZOYL PEROXIDE WASH) 5 % external liquid WASH FACE SKIN 1-2 TIMES DAILY 142 mL 3    cyanocobalamin (VITAMIN B-12) 1000 MCG tablet Take 1 tablet (1,000 mcg) by mouth daily 90 tablet 3    hydrochlorothiazide (HYDRODIURIL) 12.5 MG tablet Take 1 tablet (12.5 mg) by mouth daily 90 tablet 3    levothyroxine (SYNTHROID/LEVOTHROID) 100 MCG tablet Take 1 tablet (100 mcg) by mouth daily 90 tablet 3    loratadine (CLARITIN) 10 MG tablet Take 10 mg by mouth daily      losartan (COZAAR) 25 MG tablet Take 1 tablet (25 mg) by mouth daily 90 tablet 3    norethindrone (MICRONOR) 0.35 MG tablet Take 1 tablet (0.35 mg) by mouth daily for 84 days 84 tablet 0    omeprazole (PRILOSEC) 40 MG DR capsule Take 1 capsule (40 mg) by mouth daily 90 capsule 1     No current facility-administered medications for this visit.     Allergies   Allergen Reactions    Dust Mites     Lisinopril Swelling     Swelling in lip    Mold      Cannot have any meds that contain mold.       10 point ROS of systems were all negative except for pertinent positives noted in my HPI.      Exam:   /87   Pulse 73   Temp (!) 96.7  F (35.9  C) (Tympanic)   Resp 16   Wt 85 kg (187 lb 4.8 oz)   LMP  (LMP Unknown)   SpO2 99%   BMI 31.65 kg/m    Constitutional: healthy, alert and no distress  Head: Normocephalic, atraumatic.  Eyes: conjunctiva clear, no drainage  ENT: TMs clear and shiny suhail, nasal mucosa pink and moist, throat mildly erythematous without trismus or drooling.   Neck: neck is supple, no cervical lymphadenopathy or nuchal  rigidity  Cardiovascular: RRR  Respiratory: CTA bilaterally, no rhonchi or rales  Skin: no rashes  Neurologic: Speech clear, gait normal. Moves all extremities.    Results for orders placed or performed in visit on 04/19/24   Streptococcus A Rapid Screen w/Reflex to PCR - Clinic Collect     Status: Normal    Specimen: Throat; Swab   Result Value Ref Range    Group A Strep antigen Negative Negative   Influenza A/B antigen     Status: Abnormal    Specimen: Nose; Swab   Result Value Ref Range    Influenza A antigen Negative Negative    Influenza B antigen Positive (A) Negative    Narrative    Test results must be correlated with clinical data. If necessary, results should be confirmed by a molecular assay or viral culture.

## 2024-04-20 LAB — SARS-COV-2 RNA RESP QL NAA+PROBE: POSITIVE

## 2024-05-16 ENCOUNTER — ANESTHESIA EVENT (OUTPATIENT)
Dept: SURGERY | Facility: AMBULATORY SURGERY CENTER | Age: 49
End: 2024-05-16
Payer: COMMERCIAL

## 2024-05-17 ENCOUNTER — ANESTHESIA (OUTPATIENT)
Dept: SURGERY | Facility: AMBULATORY SURGERY CENTER | Age: 49
End: 2024-05-17
Payer: COMMERCIAL

## 2024-05-17 ENCOUNTER — HOSPITAL ENCOUNTER (OUTPATIENT)
Facility: AMBULATORY SURGERY CENTER | Age: 49
Discharge: HOME | End: 2024-05-17
Attending: SURGERY
Payer: COMMERCIAL

## 2024-05-17 VITALS
BODY MASS INDEX: 31.16 KG/M2 | HEIGHT: 65 IN | DIASTOLIC BLOOD PRESSURE: 88 MMHG | TEMPERATURE: 96.9 F | HEART RATE: 76 BPM | SYSTOLIC BLOOD PRESSURE: 144 MMHG | WEIGHT: 187 LBS | OXYGEN SATURATION: 97 % | RESPIRATION RATE: 16 BRPM

## 2024-05-17 DIAGNOSIS — N63.21 MASS OF UPPER OUTER QUADRANT OF LEFT BREAST: ICD-10-CM

## 2024-05-17 PROCEDURE — 19120 REMOVAL OF BREAST LESION: CPT | Mod: LT | Performed by: SURGERY

## 2024-05-17 RX ORDER — ONDANSETRON 2 MG/ML
4 INJECTION INTRAMUSCULAR; INTRAVENOUS EVERY 30 MIN PRN
Status: DISCONTINUED | OUTPATIENT
Start: 2024-05-17 | End: 2024-05-18 | Stop reason: HOSPADM

## 2024-05-17 RX ORDER — OXYCODONE HYDROCHLORIDE 10 MG/1
10 TABLET ORAL
Status: DISCONTINUED | OUTPATIENT
Start: 2024-05-17 | End: 2024-05-18 | Stop reason: HOSPADM

## 2024-05-17 RX ORDER — LIDOCAINE HYDROCHLORIDE 20 MG/ML
INJECTION, SOLUTION INFILTRATION; PERINEURAL PRN
Status: DISCONTINUED | OUTPATIENT
Start: 2024-05-17 | End: 2024-05-17

## 2024-05-17 RX ORDER — ONDANSETRON 4 MG/1
4 TABLET, ORALLY DISINTEGRATING ORAL EVERY 30 MIN PRN
Status: DISCONTINUED | OUTPATIENT
Start: 2024-05-17 | End: 2024-05-18 | Stop reason: HOSPADM

## 2024-05-17 RX ORDER — GLYCOPYRROLATE 0.2 MG/ML
INJECTION, SOLUTION INTRAMUSCULAR; INTRAVENOUS PRN
Status: DISCONTINUED | OUTPATIENT
Start: 2024-05-17 | End: 2024-05-17

## 2024-05-17 RX ORDER — ONDANSETRON 2 MG/ML
INJECTION INTRAMUSCULAR; INTRAVENOUS PRN
Status: DISCONTINUED | OUTPATIENT
Start: 2024-05-17 | End: 2024-05-17

## 2024-05-17 RX ORDER — FENTANYL CITRATE 50 UG/ML
INJECTION, SOLUTION INTRAMUSCULAR; INTRAVENOUS PRN
Status: DISCONTINUED | OUTPATIENT
Start: 2024-05-17 | End: 2024-05-17

## 2024-05-17 RX ORDER — LIDOCAINE 40 MG/G
CREAM TOPICAL
Status: DISCONTINUED | OUTPATIENT
Start: 2024-05-17 | End: 2024-05-18 | Stop reason: HOSPADM

## 2024-05-17 RX ORDER — FENTANYL CITRATE 0.05 MG/ML
25 INJECTION, SOLUTION INTRAMUSCULAR; INTRAVENOUS
Status: DISCONTINUED | OUTPATIENT
Start: 2024-05-17 | End: 2024-05-18 | Stop reason: HOSPADM

## 2024-05-17 RX ORDER — OXYCODONE HYDROCHLORIDE 5 MG/1
5 TABLET ORAL
Status: DISCONTINUED | OUTPATIENT
Start: 2024-05-17 | End: 2024-05-18 | Stop reason: HOSPADM

## 2024-05-17 RX ORDER — NALOXONE HYDROCHLORIDE 0.4 MG/ML
0.1 INJECTION, SOLUTION INTRAMUSCULAR; INTRAVENOUS; SUBCUTANEOUS
Status: DISCONTINUED | OUTPATIENT
Start: 2024-05-17 | End: 2024-05-18 | Stop reason: HOSPADM

## 2024-05-17 RX ORDER — ACETAMINOPHEN 325 MG/1
975 TABLET ORAL ONCE
Status: COMPLETED | OUTPATIENT
Start: 2024-05-17 | End: 2024-05-17

## 2024-05-17 RX ORDER — PROPOFOL 10 MG/ML
INJECTION, EMULSION INTRAVENOUS PRN
Status: DISCONTINUED | OUTPATIENT
Start: 2024-05-17 | End: 2024-05-17

## 2024-05-17 RX ORDER — PROPOFOL 10 MG/ML
INJECTION, EMULSION INTRAVENOUS CONTINUOUS PRN
Status: DISCONTINUED | OUTPATIENT
Start: 2024-05-17 | End: 2024-05-17

## 2024-05-17 RX ORDER — TRAMADOL HYDROCHLORIDE 50 MG/1
50 TABLET ORAL EVERY 6 HOURS PRN
Qty: 10 TABLET | Refills: 0 | Status: SHIPPED | OUTPATIENT
Start: 2024-05-17 | End: 2024-05-20

## 2024-05-17 RX ORDER — DEXAMETHASONE SODIUM PHOSPHATE 4 MG/ML
4 INJECTION, SOLUTION INTRA-ARTICULAR; INTRALESIONAL; INTRAMUSCULAR; INTRAVENOUS; SOFT TISSUE
Status: DISCONTINUED | OUTPATIENT
Start: 2024-05-17 | End: 2024-05-18 | Stop reason: HOSPADM

## 2024-05-17 RX ORDER — DEXAMETHASONE SODIUM PHOSPHATE 4 MG/ML
INJECTION, SOLUTION INTRA-ARTICULAR; INTRALESIONAL; INTRAMUSCULAR; INTRAVENOUS; SOFT TISSUE PRN
Status: DISCONTINUED | OUTPATIENT
Start: 2024-05-17 | End: 2024-05-17

## 2024-05-17 RX ORDER — SODIUM CHLORIDE, SODIUM LACTATE, POTASSIUM CHLORIDE, CALCIUM CHLORIDE 600; 310; 30; 20 MG/100ML; MG/100ML; MG/100ML; MG/100ML
INJECTION, SOLUTION INTRAVENOUS CONTINUOUS
Status: DISCONTINUED | OUTPATIENT
Start: 2024-05-17 | End: 2024-05-18 | Stop reason: HOSPADM

## 2024-05-17 RX ADMIN — PROPOFOL 30 MG: 10 INJECTION, EMULSION INTRAVENOUS at 08:53

## 2024-05-17 RX ADMIN — ONDANSETRON 4 MG: 2 INJECTION INTRAMUSCULAR; INTRAVENOUS at 08:51

## 2024-05-17 RX ADMIN — DEXAMETHASONE SODIUM PHOSPHATE 4 MG: 4 INJECTION, SOLUTION INTRA-ARTICULAR; INTRALESIONAL; INTRAMUSCULAR; INTRAVENOUS; SOFT TISSUE at 08:51

## 2024-05-17 RX ADMIN — SODIUM CHLORIDE, SODIUM LACTATE, POTASSIUM CHLORIDE, CALCIUM CHLORIDE: 600; 310; 30; 20 INJECTION, SOLUTION INTRAVENOUS at 08:32

## 2024-05-17 RX ADMIN — FENTANYL CITRATE 25 MCG: 50 INJECTION, SOLUTION INTRAMUSCULAR; INTRAVENOUS at 09:04

## 2024-05-17 RX ADMIN — GLYCOPYRROLATE 0.2 MG: 0.2 INJECTION, SOLUTION INTRAMUSCULAR; INTRAVENOUS at 08:51

## 2024-05-17 RX ADMIN — LIDOCAINE HYDROCHLORIDE 50 MG: 20 INJECTION, SOLUTION INFILTRATION; PERINEURAL at 08:51

## 2024-05-17 RX ADMIN — PROPOFOL 200 MCG/KG/MIN: 10 INJECTION, EMULSION INTRAVENOUS at 08:53

## 2024-05-17 RX ADMIN — FENTANYL CITRATE 25 MCG: 50 INJECTION, SOLUTION INTRAMUSCULAR; INTRAVENOUS at 08:53

## 2024-05-17 RX ADMIN — ACETAMINOPHEN 975 MG: 325 TABLET ORAL at 08:33

## 2024-05-17 NOTE — DISCHARGE INSTRUCTIONS
You have received 975 mg of Acetaminophen (Tylenol) at 8:33AM. Please do not take an additional dose of Tylenol until after 2:33PM     Do not exceed 4,000 mg of acetaminophen during a 24 hour period and keep in mind that acetaminophen can also be found in many over-the-counter cold medications as well as narcotics that may be given for pain.       If you have any questions or concerns regarding your procedure, please contact Dr. Dena Chase, her office number is [phone].

## 2024-05-17 NOTE — ANESTHESIA POSTPROCEDURE EVALUATION
Patient: Mimi Melton    Procedure: Procedure(s):  BREAST EXCISIONAL BIOPSY       Anesthesia Type:  MAC    Note:  Disposition: Outpatient   Postop Pain Control: Uneventful            Sign Out: Well controlled pain   PONV: No   Neuro/Psych: Uneventful            Sign Out: Acceptable/Baseline neuro status   Airway/Respiratory: Uneventful            Sign Out: Acceptable/Baseline resp. status   CV/Hemodynamics: Uneventful            Sign Out: Acceptable CV status; No obvious hypovolemia; No obvious fluid overload   Other NRE: NONE   DID A NON-ROUTINE EVENT OCCUR? No           Last vitals:  Vitals Value Taken Time   /88 05/17/24 1000   Temp 96.9  F (36.1  C) 05/17/24 0922   Pulse 76 05/17/24 1000   Resp 16 05/17/24 0922   SpO2 97 % 05/17/24 1000       Electronically Signed By: Evan Madrid MD  May 17, 2024  10:15 AM

## 2024-05-17 NOTE — ANESTHESIA CARE TRANSFER NOTE
Patient: Mimi Melton    Procedure: Procedure(s):  BREAST EXCISIONAL BIOPSY       Diagnosis: Mass of upper outer quadrant of left breast [N63.21]  Diagnosis Additional Information: No value filed.    Anesthesia Type:   MAC     Note:    Oropharynx: oropharynx clear of all foreign objects  Level of Consciousness: drowsy  Oxygen Supplementation: face mask  Level of Supplemental Oxygen (L/min / FiO2): 8  Independent Airway: airway patency satisfactory and stable  Dentition: dentition unchanged  Vital Signs Stable: post-procedure vital signs reviewed and stable  Report to RN Given: handoff report given  Patient transferred to: Phase II    Handoff Report: Identifed the Patient, Identified the Reponsible Provider, Reviewed the pertinent medical history, Discussed the surgical course, Reviewed Intra-OP anesthesia mangement and issues during anesthesia, Set expectations for post-procedure period and Allowed opportunity for questions and acknowledgement of understanding      Vitals:  Vitals Value Taken Time   /82 05/17/24 0922   Temp 96.9  F (36.1  C) 05/17/24 0922   Pulse 70    Resp 16 05/17/24 0922   SpO2 100        Electronically Signed By: MARGA Maki CRNA  May 17, 2024  9:23 AM

## 2024-05-17 NOTE — ANESTHESIA PREPROCEDURE EVALUATION
Anesthesia Pre-Procedure Evaluation    Patient: Mimi Melton   MRN: [id number] : 1975        Procedure : Procedure(s):  BREAST EXCISIONAL BIOPSY          Past Medical History:   Diagnosis Date    Cervical high risk HPV (human papillomavirus) test positive 2015, 10/2019, 10/30/20    Chronic sinusitis Summer 2016    I get congested every 3-4 weeks    Esophageal reflux     Exophthalmos, unspecified     Hypothyroidism     Motion sickness     PONV (postoperative nausea and vomiting)     Thyroid eye disease     Thyrotoxicosis without mention of goiter or other cause, without mention of thyrotoxic crisis or storm 2005    Had radioablation, On synthroid, seeing [facility]; takes synthroid    Unspecified essential hypertension 1980    meds since , on atenolol      Past Surgical History:   Procedure Laterality Date    COLONOSCOPY N/A 2022    Procedure: COLONOSCOPY;  Surgeon: Marah Souza MD;  [facility]: [facility] OR    CYSTOSCOPY, INJECT COLLAGEN, COMBINED N/A 2024    Procedure: CYSTOSCOPY, WITH PERIURETHRAL BULKING AGENT INJECTION (look in the bladder and inject filler into the walls of the urethra;  Surgeon: Karin Amin MD;  Location: [facility] OR    CYSTOSCOPY, SLING TRANSVAGINAL N/A 10/10/2017    Procedure: CYSTOSCOPY, SLING TRANSVAGINAL;  Midurethral Sling and Cystoscopy (Support the Urethra with Mesh Sling and Look in the Bladder);  Surgeon: Karin Amin MD;  Location:  OR    ESOPHAGOSCOPY, GASTROSCOPY, DUODENOSCOPY (EGD), COMBINED N/A 3/30/2023    Procedure: Esophagoscopy, gastroscopy, duodenoscopy (EGD), combined;  Surgeon: Janette Chandra MD;  Location: [facility] OR    EYE SURGERY  2008    Orbital Decompression Dr smart    ORBITOTOMY DECOMPRESSION Bilateral 2021    Procedure: Bilateral orbital decompression with  Sonopet, Bilateral lower lid retraction repair;  Surgeon: Niles Donnelly MD;  Location: [facility] OR    REPAIR RETRACTION LID Bilateral  2021    Procedure: REPAIR, RETRACTION, EYELID;  Surgeon: Niles Donnelly MD;  Location: [facility] OR    SONOPET Bilateral 2021    Procedure: SONOPET EYE;  Surgeon: Niles Donnelly MD;  Location: [facility] OR    ZZC  DELIVERY ONLY  91    , Low Cervical    ZZC  DELIVERY ONLY  97    , Low Cervical    ZZC  DELIVERY ONLY  99    , Low Cervical    ZZC INDUCED ABORTN BY D&C      Aspiration & Curettage, TAB x2    ZZHC REPAIR INCISIONAL HERNIA,REDUCIBLE      Umbilical hernia Repair      Allergies   Allergen Reactions    Dust Mites     Lisinopril Swelling     Swelling in lip    Mold      Cannot have any meds that contain mold.      Social History     Tobacco Use    Smoking status: Never    Smokeless tobacco: Never   Substance Use Topics    Alcohol use: Not Currently     Comment: 1-2x's/month if that.      Wt Readings from Last 1 Encounters:   24 84.8 kg (187 lb)        Anesthesia Evaluation   Pt has had prior anesthetic.     History of anesthetic complications  - PONV.      ROS/MED HX  ENT/Pulmonary:  - neg pulmonary ROS   (+)           allergic rhinitis,                             Neurologic:  - neg neurologic ROS     Cardiovascular:  - neg cardiovascular ROS   (+)  hypertension- -   -  - -                                      METS/Exercise Tolerance: >4 METS    Hematologic:  - neg hematologic  ROS   (+)      anemia,          Musculoskeletal:  - neg musculoskeletal ROS     GI/Hepatic:  - neg GI/hepatic ROS   (+) GERD, Asymptomatic on medication,                  Renal/Genitourinary:  - neg Renal ROS     Endo:  - neg endo ROS   (+)          thyroid problem, hypothyroidism,    Obesity (bmi 31),       Psychiatric/Substance Use:  - neg psychiatric ROS   (+)     Recreational drug usage: Cannabis.    Infectious Disease:  - neg infectious disease ROS     Malignancy:  - neg malignancy ROS     Other:  - neg other ROS          Physical Exam    Airway         Mallampati: II   TM distance: > 3 FB   Neck ROM: full   Mouth opening: > 3 cm    Respiratory Devices and Support         Dental       (+) Minor Abnormalities - some fillings, tiny chips      Cardiovascular   cardiovascular exam normal          Pulmonary   pulmonary exam normal                OUTSIDE LABS:  CBC:   Lab Results   Component Value Date    WBC 4.5 01/31/2024    WBC 4.0 12/08/2023    HGB 12.1 01/31/2024    HGB 12.7 12/08/2023    HCT 37.2 01/31/2024    HCT 37.8 12/08/2023     01/31/2024     12/08/2023     BMP:   Lab Results   Component Value Date     04/17/2024     03/14/2024    POTASSIUM 3.7 04/17/2024    POTASSIUM 3.2 (L) 03/14/2024    CHLORIDE 102 04/17/2024    CHLORIDE 103 03/14/2024    CO2 29 04/17/2024    CO2 27 03/14/2024    BUN 8.9 04/17/2024    BUN 10.5 03/14/2024    CR 1.00 (H) 04/17/2024    CR 1.07 (H) 03/14/2024    GLC 93 04/17/2024     (H) 03/14/2024     COAGS:   Lab Results   Component Value Date    PTT 27 05/16/2013    INR 1.06 05/16/2013     POC:   Lab Results   Component Value Date    HCG Negative 02/06/2024     HEPATIC:   Lab Results   Component Value Date    ALBUMIN 4.1 11/01/2023    PROTTOTAL 7.0 11/01/2023    ALT 10 11/01/2023    AST 17 11/01/2023    ALKPHOS 37 11/01/2023    BILITOTAL 0.2 11/01/2023     OTHER:   Lab Results   Component Value Date    A1C 5.5 04/14/2022    DAXA 9.4 04/17/2024    PHOS 3.0 05/11/2023    MAG 1.9 12/08/2023    LIPASE 8 (L) 12/11/2022    TSH 0.72 11/01/2023    T4 1.19 10/18/2018    T3 685 (H) 05/10/2005    CRP <2.9 03/09/2017    SED 20 06/22/2005       Anesthesia Plan    ASA Status:  2    NPO Status:  NPO Appropriate    Anesthesia Type: MAC.     - Reason for MAC: straight local not clinically adequate, immobility needed   Induction: Intravenous, Propofol.   Maintenance: TIVA.        Consents    Anesthesia Plan(s) and associated risks, benefits, and realistic alternatives discussed. Questions answered and  "patient/representative(s) expressed understanding.     - Discussed:     - Discussed with:  Patient            Postoperative Care    Pain management: Multi-modal analgesia.   PONV prophylaxis: Ondansetron (or other 5HT-3), Dexamethasone or Solumedrol     Comments:    Other Comments: MAC - propofol gtt, versed/fentanyl/ketamine bolus PRN for pain  Decadron/Zofran for antiemesis  Convert to General with LMA if unable to maintain adequate SpO2 on reduced FiO2 (<30%)  Fire precautions  Reviewed anesthetic options and risks. Patient agrees to proceed.              Evan Madrid MD    I have reviewed the pertinent notes and labs in the chart from the past 30 days and (re)examined the patient.  Any updates or changes from those notes are reflected in this note.              # Obesity: Estimated body mass index is 31.6 kg/m  as calculated from the following:    Height as of this encounter: 1.638 m (5' 4.5\").    Weight as of this encounter: 84.8 kg (187 lb).      "

## 2024-05-17 NOTE — OP NOTE
Name:  Mimi Melton  PCP:  Nanette Penny  Procedure Date:  5/17/2024      LEFT BREAST EXCISIONAL BIOPSY      Pre-Procedure Diagnosis:  Mass of upper outer quadrant of left breast     Post-Procedure Diagnosis:    Same    Anesthesia Type:    MAC with local    Estimated Blood Loss:   2 mL    Specimens:    Left breast mass    Complications:    None apparent    Indication for procedure:  This is a 48-year-old female with an enlarging left breast mass.  It has started to cause symptoms.  It was recommended that she undergo excisional biopsy for further diagnosis and treatment.    Operative Report:    After informed consent was obtained, and the risks and benefits of the procedure were discussed, the patient was brought back to the operative suite and placed in the supine position.  MAC anesthesia was provided by the department of anesthesia.  A timeout was performed and the left breast and axilla were prepped and draped in the usual sterile fashion.  1% lidocaine mixed with quarter percent Marcaine was injected over the palpable lesion in the left breast at 2:00 zone 3.  A curvilinear shaped incision was made directly over the mass.  Electrocautery was used to dissect through the subcutaneous tissues and around the palpable mass.  The mass appeared to be a well-circumscribed fibroadenoma.  The mass was sent off for permanent sectioning.  Hemostasis was assured within the wound.  The wound was then closed in a layered fashion utilizing interrupted 3-0 Vicryl for the breast tissue, interrupted 4-0 Vicryl for the deep dermis, followed by a running subcuticular 4-0 Monocryl and Dermabond to the skin.    Disposition:  The patient tolerated the procedure well, and was transferred to the postanesthesia care unit in stable condition.      Dena Chase DO  General Surgeon  [address] [address]  Office: [phone]  Employed by  - [facility]

## 2024-05-20 ENCOUNTER — VIRTUAL VISIT (OUTPATIENT)
Dept: GASTROENTEROLOGY | Facility: CLINIC | Age: 49
End: 2024-05-20
Attending: PHYSICIAN ASSISTANT
Payer: COMMERCIAL

## 2024-05-20 VITALS — HEIGHT: 65 IN | WEIGHT: 187 LBS | BODY MASS INDEX: 31.16 KG/M2

## 2024-05-20 DIAGNOSIS — R10.13 EPIGASTRIC PAIN: ICD-10-CM

## 2024-05-20 DIAGNOSIS — R11.2 NAUSEA AND VOMITING, UNSPECIFIED VOMITING TYPE: Primary | ICD-10-CM

## 2024-05-20 DIAGNOSIS — K29.80 PEPTIC DUODENITIS: ICD-10-CM

## 2024-05-20 DIAGNOSIS — E53.8 VITAMIN B12 DEFICIENCY (NON ANEMIC): ICD-10-CM

## 2024-05-20 PROCEDURE — 99213 OFFICE O/P EST LOW 20 MIN: CPT | Mod: 24 | Performed by: PHYSICIAN ASSISTANT

## 2024-05-20 ASSESSMENT — PAIN SCALES - GENERAL: PAINLEVEL: NO PAIN (0)

## 2024-05-20 NOTE — PROGRESS NOTES
GASTROENTEROLOGY Follow-up VIDEO VISIT    CC/REFERRING MD:    Nanette Penny    REASON FOR CONSULTATION:   Murphy Castro for   Chief Complaint   Patient presents with    RECHECK       HISTORY OF PRESENT ILLNESS:    Mimi Melton is a 48 year old female who is being evaluated via a billable video visit for follow-up.  Last visit with me was about 6 months ago.  She initially established care with me in January 2023 for symptoms of recurrent nausea, vomiting, and upper abdominal pain.  Her workup included abdominal ultrasound, gastric emptying scan, and EGD.  The first 2 tests were normal and EGD was notable for peptic duodenitis on pathology with some gastric atrophy.  We started her on omeprazole, which strongly improved her symptoms.  She has not had any recurring symptoms of nausea, vomiting, or abdominal pain since being on the omeprazole.  If she misses a day, she will experience some gas and bloating.  She has been tolerating the medication well.  At last visit, we updated her nutrition markers, vitamin B12 was noted to be mildly low and supplementation was started.  She currently has orders through primary care to recheck labs in August.  She continues to do well.  She is interested on tapering down the dose, if possible.  No other new symptoms to report or other GI concerns.      I have reviewed and updated the patient's Past Medical History, Social History, Family History and Medication List.    Exam:    General appearance:  Healthy appearing adult, in no acute distress  Eyes:  Sclera anicteric, Pupils round and reactive to light  Ears, nose, mouth and throat:  No obvious external lesions of ears and nose.  Hearing intact  Neck:  Symmetric, No obvious external lesions  Respiratory:  Normal respiration, no use of accessory muscles   MSK:  No visual upper extremity, neck or facial muscle atrophy  ABD:  No visual abdominal distention, no audible borborygmi  Skin:  No rashes or jaundice    Psychiatric:  Oriented to person, place and time, Appropriate mood and affect.   Neurologic:  Peripheral muscle function and dexterity appear to be intact      PERTINENT STUDIES have been reviewed.    ASSESSMENT/PLAN:    Mimi Melton is a 48 year old female who presents for follow up of previous history of symptoms of nausea, vomiting, and upper abdominal pain.  These have improved greatly on omeprazole.  We are going to taper down to 20 mg daily for the next several months and see how things go.  She does have some known vitamin B12 deficiency in the setting of gastric atrophy seen on previous EGD, so we will plan to tentatively update EGD after next visit to reassess for atrophic gastritis.  We reviewed that if she does have an increase in symptoms following dose reduction, she will let me know and we will move back up to 40 mg daily again.    1. Nausea and vomiting, unspecified vomiting type  - omeprazole (PRILOSEC) 20 MG DR capsule; Take 1 capsule (20 mg) by mouth daily  Dispense: 90 capsule; Refill: 1    2. Epigastric pain  - omeprazole (PRILOSEC) 20 MG DR capsule; Take 1 capsule (20 mg) by mouth daily  Dispense: 90 capsule; Refill: 1    3. Peptic duodenitis  - omeprazole (PRILOSEC) 20 MG DR capsule; Take 1 capsule (20 mg) by mouth daily  Dispense: 90 capsule; Refill: 1    4. Vitamin B12 deficiency (non anemic)      Video-Visit Details    Video Visit Time: 9 minutes    Type of service:  Video Visit    Originating Location (pt. Location): Home    Distant Location (provider location):  Off-site    Platform used for Video Visit: Salma    A total of 12 minutes was spent with reviewing the chart, discussing with the patient, documentation and coordination of care.    Murphy Castro PA-C  Division of Gastroenterology, Hepatology, and Nutrition  [facility]  [phone]    RTC 6 months

## 2024-05-20 NOTE — NURSING NOTE
Is the patient currently in the state of [place]? YES    Visit mode:VIDEO    If the visit is dropped, the patient can be reconnected by: VIDEO VISIT: Text to cell phone:   Telephone Information:   Mobile [phone]    and VIDEO VISIT: Send to e-mail at: [email or web address]    Will anyone else be joining the visit? NO  (If patient encounters technical issues they should call [phone]    How would you like to obtain your AVS? MyChart    Are changes needed to the allergy or medication list? No    Are refills needed on medications prescribed by this physician? NO    Reason for visit: ANNY CASTELLON

## 2024-05-28 DIAGNOSIS — Z30.011 ENCOUNTER FOR INITIAL PRESCRIPTION OF CONTRACEPTIVE PILLS: ICD-10-CM

## 2024-05-28 RX ORDER — ACETAMINOPHEN AND CODEINE PHOSPHATE 120; 12 MG/5ML; MG/5ML
0.35 SOLUTION ORAL DAILY
Qty: 84 TABLET | Refills: 1 | Status: SHIPPED | OUTPATIENT
Start: 2024-05-28

## 2024-07-11 ENCOUNTER — OFFICE VISIT (OUTPATIENT)
Dept: OTOLARYNGOLOGY | Facility: CLINIC | Age: 49
End: 2024-07-11
Payer: COMMERCIAL

## 2024-07-11 DIAGNOSIS — H61.23 BILATERAL IMPACTED CERUMEN: Primary | ICD-10-CM

## 2024-07-11 PROCEDURE — 69210 REMOVE IMPACTED EAR WAX UNI: CPT | Mod: 50 | Performed by: OTOLARYNGOLOGY

## 2024-07-11 RX ORDER — FLUTICASONE PROPIONATE 50 MCG
1 SPRAY, SUSPENSION (ML) NASAL DAILY
COMMUNITY

## 2024-07-11 NOTE — PROGRESS NOTES
FOLLOW UP VISIT NOTE      HISTORY OF PRESENT ILLNESS    Mimi was seen in follow up after previous 11/27/2023 visit for ear cleaning.       My previous note:    Encounter Diagnoses   Name Primary?    Congestion of paranasal sinus Yes    Ear fullness, bilateral          RECOMMENDATIONS:         Orders Placed This Encounter   Procedures    Nasal Endoscopy, Diag    Symptomatic COVID-19 Virus (Coronavirus) by PCR Nose      No orders of the defined types were placed in this encounter.     Return visit 6 months.      Sino-Nasal Outcome Test (SNOT - 22)    1. Need to Blow Nose: (P) Moderate  2. Nasal Blockage: (P) Moderate  3. Sneezing: (P) Very mild  4. Runny Nose: (P) Moderate  5. Cough: (P) None  6. Post-nasal discharge: (P) Moderate  7. Thick nasal discharge: (P) Very mild  8. Ear fullness: (P) Mild or slight  9. Dizziness: (P) None  10. Ear Pain: (P) None  11. Facial pain/pressure: (P) None  12. Decreased Sense of Smell/Taste: (P) None  13. Difficulty falling asleep: (P) Mild or slight  14. Wake up at night: (P) Mild or slight  15. Lack of a good night's sleep: (P) Mild or slight  16. Wake up tired: (P) Mild or slight  17. Fatigue: (P) Mild or slight  18. Reduced Productivity: (P) None  19. Reduced Concentration: (P) None  20. Frustrated/restless/irritable: (P) None  21. Sad: (P) None  22. Embarrassed: (P) None    Total Score: (P) 26    COPYRIGHT 1996. [facility] [place]     REVIEW OF SYSTEMS    Review of Systems: a 10-system review is reviewed at this encounter.  See scanned document.       Allergies   Allergen Reactions    Dust Mites     Lisinopril Swelling     Swelling in lip    Mold      Cannot have any meds that contain mold.           PHYSICAL EXAM:        HEAD: Normal appearance and symmetry:  No cutaneous lesions.      EARS:        RIGHT: cerumen impaction noted    LEFT:   cerumen impaction noted     CERUMEN IMPACTION REMOVAL    After obtaining verbal consent, and using the binocular  microscope.  Cerumen impaction(s) were removed from the affected ear canal(s)  using a wire loops and/or suction.  The patient tolerated the procedure well without incident.      NOSE:    Dorsum:   straight       ORAL CAVITY/OROPHARYNX:    Lips:  Normal.     NECK:  Trachea:  midline     NEURO:   Alert and Oriented    GAIT AND STATION:  normal     RESPIRATORY:   Symmetry and Respiratory effort    PSYCH:   normal mood and affect    SKIN:  warm and dry         IMPRESSION:   Encounter Diagnosis   Name Primary?    Bilateral impacted cerumen Yes        RECOMMENDATIONS:    Return visit 6 months for routine cleaning.

## 2024-07-11 NOTE — LETTER
7/11/2024      Mimi Melton  [address]  [place] [place] [address]      Dear Colleague,    Thank you for referring your patient, Mimi Melton, to the [facility]. Please see a copy of my visit note below.    FOLLOW UP VISIT NOTE      HISTORY OF PRESENT ILLNESS    Mimi was seen in follow up after previous 11/27/2023 visit for ear cleaning.       My previous note:    Encounter Diagnoses   Name Primary?     Congestion of paranasal sinus Yes     Ear fullness, bilateral          RECOMMENDATIONS:         Orders Placed This Encounter   Procedures     Nasal Endoscopy, Diag     Symptomatic COVID-19 Virus (Coronavirus) by PCR Nose      No orders of the defined types were placed in this encounter.     Return visit 6 months.      Sino-Nasal Outcome Test (SNOT - 22)    1. Need to Blow Nose: (P) Moderate  2. Nasal Blockage: (P) Moderate  3. Sneezing: (P) Very mild  4. Runny Nose: (P) Moderate  5. Cough: (P) None  6. Post-nasal discharge: (P) Moderate  7. Thick nasal discharge: (P) Very mild  8. Ear fullness: (P) Mild or slight  9. Dizziness: (P) None  10. Ear Pain: (P) None  11. Facial pain/pressure: (P) None  12. Decreased Sense of Smell/Taste: (P) None  13. Difficulty falling asleep: (P) Mild or slight  14. Wake up at night: (P) Mild or slight  15. Lack of a good night's sleep: (P) Mild or slight  16. Wake up tired: (P) Mild or slight  17. Fatigue: (P) Mild or slight  18. Reduced Productivity: (P) None  19. Reduced Concentration: (P) None  20. Frustrated/restless/irritable: (P) None  21. Sad: (P) None  22. Embarrassed: (P) None    Total Score: (P) 26    COPYRIGHT 1996. [facility] [place]     REVIEW OF SYSTEMS    Review of Systems: a 10-system review is reviewed at this encounter.  See scanned document.       Allergies   Allergen Reactions     Dust Mites      Lisinopril Swelling     Swelling in lip     Mold      Cannot have any meds that contain mold.            PHYSICAL EXAM:        HEAD: Normal appearance and symmetry:  No cutaneous lesions.      EARS:        RIGHT: cerumen impaction noted    LEFT:   cerumen impaction noted     CERUMEN IMPACTION REMOVAL    After obtaining verbal consent, and using the binocular microscope.  Cerumen impaction(s) were removed from the affected ear canal(s)  using a wire loops and/or suction.  The patient tolerated the procedure well without incident.      NOSE:    Dorsum:   straight       ORAL CAVITY/OROPHARYNX:    Lips:  Normal.     NECK:  Trachea:  midline     NEURO:   Alert and Oriented    GAIT AND STATION:  normal     RESPIRATORY:   Symmetry and Respiratory effort    PSYCH:   normal mood and affect    SKIN:  warm and dry         IMPRESSION:   Encounter Diagnosis   Name Primary?     Bilateral impacted cerumen Yes        RECOMMENDATIONS:    Return visit 6 months for routine cleaning.       Again, thank you for allowing me to participate in the care of your patient.        Sincerely,        Mariusz Sahu MD

## 2024-07-11 NOTE — NURSING NOTE
Sino-Nasal Outcome Test (SNOT - 22)    1. Need to Blow Nose: (P) Moderate  2. Nasal Blockage: (P) Moderate  3. Sneezing: (P) Very mild  4. Runny Nose: (P) Moderate  5. Cough: (P) None  6. Post-nasal discharge: (P) Moderate  7. Thick nasal discharge: (P) Very mild  8. Ear fullness: (P) Mild or slight  9. Dizziness: (P) None  10. Ear Pain: (P) None  11. Facial pain/pressure: (P) None  12. Decreased Sense of Smell/Taste: (P) None  13. Difficulty falling asleep: (P) Mild or slight  14. Wake up at night: (P) Mild or slight  15. Lack of a good night's sleep: (P) Mild or slight  16. Wake up tired: (P) Mild or slight  17. Fatigue: (P) Mild or slight  18. Reduced Productivity: (P) None  19. Reduced Concentration: (P) None  20. Frustrated/restless/irritable: (P) None  21. Sad: (P) None  22. Embarrassed: (P) None    Total Score: (P) 26    COPYRIGHT 1996. [facility] [place]

## 2024-08-03 ENCOUNTER — PRE VISIT (OUTPATIENT)
Dept: UROLOGY | Facility: CLINIC | Age: 49
End: 2024-08-03
Payer: COMMERCIAL

## 2024-08-03 NOTE — TELEPHONE ENCOUNTER
Reason for visit: follow up     Relevant information: symptom check, stress incontinence    Records/imaging/labs/orders: in epic    Pt called: No need for a call    At Rooming: virtual visit    Zeus Nunn  8/3/2024  2:56 AM

## 2024-08-06 ENCOUNTER — OFFICE VISIT (OUTPATIENT)
Dept: ALLERGY | Facility: CLINIC | Age: 49
End: 2024-08-06
Payer: COMMERCIAL

## 2024-08-06 DIAGNOSIS — Z91.09 HOUSE DUST MITE ALLERGY: ICD-10-CM

## 2024-08-06 DIAGNOSIS — Z88.9 ATOPY: ICD-10-CM

## 2024-08-06 DIAGNOSIS — Z87.898 HX OF ANGIOEDEMA: ICD-10-CM

## 2024-08-06 DIAGNOSIS — L20.84 INTRINSIC ECZEMA: Primary | ICD-10-CM

## 2024-08-06 DIAGNOSIS — J00 RHINOPHARYNGITIS: ICD-10-CM

## 2024-08-06 DIAGNOSIS — Z91.048 ALLERGY TO MOLD: ICD-10-CM

## 2024-08-06 DIAGNOSIS — L73.9 FOLLICULITIS: ICD-10-CM

## 2024-08-06 DIAGNOSIS — L20.89 OTHER ATOPIC DERMATITIS: ICD-10-CM

## 2024-08-06 PROCEDURE — 99213 OFFICE O/P EST LOW 20 MIN: CPT | Performed by: DERMATOLOGY

## 2024-08-06 RX ORDER — BENZOYL PEROXIDE 50 MG/ML
LIQUID TOPICAL
Qty: 142 ML | Refills: 3 | Status: SHIPPED | OUTPATIENT
Start: 2024-08-06

## 2024-08-06 RX ORDER — CLINDAMYCIN PHOSPHATE 10 UG/ML
LOTION TOPICAL
Qty: 60 ML | Refills: 5 | Status: SHIPPED | OUTPATIENT
Start: 2024-08-06

## 2024-08-06 RX ORDER — TRIAMCINOLONE ACETONIDE 1 MG/G
OINTMENT TOPICAL 2 TIMES DAILY
Qty: 30 G | Refills: 1 | Status: SHIPPED | OUTPATIENT
Start: 2024-08-06

## 2024-08-06 NOTE — NURSING NOTE
Chief Complaint   Patient presents with    Allergy Testing Followup     Follow up. Med refills needed. Per pt, just came back from [place] and feels itchy all over, extra prominent on left shoulder and possibly spreading to legs.     Claire Jauregui RN
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
## 2024-08-08 ENCOUNTER — TELEPHONE (OUTPATIENT)
Dept: DERMATOLOGY | Facility: CLINIC | Age: 49
End: 2024-08-08
Payer: COMMERCIAL

## 2024-08-13 ENCOUNTER — LAB (OUTPATIENT)
Dept: LAB | Facility: CLINIC | Age: 49
End: 2024-08-13
Payer: COMMERCIAL

## 2024-08-13 DIAGNOSIS — E83.118 OTHER HEMOCHROMATOSIS: ICD-10-CM

## 2024-08-13 DIAGNOSIS — E56.9 VITAMIN DEFICIENCY: ICD-10-CM

## 2024-08-13 DIAGNOSIS — E53.8 VITAMIN B12 DEFICIENCY (NON ANEMIC): ICD-10-CM

## 2024-08-13 LAB
ERYTHROCYTE [DISTWIDTH] IN BLOOD BY AUTOMATED COUNT: 12.9 % (ref 10–15)
FERRITIN SERPL-MCNC: 29 NG/ML (ref 6–175)
HCT VFR BLD AUTO: 39.9 % (ref 35–47)
HGB BLD-MCNC: 13.4 G/DL (ref 11.7–15.7)
IRON BINDING CAPACITY (ROCHE): 353 UG/DL (ref 240–430)
IRON SATN MFR SERPL: 39 % (ref 15–46)
IRON SERPL-MCNC: 139 UG/DL (ref 37–145)
MCH RBC QN AUTO: 29.9 PG (ref 26.5–33)
MCHC RBC AUTO-ENTMCNC: 33.6 G/DL (ref 31.5–36.5)
MCV RBC AUTO: 89 FL (ref 78–100)
PLATELET # BLD AUTO: 363 10E3/UL (ref 150–450)
RBC # BLD AUTO: 4.48 10E6/UL (ref 3.8–5.2)
VIT B12 SERPL-MCNC: 822 PG/ML (ref 232–1245)
VIT D+METAB SERPL-MCNC: 28 NG/ML (ref 20–50)
WBC # BLD AUTO: 4.1 10E3/UL (ref 4–11)

## 2024-08-13 PROCEDURE — 82728 ASSAY OF FERRITIN: CPT

## 2024-08-13 PROCEDURE — 36415 COLL VENOUS BLD VENIPUNCTURE: CPT

## 2024-08-13 PROCEDURE — 82607 VITAMIN B-12: CPT

## 2024-08-13 PROCEDURE — 82306 VITAMIN D 25 HYDROXY: CPT

## 2024-08-13 PROCEDURE — 83540 ASSAY OF IRON: CPT

## 2024-08-13 PROCEDURE — 85027 COMPLETE CBC AUTOMATED: CPT

## 2024-08-13 PROCEDURE — 83550 IRON BINDING TEST: CPT

## 2024-08-16 ENCOUNTER — TELEPHONE (OUTPATIENT)
Dept: NEPHROLOGY | Facility: CLINIC | Age: 49
End: 2024-08-16
Payer: COMMERCIAL

## 2024-08-20 DIAGNOSIS — E55.9 VITAMIN D DEFICIENCY: Primary | ICD-10-CM

## 2024-08-20 RX ORDER — ERGOCALCIFEROL 1.25 MG/1
50000 CAPSULE, LIQUID FILLED ORAL WEEKLY
Qty: 12 CAPSULE | Refills: 0 | Status: SHIPPED | OUTPATIENT
Start: 2024-08-20

## 2024-08-21 ENCOUNTER — NURSE TRIAGE (OUTPATIENT)
Dept: FAMILY MEDICINE | Facility: CLINIC | Age: 49
End: 2024-08-21
Payer: COMMERCIAL

## 2024-08-21 NOTE — TELEPHONE ENCOUNTER
Call received from patient:  Mosquito bites on legs  Very itchy  Turning red  Started 8/17/24  No drainage  Afebrile  Bite sites are smaller than a quarter, about a little smaller than a dime  No new difficulty breathing  Itching interferes with sleep  Applying with hydrocortisone cream; only works for about 20 minutes  Bite sites are not painful  Tender to touch    Writer recommended evaluation today and patient requested appt at [facility]-no appts available.  Writer recommended Urgent Care and reviewed [facility] hours; please arrive by 1800 as Urgent Care tends to reach capacity by that time of day.    Patient verbalized understanding and in agreement with plan.    CLEO WrightN, RN-[facility]      Reason for Disposition   Red or very tender (to touch) area and started over 24 hours after the bite    Additional Information   Negative: Anaphylactic reaction suspected (e.g., sudden onset of difficulty breathing, difficulty swallowing, wheezing following bite)   Negative: Difficult to awaken or acting confused (e.g., disoriented, slurred speech)   Negative: Sounds like a life-threatening emergency to the triager   Negative: Not a mosquito bite   Negative: Can't walk or can barely walk   Negative: Patient sounds very sick or weak to the triager   Negative: Fever and red area   Negative: Fever and area is very tender to touch   Negative: Red streak or red line and length > 2 inches (5 cm)    Protocols used: Mosquito Bite-A-OH

## 2024-09-04 ENCOUNTER — VIRTUAL VISIT (OUTPATIENT)
Dept: UROLOGY | Facility: CLINIC | Age: 49
End: 2024-09-04
Payer: COMMERCIAL

## 2024-09-04 VITALS — BODY MASS INDEX: 29.99 KG/M2 | WEIGHT: 180 LBS | HEIGHT: 65 IN

## 2024-09-04 DIAGNOSIS — N39.3 STRESS INCONTINENCE: Primary | ICD-10-CM

## 2024-09-04 PROCEDURE — 99213 OFFICE O/P EST LOW 20 MIN: CPT | Mod: 95 | Performed by: UROLOGY

## 2024-09-04 ASSESSMENT — PAIN SCALES - GENERAL: PAINLEVEL: NO PAIN (0)

## 2024-09-04 NOTE — NURSING NOTE
Current patient location: Patient declined to provide     Is the patient currently in the state of [place]? YES    Visit mode:VIDEO    If the visit is dropped, the patient can be reconnected by: VIDEO VISIT: Send to e-mail at: [email or web address]    Will anyone else be joining the visit? NO  (If patient encounters technical issues they should call [phone]    How would you like to obtain your AVS? MyChart    Are changes needed to the allergy or medication list? No    Are refills needed on medications prescribed by this physician? NO    Rooming Documentation:  Questionnaire(s) completed      Reason for visit: RECHECK    Yaima Griffith [facility]

## 2024-09-04 NOTE — LETTER
9/4/2024       RE: Mimi Melton  [address]  [facility] [facility] [address]     Dear Colleague,    Thank you for referring your patient, Mimi Melton, to the [facility] UROLOGY CLINIC [place] at [facility]. Please see a copy of my visit note below.    Virtual Visit Details    Type of service:  Video Visit     Originating Location (pt. Location): Home    Distant Location (provider location):  Off-site  Platform used for Video Visit: Salma    Reason for visit:  f/u on urinary symptoms    Clinical Data:  Ms. Melton is a 49 year old female w/ h/o KARMA, 3 C-sections in the past, h/o midurethral sling in 2017 with excellent results. She then started to have recurrence of her stress urinary incontinence.  She denies any difficulty voiding.  She underwent periurethral bulking on 2/6/24 and reports near complete resolution of her symptoms.    Assessment & Plan: Mimi Melton is a 49 year old female with stress urinary incontinence.  She previously underwent midurethral sling in 2017 and then had some recurrence of her stress urinary incontinence with and underwent periurethral bulking and she reports to be doing very well.  We discussed using estrogen cream after menopause should she not be on HRT to keep the vaginal tissues healthy.  -f/u prn    Thank you for allowing me to participate in the care of  Ms. Mimi Melton and I will keep you updated on her progress.    Karin Amin MD          Again, thank you for allowing me to participate in the care of your patient.      Sincerely,    Karin Amin MD

## 2024-09-04 NOTE — PROGRESS NOTES
Reason for visit:  f/u on urinary symptoms    Clinical Data:  Ms. Melton is a 49 year old female w/ h/o KARMA, 3 C-sections in the past, h/o midurethral sling in 2017 with excellent results. She then started to have recurrence of her stress urinary incontinence.  She denies any difficulty voiding.  She underwent periurethral bulking on 2/6/24 and reports near complete resolution of her symptoms.    Assessment & Plan: Mimi Melton is a 49 year old female with stress urinary incontinence.  She previously underwent midurethral sling in 2017 and then had some recurrence of her stress urinary incontinence with and underwent periurethral bulking and she reports to be doing very well.  We discussed using estrogen cream after menopause should she not be on HRT to keep the vaginal tissues healthy.  -f/u prn    Thank you for allowing me to participate in the care of  Ms. Mimi Melton and I will keep you updated on her progress.    Karin Amin MD

## 2024-09-05 DIAGNOSIS — I10 ESSENTIAL HYPERTENSION WITH GOAL BLOOD PRESSURE LESS THAN 140/90: ICD-10-CM

## 2024-09-05 DIAGNOSIS — E03.2 HYPOTHYROIDISM DUE TO MEDICATION: ICD-10-CM

## 2024-09-05 RX ORDER — LOSARTAN POTASSIUM 25 MG/1
25 TABLET ORAL DAILY
Qty: 90 TABLET | Refills: 3 | OUTPATIENT
Start: 2024-09-05

## 2024-09-05 RX ORDER — HYDROCHLOROTHIAZIDE 12.5 MG/1
12.5 TABLET ORAL DAILY
Qty: 90 TABLET | Refills: 3 | OUTPATIENT
Start: 2024-09-05

## 2024-09-05 RX ORDER — LEVOTHYROXINE SODIUM 100 UG/1
100 TABLET ORAL DAILY
Qty: 90 TABLET | Refills: 3 | OUTPATIENT
Start: 2024-09-05

## 2024-09-05 NOTE — TELEPHONE ENCOUNTER
Received call from Papi at [facility] for refills on Levothyroxine, hydrochlorothiazide and Losartan.   Has upcoming appt on 11/06/24 w/ Dr Penny  Last visit: 3/27/24    Route to [place] [facility] [place].    Jasmin IVERSON RN  [facility]

## 2024-09-19 ENCOUNTER — LAB (OUTPATIENT)
Dept: LAB | Facility: CLINIC | Age: 49
End: 2024-09-19
Payer: COMMERCIAL

## 2024-09-19 DIAGNOSIS — N94.9 VAGINAL SYMPTOM: ICD-10-CM

## 2024-09-19 PROCEDURE — 87210 SMEAR WET MOUNT SALINE/INK: CPT

## 2024-09-24 ENCOUNTER — TELEPHONE (OUTPATIENT)
Dept: OBGYN | Facility: CLINIC | Age: 49
End: 2024-09-24
Payer: COMMERCIAL

## 2024-09-24 DIAGNOSIS — N76.0 BV (BACTERIAL VAGINOSIS): Primary | ICD-10-CM

## 2024-09-24 DIAGNOSIS — N76.1 CHRONIC VAGINITIS: ICD-10-CM

## 2024-09-24 DIAGNOSIS — B96.89 BV (BACTERIAL VAGINOSIS): Primary | ICD-10-CM

## 2024-09-24 RX ORDER — METRONIDAZOLE 7.5 MG/G
1 GEL VAGINAL DAILY
Qty: 70 G | Refills: 1 | Status: SHIPPED | OUTPATIENT
Start: 2024-09-24

## 2024-09-24 RX ORDER — METRONIDAZOLE 500 MG/1
500 TABLET ORAL 2 TIMES DAILY
Qty: 14 TABLET | Refills: 0 | Status: SHIPPED | OUTPATIENT
Start: 2024-09-24 | End: 2024-10-01

## 2024-09-24 NOTE — TELEPHONE ENCOUNTER
Patient calling and requesting metronidazole pills for now and then she needs metrogel for later when she bleeds she will use the metrogel because the blood makes her to get BV so the metrogel will keep her from getting a full blown infection.   Will not be using them at the same time.

## 2024-10-08 ASSESSMENT — ASTHMA QUESTIONNAIRES
QUESTION_4 LAST FOUR WEEKS HOW OFTEN HAVE YOU USED YOUR RESCUE INHALER OR NEBULIZER MEDICATION (SUCH AS ALBUTEROL): NOT AT ALL
QUESTION_3 LAST FOUR WEEKS HOW OFTEN DID YOUR ASTHMA SYMPTOMS (WHEEZING, COUGHING, SHORTNESS OF BREATH, CHEST TIGHTNESS OR PAIN) WAKE YOU UP AT NIGHT OR EARLIER THAN USUAL IN THE MORNING: NOT AT ALL
QUESTION_1 LAST FOUR WEEKS HOW MUCH OF THE TIME DID YOUR ASTHMA KEEP YOU FROM GETTING AS MUCH DONE AT WORK, SCHOOL OR AT HOME: NONE OF THE TIME
ACT_TOTALSCORE: 25
QUESTION_5 LAST FOUR WEEKS HOW WOULD YOU RATE YOUR ASTHMA CONTROL: COMPLETELY CONTROLLED
QUESTION_2 LAST FOUR WEEKS HOW OFTEN HAVE YOU HAD SHORTNESS OF BREATH: NOT AT ALL
ACT_TOTALSCORE: 25

## 2024-10-08 ASSESSMENT — PATIENT HEALTH QUESTIONNAIRE - PHQ9
SUM OF ALL RESPONSES TO PHQ QUESTIONS 1-9: 0
10. IF YOU CHECKED OFF ANY PROBLEMS, HOW DIFFICULT HAVE THESE PROBLEMS MADE IT FOR YOU TO DO YOUR WORK, TAKE CARE OF THINGS AT HOME, OR GET ALONG WITH OTHER PEOPLE: NOT DIFFICULT AT ALL
SUM OF ALL RESPONSES TO PHQ QUESTIONS 1-9: 0

## 2024-10-09 ENCOUNTER — VIRTUAL VISIT (OUTPATIENT)
Dept: FAMILY MEDICINE | Facility: CLINIC | Age: 49
End: 2024-10-09
Payer: COMMERCIAL

## 2024-10-09 DIAGNOSIS — I10 ESSENTIAL HYPERTENSION WITH GOAL BLOOD PRESSURE LESS THAN 140/90: ICD-10-CM

## 2024-10-09 PROCEDURE — G2211 COMPLEX E/M VISIT ADD ON: HCPCS | Mod: 95 | Performed by: FAMILY MEDICINE

## 2024-10-09 PROCEDURE — 99214 OFFICE O/P EST MOD 30 MIN: CPT | Mod: 95 | Performed by: FAMILY MEDICINE

## 2024-10-09 NOTE — PROGRESS NOTES
"Mimi is a 49 year old who is being evaluated via a billable video visit.    How would you like to obtain your AVS? MyChart  If the video visit is dropped, the invitation should be resent by: Text to cell phone: [phone]  Will anyone else be joining your video visit? No      Assessment & Plan     Essential hypertension with goal blood pressure less than 140/90  - not controlled  - Declined medication adjustments and wants to first confirm that BP elevated  - Ordered 24 hour BP monitor  - Scheduled for physical in one month, recheck BP and labs during visit   - continue to stay active and low salt diet  - 24 Hour Blood Pressure Monitor - Adult; Future    The longitudinal plan of care for the diagnosis(es)/condition(s) as documented were addressed during this visit. Due to the added complexity in care, I will continue to support Mimi in the subsequent management and with ongoing continuity of care.       BMI  Estimated body mass index is 30.19 kg/m  as calculated from the following:    Height as of 9/4/24: 1.645 m (5' 4.75\").    Weight as of 9/4/24: 81.6 kg (180 lb).             Subjective   Mimi is a 49 year old, presenting for the following health issues:  Hypertension (High BP lately, want to see if she should increase medication dosage)    History of Present Illness       Reason for visit:  My blood pressure is higher than and i want to be evaluated by wearing a bp cuff for 48 hours.   She is taking medications regularly.    BP at home high. BP ~ 147/109, 168/115  [employer] normal  Dentist BP elevated either July or Aug 2024.  She stopped eating turkey jerky but even after she stopped eating that her BP still high.    Active and works out 2-3 times/week. Walks 3.6 miles twice/week.                 Objective    Vitals - Patient Reported  Systolic (Patient Reported): (!) 147  Diastolic (Patient Reported): (!) 109  Pain Score: No Pain (0)      Vitals:  No vitals were obtained today due to virtual " visit.    Physical Exam   GENERAL: alert and no distress  EYES: Eyes grossly normal to inspection.  No discharge or erythema, or obvious scleral/conjunctival abnormalities.  RESP: No audible wheeze, cough, or visible cyanosis.    SKIN: Visible skin clear. No significant rash, abnormal pigmentation or lesions.  NEURO: Cranial nerves grossly intact.  Mentation and speech appropriate for age.  PSYCH: Appropriate affect, tone, and pace of words          Video-Visit Details    Type of service:  Video Visit   Originating Location (pt. Location): Home    Distant Location (provider location):  On-site  Platform used for Video Visit: Salma  Signed Electronically by: Nanette Penny MD

## 2024-10-15 ENCOUNTER — HOSPITAL ENCOUNTER (OUTPATIENT)
Dept: CARDIOLOGY | Facility: CLINIC | Age: 49
Discharge: HOME | End: 2024-10-15
Attending: FAMILY MEDICINE | Admitting: FAMILY MEDICINE
Payer: COMMERCIAL

## 2024-10-15 DIAGNOSIS — I10 ESSENTIAL HYPERTENSION WITH GOAL BLOOD PRESSURE LESS THAN 140/90: ICD-10-CM

## 2024-10-15 PROCEDURE — 93788 AMBL BP MNTR W/SW A/R: CPT

## 2024-10-15 PROCEDURE — 93790 AMBL BP MNTR W/SW I&R: CPT | Performed by: INTERNAL MEDICINE

## 2024-10-29 ENCOUNTER — OFFICE VISIT (OUTPATIENT)
Dept: ALLERGY | Facility: CLINIC | Age: 49
End: 2024-10-29
Payer: COMMERCIAL

## 2024-10-29 DIAGNOSIS — Z91.09 HOUSE DUST MITE ALLERGY: ICD-10-CM

## 2024-10-29 DIAGNOSIS — Z91.048 ALLERGY TO MOLD: ICD-10-CM

## 2024-10-29 DIAGNOSIS — Z88.9 ATOPY: Primary | ICD-10-CM

## 2024-10-29 DIAGNOSIS — L73.9 FOLLICULITIS: ICD-10-CM

## 2024-10-29 DIAGNOSIS — L20.89 OTHER ATOPIC DERMATITIS: ICD-10-CM

## 2024-10-29 PROCEDURE — 99213 OFFICE O/P EST LOW 20 MIN: CPT | Mod: GC | Performed by: DERMATOLOGY

## 2024-10-29 RX ORDER — CLINDAMYCIN PHOSPHATE 10 UG/ML
LOTION TOPICAL
Qty: 60 ML | Refills: 5 | Status: SHIPPED | OUTPATIENT
Start: 2024-10-29

## 2024-10-29 RX ORDER — BENZOYL PEROXIDE 50 MG/ML
LIQUID TOPICAL
Qty: 142 ML | Refills: 3 | Status: SHIPPED | OUTPATIENT
Start: 2024-10-29
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
## 2024-10-29 NOTE — NURSING NOTE
Chief Complaint   Patient presents with    Allergy Testing Followup     Return for rash after hot tub (same reaction as pool last time). Used triamcinolone on it and it cleared.     Claire Jauregui RN

## 2024-11-06 ENCOUNTER — OFFICE VISIT (OUTPATIENT)
Dept: FAMILY MEDICINE | Facility: CLINIC | Age: 49
End: 2024-11-06
Payer: COMMERCIAL

## 2024-11-06 VITALS
OXYGEN SATURATION: 98 % | TEMPERATURE: 97 F | BODY MASS INDEX: 32.01 KG/M2 | SYSTOLIC BLOOD PRESSURE: 133 MMHG | HEIGHT: 65 IN | DIASTOLIC BLOOD PRESSURE: 95 MMHG | HEART RATE: 87 BPM | RESPIRATION RATE: 20 BRPM | WEIGHT: 192.1 LBS

## 2024-11-06 DIAGNOSIS — Z13.1 SCREENING FOR DIABETES MELLITUS: ICD-10-CM

## 2024-11-06 DIAGNOSIS — Z00.00 ROUTINE GENERAL MEDICAL EXAMINATION AT A HEALTH CARE FACILITY: Primary | ICD-10-CM

## 2024-11-06 DIAGNOSIS — E78.5 HYPERLIPIDEMIA LDL GOAL <100: ICD-10-CM

## 2024-11-06 DIAGNOSIS — E03.2 HYPOTHYROIDISM DUE TO MEDICATION: ICD-10-CM

## 2024-11-06 DIAGNOSIS — N94.9 VAGINAL SYMPTOM: ICD-10-CM

## 2024-11-06 DIAGNOSIS — I10 ESSENTIAL HYPERTENSION WITH GOAL BLOOD PRESSURE LESS THAN 140/90: ICD-10-CM

## 2024-11-06 LAB
ALBUMIN SERPL BCG-MCNC: 4.1 G/DL (ref 3.5–5.2)
ALP SERPL-CCNC: 42 U/L (ref 40–150)
ALT SERPL W P-5'-P-CCNC: 7 U/L (ref 0–50)
ANION GAP SERPL CALCULATED.3IONS-SCNC: 8 MMOL/L (ref 7–15)
AST SERPL W P-5'-P-CCNC: 20 U/L (ref 0–45)
BASOPHILS # BLD AUTO: 0 10E3/UL (ref 0–0.2)
BASOPHILS NFR BLD AUTO: 1 %
BILIRUB SERPL-MCNC: 0.3 MG/DL
BUN SERPL-MCNC: 9.5 MG/DL (ref 6–20)
CALCIUM SERPL-MCNC: 9.3 MG/DL (ref 8.8–10.4)
CHLORIDE SERPL-SCNC: 101 MMOL/L (ref 98–107)
CHOLEST SERPL-MCNC: 153 MG/DL
CLUE CELLS: NORMAL
CREAT SERPL-MCNC: 1.07 MG/DL (ref 0.51–0.95)
CREAT UR-MCNC: 167 MG/DL
EGFRCR SERPLBLD CKD-EPI 2021: 64 ML/MIN/1.73M2
EOSINOPHIL # BLD AUTO: 0.2 10E3/UL (ref 0–0.7)
EOSINOPHIL NFR BLD AUTO: 5 %
ERYTHROCYTE [DISTWIDTH] IN BLOOD BY AUTOMATED COUNT: 13.1 % (ref 10–15)
EST. AVERAGE GLUCOSE BLD GHB EST-MCNC: 120 MG/DL
FASTING STATUS PATIENT QL REPORTED: YES
FASTING STATUS PATIENT QL REPORTED: YES
FERRITIN SERPL-MCNC: 26 NG/ML (ref 6–175)
GLUCOSE SERPL-MCNC: 83 MG/DL (ref 70–99)
HBA1C MFR BLD: 5.8 % (ref 0–5.6)
HCO3 SERPL-SCNC: 28 MMOL/L (ref 22–29)
HCT VFR BLD AUTO: 38.6 % (ref 35–47)
HDLC SERPL-MCNC: 50 MG/DL
HGB BLD-MCNC: 12.4 G/DL (ref 11.7–15.7)
IMM GRANULOCYTES # BLD: 0 10E3/UL
IMM GRANULOCYTES NFR BLD: 0 %
IRON BINDING CAPACITY (ROCHE): 352 UG/DL (ref 240–430)
IRON SATN MFR SERPL: 22 % (ref 15–46)
IRON SERPL-MCNC: 76 UG/DL (ref 37–145)
LDLC SERPL CALC-MCNC: 95 MG/DL
LYMPHOCYTES # BLD AUTO: 1.4 10E3/UL (ref 0.8–5.3)
LYMPHOCYTES NFR BLD AUTO: 28 %
MCH RBC QN AUTO: 29 PG (ref 26.5–33)
MCHC RBC AUTO-ENTMCNC: 32.1 G/DL (ref 31.5–36.5)
MCV RBC AUTO: 90 FL (ref 78–100)
MICROALBUMIN UR-MCNC: <12 MG/L
MICROALBUMIN/CREAT UR: NORMAL MG/G{CREAT}
MONOCYTES # BLD AUTO: 0.5 10E3/UL (ref 0–1.3)
MONOCYTES NFR BLD AUTO: 10 %
NEUTROPHILS # BLD AUTO: 2.9 10E3/UL (ref 1.6–8.3)
NEUTROPHILS NFR BLD AUTO: 57 %
NONHDLC SERPL-MCNC: 103 MG/DL
PLATELET # BLD AUTO: 371 10E3/UL (ref 150–450)
POTASSIUM SERPL-SCNC: 3.6 MMOL/L (ref 3.4–5.3)
PROT SERPL-MCNC: 7.4 G/DL (ref 6.4–8.3)
RBC # BLD AUTO: 4.28 10E6/UL (ref 3.8–5.2)
SODIUM SERPL-SCNC: 137 MMOL/L (ref 135–145)
TRICHOMONAS, WET PREP: NORMAL
TRIGL SERPL-MCNC: 41 MG/DL
TSH SERPL DL<=0.005 MIU/L-ACNC: 2.89 UIU/ML (ref 0.3–4.2)
VIT B12 SERPL-MCNC: 1244 PG/ML (ref 232–1245)
VIT D+METAB SERPL-MCNC: 57 NG/ML (ref 20–50)
WBC # BLD AUTO: 5 10E3/UL (ref 4–11)
WBC'S/HIGH POWER FIELD, WET PREP: NORMAL
YEAST, WET PREP: NORMAL

## 2024-11-06 PROCEDURE — 87210 SMEAR WET MOUNT SALINE/INK: CPT | Performed by: FAMILY MEDICINE

## 2024-11-06 PROCEDURE — 82570 ASSAY OF URINE CREATININE: CPT | Performed by: FAMILY MEDICINE

## 2024-11-06 PROCEDURE — 80053 COMPREHEN METABOLIC PANEL: CPT | Performed by: FAMILY MEDICINE

## 2024-11-06 PROCEDURE — 36415 COLL VENOUS BLD VENIPUNCTURE: CPT | Performed by: FAMILY MEDICINE

## 2024-11-06 PROCEDURE — 83540 ASSAY OF IRON: CPT | Performed by: FAMILY MEDICINE

## 2024-11-06 PROCEDURE — 82306 VITAMIN D 25 HYDROXY: CPT | Performed by: FAMILY MEDICINE

## 2024-11-06 PROCEDURE — 82043 UR ALBUMIN QUANTITATIVE: CPT | Performed by: FAMILY MEDICINE

## 2024-11-06 PROCEDURE — 80061 LIPID PANEL: CPT | Performed by: FAMILY MEDICINE

## 2024-11-06 PROCEDURE — 83550 IRON BINDING TEST: CPT | Performed by: FAMILY MEDICINE

## 2024-11-06 PROCEDURE — 85025 COMPLETE CBC W/AUTO DIFF WBC: CPT | Performed by: FAMILY MEDICINE

## 2024-11-06 PROCEDURE — 99214 OFFICE O/P EST MOD 30 MIN: CPT | Mod: 25 | Performed by: FAMILY MEDICINE

## 2024-11-06 PROCEDURE — 82607 VITAMIN B-12: CPT | Performed by: FAMILY MEDICINE

## 2024-11-06 PROCEDURE — 83036 HEMOGLOBIN GLYCOSYLATED A1C: CPT | Performed by: FAMILY MEDICINE

## 2024-11-06 PROCEDURE — 99396 PREV VISIT EST AGE 40-64: CPT | Performed by: FAMILY MEDICINE

## 2024-11-06 PROCEDURE — 82728 ASSAY OF FERRITIN: CPT | Performed by: FAMILY MEDICINE

## 2024-11-06 PROCEDURE — 84443 ASSAY THYROID STIM HORMONE: CPT | Performed by: FAMILY MEDICINE

## 2024-11-06 RX ORDER — HYDROCHLOROTHIAZIDE 12.5 MG/1
12.5 TABLET ORAL DAILY
Qty: 90 TABLET | Refills: 3 | Status: SHIPPED | OUTPATIENT
Start: 2024-11-06

## 2024-11-06 RX ORDER — LOSARTAN POTASSIUM 25 MG/1
25 TABLET ORAL 2 TIMES DAILY
Qty: 180 TABLET | Refills: 0 | Status: SHIPPED | OUTPATIENT
Start: 2024-11-06 | End: 2025-02-04

## 2024-11-06 RX ORDER — LEVOTHYROXINE SODIUM 100 UG/1
100 TABLET ORAL DAILY
Qty: 90 TABLET | Refills: 3 | Status: SHIPPED | OUTPATIENT
Start: 2024-11-06

## 2024-11-06 SDOH — HEALTH STABILITY: PHYSICAL HEALTH: ON AVERAGE, HOW MANY DAYS PER WEEK DO YOU ENGAGE IN MODERATE TO STRENUOUS EXERCISE (LIKE A BRISK WALK)?: 3 DAYS

## 2024-11-06 SDOH — HEALTH STABILITY: PHYSICAL HEALTH: ON AVERAGE, HOW MANY MINUTES DO YOU ENGAGE IN EXERCISE AT THIS LEVEL?: 60 MIN

## 2024-11-06 ASSESSMENT — ASTHMA QUESTIONNAIRES
QUESTION_4 LAST FOUR WEEKS HOW OFTEN HAVE YOU USED YOUR RESCUE INHALER OR NEBULIZER MEDICATION (SUCH AS ALBUTEROL): NOT AT ALL
QUESTION_3 LAST FOUR WEEKS HOW OFTEN DID YOUR ASTHMA SYMPTOMS (WHEEZING, COUGHING, SHORTNESS OF BREATH, CHEST TIGHTNESS OR PAIN) WAKE YOU UP AT NIGHT OR EARLIER THAN USUAL IN THE MORNING: NOT AT ALL
QUESTION_1 LAST FOUR WEEKS HOW MUCH OF THE TIME DID YOUR ASTHMA KEEP YOU FROM GETTING AS MUCH DONE AT WORK, SCHOOL OR AT HOME: NONE OF THE TIME
QUESTION_2 LAST FOUR WEEKS HOW OFTEN HAVE YOU HAD SHORTNESS OF BREATH: NOT AT ALL
QUESTION_5 LAST FOUR WEEKS HOW WOULD YOU RATE YOUR ASTHMA CONTROL: COMPLETELY CONTROLLED
ACT_TOTALSCORE: 25
ACT_TOTALSCORE: 25

## 2024-11-06 ASSESSMENT — SOCIAL DETERMINANTS OF HEALTH (SDOH): HOW OFTEN DO YOU GET TOGETHER WITH FRIENDS OR RELATIVES?: MORE THAN THREE TIMES A WEEK

## 2024-11-06 NOTE — PATIENT INSTRUCTIONS
Blood pressure -   Increase losartan from 25 mg daily to 25 mg twice daily for 3 weeks  If your home blood pressures are not less than 130/80, then please increase dose to Losartan 50 mg twice daily           Patient Education   Preventive Care Advice   This is general advice given by our system to help you stay healthy. However, your care team may have specific advice just for you. Please talk to your care team about your preventive care needs.  Nutrition  Eat 5 or more servings of fruits and vegetables each day.  Try wheat bread, brown rice and whole grain pasta (instead of white bread, rice, and pasta).  Get enough calcium and vitamin D. Check the label on foods and aim for 100% of the RDA (recommended daily allowance).  Lifestyle  Exercise at least 150 minutes each week  (30 minutes a day, 5 days a week).  Do muscle strengthening activities 2 days a week. These help control your weight and prevent disease.  No smoking.  Wear sunscreen to prevent skin cancer.  Have a dental exam and cleaning every 6 months.  Yearly exams  See your health care team every year to talk about:  Any changes in your health.  Any medicines your care team has prescribed.  Preventive care, family planning, and ways to prevent chronic diseases.  Shots (vaccines)   HPV shots (up to age 26), if you've never had them before.  Hepatitis B shots (up to age 59), if you've never had them before.  COVID-19 shot: Get this shot when it's due.  Flu shot: Get a flu shot every year.  Tetanus shot: Get a tetanus shot every 10 years.  Pneumococcal, hepatitis A, and RSV shots: Ask your care team if you need these based on your risk.  Shingles shot (for age 50 and up)  General health tests  Diabetes screening:  Starting at age 35, Get screened for diabetes at least every 3 years.  If you are younger than age 35, ask your care team if you should be screened for diabetes.  Cholesterol test: At age 39, start having a cholesterol test every 5 years, or more  often if advised.  Bone density scan (DEXA): At age 50, ask your care team if you should have this scan for osteoporosis (brittle bones).  Hepatitis C: Get tested at least once in your life.  STIs (sexually transmitted infections)  Before age 24: Ask your care team if you should be screened for STIs.  After age 24: Get screened for STIs if you're at risk. You are at risk for STIs (including HIV) if:  You are sexually active with more than one person.  You don't use condoms every time.  You or a partner was diagnosed with a sexually transmitted infection.  If you are at risk for HIV, ask about PrEP medicine to prevent HIV.  Get tested for HIV at least once in your life, whether you are at risk for HIV or not.  Cancer screening tests  Cervical cancer screening: If you have a cervix, begin getting regular cervical cancer screening tests starting at age 21.  Breast cancer scan (mammogram): If you've ever had breasts, begin having regular mammograms starting at age 40. This is a scan to check for breast cancer.  Colon cancer screening: It is important to start screening for colon cancer at age 45.  Have a colonoscopy test every 10 years (or more often if you're at risk) Or, ask your provider about stool tests like a FIT test every year or Cologuard test every 3 years.  To learn more about your testing options, visit:   .  For help making a decision, visit:   https://bit.ly/pf08727.  Prostate cancer screening test: If you have a prostate, ask your care team if a prostate cancer screening test (PSA) at age 55 is right for you.  Lung cancer screening: If you are a current or former smoker ages 50 to 80, ask your care team if ongoing lung cancer screenings are right for you.  For informational purposes only. Not to replace the advice of your health care provider. Copyright   2023 [employer]. All rights reserved. Clinically reviewed by the [facility] Transitions Program. Bazari [id number] - REV 01/24.

## 2024-11-06 NOTE — PROGRESS NOTES
"Preventive Care Visit  [facility]  Nanette Penny MD, Family Medicine  Nov 6, 2024      Assessment & Plan     Routine general medical examination at a health care facility  - PRIMARY CARE FOLLOW-UP SCHEDULING; Future  - Iron and iron binding capacity; Future  - Ferritin; Future  - Vitamin B12; Future  - Vitamin D Deficiency; Future    Essential hypertension with goal blood pressure less than 140/90  - not controlled, adjusted medication below   - 24 hr Blood pressure monitor avg 145/97  - CBC with platelets and differential; Future  - Albumin Random Urine Quantitative with Creat Ratio; Future  - Adult Cardiology Eval  Referral; Future  - Comprehensive metabolic panel (BMP + Alb, Alk Phos, ALT, AST, Total. Bili, TP); Future  - losartan (COZAAR) 25 MG tablet; Take 1 tablet (25 mg) by mouth 2 times daily. If home BP read is >130/80 take Losartan 50 mg BID daily   - hydroCHLOROthiazide 12.5 MG tablet; Take 1 tablet (12.5 mg) by mouth daily.  - Follow up in 6-8 weeks    Hyperlipidemia LDL goal <100  - Lipid panel reflex to direct LDL Fasting; Future  - Adult Cardiology Eval  Referral; Future  - CT Coronary Calcium Scan; Future    Hypothyroidism due to medication  - followed by eye provider   - TSH with free T4 reflex; Future  - levothyroxine (SYNTHROID/LEVOTHROID) 100 MCG tablet; Take 1 tablet (100 mcg) by mouth daily.    Vaginal symptom  - followed by GYN  - Wet prep - lab collect; Future    Screening for diabetes mellitus  - Hemoglobin A1c; Future    Blood pressure -   Increase losartan from 25 mg daily to 25 mg twice daily for 3 weeks  If your home blood pressures are not less than 130/80, then please increase dose to Losartan 50 mg twice daily             BMI  Estimated body mass index is 32.4 kg/m  as calculated from the following:    Height as of this encounter: 1.64 m (5' 4.57\").    Weight as of this encounter: 87.1 kg (192 lb 1.6 oz). "       Counseling  Appropriate preventive services were addressed with this patient via screening, questionnaire, or discussion as appropriate for fall prevention, nutrition, physical activity, Tobacco-use cessation, social engagement, weight loss and cognition.  Checklist reviewing preventive services available has been given to the patient.  Reviewed patient's diet, addressing concerns and/or questions.   She is at risk for lack of exercise and has been provided with information to increase physical activity for the benefit of her well-being.   She is at risk for psychosocial distress and has been provided with information to reduce risk.           Dennis Le is a 49 year old, presenting for the following:  Physical        11/6/2024     8:52 AM   Additional Questions   Roomed by nnamdi   Accompanied by self          HPI  Patient expressed some concerns about her blood pressure and would like to get further work-up to check for clogged heart blood vessels. Discussed with the patient her BP monitor results and the necessity to increase her blood pressure medications in order to control her blood pressure. She reported some recent weight gain and would like to get appetite suppressors. She denies any SOB, chest pain, N/V, or abdominal pain.     The 10-year ASCVD risk score (Gage DK, et al., 2019) is: 3.1%    Values used to calculate the score:      Age: 49 years      Sex: Female      Is Non- : Yes      Diabetic: No      Tobacco smoker: No      Systolic Blood Pressure: 133 mmHg      Is BP treated: Yes      HDL Cholesterol: 52 mg/dL      Total Cholesterol: 163 mg/dL     Health Care Directive  Patient does not have a Health Care Directive: Discussed advance care planning with patient; information given to patient to review.      11/6/2024   General Health   How would you rate your overall physical health? Good   Feel stress (tense, anxious, or unable to sleep) Only a little      (!) STRESS  CONCERN      11/6/2024   Nutrition   Three or more servings of calcium each day? (!) NO   Diet: Low salt   How many servings of fruit and vegetables per day? (!) 0-1   How many sweetened beverages each day? (!) 2            11/6/2024   Exercise   Days per week of moderate/strenous exercise 3 days   Average minutes spent exercising at this level 60 min            11/6/2024   Social Factors   Frequency of gathering with friends or relatives More than three times a week   Worry food won't last until get money to buy more No   Food not last or not have enough money for food? No   Do you have housing? (Housing is defined as stable permanent housing and does not include staying ouside in a car, in a tent, in an abandoned building, in an overnight shelter, or couch-surfing.) Yes   Are you worried about losing your housing? No   Lack of transportation? No   Unable to get utilities (heat,electricity)? No            11/6/2024   Dental   Dentist two times every year? Yes            11/6/2024   TB Screening   Were you born outside of the [place]? No          Today's PHQ-9 Score:       11/6/2024     7:29 AM   PHQ-9 SCORE   PHQ-9 Total Score MyChart 0   PHQ-9 Total Score 0        Patient-reported         11/6/2024   Substance Use   Alcohol more than 3/day or more than 7/wk No   Do you use any other substances recreationally? No        Social History     Tobacco Use    Smoking status: Never    Smokeless tobacco: Never   Vaping Use    Vaping status: Never Used   Substance Use Topics    Alcohol use: Not Currently     Comment: 1-2x's/month if that.    Drug use: Yes     Types: Marijuana     Comment: 3x's/day           3/27/2024   LAST FHS-7 RESULTS   1st degree relative breast or ovarian cancer No   Any relative bilateral breast cancer No   Any male have breast cancer No   Any ONE woman have BOTH breast AND ovarian cancer No   Any woman with breast cancer before 50yrs No   2 or more relatives with breast AND/OR ovarian cancer No   2 or  "more relatives with breast AND/OR bowel cancer No                   11/6/2024   STI Screening   New sexual partner(s) since last STI/HIV test? No        History of abnormal Pap smear: No - age 30-64 HPV with reflex Pap every 3 years recommended        Latest Ref Rng & Units 12/28/2023     9:40 AM 12/22/2022     2:18 PM 12/6/2021     8:52 AM   PAP / HPV   PAP  Negative for Intraepithelial Lesion or Malignancy (NILM)  Negative for Intraepithelial Lesion or Malignancy (NILM)  Negative for Intraepithelial Lesion or Malignancy (NILM)    HPV 16 DNA Negative Negative  Negative  Negative    HPV 18 DNA Negative Negative  Negative  Negative    Other HR HPV Negative Negative  Negative  Positive      ASCVD Risk   The 10-year ASCVD risk score (Lexi PARRISH, et al., 2019) is: 4.4%    Values used to calculate the score:      Age: 49 years      Sex: Female      Is Non- : Yes      Diabetic: No      Tobacco smoker: No      Systolic Blood Pressure: 144 mmHg      Is BP treated: Yes      HDL Cholesterol: 52 mg/dL      Total Cholesterol: 163 mg/dL        11/6/2024   Contraception/Family Planning   Questions about contraception or family planning No           Reviewed and updated as needed this visit by Provider                          Review of Systems  Constitutional, neuro, ENT, endocrine, pulmonary, cardiac, gastrointestinal, genitourinary, musculoskeletal, integument and psychiatric systems are negative, except as otherwise noted.     Objective    Exam  There were no vitals taken for this visit.   Estimated body mass index is 30.19 kg/m  as calculated from the following:    Height as of 9/4/24: 1.645 m (5' 4.75\").    Weight as of 9/4/24: 81.6 kg (180 lb).    Physical Exam  GENERAL: alert and no distress  EYES: Eyes grossly normal to inspection  HENT: ear canals and TM's normal  NECK: no adenopathy, no asymmetry, masses, or scars  RESP: lungs clear to auscultation - no rales, rhonchi or wheezes  CV: " regular rate and rhythm, normal S1 S2, no S3 or S4, no murmur, click or rub, no peripheral edema  ABDOMEN: soft, nontender, no hepatosplenomegaly, no masses and bowel sounds normal  MS: no gross musculoskeletal defects noted, no edema      BRIIMATEOE Trainee  Hoang Tam      Signed Electronically by: Nanette Penny MD    Answers submitted by the patient for this visit:  Patient Health Questionnaire (Submitted on 11/6/2024)  If you checked off any problems, how difficult have these problems made it for you to do your work, take care of things at home, or get along with other people?: Not difficult at all  PHQ9 TOTAL SCORE: 0

## 2024-11-07 ENCOUNTER — MYC MEDICAL ADVICE (OUTPATIENT)
Dept: FAMILY MEDICINE | Facility: CLINIC | Age: 49
End: 2024-11-07
Payer: COMMERCIAL

## 2024-11-07 DIAGNOSIS — R73.03 PREDIABETES: Primary | ICD-10-CM

## 2024-11-11 ENCOUNTER — TRANSFERRED RECORDS (OUTPATIENT)
Dept: HEALTH INFORMATION MANAGEMENT | Facility: CLINIC | Age: 49
End: 2024-11-11
Payer: COMMERCIAL

## 2024-11-12 NOTE — TELEPHONE ENCOUNTER
Diabetic educator referral placed   Early Jan 2025 - I recommend starting daily over the counter Vitamin D3 1, 000 U daily until end of spring.   DM

## 2024-11-12 NOTE — TELEPHONE ENCOUNTER
Relayed POC to patient via [employer].    CLEO EppersonN, RN (she/her)  [facility] RN

## 2024-11-20 ENCOUNTER — OFFICE VISIT (OUTPATIENT)
Dept: CARDIOLOGY | Facility: CLINIC | Age: 49
End: 2024-11-20
Attending: FAMILY MEDICINE
Payer: COMMERCIAL

## 2024-11-20 VITALS
OXYGEN SATURATION: 98 % | WEIGHT: 193.2 LBS | HEIGHT: 65 IN | BODY MASS INDEX: 32.19 KG/M2 | SYSTOLIC BLOOD PRESSURE: 124 MMHG | DIASTOLIC BLOOD PRESSURE: 86 MMHG | HEART RATE: 92 BPM | RESPIRATION RATE: 18 BRPM

## 2024-11-20 DIAGNOSIS — R06.09 DOE (DYSPNEA ON EXERTION): Primary | ICD-10-CM

## 2024-11-20 DIAGNOSIS — I10 ESSENTIAL HYPERTENSION WITH GOAL BLOOD PRESSURE LESS THAN 140/90: ICD-10-CM

## 2024-11-20 DIAGNOSIS — E78.5 HYPERLIPIDEMIA LDL GOAL <100: ICD-10-CM

## 2024-11-20 PROCEDURE — 99204 OFFICE O/P NEW MOD 45 MIN: CPT | Performed by: INTERNAL MEDICINE

## 2024-11-20 NOTE — PROGRESS NOTES
HEART CARE ENCOUNTER NOTE      [facility]  [phone]      Assessment/Recommendations   Assessment:   1.  Hypertension, longstanding, with improved control with recent increase of medications.  2.  Dyspnea on exertion, mild.  Probably normal but cannot exclude angina equivalent.      Plan:   1.  Will schedule a routine treadmill test and also echocardiogram to evaluate cardiac structure and function.  2.  No change to current medicines.         History of Present Illness/Subjective    HPI: Mimi Melton is a 49 year old female with longstanding history of essential hypertension.  She had evaluation over 10 years ago at [facility] looking for secondary causes including duplex ultrasound of the abdomen to rule out renal artery stenosis, and routine metabolic studies.  She also had a CT coronary angiogram performed which was normal.  Patient has been exercising regularly and has become more concerned about possibility of developing heart problems from sustained hypertension.  She recently doubled her dose of losartan hydrochlorothiazide as blood pressure readings are consistently above 150 systolic.  Following the dose doubling readings have been consistently in the 120/80 range.  She does get some shortness of breath with high levels of activity.  She denies any exertional chest pain however.  She has a son who has abnormal thickening of the heart muscle.  He also has hypertension.  It is not clear if this represents hypertrophic cardiomyopathy or hypertensive cardiomyopathy.    Studies reviewed:    Echo: 9/19/2022:  Left ventricular size, wall motion and function are normal. The ejection  fraction is > 65%. Left ventricular diastolic function is normal.     Right ventricular function, chamber size, wall motion, and thickness are  normal. The peak velocity of the tricuspid regurgitant jet is not obtainable  therefore pulmonary artery systolic pressure cannot be assessed.     Both  "atria appear normal.     No significant valvular abnormalities present.     No pericardial effusion is present.    24-hour ambulatory blood pressure monitoring 10/15/2024: Hypertension, Stage II   Coronary CTA 8/15/2011:  No atherosclerosis identified however RCA at the crux with motion   artifact.   Severe lesion excluded however more milder degrees of atherosclerosis   at this segment cannot be rule out.    Aorta within normal limits.        Physical Examination  Review of Systems   /86 (BP Location: Left arm, Patient Position: Sitting, Cuff Size: Adult Regular)   Pulse 92   Resp 18   Ht 1.645 m (5' 4.75\")   Wt 87.6 kg (193 lb 3.2 oz)   SpO2 98%   BMI 32.40 kg/m    Body mass index is 32.4 kg/m .  Wt Readings from Last 3 Encounters:   11/20/24 87.6 kg (193 lb 3.2 oz)   11/06/24 87.1 kg (192 lb 1.6 oz)   09/04/24 81.6 kg (180 lb)       General Appearance:   Pleasant female appears stated age. no acute distress, normal body habitus   ENT/Mouth: Oropharynx normal    EYES:  no scleral icterus, normal conjunctivae. No eyelid xanthelasma   Neck: No cervical lymphadenopathy  Thyroid not enlarged or nodular  No JVD   Respiratory:   lungs clear , no rales or wheezing, Normal chest wall expansion    Cardiovascular:   Regular rhythm, normal rate. Normal 1st and 2nd heart sounds.  No significant murmurs, no rubs or gallops;   radial pulses are full and equal   Jugular venous pressure is nonelevated   Abdomen/GI:  No tenderness, no organomegaly, no pulsatile masses. NBS.   Extremities: No cyanosis or clubbing.  No peripheral edema   Skin: No rash or lesions   Heme/lymph/ Immunology No lymphadenopathy, petechiae   Neurologic: Alert and oriented. No focal motor weakness, gait appears normal.       Psychiatric: Pleasant, calm, appropriate affect.          No known hepatic, renal, pulmonary, or CNS disorders. The remainder of the complete ROS is negative except as noted above.         Medical History  Surgical History " Family History Social History   Past Medical History:   Diagnosis Date    Cervical high risk HPV (human papillomavirus) test positive 12/2015 12/2015, 10/2019, 10/30/20    Chronic sinusitis Summer 2016    I get congested every 3-4 weeks    Esophageal reflux     Exophthalmos, unspecified     Hypothyroidism     Motion sickness     PONV (postoperative nausea and vomiting)     Thyroid eye disease     Thyrotoxicosis without mention of goiter or other cause, without mention of thyrotoxic crisis or storm 03/2005    Had radioablation, On synthroid, seeing N Endocrine; takes synthroid    Unspecified essential hypertension 1980    meds since 2001, on atenolol     Past Surgical History:   Procedure Laterality Date    BIOPSY BREAST Left 5/17/2024    Procedure: BREAST EXCISIONAL BIOPSY;  Surgeon: Dena Chase DO;  Location: [facility]    COLONOSCOPY N/A 12/27/2022    Procedure: COLONOSCOPY;  Surgeon: Marah Souza MD;  Location: [facility] OR    CYSTOSCOPY, INJECT COLLAGEN, COMBINED N/A 2/6/2024    Procedure: CYSTOSCOPY, WITH PERIURETHRAL BULKING AGENT INJECTION (look in the bladder and inject filler into the walls of the urethra;  Surgeon: Karin Amin MD;  [place] [facility] [place]    CYSTOSCOPY, SLING TRANSVAGINAL N/A 10/10/2017    Procedure: CYSTOSCOPY, SLING TRANSVAGINAL;  Midurethral Sling and Cystoscopy (Support the Urethra with Mesh Sling and Look in the Bladder);  Surgeon: Karin Amin MD;  Location:  OR    ESOPHAGOSCOPY, GASTROSCOPY, DUODENOSCOPY (EGD), COMBINED N/A 3/30/2023    Procedure: Esophagoscopy, gastroscopy, duodenoscopy (EGD), combined;  Surgeon: Janette Chandra MD;  [place] [facility] [place]    EYE SURGERY  6/6/2008    Orbital Decompression Dr smart    ORBITOTOMY DECOMPRESSION Bilateral 11/5/2021    Procedure: Bilateral orbital decompression with  Sonopet, Bilateral lower lid retraction repair;  Surgeon: Niles Donnelly MD;  [place] [facility] [place]    REPAIR RETRACTION LID Bilateral 11/5/2021     Procedure: REPAIR, RETRACTION, EYELID;  Surgeon: Niles Donnelly MD;  Location: [facility] OR    SONOPET Bilateral 2021    Procedure: SONOPET EYE;  Surgeon: Niles Donnelly MD;  Location: [facility] OR    ZZC  DELIVERY ONLY  91    , Low Cervical    ZZC  DELIVERY ONLY  97    , Low Cervical    ZZC  DELIVERY ONLY  99    , Low Cervical    ZZC INDUCED ABORTN BY D&C      Aspiration & Curettage, TAB x2    ZZHC REPAIR INCISIONAL HERNIA,REDUCIBLE      Umbilical hernia Repair     Family History   Problem Relation Age of Onset    Hypertension Mother     Hypertension Father     Hypertension Maternal Grandmother     Hypertension Maternal Grandfather     Hypertension Paternal Grandmother     Hypertension Paternal Grandfather     Diabetes No family hx of     Glaucoma No family hx of     Macular Degeneration No family hx of         Social History     Socioeconomic History    Marital status: Single     Spouse name: Not on file    Number of children: Not on file    Years of education: Not on file    Highest education level: Not on file   Occupational History    Occupation: [employer]     Employer: [employer]   Tobacco Use    Smoking status: Never     Passive exposure: Past    Smokeless tobacco: Never   Vaping Use    Vaping status: Never Used   Substance and Sexual Activity    Alcohol use: Not Currently     Comment: 1-2x's/month if that.    Drug use: Not Currently     Types: Marijuana     Comment: 3x's/day    Sexual activity: Yes     Partners: Male     Birth control/protection: I.U.D.     Comment: Paraguard IUD,    Other Topics Concern    Parent/sibling w/ CABG, MI or angioplasty before 65F 55M? No   Social History Narrative    Caffeine intake/servings daily - 0-1, 12 oz of Mountain Dew    Calcium intake/servings daily - eats only cheese    Exercise None    Sunscreen used - Yes    Seatbelts used - Yes    Guns stored in the home -  No    Self Breast Exam - No    Pap test up to date -  Yes, as of today    Eye exam up to date -  Yes    Dental exam up to date -  Yes    DEXA scan up to date -  Not Applicable    Flex Sig/Colonoscopy up to date -  Not Applicable    Mammography up to date -  Not Applicable    Immunizations reviewed and up to date - Yes, within the last 10 years    Abuse: Current or Past (Physical, Sexual or Emotional) - No    Do you feel safe in your environment - Yes    Do you cope well with stress - Yes    Do you suffer from insomnia - No    Last updated by: Anu Vinson MA 12/21/2011             Social Drivers of Health     Financial Resource Strain: Low Risk  (11/6/2024)    Financial Resource Strain     Within the past 12 months, have you or your family members you live with been unable to get utilities (heat, electricity) when it was really needed?: No   Food Insecurity: Low Risk  (11/6/2024)    Food Insecurity     Within the past 12 months, did you worry that your food would run out before you got money to buy more?: No     Within the past 12 months, did the food you bought just not last and you didn t have money to get more?: No   Transportation Needs: Low Risk  (11/6/2024)    Transportation Needs     Within the past 12 months, has lack of transportation kept you from medical appointments, getting your medicines, non-medical meetings or appointments, work, or from getting things that you need?: No   Physical Activity: Sufficiently Active (11/6/2024)    Exercise Vital Sign     Days of Exercise per Week: 3 days     Minutes of Exercise per Session: 60 min   Stress: No Stress Concern Present (11/6/2024)    [employer] - Occupational Stress Questionnaire     Feeling of Stress : Only a little   Social Connections: Unknown (11/6/2024)    Social Connection and Isolation Panel [NHANES]     Frequency of Communication with Friends and Family: Not on file     Frequency of Social Gatherings with Friends and  Family: More than three times a week     Attends Sabianism Services: Not on file     Active Member of Clubs or Organizations: Not on file     Attends Club or Organization Meetings: Not on file     Marital Status: Not on file   Interpersonal Safety: Low Risk  (11/6/2024)    Interpersonal Safety     Do you feel physically and emotionally safe where you currently live?: Yes     Within the past 12 months, have you been hit, slapped, kicked or otherwise physically hurt by someone?: No     Within the past 12 months, have you been humiliated or emotionally abused in other ways by your partner or ex-partner?: No   Housing Stability: Low Risk  (11/6/2024)    Housing Stability     Do you have housing? : Yes     Are you worried about losing your housing?: No           Medications  Allergies   Current Outpatient Medications   Medication Sig Dispense Refill    ACETAMINOPHEN PO Take 1,000 mg by mouth 2 times daily as needed for pain (menstrual cramps)      benzoyl peroxide (BENZOYL PEROXIDE WASH) 5 % external liquid WASH areas with follliculist (face and body) 2x daily 142 mL 3    clindamycin (CLEOCIN T) 1 % external lotion Use once daily to the affected areas of the face and body 60 mL 5    cyanocobalamin (VITAMIN B-12) 1000 MCG tablet Take 1 tablet (1,000 mcg) by mouth daily 90 tablet 3    fluticasone (FLONASE) 50 MCG/ACT nasal spray Spray 1 spray into both nostrils daily      hydroCHLOROthiazide 12.5 MG tablet Take 1 tablet (12.5 mg) by mouth daily. (Patient taking differently: Take 12.5 mg by mouth 2 times daily.) 90 tablet 3    levothyroxine (SYNTHROID/LEVOTHROID) 100 MCG tablet Take 1 tablet (100 mcg) by mouth daily. 90 tablet 3    loratadine (CLARITIN) 10 MG tablet Take 10 mg by mouth daily      losartan (COZAAR) 25 MG tablet Take 1 tablet (25 mg) by mouth 2 times daily. 180 tablet 0    metroNIDAZOLE (METROGEL) 0.75 % vaginal gel Place 1 applicator (5 g) vaginally daily. 70 g 1    norethindrone (MICRONOR) 0.35 MG tablet  "Take 1 tablet (0.35 mg) by mouth daily 84 tablet 1    omeprazole (PRILOSEC) 20 MG DR capsule Take 1 capsule (20 mg) by mouth daily 90 capsule 1    triamcinolone (KENALOG) 0.1 % external ointment Apply topically 2 times daily To affected areas of the body twice a day for one week. Avoid use to the face. 30 g 1    vitamin D2 (ERGOCALCIFEROL) 90114 units (1250 mcg) capsule Take 1 capsule (50,000 Units) by mouth once a week 12 capsule 0       Allergies   Allergen Reactions    Dust Mites     Lisinopril Swelling     Swelling in lip    Mold      Cannot have any meds that contain mold.          Lab Results    Chemistry/lipid CBC Cardiac Enzymes/BNP/TSH/INR   Recent Labs   Lab Test 11/06/24  1001   CHOL 153   HDL 50   LDL 95   TRIG 41     Recent Labs   Lab Test 11/06/24  1001 11/01/23  0940 10/20/22  1016   LDL 95 101* 113*     Recent Labs   Lab Test 11/06/24  1001      POTASSIUM 3.6   CHLORIDE 101   CO2 28   GLC 83   BUN 9.5   CR 1.07*   GFRESTIMATED 64   DXAA 9.3     Recent Labs   Lab Test 11/06/24  1001 04/17/24  0813 03/14/24  1308   CR 1.07* 1.00* 1.07*     Recent Labs   Lab Test 11/06/24  1001 04/14/22  0853 10/14/21  0946   A1C 5.8* 5.5 5.4          Recent Labs   Lab Test 11/06/24  1001   WBC 5.0   HGB 12.4   HCT 38.6   MCV 90        Recent Labs   Lab Test 11/06/24  1001 08/13/24  1119 01/31/24  1615   HGB 12.4 13.4 12.1    No results for input(s): \"TROPONINI\" in the last 72264 hours.  No results for input(s): \"BNP\", \"NTBNPI\", \"NTBNP\" in the last 25019 hours.  Recent Labs   Lab Test 11/06/24  1001   TSH 2.89     No results for input(s): \"INR\" in the last 83903 hours.     Nasir Arriaga MD                                    "

## 2024-11-20 NOTE — LETTER
11/20/2024    Nanette Penny MD  [address]  [place] [address]    RE: Mimi Melton       Dear Colleague,     I had the pleasure of seeing Mimi Melton in the [facility].    HEART CARE ENCOUNTER NOTE      [facility]  [phone]      Assessment/Recommendations   Assessment:   1.  Hypertension, longstanding, with improved control with recent increase of medications.  2.  Dyspnea on exertion, mild.  Probably normal but cannot exclude angina equivalent.      Plan:   1.  Will schedule a routine treadmill test and also echocardiogram to evaluate cardiac structure and function.  2.  No change to current medicines.         History of Present Illness/Subjective    HPI: Mimi Melton is a 49 year old female with longstanding history of essential hypertension.  She had evaluation over 10 years ago at [facility] looking for secondary causes including duplex ultrasound of the abdomen to rule out renal artery stenosis, and routine metabolic studies.  She also had a CT coronary angiogram performed which was normal.  Patient has been exercising regularly and has become more concerned about possibility of developing heart problems from sustained hypertension.  She recently doubled her dose of losartan hydrochlorothiazide as blood pressure readings are consistently above 150 systolic.  Following the dose doubling readings have been consistently in the 120/80 range.  She does get some shortness of breath with high levels of activity.  She denies any exertional chest pain however.  She has a son who has abnormal thickening of the heart muscle.  He also has hypertension.  It is not clear if this represents hypertrophic cardiomyopathy or hypertensive cardiomyopathy.    Studies reviewed:    Echo: 9/19/2022:  Left ventricular size, wall motion and function are normal. The ejection  fraction is > 65%. Left ventricular diastolic function is normal.     Right  "ventricular function, chamber size, wall motion, and thickness are  normal. The peak velocity of the tricuspid regurgitant jet is not obtainable  therefore pulmonary artery systolic pressure cannot be assessed.     Both atria appear normal.     No significant valvular abnormalities present.     No pericardial effusion is present.    24-hour ambulatory blood pressure monitoring 10/15/2024: Hypertension, Stage II   Coronary CTA 8/15/2011:  No atherosclerosis identified however RCA at the crux with motion   artifact.   Severe lesion excluded however more milder degrees of atherosclerosis   at this segment cannot be rule out.    Aorta within normal limits.        Physical Examination  Review of Systems   /86 (BP Location: Left arm, Patient Position: Sitting, Cuff Size: Adult Regular)   Pulse 92   Resp 18   Ht 1.645 m (5' 4.75\")   Wt 87.6 kg (193 lb 3.2 oz)   SpO2 98%   BMI 32.40 kg/m    Body mass index is 32.4 kg/m .  Wt Readings from Last 3 Encounters:   11/20/24 87.6 kg (193 lb 3.2 oz)   11/06/24 87.1 kg (192 lb 1.6 oz)   09/04/24 81.6 kg (180 lb)       General Appearance:   Pleasant female appears stated age. no acute distress, normal body habitus   ENT/Mouth: Oropharynx normal    EYES:  no scleral icterus, normal conjunctivae. No eyelid xanthelasma   Neck: No cervical lymphadenopathy  Thyroid not enlarged or nodular  No JVD   Respiratory:   lungs clear , no rales or wheezing, Normal chest wall expansion    Cardiovascular:   Regular rhythm, normal rate. Normal 1st and 2nd heart sounds.  No significant murmurs, no rubs or gallops;   radial pulses are full and equal   Jugular venous pressure is nonelevated   Abdomen/GI:  No tenderness, no organomegaly, no pulsatile masses. NBS.   Extremities: No cyanosis or clubbing.  No peripheral edema   Skin: No rash or lesions   Heme/lymph/ Immunology No lymphadenopathy, petechiae   Neurologic: Alert and oriented. No focal motor weakness, gait appears normal.     "   Psychiatric: Pleasant, calm, appropriate affect.          No known hepatic, renal, pulmonary, or CNS disorders. The remainder of the complete ROS is negative except as noted above.         Medical History  Surgical History Family History Social History   Past Medical History:   Diagnosis Date     Cervical high risk HPV (human papillomavirus) test positive 12/2015 12/2015, 10/2019, 10/30/20     Chronic sinusitis Summer 2016    I get congested every 3-4 weeks     Esophageal reflux      Exophthalmos, unspecified      Hypothyroidism      Motion sickness      PONV (postoperative nausea and vomiting)      Thyroid eye disease      Thyrotoxicosis without mention of goiter or other cause, without mention of thyrotoxic crisis or storm 03/2005    Had radioablation, On synthroid, seeing [facility]; takes synthroid     Unspecified essential hypertension 1980    meds since 2001, on atenolol     Past Surgical History:   Procedure Laterality Date     BIOPSY BREAST Left 5/17/2024    Procedure: BREAST EXCISIONAL BIOPSY;  Surgeon: Dena Chase DO;  Location: [facility] OR     COLONOSCOPY N/A 12/27/2022    Procedure: COLONOSCOPY;  Surgeon: Marah Souza MD;  Location: [facility] OR     CYSTOSCOPY, INJECT COLLAGEN, COMBINED N/A 2/6/2024    Procedure: CYSTOSCOPY, WITH PERIURETHRAL BULKING AGENT INJECTION (look in the bladder and inject filler into the walls of the urethra;  Surgeon: Karin Amin MD;  [facility]     CYSTOSCOPY, SLING TRANSVAGINAL N/A 10/10/2017    Procedure: CYSTOSCOPY, SLING TRANSVAGINAL;  Midurethral Sling and Cystoscopy (Support the Urethra with Mesh Sling and Look in the Bladder);  Surgeon: Karin Amin MD;  Location:  OR     ESOPHAGOSCOPY, GASTROSCOPY, DUODENOSCOPY (EGD), COMBINED N/A 3/30/2023    Procedure: Esophagoscopy, gastroscopy, duodenoscopy (EGD), combined;  Surgeon: Janette Chandra MD;  [place] [facility] [place]     EYE SURGERY  6/6/2008    Orbital Decompression Dr smart      ORBITOTOMY DECOMPRESSION Bilateral 2021    Procedure: Bilateral orbital decompression with  Sonopet, Bilateral lower lid retraction repair;  Surgeon: Niles Donnelly MD;  Location: [facility] OR     REPAIR RETRACTION LID Bilateral 2021    Procedure: REPAIR, RETRACTION, EYELID;  Surgeon: Niles Donnelly MD;  Location: [facility] OR     SONOPET Bilateral 2021    Procedure: SONOPET EYE;  Surgeon: Niles Donnelly MD;  Location: [facility] OR     ZZC  DELIVERY ONLY  91    , Low Cervical     ZZC  DELIVERY ONLY  97    , Low Cervical     ZZC  DELIVERY ONLY  99    , Low Cervical     ZZC INDUCED ABORTN BY D&C      Aspiration & Curettage, TAB x2     ZZHC REPAIR INCISIONAL HERNIA,REDUCIBLE      Umbilical hernia Repair     Family History   Problem Relation Age of Onset     Hypertension Mother      Hypertension Father      Hypertension Maternal Grandmother      Hypertension Maternal Grandfather      Hypertension Paternal Grandmother      Hypertension Paternal Grandfather      Diabetes No family hx of      Glaucoma No family hx of      Macular Degeneration No family hx of         Social History     Socioeconomic History     Marital status: Single     Spouse name: Not on file     Number of children: Not on file     Years of education: Not on file     Highest education level: Not on file   Occupational History     Occupation: [employer]     Employer: [employer]   Tobacco Use     Smoking status: Never     Passive exposure: Past     Smokeless tobacco: Never   Vaping Use     Vaping status: Never Used   Substance and Sexual Activity     Alcohol use: Not Currently     Comment: 1-2x's/month if that.     Drug use: Not Currently     Types: Marijuana     Comment: 3x's/day     Sexual activity: Yes     Partners: Male     Birth control/protection: I.U.D.     Comment: Paraguard IUD,    Other Topics Concern     Parent/sibling w/ CABG,  MI or angioplasty before 65F 55M? No   Social History Narrative    Caffeine intake/servings daily - 0-1, 12 oz of Mountain Dew    Calcium intake/servings daily - eats only cheese    Exercise None    Sunscreen used - Yes    Seatbelts used - Yes    Guns stored in the home - No    Self Breast Exam - No    Pap test up to date -  Yes, as of today    Eye exam up to date -  Yes    Dental exam up to date -  Yes    DEXA scan up to date -  Not Applicable    Flex Sig/Colonoscopy up to date -  Not Applicable    Mammography up to date -  Not Applicable    Immunizations reviewed and up to date - Yes, within the last 10 years    Abuse: Current or Past (Physical, Sexual or Emotional) - No    Do you feel safe in your environment - Yes    Do you cope well with stress - Yes    Do you suffer from insomnia - No    Last updated by: Anu Vinson MA 12/21/2011             Social Drivers of Health     Financial Resource Strain: Low Risk  (11/6/2024)    Financial Resource Strain      Within the past 12 months, have you or your family members you live with been unable to get utilities (heat, electricity) when it was really needed?: No   Food Insecurity: Low Risk  (11/6/2024)    Food Insecurity      Within the past 12 months, did you worry that your food would run out before you got money to buy more?: No      Within the past 12 months, did the food you bought just not last and you didn t have money to get more?: No   Transportation Needs: Low Risk  (11/6/2024)    Transportation Needs      Within the past 12 months, has lack of transportation kept you from medical appointments, getting your medicines, non-medical meetings or appointments, work, or from getting things that you need?: No   Physical Activity: Sufficiently Active (11/6/2024)    Exercise Vital Sign      Days of Exercise per Week: 3 days      Minutes of Exercise per Session: 60 min   Stress: No Stress Concern Present (11/6/2024)    [employer] [place] [employer] -  Occupational Stress Questionnaire      Feeling of Stress : Only a little   Social Connections: Unknown (11/6/2024)    Social Connection and Isolation Panel [NHANES]      Frequency of Communication with Friends and Family: Not on file      Frequency of Social Gatherings with Friends and Family: More than three times a week      Attends Islam Services: Not on file      Active Member of Clubs or Organizations: Not on file      Attends Club or Organization Meetings: Not on file      Marital Status: Not on file   Interpersonal Safety: Low Risk  (11/6/2024)    Interpersonal Safety      Do you feel physically and emotionally safe where you currently live?: Yes      Within the past 12 months, have you been hit, slapped, kicked or otherwise physically hurt by someone?: No      Within the past 12 months, have you been humiliated or emotionally abused in other ways by your partner or ex-partner?: No   Housing Stability: Low Risk  (11/6/2024)    Housing Stability      Do you have housing? : Yes      Are you worried about losing your housing?: No           Medications  Allergies   Current Outpatient Medications   Medication Sig Dispense Refill     ACETAMINOPHEN PO Take 1,000 mg by mouth 2 times daily as needed for pain (menstrual cramps)       benzoyl peroxide (BENZOYL PEROXIDE WASH) 5 % external liquid WASH areas with follliculist (face and body) 2x daily 142 mL 3     clindamycin (CLEOCIN T) 1 % external lotion Use once daily to the affected areas of the face and body 60 mL 5     cyanocobalamin (VITAMIN B-12) 1000 MCG tablet Take 1 tablet (1,000 mcg) by mouth daily 90 tablet 3     fluticasone (FLONASE) 50 MCG/ACT nasal spray Spray 1 spray into both nostrils daily       hydroCHLOROthiazide 12.5 MG tablet Take 1 tablet (12.5 mg) by mouth daily. (Patient taking differently: Take 12.5 mg by mouth 2 times daily.) 90 tablet 3     levothyroxine (SYNTHROID/LEVOTHROID) 100 MCG tablet Take 1 tablet (100 mcg) by mouth daily. 90 tablet  "3     loratadine (CLARITIN) 10 MG tablet Take 10 mg by mouth daily       losartan (COZAAR) 25 MG tablet Take 1 tablet (25 mg) by mouth 2 times daily. 180 tablet 0     metroNIDAZOLE (METROGEL) 0.75 % vaginal gel Place 1 applicator (5 g) vaginally daily. 70 g 1     norethindrone (MICRONOR) 0.35 MG tablet Take 1 tablet (0.35 mg) by mouth daily 84 tablet 1     omeprazole (PRILOSEC) 20 MG DR capsule Take 1 capsule (20 mg) by mouth daily 90 capsule 1     triamcinolone (KENALOG) 0.1 % external ointment Apply topically 2 times daily To affected areas of the body twice a day for one week. Avoid use to the face. 30 g 1     vitamin D2 (ERGOCALCIFEROL) 28330 units (1250 mcg) capsule Take 1 capsule (50,000 Units) by mouth once a week 12 capsule 0       Allergies   Allergen Reactions     Dust Mites      Lisinopril Swelling     Swelling in lip     Mold      Cannot have any meds that contain mold.          Lab Results    Chemistry/lipid CBC Cardiac Enzymes/BNP/TSH/INR   Recent Labs   Lab Test 11/06/24  1001   CHOL 153   HDL 50   LDL 95   TRIG 41     Recent Labs   Lab Test 11/06/24  1001 11/01/23  0940 10/20/22  1016   LDL 95 101* 113*     Recent Labs   Lab Test 11/06/24  1001      POTASSIUM 3.6   CHLORIDE 101   CO2 28   GLC 83   BUN 9.5   CR 1.07*   GFRESTIMATED 64   DAXA 9.3     Recent Labs   Lab Test 11/06/24  1001 04/17/24  0813 03/14/24  1308   CR 1.07* 1.00* 1.07*     Recent Labs   Lab Test 11/06/24  1001 04/14/22  0853 10/14/21  0946   A1C 5.8* 5.5 5.4          Recent Labs   Lab Test 11/06/24  1001   WBC 5.0   HGB 12.4   HCT 38.6   MCV 90        Recent Labs   Lab Test 11/06/24  1001 08/13/24  1119 01/31/24  1615   HGB 12.4 13.4 12.1    No results for input(s): \"TROPONINI\" in the last 26743 hours.  No results for input(s): \"BNP\", \"NTBNPI\", \"NTBNP\" in the last 29082 hours.  Recent Labs   Lab Test 11/06/24  1001   TSH 2.89     No results for input(s): \"INR\" in the last 56546 hours.     Nasir Arriaga, " MD                                        Thank you for allowing me to participate in the care of your patient.      Sincerely,     Nasir Arriaga MD     [facility]  cc:   Nanette Pneny MD  [address] [place] [address]

## 2024-11-25 ENCOUNTER — VIRTUAL VISIT (OUTPATIENT)
Dept: GASTROENTEROLOGY | Facility: CLINIC | Age: 49
End: 2024-11-25
Attending: PHYSICIAN ASSISTANT
Payer: COMMERCIAL

## 2024-11-25 VITALS — WEIGHT: 185 LBS | BODY MASS INDEX: 31.58 KG/M2 | HEIGHT: 64 IN

## 2024-11-25 DIAGNOSIS — Z12.11 COLON CANCER SCREENING: ICD-10-CM

## 2024-11-25 DIAGNOSIS — K29.40 GASTRIC ATROPHY: Primary | ICD-10-CM

## 2024-11-25 DIAGNOSIS — E53.8 VITAMIN B12 DEFICIENCY (NON ANEMIC): ICD-10-CM

## 2024-11-25 DIAGNOSIS — R10.13 EPIGASTRIC PAIN: ICD-10-CM

## 2024-11-25 DIAGNOSIS — K29.80 PEPTIC DUODENITIS: ICD-10-CM

## 2024-11-25 DIAGNOSIS — R11.2 NAUSEA AND VOMITING, UNSPECIFIED VOMITING TYPE: ICD-10-CM

## 2024-11-25 PROCEDURE — 99214 OFFICE O/P EST MOD 30 MIN: CPT | Mod: 95 | Performed by: PHYSICIAN ASSISTANT

## 2024-11-25 ASSESSMENT — PAIN SCALES - GENERAL: PAINLEVEL_OUTOF10: NO PAIN (0)

## 2024-11-25 NOTE — PROGRESS NOTES
GASTROENTEROLOGY Follow-up VIDEO VISIT    CC/REFERRING MD:    Nanette Penny    REASON FOR CONSULTATION:   Murphy Castro for   Chief Complaint   Patient presents with    RECHECK     F/U       HISTORY OF PRESENT ILLNESS:    Mimi Melton is a 49 year old female who is being evaluated via a billable video visit for follow-up.  Last visit with me was about 6 months ago.  This patient initially established care with me in January 2023 for symptoms of recurrent nausea, vomiting, and upper abdominal pain.  Her initial workup included abdominal ultrasound, gastric emptying scan, and EGD.  The first 2 tests were normal and her EGD was notable for peptic duodenitis on pathology with some gastric atrophy noted, though gastric biopsies did not reveal any intestinal metaplasia.  Subsequent treatment with omeprazole largely resolved her symptoms.  When I spoke with her 6 months ago, she was doing quite well.  We dropped her dose to 20 mg daily.  Since then, she has continued to have good control of symptoms.  If she misses a few days of the medication, she will experience some gas and bloating.  We looked at her nutrition earlier this year, vitamin B12 was mildly low and supplementation has subsequently improved that.  Her recent nutrition labs from earlier this month showed normal B12 and iron levels.  Otherwise, no new symptoms to report.      I have reviewed and updated the patient's Past Medical History, Social History, Family History and Medication List.    Exam:    General appearance:  Healthy appearing adult, in no acute distress  Eyes:  Sclera anicteric, Pupils round and reactive to light  Ears, nose, mouth and throat:  No obvious external lesions of ears and nose.  Hearing intact  Neck:  Symmetric, No obvious external lesions  Respiratory:  Normal respiration, no use of accessory muscles   MSK:  No visual upper extremity, neck or facial muscle atrophy  ABD:  No visual abdominal distention, no  audible borborygmi  Skin:  No rashes or jaundice   Psychiatric:  Oriented to person, place and time, Appropriate mood and affect.   Neurologic:  Peripheral muscle function and dexterity appear to be intact      PERTINENT STUDIES have been reviewed.    ASSESSMENT/PLAN:    Mimi Melton is a 49 year old female who presents for follow up of dyspepsia symptoms that have subsequently improved on omeprazole.  She has been able to titrate down to 20 mg daily and still has good control even if she misses some doses.  At this time, I would like to update her upper endoscopy to reassess the gastric atrophy that was seen endoscopically.  She does have some B12 deficiency and I would like to make sure we get mapping biopsies to rule out intestinal metaplasia.  We will combine this with screening colonoscopy, as she had a suboptimal prep in 2022.  I agree with pursuing split prep to improve tolerability.  We will plan for follow-up in 6 months, sooner as needed.    1. Nausea and vomiting, unspecified vomiting type  - omeprazole (PRILOSEC) 20 MG DR capsule; Take 1 capsule (20 mg) by mouth daily.  Dispense: 90 capsule; Refill: 1    2. Epigastric pain  - omeprazole (PRILOSEC) 20 MG DR capsule; Take 1 capsule (20 mg) by mouth daily.  Dispense: 90 capsule; Refill: 1    3. Peptic duodenitis  - omeprazole (PRILOSEC) 20 MG DR capsule; Take 1 capsule (20 mg) by mouth daily.  Dispense: 90 capsule; Refill: 1    4. Gastric atrophy (Primary)    5. Vitamin B12 deficiency (non anemic)      Video-Visit Details    Video Visit Time: 12 minutes    Type of service:  Video Visit    Originating Location (pt. Location): Home    Distant Location (provider location):  Off-site    Platform used for Video Visit: [facility]    A total of 23 minutes was spent with reviewing the chart, discussing with the patient, documentation and coordination of care on 11/25/2024.    Murphy Castro PA-C  Division of Gastroenterology, Hepatology, and Nutrition  [facility]  [facility]  [phone]    RTC 6 months

## 2024-11-25 NOTE — NURSING NOTE
Current patient location: Patient declined to provide     Is the patient currently in the state of [place]? YES    Visit mode:VIDEO    If the visit is dropped, the patient can be reconnected by:VIDEO VISIT: Text to cell phone:   Telephone Information:   Mobile [phone]       Will anyone else be joining the visit? NO  (If patient encounters technical issues they should call [phone] :[phone])    Are changes needed to the allergy or medication list? No    Are refills needed on medications prescribed by this physician? NO    Rooming Documentation:  Questionnaire(s) not pre-assigned    Reason for visit: RECHECK (F/U)    Esther CASTELLON

## 2024-11-26 DIAGNOSIS — Z30.011 ENCOUNTER FOR INITIAL PRESCRIPTION OF CONTRACEPTIVE PILLS: ICD-10-CM

## 2024-11-26 RX ORDER — ACETAMINOPHEN AND CODEINE PHOSPHATE 120; 12 MG/5ML; MG/5ML
0.35 SOLUTION ORAL DAILY
Qty: 84 TABLET | Refills: 1 | Status: SHIPPED | OUTPATIENT
Start: 2024-11-26

## 2024-12-10 ENCOUNTER — HOSPITAL ENCOUNTER (OUTPATIENT)
Dept: CARDIOLOGY | Facility: CLINIC | Age: 49
Discharge: HOME | End: 2024-12-10
Attending: INTERNAL MEDICINE
Payer: COMMERCIAL

## 2024-12-10 DIAGNOSIS — R06.09 DOE (DYSPNEA ON EXERTION): ICD-10-CM

## 2024-12-10 LAB
CV STRESS CURRENT BP HE: NORMAL
CV STRESS CURRENT HR HE: 104
CV STRESS CURRENT HR HE: 105
CV STRESS CURRENT HR HE: 106
CV STRESS CURRENT HR HE: 107
CV STRESS CURRENT HR HE: 110
CV STRESS CURRENT HR HE: 113
CV STRESS CURRENT HR HE: 115
CV STRESS CURRENT HR HE: 117
CV STRESS CURRENT HR HE: 119
CV STRESS CURRENT HR HE: 119
CV STRESS CURRENT HR HE: 120
CV STRESS CURRENT HR HE: 126
CV STRESS CURRENT HR HE: 130
CV STRESS CURRENT HR HE: 133
CV STRESS CURRENT HR HE: 134
CV STRESS CURRENT HR HE: 138
CV STRESS CURRENT HR HE: 141
CV STRESS CURRENT HR HE: 142
CV STRESS CURRENT HR HE: 146
CV STRESS CURRENT HR HE: 149
CV STRESS CURRENT HR HE: 160
CV STRESS CURRENT HR HE: 161
CV STRESS CURRENT HR HE: 163
CV STRESS CURRENT HR HE: 97
CV STRESS CURRENT HR HE: 97
CV STRESS DEVIATION TIME HE: NORMAL
CV STRESS ECHO PERCENT HR HE: NORMAL
CV STRESS EXERCISE STAGE HE: NORMAL
CV STRESS EXERCISE STAGE REACHED HE: NORMAL
CV STRESS FINAL RESTING BP HE: NORMAL
CV STRESS FINAL RESTING HR HE: 107
CV STRESS MAX HR HE: 163
CV STRESS MAX TREADMILL GRADE HE: 16
CV STRESS MAX TREADMILL SPEED HE: 4.2
CV STRESS PEAK DIA BP HE: NORMAL
CV STRESS PEAK SYS BP HE: NORMAL
CV STRESS PHASE HE: NORMAL
CV STRESS PROTOCOL HE: NORMAL
CV STRESS REASON STOPPED HE: NORMAL
CV STRESS RESTING PT POSITION HE: NORMAL
CV STRESS RESTING PT POSITION HE: NORMAL
CV STRESS ST DEVIATION AMOUNT HE: NORMAL
CV STRESS ST DEVIATION ELEVATION HE: NORMAL
CV STRESS ST EVELATION AMOUNT HE: NORMAL
CV STRESS SYMPTOMS HE: NORMAL
CV STRESS TEST TYPE HE: NORMAL
CV STRESS TOTAL STAGE TIME MIN 1 HE: NORMAL
STRESS ECHO BASELINE DIASTOLIC HE: 90
STRESS ECHO BASELINE HR: 99
STRESS ECHO BASELINE SYSTOLIC BP: 141
STRESS ECHO LAST STRESS DIASTOLIC BP: 74
STRESS ECHO LAST STRESS HR: 161
STRESS ECHO LAST STRESS SYSTOLIC BP: 164
STRESS ECHO POST ESTIMATED WORKLOAD: 11.2
STRESS ECHO POST EXERCISE DUR MIN: 9
STRESS ECHO POST EXERCISE DUR SEC: 35
STRESS ECHO TARGET HR: 146

## 2024-12-10 PROCEDURE — 93306 TTE W/DOPPLER COMPLETE: CPT | Mod: 26 | Performed by: INTERNAL MEDICINE

## 2024-12-10 PROCEDURE — 93306 TTE W/DOPPLER COMPLETE: CPT

## 2024-12-16 ENCOUNTER — MYC MEDICAL ADVICE (OUTPATIENT)
Dept: CARDIOLOGY | Facility: CLINIC | Age: 49
End: 2024-12-16
Payer: COMMERCIAL

## 2024-12-17 ENCOUNTER — MYC MEDICAL ADVICE (OUTPATIENT)
Dept: FAMILY MEDICINE | Facility: CLINIC | Age: 49
End: 2024-12-17
Payer: COMMERCIAL

## 2024-12-17 DIAGNOSIS — I10 ESSENTIAL HYPERTENSION WITH GOAL BLOOD PRESSURE LESS THAN 140/90: ICD-10-CM

## 2024-12-17 DIAGNOSIS — E03.2 HYPOTHYROIDISM DUE TO MEDICATION: ICD-10-CM

## 2024-12-18 RX ORDER — LEVOTHYROXINE SODIUM 100 UG/1
100 TABLET ORAL DAILY
Qty: 90 TABLET | Refills: 3 | Status: SHIPPED | OUTPATIENT
Start: 2024-12-18

## 2024-12-18 RX ORDER — LOSARTAN POTASSIUM 25 MG/1
25 TABLET ORAL 2 TIMES DAILY
Qty: 180 TABLET | Refills: 0 | Status: SHIPPED | OUTPATIENT
Start: 2024-12-18

## 2024-12-18 RX ORDER — HYDROCHLOROTHIAZIDE 12.5 MG/1
12.5 TABLET ORAL DAILY
Qty: 90 TABLET | Refills: 3 | Status: CANCELLED | OUTPATIENT
Start: 2024-12-18

## 2024-12-18 NOTE — TELEPHONE ENCOUNTER
Dr Penny,    I did refill her losartan and synthroid as it was just a pharmacy change. However I cannot sign the hydrochlorothiazide as she states she is taking two a day instead of one and this is different from the med list. She says you discussed this but I do not see notes. She does have appt on 1/8 with you.    She writes:    Please send my prescriptions to [facility]:  Hydrochloride 12.5 mg X 2 a day      I had to start taking the Hydrochloride twice a day to keep my blood pressure down. Just increasing the Losartan did not lower my BP.     Can you sign refill?    Christine OROPEZA RN, BSN  [facility]

## 2024-12-19 NOTE — TELEPHONE ENCOUNTER
Dr. Penny: please see message thread regarding BP meds    Iris IVERSON BSN, PHN, RN-[facility]  [phone]

## 2024-12-21 DIAGNOSIS — I10 ESSENTIAL HYPERTENSION WITH GOAL BLOOD PRESSURE LESS THAN 140/90: ICD-10-CM

## 2024-12-21 RX ORDER — HYDROCHLOROTHIAZIDE 12.5 MG/1
12.5 TABLET ORAL DAILY
Qty: 90 TABLET | Refills: 3 | OUTPATIENT
Start: 2024-12-21

## 2024-12-21 NOTE — TELEPHONE ENCOUNTER
"Clinic RN: Please contact patient because  sorry cause this might be a bit confusing.   1) This is a pharmacy change.  2) So as per our protocol I refused the pended medication with reason  \"other.\"  3) Then I planned to discontinue the last order and reorder to send to the new pharmacy, BUT I noticed in the existing order has a note that says the patient is taking the medication \"differently\" than as ordered.  4) Please call patient and confirm how she is taking this medication and then route to provider to authorize for the new pharmacy as loaded.  5) If this is confusing, then hopefully it will make sense to a refill nurse.    ydroCHLOROthiazide 12.5 MG tablet 90 tablet 3 11/6/2024 -- No   Sig - Route: Take 1 tablet (12.5 mg) by mouth daily. - Oral   Patient taking differently: Take 12.5 mg by mouth 2 times daily.       "

## 2024-12-23 NOTE — TELEPHONE ENCOUNTER
Dr. Penny --    Please review and advise: hydrochlorothiazide.     Reviewed with patient what her dose is currently.   Patient states that she is taking the hydrochlorothiazide 1 tablet BID. She takes this medication along with her losartan.   Patient states taking them together has really helped with her blood pressure.   Patient has appointment with you on January 8.    Patient would like hydrochlorothiazide sent to [facility].     Thank you,   MARK Lopez RN  [facility]

## 2024-12-26 RX ORDER — HYDROCHLOROTHIAZIDE 25 MG/1
25 TABLET ORAL DAILY
Qty: 90 TABLET | Refills: 1 | Status: SHIPPED | OUTPATIENT
Start: 2024-12-26

## 2024-12-26 NOTE — TELEPHONE ENCOUNTER
Responded to the patient through MyChart.     Lilly Ruiz RN  [facility]

## 2024-12-26 NOTE — TELEPHONE ENCOUNTER
Responded to the patient through MyChart.     Lilly Ruiz RN  [facility]

## 2024-12-30 ENCOUNTER — OFFICE VISIT (OUTPATIENT)
Dept: OBGYN | Facility: CLINIC | Age: 49
End: 2024-12-30
Payer: COMMERCIAL

## 2024-12-30 VITALS
OXYGEN SATURATION: 98 % | TEMPERATURE: 97.7 F | SYSTOLIC BLOOD PRESSURE: 130 MMHG | WEIGHT: 196.3 LBS | DIASTOLIC BLOOD PRESSURE: 87 MMHG | HEART RATE: 100 BPM | BODY MASS INDEX: 33.69 KG/M2

## 2024-12-30 DIAGNOSIS — B96.89 BV (BACTERIAL VAGINOSIS): ICD-10-CM

## 2024-12-30 DIAGNOSIS — Z12.4 CERVICAL CANCER SCREENING: ICD-10-CM

## 2024-12-30 DIAGNOSIS — N76.0 BV (BACTERIAL VAGINOSIS): ICD-10-CM

## 2024-12-30 DIAGNOSIS — D25.9 UTERINE LEIOMYOMA, UNSPECIFIED LOCATION: Primary | ICD-10-CM

## 2024-12-30 DIAGNOSIS — Z11.3 ROUTINE SCREENING FOR STI (SEXUALLY TRANSMITTED INFECTION): ICD-10-CM

## 2024-12-30 DIAGNOSIS — N93.9 ABNORMAL UTERINE BLEEDING (AUB): ICD-10-CM

## 2024-12-30 DIAGNOSIS — E66.811 CLASS 1 OBESITY WITH BODY MASS INDEX (BMI) OF 33.0 TO 33.9 IN ADULT, UNSPECIFIED OBESITY TYPE, UNSPECIFIED WHETHER SERIOUS COMORBIDITY PRESENT: ICD-10-CM

## 2024-12-30 LAB
CLUE CELLS: PRESENT
HCV AB SERPL QL IA: NONREACTIVE
HIV 1+2 AB+HIV1 P24 AG SERPL QL IA: NONREACTIVE
T PALLIDUM AB SER QL: NONREACTIVE
TRICHOMONAS, WET PREP: ABNORMAL
WBC'S/HIGH POWER FIELD, WET PREP: ABNORMAL
YEAST, WET PREP: ABNORMAL

## 2024-12-30 PROCEDURE — 86780 TREPONEMA PALLIDUM: CPT | Performed by: OBSTETRICS & GYNECOLOGY

## 2024-12-30 PROCEDURE — 87624 HPV HI-RISK TYP POOLED RSLT: CPT | Performed by: OBSTETRICS & GYNECOLOGY

## 2024-12-30 PROCEDURE — 87210 SMEAR WET MOUNT SALINE/INK: CPT | Performed by: OBSTETRICS & GYNECOLOGY

## 2024-12-30 PROCEDURE — 87491 CHLMYD TRACH DNA AMP PROBE: CPT | Performed by: OBSTETRICS & GYNECOLOGY

## 2024-12-30 PROCEDURE — 99214 OFFICE O/P EST MOD 30 MIN: CPT | Performed by: OBSTETRICS & GYNECOLOGY

## 2024-12-30 PROCEDURE — 86803 HEPATITIS C AB TEST: CPT | Performed by: OBSTETRICS & GYNECOLOGY

## 2024-12-30 PROCEDURE — 87389 HIV-1 AG W/HIV-1&-2 AB AG IA: CPT | Performed by: OBSTETRICS & GYNECOLOGY

## 2024-12-30 PROCEDURE — 87591 N.GONORRHOEAE DNA AMP PROB: CPT | Performed by: OBSTETRICS & GYNECOLOGY

## 2024-12-30 PROCEDURE — 36415 COLL VENOUS BLD VENIPUNCTURE: CPT | Performed by: OBSTETRICS & GYNECOLOGY

## 2024-12-30 RX ORDER — ACETAMINOPHEN AND CODEINE PHOSPHATE 120; 12 MG/5ML; MG/5ML
0.35 SOLUTION ORAL DAILY
Qty: 84 TABLET | Refills: 4 | Status: SHIPPED | OUTPATIENT
Start: 2024-12-30

## 2024-12-30 NOTE — PROGRESS NOTES
Routine Gyn Exam                                                 CC:  gyn exam, routine pap    HPI:  Mimi Melton is a 49 year old female who presents to clinic today for pap smear and breast exam.    Has a couple additional questions.  -Does she need follow-up for fibroids  -Weight loss/struggles losing weight.  -Some discharge.  No odor, irritation.  Hx of BV.  -STI screening  -Occ spotting, had it Sept, Dec.  Otherwise doing well on micronor.        EXAM:  Blood pressure 130/87, pulse 100, temperature 97.7  F (36.5  C), temperature source Oral, weight 89 kg (196 lb 4.8 oz), SpO2 98%, not currently breastfeeding.   BMI= Body mass index is 33.69 kg/m .  General - pleasant female in no acute distress.  Breast - no nodularity, skin changes, asymmetry or nipple discharge bilaterally. No axillary or supraclavicular lymphadenopathy.  Pelvic - external genitalia: normal adult female without lesions or abnormalities; BUS: within normal limits; Vagina: well rugated, no discharge, no lesions; Cervix: no lesions   Rectovaginal - deferred.  Musculoskeletal - no gross deformities.  Neurological - normal strength, sensation, and mental status.      Assessment:    Mimi Melton is a 49 year old  who presents today for pap smear and breast exam, among other concerns.    Plan:  1. Uterine leiomyoma, unspecified location (Primary)  Wouldn't expect to change in size significantly at this point in her life.  Will check pelvic US to ensure that's the case  - US Pelvic Complete with Transvaginal; Future  - norethindrone (MICRONOR) 0.35 MG tablet; Take 1 tablet (0.35 mg) by mouth daily.  Dispense: 84 tablet; Refill: 4    2. Abnormal uterine bleeding (AUB)  Hx hypertension, so avoiding estrogen.  Has done well with Micronor, so can continue  - norethindrone (MICRONOR) 0.35 MG tablet; Take 1 tablet (0.35 mg) by mouth daily.  Dispense: 84 tablet; Refill: 4    3. Class 1 obesity with body mass index (BMI) of 33.0 to 33.9 in  adult, unspecified obesity type, unspecified whether serious comorbidity present  Discussed metabolism can certainly slow as we age.  Offered referral to weight management clinic and she was interested  - Adult Comprehensive Weight Management  Referral; Future    4. Cervical cancer screening  - HPV and Gynecologic Cytology Panel - Recommended Age 30-65 Years  - Gynecologic Cytology (PAP)    5. Routine screening for STI (sexually transmitted infection)  - NEISSERIA GONORRHOEA PCR  - CHLAMYDIA TRACHOMATIS PCR  - Wet prep - Clinic Collect  - HIV Antigen Antibody Combo; Future  - Treponema Abs w Reflex to RPR and Titer; Future  - Hepatitis C antibody; Future  - HIV Antigen Antibody Combo  - Treponema Abs w Reflex to RPR and Titer  - Hepatitis C antibody    6.  Bacterial vaginosis  -Wet prep showed clue cells; metrogel sent to preferred pharmacy.      Shanique Johnson MD

## 2024-12-30 NOTE — PROGRESS NOTES
Mimi is a 49 year old  female who presents for annual exam.     Menses are {GYN PERIOD REGULARITY:715} and {MENSES DESCRIPTION:010840} lasting {1-10:835578} days.  Menses flow: {MENSTRUAL FLOW:6477136}.  No LMP recorded. (Menstrual status: Birth Control).. Using {CONTRACEPTION:745695} for contraception.  She {IS NOT/IS:915030} currently considering pregnancy.  Besides routine health maintenance, Pap, STD/Cholesterol and Is Not Fasting today re:Labs, No Medication Refills/Referrals.  GYNECOLOGIC HISTORY:  Menarche: ***  {AGE AT FIRST/LIFETIME PARTNERS:879455}  Mimi {isnot:804587} sexually active with ***male partner(s) and {isnot:195538} currently in monogamous relationship with ***.    History sexually transmitted infections:{STD AGENTS:383088}  STI testing offered?  {GC/CHLAMYDIA:543964}  SUSAN exposure: {YES/NO/UNKNOWN:724409}  History of abnormal Pap smear: {PAP HX:122355}  Family history of breast CA: {YES/NO:465186}  Family history of uterine/ovarian CA: {YES/NO:276293}    Family history of colon CA: {YES/NO:192220}    HEALTH MAINTENANCE:  Cholesterol: (  Cholesterol   Date Value Ref Range Status   2024 153 <200 mg/dL Final   2023 163 <200 mg/dL Final   10/07/2020 180 <200 mg/dL Final   2019 175 <200 mg/dL Final    History of abnormal lipids: {Yes/No:249334607}  Mammo: *** . History of abnormal Mammo: {YES CAPS/NO SMALL:550415}.  Regular Self Breast Exams: {Yes/No:577018577}  Calcium/Vitamin D intake: source:  {CALCIUM SOURCES:666101} Adequate? {Yes/No:615235085}  TSH: (  TSH   Date Value Ref Range Status   2024 2.89 0.30 - 4.20 uIU/mL Final   2022 1.52 0.40 - 4.00 mU/L Final   2020 0.49 0.40 - 4.00 mU/L Final    )  Pap; (  Lab Results   Component Value Date    PAP NIL 10/30/2020    PAP NIL 10/18/2019    PAP NIL 2016    )    HISTORY:  OB History    Para Term  AB Living   5 3 0 3 2 3   SAB IAB Ectopic Multiple Live Births   0 2 0 0 3      # Outcome  Date GA Lbr Leeroy/2nd Weight Sex Type Anes PTL Lv   5  99 34w0d  2.041 kg (4 lb 8 oz)  CS   FARIDEH      Birth Comments: htn, pih   4  97 36w0d  2.948 kg (6 lb 8 oz)  CS   FARIDEH      Birth Comments: htn, pih   3 IAB 96     IAB   DEC   2 IAB 92     IAB   DEC   1  91 30w0d  1.304 kg (2 lb 14 oz)  CS   FARIDEH      Birth Comments: htn, pih     Past Medical History:   Diagnosis Date    Cervical high risk HPV (human papillomavirus) test positive 2015, 10/2019, 10/30/20    Chronic sinusitis Summer 2016    I get congested every 3-4 weeks    Esophageal reflux     Exophthalmos, unspecified     Hypothyroidism     Motion sickness     PONV (postoperative nausea and vomiting)     Thyroid eye disease     Thyrotoxicosis without mention of goiter or other cause, without mention of thyrotoxic crisis or storm 2005    Had radioablation, On synthroid, seeing N Endocrine; takes synthroid    Unspecified essential hypertension 1980    meds since , on atenolol     Past Surgical History:   Procedure Laterality Date    BIOPSY BREAST Left 2024    Procedure: BREAST EXCISIONAL BIOPSY;  Surgeon: Dena Chase DO;  Location: [facility] OR    COLONOSCOPY N/A 2022    Procedure: COLONOSCOPY;  Surgeon: Marah Souza MD;  Location: [facility] OR    CYSTOSCOPY, INJECT COLLAGEN, COMBINED N/A 2024    Procedure: CYSTOSCOPY, WITH PERIURETHRAL BULKING AGENT INJECTION (look in the bladder and inject filler into the walls of the urethra;  Surgeon: Karin Amin MD;  Location: [facility] OR    CYSTOSCOPY, SLING TRANSVAGINAL N/A 10/10/2017    Procedure: CYSTOSCOPY, SLING TRANSVAGINAL;  Midurethral Sling and Cystoscopy (Support the Urethra with Mesh Sling and Look in the Bladder);  Surgeon: Karin Amin MD;  Location: [facility] OR    ESOPHAGOSCOPY, GASTROSCOPY, DUODENOSCOPY (EGD), COMBINED N/A 3/30/2023    Procedure: Esophagoscopy, gastroscopy, duodenoscopy (EGD), combined;   Surgeon: Janette Chandra MD;  Location: [facility] [place] OR    EYE SURGERY  2008    Orbital Decompression Dr smart    ORBITOTOMY DECOMPRESSION Bilateral 2021    Procedure: Bilateral orbital decompression with  Sonopet, Bilateral lower lid retraction repair;  Surgeon: Niles Donnelly MD;  Location: [facility] OR    REPAIR RETRACTION LID Bilateral 2021    Procedure: REPAIR, RETRACTION, EYELID;  Surgeon: Niles Donnelly MD;  Location: [facility] OR    SONOPET Bilateral 2021    Procedure: SONOPET EYE;  Surgeon: Niles Donnelly MD;  Location: [facility] OR    ZZC  DELIVERY ONLY  91    , Low Cervical    ZZC  DELIVERY ONLY  97    , Low Cervical    ZZC  DELIVERY ONLY  99    , Low Cervical    ZZC INDUCED ABORTN BY D&C      Aspiration & Curettage, TAB x2    ZZHC REPAIR INCISIONAL HERNIA,REDUCIBLE      Umbilical hernia Repair     Family History   Problem Relation Age of Onset    Hypertension Mother     Hypertension Father     Hypertension Maternal Grandmother     Hypertension Maternal Grandfather     Hypertension Paternal Grandmother     Hypertension Paternal Grandfather     Diabetes No family hx of     Glaucoma No family hx of     Macular Degeneration No family hx of      Social History     Socioeconomic History    Marital status: Single   Occupational History    Occupation: [employer]     Employer: [employer]   Tobacco Use    Smoking status: Never     Passive exposure: Past    Smokeless tobacco: Never   Vaping Use    Vaping status: Never Used   Substance and Sexual Activity    Alcohol use: Not Currently     Comment: 1-2x's/month if that.    Drug use: Not Currently     Types: Marijuana     Comment: 3x's/day    Sexual activity: Yes     Partners: Male     Birth control/protection: I.U.D.     Comment: Paraguard IUD,    Other Topics Concern    Parent/sibling w/ CABG, MI or angioplasty before 65F 55M? No   Social History  Narrative    Caffeine intake/servings daily - 0-1, 12 oz of Mountain Dew    Calcium intake/servings daily - eats only cheese    Exercise None    Sunscreen used - Yes    Seatbelts used - Yes    Guns stored in the home - No    Self Breast Exam - No    Pap test up to date -  Yes, as of today    Eye exam up to date -  Yes    Dental exam up to date -  Yes    DEXA scan up to date -  Not Applicable    Flex Sig/Colonoscopy up to date -  Not Applicable    Mammography up to date -  Not Applicable    Immunizations reviewed and up to date - Yes, within the last 10 years    Abuse: Current or Past (Physical, Sexual or Emotional) - No    Do you feel safe in your environment - Yes    Do you cope well with stress - Yes    Do you suffer from insomnia - No    Last updated by: Anu Vinson MA 12/21/2011             Social Drivers of Health     Financial Resource Strain: Low Risk  (11/6/2024)    Financial Resource Strain     Within the past 12 months, have you or your family members you live with been unable to get utilities (heat, electricity) when it was really needed?: No   Food Insecurity: Low Risk  (11/6/2024)    Food Insecurity     Within the past 12 months, did you worry that your food would run out before you got money to buy more?: No     Within the past 12 months, did the food you bought just not last and you didn t have money to get more?: No   Transportation Needs: Low Risk  (11/6/2024)    Transportation Needs     Within the past 12 months, has lack of transportation kept you from medical appointments, getting your medicines, non-medical meetings or appointments, work, or from getting things that you need?: No   Physical Activity: Sufficiently Active (11/6/2024)    Exercise Vital Sign     Days of Exercise per Week: 3 days     Minutes of Exercise per Session: 60 min   Stress: No Stress Concern Present (11/6/2024)    [employer] [place] [employer] - Occupational Stress Questionnaire     Feeling of Stress : Only a  little   Social Connections: Unknown (11/6/2024)    Social Connection and Isolation Panel [NHANES]     Frequency of Social Gatherings with Friends and Family: More than three times a week   Interpersonal Safety: Low Risk  (11/6/2024)    Interpersonal Safety     Do you feel physically and emotionally safe where you currently live?: Yes     Within the past 12 months, have you been hit, slapped, kicked or otherwise physically hurt by someone?: No     Within the past 12 months, have you been humiliated or emotionally abused in other ways by your partner or ex-partner?: No   Housing Stability: Low Risk  (11/6/2024)    Housing Stability     Do you have housing? : Yes     Are you worried about losing your housing?: No       Current Outpatient Medications:     ACETAMINOPHEN PO, Take 1,000 mg by mouth 2 times daily as needed for pain (menstrual cramps), Disp: , Rfl:     benzoyl peroxide (BENZOYL PEROXIDE WASH) 5 % external liquid, WASH areas with follliculist (face and body) 2x daily, Disp: 142 mL, Rfl: 3    clindamycin (CLEOCIN T) 1 % external lotion, Use once daily to the affected areas of the face and body, Disp: 60 mL, Rfl: 5    cyanocobalamin (VITAMIN B-12) 1000 MCG tablet, Take 1 tablet (1,000 mcg) by mouth daily, Disp: 90 tablet, Rfl: 3    fluticasone (FLONASE) 50 MCG/ACT nasal spray, Spray 1 spray into both nostrils daily, Disp: , Rfl:     hydrochlorothiazide (HYDRODIURIL) 25 MG tablet, Take 1 tablet (25 mg) by mouth daily., Disp: 90 tablet, Rfl: 1    hydroCHLOROthiazide 12.5 MG tablet, Take 1 tablet (12.5 mg) by mouth daily. (Patient taking differently: Take 12.5 mg by mouth 2 times daily.), Disp: 90 tablet, Rfl: 3    levothyroxine (SYNTHROID/LEVOTHROID) 100 MCG tablet, Take 1 tablet (100 mcg) by mouth daily., Disp: 90 tablet, Rfl: 3    loratadine (CLARITIN) 10 MG tablet, Take 10 mg by mouth daily, Disp: , Rfl:     losartan (COZAAR) 25 MG tablet, Take 1 tablet (25 mg) by mouth 2 times daily., Disp: 180 tablet, Rfl:  0    metroNIDAZOLE (METROGEL) 0.75 % vaginal gel, Place 1 applicator (5 g) vaginally daily., Disp: 70 g, Rfl: 1    norethindrone (MICRONOR) 0.35 MG tablet, Take 1 tablet (0.35 mg) by mouth daily., Disp: 84 tablet, Rfl: 1    omeprazole (PRILOSEC) 20 MG DR capsule, Take 1 capsule (20 mg) by mouth daily., Disp: 90 capsule, Rfl: 1    triamcinolone (KENALOG) 0.1 % external ointment, Apply topically 2 times daily To affected areas of the body twice a day for one week. Avoid use to the face., Disp: 30 g, Rfl: 1    vitamin D2 (ERGOCALCIFEROL) 56980 units (1250 mcg) capsule, Take 1 capsule (50,000 Units) by mouth once a week, Disp: 12 capsule, Rfl: 0     Allergies   Allergen Reactions    Dust Mites     Lisinopril Swelling     Swelling in lip    Mold      Cannot have any meds that contain mold.       Past medical, surgical, social and family history were reviewed and updated in EPIC.    ROS:   C:     NEGATIVE for fever, chills, change in weight  I:       NEGATIVE for worrisome rashes, moles or lesions  E:     NEGATIVE for vision changes or irritation  E/M: NEGATIVE for ear, mouth and throat problems  R:     NEGATIVE for significant cough or SOB  CV:   NEGATIVE for chest pain, palpitations or peripheral edema  GI:     NEGATIVE for nausea, abdominal pain, heartburn, or change in bowel habits  :   NEGATIVE for frequency, dysuria, hematuria, vaginal discharge, or irregular bleeding  M:     NEGATIVE for significant arthralgias or myalgia  N:      NEGATIVE for weakness, dizziness or paresthesias  E:      NEGATIVE for temperature intolerance, skin/hair changes  P:      NEGATIVE for changes in mood or affect.    EXAM:  There were no vitals taken for this visit.   BMI: There is no height or weight on file to calculate BMI.  Constitutional: healthy, alert and no distress  Head: Normocephalic. No masses, lesions, tenderness or abnormalities  Neck: Neck supple. Trachea midline. No adenopathy. Thyroid symmetric, normal size.  "  Cardiovascular: RRR.   Respiratory: Negative.   Breast: {GYN Breast:996448}  Gastrointestinal: Abdomen soft, non-tender, non-distended. No masses, organomegaly.  :  Vulva:  No external lesions, normal female hair distribution, no inguinal adenopathy.    Urethra:  Midline, non-tender, well supported, no discharge  Vagina:  Moist, pink, no abnormal discharge, no lesions  Uterus:  Normal size, *** , non-tender, freely mobile  Ovaries:  No masses appreciated, non-tender, mobile  Rectal Exam: {RECTAL EXAM:277884}  Musculoskeletal: extremities normal  Skin: no suspicious lesions or rashes  Psychiatric: Affect appropriate, cooperative,mentation appears normal.     COUNSELING:   {FEMALE COUNSELING MESSAGES:801965::\"Reviewed preventive health counseling, as reflected in patient instructions\"}   reports that she has never smoked. She has been exposed to tobacco smoke. She has never used smokeless tobacco.  {Tobacco Cessation -- Delete if patient is a non-smoker:726991}  There is no height or weight on file to calculate BMI.  {Obesity Action Plan -- Delete if BMA < 25:498260}  FRAX Risk Assessment    ASSESSMENT:  49 year old female with satisfactory annual exam  {DIAG PICKLIST:621045}    "

## 2024-12-31 LAB
C TRACH DNA SPEC QL NAA+PROBE: NEGATIVE
HPV HR 12 DNA CVX QL NAA+PROBE: NEGATIVE
HPV16 DNA CVX QL NAA+PROBE: NEGATIVE
HPV18 DNA CVX QL NAA+PROBE: NEGATIVE
HUMAN PAPILLOMA VIRUS FINAL DIAGNOSIS: NORMAL
N GONORRHOEA DNA SPEC QL NAA+PROBE: NEGATIVE

## 2025-01-06 LAB
BKR AP ASSOCIATED HPV REPORT: NORMAL
BKR LAB AP GYN ADEQUACY: NORMAL
BKR LAB AP GYN INTERPRETATION: NORMAL
BKR LAB AP PREVIOUS ABNORMAL: NORMAL
PATH REPORT.COMMENTS IMP SPEC: NORMAL
PATH REPORT.COMMENTS IMP SPEC: NORMAL
PATH REPORT.RELEVANT HX SPEC: NORMAL

## 2025-01-08 ENCOUNTER — VIRTUAL VISIT (OUTPATIENT)
Dept: FAMILY MEDICINE | Facility: CLINIC | Age: 50
End: 2025-01-08
Payer: COMMERCIAL

## 2025-01-08 DIAGNOSIS — I10 ESSENTIAL HYPERTENSION WITH GOAL BLOOD PRESSURE LESS THAN 140/90: ICD-10-CM

## 2025-01-08 DIAGNOSIS — R73.03 PREDIABETES: Primary | ICD-10-CM

## 2025-01-08 PROCEDURE — 98005 SYNCH AUDIO-VIDEO EST LOW 20: CPT | Performed by: FAMILY MEDICINE

## 2025-01-08 RX ORDER — HYDROCHLOROTHIAZIDE 12.5 MG/1
12.5 TABLET ORAL
Qty: 180 TABLET | Refills: 3 | Status: SHIPPED | OUTPATIENT
Start: 2025-01-08 | End: 2026-01-03

## 2025-01-08 RX ORDER — LOSARTAN POTASSIUM 25 MG/1
25 TABLET ORAL 2 TIMES DAILY
Qty: 180 TABLET | Refills: 3 | Status: SHIPPED | OUTPATIENT
Start: 2025-01-08 | End: 2026-01-03

## 2025-01-13 ENCOUNTER — ANCILLARY PROCEDURE (OUTPATIENT)
Dept: ULTRASOUND IMAGING | Facility: CLINIC | Age: 50
End: 2025-01-13
Attending: OBSTETRICS & GYNECOLOGY
Payer: COMMERCIAL

## 2025-01-13 DIAGNOSIS — D25.9 UTERINE LEIOMYOMA, UNSPECIFIED LOCATION: ICD-10-CM

## 2025-01-13 PROCEDURE — 76856 US EXAM PELVIC COMPLETE: CPT | Performed by: OBSTETRICS & GYNECOLOGY

## 2025-01-13 PROCEDURE — 76830 TRANSVAGINAL US NON-OB: CPT | Performed by: OBSTETRICS & GYNECOLOGY

## 2025-01-20 ENCOUNTER — LAB (OUTPATIENT)
Dept: LAB | Facility: CLINIC | Age: 50
End: 2025-01-20
Payer: COMMERCIAL

## 2025-01-20 DIAGNOSIS — I10 ESSENTIAL HYPERTENSION WITH GOAL BLOOD PRESSURE LESS THAN 140/90: ICD-10-CM

## 2025-01-20 LAB
ANION GAP SERPL CALCULATED.3IONS-SCNC: 10 MMOL/L (ref 7–15)
BUN SERPL-MCNC: 8.4 MG/DL (ref 6–20)
CALCIUM SERPL-MCNC: 9.8 MG/DL (ref 8.8–10.4)
CHLORIDE SERPL-SCNC: 102 MMOL/L (ref 98–107)
CREAT SERPL-MCNC: 1.11 MG/DL (ref 0.51–0.95)
EGFRCR SERPLBLD CKD-EPI 2021: 61 ML/MIN/1.73M2
GLUCOSE SERPL-MCNC: 93 MG/DL (ref 70–99)
HCO3 SERPL-SCNC: 27 MMOL/L (ref 22–29)
POTASSIUM SERPL-SCNC: 4.1 MMOL/L (ref 3.4–5.3)
SODIUM SERPL-SCNC: 139 MMOL/L (ref 135–145)

## 2025-01-20 PROCEDURE — 80048 BASIC METABOLIC PNL TOTAL CA: CPT

## 2025-01-20 PROCEDURE — 36415 COLL VENOUS BLD VENIPUNCTURE: CPT

## 2025-01-22 ENCOUNTER — OFFICE VISIT (OUTPATIENT)
Dept: OTOLARYNGOLOGY | Facility: CLINIC | Age: 50
End: 2025-01-22
Payer: COMMERCIAL

## 2025-01-22 DIAGNOSIS — R09.81 CONGESTION OF PARANASAL SINUS: ICD-10-CM

## 2025-01-22 DIAGNOSIS — H57.89 THYROID EYE DISEASE: ICD-10-CM

## 2025-01-22 DIAGNOSIS — E07.9 THYROID EYE DISEASE: ICD-10-CM

## 2025-01-22 DIAGNOSIS — H61.23 BILATERAL IMPACTED CERUMEN: Primary | ICD-10-CM

## 2025-01-22 NOTE — NURSING NOTE
Sino-Nasal Outcome Test (SNOT - 22)    1. Need to Blow Nose: (Patient-Rptd) (P) None;Moderate  2. Nasal Blockage: (Patient-Rptd) (P) None  3. Sneezing: (Patient-Rptd) (P) None  4. Runny Nose: (Patient-Rptd) (P) None;Moderate  5. Cough: (Patient-Rptd) (P) None  6. Post-nasal discharge: (Patient-Rptd) (P) None  7. Thick nasal discharge: (Patient-Rptd) (P) None  8. Ear fullness: (Patient-Rptd) (P) None  9. Dizziness: (Patient-Rptd) (P) None  10. Ear Pain: (Patient-Rptd) (P) None  11. Facial pain/pressure: (Patient-Rptd) (P) None  12. Decreased Sense of Smell/Taste: (Patient-Rptd) (P) None  13. Difficulty falling asleep: (Patient-Rptd) (P) None  14. Wake up at night: (Patient-Rptd) (P) None  15. Lack of a good night's sleep: (Patient-Rptd) (P) None  16. Wake up tired: (Patient-Rptd) (P) Very mild  17. Fatigue: (Patient-Rptd) (P) None  18. Reduced Productivity: (Patient-Rptd) (P) None  19. Reduced Concentration: (Patient-Rptd) (P) None  20. Frustrated/restless/irritable: (Patient-Rptd) (P) None  21. Sad: (Patient-Rptd) (P) None  22. Embarrassed: (Patient-Rptd) (P) None    Total Score: (Patient-Rptd) (P) 1    COPYRIGHT 1996. [facility] [place]

## 2025-01-22 NOTE — LETTER
1/22/2025      Mimi Melton  [address]  [facility] [address]      Dear Colleague,    Thank you for referring your patient, Mimi Melton, to the [facility]. Please see a copy of my visit note below.        ENT FOLLOW UP VISIT NOTE    Patient presents with:  RECHECK: 6 month f/u sinus and ear wax removal. SNOT = 1. Last SNOT =26 . CT Sinus 11/17/22. Feels like sinus congestion has worsened and has started sinus rinses again.        HISTORY OF PRESENT ILLNESS    Mimi was seen in follow up for ear cleaning.    History of orbital decompression for Thyroid Eye Disease    INAL  PREOPERATIVE DIAGNOSIS:  Thyroid eye disease.    POSTOPERATIVE DIAGNOSIS:  Thyroid eye disease.    PROCEDURE:  Bilateral medial orbital wall decompression.    SURGEON:  Francisco J Rubalcava MD, attending.    INDICATIONS:  Mimi Melton is a 32-year-old lady with a history of thyroid eye disease.  She was evaluated by Ophthalmology Service and felt to be a good candidate for decompression.  The CT scan was reviewed and found her ethmoid sinuses to be clear and the septum to be in midline.  She is also undergoing a lateral orbital wall decompression by Dr. Donnelly, which will be found in a separate dictation.    FINDINGS:  A 3.75-mm length defects bilaterally postoperatively with good decompression bilaterally.    DESCRIPTION OF PROCEDURE:  The patient was placed in the supine position and given general endotracheal anesthesia.  4% cocaine was placed in each nostril and she was prepped and draped in sterile fashion.  Under endoscopic visualization, 1% lidocaine with 1:100,000 epinephrine was injected in the area of the anterior ethmoid artery and nerve, the sphenopalatine artery and nerve and the anterior wall of the sphenoid and septum bilaterally.  Cocaine was replaced and this was allowed to take effect over approximately 4-5 minutes.  Dissection was begun on the left side.  The middle turbinate,  anterior ethmoid bulla and the uncinate process were identified, an uncinectomy was performed and a maxillary antrostomy was identified and enlarged in a posterior/inferior direction.  A complete anterior/posterior ethmoidectomy was performed.  The depth of the ethmoidectomy was confirmed by endoscopic measurements, both transnasal and transethmoid.  The mucosa of the lamina papyracea was removed, then the lamina papyracea was opened, the presence of the periorbita was identified with palpation.  The lamina papyracea was then opened for a length of 3.75 cm and a maximum height of 1.5 cm.  Once this had been completed, an incision was made into the periorbita to allow decompression of the orbital contents into the ethmoid area.  The frontal sinus was probed and thought to be patent and the maxillary sinus was probed and found to be patent.  4% cocaine was placed into the ethmoid cavity and dissection was turned to the right side.       Examination identified the anterior ethmoid bulla, uncinate process and middle turbinate.  An uncinectomy was performed and the maxillary sinus ostium was identified and enlarged in a posterior/inferior direction.  A complete anterior ethmoidectomy and posterior ethmoidectomy was performed and the location of the sphenoid sinus was identified and measured both transnasally and transethmoid.  The area of the lamina papyracea was then identified, a small portion was removed and with palpation, the periorbita was identified.  Then, the lamina papyracea was removed for a defect with a maximum height of 1.5 cm and a length of 3.75 cm.  Once this had been completed, an incision was made into the periorbit to allow decompression of orbital contents into the ethmoid cavity.  The maxillary sinus ostium and frontal sinus duct were identified and found to be patent.  4% cocaine was placed into the osteomeatal complex.  The patient was then turned over to the care of Dr. Donnelly, who performed a  lateral orbital wall decompression; this would be found on a separate dictation.  The cocaine packs will be removed at the end of the procedure.      Electronically signed on 2008 10:39 by    ARASH FELDMAN MD          D: 2008 09:10   T: 2008 11:00   MT: APOLONIA    Name:     JUSTO CALIXTO  MRN:      [id number]        Account:        [id number]  :      1975           Procedure Date: 2008    Document: [id number]      cc: Sandhya Yates MD        Additional Documentation    Encounter Info: Billing Info,     History,     Allergies,     Detailed Report     Encounter Information    Encounter Information   Provider Department Encounter # Center   2008 11:01 AM Arash Feldman [facility] [id number]          Sino-Nasal Outcome Test (SNOT - 22)    1. Need to Blow Nose: (Patient-Rptd) (P) None;Moderate  2. Nasal Blockage: (Patient-Rptd) (P) None  3. Sneezing: (Patient-Rptd) (P) None  4. Runny Nose: (Patient-Rptd) (P) None;Moderate  5. Cough: (Patient-Rptd) (P) None  6. Post-nasal discharge: (Patient-Rptd) (P) None  7. Thick nasal discharge: (Patient-Rptd) (P) None  8. Ear fullness: (Patient-Rptd) (P) None  9. Dizziness: (Patient-Rptd) (P) None  10. Ear Pain: (Patient-Rptd) (P) None  11. Facial pain/pressure: (Patient-Rptd) (P) None  12. Decreased Sense of Smell/Taste: (Patient-Rptd) (P) None  13. Difficulty falling asleep: (Patient-Rptd) (P) None  14. Wake up at night: (Patient-Rptd) (P) None  15. Lack of a good night's sleep: (Patient-Rptd) (P) None  16. Wake up tired: (Patient-Rptd) (P) Very mild  17. Fatigue: (Patient-Rptd) (P) None  18. Reduced Productivity: (Patient-Rptd) (P) None  19. Reduced Concentration: (Patient-Rptd) (P) None  20. Frustrated/restless/irritable: (Patient-Rptd) (P) None  21. Sad: (Patient-Rptd) (P) None  22. Embarrassed: (Patient-Rptd) (P) None    Total Score: (Patient-Rptd) (P) 1    COPYRIGHT . [facility] [place] .  [place], [place]         ALLERGIES    Dust mites, Lisinopril, and Mold    CURRENT MEDICATIONS      Current Outpatient Medications:      ACETAMINOPHEN PO, Take 1,000 mg by mouth 2 times daily as needed for pain (menstrual cramps), Disp: , Rfl:      benzoyl peroxide (BENZOYL PEROXIDE WASH) 5 % external liquid, WASH areas with follliculist (face and body) 2x daily, Disp: 142 mL, Rfl: 3     clindamycin (CLEOCIN T) 1 % external lotion, Use once daily to the affected areas of the face and body, Disp: 60 mL, Rfl: 5     hydroCHLOROthiazide 12.5 MG tablet, Take 1 tablet (12.5 mg) by mouth 2 times daily., Disp: 180 tablet, Rfl: 3     levothyroxine (SYNTHROID/LEVOTHROID) 100 MCG tablet, Take 1 tablet (100 mcg) by mouth daily., Disp: 90 tablet, Rfl: 3     loratadine (CLARITIN) 10 MG tablet, Take 10 mg by mouth daily, Disp: , Rfl:      losartan (COZAAR) 25 MG tablet, Take 1 tablet (25 mg) by mouth 2 times daily., Disp: 180 tablet, Rfl: 3     norethindrone (MICRONOR) 0.35 MG tablet, Take 1 tablet (0.35 mg) by mouth daily., Disp: 84 tablet, Rfl: 4     omeprazole (PRILOSEC) 20 MG DR capsule, Take 1 capsule (20 mg) by mouth daily., Disp: 90 capsule, Rfl: 1     Probiotic Product (PROBIOTIC BLEND PO), Take by mouth daily., Disp: , Rfl:      triamcinolone (KENALOG) 0.1 % external ointment, Apply topically 2 times daily To affected areas of the body twice a day for one week. Avoid use to the face., Disp: 30 g, Rfl: 1     vitamin D2 (ERGOCALCIFEROL) 66928 units (1250 mcg) capsule, Take 1 capsule (50,000 Units) by mouth once a week, Disp: 12 capsule, Rfl: 0     fluticasone (FLONASE) 50 MCG/ACT nasal spray, Spray 1 spray into both nostrils daily (Patient not taking: Reported on 1/22/2025), Disp: , Rfl:      PAST MEDICAL HISTORY    PAST MEDICAL HISTORY:   Past Medical History:   Diagnosis Date     Cervical high risk HPV (human papillomavirus) test positive 12/2015 12/2015, 10/2019, 10/30/20     Chronic sinusitis Summer 2016    I get  congested every 3-4 weeks     Esophageal reflux      Exophthalmos, unspecified      Hypothyroidism      Motion sickness      PONV (postoperative nausea and vomiting)      Thyroid eye disease      Thyrotoxicosis without mention of goiter or other cause, without mention of thyrotoxic crisis or storm 03/2005    Had radioablation, On synthroid, seeing [facility]; takes synthroid     Unspecified essential hypertension 1980    meds since 2001, on atenolol       PAST SURGICAL HISTORY    PAST SURGICAL HISTORY:   Past Surgical History:   Procedure Laterality Date     BIOPSY BREAST Left 5/17/2024    Procedure: BREAST EXCISIONAL BIOPSY;  Surgeon: Dena Chase DO;  Location: [facility]     COLONOSCOPY N/A 12/27/2022    Procedure: COLONOSCOPY;  Surgeon: Marah Souza MD;  Location: [facility] OR     CYSTOSCOPY, INJECT COLLAGEN, COMBINED N/A 2/6/2024    Procedure: CYSTOSCOPY, WITH PERIURETHRAL BULKING AGENT INJECTION (look in the bladder and inject filler into the walls of the urethra;  Surgeon: Karin Amin MD;  Location: [facility] [place] OR     CYSTOSCOPY, SLING TRANSVAGINAL N/A 10/10/2017    Procedure: CYSTOSCOPY, SLING TRANSVAGINAL;  Midurethral Sling and Cystoscopy (Support the Urethra with Mesh Sling and Look in the Bladder);  Surgeon: Karin Amin MD;  [facility] OR     ESOPHAGOSCOPY, GASTROSCOPY, DUODENOSCOPY (EGD), COMBINED N/A 3/30/2023    Procedure: Esophagoscopy, gastroscopy, duodenoscopy (EGD), combined;  Surgeon: Janette Chandra MD;  Location: [facility] [place] OR     EYE SURGERY  6/6/2008    Orbital Decompression Dr smart     ORBITOTOMY DECOMPRESSION Bilateral 11/5/2021    Procedure: Bilateral orbital decompression with  Sonopet, Bilateral lower lid retraction repair;  Surgeon: Niles Donnelly MD;  Location: [facility] OR     REPAIR RETRACTION LID Bilateral 11/5/2021    Procedure: REPAIR, RETRACTION, EYELID;  Surgeon: Nilse Donnelly MD;  Location: [facility] OR     SONOPET Bilateral 11/5/2021    Procedure:  ARMIN EYE;  Surgeon: Niles Donnelly MD;  Location: [facility] OR     ZZC  DELIVERY ONLY  91    , Low Cervical     ZZC  DELIVERY ONLY  97    , Low Cervical     ZZC  DELIVERY ONLY  99    , Low Cervical     ZZC INDUCED ABORTN BY D&C      Aspiration & Curettage, TAB x2     ZZHC REPAIR INCISIONAL HERNIA,REDUCIBLE      Umbilical hernia Repair       FAMILY  HISTORY    FAMILY HISTORY:   Family History   Problem Relation Age of Onset     Hypertension Mother      Hypertension Father      Hypertension Maternal Grandmother      Hypertension Maternal Grandfather      Hypertension Paternal Grandmother      Hypertension Paternal Grandfather      Diabetes No family hx of      Glaucoma No family hx of      Macular Degeneration No family hx of        SOCIAL HISTORY    SOCIAL HISTORY:   Social History     Tobacco Use     Smoking status: Never     Passive exposure: Past     Smokeless tobacco: Never   Substance Use Topics     Alcohol use: Not Currently     Comment: 1-2x's/month if that.        PHYSICAL EXAM    HEAD: Normal appearance and symmetry:  No cutaneous lesions.      NECK:  supple     EARS:  Auricles normal without lesions     CERUMEN IMPACTION REMOVAL    Findings:  R/bilaterally Cerumen Impactions    After obtaining verbal consent, and using the binocular microscope.  Cerumen impaction(s) were removed from the affected ear canal(s)  using a wire loops and/or suction.  The patient tolerated the procedure well without incident.      EYES:  EOMI    CN VII/XII:  intact     NOSE:  patent; ITH 2.5+        ORAL CAVITY/OROPHARYNX:     Lips:  Normal.  Tongue: normal, midline  Mucosa:   no lesions     NECK:  Trachea:  midline.        NEURO:   Alert and Oriented        RESPIRATORY:   Symmetry and Respiratory effort     PSYCH:  Normal mood and affect     SKIN:   warm and dry         IMPRESSION:    Encounter Diagnoses   Name Primary?     Bilateral impacted cerumen Yes      Congestion of paranasal sinus        RECOMMENDATIONS:    Orders Placed This Encounter   Procedures     Remove Cerumen     Continue sinus irrigations  Return visit 9 months ear cleaning       Again, thank you for allowing me to participate in the care of your patient.        Sincerely,        Mariusz Sahu MD    Electronically signed

## 2025-01-22 NOTE — PROGRESS NOTES
ENT FOLLOW UP VISIT NOTE    Patient presents with:  RECHECK: 6 month f/u sinus and ear wax removal. SNOT = 1. Last SNOT =26 . CT Sinus 11/17/22. Feels like sinus congestion has worsened and has started sinus rinses again.        HISTORY OF PRESENT ILLNESS    Mimi was seen in follow up for ear cleaning.    History of orbital decompression for Thyroid Eye Disease    INAL  PREOPERATIVE DIAGNOSIS:  Thyroid eye disease.    POSTOPERATIVE DIAGNOSIS:  Thyroid eye disease.    PROCEDURE:  Bilateral medial orbital wall decompression.    SURGEON:  Francisco J Rubalcava MD, attending.    INDICATIONS:  Mimi Melton is a 32-year-old lady with a history of thyroid eye disease.  She was evaluated by Ophthalmology Service and felt to be a good candidate for decompression.  The CT scan was reviewed and found her ethmoid sinuses to be clear and the septum to be in midline.  She is also undergoing a lateral orbital wall decompression by Dr. Donnelly, which will be found in a separate dictation.    FINDINGS:  A 3.75-mm length defects bilaterally postoperatively with good decompression bilaterally.    DESCRIPTION OF PROCEDURE:  The patient was placed in the supine position and given general endotracheal anesthesia.  4% cocaine was placed in each nostril and she was prepped and draped in sterile fashion.  Under endoscopic visualization, 1% lidocaine with 1:100,000 epinephrine was injected in the area of the anterior ethmoid artery and nerve, the sphenopalatine artery and nerve and the anterior wall of the sphenoid and septum bilaterally.  Cocaine was replaced and this was allowed to take effect over approximately 4-5 minutes.  Dissection was begun on the left side.  The middle turbinate, anterior ethmoid bulla and the uncinate process were identified, an uncinectomy was performed and a maxillary antrostomy was identified and enlarged in a posterior/inferior direction.  A complete anterior/posterior ethmoidectomy was performed.  The  depth of the ethmoidectomy was confirmed by endoscopic measurements, both transnasal and transethmoid.  The mucosa of the lamina papyracea was removed, then the lamina papyracea was opened, the presence of the periorbita was identified with palpation.  The lamina papyracea was then opened for a length of 3.75 cm and a maximum height of 1.5 cm.  Once this had been completed, an incision was made into the periorbita to allow decompression of the orbital contents into the ethmoid area.  The frontal sinus was probed and thought to be patent and the maxillary sinus was probed and found to be patent.  4% cocaine was placed into the ethmoid cavity and dissection was turned to the right side.       Examination identified the anterior ethmoid bulla, uncinate process and middle turbinate.  An uncinectomy was performed and the maxillary sinus ostium was identified and enlarged in a posterior/inferior direction.  A complete anterior ethmoidectomy and posterior ethmoidectomy was performed and the location of the sphenoid sinus was identified and measured both transnasally and transethmoid.  The area of the lamina papyracea was then identified, a small portion was removed and with palpation, the periorbita was identified.  Then, the lamina papyracea was removed for a defect with a maximum height of 1.5 cm and a length of 3.75 cm.  Once this had been completed, an incision was made into the periorbit to allow decompression of orbital contents into the ethmoid cavity.  The maxillary sinus ostium and frontal sinus duct were identified and found to be patent.  4% cocaine was placed into the osteomeatal complex.  The patient was then turned over to the care of Dr. Donnelly, who performed a lateral orbital wall decompression; this would be found on a separate dictation.  The cocaine packs will be removed at the end of the procedure.      Electronically signed on 06/09/2008 10:39 by    ARASH FELDMAN MD          D: 06/06/2008 09:10    T: 2008 11:00   MT: APOLONIA    Name:     JUSTO CALIXTO  MRN:      [id number]        Account:        [id number]  :      1975           Procedure Date: 2008    Document: [id number]      cc: Sandhya Yates MD        Additional Documentation    Encounter Info: Billing Info,     History,     Allergies,     Detailed Report     Encounter Information    Encounter Information   Provider Department Encounter # Center   2008 11:01 AM Francisco J Rubalcava [facility] [id number]          Sino-Nasal Outcome Test (SNOT - 22)    1. Need to Blow Nose: (Patient-Rptd) (P) None;Moderate  2. Nasal Blockage: (Patient-Rptd) (P) None  3. Sneezing: (Patient-Rptd) (P) None  4. Runny Nose: (Patient-Rptd) (P) None;Moderate  5. Cough: (Patient-Rptd) (P) None  6. Post-nasal discharge: (Patient-Rptd) (P) None  7. Thick nasal discharge: (Patient-Rptd) (P) None  8. Ear fullness: (Patient-Rptd) (P) None  9. Dizziness: (Patient-Rptd) (P) None  10. Ear Pain: (Patient-Rptd) (P) None  11. Facial pain/pressure: (Patient-Rptd) (P) None  12. Decreased Sense of Smell/Taste: (Patient-Rptd) (P) None  13. Difficulty falling asleep: (Patient-Rptd) (P) None  14. Wake up at night: (Patient-Rptd) (P) None  15. Lack of a good night's sleep: (Patient-Rptd) (P) None  16. Wake up tired: (Patient-Rptd) (P) Very mild  17. Fatigue: (Patient-Rptd) (P) None  18. Reduced Productivity: (Patient-Rptd) (P) None  19. Reduced Concentration: (Patient-Rptd) (P) None  20. Frustrated/restless/irritable: (Patient-Rptd) (P) None  21. Sad: (Patient-Rptd) (P) None  22. Embarrassed: (Patient-Rptd) (P) None    Total Score: (Patient-Rptd) (P) 1    COPYRIGHT . [facility]         ALLERGIES    Dust mites, Lisinopril, and Mold    CURRENT MEDICATIONS      Current Outpatient Medications:     ACETAMINOPHEN PO, Take 1,000 mg by mouth 2 times daily as needed for pain (menstrual cramps), Disp: , Rfl:     benzoyl  peroxide (BENZOYL PEROXIDE WASH) 5 % external liquid, WASH areas with follliculist (face and body) 2x daily, Disp: 142 mL, Rfl: 3    clindamycin (CLEOCIN T) 1 % external lotion, Use once daily to the affected areas of the face and body, Disp: 60 mL, Rfl: 5    hydroCHLOROthiazide 12.5 MG tablet, Take 1 tablet (12.5 mg) by mouth 2 times daily., Disp: 180 tablet, Rfl: 3    levothyroxine (SYNTHROID/LEVOTHROID) 100 MCG tablet, Take 1 tablet (100 mcg) by mouth daily., Disp: 90 tablet, Rfl: 3    loratadine (CLARITIN) 10 MG tablet, Take 10 mg by mouth daily, Disp: , Rfl:     losartan (COZAAR) 25 MG tablet, Take 1 tablet (25 mg) by mouth 2 times daily., Disp: 180 tablet, Rfl: 3    norethindrone (MICRONOR) 0.35 MG tablet, Take 1 tablet (0.35 mg) by mouth daily., Disp: 84 tablet, Rfl: 4    omeprazole (PRILOSEC) 20 MG DR capsule, Take 1 capsule (20 mg) by mouth daily., Disp: 90 capsule, Rfl: 1    Probiotic Product (PROBIOTIC BLEND PO), Take by mouth daily., Disp: , Rfl:     triamcinolone (KENALOG) 0.1 % external ointment, Apply topically 2 times daily To affected areas of the body twice a day for one week. Avoid use to the face., Disp: 30 g, Rfl: 1    vitamin D2 (ERGOCALCIFEROL) 05659 units (1250 mcg) capsule, Take 1 capsule (50,000 Units) by mouth once a week, Disp: 12 capsule, Rfl: 0    fluticasone (FLONASE) 50 MCG/ACT nasal spray, Spray 1 spray into both nostrils daily (Patient not taking: Reported on 1/22/2025), Disp: , Rfl:      PAST MEDICAL HISTORY    PAST MEDICAL HISTORY:   Past Medical History:   Diagnosis Date    Cervical high risk HPV (human papillomavirus) test positive 12/2015 12/2015, 10/2019, 10/30/20    Chronic sinusitis Summer 2016    I get congested every 3-4 weeks    Esophageal reflux     Exophthalmos, unspecified     Hypothyroidism     Motion sickness     PONV (postoperative nausea and vomiting)     Thyroid eye disease     Thyrotoxicosis without mention of goiter or other cause, without mention of  thyrotoxic crisis or storm 2005    Had radioablation, On synthroid, seeing [facility]; takes synthroid    Unspecified essential hypertension 1980    meds since , on atenolol       PAST SURGICAL HISTORY    PAST SURGICAL HISTORY:   Past Surgical History:   Procedure Laterality Date    BIOPSY BREAST Left 2024    Procedure: BREAST EXCISIONAL BIOPSY;  Surgeon: Dena Chase DO;  Location: [facility]    COLONOSCOPY N/A 2022    Procedure: COLONOSCOPY;  Surgeon: Marah Souza MD;  Location: [facility]    CYSTOSCOPY, INJECT COLLAGEN, COMBINED N/A 2024    Procedure: CYSTOSCOPY, WITH PERIURETHRAL BULKING AGENT INJECTION (look in the bladder and inject filler into the walls of the urethra;  Surgeon: Karin Amin MD;  Location: [facility] OR    CYSTOSCOPY, SLING TRANSVAGINAL N/A 10/10/2017    Procedure: CYSTOSCOPY, SLING TRANSVAGINAL;  Midurethral Sling and Cystoscopy (Support the Urethra with Mesh Sling and Look in the Bladder);  Surgeon: Karin Amin MD;  [facility]    ESOPHAGOSCOPY, GASTROSCOPY, DUODENOSCOPY (EGD), COMBINED N/A 3/30/2023    Procedure: Esophagoscopy, gastroscopy, duodenoscopy (EGD), combined;  Surgeon: Janette Chandra MD;  Location: [facility] OR    EYE SURGERY  2008    Orbital Decompression Dr smart    ORBITOTOMY DECOMPRESSION Bilateral 2021    Procedure: Bilateral orbital decompression with  Sonopet, Bilateral lower lid retraction repair;  Surgeon: Niles Donnelly MD;  Location: [facility] OR    REPAIR RETRACTION LID Bilateral 2021    Procedure: REPAIR, RETRACTION, EYELID;  Surgeon: Nlies Donnelly MD;  Location: [facility] OR    SONOPET Bilateral 2021    Procedure: SONOPET EYE;  Surgeon: Niles Donnelly MD;  Location: [facility] OR    ZZC  DELIVERY ONLY  91    , Low Cervical    ZZC  DELIVERY ONLY  97    , Low Cervical    ZZC  DELIVERY ONLY  99    , Low Cervical    ZZC INDUCED ABORTN BY  D&C      Aspiration & Curettage, TAB x2    ZZHC REPAIR INCISIONAL HERNIA,REDUCIBLE      Umbilical hernia Repair       FAMILY  HISTORY    FAMILY HISTORY:   Family History   Problem Relation Age of Onset    Hypertension Mother     Hypertension Father     Hypertension Maternal Grandmother     Hypertension Maternal Grandfather     Hypertension Paternal Grandmother     Hypertension Paternal Grandfather     Diabetes No family hx of     Glaucoma No family hx of     Macular Degeneration No family hx of        SOCIAL HISTORY    SOCIAL HISTORY:   Social History     Tobacco Use    Smoking status: Never     Passive exposure: Past    Smokeless tobacco: Never   Substance Use Topics    Alcohol use: Not Currently     Comment: 1-2x's/month if that.        PHYSICAL EXAM    HEAD: Normal appearance and symmetry:  No cutaneous lesions.      NECK:  supple     EARS:  Auricles normal without lesions     CERUMEN IMPACTION REMOVAL    Findings:  R/bilaterally Cerumen Impactions    After obtaining verbal consent, and using the binocular microscope.  Cerumen impaction(s) were removed from the affected ear canal(s)  using a wire loops and/or suction.  The patient tolerated the procedure well without incident.      EYES:  EOMI    CN VII/XII:  intact     NOSE:  patent; ITH 2.5+        ORAL CAVITY/OROPHARYNX:     Lips:  Normal.  Tongue: normal, midline  Mucosa:   no lesions     NECK:  Trachea:  midline.        NEURO:   Alert and Oriented        RESPIRATORY:   Symmetry and Respiratory effort     PSYCH:  Normal mood and affect     SKIN:   warm and dry         IMPRESSION:    Encounter Diagnoses   Name Primary?    Bilateral impacted cerumen Yes    Congestion of paranasal sinus        RECOMMENDATIONS:    Orders Placed This Encounter   Procedures    Remove Cerumen     Continue sinus irrigations  Return visit 9 months ear cleaning

## 2025-01-24 ENCOUNTER — HOSPITAL ENCOUNTER (OUTPATIENT)
Dept: CT IMAGING | Facility: CLINIC | Age: 50
Discharge: HOME | End: 2025-01-24
Attending: FAMILY MEDICINE | Admitting: FAMILY MEDICINE
Payer: COMMERCIAL

## 2025-01-24 DIAGNOSIS — E78.5 HYPERLIPIDEMIA LDL GOAL <100: ICD-10-CM

## 2025-01-24 LAB
CV CALCIUM SCORE AGATSTON LM: 0
CV CALCIUM SCORING AGATSON LAD: 0
CV CALCIUM SCORING AGATSTON CX: 0
CV CALCIUM SCORING AGATSTON RCA: 0
CV CALCIUM SCORING AGATSTON TOTAL: 0

## 2025-01-24 PROCEDURE — 75571 CT HRT W/O DYE W/CA TEST: CPT

## 2025-01-24 PROCEDURE — 75571 CT HRT W/O DYE W/CA TEST: CPT | Mod: 26 | Performed by: INTERNAL MEDICINE

## 2025-01-27 ENCOUNTER — TELEPHONE (OUTPATIENT)
Dept: GASTROENTEROLOGY | Facility: CLINIC | Age: 50
End: 2025-01-27
Payer: COMMERCIAL

## 2025-01-27 NOTE — TELEPHONE ENCOUNTER
"Endoscopy Scheduling Screen    Have you had any respiratory illness or flu-like symptoms in the last 10 days?  No    What is your communication preference for Instructions and/or Bowel Prep?   MyChart    What insurance is in the chart?  Other:  [employer]    Ordering/Referring Provider: Murphy Castro PA-C     (If ordering provider performs procedure, schedule with ordering provider unless otherwise instructed. )    BMI: Estimated body mass index is 33.69 kg/m  as calculated from the following:    Height as of 11/25/24: 1.626 m (5' 4\").    Weight as of 12/30/24: 89 kg (196 lb 4.8 oz).     Sedation Ordered  MAC/deep sedation.   BMI<= 45 45 < BMI <= 48 48 < BMI < = 50  BMI > 50   No Restrictions No [facility]  No [facility]  [facility] with exceptions Hospital Only OR Only       Do you have a history of malignant hyperthermia?  No    (Females) Are you currently pregnant?   No     Have you been diagnosed or told you have pulmonary hypertension?   No    Do you have an LVAD?  No    Have you been told you have moderate to severe sleep apnea?  No.    Have you been told you have COPD, asthma, or any other lung disease?  No    Do you have any heart conditions?  No     Have you ever had or are you waiting for an organ transplant?  No. Continue scheduling, no site restrictions.    Have you had a stroke or transient ischemic attack (TIA aka \"mini stroke\" in the last 6 months?   No    Have you been diagnosed with or been told you have cirrhosis of the liver?   No.    Are you currently on dialysis?   No    Do you need assistance transferring?   No    BMI: Estimated body mass index is 33.69 kg/m  as calculated from the following:    Height as of 11/25/24: 1.626 m (5' 4\").    Weight as of 12/30/24: 89 kg (196 lb 4.8 oz).     Is patients BMI > 40 and scheduling location [facility]?  No    Do you take an injectable or oral medication for weight loss or diabetes (excluding insulin)?  No    Do you take the medication Naltrexone?  No    Do you take " blood thinners?  No       Prep   Are you currently on dialysis or do you have chronic kidney disease?  No    Do you have a diagnosis of diabetes?  No    Do you have a diagnosis of cystic fibrosis (CF)?  No    On a regular basis do you go 3 -5 days between bowel movements?  No    BMI > 40?  No    Preferred Pharmacy:      [id number] - [place], [place] - [address]  [address]  [place] [address]  Phone: [phone] Fax: [phone]      Final Scheduling Details     Procedure scheduled  Colonoscopy / Upper endoscopy (EGD)    Surgeon:  Lauren     Date of procedure:  05/30/2025     Pre-OP / PAC:   No - Not required for this site.    Location  [facility] - Patient preference.    Sedation   MAC/Deep Sedation - Per order.      Patient Reminders:   You will receive a call from a Nurse to review instructions and health history.  This assessment must be completed prior to your procedure.  Failure to complete the Nurse assessment may result in the procedure being cancelled.      On the day of your procedure, please designate an adult(s) who can drive you home stay with you for the next 24 hours. The medicines used in the exam will make you sleepy. You will not be able to drive.      You cannot take public transportation, ride share services, or non-medical taxi service without a responsible caregiver.  Medical transport services are allowed with the requirement that a responsible caregiver will receive you at your destination.  We require that drivers and caregivers are confirmed prior to your procedure.

## 2025-02-05 ENCOUNTER — TELEPHONE (OUTPATIENT)
Dept: NEPHROLOGY | Facility: CLINIC | Age: 50
End: 2025-02-05
Payer: COMMERCIAL

## 2025-02-05 NOTE — TELEPHONE ENCOUNTER
Left Voicemail (1st Attempt) and Sent Mychart (1st Attempt) for the patient to call back and schedule the following:    Appointment type: Return Nephrology Virtual  Provider: Dr. Shen  Return date: next avail or next avail [facility]  Specialty phone number: [phone]  Additional appointment(s) needed: labs prior  Additonal Notes: 3/6 resched

## 2025-03-03 ENCOUNTER — MYC MEDICAL ADVICE (OUTPATIENT)
Dept: OBGYN | Facility: CLINIC | Age: 50
End: 2025-03-03

## 2025-03-03 DIAGNOSIS — N89.8 VAGINAL IRRITATION: Primary | ICD-10-CM

## 2025-03-03 DIAGNOSIS — N76.0 BV (BACTERIAL VAGINOSIS): ICD-10-CM

## 2025-03-03 DIAGNOSIS — B96.89 BV (BACTERIAL VAGINOSIS): ICD-10-CM

## 2025-03-04 DIAGNOSIS — I12.9 BENIGN HYPERTENSION WITH CKD (CHRONIC KIDNEY DISEASE), STAGE II: Primary | ICD-10-CM

## 2025-03-04 DIAGNOSIS — N18.2 BENIGN HYPERTENSION WITH CKD (CHRONIC KIDNEY DISEASE), STAGE II: Primary | ICD-10-CM

## 2025-03-06 ENCOUNTER — LAB (OUTPATIENT)
Dept: LAB | Facility: CLINIC | Age: 50
End: 2025-03-06
Payer: COMMERCIAL

## 2025-03-06 DIAGNOSIS — N89.8 VAGINAL IRRITATION: ICD-10-CM

## 2025-03-06 RX ORDER — METRONIDAZOLE 500 MG/1
500 TABLET ORAL 2 TIMES DAILY
Qty: 14 TABLET | Refills: 0 | OUTPATIENT
Start: 2025-03-06 | End: 2025-03-13

## 2025-03-10 ENCOUNTER — OFFICE VISIT (OUTPATIENT)
Dept: OPHTHALMOLOGY | Facility: CLINIC | Age: 50
End: 2025-03-10
Payer: COMMERCIAL

## 2025-03-10 DIAGNOSIS — E07.9 THYROID EYE DISEASE: Primary | ICD-10-CM

## 2025-03-10 DIAGNOSIS — H05.249 CONSTANT EXOPHTHALMOS, UNSPECIFIED LATERALITY: ICD-10-CM

## 2025-03-10 DIAGNOSIS — H57.89 THYROID EYE DISEASE: Primary | ICD-10-CM

## 2025-03-10 ASSESSMENT — VISUAL ACUITY
OS_CC: 20/25
METHOD: SNELLEN - LINEAR
OD_CC: 20/20
CORRECTION_TYPE: CONTACTS
OS_CC+: +1
OD_CC+: -2

## 2025-03-10 ASSESSMENT — SLIT LAMP EXAM - LIDS
COMMENTS: INF SCLERAL SHOW
COMMENTS: INF SCLERAL SHOW

## 2025-03-10 ASSESSMENT — MARGIN REFLEX DISTANCE
OS_MRD1: 3
OD_MRD1: 3
OS_MRD2: 8
OD_MRD2: 8

## 2025-03-10 ASSESSMENT — TONOMETRY
IOP_METHOD: ICARE
OD_IOP_MMHG: 17
OS_IOP_MMHG: 20

## 2025-03-10 ASSESSMENT — EXTERNAL EXAM - RIGHT EYE: OD_EXAM: EXOPHTHALMOS

## 2025-03-10 ASSESSMENT — EXTERNAL EXAM - LEFT EYE: OS_EXAM: EXOPHTHALMOS

## 2025-03-10 NOTE — PROGRESS NOTES
Chief Complaints and History of Present Illnesses   Patient presents with    Follow Up     1 year follow up Thyroid eye disease     Chief Complaint(s) and History of Present Illness(es)     Follow Up    Associated symptoms include Negative for double vision, eye pain, redness,   flashes and floaters.  Treatments tried include artificial tears.  Pain   was noted as 0/10. Additional comments: 1 year follow up Thyroid eye   disease    Comments    Pt states lately harder to see small print.   Denies eye pain, irritation or diplopia.   She is using AT PRN for dryness.   No new concerns today.    Gordo Briggs 8:38 AM March 10, 2025         Assessment & Plan     Mimi MASHA Melton is a 49 year old female with the following diagnoses:   1. Thyroid eye disease    2. Constant exophthalmos, unspecified laterality       Thyroid eye disease - stable   1. Spontaneous orbital pain.                                                      0  2. Gaze evoked orbital pain.                                                      0  3. Eyelid swelling due to active thyroid eye disease                  0  4. Eyelid erythema.                                                                    0  5. Conjunctival redness due to active thyroid eye disease .      0  6. Chemosis.                                                                              0  7. Inflammation of caruncle OR plica.                                        0     Patients assessed after follow-up can be scored out of 10 by  including items 8-10.     8. Increase of > 2mm in proptosis.                                            0           9. Decrease in uniocular excursion in any direction of > 8 .     0  10. Decrease of acuity equivalent to 1 Snellen line.                 0     JAREN SCORE = 0/10     Most recent TSI (10/2023) improved compared to prior measurements.  Most recent TSH wnl.     Chronic, inactive Thyroid eye disease  S/p bilateral decompression, bilateral lower lid  retraction repair 2021    No recent changes to vision or new, bothersome ocular symptoms.  Mild improvement in proptosis each eye. No changes in lid position.   No new surface inflammation.     Continue AT BID each eye   Patient would like to follow with us yearly        Lynn Reza MD  Oculoplastic Surgery Fellow    Attending Physician Attestation:  I have seen and examined this patient with the fellow .  I have confirmed and edited as necessary the chief complaint(s), history of present illness, review of systems, relevant history, and examination findings as documented by others.  I have personally reviewed the relevant tests, images, and reports as documented above.  I have confirmed and edited as necessary the assessment and plan and agree with this note.    - Niles Donnelly MD 9:03 AM 3/10/2025

## 2025-03-10 NOTE — NURSING NOTE
Chief Complaints and History of Present Illnesses   Patient presents with    Follow Up     1 year follow up Thyroid eye disease     Chief Complaint(s) and History of Present Illness(es)       Follow Up              Associated symptoms: Negative for double vision, eye pain, redness, flashes and floaters    Treatments tried: artificial tears    Pain scale: 0/10    Comments: 1 year follow up Thyroid eye disease              Comments    Pt states lately harder to see small print.   Denies eye pain, irritation or diplopia.   She is using AT PRN for dryness.   No new concerns today.    Gordo Briggs 8:38 AM March 10, 2025

## 2025-03-11 ENCOUNTER — LAB (OUTPATIENT)
Dept: LAB | Facility: CLINIC | Age: 50
End: 2025-03-11
Payer: COMMERCIAL

## 2025-03-11 DIAGNOSIS — N18.2 BENIGN HYPERTENSION WITH CKD (CHRONIC KIDNEY DISEASE), STAGE II: ICD-10-CM

## 2025-03-11 DIAGNOSIS — I12.9 BENIGN HYPERTENSION WITH CKD (CHRONIC KIDNEY DISEASE), STAGE II: ICD-10-CM

## 2025-03-11 PROCEDURE — 80048 BASIC METABOLIC PNL TOTAL CA: CPT

## 2025-03-11 PROCEDURE — 36415 COLL VENOUS BLD VENIPUNCTURE: CPT

## 2025-03-12 LAB
ANION GAP SERPL CALCULATED.3IONS-SCNC: 6 MMOL/L (ref 7–15)
BUN SERPL-MCNC: 10.2 MG/DL (ref 6–20)
CALCIUM SERPL-MCNC: 10 MG/DL (ref 8.8–10.4)
CHLORIDE SERPL-SCNC: 102 MMOL/L (ref 98–107)
CREAT SERPL-MCNC: 1 MG/DL (ref 0.51–0.95)
EGFRCR SERPLBLD CKD-EPI 2021: 69 ML/MIN/1.73M2
GLUCOSE SERPL-MCNC: 83 MG/DL (ref 70–99)
HCO3 SERPL-SCNC: 28 MMOL/L (ref 22–29)
POTASSIUM SERPL-SCNC: 3.9 MMOL/L (ref 3.4–5.3)
SODIUM SERPL-SCNC: 136 MMOL/L (ref 135–145)

## 2025-03-13 ENCOUNTER — VIRTUAL VISIT (OUTPATIENT)
Dept: NEPHROLOGY | Facility: CLINIC | Age: 50
End: 2025-03-13
Attending: INTERNAL MEDICINE
Payer: COMMERCIAL

## 2025-03-13 DIAGNOSIS — I12.9 BENIGN HYPERTENSION WITH CKD (CHRONIC KIDNEY DISEASE), STAGE II: Primary | ICD-10-CM

## 2025-03-13 DIAGNOSIS — N18.2 BENIGN HYPERTENSION WITH CKD (CHRONIC KIDNEY DISEASE), STAGE II: Primary | ICD-10-CM

## 2025-03-13 DIAGNOSIS — I10 ESSENTIAL HYPERTENSION WITH GOAL BLOOD PRESSURE LESS THAN 140/90: ICD-10-CM

## 2025-03-13 RX ORDER — LOSARTAN POTASSIUM 25 MG/1
25 TABLET ORAL 2 TIMES DAILY
Qty: 180 TABLET | Refills: 3 | Status: SHIPPED | OUTPATIENT
Start: 2025-03-13

## 2025-03-13 NOTE — LETTER
3/13/2025       RE: Mimi Melton  [address]  [facility] [address]     Dear Colleague,    Thank you for referring your patient, Mimi Melton, to the [facility] [place] [facility]. Please see a copy of my visit note below.        Nephrology Outpatient Visit Note (03/13/2025)    Chief Complain/Reason for Visit  Hypertension. Chronic kidney disease. This is a billable video visit.    History of Present Illness  This is a 49 year old female  who presents for routine follow-up appointment.  Please see my initial consult note dated 9/8/2022 for details.  Briefly, the patient had abnormal kidney function going back to 2005 with serum creatinine measurements around 1 mg/dL. 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  She also has a history of chronic sinusitis, and esophageal reflux.       Overall she is doing well today.  She denies any acute health issues or symptoms.   She completed a 24-hour ambulatory blood pressure monitoring October 2024.  Her average blood pressure reading in the week.  Was 150/103.  Average blood pressure during sleep was 133/83.  The overall average was 145/97.  After her results her blood pressure regimen was essentially doubled and she is now on hydrochlorothiazide 12.5 twice daily, and losartan 25 twice daily.  She states that her blood pressure at home runs in the 130s systolic.  However, she still sees high diastolic readings in the high 80s.    The following portions of the patient's history were reviewed and updated as appropriate: allergies, current medications, past family history, past medical history, past social history, past surgical history, and problem list.    Subjective  Review of Systems  A comprehensive review of systems was performed. Pertinent positives and negatives are described  above.    Social and Family History  Patient reports that she has never smoked. She has been exposed to tobacco smoke. She has never used smokeless tobacco. She reports that she does not currently use alcohol. She reports that she does not currently use drugs after having used the following drugs: Marijuana.  family history includes Hypertension in her father, maternal grandfather, maternal grandmother, mother, paternal grandfather, and paternal grandmother.     Examination  not currently breastfeeding. There is no height or weight on file to calculate BMI.    Objective  Pertinent Laboratory and Imaging Results  Most Recent Serum Chemistries  Lab Results   Component Value Date    WBC 5.0 11/06/2024    HGB 12.4 11/06/2024    HCT 38.6 11/06/2024     11/06/2024     03/11/2025    POTASSIUM 3.9 03/11/2025    CHLORIDE 102 03/11/2025    CO2 28 03/11/2025    BUN 10.2 03/11/2025    CR 1.00 (H) 03/11/2025    GLC 83 03/11/2025    SED 20 06/22/2005    AST 20 11/06/2024    ALT 7 11/06/2024    ALKPHOS 42 11/06/2024    INR 1.06 05/16/2013     Most Recent Urinalysis  Lab Results   Component Value Date    COLOR Light Yellow 05/11/2023    APPEARANCE Clear 05/11/2023    URINEGLC Negative 05/11/2023    URINEBILI Negative 05/11/2023    URINEKETONE Trace (A) 05/11/2023    SG 1.020 05/11/2023    URINEPH 6.0 05/11/2023    PROTEIN Negative 05/11/2023    UROBILINOGEN 0.2 09/18/2021    NITRITE Negative 05/11/2023    LEUKEST Negative 05/11/2023     Current Outpatient Medications   Medication Sig Dispense Refill     losartan (COZAAR) 25 MG tablet Take 1 tablet (25 mg) by mouth 2 times daily. 1 tablet in the morning, and 2 tablets at night 180 tablet 3     ACETAMINOPHEN PO Take 1,000 mg by mouth 2 times daily as needed for pain (menstrual cramps)       benzoyl peroxide (BENZOYL PEROXIDE WASH) 5 % external liquid WASH areas with follliculist (face and body) 2x daily 142 mL 3     clindamycin (CLEOCIN T) 1 % external lotion Use once  daily to the affected areas of the face and body 60 mL 5     fluticasone (FLONASE) 50 MCG/ACT nasal spray Spray 1 spray into both nostrils daily (Patient not taking: Reported on 1/22/2025)       hydroCHLOROthiazide 12.5 MG tablet Take 1 tablet (12.5 mg) by mouth 2 times daily. 180 tablet 3     levothyroxine (SYNTHROID/LEVOTHROID) 100 MCG tablet Take 1 tablet (100 mcg) by mouth daily. 90 tablet 3     loratadine (CLARITIN) 10 MG tablet Take 10 mg by mouth daily       metroNIDAZOLE (FLAGYL) 500 MG tablet Take 1 tablet (500 mg) by mouth 2 times daily for 7 days. 14 tablet 0     metroNIDAZOLE (METROGEL) 0.75 % vaginal gel Place 1 applicator (5 g) vaginally twice a week. 70 g 3     norethindrone (MICRONOR) 0.35 MG tablet Take 1 tablet (0.35 mg) by mouth daily. 84 tablet 4     omeprazole (PRILOSEC) 20 MG DR capsule Take 1 capsule (20 mg) by mouth daily. 90 capsule 1     Probiotic Product (PROBIOTIC BLEND PO) Take by mouth daily.       triamcinolone (KENALOG) 0.1 % external ointment Apply topically 2 times daily To affected areas of the body twice a day for one week. Avoid use to the face. 30 g 1     vitamin D2 (ERGOCALCIFEROL) 87951 units (1250 mcg) capsule Take 1 capsule (50,000 Units) by mouth once a week 12 capsule 0     No current facility-administered medications for this visit.        Assessment & Plan  # CKD stage 2  # Chronic hypertension  # Thyroid eye disease   # Hypokalemia, resolved    Overall, the patient is doing well.  Her serum creatinine is slightly elevated.  She has mild chronic kidney disease likely due to chronic hypertension.She is not a diabetic.  She does not have a family history of kidney problems.  I reassured the patient that overall, her kidney function is stable.      She does not smoke tobacco.  However, she smokes weed, and I counseled her and recommended that she stops.  She exercises regularly.  She knows to avoid NSAIDs.    For management perspective, I suggested that she increases her  losartan.  Goal blood pressure should be 130/80 for her.    I also discussed with her participation in the keeping PE study.  She is interested in learning more, and I will get her connected with the .     My recommendations are the following:  -Increase losartan to 25 mg in the morning and 50 mg in the afternoon.  Continue hydrochlorothiazide at the current dose.  -Check your blood pressure at home. Goal blood pressure is 130/80. Call my office if your blood pressure readings are running higher than that.  -Check a BMP in 2 weeks  -Return visit in 6 months.    Total time was of this encounter on the date of service was 30 minutes. All questions were answered to the patient's satisfaction.  Chart documentation was completed, in part, with [employer] voice-recognition software. Even though reviewed, some grammatical, spelling, and word errors may remain.    Telemedicine Visit Addendum:  Consultation provided at the request of above provider for advice regarding the diagnosis and treatment of patient's condition. The patient's condition can be safely assessed via telemedicine. Patient has agreed to receiving services via telemedicine technology. Date of service is 03/13/25. Time Service Began: 1:58 PM. Time Service Ended: 2:28 PM. Originating Location (pt. Location): Home. Distant Location (provider location):  [facility].Reason that telemedicine is appropriate: Patient unable to travel. Mode of transmission: Secure real time interactive audio and visual telecommunication system  [employer]    Orders placed today:  Orders Placed This Encounter   Procedures     Basic metabolic panel     UA with Microscopic     Albumin Random Urine Quantitative with Creat Ratio     Hemoglobin and hematocrit     Iron and iron binding capacity     Ferritin     Renal panel     Parathyroid Hormone Intact     Vitamin D Deficiency     UA with Microscopic reflex to Culture     Albumin Random Urine Quantitative with Creat Ratio     Al  MD [facility]  Division of Nephrology and Hypertension  [employer] Web ([email or web address])   Vocera Web Console ([email or web address])       Again, thank you for allowing me to participate in the care of your patient.      Sincerely,    Al Shen MD

## 2025-03-13 NOTE — PATIENT INSTRUCTIONS
It was a pleasure taking care of you today.  I've included a brief summary of our discussion and care plan from today's visit below.  Please review this information with your primary care provider.    My recommendations are summarized as follows:  -Increase losartan to 25 mg in the morning and 50 mg in the afternoon.  Continue hydrochlorothiazide at the current dose.  -Check your blood pressure at home. Goal blood pressure is 130/80. Call my office if your blood pressure readings are running higher than that.  -Check a BMP in 2 weeks  -Return visit in 6 months.    Who do I call with any questions after my visit?  Please be in touch if there are any further questions that arise following today's visit.  There are multiple ways to contact your nephrology care team.      You can always send a secure message through [employer] or call [phone]. [employer] messages are answered by your nurse or doctor typically within 24-48 hours. Please allow extra time on weekends and holidays. For urgent/emergent questions after business hours, you may reach the on-call Nephrology Fellow by contacting the [facility]  at [phone].    How do I schedule appointments?  During business hours, you may reach your Nephrology Care Team or schedule or reschedule an appointment or lab at [phone]. If you need to schedule imaging, please call [phone].      How will I get the results of any tests ordered?    You will receive all of your results.  If you have signed up for [employer], any tests ordered at your visit will be available to you once resulted on MyChart. Typically the physician reviews them and may or may not make further recommendations. If there are urgent results that require a change in your care plan, your physician or nurse will call you to discuss the next steps. If you are not on MyChart, a letter may be generated and mailed to you with your results.    Sincerely,    Al Shen MD  [facility]  [place]  Division of Nephrology and Hypertension

## 2025-03-13 NOTE — PROGRESS NOTES
Nephrology Outpatient Visit Note (03/13/2025)    Chief Complain/Reason for Visit  Hypertension. Chronic kidney disease. This is a billable video visit.    History of Present Illness  This is a 49 year old female  who presents for routine follow-up appointment.  Please see my initial consult note dated 9/8/2022 for details.  Briefly, the patient had abnormal kidney function going back to 2005 with serum creatinine measurements around 1 mg/dL. 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  She also has a history of chronic sinusitis, and esophageal reflux.       Overall she is doing well today.  She denies any acute health issues or symptoms.   She completed a 24-hour ambulatory blood pressure monitoring October 2024.  Her average blood pressure reading in the week.  Was 150/103.  Average blood pressure during sleep was 133/83.  The overall average was 145/97.  After her results her blood pressure regimen was essentially doubled and she is now on hydrochlorothiazide 12.5 twice daily, and losartan 25 twice daily.  She states that her blood pressure at home runs in the 130s systolic.  However, she still sees high diastolic readings in the high 80s.    The following portions of the patient's history were reviewed and updated as appropriate: allergies, current medications, past family history, past medical history, past social history, past surgical history, and problem list.    Subjective   Review of Systems  A comprehensive review of systems was performed. Pertinent positives and negatives are described above.    Social and Family History  Patient reports that she has never smoked. She has been exposed to tobacco smoke. She has never used smokeless tobacco. She reports that she does not currently use alcohol. She reports that she does not currently use drugs after having used the following drugs: Marijuana.  family history  includes Hypertension in her father, maternal grandfather, maternal grandmother, mother, paternal grandfather, and paternal grandmother.     Examination  not currently breastfeeding. There is no height or weight on file to calculate BMI.    Objective   Pertinent Laboratory and Imaging Results  Most Recent Serum Chemistries  Lab Results   Component Value Date    WBC 5.0 11/06/2024    HGB 12.4 11/06/2024    HCT 38.6 11/06/2024     11/06/2024     03/11/2025    POTASSIUM 3.9 03/11/2025    CHLORIDE 102 03/11/2025    CO2 28 03/11/2025    BUN 10.2 03/11/2025    CR 1.00 (H) 03/11/2025    GLC 83 03/11/2025    SED 20 06/22/2005    AST 20 11/06/2024    ALT 7 11/06/2024    ALKPHOS 42 11/06/2024    INR 1.06 05/16/2013     Most Recent Urinalysis  Lab Results   Component Value Date    COLOR Light Yellow 05/11/2023    APPEARANCE Clear 05/11/2023    URINEGLC Negative 05/11/2023    URINEBILI Negative 05/11/2023    URINEKETONE Trace (A) 05/11/2023    SG 1.020 05/11/2023    URINEPH 6.0 05/11/2023    PROTEIN Negative 05/11/2023    UROBILINOGEN 0.2 09/18/2021    NITRITE Negative 05/11/2023    LEUKEST Negative 05/11/2023     Current Outpatient Medications   Medication Sig Dispense Refill    losartan (COZAAR) 25 MG tablet Take 1 tablet (25 mg) by mouth 2 times daily. 1 tablet in the morning, and 2 tablets at night 180 tablet 3    ACETAMINOPHEN PO Take 1,000 mg by mouth 2 times daily as needed for pain (menstrual cramps)      benzoyl peroxide (BENZOYL PEROXIDE WASH) 5 % external liquid WASH areas with follliculist (face and body) 2x daily 142 mL 3    clindamycin (CLEOCIN T) 1 % external lotion Use once daily to the affected areas of the face and body 60 mL 5    fluticasone (FLONASE) 50 MCG/ACT nasal spray Spray 1 spray into both nostrils daily (Patient not taking: Reported on 1/22/2025)      hydroCHLOROthiazide 12.5 MG tablet Take 1 tablet (12.5 mg) by mouth 2 times daily. 180 tablet 3    levothyroxine (SYNTHROID/LEVOTHROID)  100 MCG tablet Take 1 tablet (100 mcg) by mouth daily. 90 tablet 3    loratadine (CLARITIN) 10 MG tablet Take 10 mg by mouth daily      metroNIDAZOLE (FLAGYL) 500 MG tablet Take 1 tablet (500 mg) by mouth 2 times daily for 7 days. 14 tablet 0    metroNIDAZOLE (METROGEL) 0.75 % vaginal gel Place 1 applicator (5 g) vaginally twice a week. 70 g 3    norethindrone (MICRONOR) 0.35 MG tablet Take 1 tablet (0.35 mg) by mouth daily. 84 tablet 4    omeprazole (PRILOSEC) 20 MG DR capsule Take 1 capsule (20 mg) by mouth daily. 90 capsule 1    Probiotic Product (PROBIOTIC BLEND PO) Take by mouth daily.      triamcinolone (KENALOG) 0.1 % external ointment Apply topically 2 times daily To affected areas of the body twice a day for one week. Avoid use to the face. 30 g 1    vitamin D2 (ERGOCALCIFEROL) 86068 units (1250 mcg) capsule Take 1 capsule (50,000 Units) by mouth once a week 12 capsule 0     No current facility-administered medications for this visit.        Assessment & Plan   # CKD stage 2  # Chronic hypertension  # Thyroid eye disease   # Hypokalemia, resolved    Overall, the patient is doing well.  Her serum creatinine is slightly elevated.  She has mild chronic kidney disease likely due to chronic hypertension.She is not a diabetic.  She does not have a family history of kidney problems.  I reassured the patient that overall, her kidney function is stable.      She does not smoke tobacco.  However, she smokes weed, and I counseled her and recommended that she stops.  She exercises regularly.  She knows to avoid NSAIDs.    For management perspective, I suggested that she increases her losartan.  Goal blood pressure should be 130/80 for her.    I also discussed with her participation in the keeping PE study.  She is interested in learning more, and I will get her connected with the .     My recommendations are the following:  -Increase losartan to 25 mg in the morning and 50 mg in the afternoon.   Continue hydrochlorothiazide at the current dose.  -Check your blood pressure at home. Goal blood pressure is 130/80. Call my office if your blood pressure readings are running higher than that.  -Check a BMP in 2 weeks  -Return visit in 6 months.    Total time was of this encounter on the date of service was 30 minutes. All questions were answered to the patient's satisfaction.  Chart documentation was completed, in part, with [employer] voice-recognition software. Even though reviewed, some grammatical, spelling, and word errors may remain.    Telemedicine Visit Addendum:  Consultation provided at the request of above provider for advice regarding the diagnosis and treatment of patient's condition. The patient's condition can be safely assessed via telemedicine. Patient has agreed to receiving services via telemedicine technology. Date of service is 03/13/25. Time Service Began: 1:58 PM. Time Service Ended: 2:28 PM. Originating Location (pt. Location): Home. Distant Location (provider location):  [facility] [place].Reason that telemedicine is appropriate: Patient unable to travel. Mode of transmission: Secure real time interactive audio and visual telecommunication system  Avimarybela    Orders placed today:  Orders Placed This Encounter   Procedures    Basic metabolic panel    UA with Microscopic    Albumin Random Urine Quantitative with Creat Ratio    Hemoglobin and hematocrit    Iron and iron binding capacity    Ferritin    Renal panel    Parathyroid Hormone Intact    Vitamin D Deficiency    UA with Microscopic reflex to Culture    Albumin Random Urine Quantitative with Creat Ratio     Al Shen MD  Division of Nephrology and Hypertension  [employer]   Vocera Web Console ([employer])

## 2025-03-13 NOTE — NURSING NOTE
Current patient location: Patient declined to provide     Is the patient currently in the state of [place]? YES    Visit mode: VIDEO    If the visit is dropped, the patient can be reconnected by:VIDEO VISIT: Text to cell phone:   Telephone Information:   Mobile [phone]    and VIDEO VISIT: Send to e-mail at: [email or web address]    Will anyone else be joining the visit? NO  (If patient encounters technical issues they should call [phone])    Are changes needed to the allergy or medication list? No    Are refills needed on medications prescribed by this physician? NO    Rooming Documentation:  Not applicable    Reason for visit: ANNY CASTELLON

## 2025-04-03 ENCOUNTER — ANCILLARY PROCEDURE (OUTPATIENT)
Dept: MAMMOGRAPHY | Facility: CLINIC | Age: 50
End: 2025-04-03
Attending: FAMILY MEDICINE
Payer: COMMERCIAL

## 2025-04-03 DIAGNOSIS — Z12.31 VISIT FOR SCREENING MAMMOGRAM: ICD-10-CM

## 2025-04-03 PROCEDURE — 77063 BREAST TOMOSYNTHESIS BI: CPT

## 2025-04-03 PROCEDURE — 77067 SCR MAMMO BI INCL CAD: CPT

## 2025-04-09 ENCOUNTER — TELEPHONE (OUTPATIENT)
Dept: GASTROENTEROLOGY | Facility: CLINIC | Age: 50
End: 2025-04-09
Payer: COMMERCIAL

## 2025-04-09 NOTE — TELEPHONE ENCOUNTER
Caller: Mimi Melton      Reason for Reschedule/Cancellation (please be detailed, any staff messages or encounters to note?):   Conflict with personal schedule; Patient specifically requested Dr. Aguilar    Did you cancel or rescheduled an EUS procedure? No.    Is screening questionnaire older than 3 months from the reschedule date.   If Yes, please complete screening questionnaire. No    Prior to reschedule please review:  Ordering Provider: Murphy Castro PA-C   Sedation Determined: MAC  Does patient have any ASC Exclusions, please identify?: N    Notes on Cancelled Procedure:  Procedure: Upper and Lower Endoscopy [EGD and Colonoscopy]   Date: 5/30  Location: [facility] [facility]; [address] [place] [address]  Surgeon: ОЛЬГА    Rescheduled: Yes,   Procedure: Upper and Lower Endoscopy [EGD and Colonoscopy]    Date: 7/11   Location: [facility] [place]; [address] [place] [address]   Surgeon: ОЛЬГА   Sedation Level Scheduled  MAC ,  Reason for Sedation Level PER ORDER   Instructions updated and sent: Y     Does patient need PAC or Pre -Op Rescheduled? : N

## 2025-05-06 ENCOUNTER — VIRTUAL VISIT (OUTPATIENT)
Dept: OBGYN | Facility: CLINIC | Age: 50
End: 2025-05-06
Payer: COMMERCIAL

## 2025-05-06 DIAGNOSIS — N89.8 VAGINAL DISCHARGE: Primary | ICD-10-CM

## 2025-05-06 DIAGNOSIS — D25.9 UTERINE LEIOMYOMA, UNSPECIFIED LOCATION: ICD-10-CM

## 2025-05-06 PROCEDURE — 98005 SYNCH AUDIO-VIDEO EST LOW 20: CPT | Performed by: OBSTETRICS & GYNECOLOGY

## 2025-05-06 RX ORDER — FLUCONAZOLE 150 MG/1
150 TABLET ORAL
Qty: 3 TABLET | Refills: 0 | Status: SHIPPED | OUTPATIENT
Start: 2025-05-06 | End: 2025-05-13

## 2025-05-06 NOTE — PROGRESS NOTES
Virtual Visit Details    Type of service:  Video Visit     Originating Location (pt. Location): Home    Distant Location (provider location):  On-site  Platform used for Video Visit: Salma    Has had chronic BV and wants to discuss upcoming plan.  Is currently doing the twice a week MetroGel and excited to meet a provider that believes she has chronic issues.  Would like the ability to have a wet prep done whenever she needs it.  Also worried about developing yeast infections while on the BV treatment.  No current sexual partner.      (N89.8) Vaginal discharge  (primary encounter diagnosis)  Comment: chronic BV  Plan: fluconazole (DIFLUCAN) 150 MG tablet, Wet prep         - Clinic Collect        Prescription for diflucan done so she can take that if needed.  Standing order for wet prep was done.  Discussed continuing the MetroGel twice a week for a total of 4 to 6 months and then come off and we can see if that helps her condition.  Questions were answered    (D25.9) Uterine leiomyoma, unspecified location  Comment: fibroid uterus  Plan: US Pelvic Transabdominal and Transvaginal        Gets yearly ultrasound to check for fibroid growth and this was ordered prior to her annual exam for this December.    Sandhya Carson MD            
Detail Level: Zone

## 2025-05-17 ASSESSMENT — SLEEP AND FATIGUE QUESTIONNAIRES
HOW LIKELY ARE YOU TO NOD OFF OR FALL ASLEEP WHEN YOU ARE A PASSENGER IN A CAR FOR AN HOUR WITHOUT A BREAK: WOULD NEVER DOZE
HOW LIKELY ARE YOU TO NOD OFF OR FALL ASLEEP WHILE SITTING INACTIVE IN A PUBLIC PLACE: WOULD NEVER DOZE
HOW LIKELY ARE YOU TO NOD OFF OR FALL ASLEEP WHILE SITTING QUIETLY AFTER LUNCH WITHOUT ALCOHOL: SLIGHT CHANCE OF DOZING
HOW LIKELY ARE YOU TO NOD OFF OR FALL ASLEEP WHILE LYING DOWN TO REST IN THE AFTERNOON WHEN CIRCUMSTANCES PERMIT: MODERATE CHANCE OF DOZING
HOW LIKELY ARE YOU TO NOD OFF OR FALL ASLEEP WHILE SITTING AND READING: MODERATE CHANCE OF DOZING
HOW LIKELY ARE YOU TO NOD OFF OR FALL ASLEEP WHILE SITTING AND TALKING TO SOMEONE: SLIGHT CHANCE OF DOZING
HOW LIKELY ARE YOU TO NOD OFF OR FALL ASLEEP IN A CAR, WHILE STOPPED FOR A FEW MINUTES IN TRAFFIC: WOULD NEVER DOZE
HOW LIKELY ARE YOU TO NOD OFF OR FALL ASLEEP WHILE WATCHING TV: MODERATE CHANCE OF DOZING

## 2025-05-20 ENCOUNTER — OFFICE VISIT (OUTPATIENT)
Dept: SURGERY | Facility: CLINIC | Age: 50
End: 2025-05-20
Payer: COMMERCIAL

## 2025-05-20 VITALS
BODY MASS INDEX: 33.24 KG/M2 | HEIGHT: 65 IN | SYSTOLIC BLOOD PRESSURE: 124 MMHG | DIASTOLIC BLOOD PRESSURE: 76 MMHG | WEIGHT: 199.5 LBS

## 2025-05-20 DIAGNOSIS — E66.9 OBESITY (BMI 30-39.9): Primary | ICD-10-CM

## 2025-05-20 DIAGNOSIS — E53.8 VITAMIN B12 DEFICIENCY: ICD-10-CM

## 2025-05-20 DIAGNOSIS — E66.811 CLASS 1 OBESITY WITH BODY MASS INDEX (BMI) OF 33.0 TO 33.9 IN ADULT, UNSPECIFIED OBESITY TYPE, UNSPECIFIED WHETHER SERIOUS COMORBIDITY PRESENT: ICD-10-CM

## 2025-05-20 DIAGNOSIS — I10 ESSENTIAL HYPERTENSION WITH GOAL BLOOD PRESSURE LESS THAN 140/90: ICD-10-CM

## 2025-05-20 PROCEDURE — 99417 PROLNG OP E/M EACH 15 MIN: CPT | Performed by: FAMILY MEDICINE

## 2025-05-20 PROCEDURE — 99215 OFFICE O/P EST HI 40 MIN: CPT | Performed by: FAMILY MEDICINE
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
## 2025-05-20 NOTE — PATIENT INSTRUCTIONS
[facility]    Remember to:    -Eat 3 meals a day (not 2, not 5) Chew your food well/SLOW down  -Eat your protein first  -Be a water drinker/Minize liquid calories (no regular pop, no juice) skim or 1% milk OK  -Sleep 7-8 hours each night. Address sleep if problematic  -Stress management is important. Address if problematic  -Move-8000 steps daily Muscle: maintain your muscle mass (strength training 2X/wk)  -Wheat, not white (bread, pasta, crackers, tamara, bagels, tortillas, rice)  -Limit restaurant, cafeteria, take out, drive through to 2 times per week or less  -Minimize caffeine, alcohol, and night-time snacking  -Consider keeping a food diary (i.e. My Fitness Pal, Lose It, or other food tracker)  -Follow up with the dietitian      **Some lean proteins: chicken, turkey, tuna, salmon, crab, fish, shrimp, scallops, lobster, lean cuts of beef and pork, luncheon meats, veggie burgers, beans (black, lima, garbanzo, perea, kidney, refried), chile, cottage cheese, string cheese, other cheese, eggs, tofu, peanut butter, nuts, vegan crumbles, greek yogurt      Nausea and constipation are the most common side effects with the GLP-1/GIP medications.     Drinking water throughout the day, preventing and or treating constipation, and eating 3 nutrient dense meals containing protein is important while on GLP-1 RA/GIP medications. It can mitigate these side effects.     For Prevention and Treatment of Constipation    From least aggressive to most aggressive:    Move: wallking-the more we move, the more our bowels move  Water: Drink water-64oz+ day  Go when you need to go. Don't wait. The longer you wait, the harder it gets.  Fiber: Fruit, raw veggies, nuts, whole grains,   Stool Softeners: if constipation is mild and for maintenance  Gentle laxatives: Miralax, senokot, dulcolax , Smooth move tea as needed    More aggressive (and typically won't get to this point)  Milk of Magnesia  Mag Citrate (what you drink  before a colonoscopy)  Suppositories  Enema      After your 3rd injection at each dose-Call [phone] or TV Volume Wizard App to let Nissa or Floresita know that you are ready to go to the next dose or if you would like to stay at the current dose another month.     MEDICATIONS FOR WEIGHT LOSS    PHENTERMINE (Adipex): approved in 1959 for appetite suppression.  It has stimulant effects and cannot be used with Ritalin, Concerta, or other stimulants.  It is not addictive although it's chemically related to amphetamines.  Amphetamines are addictive. The most common side effects are dry mouth, increased energy and concentration, increased pulse, and constipation.  You should not take phentermine if you have glaucoma, hyperthyroidism, or uncontrolled/untreated hypertension.  $24-$30 for 90 tablets    PHENDIMETRAZINE (Bontril): Appetite suppressant/sympathomimetic.  Controlled substance.  Side effects and contraindications similar to phentermine.  $45-$60 for 3 month supply    TOPIRAMATE (Topamax): Anti-seizure medication, also used to prevent migraines.  Side effects include paresthesia, glaucoma, altered concentration, attention difficulties, memory and speech problems, metabolic acidosis, depression, increase in body temperature and decrease sweating, kidney stones, and weight loss.  Do not take Topamax while taking Depakote as this can cause high ammonia levels.  You must have reliable birth control as Topamax can cause birth defects.  Discontinue slowly to avoid seizure.  Insurance usually covers Topiramate.    QSYMIA (Phentermine + Topamax):  See above information about phentermine and Topamax.  Most common side effects are paresthesia, dizziness, distortion of taste, insomnia, constipation, and dry mouth.  $150-$220 per month    DIETHYLPROPION (Tenuate): Sympathomimetic amine.  Appetite suppressant.  Doses 25 mg before meals or 75 mg per day.  Most common side effects are hypertension, palpitations, EKG changes, and increased  seizures in epileptics.  There can be a possible adverse reaction with alcohol.  $70-$90 per 3 months    XENICAL(Orlistat) (Patrick OTC): Approved in 1999.  A fat-blocker.  It reduces absorption of fat by approximately 30%.  It has beneficial effects on lipid levels.  Side effects include diarrhea, abdominal cramping, fecal incontinence, oily spotting, and flatus with discharge.  Side effects are minimized if the patient limits their dietary fat to no more than 30% of their diet.  Patients must take a multivitamin daily to avoid vitamin D, E, A, and K deficiency.  $120 per month    CONTRAVE (Naltrexone/Bupropion): Approved in 2014.  It is a combination pill including an opioid receptor blocker and a long-standing antidepressant.  Most common side effects include nausea, constipation, headache, vomiting, dizziness, trouble sleeping, dry mouth, and diarrhea.  With all antidepressants watch for mood changes and suicide ideation.  Bupropion has been known to lower the seizure threshold in those prone to seizures.  It should not be used in a patient with a recent history of bulimia. It has been associated with liver damage from taking higher than recommended doses.  Do not use countrave if you have taken opioid medications or opioid street drugs in the past 7-10 days, if you are currently on opioids, methadone, or if you are pregnant.  Do not use contrave if you have recently stopped using alcohol or benzodiazepines.  Taper off contrave slowly.  Dosing: titrate up to 2 tablets twice daily of the Naltrexone 8 mg/ Bupropion 90 mg tablets.  $200 for 90 tablets    SAXENDA (Liraglutide (called Victoza for Type 2 Diabetes): A daily injectable (3mg daily) medication used for type 2 diabetes (Victoza). Glucagon-like peptide-1 (GLP-1) agonist. Contraindications include personal or family history of medullary thyroid cancer or MEN type 2. Acute pancreatitis has been observed in patients taking liraglutide. Liraglutide causes C-cell  tumors in rats and mice. It is unknown whether liraglutide causes tumors in humans. Start at 0.6mg, increasing the dose weekly up to 3mg.     VYVANSE (Lisdexamfetamine dimesylate): a CNS stimulant used to treat ADHD. Indicated for the treatment of moderate to severe Binge Eating Disorder in Adults. Contraindicated in patients with known heart disease, structural abnormalities of the heart, serious heart arrhythmias or unexplained syncope. CNS stimulants such as vyvanse may cause manic or psychotic symptoms in patients with BPAD or pre-existing psychosis. Use with caution in patients with Raynaud's phenomenon. Most common side effects include dry mouth, insomnia, decreased appetite, increased heart rate, jittery feeling, constipation and anxiety.     WEGOVY (Semaglutide (called Ozempic for Type 2 Diabetes): A weekly injectable (2.4mg weekly) medication used for type 2 diabetes (Ozempic). Glucagon-like peptide-1 (GLP-1) agonist. Contraindications include personal or family history of medullary thyroid cancer or MEN type 2. Acute pancreatitis has been observed in patients taking Semaglutide. Semaglutide causes C-cell tumors in rats and mice. It is unknown whether Semaglutide causes tumors in humans. Start at 0.25mg, increasing to 0.5, 1.0, 1.7 then 2.4 monthly. $1200/mo    ZEPBOUND (Tirzepatide (called Mounjaro for Type 2 Diabetes): A weekly injectable medication indicated for type 2 diabetes in 2022 and for weight loss in 2023.. Glucagon-like-peptide-1 (GLP-1) agonist and Glucose-Dependent Insulinotropic Polypeptide (GIP) agonist. Contraindications include personal or family history of medullary thyroid cancer or MEN type 2. Acute pancreatitis has been observed in patients taking Tirzepatide. Tirzepatide causes C-cell tumors in rats and mice. It is unknown whether Tirzepatide causes tumors in humans. Watch for neck mass, difficulty swallowing, persistent hoarseness, and epigastric abdominal pain. Most common side  effects include nausea, diarrhea, vomiting, constipation, dyspepsia, and abdominal pain. Start at 2.5mg, increasing to 5.0, 7.5, 10, 12.5 then 15mg monthly. $1,000/month
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
## 2025-05-21 ENCOUNTER — MYC MEDICAL ADVICE (OUTPATIENT)
Dept: SURGERY | Facility: CLINIC | Age: 50
End: 2025-05-21
Payer: COMMERCIAL

## 2025-05-21 DIAGNOSIS — R11.0 NAUSEA: Primary | ICD-10-CM

## 2025-05-21 RX ORDER — ONDANSETRON 4 MG/1
4 TABLET, ORALLY DISINTEGRATING ORAL EVERY 8 HOURS PRN
Qty: 30 TABLET | Refills: 1 | Status: SHIPPED | OUTPATIENT
Start: 2025-05-21

## 2025-06-04 ENCOUNTER — VIRTUAL VISIT (OUTPATIENT)
Dept: SURGERY | Facility: CLINIC | Age: 50
End: 2025-06-04
Payer: COMMERCIAL

## 2025-06-04 DIAGNOSIS — E66.9 OBESITY (BMI 30-39.9): Primary | ICD-10-CM

## 2025-06-04 DIAGNOSIS — E66.811 CLASS 1 OBESITY WITH BODY MASS INDEX (BMI) OF 33.0 TO 33.9 IN ADULT, UNSPECIFIED OBESITY TYPE, UNSPECIFIED WHETHER SERIOUS COMORBIDITY PRESENT: ICD-10-CM

## 2025-06-04 DIAGNOSIS — Z71.3 NUTRITIONAL COUNSELING: ICD-10-CM

## 2025-06-04 PROCEDURE — 98001 SYNCH AUDIO-VIDEO NEW LOW 30: CPT | Performed by: DIETITIAN, REGISTERED

## 2025-06-04 PROCEDURE — 97802 MEDICAL NUTRITION INDIV IN: CPT | Mod: 95 | Performed by: DIETITIAN, REGISTERED

## 2025-06-04 NOTE — PATIENT INSTRUCTIONS
Recipes:      Eat Better ? Move More ? Live Well    Eat 3 nutrient-rich meals each day     Don't skip meals--it will cause you to overeat later in the day!     Eating fiber (vegetables/fruits/whole grains) and protein with meals helps you stay full longer     Choose foods with less than 10 grams of sugar and 5 grams of fat per serving to prevent excess calories and weight re-gain   Eat around the same times each day to develop a routine eating schedule    Avoid snacking unless physically hungry.   Planned snacks: 1-2 times per day and no more than 150 calories    Eat protein first    Protein helps with healing, maintaining adequate muscle mass, reducing hunger and optimizing nutritional status    Aim for 55 - 70 grams of protein per day   Fill up on Fiber    Fiber comes from plants--fruits, veggies, whole grains, nuts/seeds and beans    Fiber is low in calories, high in phytonutrients and helps you stay full longer    Aim for 25-35 grams per day by eating fiber with meals and snacks  Eat [id number]    Take 20-30 minutes to eat each meal by taking small bites, chewing foods to applesauce consistency or 20-30 times before you swallow    Eating foods too fast can delay satiety/fullness signals and increase overeating   Slow down your eating by using toddler utensils, putting your fork/spoon down between bites and not watching TV or emailing during meals!   Keep a Journal          Writing down what you eat, how you feel and when you are active helps you identify new changes to work on from week to week          Look for ways to cut 100 calories from your current diet 2-3 times per day  Drink 64 ounces of 0-Calorie drinks between meals    Water    Zero calorie Propel  or Vitamin Water      SoBe [place]  Zero Calories    Crystal Light , Sugar-Free Jonny-Aid , and other sugar-free lemonade or flavored cheney    Keep Caffeine to less than 300mg per day ie: 3-6oz cups coffee     Work up to 45-60 minutes of physical  activity most days of the week    Helps with losing weight and prevent regaining those extra pounds!     Do a combo of cardio (walking/water exercises) and strength training (lifting weights/Vinyasa yoga)    Avoid Mindless Eating    Be present when you eat--take note of the smell, taste and quality of your food    Make a list of alternative activities you could do to prevent eating out of boredom/stress  Go for a walk, call a friend, chew gum, paint your nails, re-organize the garage, etc      LEAN PROTEIN SOURCES    Protein Source Portion Calories Grams of Protein                           Nonfat, plain Greek yogurt    (10 grams sugar or less) 3/4 cup (6 oz)  12-17   Light Yogurt (10 grams sugar or less) 3/4 cup (6 oz)  6-8   Protein Shake 1 shake 110-180 15-30   Skim/1% Milk or lactose-free milk 1 cup ( 8 oz)  8   Plain or light, flavored soymilk 1 cup  7-8   Plain or light, hemp milk 1 cup 110 6   Fat Free or 1% Cottage Cheese 1/2 cup 90 15   Part skim ricotta cheese 1/2 cup 100 14   Part skim or reduced fat cheese slices 1/4cup, 3 dice 65-80 8     Mozzarella String Cheese 1 80 8   Canned tuna, chicken, crab or salmon  (canned in water)  1/2 cup 100 15-20   White fish (broiled, grilled, baked) 3 ounces 100 21   [place]/Tuna (broiled, grilled, baked) 3 ounces 150-180 21   Shrimp, Scallops, Lobster, Crab 3 ounces 100 21   Pork loin, Pork Tenderloin 3 ounces 150 21   Boneless, skinless chicken /turkey breast                          (broiled, grilled, baked) 3 ounces 120 21   [place], [place], [place], and Venison 3 ounces 120 21   Lean cuts of red meat and pork (sirloin,   round, tenderloin, flank, ground 93%-96%) 3 ounces 170 21   Lean or Extra Lean Ground Turkey 1/2 cup 150 20   90-95% Lean [place] Burger 1 rahul 140-180 21   Low-fat casserole with lean meat 3/4 cup 200 17   Luncheon Meats                                                        (turkey, lean ham, roast beef, chicken) 3 ounces 100  21   Egg (boiled, poached, scrambled) 1 Egg 60 7   Egg Substitute 1/2 cup 70 10   Nuts (limit to 1 serving per day)  3 Tbsp. 150 7   Nut [place] (peanut, almond)  Limit to 1 serving or less daily 1 Tbsp. 90 4   Soy Burger (varies) 1  10-15   Edamame  1/2 cup ~95 9   Garbanzo, Black, Hung Beans 1/2 cup 110 7   Refried Beans 1/2 cup 100 7   Kidney and Lima beans 1/2 cup 110 7   Tempeh 3 oz 175 18   Vegan crumbles 1/2 cup 100 14   Tofu 1/2 cup 110 14   Chili (beans and extra lean beef or turkey) 1 cup 200 23   Lentil Stew/Soup 1 cup 150 12   Black Bean Soup 1 cup 175 12     Carbohydrates  Carbohydrates fuel your body with glucose (sugar)--the energy your body needs so you can do your daily activities.  Carbohydrates offer an immediate source of energy for your body. They provide the fuel for your muscles and organs, such as your brain.     Types of Carbohydrates     Complex Carbohydrates are higher in fiber and keep you feeling full longer--helping you eat less.   These are found in nearly all plant-based foods and usually take longer for the body to digest.  They are most commonly found in whole-wheat bread, whole-grain pasta, brown rice, starchy vegetables,   and fruits  Refined Carbohydrates require almost NO WORK for digestion and break down into glucose more quickly   than complex carbohydrates. Refined carbohydrates are usually high in calories and low in nutrients and fiber--  eating more of these can lead to weight gain.  Thinking about eliminating carbohydrates???  If you do not eat enough carbohydrates, the following can occur:  Fatigue  Muscle cramps  Poor mental function  Fatigue easily results from deprivation of carbohydrates, which is seen in people who fast, possibly interfering with activities of daily living.      Thinking about eliminating carbohydrates???  If you do not eat enough carbohydrates, the following can occur:  Fatigue  Muscle cramps  Poor mental function  Fatigue easily results  from deprivation of carbohydrates, which is seen in people who fast, possibly interfering with activities of daily living    Carbohydrates are your body's first choice for fuel. If given a choice of several types of foods simultaneously, your body will use the energy from carbohydrates first.    What foods contain carbohydrates?  Choose the following foods containing carbohydrates (the BEST ones to eat):   Fruit-fresh, frozen, canned in their own juices  Whole grains:  Whole-wheat breads  Brown rice  Oatmeal  Whole-grain cereals  Other starchy foods containing a minimum of 3 grams (g) fiber/100 calories  The ingredient label should list whole wheat or whole grain as one of the first ingredients (bulgur, quinoa, buckwheat, millet, spelt, faro, kasha)  Milk or yogurt (a natural source of carbohydrates):  Low-fat milk  Fat-free milk  Yogurt   Beans or legumes     Starchy vegetables, raw or frozen:  Potatoes  Peas  Corn    AVOID or limit the following foods containing carbohydrates:  Refined sugars, such as in:  Candy  Desserts-ice cream, cakes, pies, brownies, frozen yogurt, sherbet/sorbet  Cookies  White flour: bread/pasta/crackers/rice/tortillas  Sugary snacks: sweetened cereal, granola bars, cereal bars, donuts, muffins, bagels  Sugary Drinks:  Fruit Juice, Smoothies  Sports Drinks  Regular Soda    What are typical serving sizes or portions?  The following are some serving and portion sizes for foods containing carbohydrates:  One medium piece of fruit, about 4?5 ounces (oz) (-tennis ball)  1 cup (C) berries or melon    C canned fruit    C juice (100% vegetable)    C starchy vegetables, cooked or chopped  One slice whole-grain bread  ? C brown rice, quinoa, buckwheat, millet, spelt, faro, kasha    C oatmeal (dry)    C bulgur  One small tortilla (less than 6inch diameter)    C wheat germ  1 oz pretzels     C flaked cereal        Calorie-Controlled Sample Meal Plans    Examples of small healthy  meals    Breakfast   Omelet made with   cup to   cup egg substitute or 2 eggs    cup chopped vegetables  1-2 tbsp. of light cheese     cup salsa  Medium banana    1 cup non-fat plain, Greek yogurt mixed with 1 cup berries and 1-2 Tbsp nuts or cereal   -3/4 cup skim or 1% cottage cheese    cup unsweetened whole-grain cereal  1/2 cup of fresh strawberries  Whole-wheat English muffin or mini bagel, 1 scrambled egg and 1 slice Swiss cheese   Small orange  Protein Bar or Shake (15-30 grams protein and 15-25 grams Carbohydrates)    cup cottage cheese, low-fat    cup fresh fruit    11 ounces of Slim Fast Low Carb (only), Teresa's Advantage, EAS Carb Control    Lunch/Dinner  2-3 slices roasted turkey breast  1 tbsp. of fat free mayonnaise  2 slices of  whole-wheat bread, Medium apple  10 baby carrots with 1 tbsp. of low-fat dip     cup water packed tuna or chicken  1 tablespoons of low-fat mayonnaise  1-2 tbsp. dill relish  1 serving of whole-grain crackers  1 cup of strawberries   6 inch turkey sub sandwich with light mayonnaise,   cup cottage cheese                                                                                                                                                      Black bean and low-fat cheese on a whole wheat tortilla with salsa and light sour cream  Grilled chicken sandwich  Tossed salad with light dressing    Baked potato with 3/4 cup of extra lean ground beef, light shredded cheese and salsa  Fresh fruit                                                 Chicken chunks with lettuce and vegetables stuffed in tamara  Steamed broccoli                                                 3 oz boneless/skinless chicken breast  1/2 cup brown rice with stir-fried vegetables    grapefruit  3 ounces of salmon, trout, or tuna  1 cup of steamed asparagus  1 small slice whole grain Italian bread  Broiled white or pink fish  3/4 cup whole wheat pasta with tomatoes  3/4 cup of roasted red peppers  3 oz. of  extra lean (93/7) hamburger on a Arnold[employer] [place] Thins  Tossed salad with light dressing       Black bean or Tuscan bean soup with grated mozzarella cheese    of a flour tortilla    3 ounces of grilled pork loin with 1 tbsp. of low-sugar barbeque sauce, 1 cup of green beans seasoned with pepper  Small dinner roll or   cup of grapefruit sections    1-2 cups of torn anand    cup of garbanzo beans or diced skinless chicken breast  5-6 cherry tomatoes  1  tbsp. of crumbled feta cheese  1 tbsp. of roasted soy nuts  1 tsp. of olive oil and 2-3 Tbsp. of balsamic or red wine vinegar  Small whole-wheat dinner roll or   cup of cut up pineapple       FIBER    Goal is 25-30g per day  +increase slowly 2-3g every few days to avoid constipation  +drink plenty of water when increasing fiber intake    Aim for the servings in each category daily:    Nuts/Seeds 1oz daily=1/4 cup, 2 Tbsp or small handful  -nuts  -hemp hearts  -macho  -flax  -nut butters    Vegetables 4 servings daily= 1 cup raw or 1/2 cup cooked  -broccoli  -onions  -asparagus  -beets  -radishes  -cauliflower  -peppers  -jicama    Fruits 1-2 serving daily= 1-1.5 cup  -apples  -pears  -fig  -dates (2)  -kiwi  -slightly green banana  -peach  -berries (blackberries=9g fiber per cup!)    Whole grains and beans, 1 cup daily  -black beans  -kidney beans  -garbanzo beans  -lentils  -quinoa  -edamame  -brown rice  -wild rice  -farro  -bulgur  -whole wheat

## 2025-06-04 NOTE — LETTER
6/4/2025      Mimi Melton  [address]  [place] [address]      Dear Colleague,    Thank you for referring your patient, Mimi Melton, to the [facility] [place]. Please see a copy of my visit note below.    Mimi Melton is a 49 year old who is being evaluated via a billable video visit.      How would you like to obtain your AVS? MyChart  If the video visit is dropped, the invitation should be resent by: Text to cell phone: [phone]  Will anyone else be joining your video visit? No          Medical Weight Loss Initial Diet Evaluation  Assessment:  This patient was referred by Dr. Monterroso for MNT as treatment for Obesity which is impacting HTN    Mimi is presenting today for a new weight management nutrition consultation. Pt has had an initial appointment with Dr. Monterroso.    Weight loss medication: Zepbound.     Anthropometrics:    Initial weight: 199.5 lbs  BMI: 33.2 lbs  Ideal body weight: 57 kg (125 lb 10.6 oz)  Adjusted ideal body weight: 70.4 kg (155 lb 3.2 oz)  Estimated RMR (Gates-St Jeor equation):  1,536 kcals x 1.2 (sedentary) = 1,843 kcals (for weight maintenance)    Recommended Protein Intake: 55 - 70 grams of protein/day (0.8 - 1.0 g/kg d/t kidney function)    Medical History:  Patient Active Problem List   Diagnosis     Surveillance of previously prescribed intrauterine contraceptive device     BV (bacterial vaginosis)     Exophthalmos     Abnormal Pap smear of cervix     CARDIOVASCULAR SCREENING; LDL GOAL LESS THAN 160     Essential hypertension with goal blood pressure less than 140/90     Anemia     Hypothyroidism due to acquired atrophy of thyroid     Cervical high risk HPV (human papillomavirus) test positive     Thyroid disease     Female stress incontinence     Chronic seasonal allergic rhinitis, unspecified trigger     Need for HPV vaccine     Thyroid eye disease     Eyelid retraction     Diverticulosis of large intestine  without hemorrhage     Intrinsic sphincter deficiency     Stress incontinence     Mass of upper outer quadrant of left breast     Vitamin B12 deficiency   Diabetes: no dx, A1C of 5.8% on 11/6/2024    Nutrition History:   Food allergies/intolerances/cultural or [employer] food customs: No. Does not like beef, seafood/fish, pork, mangoes, brussels sprouts, tofu, lentils    Okay with turkey and chicken, broccoli, green beans, cantaloupe, strawberries and grapes, bread, rice, quinoa, cauliflower rice    Weight loss history: watching portions or calories. Patient stated that she was gaining weight d/t stopped smoking weed in the summer of 2024 and gained weight. Patient stated from January to March was trying to change habits - weight loss during that time. Patient stated that she has been working our since 2021. Patient has been tolerating the medication well.      Patient would like to make more recipe with the air fryer    Vitamins/Mineral Supplementation: vitamin D, B12, biotin, MVI, probiotic    *patient has noticed a change with her eating habits since starting the   Dietary Recall:  Will workout before breakfast at 6:30 - 7 AM  Breakfast: greek yogurt with a granola bar  Lunch (12:30 PM): cheese ravioli (8)  Dinner (7:30 PM): 1/2 turkey sandwich with lettuce, tomato and cheese  Typical Snacks: limited  Overnight eating: No  Eating out: 3-4x/week - goal to cut down to 2x/week    Beverages:   Water - 16 ounce - 3 bottles/day (48 ounces)  Tea - jose lemon with honey and lemon - 3-4 cups    No pop, sparkling, milk, juice    Exercise:   [employer] - 2 days/week - full body workout or shoulders/arms    [place]alking [place] 1-2x/week    Nutrition Diagnosis (PES statement):     Obesity related to excessive energy intake as evidence by BMI of 33.2     Nutrition Intervention  Food and/or Nutrient Delivery   Placed emphasis on importance of developing a healthy meal routine, aiming for 3 meals a day and limited  snacks.  Discussed using a protein supplement as nutrition support  Nutrition Education   Discussed with patient how to build a meal: the importance of including a lean/low fat protein at each meal, include a source of vegetables at a minimum of lunch and dinner and limiting carbohydrate intake  Educated on sources of lean protein, portion sizes, the amount of grams found in each source. Recommend patient to aim for 20g protein at each meal.  Discussed the importance of adequate hydration, with emphasis on drinking 64oz of water or zero calorie beverages per day.  Nutrition Counseling   Encouraged importance of developing routine exercise for health benefits and weight loss.  Stay consistent with movement    Goals established by patient:   Aim to have 55-70 grams or protein/day  Aim for 25+ grams of fiber/day - fiber source with every meal  Limit fluid intake 20-30 minutes before a meal and with a meal - fuel on food first  Stay consistent with three meals/day    Handouts provided:  Recipes  Fiber Sources  Intro to MWM    Assessment/Plan:    Pt will follow up in 6 month(s) with bariatrician and 1 month(s) with dietitian.       Video-Visit Details    Type of service:  Video Visit    Video Start Time (time video started): 7:30 AM    Video End Time (time video stopped): 8:06 AM    Originating Location (pt. Location): Home      Distant Location (provider location):  Off-site    Mode of Communication:  Video Conference via [facility]    Physician has received verbal consent for a Video Visit from the patient? Yes      Claire Iverson RD       Again, thank you for allowing me to participate in the care of your patient.        Sincerely,        Claire Iverson RD    Electronically signed

## 2025-07-03 ENCOUNTER — OFFICE VISIT (OUTPATIENT)
Dept: FAMILY MEDICINE | Facility: CLINIC | Age: 50
End: 2025-07-03
Payer: COMMERCIAL

## 2025-07-03 VITALS
TEMPERATURE: 98.2 F | DIASTOLIC BLOOD PRESSURE: 80 MMHG | WEIGHT: 191.2 LBS | SYSTOLIC BLOOD PRESSURE: 116 MMHG | RESPIRATION RATE: 16 BRPM | HEIGHT: 65 IN | BODY MASS INDEX: 31.86 KG/M2 | OXYGEN SATURATION: 97 % | HEART RATE: 98 BPM

## 2025-07-03 DIAGNOSIS — Z76.89 ENCOUNTER TO ESTABLISH CARE: Primary | ICD-10-CM

## 2025-07-03 DIAGNOSIS — N18.2 BENIGN HYPERTENSION WITH CKD (CHRONIC KIDNEY DISEASE), STAGE II: ICD-10-CM

## 2025-07-03 DIAGNOSIS — E03.9 HYPOTHYROIDISM, UNSPECIFIED TYPE: ICD-10-CM

## 2025-07-03 DIAGNOSIS — I12.9 BENIGN HYPERTENSION WITH CKD (CHRONIC KIDNEY DISEASE), STAGE II: ICD-10-CM

## 2025-07-03 DIAGNOSIS — R73.03 PREDIABETES: ICD-10-CM

## 2025-07-03 DIAGNOSIS — N89.8 VAGINAL DISCHARGE: ICD-10-CM

## 2025-07-03 DIAGNOSIS — E78.5 HYPERLIPIDEMIA LDL GOAL <100: ICD-10-CM

## 2025-07-03 LAB
ALBUMIN UR-MCNC: NEGATIVE MG/DL
ANION GAP SERPL CALCULATED.3IONS-SCNC: 11 MMOL/L (ref 7–15)
APPEARANCE UR: CLEAR
BACTERIA #/AREA URNS HPF: ABNORMAL /HPF
BILIRUB UR QL STRIP: NEGATIVE
BUN SERPL-MCNC: 8.5 MG/DL (ref 6–20)
CALCIUM SERPL-MCNC: 9.9 MG/DL (ref 8.8–10.4)
CHLORIDE SERPL-SCNC: 99 MMOL/L (ref 98–107)
CLUE CELLS: PRESENT
COLOR UR AUTO: YELLOW
CREAT SERPL-MCNC: 1.09 MG/DL (ref 0.51–0.95)
CREAT UR-MCNC: 216 MG/DL
EGFRCR SERPLBLD CKD-EPI 2021: 62 ML/MIN/1.73M2
EST. AVERAGE GLUCOSE BLD GHB EST-MCNC: 105 MG/DL
GLUCOSE SERPL-MCNC: 77 MG/DL (ref 70–99)
GLUCOSE UR STRIP-MCNC: NEGATIVE MG/DL
HBA1C MFR BLD: 5.3 % (ref 0–5.6)
HCO3 SERPL-SCNC: 27 MMOL/L (ref 22–29)
HGB UR QL STRIP: NEGATIVE
KETONES UR STRIP-MCNC: NEGATIVE MG/DL
LEUKOCYTE ESTERASE UR QL STRIP: NEGATIVE
MICROALBUMIN UR-MCNC: 26.9 MG/L
MICROALBUMIN/CREAT UR: 12.45 MG/G CR (ref 0–25)
NITRATE UR QL: NEGATIVE
PH UR STRIP: 7 [PH] (ref 5–8)
POTASSIUM SERPL-SCNC: 3.5 MMOL/L (ref 3.4–5.3)
RBC #/AREA URNS AUTO: ABNORMAL /HPF
SODIUM SERPL-SCNC: 137 MMOL/L (ref 135–145)
SP GR UR STRIP: 1.01 (ref 1–1.03)
SQUAMOUS #/AREA URNS AUTO: ABNORMAL /LPF
TRICHOMONAS, WET PREP: ABNORMAL
UROBILINOGEN UR STRIP-ACNC: 0.2 E.U./DL
WBC #/AREA URNS AUTO: ABNORMAL /HPF
WBC'S/HIGH POWER FIELD, WET PREP: ABNORMAL
YEAST, WET PREP: ABNORMAL

## 2025-07-03 ASSESSMENT — PATIENT HEALTH QUESTIONNAIRE - PHQ9
SUM OF ALL RESPONSES TO PHQ QUESTIONS 1-9: 2
SUM OF ALL RESPONSES TO PHQ QUESTIONS 1-9: 2
10. IF YOU CHECKED OFF ANY PROBLEMS, HOW DIFFICULT HAVE THESE PROBLEMS MADE IT FOR YOU TO DO YOUR WORK, TAKE CARE OF THINGS AT HOME, OR GET ALONG WITH OTHER PEOPLE: NOT DIFFICULT AT ALL

## 2025-07-03 ASSESSMENT — PAIN SCALES - GENERAL: PAINLEVEL_OUTOF10: NO PAIN (0)

## 2025-07-03 NOTE — PROGRESS NOTES
"  Assessment & Plan     Encounter to establish care    Benign hypertension with CKD (chronic kidney disease), stage II  Continue losartan and hydrochlorothiazide at current doses    Monitor BP and kidney functions regularly;   In consultation and recent labs ordered by nephrology   normotensive at present      Prediabetes  On Zepbound  - Hemoglobin A1c; Future      Hyperlipidemia LDL goal <100  Patient currently not on statins    Hypothyroidism, unspecified type  Continue levothyroxine 100 mcg daily    Monitor thyroid function tests as per routine    Vaginal discharge  - Wet prep - Clinic Collect          BMI  Estimated body mass index is 31.82 kg/m  as calculated from the following:    Height as of this encounter: 1.651 m (5' 5\").    Weight as of this encounter: 86.7 kg (191 lb 3.2 oz).   Weight management plan: Discussed healthy diet and exercise guidelines      I spent a total of 30 minutes on the day of the visit.   Time spent by me today doing chart review, history and exam, documentation and further activities per the note      The longitudinal plan of care for the diagnosis(es)/condition(s) as documented were addressed during this visit. Due to the added complexity in care, I will continue to support Mimi in the subsequent management and with ongoing continuity of care.    Dennis Le is a 50-year-old female presenting to establish care and manage her hypertension and other chronic disease.   She has a history of lisinopril allergy, currently on losartan and hydrochlorothiazide. Nephrology recommended increasing losartan dose, but she prefers to stay on the current regimen. Her blood pressure is currently normotensive, and no medication refills are needed today.    She takes levothyroxine 100 mcg daily for hypothyroidism. Has a history of hyperlipidemia but is not currently on statin therapy. She also has vitamin D and B12 deficiencies, on daily supplementation with recent labs up to " "date.    Reports recurrent bacterial vaginosis. Allergies remain stable.        7/3/2025     7:53 AM   Additional Questions   Roomed by Emanuel James   Accompanied by Self     History of Present Illness       Hypertension: She presents for follow up of hypertension.  She does check blood pressure  regularly outside of the clinic. Outpatient blood pressures have not been over 140/90. She does not follow a low salt diet.     Hypothyroidism:     Since last visit, patient describes the following symptoms::  Constipation    She eats 0-1 servings of fruits and vegetables daily.She consumes 1 sweetened beverage(s) daily.She exercises with enough effort to increase her heart rate 60 or more minutes per day.  She exercises with enough effort to increase her heart rate 3 or less days per week.   She is taking medications regularly.                      Objective    /80 (BP Location: Right arm, Patient Position: Sitting, Cuff Size: Adult Large)   Pulse 98   Temp 98.2  F (36.8  C) (Oral)   Resp 16   Ht 1.651 m (5' 5\")   Wt 86.7 kg (191 lb 3.2 oz)   LMP  (LMP Unknown)   SpO2 97%   BMI 31.82 kg/m    Body mass index is 31.82 kg/m .      Physical Exam   GENERAL: alert and no distress  HENT: oropharynx clear and oral mucous membranes moist  NECK: no adenopathy, no asymmetry, masses, or scars  RESP: lungs clear to auscultation - no rales, rhonchi or wheezes  CV: regular rate and rhythm, normal S1 S2, no S3 or S4, no murmur, click or rub, no peripheral edema  ABDOMEN: soft, nontender, no hepatosplenomegaly, no masses and bowel sounds normal  MS: no gross musculoskeletal defects noted, no edema  SKIN: no suspicious lesions or rashes            Signed Electronically by: Adriana Davis MD    "

## 2025-07-06 ENCOUNTER — RESULTS FOLLOW-UP (OUTPATIENT)
Dept: OBGYN | Facility: CLINIC | Age: 50
End: 2025-07-06
Payer: COMMERCIAL

## 2025-07-06 DIAGNOSIS — B96.89 BACTERIAL VAGINOSIS: Primary | ICD-10-CM

## 2025-07-06 DIAGNOSIS — N76.0 BACTERIAL VAGINOSIS: Primary | ICD-10-CM

## 2025-07-07 ENCOUNTER — RESULTS FOLLOW-UP (OUTPATIENT)
Dept: FAMILY MEDICINE | Facility: CLINIC | Age: 50
End: 2025-07-07
Payer: COMMERCIAL

## 2025-07-09 RX ORDER — METRONIDAZOLE 500 MG/1
500 TABLET ORAL 2 TIMES DAILY
Qty: 14 TABLET | Refills: 0 | Status: SHIPPED | OUTPATIENT
Start: 2025-07-09 | End: 2025-07-16

## 2025-07-10 RX ORDER — FLUCONAZOLE 150 MG/1
150 TABLET ORAL WEEKLY
Qty: 12 TABLET | Refills: 0 | Status: SHIPPED | OUTPATIENT
Start: 2025-07-10 | End: 2025-09-26

## 2025-07-10 RX ORDER — METRONIDAZOLE 7.5 MG/G
1 GEL VAGINAL
Qty: 70 G | Refills: 4 | Status: SHIPPED | OUTPATIENT
Start: 2025-07-11
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
## 2025-07-10 NOTE — TELEPHONE ENCOUNTER
I totally forgot to order the Diflucan.  Now sent to the pharmacy to take once a week.  We will continue it while she is doing the MetroGel.    Thanks  Sandhya Carson MD'

## 2025-07-11 ENCOUNTER — HOSPITAL ENCOUNTER (OUTPATIENT)
Facility: AMBULATORY SURGERY CENTER | Age: 50
Discharge: HOME | End: 2025-07-11
Attending: STUDENT IN AN ORGANIZED HEALTH CARE EDUCATION/TRAINING PROGRAM
Payer: COMMERCIAL

## 2025-07-11 VITALS
WEIGHT: 190 LBS | DIASTOLIC BLOOD PRESSURE: 90 MMHG | SYSTOLIC BLOOD PRESSURE: 117 MMHG | TEMPERATURE: 97.1 F | BODY MASS INDEX: 31.65 KG/M2 | RESPIRATION RATE: 16 BRPM | OXYGEN SATURATION: 100 % | HEIGHT: 65 IN | HEART RATE: 89 BPM

## 2025-07-11 LAB
COLONOSCOPY: NORMAL
HCG UR QL: NEGATIVE
INTERNAL QC OK POCT: NORMAL
POCT KIT EXPIRATION DATE: NORMAL
POCT KIT LOT NUMBER: NORMAL
UPPER GI ENDOSCOPY: NORMAL

## 2025-07-11 PROCEDURE — 43239 EGD BIOPSY SINGLE/MULTIPLE: CPT | Performed by: STUDENT IN AN ORGANIZED HEALTH CARE EDUCATION/TRAINING PROGRAM

## 2025-07-11 PROCEDURE — 45380 COLONOSCOPY AND BIOPSY: CPT | Mod: 33 | Performed by: STUDENT IN AN ORGANIZED HEALTH CARE EDUCATION/TRAINING PROGRAM

## 2025-07-11 PROCEDURE — 88305 TISSUE EXAM BY PATHOLOGIST: CPT | Mod: 26 | Performed by: PATHOLOGY

## 2025-07-11 PROCEDURE — 88305 TISSUE EXAM BY PATHOLOGIST: CPT | Mod: TC | Performed by: STUDENT IN AN ORGANIZED HEALTH CARE EDUCATION/TRAINING PROGRAM

## 2025-07-11 PROCEDURE — 81025 URINE PREGNANCY TEST: CPT | Performed by: PATHOLOGY

## 2025-07-11 RX ORDER — ONDANSETRON 2 MG/ML
4 INJECTION INTRAMUSCULAR; INTRAVENOUS
Status: DISCONTINUED | OUTPATIENT
Start: 2025-07-11 | End: 2025-07-11 | Stop reason: HOSPADM

## 2025-07-11 RX ORDER — FLUMAZENIL 0.1 MG/ML
0.2 INJECTION, SOLUTION INTRAVENOUS
Status: ACTIVE | OUTPATIENT
Start: 2025-07-11 | End: 2025-07-11

## 2025-07-11 RX ORDER — NALOXONE HYDROCHLORIDE 0.4 MG/ML
0.2 INJECTION, SOLUTION INTRAMUSCULAR; INTRAVENOUS; SUBCUTANEOUS
Status: DISCONTINUED | OUTPATIENT
Start: 2025-07-11 | End: 2025-07-12 | Stop reason: HOSPADM

## 2025-07-11 RX ORDER — LIDOCAINE 40 MG/G
CREAM TOPICAL
Status: DISCONTINUED | OUTPATIENT
Start: 2025-07-11 | End: 2025-07-11 | Stop reason: HOSPADM

## 2025-07-11 RX ORDER — NALOXONE HYDROCHLORIDE 0.4 MG/ML
0.4 INJECTION, SOLUTION INTRAMUSCULAR; INTRAVENOUS; SUBCUTANEOUS
Status: DISCONTINUED | OUTPATIENT
Start: 2025-07-11 | End: 2025-07-12 | Stop reason: HOSPADM

## 2025-07-11 RX ORDER — PROCHLORPERAZINE MALEATE 10 MG
10 TABLET ORAL EVERY 6 HOURS PRN
Status: DISCONTINUED | OUTPATIENT
Start: 2025-07-11 | End: 2025-07-12 | Stop reason: HOSPADM

## 2025-07-11 RX ORDER — ONDANSETRON 4 MG/1
4 TABLET, ORALLY DISINTEGRATING ORAL EVERY 6 HOURS PRN
Status: DISCONTINUED | OUTPATIENT
Start: 2025-07-11 | End: 2025-07-12 | Stop reason: HOSPADM

## 2025-07-11 RX ORDER — ONDANSETRON 2 MG/ML
4 INJECTION INTRAMUSCULAR; INTRAVENOUS EVERY 6 HOURS PRN
Status: DISCONTINUED | OUTPATIENT
Start: 2025-07-11 | End: 2025-07-12 | Stop reason: HOSPADM

## 2025-07-14 ENCOUNTER — MYC REFILL (OUTPATIENT)
Dept: SURGERY | Facility: CLINIC | Age: 50
End: 2025-07-14
Payer: COMMERCIAL

## 2025-07-14 DIAGNOSIS — E66.9 OBESITY (BMI 30-39.9): ICD-10-CM

## 2025-07-16 ENCOUNTER — MYC MEDICAL ADVICE (OUTPATIENT)
Dept: SURGERY | Facility: CLINIC | Age: 50
End: 2025-07-16
Payer: COMMERCIAL

## 2025-07-16 DIAGNOSIS — E66.9 OBESITY (BMI 30-39.9): Primary | ICD-10-CM

## 2025-07-21 ENCOUNTER — MYC MEDICAL ADVICE (OUTPATIENT)
Dept: FAMILY MEDICINE | Facility: CLINIC | Age: 50
End: 2025-07-21
Payer: COMMERCIAL

## 2025-07-21 ENCOUNTER — TELEPHONE (OUTPATIENT)
Dept: SURGERY | Facility: CLINIC | Age: 50
End: 2025-07-21
Payer: COMMERCIAL

## 2025-07-21 NOTE — TELEPHONE ENCOUNTER
Prior Authorization Retail Medication Request    Medication/Dose: Zepbound 7.5 mg weekly dose  New/renewal/insurance change PA/secondary ins. PA: New with change in Insurance July 1st.  Rationale:  Patient started taking Zepbound on 5/20/2025 and has been tolerating it well.  Initial weight was 199 lbs and BMI 33.2     Current weight on 7/11/2025 was 190 lbs and BMI 31.62.    Insurance   Key: [id number]    Pharmacy Information (if different than what is on RX)  Name:  [facility]  Fax: [phone]    Clinic Information  Preferred routing pool for dept communication: Bariatric Surgery Support [facility]

## 2025-07-21 NOTE — PROGRESS NOTES
Mimi Melton is a 50 year old who is being evaluated via a billable video visit.      How would you like to obtain your AVS? MyChart  If the video visit is dropped, the invitation should be resent by: Text to cell phone: [phone]  Will anyone else be joining your video visit? No        Medical  Weight Loss Follow-Up Diet Evaluation  Assessment:  Mimi is presenting today for a follow up weight management nutrition consultation.  This patient has had an initial appointment and was referred by Dr. Monterroso for MNT as treatment for Obesity     Weight loss medication: Zepbound - possibly switching to Wegovy d/t insurance.   Pt's weight is 190 lbs (7/11/2025)  Initial weight: 199.5 lbs  Weight change: 9.5 lbs, 4.8% weight loss         No data to display              BMI: 31.62  Ideal body weight: 57 kg (125 lb 10.6 oz)  Adjusted ideal body weight: 68.7 kg (151 lb 6.4 oz)    Estimated RMR (Winchester-St Jeor equation):   1,488 kcals x 1.2 (sedentary) = 1,785 kcals (for weight maintenance)  Recommended Protein Intake: 55 - 70 grams of protein/day (0.8 - 1.0 g/kg d/t kidney function)  Patient Active Problem List:  Patient Active Problem List   Diagnosis    Surveillance of previously prescribed intrauterine contraceptive device    BV (bacterial vaginosis)    Exophthalmos    Abnormal Pap smear of cervix    CARDIOVASCULAR SCREENING; LDL GOAL LESS THAN 160    Essential hypertension with goal blood pressure less than 140/90    Anemia    Hypothyroidism due to acquired atrophy of thyroid    Cervical high risk HPV (human papillomavirus) test positive    Female stress incontinence    Chronic seasonal allergic rhinitis, unspecified trigger    Need for HPV vaccine    Thyroid eye disease    Eyelid retraction    Diverticulosis of large intestine without hemorrhage    Intrinsic sphincter deficiency    Stress incontinence    Mass of upper outer quadrant of left breast    Vitamin B12 deficiency   Diabetes: no dx, A1C of 5.8% on  11/6/2024  Nutrition History:   Food allergies/intolerances/cultural or Latter-day food customs: No. Does not like beef, seafood/fish, pork, mangoes, brussels sprouts, tofu, lentils  Okay with turkey and chicken, broccoli, green beans, cantaloupe, strawberries and grapes, bread, rice, quinoa, cauliflower rice  Progress on goals from last visit: Patient stated that things have been okay over the past few weeks. Patient stated that she does not feel like she is doing well with water intake and eating healthy. Patient has noticed a change with her appetite since starting the medication. Patient stated that she feels like she is not getting enough fruits and vegetables. Patient has noticed a change in taste for certain foods - desire for fruits and vegetables. Patient stated that she would like to stay on Zepbound d/t tolerating it well - would like to stay on the 5 mg dose.   Goals:   Aim to have 55-70 grams or protein/day  Aim for 25+ grams of fiber/day - fiber source with every meal  Limit fluid intake 20-30 minutes before a meal and with a meal - fuel on food first  Stay consistent with three meals/day    Dietary Recall:  Breakfast: skipping  Lunch (waiting until 2-3 PM d/t getting hungry): fast food - Elaine Roche (meat, rice, vegetables)   Dinner (7:30 PM): 1/2 turkey sandwich with lettuce, tomato and cheese  Beverages:   Water - 16 ounce - 2 bottles/day   Tea - jose lemon with honey and lemon - 3-4 cups  No pop, sparkling, milk, juice  Exercise:   [employer] - this has been inconsistent lately  [place] around [place] 1-2x/week  Nutrition Diagnosis:    Obesity related to excessive energy intake as evidence by BMI of 31.62    Intervention:  Food and/or nutrient delivery: stay consistent with lifestyle changes  Nutrition education: breakfast ideas, fast food options, protein supplements    Monitoring/Evaluation:    Goals:  Aim to have soft proteins in the morning  Order from the kids menu to balance portions when  eating out  Aim to have three meals per day  Re-establish your routine with working out    Patient to follow up in 5 month(s) with bariatrician and 1 month(s) with DESEAN    Video-Visit Details    Type of service:  Video Visit    Video Start Time (time video started): 9:31 AM    Video End Time (time video stopped): 9:53 AM    Originating Location (pt. Location): Home      Distant Location (provider location):  Off-site    Mode of Communication:  Video Conference via [facility]    Physician has received verbal consent for a Video Visit from the patient? Yes      Claire Iverson RD

## 2025-07-22 NOTE — TELEPHONE ENCOUNTER
Central Prior Authorization Team  Phone: [phone]    PA Initiation    Medication: ZEPBOUND 7.5 MG/0.5ML SC SOAJ  Insurance Company: [employer] - Phone [phone] Fax [phone]  Pharmacy Filling the Rx[id number] [place] [facility] [place], [place] - [address] [place] [address] [address]  Filling Pharmacy Phone: [phone]  Filling Pharmacy Fax: [phone]  Start Date: 7/22/2025

## 2025-07-23 ENCOUNTER — VIRTUAL VISIT (OUTPATIENT)
Dept: SURGERY | Facility: CLINIC | Age: 50
End: 2025-07-23
Payer: COMMERCIAL

## 2025-07-23 DIAGNOSIS — E66.9 OBESITY (BMI 30-39.9): Primary | ICD-10-CM

## 2025-07-23 DIAGNOSIS — Z71.3 NUTRITIONAL COUNSELING: ICD-10-CM

## 2025-07-23 NOTE — LETTER
7/23/2025      Mimi Melton  [address]  [place] [address]      Dear Colleague,    Thank you for referring your patient, Mimi Melton, to the [facility] [place]. Please see a copy of my visit note below.    Mimi Melton is a 50 year old who is being evaluated via a billable video visit.      How would you like to obtain your AVS? MyChart  If the video visit is dropped, the invitation should be resent by: Text to cell phone: [phone]  Will anyone else be joining your video visit? No        Medical  Weight Loss Follow-Up Diet Evaluation  Assessment:  Mimi is presenting today for a follow up weight management nutrition consultation.  This patient has had an initial appointment and was referred by Dr. Monterroso for MNT as treatment for Obesity     Weight loss medication: Zepbound - possibly switching to Wegovy d/t insurance.   Pt's weight is 190 lbs (7/11/2025)  Initial weight: 199.5 lbs  Weight change: 9.5 lbs, 4.8% weight loss         No data to display              BMI: 31.62  Ideal body weight: 57 kg (125 lb 10.6 oz)  Adjusted ideal body weight: 68.7 kg (151 lb 6.4 oz)    Estimated RMR (Morrison-St Jeor equation):   1,488 kcals x 1.2 (sedentary) = 1,785 kcals (for weight maintenance)  Recommended Protein Intake: 55 - 70 grams of protein/day (0.8 - 1.0 g/kg d/t kidney function)  Patient Active Problem List:  Patient Active Problem List   Diagnosis     Surveillance of previously prescribed intrauterine contraceptive device     BV (bacterial vaginosis)     Exophthalmos     Abnormal Pap smear of cervix     CARDIOVASCULAR SCREENING; LDL GOAL LESS THAN 160     Essential hypertension with goal blood pressure less than 140/90     Anemia     Hypothyroidism due to acquired atrophy of thyroid     Cervical high risk HPV (human papillomavirus) test positive     Female stress incontinence     Chronic seasonal allergic rhinitis, unspecified trigger     Need for  HPV vaccine     Thyroid eye disease     Eyelid retraction     Diverticulosis of large intestine without hemorrhage     Intrinsic sphincter deficiency     Stress incontinence     Mass of upper outer quadrant of left breast     Vitamin B12 deficiency   Diabetes: no dx, A1C of 5.8% on 11/6/2024  Nutrition History:   Food allergies/intolerances/cultural or Rastafarian food customs: No. Does not like beef, seafood/fish, pork, mangoes, brussels sprouts, tofu, lentils  Okay with turkey and chicken, broccoli, green beans, cantaloupe, strawberries and grapes, bread, rice, quinoa, cauliflower rice  Progress on goals from last visit: Patient stated that things have been okay over the past few weeks. Patient stated that she does not feel like she is doing well with water intake and eating healthy. Patient has noticed a change with her appetite since starting the medication. Patient stated that she feels like she is not getting enough fruits and vegetables. Patient has noticed a change in taste for certain foods - desire for fruits and vegetables. Patient stated that she would like to stay on Zepbound d/t tolerating it well - would like to stay on the 5 mg dose.   Goals:   Aim to have 55-70 grams or protein/day  Aim for 25+ grams of fiber/day - fiber source with every meal  Limit fluid intake 20-30 minutes before a meal and with a meal - fuel on food first  Stay consistent with three meals/day    Dietary Recall:  Breakfast: skipping  Lunch (waiting until 2-3 PM d/t getting hungry): fast food - Elaine Roche (meat, rice, vegetables)   Dinner (7:30 PM): 1/2 turkey sandwich with lettuce, tomato and cheese  Beverages:   Water - 16 ounce - 2 bottles/day   Tea - jose lemon with honey and lemon - 3-4 cups  No pop, sparkling, milk, juice  Exercise:   [employer] - this has been inconsistent lately  [place]alking around [place] lake 1-2x/week  Nutrition Diagnosis:    Obesity related to excessive energy intake as evidence by BMI of  31.62    Intervention:  Food and/or nutrient delivery: stay consistent with lifestyle changes  Nutrition education: breakfast ideas, fast food options, protein supplements    Monitoring/Evaluation:    Goals:  Aim to have soft proteins in the morning  Order from the kids menu to balance portions when eating out  Aim to have three meals per day  Re-establish your routine with working out    Patient to follow up in 5 month(s) with bariatrician and 1 month(s) with DESEAN    Video-Visit Details    Type of service:  Video Visit    Video Start Time (time video started): 9:31 AM    Video End Time (time video stopped): 9:53 AM    Originating Location (pt. Location): Home      Distant Location (provider location):  Off-site    Mode of Communication:  Video Conference via [facility]    Physician has received verbal consent for a Video Visit from the patient? Yes      Claire Iverson RD         Again, thank you for allowing me to participate in the care of your patient.        Sincerely,        Claire Iverson RD    Electronically signed

## 2025-07-23 NOTE — PATIENT INSTRUCTIONS
"High protein breakfast ideas:  -[place] products (high protein brand) - oatmeal, muffins, pancakes, etc.  -Overnight oats - there are easy \"just add water\" kinds in the grocery store or lots of recipes out there, look for one that has added protein from liquid or powder or other source  -Breakfast Egg Casseroles  -Scrambled eggs with cottage cheese whipped in  -English muffin breakfast sandwiches or burritos  -Frozen breakfast bowls OR \"Add an Egg\" bowls  -Protein bars - 10/10 rule is a good rule of thumb (Brands: [address] Protein Bar, RX bar, Skout Protein Bars)  -High protein tortillas or low carb tortilla for breakfast burrito  -Turkey, Veggie, Chicken, Black bean burgers in the frozen section - add cheese and fried egg on top  -Greek Yogurt (examples: plain greek, Oikos Triple Zero, Dannon Light N Fit, Two Good Yogurt), with choice of fresh or frozen fruit, macho seeds, hemp seeds, nuts  -1 slice whole wheat bread with mini avocado or half regular sized avocado, \"Everything But the Bagel\" seasoning, [place] sanchez on the side or on top  -2-3 light string cheeses with fruit (banana, fruit cup, berries, etc.)  -Cottage cheese and fruit on top  -Breakfast Skillet: sauteed bell peppers, onions with eggs and sprinkle of cheese (tip: batch prep sauteed peppers and onions for the week for a faster breakfast)         Dianne Christiano  Ham, egg and cheese biscuit   Cheeseburger or Whopper Jr   Chicken Jr.   +try applesauce instead of fries     Arbys  Roast [place] and Swiss Wrap   Roast beef sandwich- classic size   Classic roast chicken sandwich   +try a side salad to boost veggies     McDonalds  Sausage burrito   Cheeseburger or hamburger   Kal   +choose a kids martinez or apple slices     Kwik Trip  Ham & Swiss [place] (290 calories, 39 g carbs, 19 g protein)   Pulled BBQ Chicken [place] (460 calories, 65 g carbs, 21 g protein)    Taco Bell   Soft taco or crunchy taco supreme- 1   Burrito supreme   Cheese quesadilla "   Power menu bowl   +add a side of black beans for a boost of fiber and protein     Taco Johns  Crispy Taco  Taco Bravo  Chente Breakfast Burrito (sanchez or sausage)  Chicken Boss Bowl    Applebees  Grilled chicken breast  [address] chicken and shrimp  Blackened [place]  Blackened shrimp caesar salad chicken or steak bowl  6oz top sirloin  Grilled chicken wonton tacos    [place]vers  Grilled chicken sandwich  Sourdough Melt, Single  2 piece chicken tenders  Steamed broccoli  Small Daw[place]  [place] Salad with Grilled chicken (seasonal)    Wendys  Jr. Hamburger  Grilled chicken sandwich  4 piece chicken nuggets  Parmesan Caesar salad  Strawberry Summer Salad  Apple Pecan Salad  Small Chili  Sour cream and chive baked potato  Classic sanchez, egg and cheese sandwich    Panera  Avodado, egg white and spinach sandwich  Greek yogurt with mixed berries parfait  Asian Sesame Salad with Chicken  Green Goddess Dhaliwal Salad with Chicken  BBQ chicken salad  Deli [place] Veteran  Haakon Garland Chicken Salad Veteran  Chicken noodle soup  Creamy tomato soup  Turkey Chili    Noodles and Company  Small [place]ppi Lemon Parmesan with Shrimp (450 calories, 49 g carbs, 26 g protein)  Small 3-Cheese Tortelloni Kathryn (360 calories, 38 g carbs, 17 g protein)  Small Grilled Orange Chicken (480 calories, 50 g carbs, 33 g protein)  Small Cauliflower Rigatoni Fresca with Shrimp (470 calories, 51 g carbs, 25 g protein)  Small Zucchini Pesto Cream with Grilled Chicken (320 calories, 11 g carbs, 32 g protein)  Small The Med Salad w/ Chicken (290 calories, 16 g carbs, 32 g protein)  Small Mexican Street Corn Salad (420 calories, 17 g carbs, 34 g protein)  Small Grilled Chicken Caesar Salad (380 calories, 8 g carbs, 31 g protein)    Over 600 calories:  Small Spaghetti & Meatballs (670 calories, 53 g carbs, 25 g protein)        Protein Supplements     Look for (per serving):      100-200 calories 15-30 grams protein  Less than 10 grams total  carbohydrate  10/10 Rule      Type  Serving  Calories Protein Grams  Sugar Grams  Where Available    Premier Protein  Shake  1 bottle  160 30 1 Jurgen's, [employer]co, [facility], Target, Cub    Equate High Performance  Shake  1 bottle  170 30 1 [facility], [facility] Nutrition Plan  Shake  1 bottle  150 30 2 Jurgen's Club, Walmart, [employer]co    Ensure Max Protein  Shake  1 bottle  150 30 1 Walmart, Target, [facility]    [facility] Core Power  Shake  1 bottle  170 26 5 Walmart, Target, Hy[employer]Vee, [facility]    Slim Fast    High Protein  Shake  1 bottle  180 20 1 Walmart, Target, Amazon, SlimFast site    100kcal Muscle Milk  Shake  1 bottle  100 20 0 Walmart, Target, Walgreens    Unjury Protein+  Powder 1 scoop  90 20 0 Unjury Website, Amazon    Isopure  Powder 1 scoop  100 25 0 Target, Walmart, GNC, Vitamin Shoppe    100kcal Muscle Milk  Powder 1 scoop  100 15 0 Walmart, [employer]    Naked Whey  Powder 2 scoops  160 36 5 Naked Whey Website    Optimum Nutrition  Powder 1 scoop  120 24 1 GNC, Vitamin Shoppe, Target      Water-Based Protein Supplements      Type  Serving  Calories Protein Grams  Sugar Grams  Where Available    Isopure Zero Carb  Water  1 bottle  80 20 0 GNC, Vitamin Shoppe    Protein 2-0  Water  1 bottle  60-80 15 0 [facility], [facility]   RYSE Clear Protein  Water  1 bottle  100 22 0 GNC, Vitamin Shoppe    Premiere Clear   Water  1 bottle  90 20 0 Amazon, Walmart, Jurgen s    Isopure Infusions  Powder 1 scoop 90 20 0 GNC, Vitamin Shoppe, Target    Oath  Powder 1 scoop 100 20 1 Target, Oath Nutrition Website    SEEQ  Powder 1 scoop 100 22 0 Target, SEEQ [facility], [facility]      Plant-Based Protein Supplements     Type  Serving  Calories Protein Grams  Sugar Grams  Where Available    Orgain Plant Protein Shake  Shake 1 bottle  150 22 0 Orgain Website, [facility]    Ripple Vegan Protein Shake  Shake 1 bottle  210 20 9 Target, Amazon    Evolve Shake 1 bottle  150 20 4 Target, [facility] Cub, Hy-Vee    OWYN Shake 1 bottle  170 20 4  Target, Walmart, Hy-Vee, [facility], [facility]    PURIS Organic Pea Protein  Powder 1 scoop  140 27 0 PURIS Website    Garden of Life Raw Protein Powder  Powder 1 scoop  120 22 0 Whole Foods, Vitamin Shoppe, Hy-Vee, Lunds    Orgain Organic Protein Powder Powder 2 scoops 150 21 0 Target, Walmart, Costco, Walgreens    Planted by Unjury  Powder 1 scoop  130 20 3 Unjury Website, Amazon    Protein and Greens by Kurtz  Powder 1 scoop  120 20 1 Walmart, Target, Amazon    Sun Warrior  Powder 1 scoop  100 17 1 Vitamin Shoppe, Walmart, [facility]    Ora  Powder 2 scoops  130 23 0 Ora Website    Thorn  Powder 1 scoop  100 21 2 Thorn Website

## 2025-07-23 NOTE — TELEPHONE ENCOUNTER
PRIOR AUTHORIZATION DENIED    Medication: ZEPBOUND 7.5 MG/0.5ML SC SOAJ  Insurance Company: [employer] - Phone [phone] Fax [phone]  Denial Date: 7/22/2025    Denial Reason(s):     Appeal Information: If provider would like to appeal please provide a letter of medical necessity.

## 2025-07-25 NOTE — TELEPHONE ENCOUNTER
PREVIOUS DENIAL REMAINED - SUBMITTED FOR APPEAL     Medication Appeal Initiation    Medication: ZEPBOUND 7.5 MG/0.5ML SC SOAJ  Appeal Start Date:  7/25/2025  Insurance Company: [employer]  Insurance Phone:   Insurance Fax: [phone]  Comments:       FAXED LETTER OF MEDICAL NECESSITY AND CHART NOTES TO INSURANCE

## 2025-07-25 NOTE — TELEPHONE ENCOUNTER
PA Initiation    Medication: ZEPBOUND 7.5 MG/0.5ML SC SOAJ  Insurance Company: [employer] - Phone [phone] Fax [phone]  Pharmacy Filling the Rx: [id number] [place], [place] - [address] [place] [address] [address]  Filling Pharmacy Phone: [phone]  Filling Pharmacy Fax: [phone]  Start Date: 7/22/2025    RESUBMITTED URGENTLY FOR TERRI WITH ANGIE -     JUSTO CALIXTO ([place]: [id number])

## 2025-07-28 ENCOUNTER — MYC MEDICAL ADVICE (OUTPATIENT)
Dept: FAMILY MEDICINE | Facility: CLINIC | Age: 50
End: 2025-07-28

## 2025-07-28 DIAGNOSIS — I10 ESSENTIAL HYPERTENSION WITH GOAL BLOOD PRESSURE LESS THAN 140/90: ICD-10-CM

## 2025-07-28 RX ORDER — LOSARTAN POTASSIUM 25 MG/1
TABLET ORAL
Qty: 270 TABLET | Refills: 3 | Status: SHIPPED | OUTPATIENT
Start: 2025-07-28

## 2025-07-28 NOTE — TELEPHONE ENCOUNTER
Received phone call from Dr Verduzco - regarding a peer to peer.  Case# [id number]   Call back - [phone]

## 2025-07-28 NOTE — TELEPHONE ENCOUNTER
MEDICATION APPEAL DENIED    INSURANCE PROCESSED FOR SECOND LEVEL - FINAL APPEAL    Medication: ZEPBOUND 7.5 MG/0.5ML SC SOAJ  Insurance Company: [employer]  Denial Date: 7/26/2025  Denial Reason(s): MUST USE WEGOVY      Second Level Appeal Information:       Patient Notified: NO  Central Prior Authorization Team ONLY: Second level appeals will be managed by the clinic staff and provider. Please contact the [facility] Prior Authorization Team if additional information about the denial is needed.

## 2025-07-30 ENCOUNTER — OFFICE VISIT (OUTPATIENT)
Dept: FAMILY MEDICINE | Facility: CLINIC | Age: 50
End: 2025-07-30
Payer: COMMERCIAL

## 2025-07-30 VITALS
TEMPERATURE: 98.3 F | HEIGHT: 65 IN | BODY MASS INDEX: 31.62 KG/M2 | SYSTOLIC BLOOD PRESSURE: 112 MMHG | DIASTOLIC BLOOD PRESSURE: 74 MMHG | OXYGEN SATURATION: 97 % | RESPIRATION RATE: 16 BRPM | HEART RATE: 91 BPM

## 2025-07-30 DIAGNOSIS — M54.9 LEFT-SIDED BACK PAIN, UNSPECIFIED BACK LOCATION, UNSPECIFIED CHRONICITY: Primary | ICD-10-CM

## 2025-07-30 PROCEDURE — G2211 COMPLEX E/M VISIT ADD ON: HCPCS | Performed by: FAMILY MEDICINE

## 2025-07-30 PROCEDURE — 99214 OFFICE O/P EST MOD 30 MIN: CPT | Performed by: FAMILY MEDICINE

## 2025-07-30 ASSESSMENT — ENCOUNTER SYMPTOMS: BACK PAIN: 1

## 2025-07-30 ASSESSMENT — PAIN SCALES - GENERAL: PAINLEVEL_OUTOF10: SEVERE PAIN (7)

## 2025-07-30 NOTE — PROGRESS NOTES
"  Assessment & Plan     Left-sided back pain, mid back  Exam reveals palpable tenderness to the left mid-back, without vertebral bony tenderness.     Given patient concern and pain location, a renal ultrasound (US Renal Complete Non-Vascular) is ordered to be Normal.     Musculoskeletal origin remains likely in the absence of red flags or abnormal labs.        The longitudinal plan of care for the diagnosis(es)/condition(s) as documented were addressed during this visit. Due to the added complexity in care, I will continue to support Miim in the subsequent management and with ongoing continuity of care.    Dennis Le is a 50-year-old female presenting with left mid-back pain ongoing for 3-4 weeks. The pain is intermittent, sometimes waking her at night, and is located in the left mid-back region. She reports the discomfort prompted a chiropractor visit, but treatment provided no improvement. The chiropractor has recommended to follow up with the imaging.     Patient is concerned it may be related to her kidneys.   She denies recent urinary symptoms.    Recent labs--including BMP and UA from 3 weeks ago--were reviewed and were within normal limits, even while the pain was present. No additional systemic symptoms noted.        7/30/2025     9:50 AM   Additional Questions   Roomed by Sergio QUINTANILLA MA   Accompanied by Granddaughter     Back Pain     History of Present Illness       Reason for visit:  Back Pain   She is taking medications regularly.                      Objective    /74   Pulse 91   Temp 98.3  F (36.8  C) (Oral)   Resp 16   Ht 1.651 m (5' 5\")   LMP 07/05/2025   SpO2 97%   BMI 31.62 kg/m    Body mass index is 31.62 kg/m .    Physical Exam     Exam: palpable tenderness to the left mid back area. No bony vertebral tenderness.   No overlying rash   Abd: normal             Signed Electronically by: Adriana Davis MD    "

## 2025-07-31 ENCOUNTER — RESULTS FOLLOW-UP (OUTPATIENT)
Dept: FAMILY MEDICINE | Facility: CLINIC | Age: 50
End: 2025-07-31
Payer: COMMERCIAL

## 2025-08-11 ENCOUNTER — VIRTUAL VISIT (OUTPATIENT)
Dept: GASTROENTEROLOGY | Facility: CLINIC | Age: 50
End: 2025-08-11
Attending: PHYSICIAN ASSISTANT
Payer: COMMERCIAL

## 2025-08-11 VITALS — HEIGHT: 64 IN | WEIGHT: 190 LBS | BODY MASS INDEX: 32.44 KG/M2

## 2025-08-11 DIAGNOSIS — R10.13 EPIGASTRIC PAIN: ICD-10-CM

## 2025-08-11 DIAGNOSIS — K21.00 GASTROESOPHAGEAL REFLUX DISEASE WITH ESOPHAGITIS WITHOUT HEMORRHAGE: ICD-10-CM

## 2025-08-11 DIAGNOSIS — K29.80 PEPTIC DUODENITIS: ICD-10-CM

## 2025-08-11 DIAGNOSIS — R11.2 NAUSEA AND VOMITING, UNSPECIFIED VOMITING TYPE: Primary | ICD-10-CM

## 2025-08-11 DIAGNOSIS — K29.40 GASTRIC ATROPHY: ICD-10-CM

## 2025-08-11 PROCEDURE — 98005 SYNCH AUDIO-VIDEO EST LOW 20: CPT | Performed by: PHYSICIAN ASSISTANT

## 2025-08-11 ASSESSMENT — PAIN SCALES - GENERAL: PAINLEVEL_OUTOF10: NO PAIN (0)

## 2025-08-20 ENCOUNTER — MYC MEDICAL ADVICE (OUTPATIENT)
Dept: SURGERY | Facility: CLINIC | Age: 50
End: 2025-08-20
Payer: COMMERCIAL

## 2025-08-20 DIAGNOSIS — E66.9 OBESITY (BMI 30-39.9): Primary | ICD-10-CM

## 2025-08-26 ENCOUNTER — VIRTUAL VISIT (OUTPATIENT)
Dept: SURGERY | Facility: CLINIC | Age: 50
End: 2025-08-26
Payer: COMMERCIAL

## 2025-08-26 DIAGNOSIS — Z71.3 NUTRITIONAL COUNSELING: ICD-10-CM

## 2025-08-26 DIAGNOSIS — E66.9 OBESITY (BMI 30-39.9): Primary | ICD-10-CM

## 2025-08-26 PROCEDURE — 97803 MED NUTRITION INDIV SUBSEQ: CPT | Mod: 95 | Performed by: DIETITIAN, REGISTERED

## 2025-08-28 ENCOUNTER — LAB (OUTPATIENT)
Dept: LAB | Facility: CLINIC | Age: 50
End: 2025-08-28
Payer: COMMERCIAL

## 2025-08-28 DIAGNOSIS — N89.8 VAGINAL DISCHARGE: ICD-10-CM

## 2025-08-28 DIAGNOSIS — I12.9 BENIGN HYPERTENSION WITH CKD (CHRONIC KIDNEY DISEASE), STAGE II: ICD-10-CM

## 2025-08-28 DIAGNOSIS — N18.2 BENIGN HYPERTENSION WITH CKD (CHRONIC KIDNEY DISEASE), STAGE II: ICD-10-CM

## 2025-08-28 LAB
ALBUMIN SERPL BCG-MCNC: 4.5 G/DL (ref 3.5–5.2)
ALBUMIN UR-MCNC: NEGATIVE MG/DL
AMORPH CRY #/AREA URNS HPF: ABNORMAL /HPF
ANION GAP SERPL CALCULATED.3IONS-SCNC: 12 MMOL/L (ref 7–15)
APPEARANCE UR: CLEAR
BACTERIA #/AREA URNS HPF: ABNORMAL /HPF
BILIRUB UR QL STRIP: NEGATIVE
BUN SERPL-MCNC: 12.2 MG/DL (ref 6–20)
CALCIUM SERPL-MCNC: 9.9 MG/DL (ref 8.8–10.4)
CHLORIDE SERPL-SCNC: 97 MMOL/L (ref 98–107)
CLUE CELLS: PRESENT
COLOR UR AUTO: YELLOW
CREAT SERPL-MCNC: 1.07 MG/DL (ref 0.51–0.95)
CREAT UR-MCNC: 330 MG/DL
EGFRCR SERPLBLD CKD-EPI 2021: 63 ML/MIN/1.73M2
FERRITIN SERPL-MCNC: 66 NG/ML (ref 6–175)
GLUCOSE SERPL-MCNC: 99 MG/DL (ref 70–99)
GLUCOSE UR STRIP-MCNC: NEGATIVE MG/DL
HCO3 SERPL-SCNC: 29 MMOL/L (ref 22–29)
HCT VFR BLD AUTO: 40.1 % (ref 35–47)
HGB BLD-MCNC: 13.3 G/DL (ref 11.7–15.7)
HGB UR QL STRIP: ABNORMAL
IRON BINDING CAPACITY (ROCHE): 330 UG/DL (ref 240–430)
IRON SATN MFR SERPL: 30 % (ref 15–46)
IRON SERPL-MCNC: 99 UG/DL (ref 37–145)
KETONES UR STRIP-MCNC: ABNORMAL MG/DL
LEUKOCYTE ESTERASE UR QL STRIP: NEGATIVE
MCV RBC AUTO: 88.9 FL (ref 78–100)
MICROALBUMIN UR-MCNC: 13 MG/L
MICROALBUMIN/CREAT UR: 3.94 MG/G CR (ref 0–25)
MUCOUS THREADS #/AREA URNS LPF: PRESENT /LPF
NITRATE UR QL: NEGATIVE
PH UR STRIP: 6 [PH] (ref 5–8)
PHOSPHATE SERPL-MCNC: 3 MG/DL (ref 2.5–4.5)
POTASSIUM SERPL-SCNC: 3.1 MMOL/L (ref 3.4–5.3)
PTH-INTACT SERPL-MCNC: 37 PG/ML (ref 15–65)
RBC #/AREA URNS AUTO: ABNORMAL /HPF
SODIUM SERPL-SCNC: 138 MMOL/L (ref 135–145)
SP GR UR STRIP: 1.02 (ref 1–1.03)
SQUAMOUS #/AREA URNS AUTO: ABNORMAL /LPF
TRICHOMONAS, WET PREP: ABNORMAL
UROBILINOGEN UR STRIP-ACNC: 1 E.U./DL
VIT D+METAB SERPL-MCNC: 57 NG/ML (ref 20–50)
WBC #/AREA URNS AUTO: ABNORMAL /HPF
WBC'S/HIGH POWER FIELD, WET PREP: ABNORMAL
YEAST, WET PREP: ABNORMAL

## 2025-09-04 ENCOUNTER — VIRTUAL VISIT (OUTPATIENT)
Dept: NEPHROLOGY | Facility: CLINIC | Age: 50
End: 2025-09-04
Attending: INTERNAL MEDICINE
Payer: COMMERCIAL

## 2025-09-04 VITALS — BODY MASS INDEX: 32.44 KG/M2 | HEIGHT: 64 IN | WEIGHT: 190 LBS

## 2025-09-04 DIAGNOSIS — I10 ESSENTIAL HYPERTENSION WITH GOAL BLOOD PRESSURE LESS THAN 140/90: ICD-10-CM

## 2025-09-04 PROBLEM — M54.2 CERVICALGIA: Status: ACTIVE | Noted: 2025-09-04

## 2025-09-04 RX ORDER — LOSARTAN POTASSIUM 25 MG/1
TABLET ORAL
Status: SHIPPED
Start: 2025-09-04

## 2025-09-04 ASSESSMENT — PAIN SCALES - GENERAL: PAINLEVEL_OUTOF10: NO PAIN (0)

## (undated) DEVICE — GOWN IMPERVIOUS 2XL BLUE

## (undated) DEVICE — SOL NACL 0.9% IRRIG 500ML BOTTLE 2F7123

## (undated) DEVICE — TUBING SUCTION 12"X1/4" N612

## (undated) DEVICE — GLOVE PROTEXIS BLUE W/NEU-THERA 7.5  2D73EB75

## (undated) DEVICE — SUCTION MANIFOLD NEPTUNE 2 SYS 4 PORT 0702-020-000

## (undated) DEVICE — PAD CHUX UNDERPAD 30X30"

## (undated) DEVICE — PAD CHUX UNDERPAD 30X36" P3036C

## (undated) DEVICE — ENDO BITE BLOCK ADULT OMNI-BLOC

## (undated) DEVICE — GLOVE BIOGEL PI MICRO SZ 7.0 48570

## (undated) DEVICE — DRAPE UNDER BUTTOCK 8483

## (undated) DEVICE — EYE KNIFE CRESCENT 55DEG 373807

## (undated) DEVICE — KIT ENDO TURNOVER/PROCEDURE CARRY-ON 101822

## (undated) DEVICE — PACK MINOR EYE CUSTOM ASC

## (undated) DEVICE — SUCTION TIP YANKAUER STR K87

## (undated) DEVICE — SOL WATER IRRIG 500ML BOTTLE 2F7113

## (undated) DEVICE — PANTIES MESH LG/XLG 2PK 706M2

## (undated) DEVICE — TUBING SUCTION MEDI-VAC 1/4"X20' N620A

## (undated) DEVICE — GLOVE PROTEXIS MICRO 7.5  2D73PM75

## (undated) DEVICE — CAST PLASTER SPLINT 3X15" EXTRA FAST

## (undated) DEVICE — SOL NACL 0.9% INJ 1000ML BAG 2B1324X

## (undated) DEVICE — EYE PREP BETADINE 5% SOLUTION 30ML 0065-0411-30

## (undated) DEVICE — BLADE KNIFE SURG 15 371115

## (undated) DEVICE — GOWN ISOLATION YELLOW UNIV NOT STERILE 3110PG

## (undated) DEVICE — GLOVE PROTEXIS MICRO 7.0  2D73PM70

## (undated) DEVICE — CONNECTOR WATER VALVE PERFUSION PACK STR 020272801

## (undated) DEVICE — LINEN TOWEL PACK X5 5464

## (undated) DEVICE — PEN MARKING SKIN TYCO DEVON DUAL TIP 31145868

## (undated) DEVICE — SUCTION MANIFOLD NEPTUNE 2 SYS 1 PORT 702-025-000

## (undated) DEVICE — SUCTION CATH AIRLIFE TRI-FLO W/CONTROL PORT 14FR  T60C

## (undated) DEVICE — TUBING SET ASPIRATION W/EXT SONOPET  LF 5450-850-003

## (undated) DEVICE — ESU EYE HIGH TEMP 65410-183

## (undated) DEVICE — TIP ASPIRATOR PAYNER ANG SONOPET UNV 25KHZ 360D 5450-800-312

## (undated) DEVICE — SYR 30ML SLIP TIP W/O NDL 302833

## (undated) DEVICE — NDL 25GA 1.5" 305127

## (undated) DEVICE — ENDO FORCEP BX CAPTURA PRO SPIKE G50696

## (undated) DEVICE — ESU NDL COLORADO MICRO 3CM STR N103A

## (undated) DEVICE — RETR RING LONE STAR 28.3X18.3CM W/CATH CLIPSX2 3308G

## (undated) DEVICE — LINER SUCTION US ASPIRATOR SONOPET CANISTER LF 5450-850-002

## (undated) DEVICE — GLOVE EXAM NITRILE LG PF LATEX FREE 5064

## (undated) DEVICE — TUBING SUCTION 10'X3/16" N510

## (undated) DEVICE — PREP POVIDONE IODINE SOLUTION 10% 4OZ BOTTLE 29906-004

## (undated) DEVICE — GLOVE BIOGEL PI MICRO INDICATOR UNDERGLOVE SZ 7.5 48975

## (undated) DEVICE — SYR EAR BULB 3OZ 0035830

## (undated) DEVICE — ESU PENCIL W/COATED BLADE E2450H

## (undated) DEVICE — PREP POVIDONE-IODINE 7.5% SCRUB 4OZ BOTTLE MDS093945

## (undated) DEVICE — CATH FOLEY 18FR 5ML SILVER COAT SIL LUBRISIL 1758SI18

## (undated) DEVICE — SPONGE SURGIFOAM 100 1974

## (undated) DEVICE — PACK CYSTO CUSTOM ASC

## (undated) DEVICE — SOL NACL 0.9% IRRIG 1000ML BOTTLE 2F7124

## (undated) DEVICE — DRAPE GYN/UROLOGY FLUID POUCH TUR 29455

## (undated) DEVICE — RETR ELASTIC STAYS LONE STAR SHARP 5MM 8/PACK 3311-8G

## (undated) DEVICE — SPECIMEN CONTAINER 3OZ W/FORMALIN 59901

## (undated) DEVICE — ENDO FORCEP SPIKED SERRATED SHAFT JUMBO 239CM G56998

## (undated) DEVICE — ESU GROUND PAD ADULT W/CORD E7507

## (undated) DEVICE — PAD PERI W/WINGS 1580A

## (undated) RX ORDER — PROPOFOL 10 MG/ML
INJECTION, EMULSION INTRAVENOUS
Status: DISPENSED
Start: 2024-02-06

## (undated) RX ORDER — CEFAZOLIN SODIUM 2 G/50ML
SOLUTION INTRAVENOUS
Status: DISPENSED
Start: 2024-02-06

## (undated) RX ORDER — KETAMINE HYDROCHLORIDE 10 MG/ML
INJECTION, SOLUTION INTRAMUSCULAR; INTRAVENOUS
Status: DISPENSED
Start: 2017-10-10

## (undated) RX ORDER — FENTANYL CITRATE 50 UG/ML
INJECTION, SOLUTION INTRAMUSCULAR; INTRAVENOUS
Status: DISPENSED
Start: 2024-02-06

## (undated) RX ORDER — ACETAMINOPHEN 325 MG/1
TABLET ORAL
Status: DISPENSED
Start: 2017-10-10

## (undated) RX ORDER — OXYCODONE HYDROCHLORIDE 5 MG/1
TABLET ORAL
Status: DISPENSED
Start: 2017-10-10

## (undated) RX ORDER — ACETAMINOPHEN 325 MG/1
TABLET ORAL
Status: DISPENSED
Start: 2024-02-06

## (undated) RX ORDER — OXYCODONE HYDROCHLORIDE 5 MG/1
TABLET ORAL
Status: DISPENSED
Start: 2021-11-05

## (undated) RX ORDER — GABAPENTIN 300 MG/1
CAPSULE ORAL
Status: DISPENSED
Start: 2017-10-10

## (undated) RX ORDER — FENTANYL CITRATE 50 UG/ML
INJECTION, SOLUTION INTRAMUSCULAR; INTRAVENOUS
Status: DISPENSED
Start: 2021-11-05

## (undated) RX ORDER — FENTANYL CITRATE 50 UG/ML
INJECTION, SOLUTION INTRAMUSCULAR; INTRAVENOUS
Status: DISPENSED
Start: 2017-10-10

## (undated) RX ORDER — ACETAMINOPHEN 325 MG/1
TABLET ORAL
Status: DISPENSED
Start: 2021-11-05

## (undated) RX ORDER — LIDOCAINE HYDROCHLORIDE 20 MG/ML
INJECTION, SOLUTION EPIDURAL; INFILTRATION; INTRACAUDAL; PERINEURAL
Status: DISPENSED
Start: 2017-10-10

## (undated) RX ORDER — PROPOFOL 10 MG/ML
INJECTION, EMULSION INTRAVENOUS
Status: DISPENSED
Start: 2017-10-10